# Patient Record
Sex: MALE | URBAN - METROPOLITAN AREA
[De-identification: names, ages, dates, MRNs, and addresses within clinical notes are randomized per-mention and may not be internally consistent; named-entity substitution may affect disease eponyms.]

---

## 2019-01-23 ENCOUNTER — APPOINTMENT (RX ONLY)
Dept: URBAN - METROPOLITAN AREA CLINIC 148 | Facility: CLINIC | Age: 79
Setting detail: DERMATOLOGY
End: 2019-01-23

## 2019-01-23 DIAGNOSIS — L57.0 ACTINIC KERATOSIS: ICD-10-CM

## 2019-01-23 DIAGNOSIS — D18.0 HEMANGIOMA: ICD-10-CM

## 2019-01-23 DIAGNOSIS — D22 MELANOCYTIC NEVI: ICD-10-CM

## 2019-01-23 DIAGNOSIS — L82.1 OTHER SEBORRHEIC KERATOSIS: ICD-10-CM

## 2019-01-23 DIAGNOSIS — L81.4 OTHER MELANIN HYPERPIGMENTATION: ICD-10-CM

## 2019-01-23 PROBLEM — F41.9 ANXIETY DISORDER, UNSPECIFIED: Status: ACTIVE | Noted: 2019-01-23

## 2019-01-23 PROBLEM — I10 ESSENTIAL (PRIMARY) HYPERTENSION: Status: ACTIVE | Noted: 2019-01-23

## 2019-01-23 PROBLEM — D22.5 MELANOCYTIC NEVI OF TRUNK: Status: ACTIVE | Noted: 2019-01-23

## 2019-01-23 PROBLEM — F32.9 MAJOR DEPRESSIVE DISORDER, SINGLE EPISODE, UNSPECIFIED: Status: ACTIVE | Noted: 2019-01-23

## 2019-01-23 PROBLEM — D48.5 NEOPLASM OF UNCERTAIN BEHAVIOR OF SKIN: Status: ACTIVE | Noted: 2019-01-23

## 2019-01-23 PROBLEM — D18.01 HEMANGIOMA OF SKIN AND SUBCUTANEOUS TISSUE: Status: ACTIVE | Noted: 2019-01-23

## 2019-01-23 PROBLEM — E13.9 OTHER SPECIFIED DIABETES MELLITUS WITHOUT COMPLICATIONS: Status: ACTIVE | Noted: 2019-01-23

## 2019-01-23 PROCEDURE — ? COUNSELING

## 2019-01-23 PROCEDURE — 11102 TANGNTL BX SKIN SINGLE LES: CPT | Mod: 59

## 2019-01-23 PROCEDURE — 11103 TANGNTL BX SKIN EA SEP/ADDL: CPT | Mod: 59

## 2019-01-23 PROCEDURE — 99203 OFFICE O/P NEW LOW 30 MIN: CPT | Mod: 25

## 2019-01-23 PROCEDURE — ? LIQUID NITROGEN

## 2019-01-23 PROCEDURE — 69100 BIOPSY OF EXTERNAL EAR: CPT

## 2019-01-23 PROCEDURE — 17004 DESTROY PREMAL LESIONS 15/>: CPT

## 2019-01-23 PROCEDURE — ? BIOPSY BY SHAVE METHOD

## 2019-01-23 ASSESSMENT — LOCATION DETAILED DESCRIPTION DERM
LOCATION DETAILED: LEFT ULNAR DORSAL HAND
LOCATION DETAILED: RIGHT ANTIHELIX
LOCATION DETAILED: LEFT SUPERIOR POSTERIOR NECK
LOCATION DETAILED: LEFT CENTRAL PARIETAL SCALP
LOCATION DETAILED: RIGHT ANTECUBITAL SKIN
LOCATION DETAILED: RIGHT ULNAR DORSAL HAND
LOCATION DETAILED: LEFT ELBOW
LOCATION DETAILED: LEFT SUPERIOR PREAURICULAR CHEEK
LOCATION DETAILED: LEFT SUPERIOR UPPER BACK
LOCATION DETAILED: LEFT INFERIOR MEDIAL UPPER BACK
LOCATION DETAILED: PERIUMBILICAL SKIN
LOCATION DETAILED: RIGHT DISTAL DORSAL FOREARM
LOCATION DETAILED: LEFT POSTERIOR SHOULDER
LOCATION DETAILED: LEFT MEDIAL TRAPEZIAL NECK
LOCATION DETAILED: LEFT RADIAL DORSAL HAND
LOCATION DETAILED: LEFT SUPERIOR LATERAL BUCCAL CHEEK
LOCATION DETAILED: LEFT LATERAL ELBOW
LOCATION DETAILED: LEFT MEDIAL UPPER BACK
LOCATION DETAILED: RIGHT INFERIOR CENTRAL MALAR CHEEK
LOCATION DETAILED: RIGHT MID-UPPER BACK
LOCATION DETAILED: RIGHT SUPERIOR UPPER BACK
LOCATION DETAILED: RIGHT RADIAL DORSAL HAND
LOCATION DETAILED: LEFT DISTAL POSTERIOR UPPER ARM
LOCATION DETAILED: RIGHT DISTAL RADIAL DORSAL FOREARM
LOCATION DETAILED: RIGHT PROXIMAL DORSAL FOREARM
LOCATION DETAILED: RIGHT POSTERIOR SHOULDER
LOCATION DETAILED: RIGHT SUPERIOR HELIX
LOCATION DETAILED: RIGHT ANTERIOR DISTAL UPPER ARM
LOCATION DETAILED: LEFT DISTAL DORSAL FOREARM
LOCATION DETAILED: RIGHT INFERIOR FOREHEAD
LOCATION DETAILED: LEFT CENTRAL MALAR CHEEK
LOCATION DETAILED: LEFT SUPERIOR LATERAL NECK
LOCATION DETAILED: LEFT PROXIMAL RADIAL DORSAL FOREARM
LOCATION DETAILED: RIGHT PROXIMAL RADIAL DORSAL FOREARM
LOCATION DETAILED: LEFT PROXIMAL DORSAL FOREARM

## 2019-01-23 ASSESSMENT — LOCATION SIMPLE DESCRIPTION DERM
LOCATION SIMPLE: NECK
LOCATION SIMPLE: RIGHT FOREARM
LOCATION SIMPLE: RIGHT EAR
LOCATION SIMPLE: LEFT SHOULDER
LOCATION SIMPLE: RIGHT SHOULDER
LOCATION SIMPLE: ABDOMEN
LOCATION SIMPLE: LEFT HAND
LOCATION SIMPLE: RIGHT FOREHEAD
LOCATION SIMPLE: SCALP
LOCATION SIMPLE: LEFT FOREARM
LOCATION SIMPLE: RIGHT CHEEK
LOCATION SIMPLE: LEFT UPPER BACK
LOCATION SIMPLE: LEFT UPPER ARM
LOCATION SIMPLE: POSTERIOR NECK
LOCATION SIMPLE: RIGHT ELBOW
LOCATION SIMPLE: LEFT CHEEK
LOCATION SIMPLE: RIGHT UPPER BACK
LOCATION SIMPLE: RIGHT HAND
LOCATION SIMPLE: LEFT ELBOW
LOCATION SIMPLE: RIGHT UPPER ARM

## 2019-01-23 ASSESSMENT — LOCATION ZONE DERM
LOCATION ZONE: HAND
LOCATION ZONE: EAR
LOCATION ZONE: ARM
LOCATION ZONE: TRUNK
LOCATION ZONE: FACE
LOCATION ZONE: SCALP
LOCATION ZONE: NECK

## 2019-01-23 NOTE — PROCEDURE: LIQUID NITROGEN
Detail Level: Zone
Duration Of Freeze Thaw-Cycle (Seconds): 3
Post-Care Instructions: I reviewed with the patient in detail post-care instructions. Patient is to wear sunprotection, and avoid picking at any of the treated lesions. Pt may apply Vaseline to crusted or scabbing areas.
Render Post-Care Instructions In Note?: no
Consent: The patient's consent was obtained including but not limited to risks of crusting, scabbing, blistering, scarring, darker or lighter pigmentary change, recurrence, incomplete removal and infection.

## 2019-02-13 ENCOUNTER — APPOINTMENT (RX ONLY)
Dept: URBAN - METROPOLITAN AREA CLINIC 148 | Facility: CLINIC | Age: 79
Setting detail: DERMATOLOGY
End: 2019-02-13

## 2019-02-13 PROBLEM — C44.229 SQUAMOUS CELL CARCINOMA OF SKIN OF LEFT EAR AND EXTERNAL AURICULAR CANAL: Status: ACTIVE | Noted: 2019-02-13

## 2019-02-13 PROCEDURE — 17311 MOHS 1 STAGE H/N/HF/G: CPT

## 2019-02-13 PROCEDURE — ? MOHS SURGERY

## 2019-02-13 PROCEDURE — 13152 CMPLX RPR E/N/E/L 2.6-7.5 CM: CPT

## 2019-02-13 NOTE — PROCEDURE: MOHS SURGERY
Body Location Override (Optional - Billing Will Still Be Based On Selected Body Map Location If Applicable): L posterior ear

## 2019-02-18 ENCOUNTER — APPOINTMENT (RX ONLY)
Dept: URBAN - METROPOLITAN AREA CLINIC 148 | Facility: CLINIC | Age: 79
Setting detail: DERMATOLOGY
End: 2019-02-18

## 2019-02-18 DIAGNOSIS — Z48.02 ENCOUNTER FOR REMOVAL OF SUTURES: ICD-10-CM

## 2019-02-18 PROBLEM — D04.62 CARCINOMA IN SITU OF SKIN OF LEFT UPPER LIMB, INCLUDING SHOULDER: Status: ACTIVE | Noted: 2019-02-18

## 2019-02-18 PROCEDURE — ? MOHS SURGERY

## 2019-02-18 PROCEDURE — 17313 MOHS 1 STAGE T/A/L: CPT | Mod: 79

## 2019-02-18 PROCEDURE — 99024 POSTOP FOLLOW-UP VISIT: CPT

## 2019-02-18 PROCEDURE — ? SUTURE REMOVAL (GLOBAL PERIOD)

## 2019-02-18 PROCEDURE — 13121 CMPLX RPR S/A/L 2.6-7.5 CM: CPT | Mod: 79

## 2019-02-18 ASSESSMENT — LOCATION SIMPLE DESCRIPTION DERM: LOCATION SIMPLE: LEFT EAR

## 2019-02-18 ASSESSMENT — LOCATION DETAILED DESCRIPTION DERM: LOCATION DETAILED: LEFT POSTERIOR EAR

## 2019-02-18 ASSESSMENT — LOCATION ZONE DERM: LOCATION ZONE: EAR

## 2019-02-18 NOTE — PROCEDURE: SUTURE REMOVAL (GLOBAL PERIOD)
Detail Level: Detailed
Add 00738 Cpt? (Important Note: In 2017 The Use Of 44767 Is Being Tracked By Cms To Determine Future Global Period Reimbursement For Global Periods): yes

## 2019-02-28 ENCOUNTER — APPOINTMENT (RX ONLY)
Dept: URBAN - METROPOLITAN AREA CLINIC 148 | Facility: CLINIC | Age: 79
Setting detail: DERMATOLOGY
End: 2019-02-28

## 2019-02-28 PROBLEM — C44.529 SQUAMOUS CELL CARCINOMA OF SKIN OF OTHER PART OF TRUNK: Status: ACTIVE | Noted: 2019-02-28

## 2019-02-28 PROCEDURE — ? EXCISION

## 2019-02-28 PROCEDURE — 13101 CMPLX RPR TRUNK 2.6-7.5 CM: CPT | Mod: 79

## 2019-02-28 PROCEDURE — 11602 EXC TR-EXT MAL+MARG 1.1-2 CM: CPT | Mod: 79

## 2019-02-28 PROCEDURE — ? PRESCRIPTION

## 2019-02-28 RX ORDER — MUPIROCIN 20 MG/G
OINTMENT TOPICAL
Qty: 1 | Refills: 0 | Status: ERX | COMMUNITY
Start: 2019-02-28

## 2019-02-28 RX ADMIN — MUPIROCIN: 20 OINTMENT TOPICAL at 19:51

## 2019-02-28 NOTE — PROCEDURE: EXCISION
Burow's Advancement Flap Text: The defect edges were debeveled with a #15 scalpel blade.  Given the location of the defect and the proximity to free margins a Burow's advancement flap was deemed most appropriate.  Using a sterile surgical marker, the appropriate advancement flap was drawn incorporating the defect and placing the expected incisions within the relaxed skin tension lines where possible.    The area thus outlined was incised deep to adipose tissue with a #15 scalpel blade.  The skin margins were undermined to an appropriate distance in all directions utilizing iris scissors. Viral syndrome

## 2019-03-01 ENCOUNTER — APPOINTMENT (RX ONLY)
Dept: URBAN - METROPOLITAN AREA CLINIC 148 | Facility: CLINIC | Age: 79
Setting detail: DERMATOLOGY
End: 2019-03-01

## 2019-03-01 DIAGNOSIS — Z48.817 ENCOUNTER FOR SURGICAL AFTERCARE FOLLOWING SURGERY ON THE SKIN AND SUBCUTANEOUS TISSUE: ICD-10-CM

## 2019-03-01 PROCEDURE — ? POST-OP WOUND CHECK

## 2019-03-01 ASSESSMENT — LOCATION DETAILED DESCRIPTION DERM: LOCATION DETAILED: LEFT POSTERIOR EAR

## 2019-03-01 ASSESSMENT — LOCATION SIMPLE DESCRIPTION DERM: LOCATION SIMPLE: LEFT EAR

## 2019-03-01 ASSESSMENT — LOCATION ZONE DERM: LOCATION ZONE: EAR

## 2019-03-01 NOTE — PROCEDURE: POST-OP WOUND CHECK
Body Location Override (Optional - Billing Will Still Be Based On Selected Body Map Location If Applicable): L posterior ear
Detail Level: Detailed
Add 02585 Cpt? (Important Note: In 2017 The Use Of 31147 Is Being Tracked By Cms To Determine Future Global Period Reimbursement For Global Periods): no
Wound Assessment Override (Optional): no signs of infection, good granulation, no exudate

## 2019-03-04 ENCOUNTER — APPOINTMENT (RX ONLY)
Dept: URBAN - METROPOLITAN AREA CLINIC 148 | Facility: CLINIC | Age: 79
Setting detail: DERMATOLOGY
End: 2019-03-04

## 2019-03-04 DIAGNOSIS — Z48.02 ENCOUNTER FOR REMOVAL OF SUTURES: ICD-10-CM

## 2019-03-04 DIAGNOSIS — L57.0 ACTINIC KERATOSIS: ICD-10-CM

## 2019-03-04 DIAGNOSIS — Z48.817 ENCOUNTER FOR SURGICAL AFTERCARE FOLLOWING SURGERY ON THE SKIN AND SUBCUTANEOUS TISSUE: ICD-10-CM

## 2019-03-04 DIAGNOSIS — L82.1 OTHER SEBORRHEIC KERATOSIS: ICD-10-CM

## 2019-03-04 PROCEDURE — ? ADDITIONAL NOTES

## 2019-03-04 PROCEDURE — 99213 OFFICE O/P EST LOW 20 MIN: CPT | Mod: 24,25

## 2019-03-04 PROCEDURE — ? LIQUID NITROGEN

## 2019-03-04 PROCEDURE — ? POST-OP WOUND CHECK

## 2019-03-04 PROCEDURE — ? SUTURE REMOVAL (GLOBAL PERIOD)

## 2019-03-04 PROCEDURE — 17004 DESTROY PREMAL LESIONS 15/>: CPT | Mod: 79

## 2019-03-04 PROCEDURE — ? COUNSELING

## 2019-03-04 PROCEDURE — ? PRESCRIPTION MEDICATION MANAGEMENT

## 2019-03-04 PROCEDURE — ? PRESCRIPTION

## 2019-03-04 PROCEDURE — 99024 POSTOP FOLLOW-UP VISIT: CPT

## 2019-03-04 RX ORDER — FLUOROURACIL 20 MG/ML
SOLUTION TOPICAL
Qty: 1 | Refills: 0 | Status: ERX | COMMUNITY
Start: 2019-03-04

## 2019-03-04 RX ORDER — TRIAMCINOLONE ACETONIDE 1 MG/G
CREAM TOPICAL
Qty: 1 | Refills: 0 | Status: ERX | COMMUNITY
Start: 2019-03-04

## 2019-03-04 RX ADMIN — FLUOROURACIL: 20 SOLUTION TOPICAL at 15:19

## 2019-03-04 RX ADMIN — TRIAMCINOLONE ACETONIDE: 1 CREAM TOPICAL at 15:20

## 2019-03-04 ASSESSMENT — LOCATION DETAILED DESCRIPTION DERM
LOCATION DETAILED: RIGHT PROXIMAL DORSAL FOREARM
LOCATION DETAILED: RIGHT RADIAL DORSAL HAND
LOCATION DETAILED: RIGHT PROXIMAL RADIAL DORSAL FOREARM
LOCATION DETAILED: RIGHT DISTAL RADIAL DORSAL FOREARM
LOCATION DETAILED: LEFT DISTAL RADIAL DORSAL FOREARM
LOCATION DETAILED: LEFT POSTERIOR EAR
LOCATION DETAILED: RIGHT ULNAR DORSAL HAND
LOCATION DETAILED: LEFT DISTAL POSTERIOR UPPER ARM
LOCATION DETAILED: LEFT CENTRAL MALAR CHEEK
LOCATION DETAILED: LEFT PROXIMAL LATERAL POSTERIOR UPPER ARM
LOCATION DETAILED: LEFT PROXIMAL DORSAL FOREARM
LOCATION DETAILED: LEFT PROXIMAL POSTERIOR UPPER ARM
LOCATION DETAILED: LEFT DISTAL DORSAL FOREARM
LOCATION DETAILED: RIGHT DISTAL DORSAL FOREARM

## 2019-03-04 ASSESSMENT — LOCATION SIMPLE DESCRIPTION DERM
LOCATION SIMPLE: LEFT EAR
LOCATION SIMPLE: LEFT FOREARM
LOCATION SIMPLE: LEFT CHEEK
LOCATION SIMPLE: RIGHT FOREARM
LOCATION SIMPLE: RIGHT HAND
LOCATION SIMPLE: LEFT UPPER ARM

## 2019-03-04 ASSESSMENT — LOCATION ZONE DERM
LOCATION ZONE: HAND
LOCATION ZONE: ARM
LOCATION ZONE: EAR
LOCATION ZONE: FACE

## 2019-03-04 NOTE — PROCEDURE: SUTURE REMOVAL (GLOBAL PERIOD)
Detail Level: Zone
Add 31234 Cpt? (Important Note: In 2017 The Use Of 36679 Is Being Tracked By Cms To Determine Future Global Period Reimbursement For Global Periods): yes

## 2019-03-04 NOTE — PROCEDURE: LIQUID NITROGEN
Duration Of Freeze Thaw-Cycle (Seconds): 3
Consent: The patient's consent was obtained including but not limited to risks of crusting, scabbing, blistering, scarring, darker or lighter pigmentary change, recurrence, incomplete removal and infection.
Detail Level: Zone
Post-Care Instructions: I reviewed with the patient in detail post-care instructions. Patient is to wear sunprotection, and avoid picking at any of the treated lesions. Pt may apply Vaseline to crusted or scabbing areas.
Render Post-Care Instructions In Note?: no

## 2019-03-04 NOTE — PROCEDURE: ADDITIONAL NOTES
Detail Level: Simple
Additional Notes: Discussed with patient to start with the right arm first. Let the wound healed completely on left wrist and then do the other arm. Pt understood and agreed with treatment plan

## 2019-03-04 NOTE — PROCEDURE: POST-OP WOUND CHECK
Add 44760 Cpt? (Important Note: In 2017 The Use Of 70274 Is Being Tracked By Cms To Determine Future Global Period Reimbursement For Global Periods): no
Detail Level: Detailed
Body Location Override (Optional - Billing Will Still Be Based On Selected Body Map Location If Applicable): L posterior ear
Wound Assessment Override (Optional): no signs of infection, good granulation

## 2019-03-04 NOTE — PROCEDURE: PRESCRIPTION MEDICATION MANAGEMENT
Render In Strict Bullet Format?: No
Continue Regimen: Mupirocin BID until healed as Cathy ALVARADO discussed with patient last time
Detail Level: Simple

## 2019-03-15 ENCOUNTER — APPOINTMENT (RX ONLY)
Dept: URBAN - METROPOLITAN AREA CLINIC 148 | Facility: CLINIC | Age: 79
Setting detail: DERMATOLOGY
End: 2019-03-15

## 2019-03-15 DIAGNOSIS — Z48.02 ENCOUNTER FOR REMOVAL OF SUTURES: ICD-10-CM

## 2019-03-15 PROCEDURE — ? SUTURE REMOVAL (GLOBAL PERIOD)

## 2019-03-15 PROCEDURE — 99024 POSTOP FOLLOW-UP VISIT: CPT

## 2019-03-15 ASSESSMENT — LOCATION SIMPLE DESCRIPTION DERM: LOCATION SIMPLE: RIGHT UPPER BACK

## 2019-03-15 ASSESSMENT — LOCATION ZONE DERM: LOCATION ZONE: TRUNK

## 2019-03-15 ASSESSMENT — LOCATION DETAILED DESCRIPTION DERM: LOCATION DETAILED: RIGHT SUPERIOR UPPER BACK

## 2019-03-15 NOTE — PROCEDURE: SUTURE REMOVAL (GLOBAL PERIOD)
Body Location Override (Optional - Billing Will Still Be Based On Selected Body Map Location If Applicable): right superior upper back
Detail Level: Detailed
Add 47065 Cpt? (Important Note: In 2017 The Use Of 28052 Is Being Tracked By Cms To Determine Future Global Period Reimbursement For Global Periods): yes

## 2019-04-01 ENCOUNTER — APPOINTMENT (RX ONLY)
Dept: URBAN - METROPOLITAN AREA CLINIC 148 | Facility: CLINIC | Age: 79
Setting detail: DERMATOLOGY
End: 2019-04-01

## 2019-04-01 DIAGNOSIS — L57.0 ACTINIC KERATOSIS: ICD-10-CM

## 2019-04-01 DIAGNOSIS — L81.4 OTHER MELANIN HYPERPIGMENTATION: ICD-10-CM

## 2019-04-01 PROBLEM — Z85.828 PERSONAL HISTORY OF OTHER MALIGNANT NEOPLASM OF SKIN: Status: ACTIVE | Noted: 2019-04-01

## 2019-04-01 PROCEDURE — ? LIQUID NITROGEN

## 2019-04-01 PROCEDURE — ? PRESCRIPTION

## 2019-04-01 PROCEDURE — 99213 OFFICE O/P EST LOW 20 MIN: CPT | Mod: 25

## 2019-04-01 PROCEDURE — ? COUNSELING

## 2019-04-01 PROCEDURE — 17004 DESTROY PREMAL LESIONS 15/>: CPT

## 2019-04-01 PROCEDURE — ? PRESCRIPTION MEDICATION MANAGEMENT

## 2019-04-01 RX ORDER — FLUOROURACIL 2 G/40G
CREAM TOPICAL
Qty: 1 | Refills: 0 | COMMUNITY
Start: 2019-04-01

## 2019-04-01 RX ADMIN — FLUOROURACIL: 2 CREAM TOPICAL at 13:24

## 2019-04-01 ASSESSMENT — LOCATION DETAILED DESCRIPTION DERM
LOCATION DETAILED: LEFT DISTAL LATERAL POSTERIOR UPPER ARM
LOCATION DETAILED: LEFT PROXIMAL ULNAR DORSAL FOREARM
LOCATION DETAILED: LEFT ANTERIOR DISTAL UPPER ARM
LOCATION DETAILED: LEFT PROXIMAL RADIAL DORSAL FOREARM
LOCATION DETAILED: LEFT PROXIMAL POSTERIOR UPPER ARM
LOCATION DETAILED: LEFT PROXIMAL ULNAR DORSAL RING FINGER
LOCATION DETAILED: RIGHT PROXIMAL DORSAL FOREARM
LOCATION DETAILED: LEFT INFERIOR CENTRAL MALAR CHEEK
LOCATION DETAILED: RIGHT ULNAR DORSAL HAND
LOCATION DETAILED: LEFT DISTAL POSTERIOR UPPER ARM
LOCATION DETAILED: LEFT MEDIAL SUPERIOR CHEST
LOCATION DETAILED: RIGHT DISTAL POSTERIOR UPPER ARM
LOCATION DETAILED: RIGHT PROXIMAL DORSAL MIDDLE FINGER
LOCATION DETAILED: RIGHT DISTAL RADIAL DORSAL FOREARM
LOCATION DETAILED: RIGHT PROXIMAL RADIAL DORSAL FOREARM
LOCATION DETAILED: RIGHT RADIAL DORSAL HAND
LOCATION DETAILED: SUPERIOR THORACIC SPINE
LOCATION DETAILED: LEFT PROXIMAL DORSAL FOREARM
LOCATION DETAILED: LEFT ULNAR DORSAL HAND
LOCATION DETAILED: RIGHT DISTAL DORSAL FOREARM
LOCATION DETAILED: LEFT MEDIAL DORSAL WRIST
LOCATION DETAILED: LEFT RADIAL DORSAL HAND
LOCATION DETAILED: LEFT DISTAL DORSAL FOREARM

## 2019-04-01 ASSESSMENT — LOCATION ZONE DERM
LOCATION ZONE: TRUNK
LOCATION ZONE: HAND
LOCATION ZONE: FACE
LOCATION ZONE: ARM
LOCATION ZONE: FINGER

## 2019-04-01 ASSESSMENT — LOCATION SIMPLE DESCRIPTION DERM
LOCATION SIMPLE: LEFT RING FINGER
LOCATION SIMPLE: LEFT CHEEK
LOCATION SIMPLE: UPPER BACK
LOCATION SIMPLE: LEFT WRIST
LOCATION SIMPLE: LEFT UPPER ARM
LOCATION SIMPLE: CHEST
LOCATION SIMPLE: RIGHT FOREARM
LOCATION SIMPLE: RIGHT MIDDLE FINGER
LOCATION SIMPLE: LEFT HAND
LOCATION SIMPLE: RIGHT HAND
LOCATION SIMPLE: RIGHT UPPER ARM
LOCATION SIMPLE: LEFT FOREARM

## 2019-04-01 NOTE — PROCEDURE: LIQUID NITROGEN
Post-Care Instructions: I reviewed with the patient in detail post-care instructions. Patient is to wear sunprotection, and avoid picking at any of the treated lesions. Pt may apply Vaseline to crusted or scabbing areas.
Duration Of Freeze Thaw-Cycle (Seconds): 3
Consent: The patient's consent was obtained including but not limited to risks of crusting, scabbing, blistering, scarring, darker or lighter pigmentary change, recurrence, incomplete removal and infection.
Render Post-Care Instructions In Note?: no
Detail Level: Zone

## 2019-04-01 NOTE — PROCEDURE: PRESCRIPTION MEDICATION MANAGEMENT
Discontinue Regimen: TAC 0.1%
Plan: Explained to patient to repeat treatment in 3 months again on arms. Apply efudex cream twice a day for two weeks, then use TAC cream BID for two weeks and stop. Moisturize daily. Patient was only spot treating the arms with minimal reaction.
Detail Level: Simple
Render In Strict Bullet Format?: No

## 2021-05-08 ENCOUNTER — INPATIENT (INPATIENT)
Facility: HOSPITAL | Age: 81
LOS: 5 days | Discharge: HOME | DRG: 260 | End: 2021-05-14
Attending: INTERNAL MEDICINE | Admitting: INTERNAL MEDICINE
Payer: MEDICARE

## 2021-05-08 VITALS
SYSTOLIC BLOOD PRESSURE: 184 MMHG | HEART RATE: 104 BPM | OXYGEN SATURATION: 95 % | WEIGHT: 240.08 LBS | TEMPERATURE: 98 F | DIASTOLIC BLOOD PRESSURE: 101 MMHG | RESPIRATION RATE: 18 BRPM | HEIGHT: 73 IN

## 2021-05-08 DIAGNOSIS — Z95.1 PRESENCE OF AORTOCORONARY BYPASS GRAFT: Chronic | ICD-10-CM

## 2021-05-08 LAB
ABO RH CONFIRMATION: SIGNIFICANT CHANGE UP
ALBUMIN SERPL ELPH-MCNC: 3.8 G/DL — SIGNIFICANT CHANGE UP (ref 3.3–5.2)
ALP SERPL-CCNC: 143 U/L — HIGH (ref 40–120)
ALT FLD-CCNC: 15 U/L — SIGNIFICANT CHANGE UP
ANION GAP SERPL CALC-SCNC: 14 MMOL/L — SIGNIFICANT CHANGE UP (ref 5–17)
ANISOCYTOSIS BLD QL: SLIGHT — SIGNIFICANT CHANGE UP
APPEARANCE UR: CLEAR — SIGNIFICANT CHANGE UP
APTT BLD: 36.5 SEC — HIGH (ref 27.5–35.5)
AST SERPL-CCNC: 22 U/L — SIGNIFICANT CHANGE UP
BACTERIA # UR AUTO: NEGATIVE — SIGNIFICANT CHANGE UP
BASOPHILS # BLD AUTO: 0.14 K/UL — SIGNIFICANT CHANGE UP (ref 0–0.2)
BASOPHILS NFR BLD AUTO: 1.7 % — SIGNIFICANT CHANGE UP (ref 0–2)
BILIRUB SERPL-MCNC: 0.6 MG/DL — SIGNIFICANT CHANGE UP (ref 0.4–2)
BILIRUB UR-MCNC: NEGATIVE — SIGNIFICANT CHANGE UP
BLD GP AB SCN SERPL QL: SIGNIFICANT CHANGE UP
BUN SERPL-MCNC: 46 MG/DL — HIGH (ref 8–20)
BURR CELLS BLD QL SMEAR: SLIGHT — SIGNIFICANT CHANGE UP
CALCIUM SERPL-MCNC: 8.6 MG/DL — SIGNIFICANT CHANGE UP (ref 8.6–10.2)
CHLORIDE SERPL-SCNC: 100 MMOL/L — SIGNIFICANT CHANGE UP (ref 98–107)
CK MB CFR SERPL CALC: 6.1 NG/ML — SIGNIFICANT CHANGE UP (ref 0–6.7)
CK SERPL-CCNC: 155 U/L — SIGNIFICANT CHANGE UP (ref 30–200)
CO2 SERPL-SCNC: 28 MMOL/L — SIGNIFICANT CHANGE UP (ref 22–29)
COLOR SPEC: YELLOW — SIGNIFICANT CHANGE UP
CREAT SERPL-MCNC: 2.12 MG/DL — HIGH (ref 0.5–1.3)
DACRYOCYTES BLD QL SMEAR: SLIGHT — SIGNIFICANT CHANGE UP
DIFF PNL FLD: ABNORMAL
ELLIPTOCYTES BLD QL SMEAR: SLIGHT — SIGNIFICANT CHANGE UP
EOSINOPHIL # BLD AUTO: 0.07 K/UL — SIGNIFICANT CHANGE UP (ref 0–0.5)
EOSINOPHIL NFR BLD AUTO: 0.9 % — SIGNIFICANT CHANGE UP (ref 0–6)
EPI CELLS # UR: NEGATIVE — SIGNIFICANT CHANGE UP
ETHANOL SERPL-MCNC: <10 MG/DL — SIGNIFICANT CHANGE UP (ref 0–9)
GLUCOSE BLDC GLUCOMTR-MCNC: 116 MG/DL — HIGH (ref 70–99)
GLUCOSE SERPL-MCNC: 77 MG/DL — SIGNIFICANT CHANGE UP (ref 70–99)
GLUCOSE UR QL: NEGATIVE MG/DL — SIGNIFICANT CHANGE UP
HCT VFR BLD CALC: 36.2 % — LOW (ref 39–50)
HGB BLD-MCNC: 11.1 G/DL — LOW (ref 13–17)
INR BLD: 2.03 RATIO — HIGH (ref 0.88–1.16)
KETONES UR-MCNC: ABNORMAL
LACTATE BLDV-MCNC: 1.3 MMOL/L — SIGNIFICANT CHANGE UP (ref 0.5–2)
LEUKOCYTE ESTERASE UR-ACNC: NEGATIVE — SIGNIFICANT CHANGE UP
LIDOCAIN IGE QN: 13 U/L — LOW (ref 22–51)
LYMPHOCYTES # BLD AUTO: 0.36 K/UL — LOW (ref 1–3.3)
LYMPHOCYTES # BLD AUTO: 4.3 % — LOW (ref 13–44)
MACROCYTES BLD QL: SLIGHT — SIGNIFICANT CHANGE UP
MANUAL SMEAR VERIFICATION: SIGNIFICANT CHANGE UP
MCHC RBC-ENTMCNC: 27.7 PG — SIGNIFICANT CHANGE UP (ref 27–34)
MCHC RBC-ENTMCNC: 30.7 GM/DL — LOW (ref 32–36)
MCV RBC AUTO: 90.3 FL — SIGNIFICANT CHANGE UP (ref 80–100)
MICROCYTES BLD QL: SLIGHT — SIGNIFICANT CHANGE UP
MONOCYTES # BLD AUTO: 0.29 K/UL — SIGNIFICANT CHANGE UP (ref 0–0.9)
MONOCYTES NFR BLD AUTO: 3.5 % — SIGNIFICANT CHANGE UP (ref 2–14)
NEUTROPHILS # BLD AUTO: 7.43 K/UL — HIGH (ref 1.8–7.4)
NEUTROPHILS NFR BLD AUTO: 89.6 % — HIGH (ref 43–77)
NITRITE UR-MCNC: NEGATIVE — SIGNIFICANT CHANGE UP
NT-PROBNP SERPL-SCNC: HIGH PG/ML (ref 0–300)
OVALOCYTES BLD QL SMEAR: SLIGHT — SIGNIFICANT CHANGE UP
PH UR: 6 — SIGNIFICANT CHANGE UP (ref 5–8)
PLAT MORPH BLD: NORMAL — SIGNIFICANT CHANGE UP
PLATELET # BLD AUTO: 180 K/UL — SIGNIFICANT CHANGE UP (ref 150–400)
PLATELET COUNT - ESTIMATE: NORMAL — SIGNIFICANT CHANGE UP
POIKILOCYTOSIS BLD QL AUTO: SLIGHT — SIGNIFICANT CHANGE UP
POLYCHROMASIA BLD QL SMEAR: SLIGHT — SIGNIFICANT CHANGE UP
POTASSIUM SERPL-MCNC: 3.3 MMOL/L — LOW (ref 3.5–5.3)
POTASSIUM SERPL-SCNC: 3.3 MMOL/L — LOW (ref 3.5–5.3)
PROT SERPL-MCNC: 6.9 G/DL — SIGNIFICANT CHANGE UP (ref 6.6–8.7)
PROT UR-MCNC: 100 MG/DL
PROTHROM AB SERPL-ACNC: 22.8 SEC — HIGH (ref 10.6–13.6)
RBC # BLD: 4.01 M/UL — LOW (ref 4.2–5.8)
RBC # FLD: 17.2 % — HIGH (ref 10.3–14.5)
RBC BLD AUTO: NORMAL — SIGNIFICANT CHANGE UP
RBC CASTS # UR COMP ASSIST: ABNORMAL /HPF (ref 0–4)
SARS-COV-2 RNA SPEC QL NAA+PROBE: SIGNIFICANT CHANGE UP
SODIUM SERPL-SCNC: 142 MMOL/L — SIGNIFICANT CHANGE UP (ref 135–145)
SP GR SPEC: 1.01 — SIGNIFICANT CHANGE UP (ref 1.01–1.02)
TROPONIN T SERPL-MCNC: 0.04 NG/ML — SIGNIFICANT CHANGE UP (ref 0–0.06)
TROPONIN T SERPL-MCNC: 0.04 NG/ML — SIGNIFICANT CHANGE UP (ref 0–0.06)
UROBILINOGEN FLD QL: NEGATIVE MG/DL — SIGNIFICANT CHANGE UP
WBC # BLD: 8.29 K/UL — SIGNIFICANT CHANGE UP (ref 3.8–10.5)
WBC # FLD AUTO: 8.29 K/UL — SIGNIFICANT CHANGE UP (ref 3.8–10.5)
WBC UR QL: SIGNIFICANT CHANGE UP

## 2021-05-08 PROCEDURE — 73060 X-RAY EXAM OF HUMERUS: CPT | Mod: 26,RT

## 2021-05-08 PROCEDURE — 93970 EXTREMITY STUDY: CPT | Mod: 26

## 2021-05-08 PROCEDURE — 72170 X-RAY EXAM OF PELVIS: CPT | Mod: 26

## 2021-05-08 PROCEDURE — 72125 CT NECK SPINE W/O DYE: CPT | Mod: 26,MA

## 2021-05-08 PROCEDURE — 70450 CT HEAD/BRAIN W/O DYE: CPT | Mod: 26,MA

## 2021-05-08 PROCEDURE — 71250 CT THORAX DX C-: CPT | Mod: 26,MA

## 2021-05-08 PROCEDURE — 70450 CT HEAD/BRAIN W/O DYE: CPT | Mod: 26,MA,77

## 2021-05-08 PROCEDURE — 74176 CT ABD & PELVIS W/O CONTRAST: CPT | Mod: 26,MA

## 2021-05-08 PROCEDURE — 71045 X-RAY EXAM CHEST 1 VIEW: CPT | Mod: 26

## 2021-05-08 RX ORDER — METOPROLOL TARTRATE 50 MG
5 TABLET ORAL ONCE
Refills: 0 | Status: COMPLETED | OUTPATIENT
Start: 2021-05-08 | End: 2021-05-08

## 2021-05-08 RX ORDER — LISINOPRIL 2.5 MG/1
10 TABLET ORAL DAILY
Refills: 0 | Status: DISCONTINUED | OUTPATIENT
Start: 2021-05-08 | End: 2021-05-08

## 2021-05-08 RX ORDER — INSULIN LISPRO 100/ML
VIAL (ML) SUBCUTANEOUS
Refills: 0 | Status: DISCONTINUED | OUTPATIENT
Start: 2021-05-08 | End: 2021-05-14

## 2021-05-08 RX ORDER — FUROSEMIDE 40 MG
60 TABLET ORAL ONCE
Refills: 0 | Status: DISCONTINUED | OUTPATIENT
Start: 2021-05-08 | End: 2021-05-08

## 2021-05-08 RX ORDER — DEXTROSE 50 % IN WATER 50 %
25 SYRINGE (ML) INTRAVENOUS ONCE
Refills: 0 | Status: DISCONTINUED | OUTPATIENT
Start: 2021-05-08 | End: 2021-05-14

## 2021-05-08 RX ORDER — AMLODIPINE BESYLATE 2.5 MG/1
10 TABLET ORAL DAILY
Refills: 0 | Status: DISCONTINUED | OUTPATIENT
Start: 2021-05-08 | End: 2021-05-11

## 2021-05-08 RX ORDER — POTASSIUM CHLORIDE 20 MEQ
40 PACKET (EA) ORAL ONCE
Refills: 0 | Status: COMPLETED | OUTPATIENT
Start: 2021-05-08 | End: 2021-05-08

## 2021-05-08 RX ORDER — ACETAMINOPHEN 500 MG
975 TABLET ORAL ONCE
Refills: 0 | Status: COMPLETED | OUTPATIENT
Start: 2021-05-08 | End: 2021-05-08

## 2021-05-08 RX ORDER — SODIUM CHLORIDE 9 MG/ML
1000 INJECTION, SOLUTION INTRAVENOUS
Refills: 0 | Status: DISCONTINUED | OUTPATIENT
Start: 2021-05-08 | End: 2021-05-14

## 2021-05-08 RX ORDER — DEXTROSE 50 % IN WATER 50 %
15 SYRINGE (ML) INTRAVENOUS ONCE
Refills: 0 | Status: DISCONTINUED | OUTPATIENT
Start: 2021-05-08 | End: 2021-05-14

## 2021-05-08 RX ORDER — FUROSEMIDE 40 MG
40 TABLET ORAL DAILY
Refills: 0 | Status: DISCONTINUED | OUTPATIENT
Start: 2021-05-08 | End: 2021-05-08

## 2021-05-08 RX ORDER — INSULIN GLARGINE 100 [IU]/ML
7 INJECTION, SOLUTION SUBCUTANEOUS
Refills: 0 | Status: DISCONTINUED | OUTPATIENT
Start: 2021-05-08 | End: 2021-05-14

## 2021-05-08 RX ORDER — SERTRALINE 25 MG/1
100 TABLET, FILM COATED ORAL DAILY
Refills: 0 | Status: DISCONTINUED | OUTPATIENT
Start: 2021-05-08 | End: 2021-05-14

## 2021-05-08 RX ORDER — CARVEDILOL PHOSPHATE 80 MG/1
6.25 CAPSULE, EXTENDED RELEASE ORAL EVERY 12 HOURS
Refills: 0 | Status: DISCONTINUED | OUTPATIENT
Start: 2021-05-08 | End: 2021-05-08

## 2021-05-08 RX ORDER — DILTIAZEM HCL 120 MG
10 CAPSULE, EXT RELEASE 24 HR ORAL ONCE
Refills: 0 | Status: COMPLETED | OUTPATIENT
Start: 2021-05-08 | End: 2021-05-08

## 2021-05-08 RX ORDER — MAGNESIUM OXIDE 400 MG ORAL TABLET 241.3 MG
400 TABLET ORAL DAILY
Refills: 0 | Status: DISCONTINUED | OUTPATIENT
Start: 2021-05-08 | End: 2021-05-10

## 2021-05-08 RX ORDER — METOPROLOL TARTRATE 50 MG
50 TABLET ORAL
Refills: 0 | Status: DISCONTINUED | OUTPATIENT
Start: 2021-05-08 | End: 2021-05-09

## 2021-05-08 RX ORDER — FUROSEMIDE 40 MG
40 TABLET ORAL DAILY
Refills: 0 | Status: DISCONTINUED | OUTPATIENT
Start: 2021-05-08 | End: 2021-05-10

## 2021-05-08 RX ORDER — DEXTROSE 50 % IN WATER 50 %
12.5 SYRINGE (ML) INTRAVENOUS ONCE
Refills: 0 | Status: DISCONTINUED | OUTPATIENT
Start: 2021-05-08 | End: 2021-05-14

## 2021-05-08 RX ORDER — APIXABAN 2.5 MG/1
2 TABLET, FILM COATED ORAL
Refills: 0 | Status: DISCONTINUED | OUTPATIENT
Start: 2021-05-08 | End: 2021-05-08

## 2021-05-08 RX ORDER — GLUCAGON INJECTION, SOLUTION 0.5 MG/.1ML
1 INJECTION, SOLUTION SUBCUTANEOUS ONCE
Refills: 0 | Status: DISCONTINUED | OUTPATIENT
Start: 2021-05-08 | End: 2021-05-14

## 2021-05-08 RX ORDER — TAMSULOSIN HYDROCHLORIDE 0.4 MG/1
0.4 CAPSULE ORAL AT BEDTIME
Refills: 0 | Status: DISCONTINUED | OUTPATIENT
Start: 2021-05-08 | End: 2021-05-14

## 2021-05-08 RX ORDER — PANTOPRAZOLE SODIUM 20 MG/1
40 TABLET, DELAYED RELEASE ORAL
Refills: 0 | Status: DISCONTINUED | OUTPATIENT
Start: 2021-05-08 | End: 2021-05-14

## 2021-05-08 RX ORDER — APIXABAN 2.5 MG/1
5 TABLET, FILM COATED ORAL ONCE
Refills: 0 | Status: COMPLETED | OUTPATIENT
Start: 2021-05-08 | End: 2021-05-08

## 2021-05-08 RX ADMIN — Medication 975 MILLIGRAM(S): at 09:01

## 2021-05-08 RX ADMIN — Medication 10 MILLIGRAM(S): at 17:23

## 2021-05-08 RX ADMIN — Medication 40 MILLIGRAM(S): at 12:43

## 2021-05-08 RX ADMIN — Medication 40 MILLIEQUIVALENT(S): at 12:42

## 2021-05-08 RX ADMIN — INSULIN GLARGINE 7 UNIT(S): 100 INJECTION, SOLUTION SUBCUTANEOUS at 21:12

## 2021-05-08 RX ADMIN — Medication 5 MILLIGRAM(S): at 18:31

## 2021-05-08 RX ADMIN — Medication 10 MILLIGRAM(S): at 17:10

## 2021-05-08 RX ADMIN — Medication 975 MILLIGRAM(S): at 12:22

## 2021-05-08 RX ADMIN — TAMSULOSIN HYDROCHLORIDE 0.4 MILLIGRAM(S): 0.4 CAPSULE ORAL at 20:07

## 2021-05-08 RX ADMIN — CARVEDILOL PHOSPHATE 6.25 MILLIGRAM(S): 80 CAPSULE, EXTENDED RELEASE ORAL at 16:19

## 2021-05-08 RX ADMIN — APIXABAN 5 MILLIGRAM(S): 2.5 TABLET, FILM COATED ORAL at 20:07

## 2021-05-08 NOTE — ED PROVIDER NOTE - PROGRESS NOTE DETAILS
Juan Jose: I discussed with Joselyn COATS who will evaluate the patient. I shared results with patient and wife. Patient has no contraindications to MRI. Juan Jose: I discussed repeat CT head findings with NSx PA who notes there is nothing more to do from NSx perspective, pending echo.

## 2021-05-08 NOTE — ED ADULT NURSE NOTE - NSIMPLEMENTINTERV_GEN_ALL_ED
Implemented All Fall with Harm Risk Interventions:  [place] to call system. Call bell, personal items and telephone within reach. Instruct patient to call for assistance. Room bathroom lighting operational. Non-slip footwear when patient is off stretcher. Physically safe environment: no spills, clutter or unnecessary equipment. Stretcher in lowest position, wheels locked, appropriate side rails in place. Provide visual cue, wrist band, yellow gown, etc. Monitor gait and stability. Monitor for mental status changes and reorient to person, place, and time. Review medications for side effects contributing to fall risk. Reinforce activity limits and safety measures with patient and family. Provide visual clues: red socks.

## 2021-05-08 NOTE — ED PROVIDER NOTE - CARE PLAN
Principal Discharge DX:	Intracranial hemorrhage  Secondary Diagnosis:	Acute on chronic congestive heart failure, unspecified heart failure type   Principal Discharge DX:	Atrial fibrillation, unspecified type  Secondary Diagnosis:	Acute on chronic congestive heart failure, unspecified heart failure type

## 2021-05-08 NOTE — ED PROVIDER NOTE - OBJECTIVE STATEMENT
79 y/o M with PMH A fib on Eliquis, CHF, CAD s/p CABG x 2 presents with wife for fall that occurred 4 days ago walking on his deck without using a walker or cane (patient has both, refuses to use either). He states he missed a step and fell, landing mostly on his right side, did not hit head or lose consciousness, was helped up by a neighbor. Patient states that his left leg "went out from under me," and that two weeks ago he fell again, also to the right side and suffered bruising to his abdomen. He has felt lightheaded since the fall, complaining of right upper arm pain, low back pain. He has been taking tramadol for pain since the fall. He has refused to come to the ED until today because the pain was not getting much better. His wife states that his legs have been increasingly swollen despite taking his diuretic. [facility] [place] [facility] - he has not been in [place] for the past 18 months due to the pandemic. He is fully vaccinated. He denies allergies to medications.    Wife told RN that yesterday his lips turned purple for a few minutes, which has never happened before. Patient has no complaints regarding SOB or cyanosis.

## 2021-05-08 NOTE — ED PROVIDER NOTE - PMH
Atrial fibrillation, unspecified type    Bell's palsy  Right side of face  Congestive heart failure, unspecified HF chronicity, unspecified heart failure type    Coronary artery disease involving native heart, angina presence unspecified, unspecified vessel or lesion type

## 2021-05-08 NOTE — ED ADULT TRIAGE NOTE - CHIEF COMPLAINT QUOTE
patient brought in by wife HX of DM and HTN, states that he missed a step fell denies hitting head and LOC on Eliquis complaining of right shoulder and arm pain and back pain feeling dizzy, states feeling weak on lower left side, patient states that he fell TUESDAY, states that n lately he has been off balance for the past week

## 2021-05-08 NOTE — ED CDU PROVIDER INITIAL DAY NOTE - MEDICAL DECISION MAKING DETAILS
Patient is a 80 year old male with pmhx of CAD, CABG, A-fib, HTN, DM2 who presents for fall, patient had trauma CT which are negative.  patient will be placed in obs for TTE and daily weights, cardiology will see again in AM.    will add on 2nd trop, and doppler

## 2021-05-08 NOTE — CONSULT NOTE ADULT - SUBJECTIVE AND OBJECTIVE BOX
Neurosurgery [facility]  Consult note     This is a 80 year old right hand dominant male with a past medical history significant for AFIB on Eliquis, CHF, CAD, CABG x 2, right Belly's Palsy,       PMHX: angina, Atrial fibrillation, CHF, right Bell's Palsy   PSHX: CABG (coronary artery bypass graft)  FAMILY HISTORY:    SOCIAL HISTORY:  Tobacco Use:  EtOH use:   Substance:    Allergies: No Known Allergies    REVIEW OF SYSTEMS  CONSTITUTIONAL: No fever, weight loss, or fatigue  EYES: No eye pain, visual disturbances, or discharge  ENMT:  No difficulty hearing, tinnitus, vertigo; No sinus or throat pain  NECK: No pain or stiffness  RESPIRATORY: No cough, wheezing, chills or hemoptysis; No shortness of breath  CARDIOVASCULAR: No chest pain, palpitations, dizziness, or leg swelling  GASTROINTESTINAL: No abdominal or epigastric pain. No nausea, vomiting, or hematemesis; No diarrhea or constipation. No melena or hematochezia.  GENITOURINARY: No dysuria, frequency, hematuria, or incontinence  NEUROLOGICAL: No headaches, memory loss, loss of strength, numbness, or tremors  SKIN: No itching, burning, rashes, or lesions   LYMPH NODES: No enlarged glands  ENDOCRINE: No heat or cold intolerance; No hair loss  MUSCULOSKELETAL: No joint pain or swelling; No muscle, back, or extremity pain  PSYCHIATRIC: No depression, anxiety, mood swings, or difficulty sleeping  HEME/LYMPH: No easy bruising, or bleeding gums  ALLERGY AND IMMUNOLOGIC: No hives or eczema    HOME MEDICATIONS:  Home Medications:    MEDICATIONS:  Antibiotics:    Neuro:    Anticoagulation:    OTHER:  furosemide   Injectable 60 milliGRAM(s) IV Push Once    IVF:  potassium chloride    Tablet ER 40 milliEquivalent(s) Oral once      Vital Signs Last 24 Hrs  T(C): 36.6 (08 May 2021 11:05), Max: 36.7 (08 May 2021 07:06)  T(F): 97.9 (08 May 2021 11:05), Max: 98 (08 May 2021 07:06)  HR: 81 (08 May 2021 11:05) (80 - 104)  BP: 161/92 (08 May 2021 11:05) (136/87 - 184/101)  RR: 20 (08 May 2021 11:05) (18 - 20)  SpO2: 95% (08 May 2021 11:05) (95% - 96%)      PHYSICAL EXAM:  GENERAL: NAD, well-developed  HEAD:  Atraumatic, normocephalic  WOUND: Dressing clean dry intact; well healed  EYES: Conjunctiva and sclera clear  ENMT: No tonsillar erythema, exudates, or enlargement; moist mucous membranes  NECK: Supple, no JVD  ALEXANDER COMA SCORE: E- V- M- =       E: 4= opens eyes spontaneously 3= to voice 2= to noxious 1= no opening       V: 5= oriented 4= confused 3= inappropriate words 2= incomprehensible sounds 1= nonverbal 1T= intubated       M: 6= follows commands 5= localizes 4= withdraws 3= flexor posturing 2= extensor posturing 1= no movement  MENTAL STATUS: A A O x3, Appropriately conversant, following simple commands   CRANIAL NERVES: PERRL. EOMI without nystagmus. Facial sensation intact V1-3 distribution b/l. Tongue midline. Hearing grossly intact. Speech clear. Head turning and shoulder shrug intact.   REFLEXES: No pronator drift   MOTOR: strength 5/5 b/l upper and lower extremities  Uppers     Delt     Bicep     Tricep     HG  R                5/5       5/5         5/5         5/5  L                5/5       5/5         5/5         5/5  Lowers      HF     KF      KE     PF     DF     EHL  R             5/5     5/5     5/5    5/5   5/5    5/5  L              5/5    5/5      5/5    5/5   5/5    5/5  SENSATION: grossly intact to light touch all extremities  COORDINATION: Gait intact  SENSATION: grossly intact to light touch all extremities  PLANTAR: downgoing (Babinski)  CHEST/LUNG: Clear to auscultation bilaterally  HEART: +S1/+S2  ABDOMEN: Soft, nontender  EXTREMITIES:  2+ peripheral pulses, no clubbing, cyanosis, or edema  LYMPH: No lymphadenopathy noted  SKIN: Warm, dry; no rashes or lesions    LABS:                        11.1   8.29  )-----------( 180      ( 08 May 2021 08:39 )             36.2         142  |  100  |  46.0<H>  ----------------------------<  77  3.3<L>   |  28.0  |  2.12<H>    Ca    8.6      08 May 2021 08:39    TPro  6.9  /  Alb  3.8  /  TBili  0.6  /  DBili  x   /  AST  22  /  ALT  15  /  AlkPhos  143<H>  05-08ra    PT/INR - ( 08 May 2021 08:39 )   PT: 22.8 sec;   INR: 2.03 ratio      PTT - ( 08 May 2021 08:39 )  PTT:36.5 sec  Urinalysis Basic - ( 08 May 2021 11:07 )    Color: Yellow / Appearance: Clear / S.015 / pH: x  Gluc: x / Ketone: Trace  / Bili: Negative / Urobili: Negative mg/dL   Blood: x / Protein: 100 mg/dL / Nitrite: Negative   Leuk Esterase: Negative / RBC: x / WBC x   Sq Epi: x / Non Sq Epi: x / Bacteria: x      RADIOLOGY & ADDITIONAL STUDIES:  EXAM:  CT CERVICAL SPINE                        EXAM:  CT BRAIN                        PROCEDURE DATE:  2021      IMPRESSION:    CT head: Questionable thin anterior falx subdural hemorrhage measuring 1 cm in thickness. Follow-up head CT or MRI is recommended for further evaluation.    CT C-spine: Multilevel spondylosis. No acute osseous abnormality. Consider MRI as clinically warranted.     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 Patient currently complains of right shoulder pain. Patient denies headache, neck pain, dizziness, visual deficits and mental status change.     PMHX: DM, angina, Atrial fibrillation, CHF, right Bell's Palsy   PSHX: CABG (coronary artery bypass graft)  FAMILY HISTORY: non-contributory      SOCIAL HISTORY: , accompanied by wife   Tobacco Use: denies   EtOH use:  denies   Substance: denies     Allergies: No Known Allergies    REVIEW OF SYSTEMS  CONSTITUTIONAL: No fever, weight loss, or fatigue  EYES: No eye pain, visual disturbances, or discharge  ENMT:  No difficulty hearing, tinnitus, vertigo; No sinus or throat pain  NECK: No pain or stiffness  RESPIRATORY: No cough, wheezing, chills or hemoptysis; No shortness of breath  CARDIOVASCULAR: No chest pain, palpitations, dizziness, or leg swelling  GASTROINTESTINAL: No abdominal or epigastric pain. No nausea, vomiting, or hematemesis; No diarrhea or constipation. No melena or hematochezia.  GENITOURINARY: No dysuria, frequency, hematuria, or incontinence  NEUROLOGICAL: No headaches, memory loss, loss of strength, numbness, or tremors  SKIN: No itching, burning, rashes, or lesions   LYMPH NODES: No enlarged glands  ENDOCRINE: No heat or cold intolerance; No hair loss  MUSCULOSKELETAL: No joint pain or swelling; No muscle, back, or extremity pain  PSYCHIATRIC: No depression, anxiety, mood swings, or difficulty sleeping  HEME/LYMPH: No easy bruising, or bleeding gums  ALLERGY AND IMMUNOLOGIC: No hives or eczema    HOME MEDICATIONS:  Home Medications:      OTHER:  furosemide   Injectable 60 milliGRAM(s) IV Push Once    IVF:  potassium chloride    Tablet ER 40 milliEquivalent(s) Oral once      Vital Signs Last 24 Hrs  T(C): 36.6 (08 May 2021 11:05), Max: 36.7 (08 May 2021 07:06)  T(F): 97.9 (08 May 2021 11:05), Max: 98 (08 May 2021 07:06)  HR: 81 (08 May 2021 11:05) (80 - 104)  BP: 161/92 (08 May 2021 11:05) (136/87 - 184/101)  RR: 20 (08 May 2021 11:05) (18 - 20)  SpO2: 95% (08 May 2021 11:05) (95% - 96%)      PHYSICAL EXAM:  GENERAL: NAD, well-developed  HEAD:  Atraumatic, normocephalic  WOUND: Dressing clean dry intact; well healed  EYES: Conjunctiva and sclera clear  ENMT: No tonsillar erythema, exudates, or enlargement; moist mucous membranes  NECK: Supple, no JVD  ALEXANDER COMA SCORE: E- V- M- =15  CRANIAL NERVES: PERRL. EOMI without nystagmus. Facial sensation intact V1-3 distribution b/l. Tongue midline. Hearing grossly intact. Speech clear. Head turning and shoulder shrug intact.   REFLEXES: No pronator drift   MOTOR: strength 5/5 b/l upper and lower extremities  SENSATION: grossly intact to light touch all extremities  CHEST/LUNG: Clear to auscultation bilaterally  HEART: +S1/+S2  ABDOMEN: Soft, nontender, obese  EXTREMITIES:  2+ peripheral pulses, no clubbing, cyanosis, or edema  LYMPH: No lymphadenopathy noted  SKIN: Warm, dry; no rashes or lesions    LABS:                        11.1   8.29  )-----------( 180      ( 08 May 2021 08:39 )             36.2         142  |  100  |  46.0<H>  ----------------------------<  79  3.3<L>   |  28.0  |  2.12<H>    Ca    8.6      08 May 2021 08:39    TPro  6.9  /  Alb  3.8  /  TBili  0.6  /  DBili  x   /  AST  22  /  ALT  15  /  AlkPhos  143<H>  05-08ra    PT/INR - ( 08 May 2021 08:39 )   PT: 22.8 sec;   INR: 2.03 ratio      PTT - ( 08 May 2021 08:39 )  PTT:36.5 sec  Urinalysis Basic - ( 08 May 2021 11:07 )    Color: Yellow / Appearance: Clear / S.015 / pH: x  Gluc: x / Ketone: Trace  / Bili: Negative / Urobili: Negative mg/dL   Blood: x / Protein: 100 mg/dL / Nitrite: Negative   Leuk Esterase: Negative / RBC: x / WBC x   Sq Epi: x / Non Sq Epi: x / Bacteria: x      RADIOLOGY & ADDITIONAL STUDIES:  EXAM:  CT CERVICAL SPINE                        EXAM:  CT BRAIN                        PROCEDURE DATE:  2021      IMPRESSION:    CT head: Questionable thin anterior falx subdural hemorrhage measuring 1 cm in thickness. Follow-up head CT or MRI is recommended for further evaluation.    CT C-spine: Multilevel spondylosis. No acute osseous abnormality. Consider MRI as clinically warranted.     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 Patient currently complains of right shoulder pain. Patient denies headache, neck pain, dizziness, visual deficits and mental status change.     PMHX: DM, angina, Atrial fibrillation, CHF, right Bell's Palsy   PSHX: CABG (coronary artery bypass graft)  FAMILY HISTORY: non-contributory      SOCIAL HISTORY: , accompanied by wife   Tobacco Use: denies   EtOH use:  denies   Substance: denies     Allergies: No Known Allergies    REVIEW OF SYSTEMS  CONSTITUTIONAL: No fever, weight loss, or fatigue  EYES: No eye pain, visual disturbances, or discharge  ENMT:  No difficulty hearing, tinnitus, vertigo; No sinus or throat pain  NECK: No pain or stiffness  RESPIRATORY: No cough, wheezing, chills or hemoptysis; No shortness of breath  CARDIOVASCULAR: No chest pain, palpitations, dizziness, or leg swelling  GASTROINTESTINAL: No abdominal or epigastric pain. No nausea, vomiting, or hematemesis; No diarrhea or constipation. No melena or hematochezia.  GENITOURINARY: No dysuria, frequency, hematuria, or incontinence  NEUROLOGICAL: No headaches, memory loss, loss of strength, numbness, or tremors  SKIN: No itching, burning, rashes, or lesions   LYMPH NODES: No enlarged glands  ENDOCRINE: No heat or cold intolerance; No hair loss  MUSCULOSKELETAL: No joint pain or swelling; No muscle, back, or extremity pain  PSYCHIATRIC: No depression, anxiety, mood swings, or difficulty sleeping  HEME/LYMPH: No easy bruising, or bleeding gums  ALLERGY AND IMMUNOLOGIC: No hives or eczema    HOME MEDICATIONS:  Home Medications:      OTHER:  furosemide   Injectable 60 milliGRAM(s) IV Push Once    IVF:  potassium chloride    Tablet ER 40 milliEquivalent(s) Oral once      Vital Signs Last 24 Hrs  T(C): 36.6 (08 May 2021 11:05), Max: 36.7 (08 May 2021 07:06)  T(F): 97.9 (08 May 2021 11:05), Max: 98 (08 May 2021 07:06)  HR: 81 (08 May 2021 11:05) (80 - 104)  BP: 161/92 (08 May 2021 11:05) (136/87 - 184/101)  RR: 20 (08 May 2021 11:05) (18 - 20)  SpO2: 95% (08 May 2021 11:05) (95% - 96%)      PHYSICAL EXAM:  GENERAL: NAD, well-developed  HEAD:  Atraumatic, normocephalic  WOUND: Dressing clean dry intact; well healed  EYES: Conjunctiva and sclera clear  ENMT: No tonsillar erythema, exudates, or enlargement; moist mucous membranes  NECK: Supple, no JVD  ALEXANDER COMA SCORE: E- V- M- =15  CRANIAL NERVES: PERRL. EOMI without nystagmus. Right facial droop, Tongue midline. Hearing grossly intact. Speech clear. Head turning and shoulder shrug intact.   REFLEXES: No pronator drift   MOTOR: strength 5/5 b/l upper and lower extremities  SENSATION: grossly intact to light touch all extremities  CHEST/LUNG: Clear to auscultation bilaterally  HEART: +S1/+S2  ABDOMEN: Soft, nontender, obese  EXTREMITIES:  2+ peripheral pulses, no clubbing, cyanosis, or edema  LYMPH: No lymphadenopathy noted  SKIN: Warm, dry; no rashes or lesions    LABS:                        11.1   8.29  )-----------( 180      ( 08 May 2021 08:39 )             36.2         142  |  100  |  46.0<H>  ----------------------------<  79  3.3<L>   |  28.0  |  2.12<H>    Ca    8.6      08 May 2021 08:39    TPro  6.9  /  Alb  3.8  /  TBili  0.6  /  DBili  x   /  AST  22  /  ALT  15  /  AlkPhos  143<H>  05-08ra    PT/INR - ( 08 May 2021 08:39 )   PT: 22.8 sec;   INR: 2.03 ratio      PTT - ( 08 May 2021 08:39 )  PTT:36.5 sec  Urinalysis Basic - ( 08 May 2021 11:07 )    Color: Yellow / Appearance: Clear / S.015 / pH: x  Gluc: x / Ketone: Trace  / Bili: Negative / Urobili: Negative mg/dL   Blood: x / Protein: 100 mg/dL / Nitrite: Negative   Leuk Esterase: Negative / RBC: x / WBC x   Sq Epi: x / Non Sq Epi: x / Bacteria: x      RADIOLOGY & ADDITIONAL STUDIES:  EXAM:  CT CERVICAL SPINE                        EXAM:  CT BRAIN                        PROCEDURE DATE:  2021      IMPRESSION:    CT head: Questionable thin anterior falx subdural hemorrhage measuring 1 cm in thickness. Follow-up head CT or MRI is recommended for further evaluation.    CT C-spine: Multilevel spondylosis. No acute osseous abnormality. Consider MRI as clinically warranted.

## 2021-05-08 NOTE — ED ADULT NURSE REASSESSMENT NOTE - NS ED NURSE REASSESS COMMENT FT1
Received report from HARMEET Higginbotham. WESLEY noted, respirations even and nonlabored. Pt without complaints at this time.

## 2021-05-08 NOTE — ED PROVIDER NOTE - PHYSICAL EXAMINATION
Const: Awake, alert and oriented. In no acute distress. Well appearing.  HEENT: NC/AT. Moist mucous membranes.  Eyes: No scleral icterus. EOMI.  Neck:. Soft and supple. Full ROM without pain.  Cardiac: Tachycardic rate and regular rhythm. +S1/S2. No murmurs. Peripheral pulses 2+ and symmetric. 2+b/l LE edema.  Resp: Speaking in full sentences. No evidence of respiratory distress. No wheezes, rales or rhonchi.  Abd: Soft, non-tender, non-distended. Normal bowel sounds in all 4 quadrants. No guarding or rebound.  Back: Spine midline and non-tender. No CVAT.  Skin: Ecchymosis to right upper arm, ecchymosis to right flank. No rashes, abrasions or lacerations.  MSK: Chest wall stable, pelvis stable. No deformity. Full ROM all extremities.  Neuro: Right facial droop (chronic per wife - Bell's palsy), numbness to right side of face (chronic per patient - Bell's palsy), PERRL, EOMI. Tongue midline. Normal finger to nose. Normal heel to shin. Normal rapid alternating movements. No pronator drift. Sensation symmetrically intact bilateral upper and lower extremities.

## 2021-05-08 NOTE — CONSULT NOTE ADULT - SUBJECTIVE AND OBJECTIVE BOX
[place] [facility]                                                               Office:  [address] [address]                                                              Telephone: [phone]. Fax:[phone]                                                                        CARDIOLOGY CONSULTATION NOTE                                                                                             Consult requested by:  Dr. Ingram   Reason for Consultation: CHF  History obtained by: Patient and medical record   obtained: No    Covid Status: Pending    Chief complaint:    Patient is a 80y old  Male who presents with a chief complaint of subdural hemorrhage (08 May 2021 11:22)      HPI: 79 y/o M with a PMHx of CAD s/p CABG c/b sepsis and sternal wound infection s/p sternum removal and pec flap and angioplasty, CHF (unknown baseline EF), Afib (on Eliquis), HTN, DMII, and Bell's Palsy who presented to the ER with complaints of back pain and dizziness after a fall 4 days ago. Patient states that he missed a step, fell backwards, and landed on his right side. Patient states that he has been having some dizziness, felt like he might pass out, as well as back pain since the fall. Patient's wife states he refused to come to the ER initially, but the symptoms continued to worsen. Patient just flew up from [place] as well, and does not have a Cardiologist following in [place]. Patient also noted for the last few months he has been having continued shortness of breath and leg swelling, and was recently started on lasix in addition to metolazone. Patient denies any fevers, chills, CP, abdominal pain, diarrhea, or vision changes.     REVIEW OF SYSTEMS:  CONSTITUTIONAL: No fever, weight loss, or fatigue  ENMT:  No difficulty hearing, tinnitus, vertigo; No sinus or throat pain  NECK: No pain or stiffness  CARDIOVASCULAR: AS PER HPI  RESPIRATORY: AS PER HPI  : No dysuria, no hematuria   GI: No dark color stool, no melena, no diarrhea, no constipation, no abdominal pain   NEURO: AS PER HPI  MUSCULOSKELETAL: No joint pain or swelling; No muscle, back, or extremity pain  PSYCH: No agitation, no anxiety.    ALL OTHER REVIEW OF SYSTEMS ARE NEGATIVE.      PREVIOUS DIAGNOSTIC TESTING  ECHO FINDINGS: Pending      ALLERGIES: Allergies    No Known Allergies    Intolerances      PAST MEDICAL HISTORY  Coronary artery disease involving native heart, angina presence unspecified, unspecified vessel or lesion type    Atrial fibrillation, unspecified type    Congestive heart failure, unspecified HF chronicity, unspecified heart failure type    Bell&#x27;s palsy        PAST SURGICAL HISTORY  S/P CABG (coronary artery bypass graft)        FAMILY HISTORY: Non-contributory.      SOCIAL HISTORY:  Denies smoking/alcohol/drugs. Splits time between [place] and [place]      CURRENT MEDICATIONS:  amLODIPine   Tablet 10 milliGRAM(s) Oral daily  furosemide   Injectable 40 milliGRAM(s) IV Push daily  lisinopril 10 milliGRAM(s) Oral daily  metolazone 5 milliGRAM(s) Oral daily  tamsulosin 0.4 milliGRAM(s) Oral at bedtime     sertraline  pantoprazole    Tablet  insulin glargine Injectable (LANTUS)  magnesium oxide  potassium chloride    Tablet ER      HOME MEDICATIONS:  Metolazone 5mg PO qday   Norvasc 10mg PO qday   Magnesium Oxid 400mg PO qday   Omeprazole 20mg PO qday   Eliquis 5mg PO BID  Metoprolol   Flomax 0.4mg PO qday  Lipitor 1/2? per day  Lexapro  Lantus 17 U 2x a day  Lasix 40mg PO qday  Lisinopril 10mg PO qday  Sertraline 100mg     Vital Signs Last 24 Hrs  T(C): 36.6 (08 May 2021 11:05), Max: 36.7 (08 May 2021 07:06)  T(F): 97.9 (08 May 2021 11:05), Max: 98 (08 May 2021 07:06)  HR: 81 (08 May 2021 11:05) (80 - 104)  BP: 161/92 (08 May 2021 11:05) (136/87 - 184/101)  RR: 20 (08 May 2021 11:05) (18 - 20)  SpO2: 95% (08 May 2021 11:05) (95% - 96%)      PHYSICAL EXAM:  Constitutional: Comfortable . No acute distress.   HEENT: Atraumatic and normocephalic , neck is supple . no JVD. No carotid bruit. PEERL   CNS: A&Ox3. No focal deficits. EOMI. Cranial nerves II-IX are intact.   Lymph Nodes: Cervical : Not palpable.  Respiratory: CTAB  Cardiovascular: Irregularly irregular. No rubs or gallop. II/VI systolic murmur lsb  Gastrointestinal: Soft non-tender and non distended . +Bowel sounds. negative Dang's sign.  Extremities: 2+ b/l LE edema.   Psychiatric: Calm . no agitation.  Skin: No skin rash/ulcers visualized to face, hands or feet.    Intake and output:     LABS:                        11.1   8.29  )-----------( 180      ( 08 May 2021 08:39 )             36.2         142  |  100  |  46.0<H>  ----------------------------<  77  3.3<L>   |  28.0  |  2.12<H>    Ca    8.6      08 May 2021 08:39    TPro  6.9  /  Alb  3.8  /  TBili  0.6  /  DBili  x   /  AST  22  /  ALT  15  /  AlkPhos  143<H>      CARDIAC MARKERS ( 08 May 2021 08:39 )  x     / 0.04 ng/mL / 155 U/L / x     / 6.1 ng/mL    ;p-BNP=Serum Pro-Brain Natriuretic Peptide: 27789 pg/mL ( @ 08:39)    PT/INR - ( 08 May 2021 08:39 )   PT: 22.8 sec;   INR: 2.03 ratio         PTT - ( 08 May 2021 08:39 )  PTT:36.5 sec  Urinalysis Basic - ( 08 May 2021 11:07 )    Color: Yellow / Appearance: Clear / S.015 / pH: x  Gluc: x / Ketone: Trace  / Bili: Negative / Urobili: Negative mg/dL   Blood: x / Protein: 100 mg/dL / Nitrite: Negative   Leuk Esterase: Negative / RBC: 11-25 /HPF / WBC 0-2   Sq Epi: x / Non Sq Epi: Negative / Bacteria: Negative        INTERPRETATION OF TELEMETRY: Reviewed by me. Afib, PVCs  ECG: Reviewed by me. Afib, Bifasicular block    RADIOLOGY & ADDITIONAL STUDIES:     < from: CT Chest No Cont (21 @ 10:16) >  FINDINGS:  CHEST:  LUNGS AND LARGE AIRWAYS: Patent central airways. No pulmonary nodules. Trace subpleural atelectasis throughout the lungs.  PLEURA: No pleural effusion. No pneumothorax or pneumomediastinum. No hemothorax.  VESSELS: Within normal limits.  HEART: Status post CABG. The heart is enlarged.. No pericardial effusion.  MEDIASTINUM AND ADAIR: No lymphadenopathy.  CHEST WALL AND LOWER NECK: Within normal limits.    ABDOMEN AND PELVIS:  LIVER: The liver is mildly prominent and mildly heterogeneous in attenuation, which may represent underlying hepatocellular disease.  BILE DUCTS: Normal caliber.  GALLBLADDER: Multiple small gallstones..  SPLEEN: Within normal limits.  PANCREAS: Within normal limits.  ADRENALS: Within normal limits.  KIDNEYS/URETERS: Within normal limits.    BLADDER:Mildly thickened and trabeculated with stone containing diverticula of the dome.  REPRODUCTIVE ORGANS: The prostate is enlarged.    BOWEL: Diverticulum of the second portion of the duodenum. Sigmoid diverticulosis.  PERITONEUM: There is trace free fluid in the pelvis and trace perihepatic fluid.  VESSELS: Within normal limits.  RETROPERITONEUM/LYMPH NODES: No lymphadenopathy.  ABDOMINAL WALL: Status post right inguinal hernia repair. Small fat-containing umbilical hernia.  BONES: Chronic mild compression deformity of the central superior endplate of L1.    IMPRESSION: No evidence of acute visceral organ or bony injury on this noncontrast CT. Mild cirrhotic morphology of the liver with trace intraperitoneal fluid. Thickened bladder with trabeculation and stone containing diverticula and enlarged prostate, compatible chronic bladder outlet obstruction.    < end of copied text >    < from: CT Head No Cont (21 @ 10:15) >    FINDINGS: Mild anterior dural thickening tiny calcification on image 46 of series 4. Questionable thin anterior falx subdural hemorrhage measuring 1 mm image 40 of series 4.  There is periventricular and subcortical white matter hypodensity without mass effect, nonspecific, likely representing mild chronic microvascular ischemic changes. There is no compelling evidence for an acute transcortical infarction. There is no other evidence of mass, mass effect, midline shift or extra-axial fluid collection. The lateral ventricles and cortical sulci are otherwise age-appropriate in size and configuration. Trace mucosal thickening bilateral maxillary sinuses The orbits, mastoid air cells and visualized paranasal sinuses are normal. The calvarium is intact. Consider follow-up CT or MRI as clinically warranted.    < end of copied text >

## 2021-05-08 NOTE — CONSULT NOTE ADULT - ATTENDING COMMENTS
The patient was seen and examined  Details per the resident's consult note  Fall several days prior to presentation  Now complains of arm pain--no fracture  The work-up included a CT scan of the brain--this shows a SDH  Neurologically okay  No other injury  CXR and PXR okay  Cleared from trauma

## 2021-05-08 NOTE — CONSULT NOTE ADULT - ASSESSMENT
This is a 80 year old right hand dominant male with a past medical history significant for AFIB on Eliquis, CHF, CAD, CABG x 2, right Belly's Palsy,     1. Repeat Ct scan non-contrast in 6 hours  2. Continue neuro checks   3. Maintain SBP less than 150  4.  This is a 80 year old right hand dominant male with a past medical history significant for DM, CAD, CHF, CVA, AFIB on Eliquis, CHF, CAD, CABG x 2, right Belly's Palsy, status post fall 4 days, ago with a questionable thin anterior falx subdural hemorrhage.     1. Repeat Ct scan non-contrast in 6 hours  2. Continue neuro checks   3. Maintain SBP less than 150  4. Tylenol for pain to avoid over sedation   5. Plan was discussed with Dr Mcclendon

## 2021-05-08 NOTE — CONSULT NOTE ADULT - ASSESSMENT
A/P:  81 y/o M with a PMHx of CAD s/p CABG c/b sepsis and sternal wound infection s/p sternum removal and pec flap and angioplasty, CHF (unknown baseline EF), Afib (on Eliquis), HTN, DMII, and Bell's Palsy who presented to the ER with complaints of back pain and dizziness after a fall 4 days ago. Patient states that he missed a step, fell backwards, and landed on his right side. Patient states that he has been having some dizziness, felt like he might pass out, as well as back pain since the fall. Patient's wife states he refused to come to the ER initially, but the symptoms continued to worsen. Patient just flew up from [place] as well, and does not have a Cardiologist following in [place]. Patient also noted for the last few months he has been having continued shortness of breath and leg swelling, and was recently started on lasix in addition to metolazone. Patient denies any fevers, chills, CP, abdominal pain, diarrhea, or vision changes.   Troponin negative x 1  BNP 78230    Acute Decompensated CHF  - Obtain TTE.   - LE swelling, but CT chest and lung exam not with significant fluid overload.   - Continue metolazone 5mg PO qday and Lasix 40mg IV daily.   - Further GDMT based on echo results.   - Monitor on telemetry.   - Strict i/o and daily weights.  -  Keep K > 4, Mg > 2.  -  Monitor renal function with ongoing diuresis.    Atrial Fibrillation  - Currently rate controlled.   - Continue home metoprolol dose.   - Hold Eliquis due to concern for SDH on CT head.   - Continue telemetry monitoring.     Subdural Hematoma  - Neurosurgery consulted.   - MRI pending.   - Hold AC at this time.     Assessment and recommendations are final when note is signed by the attending.

## 2021-05-08 NOTE — CONSULT NOTE ADULT - ATTENDING COMMENTS
80M history as listed follows with cardiologist at [facility] had recent mechanical fall found with traumatic SDH on Eliquis for Afib, reports recent dyspnea on exertion, significantly elevated pBNP although CT chest without much pulmonary edema, on Lasix 40mg and metolazone 5mg daily at home, wife reports baseline Cr less than 2.0, does not follow up with outside nephrologist  -change to IV Lasix 40mg + metolazone 5mg daily- consider Torsemide on discharge  -hold lisinopril for now and monitor Cr. trend with diuresis  -change metoprolol to carvedilol for BP control  -await TTE  -await repeat CT head and neurosurgery decision about when can resume anticoagulation for Afib          Gamal Duke DO, [facility]  Faculty Non-Invasive Cardiologist  [phone] 80M history as listed follows with cardiologist at [facility] [place] [facility] had recent mechanical fall found with traumatic SDH on Eliquis for Afib, reports recent dyspnea on exertion, significantly elevated pBNP although CT chest without much pulmonary edema, on Lasix 40mg and metolazone 5mg daily at home, wife reports baseline Cr less than 2.0, does not follow up with outside nephrologist  -change to IV Lasix 40mg + metolazone 5mg daily- consider Torsemide on discharge  -hold lisinopril for now and monitor Cr. trend with diuresis  -change metoprolol to carvedilol for BP control however per report wife recollect prior delayed drug rash to carvedilol in the past, already received 1 dose in the ER and discontinue, will increase metoprolol 50mg BID for now.   -await TTE  -await repeat CT head and neurosurgery decision about when can resume anticoagulation for Afib          Gamal Duke DO, [facility]  Faculty Non-Invasive Cardiologist  [phone]

## 2021-05-08 NOTE — CONSULT NOTE ADULT - ASSESSMENT
79yo M s/p fall on Eliquis. Questionable SDH on CT head. CT cspine and remainder of exam negative for any traumatic injury.     - appreciate neurosurgery recs  - final disposition pending results of repeat CT head to r/o acute intracranial bleed.

## 2021-05-08 NOTE — ED ADULT NURSE NOTE - OBJECTIVE STATEMENT
A&Ox3, resp wnl, c/o pain to buttocks, right shoulder " all over " after falling, has been falling lately, due to loss of balance

## 2021-05-08 NOTE — ED PROVIDER NOTE - NS ED ROS FT
Const: Denies fever, chills  HEENT: Denies blurry vision, sore throat  Neck: Denies neck pain/stiffness  Resp: Denies coughing, SOB  Cardiovascular: + LE edema. Denies CP, palpitations  GI: Denies nausea, vomiting, abdominal pain, diarrhea, constipation, blood in stool  : Denies urinary frequency/urgency/dysuria, hematuria  MSK: + back pain, + right arm pain.  Neuro: + lightheadedness.  Denies HA, numbness, weakness  Skin: Denies rashes.

## 2021-05-08 NOTE — ED CDU PROVIDER INITIAL DAY NOTE - ATTENDING CONTRIBUTION TO CARE
seen with acp  in obs for extensive cardiac hx and falling  eval sig acute on chronic chf  agree with care p howie

## 2021-05-08 NOTE — ED CDU PROVIDER INITIAL DAY NOTE - OBJECTIVE STATEMENT
Patient is a 80 year old male with pmhx of CAD, CABG, A-fib, HTN, DM2 who presented to ER c/o falls on Tuesday.  patient flew from [place] on Tuesday and fell upon arrival. patient came today due to pain.  patient with bruising on right arm and torso.  patient had negative xrays, CT of Head showed possible SDH however repeat was negative.  patient seen by Neurosurgery and Trauma.  patient also c/o chronic LE edema, worsened over past few days.  patient seen by cardiology who was obs overnight with TTE.  patient will be placed in obs.  patient admits to SOB and PINA. no chest pain

## 2021-05-08 NOTE — ED PROVIDER NOTE - CLINICAL SUMMARY MEDICAL DECISION MAKING FREE TEXT BOX
79 y/o M with extensive cardiac history presents for mechanical fall 4 days ago, no head trauma, but has been lightheaded ever since, b/l LE edema. Will evaluate for traumatic injuries as patient is on Eliquis, no head trauma or LOC (contrary to triage RN note), however will evaluate for intracranial pathology as cause of lightheadedness, cardiac work up and diurese as needed.

## 2021-05-08 NOTE — CONSULT NOTE ADULT - SUBJECTIVE AND OBJECTIVE BOX
Trauma and Acute Care Surgery Consult Note    HPI:   79 y/o M with PMH A fib on Eliquis, CHF, CAD s/p CABG x 2 presents with wife for fall that occurred 4 days ago walking on his deck without using a walker or cane (patient has both, refuses to use either). He states he missed a step and fell, landing mostly on his right side, did not hit head or lose consciousness, was helped up by a neighbor. Patient states that his left leg "went out from under me," and that two weeks ago he fell again, also to the right side. Complains of right arm pain. denies any headache. He has been taking tramadol for pain since the fall. He has refused to come to the ED until today because the pain was not getting much better.    PMH:  Coronary artery disease involving native heart, angina presence unspecified, unspecified vessel or lesion type  Atrial fibrillation, unspecified type  Congestive heart failure, unspecified HF chronicity, unspecified heart failure type  Bell&#x27;s palsy  Right side of face    PSH:  S/P CABG (coronary artery bypass graft)    Home Medications:  eliquis  amlodipine  hctz    ALL:  nkda    FMH:  Denies family history of gastrointestinal malignancy     SOC Hx:   Denies etoh, tobacco, or drug use     Physical Exam:   Gen: well appearing elderly male, NAD  Neuro: AOx3, EOMI, PERRLA, no gross deficit on exam. CTLS spines nontender to palpation without deformity.   HEENT: No oral/mucosal lesions, no neck masses or lymphadenopathy  RESP: CTABL, nonlabored breathing  CVS: RRR,   GI: abd soft, NT, ND, no rebound/guarding  Extremities: 2+ peripheral pulses. RUE w/ ecchymosis.

## 2021-05-08 NOTE — ED ADULT NURSE NOTE - PMH
Atrial fibrillation, unspecified type    Congestive heart failure, unspecified HF chronicity, unspecified heart failure type    Coronary artery disease involving native heart, angina presence unspecified, unspecified vessel or lesion type

## 2021-05-09 DIAGNOSIS — I50.9 HEART FAILURE, UNSPECIFIED: ICD-10-CM

## 2021-05-09 LAB
A1C WITH ESTIMATED AVERAGE GLUCOSE RESULT: 7.5 % — HIGH (ref 4–5.6)
ANION GAP SERPL CALC-SCNC: 13 MMOL/L — SIGNIFICANT CHANGE UP (ref 5–17)
BUN SERPL-MCNC: 43 MG/DL — HIGH (ref 8–20)
CALCIUM SERPL-MCNC: 9 MG/DL — SIGNIFICANT CHANGE UP (ref 8.6–10.2)
CHLORIDE SERPL-SCNC: 91 MMOL/L — LOW (ref 98–107)
CO2 SERPL-SCNC: 27 MMOL/L — SIGNIFICANT CHANGE UP (ref 22–29)
CREAT SERPL-MCNC: 1.93 MG/DL — HIGH (ref 0.5–1.3)
ESTIMATED AVERAGE GLUCOSE: 169 MG/DL — HIGH (ref 68–114)
GLUCOSE BLDC GLUCOMTR-MCNC: 100 MG/DL — HIGH (ref 70–99)
GLUCOSE BLDC GLUCOMTR-MCNC: 193 MG/DL — HIGH (ref 70–99)
GLUCOSE BLDC GLUCOMTR-MCNC: 236 MG/DL — HIGH (ref 70–99)
GLUCOSE SERPL-MCNC: 102 MG/DL — HIGH (ref 70–99)
MAGNESIUM SERPL-MCNC: 1.3 MG/DL — LOW (ref 1.6–2.6)
NT-PROBNP SERPL-SCNC: HIGH PG/ML (ref 0–300)
NT-PROBNP SERPL-SCNC: HIGH PG/ML (ref 0–300)
POTASSIUM SERPL-MCNC: 2.9 MMOL/L — CRITICAL LOW (ref 3.5–5.3)
POTASSIUM SERPL-SCNC: 2.9 MMOL/L — CRITICAL LOW (ref 3.5–5.3)
SODIUM SERPL-SCNC: 131 MMOL/L — LOW (ref 135–145)
TSH SERPL-MCNC: 0.91 UIU/ML — SIGNIFICANT CHANGE UP (ref 0.27–4.2)

## 2021-05-09 PROCEDURE — 93306 TTE W/DOPPLER COMPLETE: CPT | Mod: 26

## 2021-05-09 PROCEDURE — 99223 1ST HOSP IP/OBS HIGH 75: CPT

## 2021-05-09 PROCEDURE — 99233 SBSQ HOSP IP/OBS HIGH 50: CPT

## 2021-05-09 PROCEDURE — 99223 1ST HOSP IP/OBS HIGH 75: CPT | Mod: AI

## 2021-05-09 RX ORDER — POTASSIUM CHLORIDE 20 MEQ
20 PACKET (EA) ORAL
Refills: 0 | Status: COMPLETED | OUTPATIENT
Start: 2021-05-09 | End: 2021-05-09

## 2021-05-09 RX ORDER — MAGNESIUM SULFATE 500 MG/ML
2 VIAL (ML) INJECTION ONCE
Refills: 0 | Status: COMPLETED | OUTPATIENT
Start: 2021-05-09 | End: 2021-05-09

## 2021-05-09 RX ORDER — APIXABAN 2.5 MG/1
5 TABLET, FILM COATED ORAL
Refills: 0 | Status: DISCONTINUED | OUTPATIENT
Start: 2021-05-09 | End: 2021-05-12

## 2021-05-09 RX ORDER — METOPROLOL TARTRATE 50 MG
100 TABLET ORAL EVERY 12 HOURS
Refills: 0 | Status: DISCONTINUED | OUTPATIENT
Start: 2021-05-09 | End: 2021-05-11

## 2021-05-09 RX ORDER — ASPIRIN/CALCIUM CARB/MAGNESIUM 324 MG
81 TABLET ORAL DAILY
Refills: 0 | Status: DISCONTINUED | OUTPATIENT
Start: 2021-05-09 | End: 2021-05-13

## 2021-05-09 RX ORDER — SODIUM CHLORIDE 9 MG/ML
500 INJECTION INTRAMUSCULAR; INTRAVENOUS; SUBCUTANEOUS ONCE
Refills: 0 | Status: COMPLETED | OUTPATIENT
Start: 2021-05-09 | End: 2021-05-09

## 2021-05-09 RX ORDER — LANOLIN ALCOHOL/MO/W.PET/CERES
5 CREAM (GRAM) TOPICAL ONCE
Refills: 0 | Status: COMPLETED | OUTPATIENT
Start: 2021-05-09 | End: 2021-05-09

## 2021-05-09 RX ORDER — POTASSIUM CHLORIDE 20 MEQ
40 PACKET (EA) ORAL EVERY 4 HOURS
Refills: 0 | Status: COMPLETED | OUTPATIENT
Start: 2021-05-09 | End: 2021-05-09

## 2021-05-09 RX ADMIN — INSULIN GLARGINE 7 UNIT(S): 100 INJECTION, SOLUTION SUBCUTANEOUS at 21:37

## 2021-05-09 RX ADMIN — SODIUM CHLORIDE 500 MILLILITER(S): 9 INJECTION INTRAMUSCULAR; INTRAVENOUS; SUBCUTANEOUS at 10:00

## 2021-05-09 RX ADMIN — Medication 50 MILLIEQUIVALENT(S): at 14:35

## 2021-05-09 RX ADMIN — Medication 50 GRAM(S): at 21:36

## 2021-05-09 RX ADMIN — TAMSULOSIN HYDROCHLORIDE 0.4 MILLIGRAM(S): 0.4 CAPSULE ORAL at 21:37

## 2021-05-09 RX ADMIN — Medication 81 MILLIGRAM(S): at 11:39

## 2021-05-09 RX ADMIN — Medication 50 MILLIEQUIVALENT(S): at 18:24

## 2021-05-09 RX ADMIN — INSULIN GLARGINE 7 UNIT(S): 100 INJECTION, SOLUTION SUBCUTANEOUS at 08:06

## 2021-05-09 RX ADMIN — AMLODIPINE BESYLATE 10 MILLIGRAM(S): 2.5 TABLET ORAL at 05:38

## 2021-05-09 RX ADMIN — Medication 40 MILLIEQUIVALENT(S): at 14:35

## 2021-05-09 RX ADMIN — Medication 50 MILLIEQUIVALENT(S): at 11:36

## 2021-05-09 RX ADMIN — Medication 40 MILLIGRAM(S): at 06:10

## 2021-05-09 RX ADMIN — MAGNESIUM OXIDE 400 MG ORAL TABLET 400 MILLIGRAM(S): 241.3 TABLET ORAL at 11:36

## 2021-05-09 RX ADMIN — SERTRALINE 100 MILLIGRAM(S): 25 TABLET, FILM COATED ORAL at 11:36

## 2021-05-09 RX ADMIN — Medication 100 MILLIGRAM(S): at 18:25

## 2021-05-09 RX ADMIN — Medication 1: at 18:25

## 2021-05-09 RX ADMIN — PANTOPRAZOLE SODIUM 40 MILLIGRAM(S): 20 TABLET, DELAYED RELEASE ORAL at 08:05

## 2021-05-09 RX ADMIN — Medication 40 MILLIEQUIVALENT(S): at 10:44

## 2021-05-09 RX ADMIN — Medication 50 MILLIGRAM(S): at 05:38

## 2021-05-09 RX ADMIN — APIXABAN 5 MILLIGRAM(S): 2.5 TABLET, FILM COATED ORAL at 18:25

## 2021-05-09 RX ADMIN — SODIUM CHLORIDE 500 MILLILITER(S): 9 INJECTION INTRAMUSCULAR; INTRAVENOUS; SUBCUTANEOUS at 11:32

## 2021-05-09 RX ADMIN — Medication 5 MILLIGRAM(S): at 21:36

## 2021-05-09 NOTE — H&P ADULT - HISTORY OF PRESENT ILLNESS
The patient is a 80 year old male with a past medical history of CAD status post CABG, CHF unknown baseline EF, afib on eliquis, hypertension and diabetes mellitus type 2 who presented to the ER after a fall at home. The patient fell 4 days prior to admission, states he missed a step and fell backward with possible LOC. Post fall patient had progressively worsening back pain and presented to the ER for evaluation. In the ER, CT head showed possible anterior SDH. Neurosurgery was consulted, repeat CT head was negative.  Noted to have KEN [birth year] [place] [address]. REcently patient has had complaints of dyspnea. HIs cardiologists and physicians are in [place]. Was seen in the ER by cardiology, started on diuresis with IV lasix and metalozone for acute decompensated CHF. Troponin was negative x 2. Initially admitted to the CDU> Echocardiogram was done which showed EF of 35-40% with severe RV dysfunction. Admitted for afib with rvr and acute decompensated CHF>

## 2021-05-09 NOTE — H&P ADULT - ASSESSMENT
The patient is a 80 year old male with a past medical history of CAD status post CABG, CHF unknown baseline EF, afib on eliquis, hypertension and diabetes mellitus type 2 who presented to the ER after a fall at home. Admitted for afib with rvr and acute decompensated CHF.     Assessment/Plan:    1. Acute decompensated CHF: Echocardiogram reviewed with EF of 35-40%  NPO after midnight for NST In AM  TElemetry monitoring  IV lasix  Metalozone held     2. KEN with CKD: Baseline unknown  Continue to monitor BMP    3. Afib with RVR: Eliquis was resumed  Metoprolol increased    4. Hypokalemia: SUpplement KCL  Check Mg level STAT    5. Diabetes mellitus type 2     The patient is a 80 year old male with a past medical history of CAD status post CABG, CHF unknown baseline EF, afib on eliquis, hypertension and diabetes mellitus type 2 who presented to the ER after a fall at home. Admitted for afib with rvr and acute decompensated CHF.     Assessment/Plan:    1. Acute decompensated CHF: Echocardiogram reviewed with EF of 35-40%  NPO after midnight for NST In AM  TElemetry monitoring  IV lasix  Metalozone held     2. KEN with CKD: Baseline unknown  Continue to monitor BMP    3. Afib with RVR: Eliquis was resumed  Metoprolol increased    4. Hypokalemia: SUpplement KCL  Check Mg level STAT    5. Diabetes mellitus type 2: continue Lantus with HSS  Monitor BSL

## 2021-05-09 NOTE — ED CDU PROVIDER DISPOSITION NOTE - ATTENDING CONTRIBUTION TO CARE
I agree with the PA's note and was available for any issues/concerns. I was directly involved in patient care. My brief overall assessment is as follows:    cardiology assessment noted; will admit

## 2021-05-09 NOTE — ED CDU PROVIDER SUBSEQUENT DAY NOTE - MEDICAL DECISION MAKING DETAILS
Patient is a 80y male with pmhx of CAD, CABG, A-fib, HTN, DM2 who presents for fall, patient had trauma CTs were negative. Pt placed in obs for TTE and daily weights. Cardiology consult this am.

## 2021-05-09 NOTE — PROGRESS NOTE ADULT - SUBJECTIVE AND OBJECTIVE BOX
[place] [facility]                                                               Office: [address] [address]                                                              Telephone: [phone]. Fax:[phone]                                                                             PROGRESS NOTE  Reason for follow up: CHF  Update: Patient with rapid Afib overnight, and states that he has been urinating frequently. Repeat CT head was negative for bleeding, given dose of Eliquis. Patient states his breathing and leg swelling have improved. Echo shows LVEF 35-40% with severely dilated RV. No records available for comparison. BNP   Covid Status: Negative 05/08/21    Review of symptoms:   Cardiac:  No chest pain. +dyspnea. No palpitations.  Respiratory: No cough. +dyspnea  Gastrointestinal: No diarrhea. No abdominal pain. No bleeding.     Past medical history: No updates.   	  Vitals:  T(C): 36.8 (05-09-21 @ 07:49), Max: 36.8 (05-09-21 @ 07:49)  HR: 108 (05-09-21 @ 07:49) (77 - 121)  BP: 148/88 (05-09-21 @ 07:49) (118/72 - 192/110)  RR: 19 (05-09-21 @ 07:49) (18 - 28)  SpO2: 98% (05-09-21 @ 07:49) (94% - 98%)  Wt(kg): --  I&O's Summary    Weight (kg): 108.9 (05-08 @ 07:06)      PHYSICAL EXAM:  Appearance: Comfortable. No acute distress  HEENT:  Head and neck: Atraumatic. Normocephalic.  Normal oral mucosa, PERRL, Neck is supple. No JVD, No carotid bruit.   Neurologic: A&Ox 3, no focal deficits. EOMI, Cranial nerves are intact.  Lymphatic: No cervical lymphadenopathy  Cardiovascular: Normal S1 S2, No murmur, rubs/gallops. No JVD, No edema  Respiratory: Lungs clear to auscultation  Gastrointestinal:  Soft, Non-tender, + BS  Lower Extremities: No edema  Psychiatry: Patient is calm. No agitation. Mood & affect appropriate  Skin: No rashes/ecchymoses/cyanosis/ulcers visualized on the face, hands or feet.      CURRENT MEDICATIONS:  amLODIPine   Tablet 10 milliGRAM(s) Oral daily  furosemide   Injectable 40 milliGRAM(s) IV Push daily  metolazone 5 milliGRAM(s) Oral daily  metoprolol tartrate 100 milliGRAM(s) Oral every 12 hours  tamsulosin 0.4 milliGRAM(s) Oral at bedtime    sertraline  pantoprazole    Tablet  dextrose 40% Gel  dextrose 50% Injectable  dextrose 50% Injectable  dextrose 50% Injectable  glucagon  Injectable  insulin glargine Injectable (LANTUS)  insulin lispro (ADMELOG) corrective regimen sliding scale  apixaban  dextrose 5%.  dextrose 5%.  magnesium oxide  potassium chloride    Tablet ER  potassium chloride  20 mEq/100 mL IVPB  sodium chloride 0.9% Bolus      DIAGNOSTIC TESTING:  [ ] Echocardiogram:   [ ]  Catheterization:  [ ] Stress Test:    OTHER: 	      LABS:	 	  CARDIAC MARKERS ( 08 May 2021 18:28 )  x     / 0.04 ng/mL / x     / x     / x      p-BNP 08 May 2021 18:28: x    , CARDIAC MARKERS ( 08 May 2021 08:39 )  x     / 0.04 ng/mL / 155 U/L / x     / 6.1 ng/mL  p-BNP 08 May 2021 08:39: 09838 pg/mL                          11.1   8.29  )-----------( 180      ( 08 May 2021 08:39 )             36.2     05-09    131<L>  |  91<L>  |  43.0<H>  ----------------------------<  102<H>  2.9<LL>   |  27.0  |  1.93<H>    Ca    9.0      09 May 2021 06:06    TPro  6.9  /  Alb  3.8  /  TBili  0.6  /  DBili  x   /  AST  22  /  ALT  15  /  AlkPhos  143<H>  05-08    proBNP: Serum Pro-Brain Natriuretic Peptide: 43459 pg/mL (05-08 @ 08:39)    Lipid Profile:   HgA1c:   TSH: Thyroid Stimulating Hormone, Serum: 0.91 uIU/mL        TELEMETRY: Reviewed    ECG:  Reviewed by me. 	                                                                      [place] [facility]                                                               Office: [address] [address]                                                              Telephone: [phone]. Fax:[phone]                                                                             PROGRESS NOTE  Reason for follow up: CHF  Update: Patient with rapid Afib overnight, and states that he has been urinating frequently. Repeat CT head was negative for bleeding, given dose of Eliquis. Patient states his breathing and leg swelling have improved. Echo shows LVEF 35-40% with severely dilated RV. No records available for comparison. BNP   Covid Status: Negative 05/08/21    Review of symptoms:   Cardiac:  No chest pain. +dyspnea. No palpitations.  Respiratory: No cough. +dyspnea  Gastrointestinal: No diarrhea. No abdominal pain. No bleeding.     Past medical history: No updates.   	  Vitals:  T(C): 36.8 (05-09-21 @ 07:49), Max: 36.8 (05-09-21 @ 07:49)  HR: 108 (05-09-21 @ 07:49) (77 - 121)  BP: 148/88 (05-09-21 @ 07:49) (118/72 - 192/110)  RR: 19 (05-09-21 @ 07:49) (18 - 28)  SpO2: 98% (05-09-21 @ 07:49) (94% - 98%)  Wt(kg): --  I&O's Summary    Weight (kg): 108.9 (05-08 @ 07:06)      PHYSICAL EXAM:  Constitutional: Comfortable . No acute distress.   HEENT: Atraumatic and normocephalic , neck is supple . no JVD. No carotid bruit. PEERL   CNS: A&Ox3. No focal deficits. EOMI. Cranial nerves II-IX are intact.   Lymph Nodes: Cervical : Not palpable.  Respiratory: Decreased BS b/l  Cardiovascular: Irregularly irregular. No rubs or gallop. II/VI systolic murmur lsb  Gastrointestinal: Soft non-tender and non distended . +Bowel sounds. negative Dang's sign.  Extremities: 2+ b/l LE edema, improved  Psychiatric: Calm . no agitation.  Skin: No skin rash/ulcers visualized to face, hands or feet.      CURRENT MEDICATIONS:  amLODIPine   Tablet 10 milliGRAM(s) Oral daily  furosemide   Injectable 40 milliGRAM(s) IV Push daily  metolazone 5 milliGRAM(s) Oral daily  metoprolol tartrate 100 milliGRAM(s) Oral every 12 hours  tamsulosin 0.4 milliGRAM(s) Oral at bedtime    sertraline  pantoprazole    Tablet  dextrose 40% Gel  dextrose 50% Injectable  dextrose 50% Injectable  dextrose 50% Injectable  glucagon  Injectable  insulin glargine Injectable (LANTUS)  insulin lispro (ADMELOG) corrective regimen sliding scale  apixaban  dextrose 5%.  dextrose 5%.  magnesium oxide  potassium chloride    Tablet ER  potassium chloride  20 mEq/100 mL IVPB  sodium chloride 0.9% Bolus      DIAGNOSTIC TESTING:  [ ] Echocardiogram: Pending    OTHER: 	  < from: CT Head No Cont (05.08.21 @ 16:31) >   EXAM:  CT BRAIN                          PROCEDURE DATE:  05/08/2021          INTERPRETATION:  CLINICAL STATEMENT: Question subdural hematoma    TECHNIQUE: CT of the head was performed without IV contrast.  RAPID artificial intelligence was utilized for the preliminary evaluation of intracranial hemorrhage.    COMPARISON: CT head 5/8/2021 at 10:06 AM    FINDINGS:  There is mild diffuse parenchymal volume loss. There are areas of low attenuation in the periventricular white matter likely related to mild chronic microvascular ischemic changes.    Stable anterior dural calcifications    There is no acute intracranial hemorrhage, parenchymal mass, mass effect or midline shift. There is no acute territorial infarct. There is no hydrocephalus. Partial empty sella    The cranium is intact.    Mild mucosal thickening maxillary sinuses    IMPRESSION:  No acute intracranial hemorrhage or acute territorial infarct.  If symptoms persist, follow-up MRI exam recommended.    < end of copied text >    < from: US Duplex Venous Lower Ext Complete, Bilateral (05.08.21 @ 19:58) >     EXAM:  US DPLX LWR EXT VEINS COMPL BI                          PROCEDURE DATE:  05/08/2021          INTERPRETATION:  CLINICAL INFORMATION: Bilateral leg pain.    COMPARISON: None available.    TECHNIQUE: Duplex sonography of the BILATERAL LOWER extremity veins with color and spectral Doppler, with and without compression.    FINDINGS:    RIGHT:  Normal compressibility of the RIGHT common femoral, femoral and popliteal veins.  Doppler examination shows normal spontaneous and phasic flow.  No RIGHT calf vein thrombosis is detected.    LEFT:  Normal compressibility of the LEFT common femoral, femoral and popliteal veins.  Doppler examination shows normal spontaneous and phasic flow.  No LEFT calf vein thrombosis is detected.    IMPRESSION:  No evidence of deep venous thrombosis in either lower extremity.    < end of copied text >      LABS:	 	  CARDIAC MARKERS ( 08 May 2021 18:28 )  x     / 0.04 ng/mL / x     / x     / x      p-BNP 08 May 2021 18:28: x    , CARDIAC MARKERS ( 08 May 2021 08:39 )  x     / 0.04 ng/mL / 155 U/L / x     / 6.1 ng/mL  p-BNP 08 May 2021 08:39: 56098 pg/mL                          11.1   8.29  )-----------( 180      ( 08 May 2021 08:39 )             36.2     05-09    131<L>  |  91<L>  |  43.0<H>  ----------------------------<  102<H>  2.9<LL>   |  27.0  |  1.93<H>    Ca    9.0      09 May 2021 06:06    TPro  6.9  /  Alb  3.8  /  TBili  0.6  /  DBili  x   /  AST  22  /  ALT  15  /  AlkPhos  143<H>  05-08    proBNP: Serum Pro-Brain Natriuretic Peptide: 71160 pg/mL (05-08 @ 08:39)    Lipid Profile:   HgA1c:   TSH: Thyroid Stimulating Hormone, Serum: 0.91 uIU/mL        TELEMETRY: Reviewed  Afib with RVR                                                                        [place] [facility]                                                               Office: [address] [address]                                                              Telephone: [phone]. Fax:[phone]                                                                             PROGRESS NOTE  Reason for follow up: CHF  Update: Patient with rapid Afib overnight, and states that he has been urinating frequently. Repeat CT head was negative for bleeding, given dose of Eliquis. Patient states his breathing and leg swelling have improved. Prelim Echo shows LVEF 35-40% with severely dilated RV. No records available for comparison. BNP   Covid Status: Negative 05/08/21    Review of symptoms:   Cardiac:  No chest pain. +dyspnea. No palpitations.  Respiratory: No cough. +dyspnea  Gastrointestinal: No diarrhea. No abdominal pain. No bleeding.     Past medical history: No updates.   	  Vitals:  T(C): 36.8 (05-09-21 @ 07:49), Max: 36.8 (05-09-21 @ 07:49)  HR: 108 (05-09-21 @ 07:49) (77 - 121)  BP: 148/88 (05-09-21 @ 07:49) (118/72 - 192/110)  RR: 19 (05-09-21 @ 07:49) (18 - 28)  SpO2: 98% (05-09-21 @ 07:49) (94% - 98%)  Wt(kg): --  I&O's Summary    Weight (kg): 108.9 (05-08 @ 07:06)      PHYSICAL EXAM:  Constitutional: Comfortable . No acute distress.   HEENT: Atraumatic and normocephalic , neck is supple . no JVD. No carotid bruit. PEERL   CNS: A&Ox3. No focal deficits. EOMI. Cranial nerves II-IX are intact.   Lymph Nodes: Cervical : Not palpable.  Respiratory: Decreased BS b/l  Cardiovascular: Irregularly irregular. No rubs or gallop. II/VI systolic murmur lsb  Gastrointestinal: Soft non-tender and non distended . +Bowel sounds. negative Dang's sign.  Extremities: 2+ b/l LE edema, improved  Psychiatric: Calm . no agitation.  Skin: No skin rash/ulcers visualized to face, hands or feet.      CURRENT MEDICATIONS:  amLODIPine   Tablet 10 milliGRAM(s) Oral daily  furosemide   Injectable 40 milliGRAM(s) IV Push daily  metolazone 5 milliGRAM(s) Oral daily  metoprolol tartrate 100 milliGRAM(s) Oral every 12 hours  tamsulosin 0.4 milliGRAM(s) Oral at bedtime    sertraline  pantoprazole    Tablet  dextrose 40% Gel  dextrose 50% Injectable  dextrose 50% Injectable  dextrose 50% Injectable  glucagon  Injectable  insulin glargine Injectable (LANTUS)  insulin lispro (ADMELOG) corrective regimen sliding scale  apixaban  dextrose 5%.  dextrose 5%.  magnesium oxide  potassium chloride    Tablet ER  potassium chloride  20 mEq/100 mL IVPB  sodium chloride 0.9% Bolus      DIAGNOSTIC TESTING:  [ ] Echocardiogram: Pending    OTHER: 	  < from: CT Head No Cont (05.08.21 @ 16:31) >   EXAM:  CT BRAIN                          PROCEDURE DATE:  05/08/2021          INTERPRETATION:  CLINICAL STATEMENT: Question subdural hematoma    TECHNIQUE: CT of the head was performed without IV contrast.  RAPID artificial intelligence was utilized for the preliminary evaluation of intracranial hemorrhage.    COMPARISON: CT head 5/8/2021 at 10:06 AM    FINDINGS:  There is mild diffuse parenchymal volume loss. There are areas of low attenuation in the periventricular white matter likely related to mild chronic microvascular ischemic changes.    Stable anterior dural calcifications    There is no acute intracranial hemorrhage, parenchymal mass, mass effect or midline shift. There is no acute territorial infarct. There is no hydrocephalus. Partial empty sella    The cranium is intact.    Mild mucosal thickening maxillary sinuses    IMPRESSION:  No acute intracranial hemorrhage or acute territorial infarct.  If symptoms persist, follow-up MRI exam recommended.    < end of copied text >    < from: US Duplex Venous Lower Ext Complete, Bilateral (05.08.21 @ 19:58) >     EXAM:  US DPLX LWR EXT VEINS COMPL BI                          PROCEDURE DATE:  05/08/2021          INTERPRETATION:  CLINICAL INFORMATION: Bilateral leg pain.    COMPARISON: None available.    TECHNIQUE: Duplex sonography of the BILATERAL LOWER extremity veins with color and spectral Doppler, with and without compression.    FINDINGS:    RIGHT:  Normal compressibility of the RIGHT common femoral, femoral and popliteal veins.  Doppler examination shows normal spontaneous and phasic flow.  No RIGHT calf vein thrombosis is detected.    LEFT:  Normal compressibility of the LEFT common femoral, femoral and popliteal veins.  Doppler examination shows normal spontaneous and phasic flow.  No LEFT calf vein thrombosis is detected.    IMPRESSION:  No evidence of deep venous thrombosis in either lower extremity.    < end of copied text >      LABS:	 	  CARDIAC MARKERS ( 08 May 2021 18:28 )  x     / 0.04 ng/mL / x     / x     / x      p-BNP 08 May 2021 18:28: x    , CARDIAC MARKERS ( 08 May 2021 08:39 )  x     / 0.04 ng/mL / 155 U/L / x     / 6.1 ng/mL  p-BNP 08 May 2021 08:39: 70656 pg/mL                          11.1   8.29  )-----------( 180      ( 08 May 2021 08:39 )             36.2     05-09    131<L>  |  91<L>  |  43.0<H>  ----------------------------<  102<H>  2.9<LL>   |  27.0  |  1.93<H>    Ca    9.0      09 May 2021 06:06    TPro  6.9  /  Alb  3.8  /  TBili  0.6  /  DBili  x   /  AST  22  /  ALT  15  /  AlkPhos  143<H>  05-08    proBNP: Serum Pro-Brain Natriuretic Peptide: 22802 pg/mL (05-08 @ 08:39)    Lipid Profile:   HgA1c:   TSH: Thyroid Stimulating Hormone, Serum: 0.91 uIU/mL        TELEMETRY: Reviewed  Afib with RVR

## 2021-05-09 NOTE — H&P ADULT - NSICDXPASTMEDICALHX_GEN_ALL_CORE_FT
PAST MEDICAL HISTORY:  Atrial fibrillation, unspecified type     Bell's palsy Right side of face    Congestive heart failure, unspecified HF chronicity, unspecified heart failure type     Coronary artery disease involving native heart, angina presence unspecified, unspecified vessel or lesion type

## 2021-05-09 NOTE — ED CDU PROVIDER DISPOSITION NOTE - CLINICAL COURSE
Patient is a 80 year old male with pmhx of CAD, CABG, A-fib, HTN, DM2 who presented to ER c/o falls on Tuesday.  patient flew from [place] on Tuesday and fell upon arrival. patient came today due to pain.  patient with bruising on right arm and torso.  patient had negative xrays, CT of Head showed possible SDH however repeat was negative.  patient seen by Neurosurgery and Trauma.  patient also c/o chronic LE edema, worsened over past few days.  patient seen by cardiology who was obs overnight with TTE.  patient will be placed in obs.  patient admits to SOB and PINA. no chest pain.  While in ED patient had echo, abnormal, as per cardiology reccomends admission for NST and possible cardioversion.

## 2021-05-09 NOTE — ED CDU PROVIDER SUBSEQUENT DAY NOTE - ATTENDING CONTRIBUTION TO CARE
I agree with the PA's note and was available for any issues/concerns. I was directly involved in patient care. My brief overall assessment is as follows:    no chest pain overnight; labs and diagnostic imaging results reviewed; potassium supplement started; patient pending ECHO

## 2021-05-10 LAB
ANION GAP SERPL CALC-SCNC: 14 MMOL/L — SIGNIFICANT CHANGE UP (ref 5–17)
BUN SERPL-MCNC: 53 MG/DL — HIGH (ref 8–20)
CALCIUM SERPL-MCNC: 8.7 MG/DL — SIGNIFICANT CHANGE UP (ref 8.6–10.2)
CHLORIDE SERPL-SCNC: 100 MMOL/L — SIGNIFICANT CHANGE UP (ref 98–107)
CO2 SERPL-SCNC: 27 MMOL/L — SIGNIFICANT CHANGE UP (ref 22–29)
COVID-19 SPIKE DOMAIN AB INTERP: POSITIVE
COVID-19 SPIKE DOMAIN ANTIBODY RESULT: >250 U/ML — HIGH
CREAT SERPL-MCNC: 2.24 MG/DL — HIGH (ref 0.5–1.3)
GLUCOSE BLDC GLUCOMTR-MCNC: 119 MG/DL — HIGH (ref 70–99)
GLUCOSE BLDC GLUCOMTR-MCNC: 180 MG/DL — HIGH (ref 70–99)
GLUCOSE BLDC GLUCOMTR-MCNC: 189 MG/DL — HIGH (ref 70–99)
GLUCOSE BLDC GLUCOMTR-MCNC: 218 MG/DL — HIGH (ref 70–99)
GLUCOSE BLDC GLUCOMTR-MCNC: 284 MG/DL — HIGH (ref 70–99)
GLUCOSE SERPL-MCNC: 133 MG/DL — HIGH (ref 70–99)
HCT VFR BLD CALC: 38.1 % — LOW (ref 39–50)
HGB BLD-MCNC: 11.4 G/DL — LOW (ref 13–17)
MAGNESIUM SERPL-MCNC: 1.9 MG/DL — SIGNIFICANT CHANGE UP (ref 1.6–2.6)
MCHC RBC-ENTMCNC: 27.3 PG — SIGNIFICANT CHANGE UP (ref 27–34)
MCHC RBC-ENTMCNC: 29.9 GM/DL — LOW (ref 32–36)
MCV RBC AUTO: 91.1 FL — SIGNIFICANT CHANGE UP (ref 80–100)
PHOSPHATE SERPL-MCNC: 2.7 MG/DL — SIGNIFICANT CHANGE UP (ref 2.4–4.7)
PLATELET # BLD AUTO: 199 K/UL — SIGNIFICANT CHANGE UP (ref 150–400)
POTASSIUM SERPL-MCNC: 4.2 MMOL/L — SIGNIFICANT CHANGE UP (ref 3.5–5.3)
POTASSIUM SERPL-SCNC: 4.2 MMOL/L — SIGNIFICANT CHANGE UP (ref 3.5–5.3)
RBC # BLD: 4.18 M/UL — LOW (ref 4.2–5.8)
RBC # FLD: 17.4 % — HIGH (ref 10.3–14.5)
SARS-COV-2 IGG+IGM SERPL QL IA: >250 U/ML — HIGH
SARS-COV-2 IGG+IGM SERPL QL IA: POSITIVE
SODIUM SERPL-SCNC: 141 MMOL/L — SIGNIFICANT CHANGE UP (ref 135–145)
WBC # BLD: 6.83 K/UL — SIGNIFICANT CHANGE UP (ref 3.8–10.5)
WBC # FLD AUTO: 6.83 K/UL — SIGNIFICANT CHANGE UP (ref 3.8–10.5)

## 2021-05-10 PROCEDURE — 78452 HT MUSCLE IMAGE SPECT MULT: CPT | Mod: 26

## 2021-05-10 PROCEDURE — 93018 CV STRESS TEST I&R ONLY: CPT

## 2021-05-10 PROCEDURE — 99223 1ST HOSP IP/OBS HIGH 75: CPT

## 2021-05-10 PROCEDURE — 93016 CV STRESS TEST SUPVJ ONLY: CPT

## 2021-05-10 PROCEDURE — 99233 SBSQ HOSP IP/OBS HIGH 50: CPT

## 2021-05-10 PROCEDURE — 93010 ELECTROCARDIOGRAM REPORT: CPT

## 2021-05-10 RX ORDER — FUROSEMIDE 40 MG
40 TABLET ORAL DAILY
Refills: 0 | Status: DISCONTINUED | OUTPATIENT
Start: 2021-05-10 | End: 2021-05-11

## 2021-05-10 RX ORDER — BACITRACIN ZINC 500 UNIT/G
1 OINTMENT IN PACKET (EA) TOPICAL
Refills: 0 | Status: COMPLETED | OUTPATIENT
Start: 2021-05-10 | End: 2021-05-12

## 2021-05-10 RX ADMIN — APIXABAN 5 MILLIGRAM(S): 2.5 TABLET, FILM COATED ORAL at 05:51

## 2021-05-10 RX ADMIN — INSULIN GLARGINE 7 UNIT(S): 100 INJECTION, SOLUTION SUBCUTANEOUS at 21:40

## 2021-05-10 RX ADMIN — Medication 1 APPLICATION(S): at 21:40

## 2021-05-10 RX ADMIN — APIXABAN 5 MILLIGRAM(S): 2.5 TABLET, FILM COATED ORAL at 17:55

## 2021-05-10 RX ADMIN — PANTOPRAZOLE SODIUM 40 MILLIGRAM(S): 20 TABLET, DELAYED RELEASE ORAL at 06:01

## 2021-05-10 RX ADMIN — MAGNESIUM OXIDE 400 MG ORAL TABLET 400 MILLIGRAM(S): 241.3 TABLET ORAL at 12:06

## 2021-05-10 RX ADMIN — AMLODIPINE BESYLATE 10 MILLIGRAM(S): 2.5 TABLET ORAL at 05:51

## 2021-05-10 RX ADMIN — INSULIN GLARGINE 7 UNIT(S): 100 INJECTION, SOLUTION SUBCUTANEOUS at 11:01

## 2021-05-10 RX ADMIN — TAMSULOSIN HYDROCHLORIDE 0.4 MILLIGRAM(S): 0.4 CAPSULE ORAL at 21:36

## 2021-05-10 RX ADMIN — Medication 40 MILLIGRAM(S): at 05:50

## 2021-05-10 RX ADMIN — SERTRALINE 100 MILLIGRAM(S): 25 TABLET, FILM COATED ORAL at 12:06

## 2021-05-10 RX ADMIN — Medication 100 MILLIGRAM(S): at 17:13

## 2021-05-10 RX ADMIN — Medication 81 MILLIGRAM(S): at 12:06

## 2021-05-10 RX ADMIN — Medication 2: at 17:13

## 2021-05-10 RX ADMIN — Medication 1: at 11:01

## 2021-05-10 NOTE — PROGRESS NOTE ADULT - SUBJECTIVE AND OBJECTIVE BOX
[id number]            [facility]                          [address] [place] [address]                       Phone: [phone]. Fax:[phone]                      ________________________________________________    HPI:  The patient is a 80 year old male with a past medical history of CAD status post CABG, CHF unknown baseline EF, afib on eliquis, hypertension and diabetes mellitus type 2 who presented to the ER after a fall at home. The patient fell 4 days prior to admission, states he missed a step and fell backward with possible LOC. Post fall patient had progressively worsening back pain and presented to the ER for evaluation. In the ER, CT head showed possible anterior SDH. Neurosurgery was consulted, repeat CT head was negative.  Noted to have KEN [birth year] with a BNP of 344[address]. REcently patient has had complaints of dyspnea. HIs cardiologists and physicians are in [place]. Was seen in the ER by cardiology, started on diuresis with IV lasix and metalozone for acute decompensated CHF. Troponin was negative x 2. Initially admitted to the CDU> Echocardiogram was done which showed EF of 35-40% with severe RV dysfunction. Admitted for afib with rvr and acute decompensated CHF>      (09 May 2021 12:07)    HOME MEDICATIONS:  atorvastatin 40 mg oral tablet: 1 tab(s) orally once a day (10 May 2021 12:58)  Eliquis 5 mg oral tablet: 1 tab(s) orally 2 times a day (10 May 2021 12:58)  ferrous sulfate 300 mg (60 mg elemental iron) oral tablet: 1 tab(s) orally once a day (10 May 2021 12:58)  Flomax 0.4 mg oral capsule: 1 cap(s) orally once a day (10 May 2021 12:58)  Lasix 40 mg oral tablet: 1 tab(s) orally once a day (10 May 2021 12:58)  lisinopril 10 mg oral tablet: 1 tab(s) orally once a day (10 May 2021 12:58)  magnesium oxide 400 mg (241.3 mg elemental magnesium) oral tablet: 1 tab(s) orally once a day (10 May 2021 12:58)  metoprolol tartrate 50 mg oral tablet: 1 tab(s) orally 2 times a day (10 May 2021 12:58)  omeprazole 40 mg oral delayed release capsule: 1 cap(s) orally once a day (10 May 2021 12:58)  sertraline 100 mg oral tablet: 1 tab(s) orally once a day (10 May 2021 12:58)  Zaroxolyn 5 mg oral tablet: 1 tab(s) orally once a day (10 May 2021 12:58)      ROS: All review of systems negative unless indicated otherwise below.                         PHYSICAL EXAM:    GENERAL: NAD  NECK: Supple, No JVD  NERVOUS SYSTEM:  Alert & Oriented X3, non focal neuro exam.   CHEST/LUNG: clear lungs, No rales, rhonchi, wheezing, or rubs  HEART: Regular rate and rhythm; s1 and s2 auscultated, No murmurs, rubs, or gallops  ABDOMEN: Soft, Nontender, Nondistended; Bowel sounds present and normoactive.   EXTREMITIES:  2+ Peripheral Pulses, No clubbing, cyanosis, or edema       Vital Signs Last 24 Hrs  T(C): 36.6 (10 May 2021 05:47), Max: 36.7 (09 May 2021 15:36)  T(F): 97.8 (10 May 2021 05:47), Max: 98.1 (09 May 2021 15:36)  HR: 80 (10 May 2021 05:47) (80 - 106)  BP: 141/92 (10 May 2021 05:47) (123/79 - 141/92)  BP(mean): --  RR: 18 (10 May 2021 05:47) (18 - 18)  SpO2: 97% (10 May 2021 05:47) (95% - 98%)                                                   DAILY WEIGHTS - 48 HOUR TREND     Daily Weight in k (10 May 2021 05:47)                             INTAKE AND OUTPUT - 48 HOUR TREND     21 @ 07:01  -  05-10-21 @ 07:00  --------------------------------------------------------  IN:  Total IN: 0 mL    OUT:    Voided (mL): 300 mL  Total OUT: 300 mL    Total NET: -300 mL                                               LAB RESULTS                 COMPLETE BLOOD COUNT( 10 May 2021 06:40 )                            11.4 g/dL<L>  6.83 K/uL )---------------( 199 K/uL                        38.1 %<L>      Automated Differential     Auto Basophil # - X      Auto Basophil % - X      Auto Eosinophil # - X      Auto Eosinophil % - X      Auto Immature Granulocyte # - X      Auto Immature Granulocyte % - X      Auto Lymphocyte # - X      Auto Lymphocyte % - X      Auto Monocyte # - X      Auto Monocyte % - X      Auto Neutrophil # - X      Auto Neutrophil % - X                                      CHEMISTRY                 Basic Metabolic Panel (05-10-21 @ 06:40)    141  |  100  |  53.0<H>  ----------------------------<  133<H>  4.2   |  27.0  |  2.24<H>    Ca    8.7      10 May 2021 06:40  Phos  2.7     05-10  Mg     1.9     05-10                    Liver Functions (21 @ 08:39))  TPro  6.9  /  Alb  3.8  /  TBili  0.6  /  DBili  x   /  AST  22  /  ALT  15  /  AlkPhos  143     PT/INR/PTT ( 08 May 2021 08:39 )                        :                       :      22.8         :       36.5                  .        .                   .              .           .       2.03        .                                                                 Cardiac Enzymes   ( 09 May 2021 11:25 )  Troponin T  X    ,  CPK  X    , CKMB  X    , BNP 35819<H>    , ( 09 May 2021 06:06 )  Troponin T  X    ,  CPK  X    , CKMB  X    , BNP 48190<H>    , ( 08 May 2021 18:28 )  Troponin T  0.04 ,  CPK  X    , CKMB  X    , BNP X                                 Current Admission Active Medications    amLODIPine   Tablet 10 milliGRAM(s) Oral daily  apixaban 5 milliGRAM(s) Oral two times a day  aspirin enteric coated 81 milliGRAM(s) Oral daily  dextrose 40% Gel 15 Gram(s) Oral once  dextrose 5%. 1000 milliLiter(s) (50 mL/Hr) IV Continuous <Continuous>  dextrose 5%. 1000 milliLiter(s) (100 mL/Hr) IV Continuous <Continuous>  dextrose 50% Injectable 25 Gram(s) IV Push once  dextrose 50% Injectable 12.5 Gram(s) IV Push once  dextrose 50% Injectable 25 Gram(s) IV Push once  furosemide   Injectable 40 milliGRAM(s) IV Push daily  glucagon  Injectable 1 milliGRAM(s) IntraMuscular once  insulin glargine Injectable (LANTUS) 7 Unit(s) SubCutaneous two times a day  insulin lispro (ADMELOG) corrective regimen sliding scale   SubCutaneous three times a day before meals  magnesium oxide 400 milliGRAM(s) Oral daily  metoprolol tartrate 100 milliGRAM(s) Oral every 12 hours  pantoprazole    Tablet 40 milliGRAM(s) Oral before breakfast  sertraline 100 milliGRAM(s) Oral daily  tamsulosin 0.4 milliGRAM(s) Oral at bedtime                          RADIOLOGY RESULTS: Personally visualized   < from: Xray Chest 1 View AP/PA. (21 @ 08:48) >  Impression: No active pulmonary disease. Marked cardiomegaly.    < end of copied text >    < from: CT Head No Cont (21 @ 16:31) >  IMPRESSION:  No acute intracranial hemorrhage or acute territorial infarct.  If symptoms persist, follow-up MRI exam recommended.      < end of copied text >                          CARDIOLOGY RESULTS: Official Report/Preliminary Verbal Reports  < from: TTE Echo Complete w/ Contrast w/ Doppler (21 @ 08:59) >  Summary:   1. Technically difficult study.   2. Endocardial visualization was enhanced with intravenous echo contrast.   3. Left ventricularejection fraction, by visual estimation, is 35 to 40%.   4. Paradoxical septal motion with moderately decreased segmental left ventricular systolic function.   5. Severe hypokinesis of the basal to mid segements, the apex and distal segments are preserved.   6. The mitral in-flow pattern reveals no discernable A-wave, therefore no comment on diastolic function can be made.   7. Severely enlarged right ventricle with severely reduced systolic function, estimated PASP least 45 mmHg mild to moderately elevated.   8. Right atrial enlargement.   9. Moderately enlarged left atrium.  10. Mild mitral annular calcification.  11. Trace mitral valve regurgitation.  12. Thickening and calcification of the anterior and posterior mitral valve leaflets.  13. Mild-moderate tricuspid regurgitation.  14. Sclerotic aortic valve with decreased opening.  15. There is mild aortic root calcification.  16. There is no evidence of pericardial effusion.  17. No prior study available for comparison.    Gamal Mirza DO Electronically signed on 2021 at 11:21:05 AM    < end of copied text >

## 2021-05-10 NOTE — CONSULT NOTE ADULT - SUBJECTIVE AND OBJECTIVE BOX
Electrophysiology Attending Consult Note    HPI:  The patient is a 80 year old male with a past medical history of CAD status post CABG, CHF unknown baseline EF, afib on eliquis, hypertension and diabetes mellitus type 2 who presented to the ER after a fall at home. The patient fell 4 days prior to admission, states he missed a step and fell backward with possible LOC. Post fall patient had progressively worsening back pain and presented to the ER for evaluation. In the ER, CT head showed possible anterior SDH. Neurosurgery was consulted, repeat CT head was negative.  Noted to have KEN [birth year] with a BNP of 8[address]. REcently patient has had complaints of dyspnea. HIs cardiologists and physicians are in [place]. Was seen in the ER by cardiology, started on diuresis with IV lasix and metalozone for acute decompensated CHF. Troponin was negative x 2. Initially admitted to the CDU> Echocardiogram was done which showed EF of 35-40% with severe RV dysfunction. Admitted for afib with rvr and acute decompensated CHF>     Above appreciated  Patient has been having progressive PINA lately as well as leg edema. He has been compliant with all his medications. He declines any chest pain, palpitation, or syncope. In regards to the fall, he rememebrs feels a bit weak or maybe dizzy but denies shiva syncope prior to the fall. NO other syncope. Has rare falls.   He stated that he was told he has AF for more than oe year and he doesn't feel any palpitation.   He had BRBPR in the recent past which determined to be due to hemorrhoid and stopped.   He has no family histroy of premature CAD or SCD.  He was in sinus with bifascicular block on presentation and went into AFIB ?sometime yesterday with rates in the low 100. Tele showed no pauses or bradycardia   During this admission his EF noted to be low, and stress test was negative for ischemia so cath was deferred given the CKD.   Initial CT suggested SDH which was not seen on recurrent CT and so his AC was resumed. No follow up from NSx initial consult       PAST MEDICAL & SURGICAL HISTORY:  Coronary artery disease, s/p CABG  Atrial fibrillation, CHADS-VASc = 6 (HF, HTN, DM, CAD and age) on eliquis  Congestive heart failure, unspecified HF chronicity, unspecified heart failure type  DM  HTN  Bell&#x27;s palsy  Right side of face        REVIEW OF SYSTEMS:    CONSTITUTIONAL: No fever, weight loss, or fatigue  EYES: No eye pain, visual disturbances, or discharge  ENMT:  No difficulty hearing, tinnitus, vertigo; No sinus or throat pain  RESPIRATORY: No cough, wheezing, chills or hemoptysis;   CARDIOVASCULAR: see HPI  GASTROINTESTINAL: No abdominal or epigastric pain. No nausea, vomiting, or hematemesis; No diarrhea or constipation.  GENITOURINARY: No dysuria, frequency, hematuria,   NEUROLOGICAL: No headaches, memory loss, loss of strength, numbness, or tremors  SKIN: No itching, burning, rashes, or lesions   LYMPH NODES: No enlarged glands  ENDOCRINE: No heat or cold intolerance; No hair loss  MUSCULOSKELETAL: No joint pain or swelling; No muscle, back, or extremity pain  HEME/LYMPH: No easy bruising, or bleeding gums  ALLERY AND IMMUNOLOGIC: No hives or eczema      MEDICATIONS  (STANDING):  amLODIPine   Tablet 10 milliGRAM(s) Oral daily  apixaban 5 milliGRAM(s) Oral two times a day  aspirin enteric coated 81 milliGRAM(s) Oral daily  dextrose 40% Gel 15 Gram(s) Oral once  dextrose 5%. 1000 milliLiter(s) (50 mL/Hr) IV Continuous <Continuous>  dextrose 5%. 1000 milliLiter(s) (100 mL/Hr) IV Continuous <Continuous>  dextrose 50% Injectable 25 Gram(s) IV Push once  dextrose 50% Injectable 12.5 Gram(s) IV Push once  dextrose 50% Injectable 25 Gram(s) IV Push once  furosemide    Tablet 40 milliGRAM(s) Oral daily  glucagon  Injectable 1 milliGRAM(s) IntraMuscular once  insulin glargine Injectable (LANTUS) 7 Unit(s) SubCutaneous two times a day  insulin lispro (ADMELOG) corrective regimen sliding scale   SubCutaneous three times a day before meals  magnesium oxide 400 milliGRAM(s) Oral daily  metoprolol tartrate 100 milliGRAM(s) Oral every 12 hours  pantoprazole    Tablet 40 milliGRAM(s) Oral before breakfast  sertraline 100 milliGRAM(s) Oral daily  tamsulosin 0.4 milliGRAM(s) Oral at bedtime      Allergies  - No Known Allergies      SOCIAL HISTORY:  Negative for smoking  Occasional alcohol    FAMILY HISTORY:  No pertinent family history in first degree relatives      Vital Signs Last 24 Hrs  T(C): 36.7 (10 May 2021 16:12), Max: 36.7 (10 May 2021 16:12)  T(F): 98.1 (10 May 2021 16:12), Max: 98.1 (10 May 2021 16:12)  HR: 104 (10 May 2021 16:12) (80 - 104)  BP: 125/75 (10 May 2021 16:12) (123/79 - 141/92)  BP(mean): --  RR: 18 (10 May 2021 16:12) (18 - 18)  SpO2: 96% (10 May 2021 16:12) (95% - 97%)    Physical Exam:  Constitutional: AAOx3, NAD  Neck: supple, No JVD  Cardiovascular: +S1S2, irregular pulse, no murmurs, rubs, gallops   Pulmonary: CTA b/l, unlabored, no wheezes, rales. 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 Bifascicular block  EKG 5/8 SR with Bifascicular block    < from: TTE Echo Complete w/ Contrast w/ Doppler (05.09.21 @ 08:59) >  Summary:   1. Technically difficult study.   2. Endocardial visualization was enhanced with intravenous echo contrast.   3. Left ventricularejection fraction, by visual estimation, is 35 to 40%.   4. Paradoxical septal motion with moderately decreased segmental left ventricular systolic function.   5. Severe hypokinesis of the basal to mid segements, the apex and distal segments are preserved.   6. The mitral in-flow pattern reveals no discernable A-wave, therefore no comment on diastolic function can be made.   7. Severely enlarged right ventricle with severely reduced systolic function, estimated PASP least 45 mmHg mild to moderately elevated.   8. Right atrial enlargement.   9. Moderately enlarged left atrium.  10. Mild mitral annular calcification.  11. Trace mitral valve regurgitation.  12. Thickening and calcification of the anterior and posterior mitral valve leaflets.  13. Mild-moderate tricuspid regurgitation.  14. Sclerotic aortic valve with decreased opening.  15. There is mild aortic root calcification.  16. There is no evidence of pericardial effusion.  17. No prior study available for comparison.    < end of copied text >      < from: CT Head No Cont (05.08.21 @ 10:15) >  FINDINGS: Mild anterior dural thickening tiny calcification on image 46 of series 4. Questionable thin anterior falx subdural hemorrhage measuring 1 mm image 40 of series 4.  There is periventricular and subcortical white matter hypodensity without mass effect, nonspecific, likely representing mild chronic microvascular ischemic changes. There is no compelling evidence for an acute transcortical infarction. There is no other evidence of mass, mass effect, midline shift or extra-axial fluid collection. The lateral ventricles and cortical sulci are otherwise age-appropriate in size and configuration. Trace mucosal thickening bilateral maxillary sinuses The orbits, mastoid air cells and visualized paranasal sinuses are normal. The calvarium is intact. Consider follow-up CT or MRI as clinically warranted.    < end of copied text >      < from: CT Chest No Cont (05.08.21 @ 10:16) >  IMPRESSION: No evidence of acute visceral organ or bony injury on this noncontrast CT. Mild cirrhotic morphology of the liver with trace intraperitoneal fluid. Thickened bladder with trabeculation and stone containing diverticula and enlarged prostate, compatible chronic bladder outlet obstruction.    < end of copied text >      < from: CT Abdomen and Pelvis No Cont (05.08.21 @ 10:16) >  IMPRESSION: No evidence of acute visceral organ or bony injury on this noncontrast CT. Mild cirrhotic morphology of the liver with trace intraperitoneal fluid. Thickened bladder with trabeculation and stone containing diverticula and enlarged prostate, compatible chronic bladder outlet obstruction.    < end of copied text >      < from: CT Head No Cont (05.08.21 @ 16:31) >  IMPRESSION:  No acute intracranial hemorrhage or acute territorial infarct.  If symptoms persist, follow-up MRI exam recommended.    < end of copied text >  < from: US Duplex Venous Lower Ext Complete, Bilateral (05.08.21 @ 19:58) >  IMPRESSION:  No evidence of deep venous thrombosis in either lower extremity.    < end of copied text >     Electrophysiology Attending Consult Note    HPI:  The patient is a 80 year old male with a past medical history of CAD status post CABG, CHF unknown baseline EF, afib on eliquis, hypertension and diabetes mellitus type 2 who presented to the ER after a fall at home. The patient fell 4 days prior to admission, states he missed a step and fell backward with possible LOC. Post fall patient had progressively worsening back pain and presented to the ER for evaluation. In the ER, CT head showed possible anterior SDH. Neurosurgery was consulted, repeat CT head was negative.  Noted to have KEN 2[birth year] with a BNP of [address]. REcently patient has had complaints of dyspnea. HIs cardiologists and physicians are in [place]. Was seen in the ER by cardiology, started on diuresis with IV lasix and metalozone for acute decompensated CHF. Troponin was negative x 2. Initially admitted to the CDU> Echocardiogram was done which showed EF of 35-40% with severe RV dysfunction. Admitted for afib with rvr and acute decompensated CHF>     Above appreciated  Patient has been having progressive PINA lately as well as leg edema. He has been compliant with all his medications. He declines any chest pain, palpitation, or syncope. In regards to the fall, he rememebrs feels a bit weak or maybe dizzy but denies shiva syncope prior to the fall. NO other syncope. Has rare falls.   He stated that he was told he has AF for more than one year and he doesn't feel any palpitation.   He had BRBPR in the recent past which determined to be due to hemorrhoid and stopped.   He has no family histroy of premature CAD or SCD.  He was in sinus vs AFL with bifascicular block on presentation and went into AFIB ?sometime yesterday with rates in the low 100. Tele showed no pauses or bradycardia   During this admission his EF noted to be low, and stress test was negative for ischemia so cath was deferred given the CKD.   Initial CT suggested SDH which was not seen on recurrent CT and so his AC was resumed. No follow up from NSx initial consult       PAST MEDICAL & SURGICAL HISTORY:  Coronary artery disease, s/p CABG  Atrial fibrillation, CHADS-VASc = 6 (HF, HTN, DM, CAD and age) on eliquis  Congestive heart failure, unspecified HF chronicity, unspecified heart failure type  DM  HTN  Bell&#x27;s palsy  Right side of face        REVIEW OF SYSTEMS:    CONSTITUTIONAL: No fever, weight loss, or fatigue  EYES: No eye pain, visual disturbances, or discharge  ENMT:  No difficulty hearing, tinnitus, vertigo; No sinus or throat pain  RESPIRATORY: No cough, wheezing, chills or hemoptysis;   CARDIOVASCULAR: see HPI  GASTROINTESTINAL: No abdominal or epigastric pain. No nausea, vomiting, or hematemesis; No diarrhea or constipation.  GENITOURINARY: No dysuria, frequency, hematuria,   NEUROLOGICAL: No headaches, memory loss, loss of strength, numbness, or tremors  SKIN: No itching, burning, rashes, or lesions   LYMPH NODES: No enlarged glands  ENDOCRINE: No heat or cold intolerance; No hair loss  MUSCULOSKELETAL: No joint pain or swelling; No muscle, back, or extremity pain  HEME/LYMPH: No easy bruising, or bleeding gums  ALLERY AND IMMUNOLOGIC: No hives or eczema      MEDICATIONS  (STANDING):  amLODIPine   Tablet 10 milliGRAM(s) Oral daily  apixaban 5 milliGRAM(s) Oral two times a day  aspirin enteric coated 81 milliGRAM(s) Oral daily  dextrose 40% Gel 15 Gram(s) Oral once  dextrose 5%. 1000 milliLiter(s) (50 mL/Hr) IV Continuous <Continuous>  dextrose 5%. 1000 milliLiter(s) (100 mL/Hr) IV Continuous <Continuous>  dextrose 50% Injectable 25 Gram(s) IV Push once  dextrose 50% Injectable 12.5 Gram(s) IV Push once  dextrose 50% Injectable 25 Gram(s) IV Push once  furosemide    Tablet 40 milliGRAM(s) Oral daily  glucagon  Injectable 1 milliGRAM(s) IntraMuscular once  insulin glargine Injectable (LANTUS) 7 Unit(s) SubCutaneous two times a day  insulin lispro (ADMELOG) corrective regimen sliding scale   SubCutaneous three times a day before meals  magnesium oxide 400 milliGRAM(s) Oral daily  metoprolol tartrate 100 milliGRAM(s) Oral every 12 hours  pantoprazole    Tablet 40 milliGRAM(s) Oral before breakfast  sertraline 100 milliGRAM(s) Oral daily  tamsulosin 0.4 milliGRAM(s) Oral at bedtime      Allergies  - No Known Allergies      SOCIAL HISTORY:  Negative for smoking  Occasional alcohol    FAMILY HISTORY:  No pertinent family history in first degree relatives      Vital Signs Last 24 Hrs  T(C): 36.7 (10 May 2021 16:12), Max: 36.7 (10 May 2021 16:12)  T(F): 98.1 (10 May 2021 16:12), Max: 98.1 (10 May 2021 16:12)  HR: 104 (10 May 2021 16:12) (80 - 104)  BP: 125/75 (10 May 2021 16:12) (123/79 - 141/92)  BP(mean): --  RR: 18 (10 May 2021 16:12) (18 - 18)  SpO2: 96% (10 May 2021 16:12) (95% - 97%)    Physical Exam:  Constitutional: AAOx3, NAD  Neck: supple, No JVD  Cardiovascular: +S1S2, irregular pulse, no murmurs, rubs, gallops   Pulmonary: CTA b/l, unlabored, no wheezes, rales. 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 Bifascicular block  EKG 5/8 AFL with 3:1 AVB and  with Bifascicular block    < from: TTE Echo Complete w/ Contrast w/ Doppler (05.09.21 @ 08:59) >  Summary:   1. Technically difficult study.   2. Endocardial visualization was enhanced with intravenous echo contrast.   3. Left ventricularejection fraction, by visual estimation, is 35 to 40%.   4. Paradoxical septal motion with moderately decreased segmental left ventricular systolic function.   5. Severe hypokinesis of the basal to mid segements, the apex and distal segments are preserved.   6. The mitral in-flow pattern reveals no discernable A-wave, therefore no comment on diastolic function can be made.   7. Severely enlarged right ventricle with severely reduced systolic function, estimated PASP least 45 mmHg mild to moderately elevated.   8. Right atrial enlargement.   9. Moderately enlarged left atrium.  10. Mild mitral annular calcification.  11. Trace mitral valve regurgitation.  12. Thickening and calcification of the anterior and posterior mitral valve leaflets.  13. Mild-moderate tricuspid regurgitation.  14. Sclerotic aortic valve with decreased opening.  15. There is mild aortic root calcification.  16. There is no evidence of pericardial effusion.  17. No prior study available for comparison.    < end of copied text >      < from: CT Head No Cont (05.08.21 @ 10:15) >  FINDINGS: Mild anterior dural thickening tiny calcification on image 46 of series 4. Questionable thin anterior falx subdural hemorrhage measuring 1 mm image 40 of series 4.  There is periventricular and subcortical white matter hypodensity without mass effect, nonspecific, likely representing mild chronic microvascular ischemic changes. There is no compelling evidence for an acute transcortical infarction. There is no other evidence of mass, mass effect, midline shift or extra-axial fluid collection. The lateral ventricles and cortical sulci are otherwise age-appropriate in size and configuration. Trace mucosal thickening bilateral maxillary sinuses The orbits, mastoid air cells and visualized paranasal sinuses are normal. The calvarium is intact. Consider follow-up CT or MRI as clinically warranted.    < end of copied text >      < from: CT Chest No Cont (05.08.21 @ 10:16) >  IMPRESSION: No evidence of acute visceral organ or bony injury on this noncontrast CT. Mild cirrhotic morphology of the liver with trace intraperitoneal fluid. Thickened bladder with trabeculation and stone containing diverticula and enlarged prostate, compatible chronic bladder outlet obstruction.    < end of copied text >      < from: CT Abdomen and Pelvis No Cont (05.08.21 @ 10:16) >  IMPRESSION: No evidence of acute visceral organ or bony injury on this noncontrast CT. Mild cirrhotic morphology of the liver with trace intraperitoneal fluid. Thickened bladder with trabeculation and stone containing diverticula and enlarged prostate, compatible chronic bladder outlet obstruction.    < end of copied text >      < from: CT Head No Cont (05.08.21 @ 16:31) >  IMPRESSION:  No acute intracranial hemorrhage or acute territorial infarct.  If symptoms persist, follow-up MRI exam recommended.    < end of copied text >  < from: US Duplex Venous Lower Ext Complete, Bilateral (05.08.21 @ 19:58) >  IMPRESSION:  No evidence of deep venous thrombosis in either lower extremity.    < end of copied text >

## 2021-05-10 NOTE — CHART NOTE - NSCHARTNOTEFT_GEN_A_CORE
PA note    Called by RN to see patient who cut his left arm on a pipe sticking out of the wall in his bathroom. He states his last tetanus shot was less then 5years ago.   Left arm: 1"by 1" skin tear noted above elbow, active bleeding.  Cleaned area with saline, antibiotic cream ordered, wrap with gauze.  Con't to monitor

## 2021-05-10 NOTE — PROGRESS NOTE ADULT - SUBJECTIVE AND OBJECTIVE BOX
CC: Follow up    INTERVAL HPI/OVERNIGHT EVENTS: Patient seen and examined, denies chest pain sob, muniz or palpitations. Status post NSt this morning       Vital Signs Last 24 Hrs  T(C): 36.6 (10 May 2021 05:47), Max: 36.7 (09 May 2021 15:36)  T(F): 97.8 (10 May 2021 05:47), Max: 98.1 (09 May 2021 15:36)  HR: 80 (10 May 2021 05:47) (80 - 106)  BP: 141/92 (10 May 2021 05:47) (123/79 - 141/92)  BP(mean): --  RR: 18 (10 May 2021 05:47) (18 - 18)  SpO2: 97% (10 May 2021 05:47) (95% - 98%)    PHYSICAL EXAM:    GENERAL: NAD, AOX3  HEAD:  Atraumatic, Normocephalic  EYES: conjunctiva and sclera clear  ENMT: Moist mucous membranes  NECK: Supple, No JVD  CHEST/LUNG: Clear to auscultation bilaterally; No rales, rhonchi, wheezing, or rubs  HEART: IRR; No murmurs, rubs, or gallops  ABDOMEN: Soft, Nontender, Nondistended; Bowel sounds present  EXTREMITIES:  2+ Peripheral Pulses, No clubbing, cyanosis, or edema        MEDICATIONS  (STANDING):  amLODIPine   Tablet 10 milliGRAM(s) Oral daily  apixaban 5 milliGRAM(s) Oral two times a day  aspirin enteric coated 81 milliGRAM(s) Oral daily  dextrose 40% Gel 15 Gram(s) Oral once  dextrose 5%. 1000 milliLiter(s) (50 mL/Hr) IV Continuous <Continuous>  dextrose 5%. 1000 milliLiter(s) (100 mL/Hr) IV Continuous <Continuous>  dextrose 50% Injectable 25 Gram(s) IV Push once  dextrose 50% Injectable 12.5 Gram(s) IV Push once  dextrose 50% Injectable 25 Gram(s) IV Push once  furosemide   Injectable 40 milliGRAM(s) IV Push daily  glucagon  Injectable 1 milliGRAM(s) IntraMuscular once  insulin glargine Injectable (LANTUS) 7 Unit(s) SubCutaneous two times a day  insulin lispro (ADMELOG) corrective regimen sliding scale   SubCutaneous three times a day before meals  magnesium oxide 400 milliGRAM(s) Oral daily  metoprolol tartrate 100 milliGRAM(s) Oral every 12 hours  pantoprazole    Tablet 40 milliGRAM(s) Oral before breakfast  sertraline 100 milliGRAM(s) Oral daily  tamsulosin 0.4 milliGRAM(s) Oral at bedtime    MEDICATIONS  (PRN):      Allergies    No Known Allergies    Intolerances          LABS:                          11.4   6.83  )-----------( 199      ( 10 May 2021 06:40 )             38.1     05-10    141  |  100  |  53.0<H>  ----------------------------<  133<H>  4.2   |  27.0  |  2.24<H>    Ca    8.7      10 May 2021 06:40  Phos  2.7     05-10  Mg     1.9     05-10            RADIOLOGY & ADDITIONAL TESTS:

## 2021-05-10 NOTE — CONSULT NOTE ADULT - ASSESSMENT
The patient is a 80 year old male with a past medical history of CAD status post CABG, CHF unknown baseline EF, afib on eliquis, hypertension and diabetes mellitus type 2 who presented to the ER after a fall at home. The patient fell 4 days prior to admission, states he missed a step and fell backward with possible LOC. Post fall patient had progressively worsening back pain and presented to the ER for evaluation. In the ER, CT head showed possible anterior SDH. Neurosurgery was consulted, repeat CT head was negative.  Noted to have KEN 2.14/[birth year] with a BNP of 77364. REcently patient has had complaints of dyspnea. HIs cardiologists and physicians are in [place]. Was seen in the ER by cardiology, started on diuresis with IV lasix and metalozone for acute decompensated CHF. Troponin was negative x 2. Initially admitted to the CDU> Echocardiogram was done which showed EF of 35-40% with severe RV dysfunction. Rythm changed into AFIB ? yesterday    stress test negative for ischemia so no cath given high creatinine  EPS consulted for AFIB, HF, dizziness and bifascicular block    1. AFIB, CHADS-VASc = 6 (HTN, DM, HF, CAD, and age) on eliquis. 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 Rate and rhythm control were discussed.  - NPO for MARLO/DCCV tomorrow  - ILR to determine AF burden then will consider further options as an outpatient  - CAn consider Watchman if recurrent falls or had recurrent GI bleeding    2. Dizziness, without shiva syncope but also bifascicular block  - ILR for monitoring    3. HF, continue with GDMT. Will r/a need for device therapy as an outpatient May benefit from rhythm control

## 2021-05-10 NOTE — PROGRESS NOTE ADULT - ASSESSMENT
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 Patient states that he missed a step, fell backwards, and landed on his right side. Patient states that he has been having some dizziness, felt like he might pass out, as well as back pain since the fall. Patient's wife states he refused to come to the ER initially, but the symptoms continued to worsen. Patient just flew up from [place] as well, and does not have a Cardiologist following in [place]. Patient also noted for the last few months he has been having continued shortness of breath and leg swelling, and was recently started on lasix in addition to metolazone. Patient denies any fevers, chills, CP, abdominal pain, diarrhea, or vision changes.   Troponin negative x 1  BNP [id number]    Acute Decompensated HFrEF with Severely Reduced RV function  -  EF 35-40% with severely reduced RV function.  - Continue IV lasix 40mg daily.   - consider Entresto if Cr. improves and would benefit from dapagliflozin as well for HF/DM  - old TTE with moderately reduced LV EF (patient reports told by cardiologist in [place] with LV EF 40-45%? in the past, had CABG 3 years ago then required PCI 1 year after without stent deployment, last nuclear stress test >2 years ago), also with severe RV dysfunction, would warrant for repeat ischemic workup with pharm nuclear stress test given advanced CKD to avoid contrast for now  - stress complete today pending results  - plan of care to be determined based upon stress results.   -Will increase metoprolol, and transition to toprol XL upon D/C.   - Monitor on telemetry.   - Strict i/o and daily weights.  -  Keep K > 4, Mg > 2.  -  Monitor renal function with ongoing diuresis.  - Will obtain outpatient records.   - NPO after midnight.   - Plan for NST tomorrow.     Atrial Fibrillation  - given Afib -130s with cardiomyopathy would benefit from rhythm control, consider MARLO/CV prior to discharge, EPS consult after stress test   - possible MARLO/CV for Afib Tuesday   - Elevated rates overnight.   - continue metoprolol   - Repeat CT head with no SDH.   - Restart Eliquis 5mg PO BID.   - Continue telemetry monitoring.     Subdural Hematoma  - Repeat CT negative.     Dispo   - ep consult for possible MARLO/DCCV, keep NPO @ [facility]   - depending upon stress results will plan for L/RHC   - avoid nephrotoxins   81 y/o M with a PMHx of CAD s/p CABG c/b sepsis and sternal wound infection s/p sternum removal and pec flap and angioplasty, CHF (unknown baseline EF), Afib (on Eliquis), HTN, DMII, and Bell's Palsy who presented to the ER with complaints of back pain and dizziness after a fall 4 days ago. Patient states that he missed a step, fell backwards, and landed on his right side. Patient states that he has been having some dizziness, felt like he might pass out, as well as back pain since the fall. Patient's wife states he refused to come to the ER initially, but the symptoms continued to worsen. Patient just flew up from [place] as well, and does not have a Cardiologist following in [place]. Patient also noted for the last few months he has been having continued shortness of breath and leg swelling, and was recently started on lasix in addition to metolazone. Patient denies any fevers, chills, CP, abdominal pain, diarrhea, or vision changes.   Troponin negative x 1  BNP [id number]    Acute Decompensated HFrEF with Severely Reduced RV function  -  EF 35-40% with severely reduced RV function.  - Continue IV lasix 40mg daily.   - consider Entresto if Cr. improves and would benefit from dapagliflozin as well for HF/DM  - old TTE with moderately reduced LV EF (patient reports told by cardiologist in [place] with LV EF 40-45%? in the past, had CABG 3 years ago then required PCI 1 year after without stent deployment, last nuclear stress test >2 years ago), also with severe RV dysfunction, would warrant for repeat ischemic workup with pharm nuclear stress test given advanced CKD to avoid contrast for now  - stress complete today pending results  - plan of care to be determined based upon stress results.   -Will increase metoprolol, and transition to toprol XL upon D/C.   - Monitor on telemetry.   - Strict i/o and daily weights.  -  Keep K > 4, Mg > 2.  -  Monitor renal function with ongoing diuresis.  - Will obtain outpatient records.   - NPO after midnight.   - Plan for NST tomorrow.     Atrial Fibrillation  - given Afib -130s with cardiomyopathy would benefit from rhythm control, consider MARLO/CV prior to discharge, EPS consult after stress test   - possible MARLO/CV for Afib Tuesday   - Elevated rates overnight.   - continue metoprolol   - Repeat CT head with no SDH.   - Restart Eliquis 5mg PO BID.   - Continue telemetry monitoring.     Subdural Hematoma  - Repeat CT negative.     Dispo   - ep consult for possible MARLO/DCCV, keep NPO @ [facility]   - depending upon stress results will plan for L/RHC   - avoid nephrotoxins     5/10/21 17:25 Addendum   - Stress test is largely unchanged, no ischemia   - will not pursue R/LHC this admission 2/2 to worsening creatinine, unchanged stress, lack of trops, and lack of angina/anginal equivalents  - will be worked up as outpatient as necessary   - Plan for MARLO/DCCV in AM, NPO @ [facility]   - continue eliquis

## 2021-05-10 NOTE — PROGRESS NOTE ADULT - ASSESSMENT
The patient is a 80 year old male with a past medical history of CAD status post CABG, CHF unknown baseline EF, afib on eliquis, hypertension and diabetes mellitus type 2 who presented to the ER after a fall at home. Admitted for afib with rvr and acute decompensated CHF.     Assessment/Plan:    1. Acute decompensated CHF: Echocardiogram reviewed with EF of 35-40%  Status post NST, results pending   TElemetry monitoring  IV lasix  Metalozone held     2. KEN with CKD: Baseline unknown  Improving  Continue to monitor BMP    3. Afib with RVR: Eliquis   Metoprolol increased  HR improved    4. Hypokalemia and hypomagnesemia supplemented    5. Diabetes mellitus type 2: continue Lantus with HSS  Monitor BSL

## 2021-05-11 ENCOUNTER — TRANSCRIPTION ENCOUNTER (OUTPATIENT)
Age: 81
End: 2021-05-11

## 2021-05-11 LAB
ANION GAP SERPL CALC-SCNC: 14 MMOL/L — SIGNIFICANT CHANGE UP (ref 5–17)
APTT BLD: 35.1 SEC — SIGNIFICANT CHANGE UP (ref 27.5–35.5)
BUN SERPL-MCNC: 55 MG/DL — HIGH (ref 8–20)
CALCIUM SERPL-MCNC: 9 MG/DL — SIGNIFICANT CHANGE UP (ref 8.6–10.2)
CHLORIDE SERPL-SCNC: 100 MMOL/L — SIGNIFICANT CHANGE UP (ref 98–107)
CO2 SERPL-SCNC: 27 MMOL/L — SIGNIFICANT CHANGE UP (ref 22–29)
CREAT SERPL-MCNC: 2.17 MG/DL — HIGH (ref 0.5–1.3)
GLUCOSE BLDC GLUCOMTR-MCNC: 183 MG/DL — HIGH (ref 70–99)
GLUCOSE BLDC GLUCOMTR-MCNC: 192 MG/DL — HIGH (ref 70–99)
GLUCOSE BLDC GLUCOMTR-MCNC: 212 MG/DL — HIGH (ref 70–99)
GLUCOSE SERPL-MCNC: 177 MG/DL — HIGH (ref 70–99)
HCT VFR BLD CALC: 34.5 % — LOW (ref 39–50)
HGB BLD-MCNC: 10.4 G/DL — LOW (ref 13–17)
INR BLD: 1.54 RATIO — HIGH (ref 0.88–1.16)
MAGNESIUM SERPL-MCNC: 2 MG/DL — SIGNIFICANT CHANGE UP (ref 1.6–2.6)
MCHC RBC-ENTMCNC: 27.2 PG — SIGNIFICANT CHANGE UP (ref 27–34)
MCHC RBC-ENTMCNC: 30.1 GM/DL — LOW (ref 32–36)
MCV RBC AUTO: 90.3 FL — SIGNIFICANT CHANGE UP (ref 80–100)
NT-PROBNP SERPL-SCNC: HIGH PG/ML (ref 0–300)
PLATELET # BLD AUTO: 194 K/UL — SIGNIFICANT CHANGE UP (ref 150–400)
POTASSIUM SERPL-MCNC: 3.8 MMOL/L — SIGNIFICANT CHANGE UP (ref 3.5–5.3)
POTASSIUM SERPL-SCNC: 3.8 MMOL/L — SIGNIFICANT CHANGE UP (ref 3.5–5.3)
PROTHROM AB SERPL-ACNC: 17.5 SEC — HIGH (ref 10.6–13.6)
RBC # BLD: 3.82 M/UL — LOW (ref 4.2–5.8)
RBC # FLD: 17.5 % — HIGH (ref 10.3–14.5)
SARS-COV-2 RNA SPEC QL NAA+PROBE: SIGNIFICANT CHANGE UP
SODIUM SERPL-SCNC: 141 MMOL/L — SIGNIFICANT CHANGE UP (ref 135–145)
WBC # BLD: 7.16 K/UL — SIGNIFICANT CHANGE UP (ref 3.8–10.5)
WBC # FLD AUTO: 7.16 K/UL — SIGNIFICANT CHANGE UP (ref 3.8–10.5)

## 2021-05-11 PROCEDURE — 33285 INSJ SUBQ CAR RHYTHM MNTR: CPT

## 2021-05-11 PROCEDURE — 93325 DOPPLER ECHO COLOR FLOW MAPG: CPT | Mod: 26

## 2021-05-11 PROCEDURE — 93451 RIGHT HEART CATH: CPT | Mod: 26

## 2021-05-11 PROCEDURE — 93010 ELECTROCARDIOGRAM REPORT: CPT

## 2021-05-11 PROCEDURE — 93320 DOPPLER ECHO COMPLETE: CPT | Mod: 26

## 2021-05-11 PROCEDURE — 99233 SBSQ HOSP IP/OBS HIGH 50: CPT

## 2021-05-11 PROCEDURE — 93312 ECHO TRANSESOPHAGEAL: CPT | Mod: 26

## 2021-05-11 PROCEDURE — 92960 CARDIOVERSION ELECTRIC EXT: CPT

## 2021-05-11 PROCEDURE — 99233 SBSQ HOSP IP/OBS HIGH 50: CPT | Mod: 25

## 2021-05-11 RX ORDER — METOPROLOL TARTRATE 50 MG
50 TABLET ORAL
Refills: 0 | Status: DISCONTINUED | OUTPATIENT
Start: 2021-05-11 | End: 2021-05-14

## 2021-05-11 RX ORDER — ASPIRIN/CALCIUM CARB/MAGNESIUM 324 MG
1 TABLET ORAL
Qty: 0 | Refills: 0 | DISCHARGE
Start: 2021-05-11

## 2021-05-11 RX ORDER — ISOSORBIDE MONONITRATE 60 MG/1
60 TABLET, EXTENDED RELEASE ORAL DAILY
Refills: 0 | Status: DISCONTINUED | OUTPATIENT
Start: 2021-05-11 | End: 2021-05-14

## 2021-05-11 RX ORDER — CEFAZOLIN SODIUM 1 G
2000 VIAL (EA) INJECTION ONCE
Refills: 0 | Status: COMPLETED | OUTPATIENT
Start: 2021-05-11 | End: 2021-05-11

## 2021-05-11 RX ORDER — HYDRALAZINE HCL 50 MG
50 TABLET ORAL
Refills: 0 | Status: DISCONTINUED | OUTPATIENT
Start: 2021-05-11 | End: 2021-05-14

## 2021-05-11 RX ADMIN — ISOSORBIDE MONONITRATE 60 MILLIGRAM(S): 60 TABLET, EXTENDED RELEASE ORAL at 21:22

## 2021-05-11 RX ADMIN — AMLODIPINE BESYLATE 10 MILLIGRAM(S): 2.5 TABLET ORAL at 05:25

## 2021-05-11 RX ADMIN — Medication 40 MILLIGRAM(S): at 05:28

## 2021-05-11 RX ADMIN — Medication 1 APPLICATION(S): at 05:28

## 2021-05-11 RX ADMIN — Medication 50 MILLIGRAM(S): at 18:51

## 2021-05-11 RX ADMIN — APIXABAN 5 MILLIGRAM(S): 2.5 TABLET, FILM COATED ORAL at 20:44

## 2021-05-11 RX ADMIN — Medication 100 MILLIGRAM(S): at 17:35

## 2021-05-11 RX ADMIN — APIXABAN 5 MILLIGRAM(S): 2.5 TABLET, FILM COATED ORAL at 05:28

## 2021-05-11 RX ADMIN — Medication 1 APPLICATION(S): at 18:48

## 2021-05-11 RX ADMIN — TAMSULOSIN HYDROCHLORIDE 0.4 MILLIGRAM(S): 0.4 CAPSULE ORAL at 20:44

## 2021-05-11 RX ADMIN — Medication 100 MILLIGRAM(S): at 05:25

## 2021-05-11 RX ADMIN — Medication 81 MILLIGRAM(S): at 11:38

## 2021-05-11 RX ADMIN — INSULIN GLARGINE 7 UNIT(S): 100 INJECTION, SOLUTION SUBCUTANEOUS at 20:44

## 2021-05-11 RX ADMIN — PANTOPRAZOLE SODIUM 40 MILLIGRAM(S): 20 TABLET, DELAYED RELEASE ORAL at 05:29

## 2021-05-11 RX ADMIN — SERTRALINE 100 MILLIGRAM(S): 25 TABLET, FILM COATED ORAL at 11:38

## 2021-05-11 NOTE — DISCHARGE NOTE PROVIDER - NSDCCPCAREPLAN_GEN_ALL_CORE_FT
PRINCIPAL DISCHARGE DIAGNOSIS  Diagnosis: Acute on chronic congestive heart failure, unspecified heart failure type  Assessment and Plan of Treatment: Clinically improved  Continue Po lasix  Follow up with cardiology Dr Duke in 1-2 weeks      SECONDARY DISCHARGE DIAGNOSES  Diagnosis: Atrial fibrillation, unspecified type  Assessment and Plan of Treatment: Metoprolol dose was increased  Continue ELiquis  Follow up with Dr Gamez in 1-2 weeks    Diagnosis: Chronic renal insufficiency  Assessment and Plan of Treatment: Follow up with your primary care physicican in 1 week for follow up labs     PRINCIPAL DISCHARGE DIAGNOSIS  Diagnosis: Acute on chronic congestive heart failure, unspecified heart failure type  Assessment and Plan of Treatment: Clinically improved  Continue Po lasix  Follow up with cardiology Dr Duke in 1-2 weeks      SECONDARY DISCHARGE DIAGNOSES  Diagnosis: Atrial fibrillation, unspecified type  Assessment and Plan of Treatment: Metoprolol dose was increased  Restart eliquis on 5/15/2021  Follow up with Dr Gamez in 1-2 weeks    Diagnosis: Chronic renal insufficiency  Assessment and Plan of Treatment: Follow up with your primary care physicican in 1 week for follow up labs     PRINCIPAL DISCHARGE DIAGNOSIS  Diagnosis: Acute on chronic congestive heart failure, unspecified heart failure type  Assessment and Plan of Treatment: Clinically improved  Lasix and metalozone changed to PO Torsemide   Follow up with cardiology Dr Duke in 1-2 weeks      SECONDARY DISCHARGE DIAGNOSES  Diagnosis: Atrial fibrillation, unspecified type  Assessment and Plan of Treatment: Continue metoprolol 50mg 1 tab twice a day   Restart eliquis on 5/15/2021  Follow up with Dr Gamez in 1-2 weeks    Diagnosis: Chronic renal insufficiency  Assessment and Plan of Treatment: Follow up with your primary care physicican in 1 week for follow up labs

## 2021-05-11 NOTE — PROGRESS NOTE ADULT - SUBJECTIVE AND OBJECTIVE BOX
Electrophysiology Attending Consult Note    HPI:  The patient is a 80 year old male with a past medical history of CAD status post CABG, CHF unknown baseline EF, afib on eliquis, hypertension and diabetes mellitus type 2 who presented to the ER after a fall at home. The patient fell 4 days prior to admission, states he missed a step and fell backward with possible LOC. Post fall patient had progressively worsening back pain and presented to the ER for evaluation. In the ER, CT head showed possible anterior SDH. Neurosurgery was consulted, repeat CT head was negative.  Noted to have KEN [birth year] with a BNP of [id number]. REcently patient has had complaints of dyspnea. HIs cardiologists and physicians are in [place]. Was seen in the ER by cardiology, started on diuresis with IV lasix and metalozone for acute decompensated CHF. Troponin was negative x 2. Initially admitted to the [facility]> Echocardiogram was done which showed EF of 35-40% with severe RV dysfunction. Admitted for afib with rvr and acute decompensated CHF>     Above appreciated  Patient has been having progressive PINA lately as well as leg edema. He has been compliant with all his medications. He declines any chest pain, palpitation, or syncope. In regards to the fall, he rememebrs feels a bit weak or maybe dizzy but denies shiva syncope prior to the fall. NO other syncope. Has rare falls.   He stated that he was told he has AF for more than one year and he doesn't feel any palpitation.   He had BRBPR in the recent past which determined to be due to hemorrhoid and stopped.   He has no family histroy of premature CAD or SCD.  He was in AFL with bifascicular block on presentation and went into AFIB ?sometime yesterday with rates in the low 100 and up to 130 bpm. Tele showed no pauses or bradycardia   During this admission his EF noted to be low, and stress test was negative for ischemia so cath was deferred given the CKD.   Initial CT suggested SDH which was not seen on recurrent CT and so his AC was resumed. No follow up from NSx initial consult     Interval Hx 5/10-11:  Doing well, denies any CP, SOB, dizziness or palpitation  Tele showed alternation between AFIB and AFL with mostly controlled rates, no pauses or bradycardia      PAST MEDICAL & SURGICAL HISTORY:  Coronary artery disease, s/p CABG  AFL/Atrial fibrillation, CHADS-VASc = 6 (HF, HTN, DM, CAD and age) on eliquis  Congestive heart failure, unspecified HF chronicity, unspecified heart failure type  DM  HTN  Bell&#x27;s palsy  Right side of face        REVIEW OF SYSTEMS:    CONSTITUTIONAL: No fever, weight loss, or fatigue  EYES: No eye pain, visual disturbances, or discharge  ENMT:  No difficulty hearing, tinnitus, vertigo; No sinus or throat pain  RESPIRATORY: No cough, wheezing, chills or hemoptysis;   CARDIOVASCULAR: see HPI  GASTROINTESTINAL: No abdominal or epigastric pain. No nausea, vomiting, or hematemesis; No diarrhea or constipation.  GENITOURINARY: No dysuria, frequency, hematuria,   NEUROLOGICAL: No headaches, memory loss, loss of strength, numbness, or tremors  SKIN: No itching, burning, rashes, or lesions   LYMPH NODES: No enlarged glands  ENDOCRINE: No heat or cold intolerance; No hair loss  MUSCULOSKELETAL: No joint pain or swelling; No muscle, back, or extremity pain  HEME/LYMPH: No easy bruising, or bleeding gums  ALLERY AND IMMUNOLOGIC: No hives or eczema      MEDICATIONS  (STANDING):  amLODIPine   Tablet 10 milliGRAM(s) Oral daily  apixaban 5 milliGRAM(s) Oral two times a day  aspirin enteric coated 81 milliGRAM(s) Oral daily  BACItracin   Ointment 1 Application(s) Topical two times a day  dextrose 40% Gel 15 Gram(s) Oral once  dextrose 5%. 1000 milliLiter(s) (50 mL/Hr) IV Continuous <Continuous>  dextrose 5%. 1000 milliLiter(s) (100 mL/Hr) IV Continuous <Continuous>  dextrose 50% Injectable 25 Gram(s) IV Push once  dextrose 50% Injectable 12.5 Gram(s) IV Push once  dextrose 50% Injectable 25 Gram(s) IV Push once  furosemide    Tablet 40 milliGRAM(s) Oral daily  glucagon  Injectable 1 milliGRAM(s) IntraMuscular once  insulin glargine Injectable (LANTUS) 7 Unit(s) SubCutaneous two times a day  insulin lispro (ADMELOG) corrective regimen sliding scale   SubCutaneous three times a day before meals  metoprolol tartrate 100 milliGRAM(s) Oral every 12 hours  pantoprazole    Tablet 40 milliGRAM(s) Oral before breakfast  sertraline 100 milliGRAM(s) Oral daily  tamsulosin 0.4 milliGRAM(s) Oral at bedtime    MEDICATIONS  (PRN):        Allergies  - No Known Allergies      SOCIAL HISTORY:  Negative for smoking  Occasional alcohol    FAMILY HISTORY:  No pertinent family history in first degree relatives      Vital Signs Last 24 Hrs  T(C): 36.7 (11 May 2021 08:46), Max: 36.7 (10 May 2021 16:12)  T(F): 98 (11 May 2021 08:46), Max: 98.1 (10 May 2021 16:12)  HR: 80 (11 May 2021 08:46) (80 - 104)  BP: 157/88 (11 May 2021 08:46) (125/75 - 157/88)  RR: 17 (11 May 2021 08:46) (17 - 18)  SpO2: 97% (11 May 2021 08:46) (94% - 97%)      Physical Exam:  Constitutional: AAOx3, NAD  Neck: supple, No JVD  Cardiovascular: +S1S2,RRR, no murmurs, rubs, gallops   Pulmonary: CTA b/l, unlabored, no wheezes, rales. rhonci  Abdomen: +BS, soft NTND  Extremities: trace edema b/l,  Neuro: non focal, speech clear, HONG x 4    LABS:                                   10.4   7.16  )-----------( 194      ( 11 May 2021 06:12 )             34.5   05-11    141  |  100  |  55.0<H>  ----------------------------<  177<H>  3.8   |  27.0  |  2.17<H>    Ca    9.0      11 May 2021 06:10  Phos  2.7     05-10  Mg     2.0     05-11          RADIOLOGY & ADDITIONAL STUDIES:  EKG 5/11 AFL with 3:1 AVB and  with Bifascicular block  EKG 5/10: AFL with 2:1 AVB. Bifascicular block  EKG 5/8 AFL with 3:1 AVB and  with Bifascicular block    < from: TTE Echo Complete w/ Contrast w/ Doppler (05.09.21 @ 08:59) >  Summary:   1. Technically difficult study.   2. Endocardial visualization was enhanced with intravenous echo contrast.   3. Left ventricularejection fraction, by visual estimation, is 35 to 40%.   4. Paradoxical septal motion with moderately decreased segmental left ventricular systolic function.   5. Severe hypokinesis of the basal to mid segements, the apex and distal segments are preserved.   6. The mitral in-flow pattern reveals no discernable A-wave, therefore no comment on diastolic function can be made.   7. Severely enlarged right ventricle with severely reduced systolic function, estimated PASP least 45 mmHg mild to moderately elevated.   8. Right atrial enlargement.   9. Moderately enlarged left atrium.  10. Mild mitral annular calcification.  11. Trace mitral valve regurgitation.  12. Thickening and calcification of the anterior and posterior mitral valve leaflets.  13. Mild-moderate tricuspid regurgitation.  14. Sclerotic aortic valve with decreased opening.  15. There is mild aortic root calcification.  16. There is no evidence of pericardial effusion.  17. No prior study available for comparison.    < end of copied text >      < from: CT Head No Cont (05.08.21 @ 10:15) >  FINDINGS: Mild anterior dural thickening tiny calcification on image 46 of series 4. Questionable thin anterior falx subdural hemorrhage measuring 1 mm image 40 of series 4.  There is periventricular and subcortical white matter hypodensity without mass effect, nonspecific, likely representing mild chronic microvascular ischemic changes. There is no compelling evidence for an acute transcortical infarction. There is no other evidence of mass, mass effect, midline shift or extra-axial fluid collection. The lateral ventricles and cortical sulci are otherwise age-appropriate in size and configuration. Trace mucosal thickening bilateral maxillary sinuses The orbits, mastoid air cells and visualized paranasal sinuses are normal. The calvarium is intact. Consider follow-up CT or MRI as clinically warranted.    < end of copied text >      < from: CT Chest No Cont (05.08.21 @ 10:16) >  IMPRESSION: No evidence of acute visceral organ or bony injury on this noncontrast CT. Mild cirrhotic morphology of the liver with trace intraperitoneal fluid. Thickened bladder with trabeculation and stone containing diverticula and enlarged prostate, compatible chronic bladder outlet obstruction.    < end of copied text >      < from: CT Abdomen and Pelvis No Cont (05.08.21 @ 10:16) >  IMPRESSION: No evidence of acute visceral organ or bony injury on this noncontrast CT. Mild cirrhotic morphology of the liver with trace intraperitoneal fluid. Thickened bladder with trabeculation and stone containing diverticula and enlarged prostate, compatible chronic bladder outlet obstruction.    < end of copied text >      < from: CT Head No Cont (05.08.21 @ 16:31) >  IMPRESSION:  No acute intracranial hemorrhage or acute territorial infarct.  If symptoms persist, follow-up MRI exam recommended.    < end of copied text >  < from: US Duplex Venous Lower Ext Complete, Bilateral (05.08.21 @ 19:58) >  IMPRESSION:  No evidence of deep venous thrombosis in either lower extremity.    < end of copied text >

## 2021-05-11 NOTE — PROGRESS NOTE ADULT - ASSESSMENT
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 Patient states that he missed a step, fell backwards, and landed on his right side. Patient states that he has been having some dizziness, felt like he might pass out, as well as back pain since the fall. Patient's wife states he refused to come to the ER initially, but the symptoms continued to worsen. Patient just flew up from [place] as well, and does not have a Cardiologist following in [place]. Patient also noted for the last few months he has been having continued shortness of breath and leg swelling, and was recently started on lasix in addition to metolazone. Patient denies any fevers, chills, CP, abdominal pain, diarrhea, or vision changes.   Troponin negative x 1  BNP 02713, I and O's not accurate, appears euvolemic      Acute Decompensated HFrEF with Severely Reduced RV function  -  EF 35-40% with severely reduced RV function.  - switch to PO Lasix    - consider Entresto if Cr. improves and would benefit from dapagliflozin as well for HF/DM - bun/creatinie remain elevated today.   - stress test:    IMPRESSIONS:  * Myocardial Perfusion SPECT results are abnormal.  * There is a medium sized, moderate defect in basal to mid  inferior wall that is fixed, suggestive of infarct. No  evidence of ischemia.  * Post-stress resting myocardial perfusion gated SPECT  imaging was performed (LVEF = 41 %;LVEDV = 141 ml.)  * Dilated right ventricle noted.  Stress test is largely unchanged, no ischemia   - will not pursue R/LHC this admission 2/2 to worsening creatinine, unchanged stress, lack of trops, and lack of angina/anginal equivalents  - will be worked up as outpatient as necessary   -Will increase metoprolol, and transition to toprol XL upon D/C.   - Monitor on telemetry.   - Strict i/o and daily weights.  -  Keep K > 4, Mg > 2.  -  Monitor renal function with ongoing diuresis.  -EP considering outpatient work up for watchman.      Atrial Fibrillation - rate improvement noted 88, afib.    - given Afib -130s with cardiomyopathy would benefit from rhythm control, consider MARLO/CV prior to today EPS following  - plan for CV  - Elevated rates overnight.   - continue metoprolol   - Repeat CT head with no SDH.   - Eliquis 5mg PO BID.   - Continue telemetry monitoring.     Subdural Hematoma  - Repeat CT negative.     Dispo   1.  - seen by EP and recommending    - MARLO/DCCV today  - ILR today to determine AF burden then will consider further options as an outpatient  - Will consider AFL and AFIB ablation as an outpatient  - Can consider Watchman if recurrent falls or had recurrent GI bleeding    2. Dizziness, without shiva syncope but also bifascicular block  - ILR for monitoring    3. HF, continue with GDMT. Will r/a need for device therapy as an outpatient May benefit from rhythm control. Patient says that his EF has always been 35-40%, need to get documents   79 y/o M with a PMHx of CAD s/p CABG c/b sepsis and sternal wound infection s/p sternum removal and pec flap and angioplasty, CHF (unknown baseline EF), Afib (on Eliquis), HTN, DMII, and Bell's Palsy who presented to the ER with complaints of back pain and dizziness after a fall 4 days ago. Patient states that he missed a step, fell backwards, and landed on his right side. Patient states that he has been having some dizziness, felt like he might pass out, as well as back pain since the fall. Patient's wife states he refused to come to the ER initially, but the symptoms continued to worsen. Patient just flew up from [place] as well, and does not have a Cardiologist following in [place]. Patient also noted for the last few months he has been having continued shortness of breath and leg swelling, and was recently started on lasix in addition to metolazone. Patient denies any fevers, chills, CP, abdominal pain, diarrhea, or vision changes.   Troponin negative x 1  BNP 07740, I and O's not accurate, appears euvolemic      Acute Decompensated HFrEF with Severely Reduced RV function  -  EF 35-40% with severely reduced RV function.  - switch to PO Lasix    - consider Entresto if Cr. improves and would benefit from dapagliflozin as well for HF/DM - bun/creatinie remain elevated today.   - stress test:    IMPRESSIONS:  * Myocardial Perfusion SPECT results are abnormal.  * There is a medium sized, moderate defect in basal to mid  inferior wall that is fixed, suggestive of infarct. No  evidence of ischemia.  * Post-stress resting myocardial perfusion gated SPECT  imaging was performed (LVEF = 41 %;LVEDV = 141 ml.)  * Dilated right ventricle noted.  Stress test is largely unchanged, no ischemia   - will be worked up as outpatient as necessary   -Will increase metoprolol, and transition to toprol XL upon D/C.   - Monitor on telemetry.   - Strict i/o and daily weights.  -  Keep K > 4, Mg > 2.  -  Monitor renal function with ongoing diuresis.  -EP considering outpatient work up for watchman.      Atrial Fibrillation - rate improvement noted 88, afib.    - given Afib -130s with cardiomyopathy would benefit from rhythm control, consider MARLO/CV prior to today EPS following  - plan for CV  - Elevated rates overnight.   - continue metoprolol   - Repeat CT head with no SDH.   - Eliquis 5mg PO BID.   - Continue telemetry monitoring.     Subdural Hematoma  - Repeat CT negative.     Dispo   1.  - seen by EP and recommending    - MARLO/DCCV today  - ILR today to determine AF burden then will consider further options as an outpatient  - Will consider AFL and AFIB ablation as an outpatient  - Can consider Watchman if recurrent falls or had recurrent GI bleeding    2. Dizziness, without shiva syncope but also bifascicular block  - ILR for monitoring    3. HF, continue with GDMT. Will r/a need for device therapy as an outpatient May benefit from rhythm control. Patient says that his EF has always been 35-40%, need to get documents   81 y/o M with a PMHx of CAD s/p CABG c/b sepsis and sternal wound infection s/p sternum removal and pec flap and angioplasty, CHF (unknown baseline EF), Afib (on Eliquis), HTN, DMII, and Bell's Palsy who presented to the ER with complaints of back pain and dizziness after a fall 4 days ago. Patient states that he missed a step, fell backwards, and landed on his right side. Patient states that he has been having some dizziness, felt like he might pass out, as well as back pain since the fall. Patient's wife states he refused to come to the ER initially, but the symptoms continued to worsen. Patient just flew up from [place] as well, and does not have a Cardiologist following in [place]. Patient also noted for the last few months he has been having continued shortness of breath and leg swelling, and was recently started on lasix in addition to metolazone. Patient denies any fevers, chills, CP, abdominal pain, diarrhea, or vision changes.   Troponin negative x 1  BNP 47854, I and O's not accurate, appears euvolemic      Acute Decompensated HFrEF with Severely Reduced RV function  -  EF 35-40% with severely reduced RV function.  - switch to PO Lasix    - consider Entresto if Cr. improves and would benefit from dapagliflozin as well for HF/DM - bun/creatinie remain elevated today.   - stress test:    IMPRESSIONS:  * Myocardial Perfusion SPECT results are abnormal.  * There is a medium sized, moderate defect in basal to mid  inferior wall that is fixed, suggestive of infarct. No  evidence of ischemia.  * Post-stress resting myocardial perfusion gated SPECT  imaging was performed (LVEF = 41 %;LVEDV = 141 ml.)  * Dilated right ventricle noted.  Stress test is largely unchanged, no ischemia   - will be worked up as outpatient as necessary   -Will increase metoprolol, and transition to toprol XL upon D/C.   - Monitor on telemetry.   - Strict i/o and daily weights.  -  Keep K > 4, Mg > 2.  -  Monitor renal function with ongoing diuresis.  -EP considering outpatient work up for watchman.      Atrial Fibrillation - rate improvement noted 88, afib.    - given Afib -130s with cardiomyopathy would benefit from rhythm control, consider MARLO/CV prior to today EPS following  - plan for CV  - Elevated rates overnight.   - continue metoprolol   - Repeat CT head with no SDH.   - Eliquis 5mg PO BID.   - Continue telemetry monitoring.     Subdural Hematoma  - Repeat CT negative.

## 2021-05-11 NOTE — PROGRESS NOTE ADULT - ASSESSMENT
The patient is a 80 year old male with a past medical history of CAD status post CABG, CHF unknown baseline EF, afib on eliquis, hypertension and diabetes mellitus type 2 who presented to the ER after a fall at home. The patient fell 4 days prior to admission, states he missed a step and fell backward with possible LOC. Post fall patient had progressively worsening back pain and presented to the ER for evaluation. In the ER, CT head showed possible anterior SDH. Neurosurgery was consulted, repeat CT head was negative.  Noted to have KEN [birth year] with a BNP of 70[address]. REcently patient has had complaints of dyspnea. HIs cardiologists and physicians are in [place]. Was seen in the ER by cardiology, started on diuresis with IV lasix and metalozone for acute decompensated CHF. Troponin was negative x 2. Initially admitted to the [facility]> Echocardiogram was done which showed EF of 35-40% with severe RV dysfunction. Rythm changed into AFIB ? yesterday    stress test negative for ischemia so no cath given high creatinine  EPS consulted for AFIB, HF, dizziness and bifascicular block    Interval Hx 5/10-11:  Doing well, denies any CP, SOB, dizziness or palpitation  Tele showed alternation between AFIB and AFL with mostly controlled rates, no pauses or bradycardia    1. AFL/AFIB, CHADS-VASc = 6 (HTN, DM, HF, CAD, and age) on eliquis. 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 Rate and rhythm control were discussed.  - MARLO/DCCV today  - ILR today to determine AF burden then will consider further options as an outpatient  - Will consider AFL and AFIB ablation as an outpatient  - CAn consider Watchman if recurrent falls or had recurrent GI bleeding    2. Dizziness, without shiva syncope but also bifascicular block  - ILR for monitoring    3. HF, continue with GDMT. Will r/a need for device therapy as an outpatient May benefit from rhythm control. Patient says that his EF has always been 35-40%, need to get documents     will follow up

## 2021-05-11 NOTE — PROGRESS NOTE ADULT - SUBJECTIVE AND OBJECTIVE BOX
[facility]                                                       [facility]                                                        Office: [address] [address]                                                       Telephone: [phone]. Fax:[phone]                                                                             PROGRESS NOTE   Reason for follow up:   rapid afib/flutter                            Overnight: No new events.   Update:   Reviewed as per MD Jaimes and plan is for TTE/CV, ILR, and outpatient work up for possible watchman.   Subjective: "I was getting ready to go home"   Complains of:  Nothng  Review of symptoms: Cardiac:  No chest pain. No dyspnea. No palpitations.  Respiratory: no cough. No dyspnea  Gastrointestinal: No diarrhea. No abdominal pain. No bleeding.   Past medical history: No updates.   Chronic conditions:  PAST MEDICAL & SURGICAL HISTORY:  Coronary artery disease involving native heart, angina presence unspecified, unspecified vessel or lesion type  Atrial fibrillation, unspecified type  Congestive heart failure, unspecified HF chronicity, unspecified heart failure type  Bell&#x27;s palsy  Right side of face  S/P CABG (coronary artery bypass graft)  	  Vitals:  T(C): 36.7 (05-11-21 @ 08:46), Max: 36.7 (05-10-21 @ 16:12)  HR: 80 (05-11-21 @ 08:46) (80 - 104)  BP: 157/88 (05-11-21 @ 08:46) (125/75 - 157/88)  RR: 17 (05-11-21 @ 08:46) (17 - 18)  SpO2: 97% (05-11-21 @ 08:46) (94% - 97%)  I&O's Summary  Weight (kg): 108.9 (05-08 @ 07:06)  PHYSICAL EXAM:  Appearance: Comfortable. No acute distress  HEENT:  Head and neck: Atraumatic. Normocephalic.  Normal oral mucosa, PERRL, Neck is supple. No JVD, No carotid bruit.   Neurologic: A & O x 3, no focal deficits. EOMI , Cranial nerves are intact.  Lymphatic: No cervical lymphadenopathy  Cardiovascular: Normal S1 S2, No murmur, rubs/gallops. No JVD, No edema  Respiratory: Lungs clear to auscultation  Gastrointestinal:  Soft, Non-tender, + BS  Lower Extremities: No edema, skin dry and scaly, hemosiderin staining noted.    Psychiatry: Patient is calm. No agitation. Mood & affect appropriate  Skin: No rash, warm and dry.    CURRENT MEDICATIONS:  amLODIPine   Tablet 10 milliGRAM(s) Oral daily  furosemide    Tablet 40 milliGRAM(s) Oral daily  metoprolol tartrate 100 milliGRAM(s) Oral every 12 hours  tamsulosin 0.4 milliGRAM(s) Oral at bedtime  sertraline  pantoprazole    Tablet  glucagon  Injectable  insulin glargine Injectable (LANTUS)  insulin lispro (ADMELOG) corrective regimen sliding scale  apixaban  aspirin enteric coated  BACItracin   Ointment    LABS:	 	  CARDIAC MARKERS ( 11 May 2021 06:10 )  x     / x     / x     / x     / x      p-BNP 11 May 2021 06:10: 56071 pg/mL, CARDIAC MARKERS ( 09 May 2021 11:25 )  x     / x     / x     / x     / x      p-BNP 09 May 2021 11:25: 42396 pg/mL, CARDIAC MARKERS ( 09 May 2021 06:06 )  x     / x     / x     / x     / x      p-BNP 09 May 2021 06:06: 67358 pg/mL, CARDIAC MARKERS ( 08 May 2021 18:28 )  x     / 0.04 ng/mL / x     / x     / x      p-BNP 08 May 2021 18:28: x                           10.4   7.16  )-----------( 194      ( 11 May 2021 06:12 )             34.5     05-11    141  |  100  |  55.0<H>  ----------------------------<  177<H>  3.8   |  27.0  |  2.17<H>    Ca    9.0      11 May 2021 06:10  Phos  2.7     05-10  Mg     2.0     05-11  proBNP: Serum Pro-Brain Natriuretic Peptide: 32868 pg/mL (05-11 @ 06:10)  Serum Pro-Brain Natriuretic Peptide: 33349 pg/mL (05-09 @ 11:25)  Serum Pro-Brain Natriuretic Peptide: 77392 pg/mL (05-09 @ 06:06)  Serum Pro-Brain Natriuretic Peptide: 82095 pg/mL (05-08 @ 08:39)  HgA1c: TSH: Thyroid Stimulating Hormone, Serum: 0.91 uIU/mL    TELEMETRY: Reviewed    ECG:  Reviewed by me.

## 2021-05-11 NOTE — PROGRESS NOTE ADULT - SUBJECTIVE AND OBJECTIVE BOX
CC: Follow up    INTERVAL HPI/OVERNIGHT EVENTS: Patient seen and examined, last night as patient was getting up from the toilet in the bathroom he hit the back of his left arm on a pipe. Was wrapped for excessive bleeding.       Vital Signs Last 24 Hrs  T(C): 36.7 (11 May 2021 08:46), Max: 36.7 (10 May 2021 16:12)  T(F): 98 (11 May 2021 08:46), Max: 98.1 (10 May 2021 16:12)  HR: 80 (11 May 2021 08:46) (80 - 104)  BP: 157/88 (11 May 2021 08:46) (125/75 - 157/88)  BP(mean): --  RR: 17 (11 May 2021 08:46) (17 - 18)  SpO2: 97% (11 May 2021 08:46) (94% - 97%)    PHYSICAL EXAM:    GENERAL: NAD, AOX3  HEAD:  Atraumatic, Normocephalic  EYES: conjunctiva and sclera clear  ENMT: Moist mucous membranes  NECK: Supple, No JVD  CHEST/LUNG: Clear to auscultation bilaterally; No rales, rhonchi, wheezing, or rubs  HEART: Regular rate and rhythm; No murmurs, rubs, or gallops  ABDOMEN: Soft, Nontender, Nondistended; Bowel sounds present  EXTREMITIES:  2+ Peripheral Pulses, No clubbing, cyanosis, or edema  LUE: Elbow dressing removed, small scab on the left elbow with superficial oozing         MEDICATIONS  (STANDING):  amLODIPine   Tablet 10 milliGRAM(s) Oral daily  apixaban 5 milliGRAM(s) Oral two times a day  aspirin enteric coated 81 milliGRAM(s) Oral daily  BACItracin   Ointment 1 Application(s) Topical two times a day  dextrose 40% Gel 15 Gram(s) Oral once  dextrose 5%. 1000 milliLiter(s) (50 mL/Hr) IV Continuous <Continuous>  dextrose 5%. 1000 milliLiter(s) (100 mL/Hr) IV Continuous <Continuous>  dextrose 50% Injectable 25 Gram(s) IV Push once  dextrose 50% Injectable 12.5 Gram(s) IV Push once  dextrose 50% Injectable 25 Gram(s) IV Push once  furosemide    Tablet 40 milliGRAM(s) Oral daily  glucagon  Injectable 1 milliGRAM(s) IntraMuscular once  insulin glargine Injectable (LANTUS) 7 Unit(s) SubCutaneous two times a day  insulin lispro (ADMELOG) corrective regimen sliding scale   SubCutaneous three times a day before meals  metoprolol tartrate 100 milliGRAM(s) Oral every 12 hours  pantoprazole    Tablet 40 milliGRAM(s) Oral before breakfast  sertraline 100 milliGRAM(s) Oral daily  tamsulosin 0.4 milliGRAM(s) Oral at bedtime    MEDICATIONS  (PRN):      Allergies    No Known Allergies    Intolerances          LABS:                          10.4   7.16  )-----------( 194      ( 11 May 2021 06:12 )             34.5     05-11    141  |  100  |  55.0<H>  ----------------------------<  177<H>  3.8   |  27.0  |  2.17<H>    Ca    9.0      11 May 2021 06:10  Phos  2.7     05-10  Mg     2.0     05-11      PT/INR - ( 11 May 2021 06:09 )   PT: 17.5 sec;   INR: 1.54 ratio         PTT - ( 11 May 2021 06:09 )  PTT:35.1 sec      RADIOLOGY & ADDITIONAL TESTS:

## 2021-05-11 NOTE — DISCHARGE NOTE PROVIDER - CARE PROVIDERS DIRECT ADDRESSES
,DirectAddress_Unknown,DirectAddress_Unknown ,DirectAddress_Unknown,DirectAddress_Unknown,[email or web address]

## 2021-05-11 NOTE — PROGRESS NOTE ADULT - SUBJECTIVE AND OBJECTIVE BOX
Pt doing well s/p uncomplicated MARLO, successful DCCV 300J x 1, RHC and ILR implant.   MARLO 5/11/21: EF 30-35%, severely enlarged RA/RV, severely reduced RV systolic, mild-mod enlarged LA, mod-severe TR, negative for intracardiac thrombus  RHC report pending     Exam:   VSS, NAD, A&O x 3  Skin: no erythema/edema/blistering at defib pad sites.   Left parasternal loop site: +op site and steri strips in place, no bleeding, swelling or hematoma.   Card: S1/S2, RRR, no m/g/r  Resp: lungs CTA b/l      Assessment: 80 year old male with a past medical history of CAD status post CABG, CHF unknown baseline EF, pAF on eliquis, hypertension and diabetes mellitus who was admitted 5/9/21 s/p mechanical fall (4 days prior to admission-he states he missed a step and fell backward with possible LOC). Post fall patient had progressively worsening back pain and presented to the ER for evaluation. In the ER, CT head showed possible anterior SDH. Neurosurgery was consulted, repeat CT head was negative, pt noted to have KEN 2.[birth year] with a BNP of 7[address]. Trop neg x 2. TTE revealed EF 35-40% with severe RV dysfunction. Pt converted to AF 5/10/21. EP consult requested for AF management, conduction disease and HFrEF (unknown duration-primary cardiologist in [place]).    Pt now s/p MARLO, successful DCCV 300J x 1, RHC and ILR implant.  MARLO 5/11/21: EF 30-35%, severely enlarged RA/RV, severely reduced RV systolic, mild-mod enlarged LA, mod-severe TR, negative for intracardiac thrombus  RHC report pending. LHC deferred as stress test was negative for ischemia and creatinine       Plan:   1. AFL/AFIB, CHADS-VASc = 6 (HTN, DM, HF, CAD, and age) on eliquis. 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  - s/p MARLO, DCCV and ILR implant   - will lower beta blocker from lopressor 100mg bid to 50mg po bid to prevent bradycardia  - Pt taken from MARLO room directly to Cath lab, ACP evaluation not performed in between.   - Observation on telemetry per post op protocol.    - Resume PO intake.   - Continue uninterrupted Eliquis 5mg po BID, pt now s/p DCCV.  Importance of strict compliance with anticoagulation regimen reinforced with pt.   - Continued plan per primary Cardiology and Medical teams including GDMT for HFrEF; lasix & lopressor. No ACEI/ARB secondary to elevated creat 2.17 today  - Will consider AFL and AFIB ablation as an outpatient  - Can consider Watchman if recurrent falls or had recurrent GI bleeding    Loop Recorder Incision Care:     - Do not touch the incision until it is completely healed.   - Remove op site tomorrow 5/12/21  - There is Dermabond (skin glue) & steri strips on your incision, they will start to flake off on their own over the next 2-3 weeks. Do not pick at or peel off the Dermabond or steri strips.  - Do not apply soaps, creams, lotions, ointments or powders to the incision until it is completely healed.  - You should call the doctor if you notice redness, drainage, swelling, increased tenderness, hot sensation around the incision, bleeding or incision edges pulling apart.    2. Dizziness, without shiva syncope, bifascicular block  - ILR for monitoring    3. HFrEF   - continue with GDMT as BP and creatinine will allow   - Will r/a need for device therapy as an outpatient.  - May benefit from rhythm control, will consider outpt ablation.   - Patient states that his EF has always been 35-40%, will attempt to get documents    DW Dr Mantilla Pt doing well s/p uncomplicated MARLO, successful DCCV 300J x 1, RHC and ILR implant.   MARLO 5/11/21: EF 30-35%, severely enlarged RA/RV, severely reduced RV systolic, mild-mod enlarged LA, mod-severe TR, negative for intracardiac thrombus  RHC report pending     EKG 5/11/21 18:21: sinus rhythm 66bpm, APCs, PVC, LPFB, RBBB    Exam:   VSS, NAD, A&O x 3  Skin: no erythema/edema/blistering at defib pad sites.   Left parasternal loop site: +op site and steri strips in place, no bleeding, swelling or hematoma.   Card: S1/S2, RRR, no m/g/r  Resp: lungs CTA b/l      Assessment: 80 year old male with a past medical history of CAD status post CABG, CHF unknown baseline EF, pAF on eliquis, hypertension and diabetes mellitus who was admitted 5/9/21 s/p mechanical fall (4 days prior to admission-he states he missed a step and fell backward with possible LOC). Post fall patient had progressively worsening back pain and presented to the ER for evaluation. In the ER, CT head showed possible anterior SDH. Neurosurgery was consulted, repeat CT head was negative, pt noted to have KEN 2.14/43 with a BNP of 9[address]. Trop neg x 2. TTE revealed EF 35-40% with severe RV dysfunction. Pt converted to AF 5/10/21. EP consult requested for AF management, conduction disease and HFrEF (unknown duration-primary cardiologist in [place]).    Pt now s/p MARLO, successful DCCV 300J x 1, RHC and ILR implant.  MARLO 5/11/21: EF 30-35%, severely enlarged RA/RV, severely reduced RV systolic, mild-mod enlarged LA, mod-severe TR, negative for intracardiac thrombus  RHC report pending. LHC deferred as stress test was negative for ischemia and creatinine       Plan:   1. AFL/AFIB, CHADS-VASc = 6 (HTN, DM, HF, CAD, and age) on eliquis. 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  - s/p MARLO, DCCV and ILR implant   - will lower beta blocker from lopressor 100mg bid to 50mg po bid to prevent bradycardia  - Pt taken from MARLO room directly to Cath lab, ACP evaluation not performed in between.   - Observation on telemetry per post op protocol.    - Resume PO intake.   - Continue uninterrupted Eliquis 5mg po BID, pt now s/p DCCV.  Importance of strict compliance with anticoagulation regimen reinforced with pt.   - Continued plan per primary Cardiology and Medical teams including GDMT for HFrEF; lasix & lopressor. No ACEI/ARB secondary to elevated creat 2.17 today  - Will consider AFL and AFIB ablation as an outpatient  - Can consider Watchman if recurrent falls or had recurrent GI bleeding    Loop Recorder Incision Care:     - Do not touch the incision until it is completely healed.   - Remove op site tomorrow 5/12/21  - There is Dermabond (skin glue) & steri strips on your incision, they will start to flake off on their own over the next 2-3 weeks. Do not pick at or peel off the Dermabond or steri strips.  - Do not apply soaps, creams, lotions, ointments or powders to the incision until it is completely healed.  - You should call the doctor if you notice redness, drainage, swelling, increased tenderness, hot sensation around the incision, bleeding or incision edges pulling apart.    2. Dizziness, without shiva syncope, bifascicular block  - ILR for monitoring    3. HFrEF   - continue with GDMT as BP and creatinine will allow   - Will r/a need for device therapy as an outpatient.  - May benefit from rhythm control, will consider outpt ablation.   - Patient states that his EF has always been 35-40%, will attempt to get documents    DW Dr Mantilla

## 2021-05-11 NOTE — DISCHARGE NOTE PROVIDER - HOSPITAL COURSE
The patient is a 80 year old male with a past medical history of CAD status post CABG, CHF unknown baseline EF, afib on eliquis, hypertension and diabetes mellitus type 2 who presented to the ER after a fall at home. Admitted for afib with rvr and acute decompensated CHF. Echocardiogram showed EF of 35-40% with severe RV dysfunction. Was admitted from CDU to medicine. Started on IV diuresis with clinical improvement. Noted to have renal insufficiency with unknown baseline. Status post nSt which showed no acute ischemia. Dyspnea was suspected secondary to Afib. EP consulted for MARLO/CV and ILR placement.    The patient is a 80 year old male with a past medical history of CAD status post CABG, CHF unknown baseline EF, afib on eliquis, hypertension and diabetes mellitus type 2 who presented to the ER after a fall at home. Admitted for afib with rvr and acute decompensated CHF. Echocardiogram showed EF of 35-40% with severe RV dysfunction. Was admitted from CDU to medicine. Started on IV diuresis with clinical improvement. Noted to have renal insufficiency with unknown baseline. Status post nSt which showed no acute ischemia. Dyspnea was suspected secondary to Afib. PT was admitted and EP consulted for MARLO/CV and ILR placement.  Pt underwent procedure on 5/11/21 and tolerated well.  Hospital course complicated by  Left elbow laceration that occurred  at home, which continued to to bleed, like due to pt on AC.  Surgery consulted and applied pressure dressing.  On day of discharge pt's laceration developed scarred and bleeding has stopped. All electrolyte abnormalities were monitored carefully and repleted as necessary during this hospitalization. At the time of discharge patient was hemodynamically stable and amenable to all terms of discharge. The patient has received verbal instructions from myself regarding discharge plans.     Length of Discharge: 45MIN      Vital Signs Last 24 Hrs  T(C): 37.1 (14 May 2021 10:57), Max: 37.1 (14 May 2021 10:57)  T(F): 98.7 (14 May 2021 10:57), Max: 98.7 (14 May 2021 10:57)  HR: 93 (14 May 2021 10:57) (63 - 93)  BP: 124/68 (14 May 2021 10:57) (116/66 - 128/72)  BP(mean): --  RR: 18 (14 May 2021 10:57) (16 - 18)  SpO2: 93% (14 May 2021 10:57) (91% - 96%)    PHYSICAL EXAM:  GENERAL: NAD, sitting in bed comfortably  HEAD:  Atraumatic, Normocephalic  EYES: EOMI, PERRL, conjunctiva and sclera clear  ENT: Moist mucous membranes  NECK: Supple, No JVD  CHEST/LUNG: Clear to auscultation bilaterally; No rales, rhonchi, wheezing, or rubs. Unlabored respirations  HEART: Regular rate and rhythm; No murmurs, rubs, or gallops  ABDOMEN: Bowel sounds present; Soft, Nontender, Nondistended   EXTREMITIES:  2+ Peripheral Pulses, brisk capillary refill. No clubbing, cyanosis, or edema  NERVOUS SYSTEM:  Alert & Oriented X3, speech clear. No facial droop, tongue protrusion midline. Answers questions appropriately. Full and equal 5/5 strength B/L upper and lower extremities. +reflexes B/L LE. Sensation intact. No deficits   MSK: FROM x 4 extremities   SKIN: Left elbow laceration noted. healed scarred.  Dressing reapplied with no issues.     The patient is a 80 year old male with a past medical history of CAD status post CABG, CHF unknown baseline EF, afib on eliquis, hypertension and diabetes mellitus type 2 who presented to the ER after a fall at home. Admitted for afib with rvr and acute decompensated CHF. Echocardiogram showed EF of 35-40% with severe RV dysfunction. Was admitted from CDU to medicine. Started on IV diuresis with clinical improvement. Noted to have renal insufficiency with unknown baseline. Status post nSt which showed no acute ischemia. Dyspnea was suspected secondary to Afib. PT was admitted and EP consulted for MARLO/CV and ILR placement.  Pt underwent procedure on 5/11/21 and tolerated well.  Hospital course complicated by  Left elbow laceration that occurred  at home, which continued to to bleed, like due to pt on AC.  Surgery consulted and applied pressure dressing.  On day of discharge pt's laceration developed scarred and bleeding has stopped. All electrolyte abnormalities were monitored carefully and repleted as necessary during this hospitalization. At the time of discharge patient was hemodynamically stable and amenable to all terms of discharge. The patient has received verbal instructions from myself regarding discharge plans.     Length of Discharge: 45MIN

## 2021-05-11 NOTE — DISCHARGE NOTE PROVIDER - NSDCMRMEDTOKEN_GEN_ALL_CORE_FT
aspirin 81 mg oral delayed release tablet: 1 tab(s) orally once a day  atorvastatin 40 mg oral tablet: 1 tab(s) orally once a day  Eliquis 5 mg oral tablet: 1 tab(s) orally 2 times a day  ferrous sulfate 300 mg (60 mg elemental iron) oral tablet: 1 tab(s) orally once a day  Flomax 0.4 mg oral capsule: 1 cap(s) orally once a day  Lasix 40 mg oral tablet: 1 tab(s) orally once a day  magnesium oxide 400 mg (241.3 mg elemental magnesium) oral tablet: 1 tab(s) orally once a day  omeprazole 40 mg oral delayed release capsule: 1 cap(s) orally once a day  sertraline 100 mg oral tablet: 1 tab(s) orally once a day   aspirin 81 mg oral delayed release tablet: 1 tab(s) orally once a day  atorvastatin 40 mg oral tablet: 1 tab(s) orally once a day  Eliquis 5 mg oral tablet: 1 tab(s) orally 2 times a day  ferrous sulfate 300 mg (60 mg elemental iron) oral tablet: 1 tab(s) orally once a day  Flomax 0.4 mg oral capsule: 1 cap(s) orally once a day  hydrALAZINE 50 mg oral tablet: 1 tab(s) orally 2 times a day  hydrALAZINE 50 mg oral tablet: 1 tab(s) orally 2 times a day  isosorbide mononitrate 60 mg oral tablet, extended release: 1 tab(s) orally once a day  Lasix 40 mg oral tablet: 1 tab(s) orally once a day  magnesium oxide 400 mg (241.3 mg elemental magnesium) oral tablet: 1 tab(s) orally once a day  omeprazole 40 mg oral delayed release capsule: 1 cap(s) orally once a day  potassium chloride 20 mEq oral tablet, extended release: 2 tab(s) orally once  sertraline 100 mg oral tablet: 1 tab(s) orally once a day   aspirin 81 mg oral delayed release tablet: 1 tab(s) orally once a day  atorvastatin 40 mg oral tablet: 1 tab(s) orally once a day  Eliquis 5 mg oral tablet: 1 tab(s) orally 2 times a day  ferrous sulfate 300 mg (60 mg elemental iron) oral tablet: 1 tab(s) orally once a day  Flomax 0.4 mg oral capsule: 1 cap(s) orally once a day  hydrALAZINE 50 mg oral tablet: 1 tab(s) orally 2 times a day  hydrALAZINE 50 mg oral tablet: 1 tab(s) orally 2 times a day  isosorbide mononitrate 60 mg oral tablet, extended release: 1 tab(s) orally once a day  magnesium oxide 400 mg (241.3 mg elemental magnesium) oral tablet: 1 tab(s) orally once a day  metoprolol tartrate 50 mg oral tablet: 1 tab(s) orally 2 times a day  omeprazole 40 mg oral delayed release capsule: 1 cap(s) orally once a day  potassium chloride 20 mEq oral tablet, extended release: 2 tab(s) orally once  sertraline 100 mg oral tablet: 1 tab(s) orally once a day  torsemide 20 mg oral tablet: 2 tab(s) orally once a day

## 2021-05-11 NOTE — DISCHARGE NOTE PROVIDER - PROVIDER TOKENS
PROVIDER:[TOKEN:[[id number]]],PROVIDER:[TOKEN:[[id number]]] PROVIDER:[TOKEN:[[id number]]],PROVIDER:[TOKEN:[[id number]]],PROVIDER:[TOKEN:[[id number]],FOLLOWUP:[2 weeks]]

## 2021-05-11 NOTE — DISCHARGE NOTE PROVIDER - CARE PROVIDER_API CALL
Jacqueline Mantilla (MD)  Cardiac Electrophysiology; Cardiovascular Disease; Internal Medicine  [address] [place] [address]  Phone: [phone]  Fax: [phone]  Follow Up Time:     Gamal Duke (DO)  Cardiology; Internal Medicine  [address] [facility], [address]  [place] [address]  Phone: [phone]  Fax: [phone]  Follow Up Time:    Jacqueline Mantilla)  Cardiac Electrophysiology; Cardiovascular Disease; Internal Medicine  [address] [place] [address]  Phone: [phone]  Fax: [phone]  Follow Up Time:     Gamal Duke (DO)  Cardiology; Internal Medicine  [address] [facility], [address]  [place] [address]  Phone: [phone]  Fax: [phone]  Follow Up Time:     Demetra Mims)  Internal Medicine; Nephrology  [address] [place] [address]  Phone: [phone]  Fax: [phone]  Follow Up Time: 2 weeks

## 2021-05-11 NOTE — CHART NOTE - NSCHARTNOTEFT_GEN_A_CORE
Pt is now s/p RHC via right brachial vein approach with Dr. Pressley    Right brachial vein precautions  Cardiac output 4.78  Cardiac index 2.08  PA 62/22/35  PCW   SAT AO 94%  SAT PA 48.4%  RV 61/8/18  SAT RV 50.6%  SAT RA 49.5%  RA 15/17/15    Give eliquis at 8pm     OFFICIAL REPORT BELOW:    < from: Cardiac Cath Lab - Adult (21 @ 16:42) >    INDICATIONS: Acute on chronic systolic congestive heart failure.  HISTORY: 81 yo man with known CAD, underwent CABG at [facility] in  the past. Admitted now with acute decompensated heart failure. Echo  revealed reduced LV function. The patient has a history of coronary artery  disease. The patient has hypertension, oral hypoglycemic-treated diabetes,  renal failure (not requiring dialysis), and morbid obesity.  PROCEDURE:  --  Right heart catheterization.  TECHNIQUE: The risks and alternatives of the procedures and conscious  sedation were explained to the patient and informed consent was obtained.  Cardiac catheterization performed electively.  Local anesthetic given. Right brachial vein access. Right heart  catheterization. The procedure was performed utilizing a catheter.  RADIATION EXPOSURE: 1.1 min.  MEDICATIONS GIVEN: 1% Lidocaine, 5 ml, subcutaneously.  COMPLICATIONS: No complications occurred during the cath lab visit.  DIAGNOSTIC IMPRESSIONS: Moderate Elevation of Right Heart Pressures:  RA=15 mmHg; RV=61/18 mmHg; PCWP=23 mmHg  Moderate Pulmonary Hypertension=62/22 mmHg  Reduced CO=4.8 L/min and CI=2.06 L/min/m2  DIAGNOSTIC RECOMMENDATIONS: The patient should continue with the present  medications. Patient management should include aggressive medical therapy,  close monitoring of BUN and creatinine, and resumption of all previous  activities in 2 days. Medical management is recommended.  INTERVENTIONAL IMPRESSIONS: Moderate Elevation of Right Heart Pressures:  RA=15 mmHg; RV=61/18 mmHg; PCWP=23 mmHg  Moderate Pulmonary Hypertension=62/22 mmHg  Reduced CO=4.8 L/min and CI=2.06 L/min/m2  Prepared and signed by  Mickey Pressley MD  Signed 2021 18:02:41  HEMODYNAMIC TABLES  Pressures:  Baseline  Pressures:  - HR: 57  Pressures:  - Rhythm:  Pressures:  -- Pulmonary Artery (S/D/M): 62/22/35  Pressures:  -- Pulmonary Capillary Wedge:   Pressures:  -- Right Atrium (a/v/M): 15/17/15  Pressures:  -- Right Ventricle (s/edp): 61/18/--  O2 Sats:  Baseline  O2 Sats:  - HR: 57  O2 Sats:  - Rhythm:  O2 Sats:  -- AO: 9.8/94/12.53  O2 Sats:  -- PA: 9.8/48.4/6.45  O2 Sats:  -- RA: 9.8/49.5/6.6  O2 Sats:  -- RV: 9.8/50.6/6.74  Outputs:  Baseline  Outputs:  -- CALCULATIONS[age]  Outputs:  -- CALCULATIONS: Body Surface Area: 2.32  Outputs:  -- CALCULATIONS: Height in cm: 185.00  Outputs:  -- CALCULATIONS: Sex: Male  Outputs:  -- CALCULATIONS: Weight in k.90  Outputs:  -- OUTPUTS: Blood Oxygen Difference: 6.08  Outputs:  -- OUTPUTS: CO by Timothy: 4.78  Outputs:  -- OUTPUTS: Timothy cardiac index: 2.06  Outputs:  -- OUTPUTS: O2 consumption: 290.21  Outputs:  -- OUTPUTS:Vo2 Indexed: 125.00  Outputs:  -- RESISTANCES: Pulmonary vascular index (dsc): 466.64  Outputs:  -- RESISTANCES: Pulmonary vascular index (Wood Units): 5.83  Outputs:  -- RESISTANCES: Pulmonary vascular resistance (dsc): 200.99  Outputs:  -- RESISTANCES: Pulmonary vascular resistance (Wood Units): 2.51  Outputs:  -- RESISTANCES: Right ventricular stroke work: 21.65  Outputs:  -- RESISTANCES: Right ventricular stroke work index: 9.32  Outputs:  -- RESISTANCES: Total pulmonary index (dsc): 1361.03  Outputs:  -- RESISTANCES: Total pulmonary index (Wood Units): 17.02  Outputs:  -- RESISTANCES: Total pulmonary resistance (dsc): 586.23  Outputs:  -- RESISTANCES: Total pulmonary resistance (Wood Units): 7.33  Outputs:  -- SHUNTS: Qs Indexed: 2.06  Outputs:  -- SHUNTS: Systemic flow: 4.78    < end of copied text >

## 2021-05-11 NOTE — PROGRESS NOTE ADULT - ASSESSMENT
The patient is a 80 year old male with a past medical history of CAD status post CABG, CHF unknown baseline EF, afib on eliquis, hypertension and diabetes mellitus type 2 who presented to the ER after a fall at home. Admitted for afib with rvr and acute decompensated CHF.     Assessment/Plan:    1. Acute decompensated CHF: Echocardiogram reviewed with EF of 35-40%  Suspected to be secondary to AFIb  Clinically improved  Lasix changed to PO     2. KEN with CKD: Baseline unknown  Improving  Continue to monitor BMP    3. Afib with RVR: NPO For MARLO/CV today  Plan for ILR placement   Metoprolol increased to 100mg Q12 hours     4. Hypokalemia and hypomagnesemia supplemented    5. Diabetes mellitus type 2: continue Lantus with HSS  Monitor BSL     Discharge home in 24-48 hours pending cardiac clearance    The patient is a 80 year old male with a past medical history of CAD status post CABG, CHF unknown baseline EF, afib on eliquis, hypertension and diabetes mellitus type 2 who presented to the ER after a fall at home. Admitted for afib with rvr and acute decompensated CHF. Echocardiogram showed EF of 35-40% with severe RV dysfunction. Was admitted from CDU to medicine. Started on IV diuresis with clinical improvement. Noted to have renal insufficiency with unknown baseline. Status post nSt which showed no acute ischemia. Dyspnea was suspected secondary to Afib. EP consulted for MARLO/CV and ILR placement.     Assessment/Plan:    1. Acute decompensated CHF: Echocardiogram reviewed with EF of 35-40%  Suspected to be secondary to AFIb  Clinically improved  Lasix changed to PO   Status post NST with no acute ischemia; no plans for cardiac cath given renal insuffiencey  Dyspnea suspected secondary to Afib with rvr       2. KEN with CKD: Baseline unknown  Improving  Continue to monitor BMP    3. Afib with RVR: NPO For MARLO/CV today  Plan for ILR placement   Metoprolol increased to 100mg Q12 hours     4. Hypokalemia and hypomagnesemia supplemented    5. Diabetes mellitus type 2: continue Lantus with HSS  Monitor BSL     Discharge home in 24-48 hours pending cardiac clearance

## 2021-05-11 NOTE — DISCHARGE NOTE PROVIDER - NSDCCPTREATMENT_GEN_ALL_CORE_FT
PRINCIPAL PROCEDURE  Procedure: Echocardiography, transesophageal, with cardioversion  Findings and Treatment: [facility] will call you to schedule a 2 week post op follow up, please call [phone] if you dont hear from them or need to be seen sooner.  -Take each dose of your anticoagulation medication (blood thinner) exactly as prescribed.   -Resume your diet with soft foods first.  - Do not drive, operate heavy machinery or make important decisions for 24 hours following the procedure.  - You may resume all other activities the day after the procedure.  Call your doctor if:   -you develop a fever, cough up blood, have any chest pain, palpitations or difficulty breathing. You may take a throat lozenge if you develop a sore throat or try warm salt water gargle.   - you have any questions or concerns regarding the procedure.  If you experience increased difficulty breathing or chest pain, or if you faint, have dizzy spells, or for any other distressing symptom, please seek immediate medical attention.        SECONDARY PROCEDURE  Procedure: Insertion, loop recorder  Findings and Treatment: Loop Recorder Incision Care:     - Do not touch the incision until it is completely healed.   - There is Dermabond (skin glue) on your incision, which will start to flake off on its own over the next 2-3 weeks. Do not pick at or peel off the Dermabond.   - Do not apply soaps, creams, lotions, ointments or powders to the incision until it is completely healed.  - You should call the doctor if you notice redness, drainage, swelling, increased tenderness, hot sensation around the  incision, bleeding or incision edges pulling apart.       PRINCIPAL PROCEDURE  Procedure: Echocardiography, transesophageal, with cardioversion  Findings and Treatment: [facility] will call you to schedule a 2 week post op follow up, please call [phone] if you dont hear from them or need to be seen sooner.  -Take each dose of your anticoagulation medication (blood thinner) exactly as prescribed.   -Resume your diet with soft foods first.  - Do not drive, operate heavy machinery or make important decisions for 24 hours following the procedure.  - You may resume all other activities the day after the procedure.  Call your doctor if:   -you develop a fever, cough up blood, have any chest pain, palpitations or difficulty breathing. You may take a throat lozenge if you develop a sore throat or try warm salt water gargle.   - you have any questions or concerns regarding the procedure.  If you experience increased difficulty breathing or chest pain, or if you faint, have dizzy spells, or for any other distressing symptom, please seek immediate medical attention.        SECONDARY PROCEDURE  Procedure: Insertion, loop recorder  Findings and Treatment: Loop Recorder Incision Care:     - Do not touch the incision until it is completely healed.   - There is Dermabond (skin glue) & steri strips on your incision, which will start to flake off on their own over the next 2-3 weeks. Do not pick at or peel off the Dermabond.   - Do not apply soaps, creams, lotions, ointments or powders to the incision until it is completely healed.  - You should call the doctor if you notice redness, drainage, swelling, increased tenderness, hot sensation around the  incision, bleeding or incision edges pulling apart.

## 2021-05-12 LAB
ANION GAP SERPL CALC-SCNC: 16 MMOL/L — SIGNIFICANT CHANGE UP (ref 5–17)
BUN SERPL-MCNC: 63 MG/DL — HIGH (ref 8–20)
CALCIUM SERPL-MCNC: 8.7 MG/DL — SIGNIFICANT CHANGE UP (ref 8.6–10.2)
CHLORIDE SERPL-SCNC: 98 MMOL/L — SIGNIFICANT CHANGE UP (ref 98–107)
CO2 SERPL-SCNC: 27 MMOL/L — SIGNIFICANT CHANGE UP (ref 22–29)
CREAT SERPL-MCNC: 2.66 MG/DL — HIGH (ref 0.5–1.3)
GLUCOSE BLDC GLUCOMTR-MCNC: 175 MG/DL — HIGH (ref 70–99)
GLUCOSE BLDC GLUCOMTR-MCNC: 210 MG/DL — HIGH (ref 70–99)
GLUCOSE BLDC GLUCOMTR-MCNC: 217 MG/DL — HIGH (ref 70–99)
GLUCOSE BLDC GLUCOMTR-MCNC: 251 MG/DL — HIGH (ref 70–99)
GLUCOSE SERPL-MCNC: 204 MG/DL — HIGH (ref 70–99)
HCT VFR BLD CALC: 31.6 % — LOW (ref 39–50)
HGB BLD-MCNC: 9.8 G/DL — LOW (ref 13–17)
MAGNESIUM SERPL-MCNC: 1.6 MG/DL — SIGNIFICANT CHANGE UP (ref 1.6–2.6)
MCHC RBC-ENTMCNC: 27.6 PG — SIGNIFICANT CHANGE UP (ref 27–34)
MCHC RBC-ENTMCNC: 31 GM/DL — LOW (ref 32–36)
MCV RBC AUTO: 89 FL — SIGNIFICANT CHANGE UP (ref 80–100)
PLATELET # BLD AUTO: 208 K/UL — SIGNIFICANT CHANGE UP (ref 150–400)
POTASSIUM SERPL-MCNC: 3.8 MMOL/L — SIGNIFICANT CHANGE UP (ref 3.5–5.3)
POTASSIUM SERPL-SCNC: 3.8 MMOL/L — SIGNIFICANT CHANGE UP (ref 3.5–5.3)
RBC # BLD: 3.55 M/UL — LOW (ref 4.2–5.8)
RBC # FLD: 17.2 % — HIGH (ref 10.3–14.5)
SODIUM SERPL-SCNC: 141 MMOL/L — SIGNIFICANT CHANGE UP (ref 135–145)
WBC # BLD: 6.8 K/UL — SIGNIFICANT CHANGE UP (ref 3.8–10.5)
WBC # FLD AUTO: 6.8 K/UL — SIGNIFICANT CHANGE UP (ref 3.8–10.5)

## 2021-05-12 PROCEDURE — 99232 SBSQ HOSP IP/OBS MODERATE 35: CPT | Mod: 24

## 2021-05-12 PROCEDURE — 99233 SBSQ HOSP IP/OBS HIGH 50: CPT

## 2021-05-12 RX ORDER — MAGNESIUM SULFATE 500 MG/ML
2 VIAL (ML) INJECTION ONCE
Refills: 0 | Status: COMPLETED | OUTPATIENT
Start: 2021-05-12 | End: 2021-05-12

## 2021-05-12 RX ORDER — SODIUM CHLORIDE 9 MG/ML
1000 INJECTION INTRAMUSCULAR; INTRAVENOUS; SUBCUTANEOUS
Refills: 0 | Status: DISCONTINUED | OUTPATIENT
Start: 2021-05-12 | End: 2021-05-12

## 2021-05-12 RX ADMIN — Medication 1 APPLICATION(S): at 05:14

## 2021-05-12 RX ADMIN — Medication 81 MILLIGRAM(S): at 08:21

## 2021-05-12 RX ADMIN — Medication 1: at 12:24

## 2021-05-12 RX ADMIN — Medication 50 MILLIGRAM(S): at 05:20

## 2021-05-12 RX ADMIN — SODIUM CHLORIDE 100 MILLILITER(S): 9 INJECTION INTRAMUSCULAR; INTRAVENOUS; SUBCUTANEOUS at 12:24

## 2021-05-12 RX ADMIN — INSULIN GLARGINE 7 UNIT(S): 100 INJECTION, SOLUTION SUBCUTANEOUS at 08:21

## 2021-05-12 RX ADMIN — Medication 1 APPLICATION(S): at 14:41

## 2021-05-12 RX ADMIN — Medication 50 GRAM(S): at 08:21

## 2021-05-12 RX ADMIN — TAMSULOSIN HYDROCHLORIDE 0.4 MILLIGRAM(S): 0.4 CAPSULE ORAL at 21:22

## 2021-05-12 RX ADMIN — Medication 50 MILLIGRAM(S): at 05:21

## 2021-05-12 RX ADMIN — Medication 40 MILLIGRAM(S): at 06:22

## 2021-05-12 RX ADMIN — Medication 50 MILLIGRAM(S): at 17:19

## 2021-05-12 RX ADMIN — PANTOPRAZOLE SODIUM 40 MILLIGRAM(S): 20 TABLET, DELAYED RELEASE ORAL at 05:20

## 2021-05-12 RX ADMIN — SERTRALINE 100 MILLIGRAM(S): 25 TABLET, FILM COATED ORAL at 08:20

## 2021-05-12 RX ADMIN — ISOSORBIDE MONONITRATE 60 MILLIGRAM(S): 60 TABLET, EXTENDED RELEASE ORAL at 08:20

## 2021-05-12 RX ADMIN — Medication 2: at 08:21

## 2021-05-12 RX ADMIN — INSULIN GLARGINE 7 UNIT(S): 100 INJECTION, SOLUTION SUBCUTANEOUS at 21:22

## 2021-05-12 RX ADMIN — Medication 3: at 17:18

## 2021-05-12 NOTE — PROGRESS NOTE ADULT - ASSESSMENT
The patient is a 80 year old male with a past medical history of CAD status post CABG, CHF unknown baseline EF, afib on eliquis, hypertension and diabetes mellitus type 2 who presented to the ER after a fall at home. Admitted for afib with rvr and acute decompensated CHF. Echocardiogram showed EF of 35-40% with severe RV dysfunction. Was admitted from CDU to medicine. Started on IV diuresis with clinical improvement. Noted to have renal insufficiency with unknown baseline. Status post nSt which showed no acute ischemia. Dyspnea was suspected secondary to Afib. EP consulted for MARLO/CV and ILR placement.     Assessment/Plan:    1. Acute decompensated CHF: Echocardiogram reviewed with EF of 35-40%  Suspected to be secondary to AFIb  Clinically improved  Lasix changed to PO torsemide  Status post NST with no acute ischemia; no plans for cardiac cath given renal insuffiencey  Dyspnea multifactorial, suspected secondary to Afib with rvr, pHTN, HF exac      2. KEN with CKD: Baseline unknown, worsening  suspect stage 3  Continue to monitor BMP, cr now up to 2.6  not seen by any nephro outpatient  poss related to diuretic - discussed w/ cardio, will cont current dose at their request but consider lowering if progressively worsening  nephro eval in am    3. Afib with RVR: sp MARLO/CV 5/11 - well tolerated  sp ILR placement 5/11   Metoprolol increased to 100mg Q12 hours, dec back to 50mg due to bradycardia     4. Hypokalemia and hypomagnesemia resolved    5. Diabetes mellitus type 2: continue Lantus with HSS  Monitor BSL     6. LT elbow laceration, sec to fall, stable  excess bleeding sec to AC use - hold eliquis until 5/14 w/ persmission given from cardio    7. Anemia, unknown if chronic  monitor for progressive worsening due to bleeding lac  transfuse hgb < 7 or symptom onset  outpt w/u    8. Mod Pulmonary Hypertenstion, new dx  confirmed on RHC  likely type 2 sec to cardiac etiology  management as above for HF    Discharge home in 24-48 hours pending improvement in renal status

## 2021-05-12 NOTE — PROGRESS NOTE ADULT - SUBJECTIVE AND OBJECTIVE BOX
CC: tr    Patient seen and examined at the bedside. No acute overnight events. RHC yesterday. Complaining of nothing today. Denies fever/chills, headache, lightheadedness, dizziness, chest pain, palpitations, shortness of breath, cough, abd pain, nausea/vomiting/diarrhea, muscle pain.      =========================================================================================  T(C): 36.8 (21 @ 15:32), Max: 36.8 (21 @ 05:17)  HR: 65 (21 @ 15:32) (59 - 83)  BP: 104/58 (21 @ 15:32) (104/58 - 166/79)  RR: 16 (21 @ 15:32) (16 - 24)  SpO2: 91% (21 @ 15:32) (91% - 95%)    PHYSICAL EXAM.    GEN - appears age appropriate. well nourished. pleasant. no distress.   HEENT - NCAT, EOMI, MEGAN  RESP - CTA BL, no wheeze/stridor/rhonchi/crackles. not on supplemental O2. able to speak in full sentences without distress. no accessory muscle use.  CARDIO - NS1S2, RRR.   ABD - Soft/Non tender/Non distended. Normal BS x4 quadrants. no guarding/rebound tenderness.  Ext - No LAST.  MSK - BL 5/5 strength on upper and lower extremities.   Neuro -  no gross neuro deficits noted. no focal sensory abnormalities. no tremor/seizure  Psych - normal affect. AAOx3.  Skin - hyperpigmented hypertrophic skin on BL upper ext. LT elbow bandaged w/ overlying ace wrap, noted blood on wrap      I&O's Summary    11 May 2021 07:01  -  12 May 2021 07:00  --------------------------------------------------------  IN: 0 mL / OUT: 700 mL / NET: -700 mL    12 May 2021 07:01  -  12 May 2021 16:51  --------------------------------------------------------  IN: 240 mL / OUT: 0 mL / NET: 240 mL      Daily     Daily Weight in k.4 (12 May 2021 05:20)    =========================================================================================  LABS.        141  |  98  |  63.0<H>  ----------------------------<  204<H>  3.8   |  27.0  |  2.66<H>    Ca    8.7      12 May 2021 07:19  Mg     1.6     12                            9.8    6.80  )-----------( 208      ( 12 May 2021 07:19 )             31.6       PT/INR - ( 11 May 2021 06:09 )   PT: 17.5 sec;   INR: 1.54 ratio         PTT - ( 11 May 2021 06:09 )  PTT:35.1 sec          COVID-19 PCR: NotDetec (11 May 2021 09:17)  COVID-19 PCR: NotDetec (08 May 2021 08:40)    =========================================================================================  IMAGING.     =========================================================================================    DIET.    Diet, Regular:   Consistent Carbohydrate Evening Snack (CSTCHOSN)  DASH/TLC Sodium & Cholesterol Restricted (DASH)  Low Sodium (21 @ 18:11)      =========================================================================================    HOME MEDS.    Home Medications:  aspirin 81 mg oral delayed release tablet: 1 tab(s) orally once a day (11 May 2021 11:51)  atorvastatin 40 mg oral tablet: 1 tab(s) orally once a day (10 May 2021 12:58)  Eliquis 5 mg oral tablet: 1 tab(s) orally 2 times a day (10 May 2021 12:58)  ferrous sulfate 300 mg (60 mg elemental iron) oral tablet: 1 tab(s) orally once a day (10 May 2021 12:58)  Flomax 0.4 mg oral capsule: 1 cap(s) orally once a day (10 May 2021 12:58)  Lasix 40 mg oral tablet: 1 tab(s) orally once a day (10 May 2021 12:58)  magnesium oxide 400 mg (241.3 mg elemental magnesium) oral tablet: 1 tab(s) orally once a day (10 May 2021 12:58)  omeprazole 40 mg oral delayed release capsule: 1 cap(s) orally once a day (10 May 2021 12:58)  sertraline 100 mg oral tablet: 1 tab(s) orally once a day (10 May 2021 12:58)      =========================================================================================    HOSPITAL MEDS.    MEDICATIONS  (STANDING):  aspirin enteric coated 81 milliGRAM(s) Oral daily  dextrose 40% Gel 15 Gram(s) Oral once  dextrose 5%. 1000 milliLiter(s) (50 mL/Hr) IV Continuous <Continuous>  dextrose 5%. 1000 milliLiter(s) (100 mL/Hr) IV Continuous <Continuous>  dextrose 50% Injectable 25 Gram(s) IV Push once  dextrose 50% Injectable 12.5 Gram(s) IV Push once  dextrose 50% Injectable 25 Gram(s) IV Push once  glucagon  Injectable 1 milliGRAM(s) IntraMuscular once  hydrALAZINE 50 milliGRAM(s) Oral two times a day  insulin glargine Injectable (LANTUS) 7 Unit(s) SubCutaneous two times a day  insulin lispro (ADMELOG) corrective regimen sliding scale   SubCutaneous three times a day before meals  isosorbide   mononitrate ER Tablet (IMDUR) 60 milliGRAM(s) Oral daily  metoprolol tartrate 50 milliGRAM(s) Oral two times a day  pantoprazole    Tablet 40 milliGRAM(s) Oral before breakfast  sertraline 100 milliGRAM(s) Oral daily  tamsulosin 0.4 milliGRAM(s) Oral at bedtime    MEDICATIONS  (PRN):

## 2021-05-12 NOTE — PROGRESS NOTE ADULT - ATTENDING COMMENTS
agree  ILR in place  SB post CV, agree with lowering metoprolol for now, can change to long acting formula prior to DC. ILR will guide further need for escalation of medication or ablation +/- device therapy
-reports his breathing is better today and Cr. slight downtrend on IV Lasix, but severe hypokalemia getting K repletion, will hold metolazone for now and continue IV Lasix 40mg once daily. Lisinopril on hold for now, consider Entresto if Cr. improves and would benefit from dapagliflozin as well for HF/DM  -TTE with moderately reduced LV EF (patient reports told by cardiologist in [place] with LV EF 40-45%? in the past, had CABG 3 years ago then required PCI 1 year after without stent deployment, last nuclear stress test >2 years ago), also with severe RV dysfunction, would warrant for repeat ischemic workup with pharm nuclear stress test given advanced CKD to avoid contrast for now, if significant ischemic burden may consider LHC, would benefit from RHC to assess cause of RV failure.   -patient with chronic Afib >3 years since CABG, never had rhythm control (CV or ablation) in [place], given Afib -130s with cardiomyopathy would benefit from rhythm control, consider MARLO/CV prior to discharge, EPS consult after stress test; increase metoprolol 100mg BID, continue Eliquis.   -NPO for pharm nuclear stress test Monday  -possible MARLO/CV for Afib Tuesday with RHC +/- LHC pending Cr. trend and stress test result.           Gamal Duke DO, [facility]  Faculty Non-Invasive Cardiologist  [phone]
-pharm nuclear stress test with fixed inferior infarct without ischemia, given underlying advanced CKD would not pursue left heart cath, continue medical management.   -euvolemic, switch to PO Lasix, chronic exertional dyspnea, suspect elevated pBNP related to marked RV dilation, obtain RHC as well to check pulmonary pressure and filling pressure.   -recurrent pAfib RVR despite increase metoprolol dose and suspect exertional dyspnea could be related to uncontrolled Afib, offered trial of MARLO/CV and consider future ablation if symptomatic improvement, EPS consulted      Gamal Duke DO, [facility]  Faculty Non-Invasive Cardiologist  [phone]
-active oozing of blood from L-elbow abrasion wound sustained 2 days ago fell in the bathroom, pressure dressing applied, need wound care eval.   -mild uptrend in Cr., did not receive any iodine contrast as only RHC done yesterday still with elevated L-sided filling pressure (PCWP 23) and moderate pHTN (PASP 60s with PVR 5)- need to continue diuresis, changed PO Lasix + metolazone to Torsemide 40mg once daily  -lisinopril been on hold since admission, off amlodipine as well, continue hydralazine/Imdur for HFrEF, outpatient addition of SGLT-2 inhibitor if eGFR >30  -stopped Mg supplement for frequent loose bowel movement.   -nephrology eval. for advanced CKD  -continue metoprolol at lower dose given sinus bradycardia, resume Eliquis if L-elbow bleeding resolve; outpatient follow up with EPS for consideration of elective Afib ablation.         Gamal Duke DO, [facility]  Faculty Non-Invasive Cardiologist  [phone]
-patient needs RHC to assess for etiology of RV dysfunction and left-sided filling pressures, pBNP downtrended but still elevated-continue PO Lasix 40mg daily, may need restart metolazone but likely not need daily dosing  -in Afib/flutter, await MARLO/CV if improvement in symptoms for eventual Afib ablation, plan for ILR implant as well today with EPS/Dr. Mantilla, continue Eliquis and metoprolol.   -continue medical management for known ischemic cardiomyopathy, will add hydralazine/Imdur for HFrEF/HTN as Cr. not able to tolerate ACE/ARB/ARNi use, need outpatient nephrology eval.  -would benefit from SGLT-2 inhibitor if eGFR >30   -possible discharge planning home tomorrow        Gamal Duke DO, [facility]  Faculty Non-Invasive Cardiologist  [phone]

## 2021-05-12 NOTE — PROGRESS NOTE ADULT - ASSESSMENT
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 Patient states that he missed a step, fell backwards, and landed on his right side. Patient states that he has been having some dizziness, felt like he might pass out, as well as back pain since the fall. Patient's wife states he refused to come to the ER initially, but the symptoms continued to worsen. Patient just flew up from [place] as well, and does not have a Cardiologist following in [place]. Patient also noted for the last few months he has been having continued shortness of breath and leg swelling, and was recently started on lasix in addition to metolazone. Patient denies any fevers, chills, CP, abdominal pain, diarrhea, or vision changes.   Troponin negative x 1  BNP 88118    Acute Decompensated HFrEF with Severely Reduced RV function  -  EF 35-40% with severely reduced RV function.  - s/p RHC - RA 15, RV 61/18, PCWP 23, CO 4.,8, CI 2.06  - consider Entresto if Cr. improves and would benefit from dapagliflozin as well for HF/DM  - stress test results are largely unchanged and there is no ischemia   -Will continue metoprolol, and transition to toprol XL upon D/C.   - off IV diuresis, was on PO   - creatinine with significant increase post cath 2.66 from 1.93 on admission  - euvolemic on exam, if creatinine improves tomorrow resume lasix 40mg daily as maintenance dose.   - cont GDMT: metoprolol, hydralazine, imdur,   - Monitor on telemetry.   - Strict i/o and daily weights.  -  Keep K > 4, Mg > 2.  -  Monitor renal function    Atrial Fibrillation  - currently SB 50s s/p MARLO/DCCV  and ILR placement   - lopressor was lowered by EP   - Repeat CT head with no SDH.   - Continue telemetry monitoring.   - hold eliquis x 48 hours     Subdural Hematoma  - Repeat CT negative.     Left Elbow Laceration  - left elbow laceration s/p mechanical fall  - skin tear with 3 spots with consistent bleeding  - wound care consult   - dressing applies with adaptiq, gauze, kerlix, ace wrap.   - if bleeding does not stop will need cauterization  - hold eliquis x 2 days

## 2021-05-12 NOTE — PROGRESS NOTE ADULT - ASSESSMENT
The patient is a 80 year old male with a past medical history of CAD status post CABG, CHF unknown baseline EF, afib on eliquis, hypertension and diabetes mellitus type 2 who presented to the ER after a fall at home. The patient fell 4 days prior to admission, states he missed a step and fell backward with possible LOC. Post fall patient had progressively worsening back pain and presented to the ER for evaluation. In the ER, CT head showed possible anterior SDH. Neurosurgery was consulted, repeat CT head was negative.  Noted to have KEN [birth year] [place] a [place] of [address]. REcently patient has had complaints of dyspnea. HIs cardiologists and physicians are in [place]. Was seen in the ER by cardiology, started on diuresis with IV lasix and metalozone for acute decompensated CHF. Troponin was negative x 2. Initially admitted to the CDU> Echocardiogram was done which showed EF of 35-40% with severe RV dysfunction. Rythm changed into AFIB ? yesterday    stress test negative for ischemia so no cath given high creatinine  EPS consulted for AFIB, HF, dizziness and bifascicular block    Interval Hx 5/10-11:  Doing well, denies any CP, SOB, dizziness or palpitation  Tele showed alternation between AFIB and AFL with mostly controlled rates, no pauses or bradycardia    Interval Hx 5/11-12:  - s/p MARLO/DCCV, and ILR as well as RHC.   - Feels well  - Tele shows sinus rhythm/sinus bradycardia with , 30 seconds of AF overnight  - lowered metoprolol from 100 bid to 50 bid    1. AFL/AFIB, CHADS-VASc = 6 (HTN, DM, HF, CAD, and age) on eliquis. 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 Rate and rhythm control were discussed.  - continue with eliquis  - keep metoprolol at 50 mg bid or change it to 100 mg Toprol  - ILR in place  - Will consider AFL and AFIB ablation as an outpatient  - Can consider Watchman if recurrent falls or had recurrent GI bleeding    2. Dizziness, without shiva syncope but also bifascicular block  - ILR in place for monitoring    3. HF, continue with GDMT. Will r/a need for device therapy as an outpatient May benefit from rhythm control. Patient says that his EF has always been 35-40%, need to get documents     EPS sign off, will follow up as an outpatient.         The patient is a 80 year old male with a past medical history of CAD status post CABG, CHF unknown baseline EF, afib on eliquis, hypertension and diabetes mellitus type 2 who presented to the ER after a fall at home. The patient fell 4 days prior to admission, states he missed a step and fell backward with possible LOC. Post fall patient had progressively worsening back pain and presented to the ER for evaluation. In the ER, CT head showed possible anterior SDH. Neurosurgery was consulted, repeat CT head was negative.  Noted to have KEN [birth year] with a BNP of 7[address]. REcently patient has had complaints of dyspnea. HIs cardiologists and physicians are in [place]. Was seen in the ER by cardiology, started on diuresis with IV lasix and metalozone for acute decompensated CHF. Troponin was negative x 2. Initially admitted to the CDU> Echocardiogram was done which showed EF of 35-40% with severe RV dysfunction. Rythm changed into AFIB ? yesterday    stress test negative for ischemia so no cath given high creatinine  EPS consulted for AFIB, HF, dizziness and bifascicular block    Interval Hx 5/10-11:  Doing well, denies any CP, SOB, dizziness or palpitation  Tele showed alternation between AFIB and AFL with mostly controlled rates, no pauses or bradycardia    Interval Hx 5/11-12:  - s/p MARLO/DCCV, and ILR as well as RHC.   - Feels well  - Tele shows sinus rhythm/sinus bradycardia with , 30 seconds of AF overnight  - lowered metoprolol from 100 bid to 50 bid    1. AFL/AFIB, CHADS-VASc = 6 (HTN, DM, HF, CAD, and age) on eliquis. 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 Rate and rhythm control were discussed.  - continue with eliquis  - keep metoprolol at 50 mg bid or change it to 100 mg Toprol  - ILR in place  - Will consider AFL and AFIB ablation as an outpatient  - Can consider Watchman if recurrent falls or had recurrent GI bleeding    2. Dizziness, without shiva syncope but also bifascicular block  - ILR in place for monitoring    3. HF, continue with GDMT. Will r/a need for device therapy as an outpatient May benefit from rhythm control. Patient says that his EF has always been 35-40%, need to get documents     EPS sign off, will follow up as an outpatient.

## 2021-05-12 NOTE — PROGRESS NOTE ADULT - SUBJECTIVE AND OBJECTIVE BOX
Electrophysiology Attending Consult Note    HPI:  The patient is a 80 year old male with a past medical history of CAD status post CABG, CHF unknown baseline EF, afib on eliquis, hypertension and diabetes mellitus type 2 who presented to the ER after a fall at home. The patient fell 4 days prior to admission, states he missed a step and fell backward with possible LOC. Post fall patient had progressively worsening back pain and presented to the ER for evaluation. In the ER, CT head showed possible anterior SDH. Neurosurgery was consulted, repeat CT head was negative.  Noted to have KEN [birth year] with a BNP of 689[address]. REcently patient has had complaints of dyspnea. HIs cardiologists and physicians are in [place]. Was seen in the ER by cardiology, started on diuresis with IV lasix and metalozone for acute decompensated CHF. Troponin was negative x 2. Initially admitted to the CDU> Echocardiogram was done which showed EF of 35-40% with severe RV dysfunction. Admitted for afib with rvr and acute decompensated CHF>     Above appreciated  Patient has been having progressive PINA lately as well as leg edema. He has been compliant with all his medications. He declines any chest pain, palpitation, or syncope. In regards to the fall, he rememebrs feels a bit weak or maybe dizzy but denies shiva syncope prior to the fall. NO other syncope. Has rare falls.   He stated that he was told he has AF for more than one year and he doesn't feel any palpitation.   He had BRBPR in the recent past which determined to be due to hemorrhoid and stopped.   He has no family histroy of premature CAD or SCD.  He was in AFL with bifascicular block on presentation and went into AFIB ?sometime yesterday with rates in the low 100 and up to 130 bpm. Tele showed no pauses or bradycardia   During this admission his EF noted to be low, and stress test was negative for ischemia so cath was deferred given the CKD.   Initial CT suggested SDH which was not seen on recurrent CT and so his AC was resumed. No follow up from NSx initial consult     Interval Hx 5/10-11:  Doing well, denies any CP, SOB, dizziness or palpitation  Tele showed alternation between AFIB and AFL with mostly controlled rates, no pauses or bradycardia    Interval Hx 5/11-12:  - s/p MARLO/DCCV, and ILR as well as RHC.   - Feels well  - Tele shows sinus rhythm/sinus bradycardia with , 30 seconds of AF overnight  - lowered metoprolol from 100 bid to 50 bid    PAST MEDICAL & SURGICAL HISTORY:  Coronary artery disease, s/p CABG  AFL/Atrial fibrillation, CHADS-VASc = 6 (HF, HTN, DM, CAD and age) on eliquis  Congestive heart failure, unspecified HF chronicity, unspecified heart failure type  DM  HTN  Bell&#x27;s palsy  Right side of face        REVIEW OF SYSTEMS:    CONSTITUTIONAL: No fever, weight loss, or fatigue  EYES: No eye pain, visual disturbances, or discharge  ENMT:  No difficulty hearing, tinnitus, vertigo; No sinus or throat pain  RESPIRATORY: No cough, wheezing, chills or hemoptysis;   CARDIOVASCULAR: see HPI  GASTROINTESTINAL: No abdominal or epigastric pain. No nausea, vomiting, or hematemesis; No diarrhea or constipation.  GENITOURINARY: No dysuria, frequency, hematuria,   NEUROLOGICAL: No headaches, memory loss, loss of strength, numbness, or tremors  SKIN: No itching, burning, rashes, or lesions   LYMPH NODES: No enlarged glands  ENDOCRINE: No heat or cold intolerance; No hair loss  MUSCULOSKELETAL: No joint pain or swelling; No muscle, back, or extremity pain  HEME/LYMPH: No easy bruising, or bleeding gums  ALLERY AND IMMUNOLOGIC: No hives or eczema      MEDICATIONS  (STANDING):  apixaban 5 milliGRAM(s) Oral two times a day  aspirin enteric coated 81 milliGRAM(s) Oral daily  BACItracin   Ointment 1 Application(s) Topical two times a day  dextrose 40% Gel 15 Gram(s) Oral once  dextrose 5%. 1000 milliLiter(s) (50 mL/Hr) IV Continuous <Continuous>  dextrose 5%. 1000 milliLiter(s) (100 mL/Hr) IV Continuous <Continuous>  dextrose 50% Injectable 25 Gram(s) IV Push once  dextrose 50% Injectable 12.5 Gram(s) IV Push once  dextrose 50% Injectable 25 Gram(s) IV Push once  glucagon  Injectable 1 milliGRAM(s) IntraMuscular once  hydrALAZINE 50 milliGRAM(s) Oral two times a day  insulin glargine Injectable (LANTUS) 7 Unit(s) SubCutaneous two times a day  insulin lispro (ADMELOG) corrective regimen sliding scale   SubCutaneous three times a day before meals  isosorbide   mononitrate ER Tablet (IMDUR) 60 milliGRAM(s) Oral daily  metoprolol tartrate 50 milliGRAM(s) Oral two times a day  pantoprazole    Tablet 40 milliGRAM(s) Oral before breakfast  sertraline 100 milliGRAM(s) Oral daily  tamsulosin 0.4 milliGRAM(s) Oral at bedtime  torsemide 40 milliGRAM(s) Oral daily        Allergies  - No Known Allergies      SOCIAL HISTORY:  Negative for smoking  Occasional alcohol    FAMILY HISTORY:  No pertinent family history in first degree relatives      Vital Signs Last 24 Hrs  T(C): 36.8 (12 May 2021 05:17), Max: 36.8 (12 May 2021 05:17)  T(F): 98.3 (12 May 2021 05:17), Max: 98.3 (12 May 2021 05:17)  HR: 60 (12 May 2021 05:17) (60 - 91)  BP: 120/70 (12 May 2021 05:17) (114/81 - 166/79)  RR: 16 (12 May 2021 05:17) (16 - 24)  SpO2: 94% (12 May 2021 05:17) (92% - 95%)    Physical Exam:  Constitutional: AAOx3, NAD  Neck: supple, No JVD  Cardiovascular: +S1S2,RRR, no murmurs, rubs, gallops   Pulmonary: CTA b/l, unlabored, no wheezes, rales. rhonci  Abdomen: +BS, soft NTND  Extremities: trace edema b/l,  Neuro: non focal, speech clear, HONG x 4    ILR site clean and dry. no swelling    LABS:                                   9.8    6.80  )-----------( 208      ( 12 May 2021 07:19 )             31.6   05-12    141  |  98  |  63.0<H>  ----------------------------<  204<H>  3.8   |  27.0  |  2.66<H>    Ca    8.7      12 May 2021 07:19  Mg     1.6     05-12              RADIOLOGY & ADDITIONAL STUDIES:  EKG 5/11 post DCCV, Sinus with PACs   EKG 5/11 AFL with 3:1 AVB and  with Bifascicular block  EKG 5/10: AFL with 2:1 AVB. Bifascicular block  EKG 5/8 AFL with 3:1 AVB and  with Bifascicular block    < from: TTE Echo Complete w/ Contrast w/ Doppler (05.09.21 @ 08:59) >  Summary:   1. Technically difficult study.   2. Endocardial visualization was enhanced with intravenous echo contrast.   3. Left ventricularejection fraction, by visual estimation, is 35 to 40%.   4. Paradoxical septal motion with moderately decreased segmental left ventricular systolic function.   5. Severe hypokinesis of the basal to mid segements, the apex and distal segments are preserved.   6. The mitral in-flow pattern reveals no discernable A-wave, therefore no comment on diastolic function can be made.   7. Severely enlarged right ventricle with severely reduced systolic function, estimated PASP least 45 mmHg mild to moderately elevated.   8. Right atrial enlargement.   9. Moderately enlarged left atrium.  10. Mild mitral annular calcification.  11. Trace mitral valve regurgitation.  12. Thickening and calcification of the anterior and posterior mitral valve leaflets.  13. Mild-moderate tricuspid regurgitation.  14. Sclerotic aortic valve with decreased opening.  15. There is mild aortic root calcification.  16. There is no evidence of pericardial effusion.  17. No prior study available for comparison.    < end of copied text >    < from: Nuclear Stress Test-Pharmacologic (05.10.21 @ 07:59) >  IMPRESSIONS:  * Myocardial Perfusion SPECT results are abnormal.  * There is a medium sized, moderate defect in basal to mid inferior wall that is fixed, suggestive of infarct. No evidence of ischemia.  * Post-stress resting myocardial perfusion gated SPECT imaging was performed (LVEF = 41 %;LVEDV = 141 ml.)  * Dilated right ventricle noted.    No prior study is available for comparison.    < end of copied text >      < from: Cardiac Cath Lab - Adult (05.11.21 @ 16:42) >  Pressures:  - Rhythm:  Pressures:  -- Pulmonary Artery (S/D/M): 62/22/35  Pressures:  -- Pulmonary Capillary Wedge: 25/33/23  Pressures:  -- Right Atrium (a/v/M): 15/17/15  Pressures:  -- Right Ventricle (s/edp): 61/18/--    < end of copied text >      < from: CT Head No Cont (05.08.21 @ 10:15) >  FINDINGS: Mild anterior dural thickening tiny calcification on image 46 of series 4. Questionable thin anterior falx subdural hemorrhage measuring 1 mm image 40 of series 4.  There is periventricular and subcortical white matter hypodensity without mass effect, nonspecific, likely representing mild chronic microvascular ischemic changes. There is no compelling evidence for an acute transcortical infarction. There is no other evidence of mass, mass effect, midline shift or extra-axial fluid collection. The lateral ventricles and cortical sulci are otherwise age-appropriate in size and configuration. Trace mucosal thickening bilateral maxillary sinuses The orbits, mastoid air cells and visualized paranasal sinuses are normal. The calvarium is intact. Consider follow-up CT or MRI as clinically warranted.    < end of copied text >      < from: CT Chest No Cont (05.08.21 @ 10:16) >  IMPRESSION: No evidence of acute visceral organ or bony injury on this noncontrast CT. Mild cirrhotic morphology of the liver with trace intraperitoneal fluid. Thickened bladder with trabeculation and stone containing diverticula and enlarged prostate, compatible chronic bladder outlet obstruction.    < end of copied text >      < from: CT Abdomen and Pelvis No Cont (05.08.21 @ 10:16) >  IMPRESSION: No evidence of acute visceral organ or bony injury on this noncontrast CT. Mild cirrhotic morphology of the liver with trace intraperitoneal fluid. Thickened bladder with trabeculation and stone containing diverticula and enlarged prostate, compatible chronic bladder outlet obstruction.    < end of copied text >      < from: CT Head No Cont (05.08.21 @ 16:31) >  IMPRESSION:  No acute intracranial hemorrhage or acute territorial infarct.  If symptoms persist, follow-up MRI exam recommended.    < end of copied text >  < from: US Duplex Venous Lower Ext Complete, Bilateral (05.08.21 @ 19:58) >  IMPRESSION:  No evidence of deep venous thrombosis in either lower extremity.    < end of copied text >

## 2021-05-12 NOTE — PROGRESS NOTE ADULT - TIME BILLING
HFrEF, Afib, ICM
ICM, HFrEF, RV dysfunction, Afib/flutter
HFrEF, ICM, RV failure, Afib
ICM/HFrEF, RV dysfunction, pAfib RVR

## 2021-05-12 NOTE — PROGRESS NOTE ADULT - SUBJECTIVE AND OBJECTIVE BOX
[facility] [id number]            [facility]                          [address] [place] [address]                       Phone: [phone]. Fax:[phone]                      ________________________________________________    HPI:  The patient is a 80 year old male with a past medical history of CAD status post CABG, CHF unknown baseline EF, afib on eliquis, hypertension and diabetes mellitus type 2 who presented to the ER after a fall at home. The patient fell 4 days prior to admission, states he missed a step and fell backward with possible LOC. Post fall patient had progressively worsening back pain and presented to the ER for evaluation. In the ER, CT head showed possible anterior SDH. Neurosurgery was consulted, repeat CT head was negative.  Noted to have KEN [birth year] with a BNP of 76414. REcently patient has had complaints of dyspnea. HIs cardiologists and physicians are in [place]. Was seen in the ER by cardiology, started on diuresis with IV lasix and metalozone for acute decompensated CHF. Troponin was negative x 2. Initially admitted to the CDU> Echocardiogram was done which showed EF of 35-40% with severe RV dysfunction. Admitted for afib with rvr and acute decompensated CHF>      (09 May 2021 12:07)    HOME MEDICATIONS:  aspirin 81 mg oral delayed release tablet: 1 tab(s) orally once a day (11 May 2021 11:51)  atorvastatin 40 mg oral tablet: 1 tab(s) orally once a day (10 May 2021 12:58)  Eliquis 5 mg oral tablet: 1 tab(s) orally 2 times a day (10 May 2021 12:58)  ferrous sulfate 300 mg (60 mg elemental iron) oral tablet: 1 tab(s) orally once a day (10 May 2021 12:58)  Flomax 0.4 mg oral capsule: 1 cap(s) orally once a day (10 May 2021 12:58)  Lasix 40 mg oral tablet: 1 tab(s) orally once a day (10 May 2021 12:58)  magnesium oxide 400 mg (241.3 mg elemental magnesium) oral tablet: 1 tab(s) orally once a day (10 May 2021 12:58)  omeprazole 40 mg oral delayed release capsule: 1 cap(s) orally once a day (10 May 2021 12:58)  sertraline 100 mg oral tablet: 1 tab(s) orally once a day (10 May 2021 12:58)      ROS: All review of systems negative unless indicated otherwise below.                         PHYSICAL EXAM:    GENERAL: NAD  NECK: Supple, No JVD  NERVOUS SYSTEM:  Alert & Oriented X3, non focal neuro exam.   CHEST/LUNG: clear lungs, No rales, rhonchi, wheezing, or rubs  HEART: Regular rate and rhythm; s1 and s2 auscultated, No murmurs, rubs, or gallops  ABDOMEN: Soft, Nontender, Nondistended; Bowel sounds present and normoactive.   EXTREMITIES:  2+ Peripheral Pulses, No clubbing, cyanosis, or edema  SKIN: Left elbow with large laceration which is with profuse bleeding, laceration is superficial. pressure pressing is intact.   CATH SITE: Right brachial site benign s/p cath.  No bleeding, no ecchymosis, no hematoma. Extremity Warm to touch, with palpable distal pulses, and brisk capillary refill.      Vital Signs Last 24 Hrs  T(C): 36.7 (12 May 2021 10:06), Max: 36.8 (12 May 2021 05:17)  T(F): 98.1 (12 May 2021 10:06), Max: 98.3 (12 May 2021 05:17)  HR: 59 (12 May 2021 10:06) (59 - 91)  BP: 116/67 (12 May 2021 10:06) (114/81 - 166/79)  BP(mean): --  RR: 17 (12 May 2021 10:06) (16 - 24)  SpO2: 95% (12 May 2021 10:06) (92% - 95%)                                                   DAILY WEIGHTS - 48 HOUR TREND     Daily Weight in k.4 (12 May 2021 05:20), Weight in k (11 May 2021 04:45)                             INTAKE AND OUTPUT - 48 HOUR TREND     21 @ 07:01  -  21 @ 07:00  --------------------------------------------------------  IN:  Total IN: 0 mL    OUT:    Voided (mL): 700 mL  Total OUT: 700 mL    Total NET: -700 mL                                                           LAB RESULTS                 COMPLETE BLOOD COUNT( 12 May 2021 07:19 )                            9.8 g/dL<L>  6.80 K/uL )---------------( 208 K/uL                        31.6 %<L>      Automated Differential     Auto Basophil # - X      Auto Basophil % - X      Auto Eosinophil # - X      Auto Eosinophil % - X      Auto Immature Granulocyte # - X      Auto Immature Granulocyte % - X      Auto Lymphocyte # - X      Auto Lymphocyte % - X      Auto Monocyte # - X      Auto Monocyte % - X      Auto Neutrophil # - X      Auto Neutrophil % - X                                      CHEMISTRY                 Basic Metabolic Panel (21 @ 07:19)    141  |  98  |  63.0<H>  ----------------------------<  204<H>  3.8   |  27.0  |  2.66<H>    Ca    8.7      12 May 2021 07:19  Phos  2.7     05-10  Mg     1.6     05-12                    Liver Functions (21 @ 08:39))  TPro  6.9  /  Alb  3.8  /  TBili  0.6  /  DBili  x   /  AST  22  /  ALT  15  /  AlkPhos  143     PT/INR/PTT ( 11 May 2021 06:09 )                        :                       :      17.5         :       35.1                  .        .                   .              .           .       1.54        .                                                                 Cardiac Enzymes   ( 11 May 2021 06:10 )  Troponin T  X    ,  CPK  X    , CKMB  X    , BNP 56029<H>    , ( 09 May 2021 11:25 )  Troponin T  X    ,  CPK  X    , CKMB  X    , BNP 58418<H>    , ( 09 May 2021 06:06 )  Troponin T  X    ,  CPK  X    , CKMB  X    , BNP 67687<H>                                       Current Admission Active Medications    aspirin enteric coated 81 milliGRAM(s) Oral daily  BACItracin   Ointment 1 Application(s) Topical two times a day  dextrose 40% Gel 15 Gram(s) Oral once  dextrose 5%. 1000 milliLiter(s) (50 mL/Hr) IV Continuous <Continuous>  dextrose 5%. 1000 milliLiter(s) (100 mL/Hr) IV Continuous <Continuous>  dextrose 50% Injectable 25 Gram(s) IV Push once  dextrose 50% Injectable 12.5 Gram(s) IV Push once  dextrose 50% Injectable 25 Gram(s) IV Push once  glucagon  Injectable 1 milliGRAM(s) IntraMuscular once  hydrALAZINE 50 milliGRAM(s) Oral two times a day  insulin glargine Injectable (LANTUS) 7 Unit(s) SubCutaneous two times a day  insulin lispro (ADMELOG) corrective regimen sliding scale   SubCutaneous three times a day before meals  isosorbide   mononitrate ER Tablet (IMDUR) 60 milliGRAM(s) Oral daily  metoprolol tartrate 50 milliGRAM(s) Oral two times a day  pantoprazole    Tablet 40 milliGRAM(s) Oral before breakfast  sertraline 100 milliGRAM(s) Oral daily  sodium chloride 0.9%. 1000 milliLiter(s) (100 mL/Hr) IV Continuous <Continuous>  tamsulosin 0.4 milliGRAM(s) Oral at bedtime       CARDIOLOGY RESULTS: Official Report/Preliminary Verbal Reports  < from: TTE Echo Complete w/ Contrast w/ Doppler (21 @ 08:59) >  Summary:   1. Technically difficult study.   2. Endocardial visualization was enhanced with intravenous echo contrast.   3. Left ventricularejection fraction, by visual estimation, is 35 to 40%.   4. Paradoxical septal motion with moderately decreased segmental left ventricular systolic function.   5. Severe hypokinesis of the basal to mid segements, the apex and distal segments are preserved.   6. The mitral in-flow pattern reveals no discernable A-wave, therefore no comment on diastolic function can be made.   7. Severely enlarged right ventricle with severely reduced systolic function, estimated PASP least 45 mmHg mild to moderately elevated.   8. Right atrial enlargement.   9. Moderately enlarged left atrium.  10. Mild mitral annular calcification.  11. Trace mitral valve regurgitation.  12. Thickening and calcification of the anterior and posterior mitral valve leaflets.  13. Mild-moderate tricuspid regurgitation.  14. Sclerotic aortic valve with decreased opening.  15. There is mild aortic root calcification.  16. There is no evidence of pericardial effusion.  17. No prior study available for comparison.    Gamal Mirza DO Electronically signed on 2021 at 11:21:05 AM    < end of copied text >    < from: Cardiac Cath Lab - Adult (21 @ 16:42) >  INTERVENTIONAL IMPRESSIONS: Moderate Elevation of Right Heart Pressures:  RA=15 mmHg; RV=61/18 mmHg; PCWP=23 mmHg  Moderate Pulmonary Hypertension=62/22 mmHg  Reduced CO=4.8 L/min and CI=2.06 L/min/m2  Prepared and signed by  Mickey Pressley MD    < end of copied text >                            CARDIOLOGY REVIEW: Personally visualized and reviewed  Telemetry:  SB 50s

## 2021-05-13 LAB
ANION GAP SERPL CALC-SCNC: 17 MMOL/L — SIGNIFICANT CHANGE UP (ref 5–17)
APPEARANCE UR: CLEAR — SIGNIFICANT CHANGE UP
BACTERIA # UR AUTO: ABNORMAL
BILIRUB UR-MCNC: NEGATIVE — SIGNIFICANT CHANGE UP
BLD GP AB SCN SERPL QL: SIGNIFICANT CHANGE UP
BUN SERPL-MCNC: 75 MG/DL — HIGH (ref 8–20)
CALCIUM SERPL-MCNC: 8.8 MG/DL — SIGNIFICANT CHANGE UP (ref 8.4–10.5)
CALCIUM SERPL-MCNC: 8.9 MG/DL — SIGNIFICANT CHANGE UP (ref 8.6–10.2)
CHLORIDE SERPL-SCNC: 98 MMOL/L — SIGNIFICANT CHANGE UP (ref 98–107)
CO2 SERPL-SCNC: 25 MMOL/L — SIGNIFICANT CHANGE UP (ref 22–29)
COLOR SPEC: YELLOW — SIGNIFICANT CHANGE UP
CREAT ?TM UR-MCNC: 50 MG/DL — SIGNIFICANT CHANGE UP
CREAT SERPL-MCNC: 2.66 MG/DL — HIGH (ref 0.5–1.3)
DIFF PNL FLD: NEGATIVE — SIGNIFICANT CHANGE UP
EPI CELLS # UR: SIGNIFICANT CHANGE UP
FERRITIN SERPL-MCNC: 280 NG/ML — SIGNIFICANT CHANGE UP (ref 30–400)
GLUCOSE BLDC GLUCOMTR-MCNC: 149 MG/DL — HIGH (ref 70–99)
GLUCOSE BLDC GLUCOMTR-MCNC: 182 MG/DL — HIGH (ref 70–99)
GLUCOSE BLDC GLUCOMTR-MCNC: 217 MG/DL — HIGH (ref 70–99)
GLUCOSE BLDC GLUCOMTR-MCNC: 284 MG/DL — HIGH (ref 70–99)
GLUCOSE SERPL-MCNC: 171 MG/DL — HIGH (ref 70–99)
GLUCOSE UR QL: NEGATIVE MG/DL — SIGNIFICANT CHANGE UP
HCT VFR BLD CALC: 30 % — LOW (ref 39–50)
HGB BLD-MCNC: 9.4 G/DL — LOW (ref 13–17)
IRON SATN MFR SERPL: 10 % — LOW (ref 16–55)
IRON SATN MFR SERPL: 29 UG/DL — LOW (ref 59–158)
KETONES UR-MCNC: NEGATIVE — SIGNIFICANT CHANGE UP
LEUKOCYTE ESTERASE UR-ACNC: NEGATIVE — SIGNIFICANT CHANGE UP
MAGNESIUM SERPL-MCNC: 1.6 MG/DL — SIGNIFICANT CHANGE UP (ref 1.6–2.6)
NITRITE UR-MCNC: NEGATIVE — SIGNIFICANT CHANGE UP
PH UR: 6 — SIGNIFICANT CHANGE UP (ref 5–8)
PHOSPHATE SERPL-MCNC: 4.5 MG/DL — SIGNIFICANT CHANGE UP (ref 2.4–4.7)
POTASSIUM SERPL-MCNC: 4 MMOL/L — SIGNIFICANT CHANGE UP (ref 3.5–5.3)
POTASSIUM SERPL-SCNC: 4 MMOL/L — SIGNIFICANT CHANGE UP (ref 3.5–5.3)
PROT ?TM UR-MCNC: 18 MG/DL — HIGH (ref 0–12)
PROT UR-MCNC: 15 MG/DL
PROT/CREAT UR-RTO: 0.4 RATIO — HIGH
RBC CASTS # UR COMP ASSIST: SIGNIFICANT CHANGE UP /HPF (ref 0–4)
SODIUM SERPL-SCNC: 140 MMOL/L — SIGNIFICANT CHANGE UP (ref 135–145)
SP GR SPEC: 1.01 — SIGNIFICANT CHANGE UP (ref 1.01–1.02)
TIBC SERPL-MCNC: 287 UG/DL — SIGNIFICANT CHANGE UP (ref 220–430)
TRANSFERRIN SERPL-MCNC: 201 MG/DL — SIGNIFICANT CHANGE UP (ref 180–329)
UROBILINOGEN FLD QL: NEGATIVE MG/DL — SIGNIFICANT CHANGE UP
WBC UR QL: SIGNIFICANT CHANGE UP

## 2021-05-13 PROCEDURE — 99221 1ST HOSP IP/OBS SF/LOW 40: CPT

## 2021-05-13 PROCEDURE — 99223 1ST HOSP IP/OBS HIGH 75: CPT

## 2021-05-13 PROCEDURE — 99233 SBSQ HOSP IP/OBS HIGH 50: CPT

## 2021-05-13 PROCEDURE — 76770 US EXAM ABDO BACK WALL COMP: CPT | Mod: 26

## 2021-05-13 RX ORDER — ACETAMINOPHEN 500 MG
650 TABLET ORAL ONCE
Refills: 0 | Status: COMPLETED | OUTPATIENT
Start: 2021-05-13 | End: 2021-05-13

## 2021-05-13 RX ORDER — INSULIN LISPRO 100/ML
3 VIAL (ML) SUBCUTANEOUS ONCE
Refills: 0 | Status: COMPLETED | OUTPATIENT
Start: 2021-05-13 | End: 2021-05-13

## 2021-05-13 RX ORDER — MAGNESIUM OXIDE 400 MG ORAL TABLET 241.3 MG
400 TABLET ORAL
Refills: 0 | Status: COMPLETED | OUTPATIENT
Start: 2021-05-13 | End: 2021-05-14

## 2021-05-13 RX ORDER — ERYTHROPOIETIN 10000 [IU]/ML
10000 INJECTION, SOLUTION INTRAVENOUS; SUBCUTANEOUS
Refills: 0 | Status: DISCONTINUED | OUTPATIENT
Start: 2021-05-13 | End: 2021-05-14

## 2021-05-13 RX ADMIN — Medication 1: at 08:29

## 2021-05-13 RX ADMIN — MAGNESIUM OXIDE 400 MG ORAL TABLET 400 MILLIGRAM(S): 241.3 TABLET ORAL at 17:10

## 2021-05-13 RX ADMIN — ISOSORBIDE MONONITRATE 60 MILLIGRAM(S): 60 TABLET, EXTENDED RELEASE ORAL at 13:26

## 2021-05-13 RX ADMIN — MAGNESIUM OXIDE 400 MG ORAL TABLET 400 MILLIGRAM(S): 241.3 TABLET ORAL at 13:26

## 2021-05-13 RX ADMIN — PANTOPRAZOLE SODIUM 40 MILLIGRAM(S): 20 TABLET, DELAYED RELEASE ORAL at 05:45

## 2021-05-13 RX ADMIN — Medication 50 MILLIGRAM(S): at 17:10

## 2021-05-13 RX ADMIN — Medication 2: at 17:10

## 2021-05-13 RX ADMIN — Medication 50 MILLIGRAM(S): at 05:45

## 2021-05-13 RX ADMIN — INSULIN GLARGINE 7 UNIT(S): 100 INJECTION, SOLUTION SUBCUTANEOUS at 08:29

## 2021-05-13 RX ADMIN — TAMSULOSIN HYDROCHLORIDE 0.4 MILLIGRAM(S): 0.4 CAPSULE ORAL at 21:44

## 2021-05-13 RX ADMIN — Medication 3 UNIT(S): at 21:44

## 2021-05-13 RX ADMIN — Medication 650 MILLIGRAM(S): at 20:17

## 2021-05-13 RX ADMIN — INSULIN GLARGINE 7 UNIT(S): 100 INJECTION, SOLUTION SUBCUTANEOUS at 21:44

## 2021-05-13 RX ADMIN — Medication 40 MILLIGRAM(S): at 05:45

## 2021-05-13 RX ADMIN — SERTRALINE 100 MILLIGRAM(S): 25 TABLET, FILM COATED ORAL at 13:26

## 2021-05-13 RX ADMIN — ERYTHROPOIETIN 10000 UNIT(S): 10000 INJECTION, SOLUTION INTRAVENOUS; SUBCUTANEOUS at 17:10

## 2021-05-13 RX ADMIN — Medication 650 MILLIGRAM(S): at 20:45

## 2021-05-13 NOTE — CONSULT NOTE ADULT - ATTENDING COMMENTS
The patient was seen and examined  Details per the resident's H&P  Direct pressure for bleeding  No reason to hold AC  Re-consult as needed

## 2021-05-13 NOTE — PROGRESS NOTE ADULT - ASSESSMENT
DELAYING PROGRESSION OF CKD    The following section describes cox recommendations and  guidance for people with CKD with respect to delaying  progression of CKD. General lifestyle recommendations are  provided as well as caveats given for those with diabetes.  Cardiovascular risk reduction including management of  hypertension, dyslipidemia, and hyperuricemia is further  addressed. Unless otherwise stated, the guidance is intended  to apply to adults with CKD.  For the practicing clinician, ideally working with a team of  health-care professionals, it is important to institute general  lifestyle modification practices in people with CKD so that  they may gain the benefit of these in addition to more  kidney-specific strategies. Often these general measures are  overlooked or disregarded in people with CKD, thus their  utility is underscored here.  3.1 PREVENTION OF CKD PROGRESSION  The management of progression of CKD is aimed at  addressing a multiplicity of factors known to be associated  with progression. There are general measures which have  been shown to address cardiovascular health and CKD  together, or each separately. Addressing CVD risk factors may  indirectly and directly impact CKD progression. Strategies  include general lifestyle measures which improve cardiovascular  health, BP control, and interruption of the RAAS.  In addition, control of other metabolic parameters such as  blood sugar, uric acid, acidosis, and dyslipidemia may also be  important. This section deals with management of BP, RAAS  interruption, glycemic control and dietary/lifestyle manipulations  which have been examined in the context of  delaying progression of CKD.  BP and RAAS interruption  The following statements are excerpted and where  necessary, condensed, from the KDIGO Clinical Practice  Guideline for the Management of Blood Pressure in CKD.  10  3.1.1: Individualize BP targets and agents according to age,  coexistent cardiovascular disease and other comorbidities,  risk of progression of CKD, presence or absence  of retinopathy (in CKD patients with diabetes), and  tolerance of treatment as described in the KDIGO  2012 Blood Pressure Guideline. (Not Graded)  3.1.2: Inquire about postural dizziness and check for  postural hypotension regularly when treating CKD  patients with BP-lowering drugs. (Not Graded)  3.1.3: Tailor BP treatment regimens in elderly patients with  CKD by carefully considering age, comorbidities and  other therapies, with gradual escalation of treatment  and close attention to adverse events related to BP  treatment, including electrolyte disorders, acute  deterioration in kidney function, orthostatic hypotension  and drug side effects. (Not Graded)  3.1.4: We recommend that in both diabetic and nondiabetic  adults with CKD and urine albumin  excretion o30 mg/24 hours (or equivalent*) whose  office BP is consistently 4140 mm Hg systolic or  490 mm Hg diastolic be treated with BP-lowering  drugs to maintain a BP that is consistently r140 mm  Hg systolic and r90 mm Hg diastolic. (1B)  3.1.5: We suggest that in both diabetic and non-diabetic  adults with CKD and with urine albumin excretion  of Z30 mg/24 hours (or equivalent*) whose  office BP is consistently 4130 mm Hg systolic or  480 mm Hg diastolic be treated with BP-lowering  drugs to maintain a BP that is consistently r130 mm  Hg systolic and r80 mm Hg diastolic. (2D)  3.1.6: We suggest that an ARB or ACE-I be used in diabetic  adults with CKD and urine albumin excretion   mg/24 hours (or equivalent*). (2D)  3.1.7: We recommend that an ARB or ACE-I be used in  both diabetic and non-diabetic adults with CKD  and urine albumin excretion 4300 mg/24 hours  (or equivalent*). (1B)  3.1.8: There is insufficient evidence to recommend combining  an ACE-I with ARBs to prevent progression  of CKD. (Not Graded)  3.1.9: We recommend that in children with CKD, BPlowering  treatment is started when BP is consistently  above the 90th percentile for age, sex, and height. (1C)  3.1.10: We suggest that in children with CKD (particularly  those with proteinuria), BP is lowered to consistently  achieve systolic and diastolic readings less than or  equal to the 50th percentile for age, sex, and height,  unless achieving these targets is limited by signs or  symptoms of hypotension. (2D)  http://www.kidney-international.org chapter 3  & 2013 KDIGO  *Approximate equivalents for albumin excretion rate per 24 hours—  expressed as protein excretion rate per 24 hours, albumin-to-creatinine  ratio, protein-to-creatinine ratio, and protein reagent strip results— are given  in Table 7, Chapter 1  Kidney International Supplements (2013) 3, 73–90 73  3.1.11: We suggest that an ARB or ACE-I be used in children  with CKD in whom treatment with BP-lowering  drugs is indicated, irrespective of the level of proteinuria.  (2D): DELAYING PROGRESSION OF CKD    The following section describes cox recommendations and  guidance for people with CKD with respect to delaying  progression of CKD. General lifestyle recommendations are  provided as well as caveats given for those with diabetes.  Cardiovascular risk reduction including management of  hypertension, dyslipidemia, and hyperuricemia is further  addressed. Unless otherwise stated, the guidance is intended  to apply to adults with CKD.  For the practicing clinician, ideally working with a team of  health-care professionals, it is important to institute general  lifestyle modification practices in people with CKD so that  they may gain the benefit of these in addition to more  kidney-specific strategies. Often these general measures are  overlooked or disregarded in people with CKD, thus their  utility is underscored here.  3.1 PREVENTION OF CKD PROGRESSION  The management of progression of CKD is aimed at  addressing a multiplicity of factors known to be associated  with progression. There are general measures which have  been shown to address cardiovascular health and CKD  together, or each separately. Addressing CVD risk factors may  indirectly and directly impact CKD progression. Strategies  include general lifestyle measures which improve cardiovascular  health, BP control, and interruption of the RAAS.  In addition, control of other metabolic parameters such as  blood sugar, uric acid, acidosis, and dyslipidemia may also be  important. This section deals with management of BP, RAAS  interruption, glycemic control and dietary/lifestyle manipulations  which have been examined in the context of  delaying progression of CKD.  BP and RAAS interruption  The following statements are excerpted and where  necessary, condensed, from the KDIGO Clinical Practice  Guideline for the Management of Blood Pressure in CKD.  10  3.1.1: Individualize BP targets and agents according to age,  coexistent cardiovascular disease and other comorbidities,  risk of progression of CKD, presence or absence  of retinopathy (in CKD patients with diabetes), and  tolerance of treatment as described in the KDIGO  2012 Blood Pressure Guideline. (Not Graded)  3.1.2: Inquire about postural dizziness and check for  postural hypotension regularly when treating CKD  patients with BP-lowering drugs. (Not Graded)  3.1.3: Tailor BP treatment regimens in elderly patients with  CKD by carefully considering age, comorbidities and  other therapies, with gradual escalation of treatment  and close attention to adverse events related to BP  treatment, including electrolyte disorders, acute  deterioration in kidney function, orthostatic hypotension  and drug side effects. (Not Graded)  3.1.4: We recommend that in both diabetic and nondiabetic  adults with CKD and urine albumin  excretion o30 mg/24 hours (or equivalent*) whose  office BP is consistently 4140 mm Hg systolic or  490 mm Hg diastolic be treated with BP-lowering  drugs to maintain a BP that is consistently r140 mm  Hg systolic and r90 mm Hg diastolic. (1B)  3.1.5: We suggest that in both diabetic and non-diabetic  adults with CKD and with urine albumin excretion  of Z30 mg/24 hours (or equivalent*) whose  office BP is consistently 4130 mm Hg systolic or  480 mm Hg diastolic be treated with BP-lowering  drugs to maintain a BP that is consistently r130 mm  Hg systolic and r80 mm Hg diastolic. (2D)  3.1.6: We suggest that an ARB or ACE-I be used in diabetic  adults with CKD and urine albumin excretion   mg/24 hours (or equivalent*). (2D)  3.1.7: We recommend that an ARB or ACE-I be used in  both diabetic and non-diabetic adults with CKD  and urine albumin excretion 4300 mg/24 hours  (or equivalent*). (1B)  3.1.8: There is insufficient evidence to recommend combining  an ACE-I with ARBs to prevent progression  of CKD. (Not Graded)  3.1.9: We recommend that in children with CKD, BPlowering  treatment is started when BP is consistently  above the 90th percentile for age, sex, and height. (1C)  3.1.10: We suggest that in children with CKD (particularly  those with proteinuria), BP is lowered to consistently  achieve systolic and diastolic readings less than or  equal to the 50th percentile for age, sex, and height,  unless achieving these targets is limited by signs or  symptoms of hypotension. (2D)  http://www.kidney-international.org chapter 3  & 2013 KDIGO  *Approximate equivalents for albumin excretion rate per 24 hours—  expressed as protein excretion rate per 24 hours, albumin-to-creatinine  ratio, protein-to-creatinine ratio, and protein reagent strip results— are given  in Table 7, Chapter 1  Kidney International Supplements (2013) 3, 73–90 73  3.1.11: We suggest that an ARB or ACE-I be used in children  with CKD in whom treatment with BP-lowering  drugs is indicated, irrespective of the level of proteinuria.  (2D):      Being Diuresed, D/W Dr. Kinney,    Full consult to Follow, after seen,

## 2021-05-13 NOTE — ADVANCED PRACTICE NURSE CONSULT - REASON FOR CONSULT
**Late entry** WORN consulted for skin tear to left elbow on 5/12/21 with excessive bleeding.  Patient noted to be on anticoagulant medications.      Former smoker

## 2021-05-13 NOTE — PROGRESS NOTE ADULT - ASSESSMENT
The patient is a 80 year old male with a past medical history of CAD status post CABG, CHF unknown baseline EF, afib on eliquis, hypertension and diabetes mellitus type 2 who presented to the ER after a fall at home. Admitted for afib with rvr and acute decompensated CHF. Echocardiogram showed EF of 35-40% with severe RV dysfunction. Was admitted from CDU to medicine. Started on IV diuresis with clinical improvement. Noted to have renal insufficiency with unknown baseline. Status post nSt which showed no acute ischemia. Dyspnea was suspected secondary to Afib. EP consulted for MARLO/CV and ILR placement.     Assessment/Plan:    1. Acute decompensated CHF: Echocardiogram reviewed with EF of 35-40%  Suspected to be secondary to AFIb  Clinically improved  Lasix changed to PO torsemide  Status post NST with no acute ischemia; no plans for cardiac cath given renal insuffiencey  Dyspnea multifactorial, suspected secondary to Afib with rvr, pHTN, HF exac      2. KEN with CKD: Baseline unknown, worsening  suspect stage 3  Continue to monitor BMP, cr now up to 2.6  not seen by any nephro outpatient  poss related to diuretic - discussed w/ cardio, will cont current dose at their request but consider lowering if progressively worsening  nephro eval in am    3. Afib with RVR: sp MARLO/CV 5/11 - well tolerated  sp ILR placement 5/11   Metoprolol increased to 100mg Q12 hours, dec back to 50mg due to bradycardia     4. Hypokalemia and hypomagnesemia resolved    5. Diabetes mellitus type 2: continue Lantus with HSS  Monitor BSL     6. LT elbow laceration, sec to fall, stable  excess bleeding sec to AC use - hold eliquis until 5/14 w/ persmission given from cardio    7. Anemia, unknown if chronic  monitor for progressive worsening due to bleeding lac  transfuse hgb < 7 or symptom onset  outpt w/u    8. Mod Pulmonary Hypertenstion, new dx  confirmed on RHC  likely type 2 sec to cardiac etiology  management as above for HF    Discharge home in 24-48 hours pending improvement in renal status   The patient is a 80 year old male with a past medical history of CAD status post CABG, CHF unknown baseline EF, afib on eliquis, hypertension and diabetes mellitus type 2 who presented to the ER after a fall at home. Admitted for afib with rvr and acute decompensated CHF. Echocardiogram showed EF of 35-40% with severe RV dysfunction. Was admitted from CDU to medicine. Started on IV diuresis with clinical improvement. Noted to have renal insufficiency with unknown baseline. Status post nSt which showed no acute ischemia. Dyspnea was suspected secondary to Afib. EP consulted for MARLO/CV and ILR placement.     Assessment/Plan:    1. Acute decompensated CHF: Echocardiogram reviewed with EF of 35-40%, resolved  Suspected to be secondary to AFIb  Clinically improved  Lasix changed to PO torsemide  Status post NST with no acute ischemia; no plans for cardiac cath given renal insuffiencey  Dyspnea multifactorial, suspected secondary to Afib with rvr, pHTN, HF exac      2. KEN with CKD: Baseline unknown, stable  suspect stage 3  Continue to monitor BMP, cr now up to 2.6  not seen by any nephro outpatient  discussed w/ cardio, will cont current dose at their request but consider lowering if progressively worsening  nephro eval w/ ss nephro appreciated  renal us 5/13 w/poss chronic bladder obstruction but pt w/o complaints of urinary retention, otherwise renal parenchyma wnl  lab ckd w/u per nephro - need close outpt fu    3. Afib with RVR: sp MARLO/CV 5/11 - well tolerated  sp ILR placement 5/11   Metoprolol increased to 100mg Q12 hours, dec back to 50mg due to bradycardia     4. Hypokalemia and hypomagnesemia resolved, hypomag recurrently low normal  mg supplemented 5/13    5. Diabetes mellitus type 2: continue Lantus with HSS  Monitor BSL     6. LT elbow laceration, sec to fall, complicated by persistent bleeding  excess bleeding sec to AC use - hold eliquis , cardio on board, last dose 5/11 in PM  asa held 5/13 - last dose 5/13 in am  wound care + sx assessments appreciated - no indication for cautery/stiching, rec pressure dressings be applied PRN  surgicell obtained from OR - will dress personally    7. Anemia, unknown if chronic, stable  monitor for progressive worsening due to bleeding lac  transfuse hgb < 7 or symptom onset - type + screen up to date as of 5/13  epo per nephro  outpt w/u    8. Mod Pulmonary Hypertenstion, new dx  confirmed on RHC  likely type 2 sec to cardiac etiology  management as above for HF    Discharge pending improvement in blood loss from elbow lac

## 2021-05-13 NOTE — CONSULT NOTE ADULT - ASSESSMENT
80 M with afib anticoagulated on Eliquis s/p fall admitted to Medicine for syncopal workup and questionable SDH on CT head. CT cspine and remainder of exam negative for any traumatic injury. Trauma Surgery saw him on admission and cleared him from the surgical standpoint.   Surgery was reconsulted today by Cardiology for continuous bleeding of the LUE elbow abrasion.     Recommendations:     An abrasion of the skin can't cause arterial bleeding. Hence, being a venous bleeding, pressure applied during a prolonged time with enough constant pressure should suffice to stop the oozing from the hypodermis.   We recommend against lifting the dressing every few hours, otherwise it will happened as stated: the bleeding will start again due to removal of the fibril layer created to coagulate the venous bleeding.   The dressing was already placed by Hospitalist with Surgicel, non adherent dressing and gauze on top on an ace wrap. We agree with this dressing.     Venous bleeding from a skin abrasion does not overcome the risk of not restarting AC on a patient with A fib. Although, this decision belong to the primary team and to the cardiology team.     Discussed with Dr. Hogue whom agrees.     Thank you for the opportunity of taking care of this consult. Any questions please call.

## 2021-05-13 NOTE — CHART NOTE - NSCHARTNOTEFT_GEN_A_CORE
Called by RN after pt requesting meds for generalized pain.  Ordered Tylenol 650mg pox1.  Monitor and reassess prn. Called by RN after pt requesting meds for generalized pain.  Ordered Tylenol 650mg pox1.  Monitor and reassess prn.    22:00  Called by RN with pt's FS of 284; Received 7U of Lantus.  Ordered Admelog 3U SQ x1 now.  Monitor and escalate prn.

## 2021-05-13 NOTE — ADVANCED PRACTICE NURSE CONSULT - ASSESSMENT
Patient with skin tear with loss of epidermal flap.  Adaptic, gauze, abd pad, kerlix and ace wrap applied. Dressing change completed with RN.  Patient educated to elevate arm and apply pressure.  Discussion with RN on anticoagulant medication and continued bleeding.  RN to discuss medication with prescribing provider.

## 2021-05-13 NOTE — PROGRESS NOTE ADULT - ASSESSMENT
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 Patient states that he missed a step, fell backwards, and landed on his right side. Patient states that he has been having some dizziness, felt like he might pass out, as well as back pain since the fall. Patient's wife states he refused to come to the ER initially, but the symptoms continued to worsen. Patient just flew up from [place] as well, and does not have a Cardiologist following in [place]. Patient also noted for the last few months he has been having continued shortness of breath and leg swelling, and was recently started on lasix in addition to metolazone. Patient denies any fevers, chills, CP, abdominal pain, diarrhea, or vision changes.   Troponin negative x 1  BNP 77201 ---> [id number]    Acute Decompensated HFrEF with Severely Reduced RV function, CAD/CABG  -  EF 35-40% with severely reduced RV function, no ischemia on stress test and advanced CKD not candidate for LHC  - s/p RHC - RA 15, RV 61/18, PCWP 23, CO 4.,8, CI 2.06  - home meds of Lasix 40mg + metolazone daily switched to Torsemide 40mg given still with elevated PCWP 23 and G. 2 pHTN on RHC  - off lisinopril as not candidate for ACE/ARB/ARNI  - continue hydralazine 50mg BID and Imdur 60mg, change to Toprol 100mg  - continue ASA 81mg and atorvastatin 40mg  - no need Mg supplement at home as frequent loose stool     pAtrial Fibrillation, flutter  - s/p MARLO/CV but revereted back to pAfib  - s/p ILR, outpatient EPS follow up for elective Afib ablation  - continue lower metoprolol dose as episodes of sinus didi  - resume Eliquis when L-elbow bleeding is controlled, future Watchman eval given recurrent falls    HTN  -off lisionopril and amlodipine  -monitor on hydralazine, Imdur and metoprolol, BP goal <130/80 can further uptitrate hydralazine dose    KEN on CKD  -unknown baseline Cr. but admission fo 2.1, would need to tolerate some degree of azotemia given with biventricular HF and pHTN  -continue Torsemide 40mg dialy  -nephrology eval pending    DM2  -consider addition of SGLT-2 inhibitor if eEFR >30     Left Elbow Laceration due to mechanical fall  - skin tear with subQ exposure and 3 spots with active blood oozing despite pressure dressing x48hrs and Eliquis remains on hold  - need surgery eval. 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 Patient states that he missed a step, fell backwards, and landed on his right side. Patient states that he has been having some dizziness, felt like he might pass out, as well as back pain since the fall. Patient's wife states he refused to come to the ER initially, but the symptoms continued to worsen. Patient just flew up from [place] as well, and does not have a Cardiologist following in [place]. Patient also noted for the last few months he has been having continued shortness of breath and leg swelling, and was recently started on lasix in addition to metolazone. Patient denies any fevers, chills, CP, abdominal pain, diarrhea, or vision changes.   Troponin negative x 1  BNP [id number] ---> [id number]    Acute Decompensated HFrEF with Severely Reduced RV function, CAD/CABG  -  EF 35-40% with severely reduced RV function, no ischemia on stress test and advanced CKD not candidate for LHC  - s/p RHC - RA 15, RV 61/18, PCWP 23, CO 4.,8, CI 2.06  - home meds of Lasix 40mg + metolazone daily switched to Torsemide 40mg given still with elevated PCWP 23 and G. 2 pHTN on RHC, consider elective CardioMems as outpatient  - off lisinopril as not candidate for ACE/ARB/ARNI  - continue hydralazine 50mg BID and Imdur 60mg, change to Toprol 100mg  - continue ASA 81mg and atorvastatin 40mg  - no need Mg supplement at home as frequent loose stool     pAtrial Fibrillation, flutter  - s/p MARLO/CV but revereted back to pAfib  - s/p ILR, outpatient EPS follow up for elective Afib ablation  - continue lower metoprolol dose as episodes of sinus didi  - resume Eliquis when L-elbow bleeding is controlled, future Watchman eval given recurrent falls    HTN  -off lisionopril and amlodipine  -monitor on hydralazine, Imdur and metoprolol, BP goal <130/80 can further uptitrate hydralazine dose    KEN on CKD  -unknown baseline Cr. but admission fo 2.1, would need to tolerate some degree of azotemia given with biventricular HF and pHTN  -continue Torsemide 40mg dialy  -nephrology eval pending    DM2  -consider addition of SGLT-2 inhibitor if eEFR >30     Left Elbow Laceration due to mechanical fall  - skin tear with subQ exposure and 3 spots with active blood oozing despite pressure dressing x48hrs and Eliquis remains on hold  - need surgery eval. and wound care      Gamal Duke DO, [facility]  Faculty Non-Invasive Cardiologist  [phone]

## 2021-05-13 NOTE — PROGRESS NOTE ADULT - SUBJECTIVE AND OBJECTIVE BOX
[place] [facility]                                                               Office: [address] [address]                                                              Telephone: [phone]. Fax:[phone]                                                                             PROGRESS NOTE  Reason for follow up: HFrEF, pAfib/flutter  Overnight: No new events.   Update: L-elbow wound remains unresolved despite been off Eliquis >24hrs, pressure dressing soaked through from yesterday and still with active oozing of bright red blood, Hb slight downtrend as well. Cr. stable from yesterday, denies shortness of breath.        Review of symptoms:   Cardiac:  No chest pain. No dyspnea. No palpitations.  Respiratory: No cough. No dyspnea  Gastrointestinal: No diarrhea. No abdominal pain. No bleeding.     Past medical history: No updates.   	  Vital Signs Last 24 Hrs  T(C): 37.2 (05-13-21 @ 05:43), Max: 37.2 (05-13-21 @ 05:43)  T(F): 99 (05-13-21 @ 05:43), Max: 99 (05-13-21 @ 05:43)  HR: 68 (05-13-21 @ 05:43) (61 - 68)  BP: 142/77 (05-13-21 @ 05:43) (104/58 - 142/77)  BP(mean): --  RR: 16 (05-13-21 @ 05:43) (16 - 16)  SpO2: 94% (05-13-21 @ 05:43) (91% - 94%)  I&O's Summary    12 May 2021 07:01  -  13 May 2021 07:00  --------------------------------------------------------  IN: 1240 mL / OUT: 1400 mL / NET: -160 mL          PHYSICAL EXAM:  Appearance: Comfortable. No acute distress  HEENT:  Head and neck: Atraumatic. Normocephalic.  Normal oral mucosa, PERRL, Neck is supple. No JVD, No carotid bruit.   Neurologic: A&Ox 3, no focal deficits. EOMI, Cranial nerves are intact.  Lymphatic: No cervical lymphadenopathy  Cardiovascular: Normal S1 S2, No murmur, rubs/gallops. No JVD, No edema  Respiratory: Lungs clear to auscultation  Gastrointestinal:  Soft, Non-tender, + BS  Lower Extremities: No edema  Psychiatry: Patient is calm. No agitation. Mood & affect appropriate  Skin: L-elbow abrasion wound with active oozing of bright red blood      CURRENT MEDICATIONS:  MEDICATIONS  (STANDING):  dextrose 40% Gel 15 Gram(s) Oral once  dextrose 5%. 1000 milliLiter(s) (50 mL/Hr) IV Continuous <Continuous>  dextrose 5%. 1000 milliLiter(s) (100 mL/Hr) IV Continuous <Continuous>  dextrose 50% Injectable 25 Gram(s) IV Push once  dextrose 50% Injectable 12.5 Gram(s) IV Push once  dextrose 50% Injectable 25 Gram(s) IV Push once  epoetin nohemi-epbx (RETACRIT) Injectable 57493 Unit(s) SubCutaneous <User Schedule>  glucagon  Injectable 1 milliGRAM(s) IntraMuscular once  hydrALAZINE 50 milliGRAM(s) Oral two times a day  insulin glargine Injectable (LANTUS) 7 Unit(s) SubCutaneous two times a day  insulin lispro (ADMELOG) corrective regimen sliding scale   SubCutaneous three times a day before meals  isosorbide   mononitrate ER Tablet (IMDUR) 60 milliGRAM(s) Oral daily  magnesium oxide 400 milliGRAM(s) Oral three times a day with meals  metoprolol tartrate 50 milliGRAM(s) Oral two times a day  pantoprazole    Tablet 40 milliGRAM(s) Oral before breakfast  sertraline 100 milliGRAM(s) Oral daily  tamsulosin 0.4 milliGRAM(s) Oral at bedtime  torsemide 40 milliGRAM(s) Oral daily    MEDICATIONS  (PRN):      DIAGNOSTIC TESTING:  [ ] Echocardiogram:   < from: TTE Echo Complete w/ Contrast w/ Doppler (05.09.21 @ 08:59) >  Summary:   1. Technically difficult study.   2. Endocardial visualization was enhanced with intravenous echo contrast.   3. Left ventricularejection fraction, by visual estimation, is 35 to 40%.   4. Paradoxical septal motion with moderately decreased segmental left ventricular systolic function.   5. Severe hypokinesis of the basal to mid segements, the apex and distal segments are preserved.   6. The mitral in-flow pattern reveals no discernable A-wave, therefore no comment on diastolic function can be made.   7. Severely enlarged right ventricle with severely reduced systolic function, estimated PASP least 45 mmHg mild to moderately elevated.   8. Right atrial enlargement.   9. Moderately enlarged left atrium.  10. Mild mitral annular calcification.  11. Trace mitral valve regurgitation.  12. Thickening and calcification of the anterior and posterior mitral valve leaflets.  13. Mild-moderate tricuspid regurgitation.  14. Sclerotic aortic valve with decreased opening.  15. There is mild aortic root calcification.  16. There is no evidence of pericardial effusion.  17. No prior study available for comparison.    < end of copied text >    [ ]  Catheterization:  < from: Cardiac Cath Lab - Adult (05.11.21 @ 16:42) >  INTERVENTIONAL IMPRESSIONS: Moderate Elevation of Right Heart Pressures:  RA=15 mmHg; RV=61/18 mmHg; PCWP=23 mmHg  Moderate Pulmonary Hypertension=62/22 mmHg  Reduced CO=4.8 L/min and CI=2.06 L/min/m2    < end of copied text >    [ ] Stress Test:    < from: Nuclear Stress Test-Pharmacologic (05.10.21 @ 07:59) >  IMPRESSIONS:  * Myocardial Perfusion SPECT results are abnormal.  * There is a medium sized, moderate defect in basal to mid  inferior wall that is fixed, suggestive of infarct. No  evidence of ischemia.  * Post-stress resting myocardial perfusion gated SPECT  imaging was performed (LVEF = 41 %;LVEDV = 141 ml.)  * Dilated right ventricle noted.    < end of copied text >      Labs:                        9.4    x     )-----------( x        ( 13 May 2021 11:38 )             30.0     05-13    140  |  98  |  75.0<H>  ----------------------------<  171<H>  4.0   |  25.0  |  2.66<H>    Ca    8.9      13 May 2021 06:45  Phos  4.5     05-13  Mg     1.6     05-13       08 May 2021 18:28 Troponin 0.04 ng/mL / Creatine Kinase x     /  CKMB x     / CPK Mass Assay % x       08 May 2021 08:39 Troponin 0.04 ng/mL / Creatine Kinase 155 U/L /  CKMB 6.1 ng/mL / CPK Mass Assay % x          Serum Pro-Brain Natriuretic Peptide: 45551 pg/mL (05-11-21 @ 06:10)  Serum Pro-Brain Natriuretic Peptide: 96704 pg/mL (05-09-21 @ 11:25)  Serum Pro-Brain Natriuretic Peptide: 81950 pg/mL (05-09-21 @ 06:06)  Serum Pro-Brain Natriuretic Peptide: 43623 pg/mL (05-08-21 @ 08:39)      A1C with Estimated Average Glucose Result: 7.5 % (05-09-21 @ 06:06)      TELEMETRY: on on monitor                                                                     [place] [facility]                                                               Office: [address] [address]                                                              Telephone: [phone]. Fax:[phone]                                                                             PROGRESS NOTE  Reason for follow up: HFrEF, pAfib/flutter  Overnight: No new events.   Update: L-elbow bleeding wound remains unresolved despite been off Eliquis >24hrs, pressure dressing soaked through from yesterday and still with active oozing of bright red blood, Hb slight downtrend as well. Cr. stable from yesterday, denies shortness of breath.        Review of symptoms:   Cardiac:  No chest pain. No dyspnea. No palpitations.  Respiratory: No cough. No dyspnea  Gastrointestinal: No diarrhea. No abdominal pain. No bleeding.     Past medical history: No updates.   	  Vital Signs Last 24 Hrs  T(C): 37.2 (05-13-21 @ 05:43), Max: 37.2 (05-13-21 @ 05:43)  T(F): 99 (05-13-21 @ 05:43), Max: 99 (05-13-21 @ 05:43)  HR: 68 (05-13-21 @ 05:43) (61 - 68)  BP: 142/77 (05-13-21 @ 05:43) (104/58 - 142/77)  BP(mean): --  RR: 16 (05-13-21 @ 05:43) (16 - 16)  SpO2: 94% (05-13-21 @ 05:43) (91% - 94%)  I&O's Summary    12 May 2021 07:01  -  13 May 2021 07:00  --------------------------------------------------------  IN: 1240 mL / OUT: 1400 mL / NET: -160 mL          PHYSICAL EXAM:  Appearance: Comfortable. No acute distress  HEENT:  Head and neck: Atraumatic. Normocephalic.  Normal oral mucosa, PERRL, Neck is supple. No JVD, No carotid bruit.   Neurologic: A&Ox 3, no focal deficits. EOMI, Cranial nerves are intact.  Lymphatic: No cervical lymphadenopathy  Cardiovascular: Normal S1 S2, No murmur, rubs/gallops. No JVD, No edema  Respiratory: Lungs clear to auscultation  Gastrointestinal:  Soft, Non-tender, + BS  Lower Extremities: No edema  Psychiatry: Patient is calm. No agitation. Mood & affect appropriate  Skin: L-elbow abrasion wound with active oozing of bright red blood      CURRENT MEDICATIONS:  MEDICATIONS  (STANDING):  dextrose 40% Gel 15 Gram(s) Oral once  dextrose 5%. 1000 milliLiter(s) (50 mL/Hr) IV Continuous <Continuous>  dextrose 5%. 1000 milliLiter(s) (100 mL/Hr) IV Continuous <Continuous>  dextrose 50% Injectable 25 Gram(s) IV Push once  dextrose 50% Injectable 12.5 Gram(s) IV Push once  dextrose 50% Injectable 25 Gram(s) IV Push once  epoetin nohemi-epbx (RETACRIT) Injectable 64132 Unit(s) SubCutaneous <User Schedule>  glucagon  Injectable 1 milliGRAM(s) IntraMuscular once  hydrALAZINE 50 milliGRAM(s) Oral two times a day  insulin glargine Injectable (LANTUS) 7 Unit(s) SubCutaneous two times a day  insulin lispro (ADMELOG) corrective regimen sliding scale   SubCutaneous three times a day before meals  isosorbide   mononitrate ER Tablet (IMDUR) 60 milliGRAM(s) Oral daily  magnesium oxide 400 milliGRAM(s) Oral three times a day with meals  metoprolol tartrate 50 milliGRAM(s) Oral two times a day  pantoprazole    Tablet 40 milliGRAM(s) Oral before breakfast  sertraline 100 milliGRAM(s) Oral daily  tamsulosin 0.4 milliGRAM(s) Oral at bedtime  torsemide 40 milliGRAM(s) Oral daily    MEDICATIONS  (PRN):      DIAGNOSTIC TESTING:  [ ] Echocardiogram:   < from: TTE Echo Complete w/ Contrast w/ Doppler (05.09.21 @ 08:59) >  Summary:   1. Technically difficult study.   2. Endocardial visualization was enhanced with intravenous echo contrast.   3. Left ventricularejection fraction, by visual estimation, is 35 to 40%.   4. Paradoxical septal motion with moderately decreased segmental left ventricular systolic function.   5. Severe hypokinesis of the basal to mid segements, the apex and distal segments are preserved.   6. The mitral in-flow pattern reveals no discernable A-wave, therefore no comment on diastolic function can be made.   7. Severely enlarged right ventricle with severely reduced systolic function, estimated PASP least 45 mmHg mild to moderately elevated.   8. Right atrial enlargement.   9. Moderately enlarged left atrium.  10. Mild mitral annular calcification.  11. Trace mitral valve regurgitation.  12. Thickening and calcification of the anterior and posterior mitral valve leaflets.  13. Mild-moderate tricuspid regurgitation.  14. Sclerotic aortic valve with decreased opening.  15. There is mild aortic root calcification.  16. There is no evidence of pericardial effusion.  17. No prior study available for comparison.    < end of copied text >    [ ]  Catheterization:  < from: Cardiac Cath Lab - Adult (05.11.21 @ 16:42) >  INTERVENTIONAL IMPRESSIONS: Moderate Elevation of Right Heart Pressures:  RA=15 mmHg; RV=61/18 mmHg; PCWP=23 mmHg  Moderate Pulmonary Hypertension=62/22 mmHg  Reduced CO=4.8 L/min and CI=2.06 L/min/m2    < end of copied text >    [ ] Stress Test:    < from: Nuclear Stress Test-Pharmacologic (05.10.21 @ 07:59) >  IMPRESSIONS:  * Myocardial Perfusion SPECT results are abnormal.  * There is a medium sized, moderate defect in basal to mid  inferior wall that is fixed, suggestive of infarct. No  evidence of ischemia.  * Post-stress resting myocardial perfusion gated SPECT  imaging was performed (LVEF = 41 %;LVEDV = 141 ml.)  * Dilated right ventricle noted.    < end of copied text >      Labs:                        9.4    x     )-----------( x        ( 13 May 2021 11:38 )             30.0     05-13    140  |  98  |  75.0<H>  ----------------------------<  171<H>  4.0   |  25.0  |  2.66<H>    Ca    8.9      13 May 2021 06:45  Phos  4.5     05-13  Mg     1.6     05-13       08 May 2021 18:28 Troponin 0.04 ng/mL / Creatine Kinase x     /  CKMB x     / CPK Mass Assay % x       08 May 2021 08:39 Troponin 0.04 ng/mL / Creatine Kinase 155 U/L /  CKMB 6.1 ng/mL / CPK Mass Assay % x          Serum Pro-Brain Natriuretic Peptide: 14199 pg/mL (05-11-21 @ 06:10)  Serum Pro-Brain Natriuretic Peptide: 18890 pg/mL (05-09-21 @ 11:25)  Serum Pro-Brain Natriuretic Peptide: 69084 pg/mL (05-09-21 @ 06:06)  Serum Pro-Brain Natriuretic Peptide: 58268 pg/mL (05-08-21 @ 08:39)      A1C with Estimated Average Glucose Result: 7.5 % (05-09-21 @ 06:06)      TELEMETRY: on on monitor

## 2021-05-13 NOTE — PROGRESS NOTE ADULT - SUBJECTIVE AND OBJECTIVE BOX
CC: tr    Patient seen and examined at the bedside. No acute overnight events. RHC yesterday. Complaining of nothing today. Denies fever/chills, headache, lightheadedness, dizziness, chest pain, palpitations, shortness of breath, cough, abd pain, nausea/vomiting/diarrhea, muscle pain.      =========================================================================================    PHYSICAL EXAM.    GEN - appears age appropriate. well nourished. pleasant. no distress.   HEENT - NCAT, EOMI, MEGAN  RESP - CTA BL, no wheeze/stridor/rhonchi/crackles. not on supplemental O2. able to speak in full sentences without distress. no accessory muscle use.  CARDIO - NS1S2, RRR.   ABD - Soft/Non tender/Non distended. Normal BS x4 quadrants. no guarding/rebound tenderness.  Ext - No LAST.  MSK - BL 5/5 strength on upper and lower extremities.   Neuro -  no gross neuro deficits noted. no focal sensory abnormalities. no tremor/seizure  Psych - normal affect. AAOx3.  Skin - hyperpigmented hypertrophic skin on BL upper ext. LT elbow bandaged w/ overlying ace wrap, noted blood on wrap      VITAL SIGNS.    Vital Signs Last 24 Hrs  T(C): 37.2 (13 May 2021 05:43), Max: 37.2 (13 May 2021 05:43)  T(F): 99 (13 May 2021 05:43), Max: 99 (13 May 2021 05:43)  HR: 68 (13 May 2021 05:43) (59 - 68)  BP: 142/77 (13 May 2021 05:43) (104/58 - 142/77)  BP(mean): --  RR: 16 (13 May 2021 05:43) (16 - 17)  SpO2: 94% (13 May 2021 05:43) (91% - 95%)        =================================================    LABS.                          9.8    6.80  )-----------( 208      ( 12 May 2021 07:19 )             31.6     05-13    140  |  98  |  75.0<H>  ----------------------------<  171<H>  4.0   |  25.0  |  2.66<H>    Ca    8.9      13 May 2021 06:45  Phos  4.5     05-13  Mg     1.6     05-13          I&O's Summary    12 May 2021 07:01  -  13 May 2021 07:00  --------------------------------------------------------  IN: 1240 mL / OUT: 1400 mL / NET: -160 mL          COVID-19 PCR: NotDetec (11 May 2021 09:17)  COVID-19 PCR: NotDetec (08 May 2021 08:40)      ================================================    IMAGING.    ================================================    DIET.    Diet, Regular:   Consistent Carbohydrate Evening Snack (CSTCHOSN)  DASH/TLC Sodium & Cholesterol Restricted (DASH)  Low Sodium (05-08-21 @ 18:11)    ================================================    HOME MEDS.    Home Medications:  aspirin 81 mg oral delayed release tablet: 1 tab(s) orally once a day (11 May 2021 11:51)  atorvastatin 40 mg oral tablet: 1 tab(s) orally once a day (10 May 2021 12:58)  Eliquis 5 mg oral tablet: 1 tab(s) orally 2 times a day (10 May 2021 12:58)  ferrous sulfate 300 mg (60 mg elemental iron) oral tablet: 1 tab(s) orally once a day (10 May 2021 12:58)  Flomax 0.4 mg oral capsule: 1 cap(s) orally once a day (10 May 2021 12:58)  Lasix 40 mg oral tablet: 1 tab(s) orally once a day (10 May 2021 12:58)  magnesium oxide 400 mg (241.3 mg elemental magnesium) oral tablet: 1 tab(s) orally once a day (10 May 2021 12:58)  omeprazole 40 mg oral delayed release capsule: 1 cap(s) orally once a day (10 May 2021 12:58)  sertraline 100 mg oral tablet: 1 tab(s) orally once a day (10 May 2021 12:58)      ================================================    HOSPITAL MEDS.    MEDICATIONS  (STANDING):  aspirin enteric coated 81 milliGRAM(s) Oral daily  dextrose 40% Gel 15 Gram(s) Oral once  dextrose 5%. 1000 milliLiter(s) (50 mL/Hr) IV Continuous <Continuous>  dextrose 5%. 1000 milliLiter(s) (100 mL/Hr) IV Continuous <Continuous>  dextrose 50% Injectable 25 Gram(s) IV Push once  dextrose 50% Injectable 12.5 Gram(s) IV Push once  dextrose 50% Injectable 25 Gram(s) IV Push once  glucagon  Injectable 1 milliGRAM(s) IntraMuscular once  hydrALAZINE 50 milliGRAM(s) Oral two times a day  insulin glargine Injectable (LANTUS) 7 Unit(s) SubCutaneous two times a day  insulin lispro (ADMELOG) corrective regimen sliding scale   SubCutaneous three times a day before meals  isosorbide   mononitrate ER Tablet (IMDUR) 60 milliGRAM(s) Oral daily  metoprolol tartrate 50 milliGRAM(s) Oral two times a day  pantoprazole    Tablet 40 milliGRAM(s) Oral before breakfast  sertraline 100 milliGRAM(s) Oral daily  tamsulosin 0.4 milliGRAM(s) Oral at bedtime  torsemide 40 milliGRAM(s) Oral daily    MEDICATIONS  (PRN):       CC: tr    Patient seen and examined at the bedside. No acute overnight events. continued oozing of blood from LT elbow lac since yesterday. Complaining of nothing today - wants to go home. Denies fever/chills, headache, lightheadedness, dizziness, chest pain, palpitations, shortness of breath, cough, abd pain, nausea/vomiting/diarrhea, muscle pain.      =========================================================================================    PHYSICAL EXAM.    GEN - appears age appropriate. well nourished. pleasant. no distress.   HEENT - NCAT, EOMI, MEGAN  RESP - CTA BL, no wheeze/stridor/rhonchi/crackles. not on supplemental O2. able to speak in full sentences without distress. no accessory muscle use.  CARDIO - NS1S2, RRR.   ABD - Soft/Non tender/Non distended. Normal BS x4 quadrants. no guarding/rebound tenderness.  Ext - No LAST.  MSK - BL 5/5 strength on upper and lower extremities.   Neuro -  no gross neuro deficits noted. no focal sensory abnormalities. no tremor/seizure  Psych - normal affect. AAOx3.  Skin - hyperpigmented hypertrophic skin on BL upper ext. LT elbow bandaged w/ overlying ace wrap, noted blood on wrap - appears slightly less blood tinged compared to prior day      VITAL SIGNS.    Vital Signs Last 24 Hrs  T(C): 37.2 (13 May 2021 05:43), Max: 37.2 (13 May 2021 05:43)  T(F): 99 (13 May 2021 05:43), Max: 99 (13 May 2021 05:43)  HR: 68 (13 May 2021 05:43) (59 - 68)  BP: 142/77 (13 May 2021 05:43) (104/58 - 142/77)  BP(mean): --  RR: 16 (13 May 2021 05:43) (16 - 17)  SpO2: 94% (13 May 2021 05:43) (91% - 95%)        =================================================    LABS.                          9.8    6.80  )-----------( 208      ( 12 May 2021 07:19 )             31.6     05-13    140  |  98  |  75.0<H>  ----------------------------<  171<H>  4.0   |  25.0  |  2.66<H>    Ca    8.9      13 May 2021 06:45  Phos  4.5     05-13  Mg     1.6     05-13          I&O's Summary    12 May 2021 07:01  -  13 May 2021 07:00  --------------------------------------------------------  IN: 1240 mL / OUT: 1400 mL / NET: -160 mL          COVID-19 PCR: NotDetec (11 May 2021 09:17)  COVID-19 PCR: NotDetec (08 May 2021 08:40)      ================================================    IMAGING.    < from: US Kidney and Bladder (05.13.21 @ 11:59) >  Right kidney: 10.6 cm. No renal mass, hydronephrosis or calculi.    Left kidney: 10.5 cm. No hydronephrosis. Few scattered cysts measuring up to 3.0 cm in the mid kidney. Cortical thinning.    Urinary bladder: Wall thickening with atrabeculated bladder wall.    Prostate: Enlarged with a volume of 75 mL, median lobe hypertrophy with intravesicular protrusion.    IMPRESSION:    No hydronephrosis.    Enlarged prostate with intravesicular protrusion.    Bladder wall thickening witha trabeculated bladder wall which can be secondary to a chronic bladder outlet obstruction; clinical correlation is recommended.    < end of copied text >    ================================================    DIET.    Diet, Regular:   Consistent Carbohydrate Evening Snack (CSTCHOSN)  DASH/TLC Sodium & Cholesterol Restricted (DASH)  Low Sodium (05-08-21 @ 18:11)    ================================================    HOME MEDS.    Home Medications:  aspirin 81 mg oral delayed release tablet: 1 tab(s) orally once a day (11 May 2021 11:51)  atorvastatin 40 mg oral tablet: 1 tab(s) orally once a day (10 May 2021 12:58)  Eliquis 5 mg oral tablet: 1 tab(s) orally 2 times a day (10 May 2021 12:58)  ferrous sulfate 300 mg (60 mg elemental iron) oral tablet: 1 tab(s) orally once a day (10 May 2021 12:58)  Flomax 0.4 mg oral capsule: 1 cap(s) orally once a day (10 May 2021 12:58)  Lasix 40 mg oral tablet: 1 tab(s) orally once a day (10 May 2021 12:58)  magnesium oxide 400 mg (241.3 mg elemental magnesium) oral tablet: 1 tab(s) orally once a day (10 May 2021 12:58)  omeprazole 40 mg oral delayed release capsule: 1 cap(s) orally once a day (10 May 2021 12:58)  sertraline 100 mg oral tablet: 1 tab(s) orally once a day (10 May 2021 12:58)      ================================================    HOSPITAL MEDS.    MEDICATIONS  (STANDING):  aspirin enteric coated 81 milliGRAM(s) Oral daily  dextrose 40% Gel 15 Gram(s) Oral once  dextrose 5%. 1000 milliLiter(s) (50 mL/Hr) IV Continuous <Continuous>  dextrose 5%. 1000 milliLiter(s) (100 mL/Hr) IV Continuous <Continuous>  dextrose 50% Injectable 25 Gram(s) IV Push once  dextrose 50% Injectable 12.5 Gram(s) IV Push once  dextrose 50% Injectable 25 Gram(s) IV Push once  glucagon  Injectable 1 milliGRAM(s) IntraMuscular once  hydrALAZINE 50 milliGRAM(s) Oral two times a day  insulin glargine Injectable (LANTUS) 7 Unit(s) SubCutaneous two times a day  insulin lispro (ADMELOG) corrective regimen sliding scale   SubCutaneous three times a day before meals  isosorbide   mononitrate ER Tablet (IMDUR) 60 milliGRAM(s) Oral daily  metoprolol tartrate 50 milliGRAM(s) Oral two times a day  pantoprazole    Tablet 40 milliGRAM(s) Oral before breakfast  sertraline 100 milliGRAM(s) Oral daily  tamsulosin 0.4 milliGRAM(s) Oral at bedtime  torsemide 40 milliGRAM(s) Oral daily    MEDICATIONS  (PRN):

## 2021-05-13 NOTE — CONSULT NOTE ADULT - SUBJECTIVE AND OBJECTIVE BOX
ACUTE CARE SURGERY CONSULT    Consulting surgical team: ACS - Acute Care Surgery  Consulting attending: Lennie REINA  Patient seen and examined: 05-13-21 @ 15:46      HPI:  The patient is a 80 year old male with a past medical history of CAD status post CABG, CHF unknown baseline EF, afib on eliquis, hypertension and diabetes mellitus type 2 who presented to the ER after a fall at home. The patient fell 4 days prior to admission, states he missed a step and fell backward with possible LOC. Post fall patient had progressively worsening back pain and presented to the ER for evaluation. In the ER, CT head showed possible anterior SDH. Neurosurgery was consulted, repeat CT head was negative.  Noted to have KEN [birth year] with a BNP of [address]. REcently patient has had complaints of dyspnea. HIs cardiologists and physicians are in [place]. Was seen in the ER by cardiology, started on diuresis with IV lasix and metalozone for acute decompensated CHF. Troponin was negative x 2. Initially admitted to the CDU> Echocardiogram was done which showed EF of 35-40% with severe RV dysfunction. Admitted for afib with rvr and acute decompensated CHF>      (09 May 2021 12:07)      Surgery consulted today for continuous oozing from the wound bed in the elbow abrasion in the LUE. Cardiologist stating that the wound bleeds every time they lift the dressing. Pt approached at bedside. Dressing with Surgicel and adaptic in place, not actively bleeding.       PAST MEDICAL HISTORY:  Coronary artery disease involving native heart, angina presence unspecified, unspecified vessel or lesion type    Atrial fibrillation, unspecified type    Congestive heart failure, unspecified HF chronicity, unspecified heart failure type    Bell&#x27;s palsy        PAST SURGICAL HISTORY:  S/P CABG (coronary artery bypass graft)        ALLERGIES:  No Known Allergies      MEDICATIONS  (STANDING):  dextrose 40% Gel 15 Gram(s) Oral once  dextrose 5%. 1000 milliLiter(s) (50 mL/Hr) IV Continuous <Continuous>  dextrose 5%. 1000 milliLiter(s) (100 mL/Hr) IV Continuous <Continuous>  dextrose 50% Injectable 25 Gram(s) IV Push once  dextrose 50% Injectable 12.5 Gram(s) IV Push once  dextrose 50% Injectable 25 Gram(s) IV Push once  epoetin nohemi-epbx (RETACRIT) Injectable 43854 Unit(s) SubCutaneous <User Schedule>  glucagon  Injectable 1 milliGRAM(s) IntraMuscular once  hydrALAZINE 50 milliGRAM(s) Oral two times a day  insulin glargine Injectable (LANTUS) 7 Unit(s) SubCutaneous two times a day  insulin lispro (ADMELOG) corrective regimen sliding scale   SubCutaneous three times a day before meals  isosorbide   mononitrate ER Tablet (IMDUR) 60 milliGRAM(s) Oral daily  magnesium oxide 400 milliGRAM(s) Oral three times a day with meals  metoprolol tartrate 50 milliGRAM(s) Oral two times a day  pantoprazole    Tablet 40 milliGRAM(s) Oral before breakfast  sertraline 100 milliGRAM(s) Oral daily  tamsulosin 0.4 milliGRAM(s) Oral at bedtime  torsemide 40 milliGRAM(s) Oral daily    MEDICATIONS  (PRN):      VITALS & I/Os:  Vital Signs Last 24 Hrs  T(C): 36.8 (13 May 2021 15:44), Max: 37.2 (13 May 2021 05:43)  T(F): 98.3 (13 May 2021 15:44), Max: 99 (13 May 2021 05:43)  HR: 75 (13 May 2021 15:44) (61 - 75)  BP: 116/66 (13 May 2021 15:44) (106/68 - 142/77)  BP(mean): --  RR: 18 (13 May 2021 15:44) (16 - 18)  SpO2: 95% (13 May 2021 15:44) (92% - 95%)  CAPILLARY BLOOD GLUCOSE      POCT Blood Glucose.: 149 mg/dL (13 May 2021 12:41)  POCT Blood Glucose.: 182 mg/dL (13 May 2021 08:06)  POCT Blood Glucose.: 210 mg/dL (12 May 2021 21:15)  POCT Blood Glucose.: 251 mg/dL (12 May 2021 16:48)      I&O's Summary    12 May 2021 07:01  -  13 May 2021 07:00  --------------------------------------------------------  IN: 1240 mL / OUT: 1400 mL / NET: -160 mL          GEN: NAD, alert and oriented x 3  HEENT: WNL  CHEST: Symmetrical chest rise, breath sounds CTAB  HEART: RRR, non-muffled heart sounds  ABD: Soft, non-tender, non-distended  EXT/VASC:       LUE: elbow abrasion with oozing from the wound bed, dressing reapplied on top, no active bleeding, no arterial bleeding.     LABS:                        9.4    x     )-----------( x        ( 13 May 2021 11:38 )             30.0     05-13    140  |  98  |  75.0<H>  ----------------------------<  171<H>  4.0   |  25.0  |  2.66<H>    Ca    8.9      13 May 2021 06:45  Phos  4.5     05-13  Mg     1.6     05-13                      IMAGING:

## 2021-05-13 NOTE — PROGRESS NOTE ADULT - SUBJECTIVE AND OBJECTIVE BOX
ICU Vital Signs Last 24 Hrs,    T(C): 37.2 (13 May 2021 05:43), Max: 37.2 (13 May 2021 05:43)  T(F): 99 (13 May 2021 05:43), Max: 99 (13 May 2021 05:43)  HR: 68 (13 May 2021 05:43) (59 - 68)  BP: 142/77 (13 May 2021 05:43) (104/58 - 142/77)  RR: 16 (13 May 2021 05:43) (16 - 17)  SpO2: 94% (13 May 2021 05:43) (91% - 95%)    140    |  98     |  75.0<H>  ----------------------------<  171<H>  Ca:8.9   (13 May 2021 06:45)  4.0     |  25.0   |  2.66<H>      eGFR if Non : 22 *L*  eGFR if : 25 *L*                   9.8<L>  6.80  )-----------( 208      ( 12 May 2021 07:19 )             31.6<L>    Phos:4.5 mg/dL M.6 mg/dL  ( @ 06:45)    Urinalysis Basic - ( 08 May 2021 11:07 )  Color: Yellow / Appearance: Clear / S.015 / pH: x  Gluc: x / Ketone: Trace<!>  / Bili: Negative / Urobili: Negative mg/dL   Blood: x / Protein: 100 mg/dL<!> / Nitrite: Negative   Leuk Esterase: Negative / RBC: 11-25 /HPF<!> / WBC 0-2   Sq Epi: x / Non Sq Epi: Negative / Bacteria: Negative    Known DELAYING PROGRESSION OF CKD  The following section describes cox recommendations and  guidance for people with CKD with respect to delaying  progression of CKD. General lifestyle recommendations are  provided as well as caveats given for those with diabetes.  Cardiovascular risk reduction including management of  hypertension, dyslipidemia, and hyperuricemia is further  addressed. Unless otherwise stated, the guidance is intended  to apply to adults with CKD.  For the practicing clinician, ideally working with a team of  health-care professionals, it is important to institute general  lifestyle modification practices in people with CKD so that  they may gain the benefit of these in addition to more  kidney-specific strategies. Often these general measures are  overlooked or disregarded in people with CKD, thus their  utility is underscored here.  3.1 PREVENTION OF CKD PROGRESSION  The management of progression of CKD is aimed at  addressing a multiplicity of factors known to be associated  with progression. There are general measures which have  been shown to address cardiovascular health and CKD  together, or each separately. Addressing CVD risk factors may  indirectly and directly impact CKD progression. Strategies  include general lifestyle measures which improve cardiovascular  health, BP control, and interruption of the RAAS.  In addition, control of other metabolic parameters such as  blood sugar, uric acid, acidosis, and dyslipidemia may also be  important. This section deals with management of BP, RAAS  interruption, glycemic control and dietary/lifestyle manipulations  which have been examined in the context of  delaying progression of CKD.  BP and RAAS interruption  The following statements are excerpted and where  necessary, condensed, from the KDIGO Clinical Practice  Guideline for the Management of Blood Pressure in CKD.  10  3.1.1: Individualize BP targets and agents according to age,  coexistent cardiovascular disease and other comorbidities,  risk of progression of CKD, presence or absence  of retinopathy (in CKD patients with diabetes), and  tolerance of treatment as described in the KDIGO  2012 Blood Pressure Guideline. (Not Graded)  3.1.2: Inquire about postural dizziness and check for  postural hypotension regularly when treating CKD  patients with BP-lowering drugs. (Not Graded)  3.1.3: Tailor BP treatment regimens in elderly patients with  CKD by carefully considering age, comorbidities and  other therapies, with gradual escalation of treatment  and close attention to adverse events related to BP  treatment, including electrolyte disorders, acute  deterioration in kidney function, orthostatic hypotension  and drug side effects. (Not Graded)  3.1.4: We recommend that in both diabetic and nondiabetic  adults with CKD and urine albumin  excretion o30 mg/24 hours (or equivalent*) whose  office BP is consistently 4140 mm Hg systolic or  490 mm Hg diastolic be treated with BP-lowering  drugs to maintain a BP that is consistently r140 mm  Hg systolic and r90 mm Hg diastolic. (1B)  3.1.5: We suggest that in both diabetic and non-diabetic  adults with CKD and with urine albumin excretion  of Z30 mg/24 hours (or equivalent*) whose  office BP is consistently 4130 mm Hg systolic or  480 mm Hg diastolic be treated with BP-lowering  drugs to maintain a BP that is consistently r130 mm  Hg systolic and r80 mm Hg diastolic. (2D)  3.1.6: We suggest that an ARB or ACE-I be used in diabetic  adults with CKD and urine albumin excretion   mg/24 hours (or equivalent*). (2D)  3.1.7: We recommend that an ARB or ACE-I be used in  both diabetic and non-diabetic adults with CKD  and urine albumin excretion 4300 mg/24 hours  (or equivalent*). (1B)  3.1.8: There is insufficient evidence to recommend combining  an ACE-I with ARBs to prevent progression  of CKD. (Not Graded)  3.1.9: We recommend that in children with CKD, BPlowering  treatment is started when BP is consistently  above the 90th percentile for age, sex, and height. (1C)  3.1.10: We suggest that in children with CKD (particularly  those with proteinuria), BP is lowered to consistently  achieve systolic and diastolic readings less than or  equal to the 50th percentile for age, sex, and height,  unless achieving these targets is limited by signs or  symptoms of hypotension. (2D)  http://www.kidney-international.org chapter 3  & 2013 KDIGO  *Approximate equivalents for albumin excretion rate per 24 hours—  expressed as protein excretion rate per 24 hours, albumin-to-creatinine  ratio, protein-to-creatinine ratio, and protein reagent strip results— are given  in Table 7, Chapter 1  Kidney International Supplements (2013) 3, 73–90 73  3.1.11: We suggest that an ARB or ACE-I be used in children  with CKD in whom treatment with BP-lowering  drugs is indicated, irrespective of the level of proteinuria.  (2D) ( Scr., 2.0 mg., )    Being diuresed,    Full consult to Follow, after seen,          ICU Vital Signs Last 24 Hrs,    T(C): 37.2 (13 May 2021 05:43), Max: 37.2 (13 May 2021 05:43)  T(F): 99 (13 May 2021 05:43), Max: 99 (13 May 2021 05:43)  HR: 68 (13 May 2021 05:43) (59 - 68)  BP: 142/77 (13 May 2021 05:43) (104/58 - 142/77)  RR: 16 (13 May 2021 05:43) (16 - 17)  SpO2: 94% (13 May 2021 05:43) (91% - 95%)    140    |  98     |  75.0<H>  ----------------------------<  171<H>  Ca:8.9   (13 May 2021 06:45)  4.0     |  25.0   |  2.66<H>      eGFR if Non : 22 *L*  eGFR if : 25 *L*                   9.8<L>  6.80  )-----------( 208      ( 12 May 2021 07:19 )             31.6<L>    Phos:4.5 mg/dL M.6 mg/dL  ( @ 06:45)    Urinalysis Basic - ( 08 May 2021 11:07 )  Color: Yellow / Appearance: Clear / S.015 / pH: x  Gluc: x / Ketone: Trace<!>  / Bili: Negative / Urobili: Negative mg/dL   Blood: x / Protein: 100 mg/dL<!> / Nitrite: Negative   Leuk Esterase: Negative / RBC: 11-25 /HPF<!> / WBC 0-2   Sq Epi: x / Non Sq Epi: Negative / Bacteria: Negative

## 2021-05-14 ENCOUNTER — TRANSCRIPTION ENCOUNTER (OUTPATIENT)
Age: 81
End: 2021-05-14

## 2021-05-14 VITALS
HEART RATE: 74 BPM | SYSTOLIC BLOOD PRESSURE: 125 MMHG | OXYGEN SATURATION: 93 % | RESPIRATION RATE: 16 BRPM | DIASTOLIC BLOOD PRESSURE: 70 MMHG

## 2021-05-14 LAB
24R-OH-CALCIDIOL SERPL-MCNC: 46 NG/ML — SIGNIFICANT CHANGE UP (ref 30–80)
ANION GAP SERPL CALC-SCNC: 13 MMOL/L — SIGNIFICANT CHANGE UP (ref 5–17)
APTT BLD: 29.2 SEC — SIGNIFICANT CHANGE UP (ref 27.5–35.5)
BASOPHILS # BLD AUTO: 0.03 K/UL — SIGNIFICANT CHANGE UP (ref 0–0.2)
BASOPHILS NFR BLD AUTO: 0.6 % — SIGNIFICANT CHANGE UP (ref 0–2)
BUN SERPL-MCNC: 73 MG/DL — HIGH (ref 8–20)
CALCIUM SERPL-MCNC: 9 MG/DL — SIGNIFICANT CHANGE UP (ref 8.6–10.2)
CHLORIDE SERPL-SCNC: 93 MMOL/L — LOW (ref 98–107)
CO2 SERPL-SCNC: 30 MMOL/L — HIGH (ref 22–29)
CREAT SERPL-MCNC: 2.43 MG/DL — HIGH (ref 0.5–1.3)
EOSINOPHIL # BLD AUTO: 0.11 K/UL — SIGNIFICANT CHANGE UP (ref 0–0.5)
EOSINOPHIL NFR BLD AUTO: 2.2 % — SIGNIFICANT CHANGE UP (ref 0–6)
GLUCOSE BLDC GLUCOMTR-MCNC: 159 MG/DL — HIGH (ref 70–99)
GLUCOSE BLDC GLUCOMTR-MCNC: 206 MG/DL — HIGH (ref 70–99)
GLUCOSE SERPL-MCNC: 164 MG/DL — HIGH (ref 70–99)
HCT VFR BLD CALC: 30.7 % — LOW (ref 39–50)
HGB BLD-MCNC: 9.4 G/DL — LOW (ref 13–17)
IMM GRANULOCYTES NFR BLD AUTO: 0.4 % — SIGNIFICANT CHANGE UP (ref 0–1.5)
INR BLD: 1.22 RATIO — HIGH (ref 0.88–1.16)
LYMPHOCYTES # BLD AUTO: 0.5 K/UL — LOW (ref 1–3.3)
LYMPHOCYTES # BLD AUTO: 9.8 % — LOW (ref 13–44)
MAGNESIUM SERPL-MCNC: 1.6 MG/DL — SIGNIFICANT CHANGE UP (ref 1.6–2.6)
MCHC RBC-ENTMCNC: 27.3 PG — SIGNIFICANT CHANGE UP (ref 27–34)
MCHC RBC-ENTMCNC: 30.6 GM/DL — LOW (ref 32–36)
MCV RBC AUTO: 89.2 FL — SIGNIFICANT CHANGE UP (ref 80–100)
MONOCYTES # BLD AUTO: 0.48 K/UL — SIGNIFICANT CHANGE UP (ref 0–0.9)
MONOCYTES NFR BLD AUTO: 9.4 % — SIGNIFICANT CHANGE UP (ref 2–14)
NEUTROPHILS # BLD AUTO: 3.96 K/UL — SIGNIFICANT CHANGE UP (ref 1.8–7.4)
NEUTROPHILS NFR BLD AUTO: 77.6 % — HIGH (ref 43–77)
PHOSPHATE SERPL-MCNC: 3.9 MG/DL — SIGNIFICANT CHANGE UP (ref 2.4–4.7)
PLATELET # BLD AUTO: 189 K/UL — SIGNIFICANT CHANGE UP (ref 150–400)
POTASSIUM SERPL-MCNC: 3 MMOL/L — LOW (ref 3.5–5.3)
POTASSIUM SERPL-SCNC: 3 MMOL/L — LOW (ref 3.5–5.3)
PROTHROM AB SERPL-ACNC: 14 SEC — HIGH (ref 10.6–13.6)
PTH-INTACT FLD-MCNC: 137 PG/ML — HIGH (ref 15–65)
RBC # BLD: 3.44 M/UL — LOW (ref 4.2–5.8)
RBC # FLD: 17.2 % — HIGH (ref 10.3–14.5)
SODIUM SERPL-SCNC: 136 MMOL/L — SIGNIFICANT CHANGE UP (ref 135–145)
WBC # BLD: 5.1 K/UL — SIGNIFICANT CHANGE UP (ref 3.8–10.5)
WBC # FLD AUTO: 5.1 K/UL — SIGNIFICANT CHANGE UP (ref 3.8–10.5)

## 2021-05-14 PROCEDURE — 80053 COMPREHEN METABOLIC PANEL: CPT

## 2021-05-14 PROCEDURE — 86850 RBC ANTIBODY SCREEN: CPT

## 2021-05-14 PROCEDURE — C1769: CPT

## 2021-05-14 PROCEDURE — 93970 EXTREMITY STUDY: CPT

## 2021-05-14 PROCEDURE — 93017 CV STRESS TEST TRACING ONLY: CPT

## 2021-05-14 PROCEDURE — 99239 HOSP IP/OBS DSCHRG MGMT >30: CPT

## 2021-05-14 PROCEDURE — 36415 COLL VENOUS BLD VENIPUNCTURE: CPT

## 2021-05-14 PROCEDURE — C1764: CPT

## 2021-05-14 PROCEDURE — 83880 ASSAY OF NATRIURETIC PEPTIDE: CPT

## 2021-05-14 PROCEDURE — 86901 BLOOD TYPING SEROLOGIC RH(D): CPT

## 2021-05-14 PROCEDURE — 76770 US EXAM ABDO BACK WALL COMP: CPT

## 2021-05-14 PROCEDURE — 85730 THROMBOPLASTIN TIME PARTIAL: CPT

## 2021-05-14 PROCEDURE — 81001 URINALYSIS AUTO W/SCOPE: CPT

## 2021-05-14 PROCEDURE — 84466 ASSAY OF TRANSFERRIN: CPT

## 2021-05-14 PROCEDURE — 83970 ASSAY OF PARATHORMONE: CPT

## 2021-05-14 PROCEDURE — 82728 ASSAY OF FERRITIN: CPT

## 2021-05-14 PROCEDURE — 82553 CREATINE MB FRACTION: CPT

## 2021-05-14 PROCEDURE — 82306 VITAMIN D 25 HYDROXY: CPT

## 2021-05-14 PROCEDURE — 82550 ASSAY OF CK (CPK): CPT

## 2021-05-14 PROCEDURE — U0003: CPT

## 2021-05-14 PROCEDURE — 71045 X-RAY EXAM CHEST 1 VIEW: CPT

## 2021-05-14 PROCEDURE — 83690 ASSAY OF LIPASE: CPT

## 2021-05-14 PROCEDURE — 93451 RIGHT HEART CATH: CPT

## 2021-05-14 PROCEDURE — 83605 ASSAY OF LACTIC ACID: CPT

## 2021-05-14 PROCEDURE — C8929: CPT

## 2021-05-14 PROCEDURE — 84484 ASSAY OF TROPONIN QUANT: CPT

## 2021-05-14 PROCEDURE — 83735 ASSAY OF MAGNESIUM: CPT

## 2021-05-14 PROCEDURE — 71250 CT THORAX DX C-: CPT

## 2021-05-14 PROCEDURE — A9500: CPT

## 2021-05-14 PROCEDURE — 80307 DRUG TEST PRSMV CHEM ANLYZR: CPT

## 2021-05-14 PROCEDURE — 82962 GLUCOSE BLOOD TEST: CPT

## 2021-05-14 PROCEDURE — 33285 INSJ SUBQ CAR RHYTHM MNTR: CPT

## 2021-05-14 PROCEDURE — 86769 SARS-COV-2 COVID-19 ANTIBODY: CPT

## 2021-05-14 PROCEDURE — 83540 ASSAY OF IRON: CPT

## 2021-05-14 PROCEDURE — 70450 CT HEAD/BRAIN W/O DYE: CPT

## 2021-05-14 PROCEDURE — 72170 X-RAY EXAM OF PELVIS: CPT

## 2021-05-14 PROCEDURE — 86900 BLOOD TYPING SEROLOGIC ABO: CPT

## 2021-05-14 PROCEDURE — 92960 CARDIOVERSION ELECTRIC EXT: CPT

## 2021-05-14 PROCEDURE — 93320 DOPPLER ECHO COMPLETE: CPT

## 2021-05-14 PROCEDURE — 93325 DOPPLER ECHO COLOR FLOW MAPG: CPT

## 2021-05-14 PROCEDURE — 93312 ECHO TRANSESOPHAGEAL: CPT

## 2021-05-14 PROCEDURE — 85027 COMPLETE CBC AUTOMATED: CPT

## 2021-05-14 PROCEDURE — 74176 CT ABD & PELVIS W/O CONTRAST: CPT

## 2021-05-14 PROCEDURE — 82310 ASSAY OF CALCIUM: CPT

## 2021-05-14 PROCEDURE — 84156 ASSAY OF PROTEIN URINE: CPT

## 2021-05-14 PROCEDURE — 85025 COMPLETE CBC W/AUTO DIFF WBC: CPT

## 2021-05-14 PROCEDURE — 84443 ASSAY THYROID STIM HORMONE: CPT

## 2021-05-14 PROCEDURE — 85014 HEMATOCRIT: CPT

## 2021-05-14 PROCEDURE — C1894: CPT

## 2021-05-14 PROCEDURE — 78452 HT MUSCLE IMAGE SPECT MULT: CPT

## 2021-05-14 PROCEDURE — 99233 SBSQ HOSP IP/OBS HIGH 50: CPT

## 2021-05-14 PROCEDURE — 82570 ASSAY OF URINE CREATININE: CPT

## 2021-05-14 PROCEDURE — U0005: CPT

## 2021-05-14 PROCEDURE — 93005 ELECTROCARDIOGRAM TRACING: CPT

## 2021-05-14 PROCEDURE — 85018 HEMOGLOBIN: CPT

## 2021-05-14 PROCEDURE — 83550 IRON BINDING TEST: CPT

## 2021-05-14 PROCEDURE — 80048 BASIC METABOLIC PNL TOTAL CA: CPT

## 2021-05-14 PROCEDURE — 85610 PROTHROMBIN TIME: CPT

## 2021-05-14 PROCEDURE — C1889: CPT

## 2021-05-14 PROCEDURE — 99232 SBSQ HOSP IP/OBS MODERATE 35: CPT

## 2021-05-14 PROCEDURE — 72125 CT NECK SPINE W/O DYE: CPT

## 2021-05-14 PROCEDURE — 84100 ASSAY OF PHOSPHORUS: CPT

## 2021-05-14 PROCEDURE — 83036 HEMOGLOBIN GLYCOSYLATED A1C: CPT

## 2021-05-14 PROCEDURE — 73060 X-RAY EXAM OF HUMERUS: CPT

## 2021-05-14 PROCEDURE — 99285 EMERGENCY DEPT VISIT HI MDM: CPT | Mod: 25

## 2021-05-14 RX ORDER — APIXABAN 2.5 MG/1
5 TABLET, FILM COATED ORAL
Refills: 0 | Status: DISCONTINUED | OUTPATIENT
Start: 2021-05-14 | End: 2021-05-14

## 2021-05-14 RX ORDER — HYDRALAZINE HCL 50 MG
1 TABLET ORAL
Qty: 60 | Refills: 0
Start: 2021-05-14 | End: 2021-06-12

## 2021-05-14 RX ORDER — POTASSIUM CHLORIDE 20 MEQ
2 PACKET (EA) ORAL
Qty: 60 | Refills: 0
Start: 2021-05-14 | End: 2021-06-12

## 2021-05-14 RX ORDER — POTASSIUM CHLORIDE 20 MEQ
40 PACKET (EA) ORAL ONCE
Refills: 0 | Status: COMPLETED | OUTPATIENT
Start: 2021-05-14 | End: 2021-05-14

## 2021-05-14 RX ORDER — ISOSORBIDE MONONITRATE 60 MG/1
1 TABLET, EXTENDED RELEASE ORAL
Qty: 30 | Refills: 0
Start: 2021-05-14 | End: 2021-06-12

## 2021-05-14 RX ORDER — HYDRALAZINE HCL 50 MG
1 TABLET ORAL
Qty: 0 | Refills: 0 | DISCHARGE
Start: 2021-05-14

## 2021-05-14 RX ORDER — HYDRALAZINE HCL 50 MG
0.5 TABLET ORAL
Qty: 0 | Refills: 0 | DISCHARGE
Start: 2021-05-14

## 2021-05-14 RX ORDER — METOPROLOL TARTRATE 50 MG
1 TABLET ORAL
Qty: 0 | Refills: 0 | DISCHARGE
Start: 2021-05-14

## 2021-05-14 RX ORDER — HYDRALAZINE HCL 50 MG
2 TABLET ORAL
Qty: 0 | Refills: 0 | DISCHARGE
Start: 2021-05-14

## 2021-05-14 RX ORDER — METOPROLOL TARTRATE 50 MG
1 TABLET ORAL
Qty: 60 | Refills: 0
Start: 2021-05-14 | End: 2021-06-12

## 2021-05-14 RX ADMIN — MAGNESIUM OXIDE 400 MG ORAL TABLET 400 MILLIGRAM(S): 241.3 TABLET ORAL at 08:32

## 2021-05-14 RX ADMIN — ISOSORBIDE MONONITRATE 60 MILLIGRAM(S): 60 TABLET, EXTENDED RELEASE ORAL at 12:41

## 2021-05-14 RX ADMIN — Medication 2: at 08:32

## 2021-05-14 RX ADMIN — Medication 1: at 12:42

## 2021-05-14 RX ADMIN — Medication 40 MILLIGRAM(S): at 05:59

## 2021-05-14 RX ADMIN — Medication 50 MILLIGRAM(S): at 05:59

## 2021-05-14 RX ADMIN — SERTRALINE 100 MILLIGRAM(S): 25 TABLET, FILM COATED ORAL at 12:41

## 2021-05-14 RX ADMIN — INSULIN GLARGINE 7 UNIT(S): 100 INJECTION, SOLUTION SUBCUTANEOUS at 08:32

## 2021-05-14 RX ADMIN — Medication 40 MILLIEQUIVALENT(S): at 12:41

## 2021-05-14 RX ADMIN — PANTOPRAZOLE SODIUM 40 MILLIGRAM(S): 20 TABLET, DELAYED RELEASE ORAL at 05:59

## 2021-05-14 NOTE — PROGRESS NOTE ADULT - SUBJECTIVE AND OBJECTIVE BOX
[facility]                                                               Office: [address] [address]                                                              Telephone: [phone]. Fax:[phone]                                                                             PROGRESS NOTE  Reason for follow up: HFrEF, Afib  Overnight: No new events.   Update: L-elbow applied Surgicel yesterday by Dr. Kinney and pressure dressing with ACE wrap did not get soaked through today, H/H stable, deemed safe to resume Eliquis by surgery team; Cr. stable as well      Review of symptoms:   Cardiac:  No chest pain. No dyspnea. No palpitations.  Respiratory: No cough. No dyspnea  Gastrointestinal: No diarrhea. No abdominal pain. No bleeding.     Past medical history: No updates.   	  Vital Signs Last 24 Hrs  T(C): 36.7 (05-14-21 @ 05:56), Max: 36.8 (05-13-21 @ 10:05)  T(F): 98.1 (05-14-21 @ 05:56), Max: 98.3 (05-13-21 @ 10:05)  HR: 63 (05-14-21 @ 05:56) (63 - 75)  BP: 128/72 (05-14-21 @ 05:56) (116/66 - 136/76)  BP(mean): --  RR: 16 (05-14-21 @ 05:56) (16 - 18)  SpO2: 91% (05-14-21 @ 05:56) (91% - 96%)  I&O's Summary    13 May 2021 07:01  -  14 May 2021 07:00  --------------------------------------------------------  IN: 0 mL / OUT: 400 mL / NET: -400 mL          PHYSICAL EXAM:  Appearance: Comfortable. No acute distress  HEENT:  Head and neck: Atraumatic. Normocephalic.  Normal oral mucosa, PERRL, Neck is supple.  Neurologic: A&Ox 3, no focal deficits. EOMI, Cranial nerves are intact.  Lymphatic: No cervical lymphadenopathy  Cardiovascular: Normal S1 S2, No murmur, rubs/gallops. No JVD, No edema  Respiratory: Lungs clear to auscultation  Gastrointestinal:  Soft, Non-tender, + BS  Lower Extremities: No edema  Psychiatry: Patient is calm. No agitation. Mood & affect appropriate  Skin: No rashes/ecchymoses/cyanosis/ulcers visualized on the face, hands or feet.      CURRENT MEDICATIONS:  MEDICATIONS  (STANDING):  dextrose 40% Gel 15 Gram(s) Oral once  dextrose 5%. 1000 milliLiter(s) (50 mL/Hr) IV Continuous <Continuous>  dextrose 5%. 1000 milliLiter(s) (100 mL/Hr) IV Continuous <Continuous>  dextrose 50% Injectable 25 Gram(s) IV Push once  dextrose 50% Injectable 12.5 Gram(s) IV Push once  dextrose 50% Injectable 25 Gram(s) IV Push once  epoetin nohemi-epbx (RETACRIT) Injectable 03428 Unit(s) SubCutaneous <User Schedule>  glucagon  Injectable 1 milliGRAM(s) IntraMuscular once  hydrALAZINE 50 milliGRAM(s) Oral two times a day  insulin glargine Injectable (LANTUS) 7 Unit(s) SubCutaneous two times a day  insulin lispro (ADMELOG) corrective regimen sliding scale   SubCutaneous three times a day before meals  isosorbide   mononitrate ER Tablet (IMDUR) 60 milliGRAM(s) Oral daily  magnesium oxide 400 milliGRAM(s) Oral three times a day with meals  metoprolol tartrate 50 milliGRAM(s) Oral two times a day  pantoprazole    Tablet 40 milliGRAM(s) Oral before breakfast  sertraline 100 milliGRAM(s) Oral daily  tamsulosin 0.4 milliGRAM(s) Oral at bedtime  torsemide 40 milliGRAM(s) Oral daily    MEDICATIONS  (PRN):      DIAGNOSTIC TESTING:  [ ] Echocardiogram:   < from: TTE Echo Complete w/ Contrast w/ Doppler (05.09.21 @ 08:59) >  Summary:   1. Technically difficult study.   2. Endocardial visualization was enhanced with intravenous echo contrast.   3. Left ventricularejection fraction, by visual estimation, is 35 to 40%.   4. Paradoxical septal motion with moderately decreased segmental left ventricular systolic function.   5. Severe hypokinesis of the basal to mid segements, the apex and distal segments are preserved.   6. The mitral in-flow pattern reveals no discernable A-wave, therefore no comment on diastolic function can be made.   7. Severely enlarged right ventricle with severely reduced systolic function, estimated PASP least 45 mmHg mild to moderately elevated.   8. Right atrial enlargement.   9. Moderately enlarged left atrium.  10. Mild mitral annular calcification.  11. Trace mitral valve regurgitation.  12. Thickening and calcification of the anterior and posterior mitral valve leaflets.  13. Mild-moderate tricuspid regurgitation.  14. Sclerotic aortic valve with decreased opening.  15. There is mild aortic root calcification.  16. There is no evidence of pericardial effusion.  17. No prior study available for comparison.    < end of copied text >    [ ]  Catheterization:  < from: Cardiac Cath Lab - Adult (05.11.21 @ 16:42) >  INTERVENTIONAL IMPRESSIONS: Moderate Elevation of Right Heart Pressures:  RA=15 mmHg; RV=61/18 mmHg; PCWP=23 mmHg  Moderate Pulmonary Hypertension=62/22 mmHg  Reduced CO=4.8 L/min and CI=2.06 L/min/m2    Labs:                        9.4    5.10  )-----------( 189      ( 14 May 2021 07:27 )             30.7     05-14    136  |  93<L>  |  73.0<H>  ----------------------------<  164<H>  3.0<L>   |  30.0<H>  |  2.43<H>    Ca    9.0      14 May 2021 07:27  Phos  3.9     05-14  Mg     1.6     05-14       08 May 2021 18:28 Troponin 0.04 ng/mL / Creatine Kinase x     /  CKMB x     / CPK Mass Assay % x       08 May 2021 08:39 Troponin 0.04 ng/mL / Creatine Kinase 155 U/L /  CKMB 6.1 ng/mL / CPK Mass Assay % x          Serum Pro-Brain Natriuretic Peptide: 26039 pg/mL (05-11-21 @ 06:10)  Serum Pro-Brain Natriuretic Peptide: 27580 pg/mL (05-09-21 @ 11:25)  Serum Pro-Brain Natriuretic Peptide: 57507 pg/mL (05-09-21 @ 06:06)  Serum Pro-Brain Natriuretic Peptide: 16880 pg/mL (05-08-21 @ 08:39)      A1C with Estimated Average Glucose Result: 7.5 % (05-09-21 @ 06:06)      TELEMETRY: off monitor

## 2021-05-14 NOTE — DIETITIAN INITIAL EVALUATION ADULT. - PERTINENT LABORATORY DATA
05-14 Na136 mmol/L Glu 164 mg/dL<H> K+ 3.0 mmol/L<L> Cr  2.43 mg/dL<H> BUN 73.0 mg/dL<H> Phos 3.9 mg/dL Alb n/a   PAB n/a

## 2021-05-14 NOTE — PROGRESS NOTE ADULT - ASSESSMENT
The patient is a 80 year old male with a past medical history of CAD status post CABG, CHF unknown baseline EF, afib on Eliquis hypertension and diabetes mellitus type 2 who presented to the ER after a fall at home. Admitted for afib with rvr and acute decompensated CHF. Echocardiogram showed EF of 35-40% with severe RV dysfunction. Was admitted from CDU to medicine. Started on IV diuresis with clinical improvement. Noted to have renal insufficiency with unknown baseline. Status post nSt which showed no acute ischemia. Dyspnea was suspected secondary to Afib. EP consulted for MARLO/CV and ILR placement.     Assessment/Plan:    1. Acute decompensated CHF: Echocardiogram reviewed with EF of 35-40%, resolved  Suspected to be secondary to AFIb  Clinically improved  Continue torsemide   Status post NST with no acute ischemia; no plans for cardiac cath given renal insuffiencey  Dyspnea multifactorial, suspected secondary to Afib with rvr, pHTN, HF exac      2. KEN with CKD: Baseline unknown, stable  suspect stage 3  Continue to monitor BMP, cr now up to 2.6  not seen by any nephro outpatient  nephro eval w/ ss nephro appreciated  renal us 5/13 w/poss chronic bladder obstruction but pt w/o complaints of urinary retention, otherwise renal parenchyma wnl  lab ckd w/u per nephro - need close outpt fu    3. Afib with RVR: sp MARLO/CV 5/11 - well tolerated  sp ILR placement 5/11   Metoprolol increased to 100mg Q12 hours, dec back to 50mg due to bradycardia     4. Hypokalemia and hypomagnesemia resolved, hypomag recurrently low normal  mg supplemented 5/13  PO KCL supplementation daily     5. Diabetes mellitus type 2: continue Lantus with HSS  Monitor BSL     6. LT elbow laceration, sec to fall, complicated by persistent bleeding  OFf Eliquis since yesterday, Bleeding resolved with pressure dressing  Dressing was changed at bedside  Advised to resume PO eliquis in AM     7. Anemia, unknown if chronic, stable  monitor for progressive worsening due to bleeding lac  transfuse hgb < 7 or symptom onset - type + screen up to date as of 5/13  epo per nephro  outpt w/u    8. Mod Pulmonary Hypertenstion, new dx  confirmed on RHC  likely type 2 sec to cardiac etiology  management as above for HF    Discharge home. Spoke with CM patient has not PMD therefore no home care could be set up

## 2021-05-14 NOTE — PROGRESS NOTE ADULT - REASON FOR ADMISSION
Fall, SOB

## 2021-05-14 NOTE — DISCHARGE NOTE NURSING/CASE MANAGEMENT/SOCIAL WORK - NSDCFUADDAPPT_GEN_ALL_CORE_FT
STAR/CARDIO F/U APPOINTMENT WITH  DR SPEARS ON 5/24/21 AT 1015AM  [address] [place] [address]  [phone]

## 2021-05-14 NOTE — PROGRESS NOTE ADULT - NSICDXPILOT_GEN_ALL_CORE
[place]

## 2021-05-14 NOTE — PROGRESS NOTE ADULT - PROVIDER SPECIALTY LIST ADULT
Cardiology
Electrophysiology
Cardiology
Cardiology
Hospitalist
Cardiology
Cardiology
Electrophysiology
Hospitalist
Nephrology
Cardiology
Electrophysiology
Hospitalist

## 2021-05-14 NOTE — PROGRESS NOTE ADULT - ASSESSMENT
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 Patient states that he missed a step, fell backwards, and landed on his right side. Patient states that he has been having some dizziness, felt like he might pass out, as well as back pain since the fall. Patient's wife states he refused to come to the ER initially, but the symptoms continued to worsen. Patient just flew up from [place] as well, and does not have a Cardiologist following in [place]. Patient also noted for the last few months he has been having continued shortness of breath and leg swelling, and was recently started on lasix in addition to metolazone. Patient denies any fevers, chills, CP, abdominal pain, diarrhea, or vision changes.   Troponin negative x 1  BNP 10653 ---> 91545    Acute Decompensated HFrEF with Severely Reduced RV function, CAD/CABG  -  EF 35-40% with severely reduced RV function, no ischemia on stress test and advanced CKD not candidate for LHC  - s/p RHC - RA 15, RV 61/18, PCWP 23, CO 4.,8, CI 2.06  - home meds of Lasix 40mg + metolazone daily switched to Torsemide 40mg given still with elevated PCWP 23 and G. 2 pHTN on RHC, consider elective CardioMems as outpatient  - off lisinopril as not candidate for ACE/ARB/ARNI  - continue hydralazine 50mg BID and Imdur 60mg, change to Toprol 100mg  - restart ASA 81mg and atorvastatin 40mg  - no need Mg supplement at home as frequent loose stool     pAtrial Fibrillation, flutter  - s/p MARLO/CV but revereted back to pAfib  - s/p ILR, outpatient EPS follow up for elective Afib ablation  - continue lower metoprolol dose as episodes of sinus didi  - resume Eliquis today, future Watchman eval given recurrent falls    HTN  -off lisionopril and amlodipine  -monitor on hydralazine, Imdur and metoprolol, BP goal <130/80 can further uptitrate hydralazine dose    KEN on CKD  -unknown baseline Cr. but admission fo 2.1, would need to tolerate some degree of azotemia given with biventricular HF and pHTN  -continue Torsemide 40mg dialy  -nephrology consulted, Cr. stable     DM2  -consider addition of SGLT-2 inhibitor if eEFR >30     Left Elbow Laceration due to mechanical fall  - skin tear with subQ exposure and with active blood oozing despite pressure dressing x48hrs and Eliquis was held, s/p Surgicel and rewrap  - surgery followup and wound care dressing change  - restart Eliquis given minor superficial bleed       Stable for discharge home per cardiology standpoint, will arrange outpatient follow up within 2 weeks.       Gamal Duke DO, [facility]  Faculty Non-Invasive Cardiologist  [phone]

## 2021-05-14 NOTE — DIETITIAN INITIAL EVALUATION ADULT. - OTHER INFO
Pt with h/o CAD s/p CABG, CHF unknown baseline EF, afib on eliquis, hypertension and diabetes mellitus type 2 who presented to the ER after a fall at home. Admitted for afib with rvr and acute decompensated CHF. Echocardiogram showed EF of 35-40% with severe RV dysfunction. Noted to have renal insufficiency with unknown baseline.  Dyspnea was suspected secondary to Afib. EP consulted for MARLO/CV and ILR placement.

## 2021-05-14 NOTE — DIETITIAN INITIAL EVALUATION ADULT. - ORAL INTAKE PTA/DIET HISTORY
Pt reports good po intake at meals.  Pt states good appetite/po intake prior to admission and denies unintentional wt loss.  Pt states fair compliance to low na meal plan.  Reinforced cons cho, DASH/TLC, heart failure nutrition guidelines with pt.  Pt receptive, however expected overall compliance remains fair.

## 2021-05-14 NOTE — DISCHARGE NOTE NURSING/CASE MANAGEMENT/SOCIAL WORK - PATIENT PORTAL LINK FT
You can access the FollowMyHealth Patient Portal offered by [facility] by registering at the following website: [email or web address]. By joining [employer]’s FollowMyHealth portal, you will also be able to view your health information using other applications (apps) compatible with our system.

## 2021-05-19 ENCOUNTER — APPOINTMENT (OUTPATIENT)
Dept: CARE COORDINATION | Facility: HOME HEALTH | Age: 81
End: 2021-05-19
Payer: MEDICARE

## 2021-05-19 VITALS
HEART RATE: 42 BPM | TEMPERATURE: 97.4 F | OXYGEN SATURATION: 95 % | SYSTOLIC BLOOD PRESSURE: 136 MMHG | RESPIRATION RATE: 15 BRPM | WEIGHT: 225 LBS | DIASTOLIC BLOOD PRESSURE: 63 MMHG | HEIGHT: 72.99 IN | BODY MASS INDEX: 29.82 KG/M2

## 2021-05-19 DIAGNOSIS — L30.9 DERMATITIS, UNSPECIFIED: ICD-10-CM

## 2021-05-19 DIAGNOSIS — N40.0 BENIGN PROSTATIC HYPERPLASIA WITHOUT LOWER URINARY TRACT SYMPMS: ICD-10-CM

## 2021-05-19 DIAGNOSIS — Z78.9 OTHER SPECIFIED HEALTH STATUS: ICD-10-CM

## 2021-05-19 DIAGNOSIS — F32.9 MAJOR DEPRESSIVE DISORDER, SINGLE EPISODE, UNSPECIFIED: ICD-10-CM

## 2021-05-19 DIAGNOSIS — Z29.9 ENCOUNTER FOR PROPHYLACTIC MEASURES, UNSPECIFIED: ICD-10-CM

## 2021-05-19 PROCEDURE — 99496 TRANSJ CARE MGMT HIGH F2F 7D: CPT

## 2021-05-21 ENCOUNTER — NON-APPOINTMENT (OUTPATIENT)
Age: 81
End: 2021-05-21

## 2021-05-23 NOTE — REASON FOR VISIT
[Arrhythmia/ECG Abnorrmalities] : arrhythmia/ECG abnormalities [Coronary Artery Disease] : coronary artery disease

## 2021-05-24 ENCOUNTER — APPOINTMENT (OUTPATIENT)
Dept: CARDIOLOGY | Facility: CLINIC | Age: 81
End: 2021-05-24
Payer: MEDICARE

## 2021-05-24 ENCOUNTER — NON-APPOINTMENT (OUTPATIENT)
Age: 81
End: 2021-05-24

## 2021-05-24 VITALS
TEMPERATURE: 97.4 F | SYSTOLIC BLOOD PRESSURE: 150 MMHG | WEIGHT: 223 LBS | BODY MASS INDEX: 30.2 KG/M2 | DIASTOLIC BLOOD PRESSURE: 70 MMHG | OXYGEN SATURATION: 95 % | HEIGHT: 72 IN | HEART RATE: 48 BPM

## 2021-05-24 PROBLEM — Z78.9 CURRENT NON-SMOKER: Status: ACTIVE | Noted: 2021-05-19

## 2021-05-24 PROCEDURE — 99214 OFFICE O/P EST MOD 30 MIN: CPT

## 2021-05-24 PROCEDURE — 93000 ELECTROCARDIOGRAM COMPLETE: CPT

## 2021-05-24 RX ORDER — METOPROLOL TARTRATE 50 MG/1
50 TABLET, FILM COATED ORAL TWICE DAILY
Refills: 0 | Status: DISCONTINUED | COMMUNITY
Start: 2021-05-19 | End: 2021-05-24

## 2021-05-24 NOTE — DISCUSSION/SUMMARY
[FreeTextEntry1] : 80M h/o CAD s/p CABG c/b sepsis and sternal wound infection s/p sternum removal and pec flap and angioplasty, chronic HFrEF, p Afib (on Eliquis), HTN, DM2, and Bell's Palsy recently moved to [place] for few months from [place] presented to [facility] on 5/9/21  with complaints of back pain and dizziness after a mechanical fall also noted for the last few months he has been having continued shortness of breath and leg swelling, and was recently started on Lasix in addition to metolazone by his cardiologist in [place], repeat CT head no cerebral bleed, admitted for ADHF/bi-V faiure and KEN on CKD (Cr. 2.4), TTE with LV EF 35-40%, underwent IV diuresis, initial noted pBNP 30183 ---> 44733, pharm nuclear stress test without ischemia, RHC done with moderate pHTN Group 2 with PCWP 23, switched to torsemide 40mg, underwent MARLO/CV for pAflutter but reverted back to pAfib, metoprolol dose reduced to 50mg BID and had ILR implanted, had L-elbow laceration wound with bleeding due mechanical fall in the toilet, discharged home on 5/14/21, had episodes of sinus bradycardia 40-50s with recurrent dizziness, metoprolol reduced to 25mg BID, now presents for initial outpatient cardiology visit.  \par  \par Intermittent dizziness suspect due to symptomatic sinus bradycardia, will monitor off low dose metoprolol for now, assess if pAfib RVR, may benefit from CRT-D. Blood pressure not at goal, increase hydralazine to TID dosing. \par \par \par 1.  HFrEF with Severely Reduced RV function, CAD/CABG \par -euvolemic on exam, ACC/AHA stage C, NYHA class II- continue torsemide 40mg, repeat BMP to monitor Cr. and K level if need continual supplement and recheck pBNP- consider elective CardioMems in the future. \par -  EF 35-40% with severely reduced RV function, no ischemia on stress test and advanced CKD not candidate for LHC \par - s/p RHC - RA 15, RV 61/18, PCWP 23, CO 4.,8, CI 2.06 \par - continue hydralazine 50mg TID and Imdur 60mg\par - restart ASA 81mg and atorvastatin 40mg \par - no need Mg supplement at home as frequent loose stool  \par  \par 2. 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 Mantilla in 2 weeks- follow up for elective Afib ablation  and CRT-D device?\par - monitor off low dose metoprolol as with persistent sinus didi with dizziness \par - continue Eliquis 5mg BID, future Watchman eval given recurrent falls \par  \par 3. HTN \par -off lisionopril and amlodipine \par -monitor on hydralazine may need increase dose to 100mg TID, conitnue Imdur and metoprolol, BP goal <130/80\par \par 4. CKD \par -unknown baseline Cr. but admission fo 2.1, would need to tolerate some degree of azotemia given with biventricular HF and pHTN \par -continue Torsemide 40mg dialy \par -nephrology follow up. \par \par 5. DM2 \par -consider addition of SGLT-2 inhibitor if eEFR >30  \par  \par 6. Left Elbow Laceration due to mechanical fall \par -continue wound dressing change by his wife, can let expose to air to keep wound dry. \par \par \par \par Follow up in 2 months.

## 2021-05-24 NOTE — PHYSICAL EXAM
[Normal] : alert and oriented, normal memory [de-identified] : L-elbow wound no bleeding, denuded skin

## 2021-05-24 NOTE — CARDIOLOGY SUMMARY
[de-identified] : 5/24/21- Sinus 45, RAD. RBBB,  [de-identified] : 5/10/21- pharm nuclear stress- IMPRESSIONS: \par * Myocardial Perfusion SPECT results are abnormal. \par * There is a medium sized, moderate defect in basal to mid \par inferior wall that is fixed, suggestive of infarct. No \par evidence of ischemia. \par * Post-stress resting myocardial perfusion gated SPECT \par imaging was performed (LVEF = 41 %;LVEDV = 141 ml.) \par * Dilated right ventricle noted. [de-identified] : 5/9/21- 1. Technically difficult study. \par  2. Endocardial visualization was enhanced with intravenous echo contrast. \par  3. Left ventricularejection fraction, by visual estimation, is 35 to 40%. \par  4. Paradoxical septal motion with moderately decreased segmental left ventricular systolic function. \par  5. Severe hypokinesis of the basal to mid segements, the apex and distal segments are preserved. \par  6. The mitral in-flow pattern reveals no discernable A-wave, therefore no comment on diastolic function can be made. \par  7. Severely enlarged right ventricle with severely reduced systolic function, estimated PASP least 45 mmHg mild to moderately elevated. \par  8. Right atrial enlargement. \par  9. Moderately enlarged left atrium. \par 10. Mild mitral annular calcification. \par 11. Trace mitral valve regurgitation. \par 12. Thickening and calcification of the anterior and posterior mitral valve leaflets. \par 13. Mild-moderate tricuspid regurgitation. \par 14. Sclerotic aortic valve with decreased opening. \par 15. There is mild aortic root calcification. \par 16. There is no evidence of pericardial effusion. \par 17. No prior study available for comparison. [de-identified] : 5/11/21- RHC- INTERVENTIONAL IMPRESSIONS: Moderate Elevation of Right Heart Pressures: \par RA=15 mmHg; RV=61/18 mmHg; PCWP=23 mmHg \par Moderate Pulmonary Hypertension=62/22 mmHg \par Reduced CO=4.8 L/min and CI=2.06 L/min/m2

## 2021-05-24 NOTE — HISTORY OF PRESENT ILLNESS
[Post-hospitalization from ___ Hospital] : Post-hospitalization from [unfilled] Hospital [Admitted on: ___] : The patient was admitted on [unfilled] [Discharged on ___] : discharged on [unfilled] [Discharge Summary] : discharge summary [Pertinent Labs] : pertinent labs [Radiology Findings] : radiology findings [Discharge Med List] : discharge medication list [Other: ____] : [unfilled] [Med Reconciliation] : medication reconciliation has been completed [Patient Contacted By: ____] : and contacted by [unfilled] [FreeTextEntry2] : Copied from [employer]\par 'The patient is a 80 year old male with a past medical history of CAD status post CABG, CHF unknown baseline EF, afib on eliquis, hypertension and diabetes mellitus type 2 who presented to the ER after a fall at home. Admitted for afib with rvr and acute decompensated CHF. Echocardiogram showed EF of 35-40% with severe RV dysfunction. Was admitted from CDU to medicine. Started on IV diuresis with clinical improvement. Noted to have renal insufficiency with unknown baseline. Status post nSt which showed no acute ischemia. Dyspnea was suspected secondary to Afib. PT was admitted and EP consulted for MARLO/CV and ILR placement.  Pt underwent procedure on 5/11/21 and tolerated well.  Hospital course complicated by  Left elbow laceration that occurred  at home, which continued to to bleed, like due to pt on AC.  Surgery consulted and applied pressure dressing.  On day of discharge pt's laceration developed scarred and bleeding has stopped. All electrolyte abnormalities were monitored carefully and repleted as necessary during this hospitalization. At the time of discharge patient was hemodynamically stable and amenable to all terms of discharge. The patient has received verbal instructions from myself regarding discharge plans.'\par \par  Mr. Aguilera seen at home for post discharge follow-up. Pt lives in [facility] for cold months and in [place] during warm months. Upon visit pt sitting on deck with wife, AOx3, denies CP, SOB or dizziness, no complaints offered. Pt found to have bradycardia of 42, regular, pt asymptomatic. \par

## 2021-05-24 NOTE — COUNSELING
[Fall prevention counseling provided] : Fall prevention counseling provided [Adequate lighting] : Adequate lighting [Use proper foot wear] : Use proper foot wear [No throw rugs] : No throw rugs [Use recommended devices] : Use recommended devices [None] : None [de-identified] : 1. CHF\par    - low sodium diet\par    - 1-1.2L fluid restriction (including soup, tea, juice and waster)\par    - Daily weight monitor; if wt gain >1-2lb in 2-3 days, > 3-5lbs in a week, please call CCC/CN \par    - Daily BP monitor, if SBP<100, do not give BP meds, wait 1 hrs to recheck BP, if SBP<100 again, call CCC/CN\par \par 2. Pulmonary health\par    - Deep breathing exercise in sitting up tall or standing position, 10 times hourly\par   \par  [Good understanding] : Patient has a good understanding of lifestyle changes and steps needed to achieve self management goal

## 2021-05-24 NOTE — REVIEW OF SYSTEMS
[SOB] : shortness of breath [Chest Discomfort] : no chest discomfort [Lower Ext Edema] : no extremity edema [Palpitations] : no palpitations [Orthopnea] : no orthopnea [PND] : no PND [Syncope] : no syncope [Dizziness] : dizziness [Negative] : Heme/Lymph

## 2021-05-24 NOTE — HISTORY OF PRESENT ILLNESS
[FreeTextEntry1] : 80M h/o CAD s/p CABG c/b sepsis and sternal wound infection s/p sternum removal and pec flap and angioplasty, chronic HFrEF, p Afib (on Eliquis), HTN, DM2, and Bell's Palsy recently moved to [place] for few months from [place] presented to [facility] on 5/9/21  with complaints of back pain and dizziness after a mechanical fall also noted for the last few months he has been having continued shortness of breath and leg swelling, and was recently started on Lasix in addition to metolazone by his cardiologist in [place], repeat CT head no cerebral bleed, admitted for ADHF/bi-V faiure and KEN on CKD (Cr. 2.4), TTE with LV EF 35-40%, underwent IV diuresis, initial noted pBNP 22367 ---> 23236, pharm nuclear stress test without ischemia, RHC done with moderate pHTN Group 2 with PCWP 23, switched to torsemide 40mg, underwent MARLO/CV for pAflutter but reverted back to pAfib, metoprolol dose reduced to 50mg BID and had ILR implanted, had L-elbow laceration wound with bleeding due mechanical fall in the toilet, discharged home on 5/14/21, had episodes of sinus bradycardia 40-50s with recurrent dizziness, metoprolol reduced to 25mg BID, now presents for initial outpatient cardiology visit.  \par  \pantera Reports having intermittent shortness of breath and also continues to have dizziness at times, been on reduced dose of metoprolol 25mg BID since last week, denies syncope, no chest pain. He reports is able to ambulate better with less exertional dyspnea compared to prior to recent hospitalization, no edema since discharge. Has not yet make appointment with nephrology for follow up. L-elbow wound has been healing, no recurrent bleeding back on ASA/Eliquis. \par \par Him and his wife and planning to return to [place] in October for 6 months.

## 2021-05-24 NOTE — HEALTH RISK ASSESSMENT
[] : No [Yes] : Yes [Two or more falls in past year] : Patient reported two or more falls in the past year [0] : 2) Feeling down, depressed, or hopeless: Not at all (0) [FreeTextEntry1] : P [[id number]] : 0 [Low Fat Diet] : low fat [Low Salt Diet] : low salt [Strict Adherence] : strict adherence [Fully functional (bathing, dressing, toileting, transferring, walking, feeding)] : Fully functional (bathing, dressing, toileting, transferring, walking, feeding) [Fully functional (using the telephone, shopping, preparing meals, housekeeping, doing laundry, using] : Fully functional and needs no help or supervision to perform IADLs (using the telephone, shopping, preparing meals, housekeeping, doing laundry, using transportation, managing medications and managing finances)

## 2021-05-24 NOTE — ASSESSMENT
[FreeTextEntry1] : [] HFrEF \par - Euvolemic on exam, EF 35-40%  with severe RV dysfunction\par - Continue Tersemide ,20mg 2 tabs QD with KCl 40mEq\par - Low sodium diet (1500mg /day), 32-40oz fluid restriction,  Daily BP and weight monitor\par \par \par [] bradycardia\par - on exam 42bpm, asymptomatic\par - Metoprolol tartrate lowered to 25mg BID\par - Dr. Duke and Dr. Mantilla made aware\par \par \par [] afib s/p ILR placement\par - regular bradycardia on exam\par - continue Eliquid 5mg BID, ASA 81mg, Lopressor 25mg BID\par - continue ILR transmission daily.\par \par \par [] Left elbow laceration\par - wound healing\par - dressing changed during the visit\par - daily wound care with NaCl cleansing, continue applying bacitracin and telfa, cover with Kerlix, \par \par \par [] HTN\par - slightly elevated SBP in 130s\par - continue hydralazine 50mg BID, Imdur 60mg QD \par \par \par \par [] depression\par - Pt reporting 'well-controlled' but spouse reporting ' pt talks to  family members that he will be up there soon' \par - Continue Sertraline 40mg QD\par \par \par [] BPH\par - stable, no issues with voiding\par - Continue Flomax 0.4mg\par \par [] Care Coordination\par - Follow up with Dr. Duke  \par - Follow up with Dr. Mantilla \par - Follow up with Felicita \par \par Reviewed all medications at length with patient, All questions addressed. Worsening symptoms discussed with pt understanding. No issues or concerns at this time. Pt encouraged to call CCC/LAURA at [phone] w/any questions, concerns or issues. Will continue to follow up with patient status\par \par \par \par \par

## 2021-05-24 NOTE — REVIEW OF SYSTEMS
[Fatigue] : no fatigue [Chest Pain] : no chest pain [Palpitations] : no palpitations [Lower Ext Edema] : no lower extremity edema [Orthopena] : no orthopnea [Shortness Of Breath] : no shortness of breath [Wheezing] : no wheezing [Cough] : no cough [Dyspnea on Exertion] : dyspnea on exertion [Abdominal Pain] : no abdominal pain [Nausea] : no nausea [Constipation] : no constipation [Diarrhea] : no diarrhea [Vomiting] : no vomiting [Heartburn] : no heartburn [Melena] : no melena [Dysuria] : no dysuria [Incontinence] : no incontinence [Hesitancy] : no hesitancy [Hematuria] : no hematuria [Frequency] : no frequency [Joint Pain] : joint pain [Joint Stiffness] : joint stiffness [Itching] : no itching [Headache] : no headache [Dizziness] : no dizziness [Fainting] : no fainting [Confusion] : no confusion [Unsteady Walk] : no ataxia [Memory Loss] : no memory loss [Anxiety] : no anxiety [Depression] : no depression [Negative] : Heme/Lymph [FreeTextEntry9] : Left shoulder pain 7/10 [de-identified] : Left elbow with kerlix dressing

## 2021-05-24 NOTE — PHYSICAL EXAM
[No Acute Distress] : no acute distress [Well Nourished] : well nourished [Well Developed] : well developed [Well-Appearing] : well-appearing [Normal Sclera/Conjunctiva] : normal sclera/conjunctiva [PERRL] : pupils equal round and reactive to light [EOMI] : extraocular movements intact [Normal Outer Ear/Nose] : the outer ears and nose were normal in appearance [Normal Oropharynx] : the oropharynx was normal [No JVD] : no jugular venous distention [Supple] : supple [Thyroid Normal, No Nodules] : the thyroid was normal and there were no nodules present [No Respiratory Distress] : no respiratory distress  [Clear to Auscultation] : lungs were clear to auscultation bilaterally [No Accessory Muscle Use] : no accessory muscle use [Regular Rhythm] : with a regular rhythm [Normal S1, S2] : normal S1 and S2 [No Murmur] : no murmur heard [No Varicosities] : no varicosities [Pedal Pulses Present] : the pedal pulses are present [No Edema] : there was no peripheral edema [No Extremity Clubbing/Cyanosis] : no extremity clubbing/cyanosis [Soft] : abdomen soft [Non Tender] : non-tender [Non-distended] : non-distended [No Masses] : no abdominal mass palpated [No HSM] : no HSM [Normal Bowel Sounds] : normal bowel sounds [Normal Posterior Cervical Nodes] : no posterior cervical lymphadenopathy [Normal Anterior Cervical Nodes] : no anterior cervical lymphadenopathy [No CVA Tenderness] : no CVA  tenderness [No Spinal Tenderness] : no spinal tenderness [No Joint Swelling] : no joint swelling [Grossly Normal Strength/Tone] : grossly normal strength/tone [Normal Gait] : normal gait [Coordination Grossly Intact] : coordination grossly intact [No Focal Deficits] : no focal deficits [Speech Grossly Normal] : speech grossly normal [Memory Grossly Normal] : memory grossly normal [Normal Affect] : the affect was normal [Alert and Oriented x3] : oriented to person, place, and time [Normal Mood] : the mood was normal [Normal Insight/Judgement] : insight and judgment were intact [de-identified] : bradycardia in 40s [de-identified] : no joint swelling on Left shoulder,  [de-identified] : eczema like skin lesions grossly located on face, all extremities, Left elbow with 2cmx 4cm wound with no skin flap.  wound bed healthy wound margin well approximated.

## 2021-05-28 ENCOUNTER — NON-APPOINTMENT (OUTPATIENT)
Age: 81
End: 2021-05-28

## 2021-05-28 ENCOUNTER — EMERGENCY (EMERGENCY)
Facility: HOSPITAL | Age: 81
LOS: 1 days | Discharge: HOME | End: 2021-05-28
Attending: EMERGENCY MEDICINE
Payer: MEDICARE

## 2021-05-28 VITALS
HEART RATE: 75 BPM | RESPIRATION RATE: 16 BRPM | OXYGEN SATURATION: 99 % | SYSTOLIC BLOOD PRESSURE: 135 MMHG | DIASTOLIC BLOOD PRESSURE: 76 MMHG | TEMPERATURE: 98 F

## 2021-05-28 VITALS
HEIGHT: 73 IN | RESPIRATION RATE: 18 BRPM | WEIGHT: 220.9 LBS | TEMPERATURE: 98 F | DIASTOLIC BLOOD PRESSURE: 78 MMHG | OXYGEN SATURATION: 98 % | SYSTOLIC BLOOD PRESSURE: 144 MMHG | HEART RATE: 84 BPM

## 2021-05-28 DIAGNOSIS — Z95.1 PRESENCE OF AORTOCORONARY BYPASS GRAFT: Chronic | ICD-10-CM

## 2021-05-28 LAB
ALBUMIN SERPL ELPH-MCNC: 4.1 G/DL — SIGNIFICANT CHANGE UP (ref 3.3–5.2)
ALP SERPL-CCNC: 149 U/L — HIGH (ref 40–120)
ALT FLD-CCNC: 13 U/L — SIGNIFICANT CHANGE UP
ANION GAP SERPL CALC-SCNC: 13 MMOL/L — SIGNIFICANT CHANGE UP (ref 5–17)
AST SERPL-CCNC: 21 U/L — SIGNIFICANT CHANGE UP
BASOPHILS # BLD AUTO: 0.04 K/UL — SIGNIFICANT CHANGE UP (ref 0–0.2)
BASOPHILS NFR BLD AUTO: 0.5 % — SIGNIFICANT CHANGE UP (ref 0–2)
BILIRUB SERPL-MCNC: 0.3 MG/DL — LOW (ref 0.4–2)
BUN SERPL-MCNC: 85.8 MG/DL — HIGH (ref 8–20)
CALCIUM SERPL-MCNC: 9.9 MG/DL — SIGNIFICANT CHANGE UP (ref 8.6–10.2)
CHLORIDE SERPL-SCNC: 99 MMOL/L — SIGNIFICANT CHANGE UP (ref 98–107)
CO2 SERPL-SCNC: 24 MMOL/L — SIGNIFICANT CHANGE UP (ref 22–29)
CREAT SERPL-MCNC: 2.49 MG/DL — HIGH (ref 0.5–1.3)
EOSINOPHIL # BLD AUTO: 0.03 K/UL — SIGNIFICANT CHANGE UP (ref 0–0.5)
EOSINOPHIL NFR BLD AUTO: 0.4 % — SIGNIFICANT CHANGE UP (ref 0–6)
GLUCOSE SERPL-MCNC: 218 MG/DL — HIGH (ref 70–99)
HCT VFR BLD CALC: 28.8 % — LOW (ref 39–50)
HGB BLD-MCNC: 9 G/DL — LOW (ref 13–17)
IMM GRANULOCYTES NFR BLD AUTO: 0.5 % — SIGNIFICANT CHANGE UP (ref 0–1.5)
LYMPHOCYTES # BLD AUTO: 0.51 K/UL — LOW (ref 1–3.3)
LYMPHOCYTES # BLD AUTO: 6 % — LOW (ref 13–44)
MAGNESIUM SERPL-MCNC: 1.9 MG/DL — SIGNIFICANT CHANGE UP (ref 1.6–2.6)
MCHC RBC-ENTMCNC: 27.9 PG — SIGNIFICANT CHANGE UP (ref 27–34)
MCHC RBC-ENTMCNC: 31.3 GM/DL — LOW (ref 32–36)
MCV RBC AUTO: 89.2 FL — SIGNIFICANT CHANGE UP (ref 80–100)
MONOCYTES # BLD AUTO: 0.46 K/UL — SIGNIFICANT CHANGE UP (ref 0–0.9)
MONOCYTES NFR BLD AUTO: 5.5 % — SIGNIFICANT CHANGE UP (ref 2–14)
NEUTROPHILS # BLD AUTO: 7.36 K/UL — SIGNIFICANT CHANGE UP (ref 1.8–7.4)
NEUTROPHILS NFR BLD AUTO: 87.1 % — HIGH (ref 43–77)
NT-PROBNP SERPL-SCNC: HIGH PG/ML (ref 0–300)
PLATELET # BLD AUTO: 182 K/UL — SIGNIFICANT CHANGE UP (ref 150–400)
POTASSIUM SERPL-MCNC: 5.1 MMOL/L — SIGNIFICANT CHANGE UP (ref 3.5–5.3)
POTASSIUM SERPL-MCNC: 5.4 MMOL/L — HIGH (ref 3.5–5.3)
POTASSIUM SERPL-SCNC: 5.1 MMOL/L — SIGNIFICANT CHANGE UP (ref 3.5–5.3)
POTASSIUM SERPL-SCNC: 5.4 MMOL/L — HIGH (ref 3.5–5.3)
PROT SERPL-MCNC: 7.3 G/DL — SIGNIFICANT CHANGE UP (ref 6.6–8.7)
RBC # BLD: 3.23 M/UL — LOW (ref 4.2–5.8)
RBC # FLD: 16.4 % — HIGH (ref 10.3–14.5)
SODIUM SERPL-SCNC: 136 MMOL/L — SIGNIFICANT CHANGE UP (ref 135–145)
TROPONIN T SERPL-MCNC: 0.05 NG/ML — SIGNIFICANT CHANGE UP (ref 0–0.06)
TROPONIN T SERPL-MCNC: 0.07 NG/ML — HIGH (ref 0–0.06)
WBC # BLD: 8.44 K/UL — SIGNIFICANT CHANGE UP (ref 3.8–10.5)
WBC # FLD AUTO: 8.44 K/UL — SIGNIFICANT CHANGE UP (ref 3.8–10.5)

## 2021-05-28 PROCEDURE — 99222 1ST HOSP IP/OBS MODERATE 55: CPT

## 2021-05-28 PROCEDURE — 99284 EMERGENCY DEPT VISIT MOD MDM: CPT

## 2021-05-28 PROCEDURE — 93010 ELECTROCARDIOGRAM REPORT: CPT

## 2021-05-28 PROCEDURE — 84132 ASSAY OF SERUM POTASSIUM: CPT

## 2021-05-28 PROCEDURE — 71045 X-RAY EXAM CHEST 1 VIEW: CPT | Mod: 26

## 2021-05-28 PROCEDURE — 99284 EMERGENCY DEPT VISIT MOD MDM: CPT | Mod: 25

## 2021-05-28 PROCEDURE — 85025 COMPLETE CBC W/AUTO DIFF WBC: CPT

## 2021-05-28 PROCEDURE — 83880 ASSAY OF NATRIURETIC PEPTIDE: CPT

## 2021-05-28 PROCEDURE — 99283 EMERGENCY DEPT VISIT LOW MDM: CPT

## 2021-05-28 PROCEDURE — 84484 ASSAY OF TROPONIN QUANT: CPT

## 2021-05-28 PROCEDURE — 71045 X-RAY EXAM CHEST 1 VIEW: CPT

## 2021-05-28 PROCEDURE — 83735 ASSAY OF MAGNESIUM: CPT

## 2021-05-28 PROCEDURE — 93880 EXTRACRANIAL BILAT STUDY: CPT

## 2021-05-28 PROCEDURE — 93880 EXTRACRANIAL BILAT STUDY: CPT | Mod: 26

## 2021-05-28 PROCEDURE — 80053 COMPREHEN METABOLIC PANEL: CPT

## 2021-05-28 PROCEDURE — 93005 ELECTROCARDIOGRAM TRACING: CPT

## 2021-05-28 PROCEDURE — 36415 COLL VENOUS BLD VENIPUNCTURE: CPT

## 2021-05-28 RX ORDER — METOPROLOL TARTRATE 50 MG
1 TABLET ORAL
Qty: 10 | Refills: 0
Start: 2021-05-28 | End: 2021-06-06

## 2021-05-28 RX ORDER — MAGNESIUM OXIDE 241.3 MG/1000MG
400 TABLET ORAL DAILY
Refills: 0 | Status: DISCONTINUED | COMMUNITY
Start: 2021-05-19 | End: 2021-05-28

## 2021-05-28 NOTE — ED PROVIDER NOTE - NSFOLLOWUPINSTRUCTIONS_ED_ALL_ED_FT
-PLEASE START YOUR METOPROLOL 25MG DAILY.  -PLEASE FOLLOW UP WITH YOUR CARDIOLOGIST AND THE ELECTROPHYSIOLOGIST.       Chest Pain    Chest pain can be caused by many different conditions which may or may not be dangerous. Causes include heartburn, lung infections, heart attack, blood clot in lungs, skin infections, strain or damage to muscle, cartilage, or bones, etc. In addition to a history and physical examination, an electrocardiogram (ECG) or other lab tests may have been performed to determine the cause of your chest pain. Follow up with your primary care provider or with a cardiologist as instructed.     SEEK IMMEDIATE MEDICAL CARE IF YOU HAVE ANY OF THE FOLLOWING SYMPTOMS: worsening chest pain, coughing up blood, unexplained back/neck/jaw pain, severe abdominal pain, dizziness or lightheadedness, fainting, shortness of breath, sweaty or clammy skin, vomiting, or racing heart beat. These symptoms may represent a serious problem that is an emergency. Do not wait to see if the symptoms will go away. Get medical help right away. Call 911 and do not drive yourself to the hospital.

## 2021-05-28 NOTE — ED PROVIDER NOTE - PMH
Atrial fibrillation, unspecified type    Bell's palsy  Right side of face  Congestive heart failure, unspecified HF chronicity, unspecified heart failure type    Coronary artery disease involving native heart, angina presence unspecified, unspecified vessel or lesion type    Essential hypertension    Osteomyelitis of sternum    Type 2 diabetes mellitus without complication, unspecified whether long term insulin use     Atrial fibrillation, unspecified type    Bell's palsy  Right side of face  Chronic kidney disease, unspecified CKD stage    Congestive heart failure, unspecified HF chronicity, unspecified heart failure type    Coronary artery disease involving native heart, angina presence unspecified, unspecified vessel or lesion type    Essential hypertension    Osteomyelitis of sternum    Type 2 diabetes mellitus without complication, unspecified whether long term insulin use

## 2021-05-28 NOTE — ED ADULT NURSE NOTE - PSH
S/P CABG (coronary artery bypass graft)  complicated by osteomyleitis of the sternum and sepsis; sternum removed

## 2021-05-28 NOTE — ED PROVIDER NOTE - CARE PROVIDER_API CALL
Gamal Duke (DO)  Cardiology; Internal Medicine  [address] [place] [address], [place] [address]  Phone: [phone]  Fax: [phone]  Follow Up Time: 1-3 Days    Hunter Suggs)  Cardiology; Internal Medicine  [address] [place] [address], [place] [address]  Phone: [phone]  Fax: [phone]  Follow Up Time: 1-3 Days

## 2021-05-28 NOTE — CONSULT NOTE ADULT - SUBJECTIVE AND OBJECTIVE BOX
[place] [facility] Faculty Practice                                                               Office: [address] [address]                                                              Telephone: [phone]. Fax:[phone]                                                                        CARDIOLOGY CONSULTATION NOTE                                                                                             Chief complaint:  Dizziness      HPI:  80M h/o CAD s/p CABG c/b sepsis and sternal wound infection s/p sternum removal and pec flap and angioplasty, chronic HFrEF, p Afib (on Eliquis), HTN, DM2, and Bell's Palsy recently moved to [place] for few months from [place] presented to [facility] on 5/9/21 with complaints of back pain and dizziness after a mechanical fall also noted for the last few months he has been having continued shortness of breath and leg swelling, and was recently started on Lasix in addition to metolazone by his cardiologist in [place], repeat CT head no cerebral bleed, admitted for ADHF/bi-V faiure and KEN on CKD (Cr. 2.4), TTE with LV EF 35-40%, underwent IV diuresis, initial noted pBNP 76309 ---> 92299, pharm nuclear stress test without ischemia, RHC done with moderate pHTN Group 2 with PCWP 23, switched to torsemide 40mg, underwent MARLO/CV for pAflutter but reverted back to pAfib, metoprolol dose reduced to 50mg BID and had ILR implanted, had L-elbow laceration wound with bleeding due mechanical fall in the toilet, discharged home on 5/14/21, had episodes of sinus bradycardia 40-50s with recurrent dizziness, metoprolol reduced to 25mg BID, seen on 5/24/21 at  outpatient cardiology visit had metoprolol discontinued for sinus bradycardia, uptitrated hydralazine dose to 50mg TID for uncontrolled HTN/HFrEF, presents today with episodes of recurring dizziness, nausea and vomited x1, reports also L-sided chest pressure for few minutes, vitals with /70s in the ER.      Patient reports still with recurring dizziness despite off metoprolol, BP has not been low and actually elevated SBP 180s in the AM, reports an episode of L-sided chest pressure at rest this morning, denies exertional discomfort, [place] and felt dizzy with nausea and vomited x1 with some yellow fluid, denies abdominal discomfort and currently he's feeling better.       REVIEW OF SYMPTOMS:     CONSTITUTIONAL: No fever, weight loss, or fatigue  ENMT:  No difficulty hearing, tinnitus, vertigo; No sinus or throat pain  NECK: No pain or stiffness  CARDIOVASCULAR: as per HPI  RESPIRATORY: No Dyspnea on exertion, Shortness of breath, cough, wheezing  : No dysuria, no hematuria   GI: No dark color stool, no melena, no diarrhea, no constipation, no abdominal pain   NEURO: No headache, no dizziness, no slurred speech   MUSCULOSKELETAL: No joint pain or swelling; No muscle, back, or extremity pain  PSYCH: No agitation, no anxiety.    ALL OTHER REVIEW OF SYSTEMS ARE NEGATIVE.      PREVIOUS DIAGNOSTIC TESTING      DIAGNOSTIC TESTING:  [ ] Echocardiogram:   < from: TTE Echo Complete w/ Contrast w/ Doppler (05.09.21 @ 08:59) >  Summary:   1. Technically difficult study.   2. Endocardial visualization was enhanced with intravenous echo contrast.   3. Left ventricularejection fraction, by visual estimation, is 35 to 40%.   4. Paradoxical septal motion with moderately decreased segmental left ventricular systolic function.   5. Severe hypokinesis of the basal to mid segements, the apex and distal segments are preserved.   6. The mitral in-flow pattern reveals no discernable A-wave, therefore no comment on diastolic function can be made.   7. Severely enlarged right ventricle with severely reduced systolic function, estimated PASP least 45 mmHg mild to moderately elevated.   8. Right atrial enlargement.   9. Moderately enlarged left atrium.  10. Mild mitral annular calcification.  11. Trace mitral valve regurgitation.  12. Thickening and calcification of the anterior and posterior mitral valve leaflets.  13. Mild-moderate tricuspid regurgitation.  14. Sclerotic aortic valve with decreased opening.  15. There is mild aortic root calcification.  16. There is no evidence of pericardial effusion.  17. No prior study available for comparison.    < end of copied text >    [ ]  Catheterization:  < from: Cardiac Cath Lab - Adult (05.11.21 @ 16:42) >  INTERVENTIONAL IMPRESSIONS: Moderate Elevation of Right Heart Pressures:  RA=15 mmHg; RV=61/18 mmHg; PCWP=23 mmHg  Moderate Pulmonary Hypertension=62/22 mmHg  Reduced CO=4.8 L/min and CI=2.06 L/min/m2      ALLERGIES: Allergies    No Known Allergies        PAST MEDICAL HISTORY  see HPI      PAST SURGICAL HISTORY        FAMILY HISTORY:        SOCIAL HISTORY:    CIGARETTES:   former  ALCOHOL: denies  DRUGS: denies      CURRENT MEDICATIONS:  MEDICATIONS  (STANDING):    MEDICATIONS  (PRN):         HOME MEDICATIONS:  reviewed      Vital Signs Last 24 Hrs  T(C): 36.8 (05-28-21 @ 13:53), Max: 36.8 (05-28-21 @ 13:53)  T(F): 98.2 (05-28-21 @ 13:53), Max: 98.2 (05-28-21 @ 13:53)  HR: 84 (05-28-21 @ 13:53) (84 - 84)  BP: 144/78 (05-28-21 @ 13:53) (144/78 - 144/78)  BP(mean): --  RR: 18 (05-28-21 @ 13:53) (18 - 18)  SpO2: 98% (05-28-21 @ 13:53) (98% - 98%)  I&O's Summary      Appearance: Comfortable. No acute distress  HEENT:  Head and neck: Atraumatic. Normocephalic.  Normal oral mucosa, PERRL, Neck is supple. No JVD, No carotid bruit.   Neurologic: A&Ox 3, no focal deficits. EOMI, Cranial nerves are intact.  Lymphatic: No cervical lymphadenopathy  Cardiovascular: Normal S1 S2, RRR, No murmur, rubs/gallops. No JVD, No edema  Respiratory: Lungs clear to auscultation  Gastrointestinal:  Soft, Non-tender, + BS  Lower Extremities: No edema  Psychiatry: Patient is calm. No agitation. Mood & affect appropriate  Skin: No rashes/ecchymoses/cyanosis/ulcers visualized on the face, hands or feet. L-elbow wound granulation tissue      Labs:                         9.0    8.44  )-----------( 182      ( 28 May 2021 15:45 )             28.8     05-28    136  |  99  |  85.8<H>  ----------------------------<  218<H>  5.4<H>   |  24.0  |  2.49<H>    Ca    9.9      28 May 2021 15:45  Mg     1.9     05-28    TPro  7.3  /  Alb  4.1  /  TBili  0.3<L>  /  DBili  x   /  AST  21  /  ALT  13  /  AlkPhos  149<H>  05-28     28 May 2021 15:45 Troponin 0.07 ng/mL / Creatine Kinase x     /  CKMB x     / CPK Mass Assay % x       08 May 2021 18:28 Troponin 0.04 ng/mL / Creatine Kinase x     /  CKMB x     / CPK Mass Assay % x       08 May 2021 08:39 Troponin 0.04 ng/mL / Creatine Kinase 155 U/L /  CKMB 6.1 ng/mL / CPK Mass Assay % x          Serum Pro-Brain Natriuretic Peptide: 21265 pg/mL (05-28-21 @ 15:45)  Serum Pro-Brain Natriuretic Peptide: 33662 pg/mL (05-11-21 @ 06:10)  Serum Pro-Brain Natriuretic Peptide: 29398 pg/mL (05-09-21 @ 11:25)  Serum Pro-Brain Natriuretic Peptide: 36144 pg/mL (05-09-21 @ 06:06)  Serum Pro-Brain Natriuretic Peptide: 77321 pg/mL (05-08-21 @ 08:39)      A1C with Estimated Average Glucose Result: 7.5 % (05-09-21 @ 06:06)        ECG:  Sinus with RBBB, LAFB

## 2021-05-28 NOTE — ED PROVIDER NOTE - CLINICAL SUMMARY MEDICAL DECISION MAKING FREE TEXT BOX
patient with chest pain and diaphoresis, found to have episodes of aflutter during these episodes. seen by cardiology and EP who recommends restarting Metoprolol at 25mg daily.

## 2021-05-28 NOTE — CONSULT NOTE ADULT - ASSESSMENT
80M h/o CAD s/p CABG c/b sepsis and sternal wound infection s/p sternum removal and pec flap and angioplasty, chronic HFrEF, p Afib (on Eliquis), HTN, DM2, and Bell's Palsy recently moved to [place] for few months from [place] presented to [facility] on 5/9/21 with complaints of back pain and dizziness after a mechanical fall also noted for the last few months he has been having continued shortness of breath and leg swelling, and was recently started on Lasix in addition to metolazone by his cardiologist in [place], repeat CT head no cerebral bleed, admitted for ADHF/bi-V faiure and KEN on CKD (Cr. 2.4), TTE with LV EF 35-40%, underwent IV diuresis, initial noted pBNP 01484 ---> 34371, pharm nuclear stress test without ischemia, RHC done with moderate pHTN Group 2 with PCWP 23, switched to torsemide 40mg, underwent MARLO/CV for pAflutter but reverted back to pAfib, metoprolol dose reduced to 50mg BID and had ILR implanted, had L-elbow laceration wound with bleeding due mechanical fall in the toilet, discharged home on 5/14/21, had episodes of sinus bradycardia 40-50s with recurrent dizziness, metoprolol reduced to 25mg BID, seen on 5/24/21 at  outpatient cardiology visit had metoprolol discontinued for sinus bradycardia, uptitrated hydralazine dose to 50mg TID for uncontrolled HTN/HFrEF, presents today with episodes of recurring dizziness, nausea and vomited x1, reports also L-sided chest pressure for few minutes, vitals with /70s in the ER.        Recurring dizziness despite off metoprolol and normotensive, lab work overall similar to prior baseline except for mildly elevated Trop, pBNP near baseline and appear euvolemic, will check ILR for event if correlating to recent symptoms 80M h/o CAD s/p CABG c/b sepsis and sternal wound infection s/p sternum removal and pec flap and angioplasty, chronic HFrEF, p Afib (on Eliquis), HTN, DM2, and Bell's Palsy recently moved to [place] for few months from [place] presented to [facility] on 5/9/21 with complaints of back pain and dizziness after a mechanical fall also noted for the last few months he has been having continued shortness of breath and leg swelling, and was recently started on Lasix in addition to metolazone by his cardiologist in [place], repeat CT head no cerebral bleed, admitted for ADHF/bi-V faiure and KEN on CKD (Cr. 2.4), TTE with LV EF 35-40%, underwent IV diuresis, initial noted pBNP 34994 ---> 64987, pharm nuclear stress test without ischemia, RHC done with moderate pHTN Group 2 with PCWP 23, switched to torsemide 40mg, underwent MARLO/CV for pAflutter but reverted back to pAfib, metoprolol dose reduced to 50mg BID and had ILR implanted, had L-elbow laceration wound with bleeding due mechanical fall in the toilet, discharged home on 5/14/21, had episodes of sinus bradycardia 40-50s with recurrent dizziness, metoprolol reduced to 25mg BID, seen on 5/24/21 at  outpatient cardiology visit had metoprolol discontinued for sinus bradycardia, uptitrated hydralazine dose to 50mg TID for uncontrolled HTN/HFrEF, presents today with episodes of recurring dizziness, nausea and vomited x1, reports also L-sided chest pressure for few minutes, vitals with /70s in the ER.        Recurring dizziness despite off metoprolol and normotensive, lab work overall similar to prior baseline except for mildly elevated Trop, pBNP near baseline and appear euvolemic, ILR checked showed episodes of narrow complex tachycardia 100-110s around 12pm today and yesterday correlated to the time of dizziness event, suspect sinus tach vs. AT per EPS    1. Dizziness  -suspect symptomatic from vagal vs. AT- trial to restart low dose metoprolol succinate 25mg daily  -continue home BP monitoring  -consider MRI head if recurring dizziness     2. Mildly elevated Trop  -can get 2nd set in 4hrs if no further elevation then stable for discharge home  -continue medical management for known CAD, advanced renal insuff. not candidate for invasive workup/intervention for now.     3. HFrEF, CAD/ischemic cardiomyopathy  -euvolemic, continue Torsemide 40mg + reduce KCl to 20 mEq  -continue ASA/statin    4. HTN  -continue hydralazine 50mg TID and Imdur, would tolerate higher BP goals for now given recurrent dizziness complains    5. pAfib/flutter, sinus bradycardia  -resume low dose metoprolol succinate 25mg- monitor with ILR for symptomatic activation  -continue Eliquis 5mg BID  -plan for elective Afib/flutter ablation as outpatient with Dr. Annalee Hammer for discharge home tonight if 2nd Trop without further elevation.   Will arrange earlier followup  to see me in the office within 2 weeks. Follow up with EPS in the office as well.         Gamal Duke DO, [facility]  Faculty Non-Invasive Cardiologist  [phone] 80M h/o CAD s/p CABG c/b sepsis and sternal wound infection s/p sternum removal and pec flap and angioplasty, chronic HFrEF, p Afib (on Eliquis), HTN, DM2, and Bell's Palsy recently moved to [place] for few months from [place] presented to [facility] on 5/9/21 with complaints of back pain and dizziness after a mechanical fall also noted for the last few months he has been having continued shortness of breath and leg swelling, and was recently started on Lasix in addition to metolazone by his cardiologist in [place], repeat CT head no cerebral bleed, admitted for ADHF/bi-V faiure and KEN on CKD (Cr. 2.4), TTE with LV EF 35-40%, underwent IV diuresis, initial noted pBNP 72085 ---> 78974, pharm nuclear stress test without ischemia, RHC done with moderate pHTN Group 2 with PCWP 23, switched to torsemide 40mg, underwent MARLO/CV for pAflutter but reverted back to pAfib, metoprolol dose reduced to 50mg BID and had ILR implanted, had L-elbow laceration wound with bleeding due mechanical fall in the toilet, discharged home on 5/14/21, had episodes of sinus bradycardia 40-50s with recurrent dizziness, metoprolol reduced to 25mg BID, seen on 5/24/21 at  outpatient cardiology visit had metoprolol discontinued for sinus bradycardia, uptitrated hydralazine dose to 50mg TID for uncontrolled HTN/HFrEF, presents today with episodes of recurring dizziness, nausea and vomited x1, reports also L-sided chest pressure for few minutes, vitals with /70s in the ER.        Recurring dizziness despite off metoprolol and normotensive, lab work overall similar to prior baseline except for mildly elevated Trop, pBNP near baseline and appear euvolemic, ILR checked showed episodes of narrow complex tachycardia 100-110s around 12pm today and yesterday correlated to the time of dizziness event, suspect sinus tach vs. AT per EPS    1. Dizziness  -suspect symptomatic from vagal vs. AT- trial to restart low dose metoprolol succinate 25mg daily  -continue home BP monitoring  -carotid duplex to rule out ROSA  -consider MRI head if recurring dizziness     2. Mildly elevated Trop  -can get 2nd set in 4hrs if no further elevation then stable for discharge home  -continue medical management for known CAD, advanced renal insuff. not candidate for invasive workup/intervention for now.     3. HFrEF, CAD/ischemic cardiomyopathy  -euvolemic, continue Torsemide 40mg + reduce KCl to 20 mEq  -continue ASA/statin    4. HTN  -continue hydralazine 50mg TID and Imdur, would tolerate higher BP goals for now given recurrent dizziness complains    5. pAfib/flutter, sinus bradycardia  -resume low dose metoprolol succinate 25mg- monitor with ILR for symptomatic activation  -continue Eliquis 5mg BID  -plan for elective Afib/flutter ablation as outpatient with Dr. Annalee Hammer for discharge home tonight if 2nd Trop without further elevation.   Will arrange earlier followup  to see me in the office within 2 weeks. Follow up with EPS in the office as well.         Gamal Duke DO, [facility]  Faculty Non-Invasive Cardiologist  [phone]

## 2021-05-28 NOTE — ED PROVIDER NOTE - OBJECTIVE STATEMENT
80 y.o M with PMH of CAD s/p CABG 3 years ago, Afib, HTN, DM, HLD, CVA presenting to the ED with chest pain. Patient [place] post [place] when he had an abrupt onset of chest pain, leg weakness, lightheadedness and nausea. He states he was able to ambulate back to his car 80 y.o M with PMH of CAD s/p CABG 3 years ago complicated by osteomyelitis of the sternum, Afib, HTN, DM, HLD, CVA presenting to the ED with chest pain. Patient states [place] post [place] when he had an abrupt onset of chest pain, leg weakness, lightheadedness and nausea. He describes the chest pain as pressure, and rates it a 4/10 in severity. He states he was able to ambulate back to his car and then went home. At home while ambulating into the house, his symptoms started again. He states when he got into the house he started dry heaving, and had 4-5 episodes of clear emesis. He also had a bowel movement at that time, looser stool but not diarrhea. He reports a similar episode 2 days ago, but that today's was much worse. He called his cardiologist's office, Dr. Duke, and was advised to come to the ED. Patient endorses a 19lb weight loss since last hospitalization at [facility] about 2 weeks ago. He denies fever, SOB, syncope, confusion, jaw pain, arm pain, numbness, paresthesias. Patient states he was taken off metoprolol 1 week ago due to bradycardia and started on new medications, that he can not recall at this time. 80 y.o M with PMH of CAD s/p CABG 3 years ago c/b osteomyelitis of sternum and sternum removal, Afib, CHF, CKD, HTN, DM, CVA, HLD, Bell's Palsy presenting to the ED with chest pain. Patient states [place] when he had an abrupt onset of chest pain, leg weakness, lightheadedness and nausea. He describes the chest pain as pressure, and rates it a 4/10 in severity. He states he was able to ambulate back to his car and then went home. At home while ambulating into the house, his symptoms started again. He states when he got into the house he started dry heaving, and had 4-5 episodes of clear emesis. He also had a bowel movement at that time, looser stool but not diarrhea. He reports a similar episode 2 days ago, but that today's was much worse. He called his cardiologist's office, Dr. Duke, and was advised to come to the ED. Patient endorses a 19lb weight loss since last hospitalization at [facility] about 2 weeks ago. He denies fever, SOB, syncope, confusion, jaw pain, arm pain, numbness, paresthesias. Patient states he was taken off metoprolol 1 week ago due to bradycardia and started on new medications, that he can not recall at this time.

## 2021-05-28 NOTE — ED PROVIDER NOTE - PATIENT PORTAL LINK FT
You can access the FollowMyHealth Patient Portal offered by [facility] by registering at the following website: [email or web address]. By joining [employer]’s FollowMyHealth portal, you will also be able to view your health information using other applications (apps) compatible with our system.

## 2021-05-28 NOTE — CONSULT NOTE ADULT - SUBJECTIVE AND OBJECTIVE BOX
80 year old male patient with a known history of CAD s/p CABG c/b sepsis and sternal wound infection s/p sternum removal and pec flap and angioplasty, chronic HFrEF, EF 30-35%, RBBB and LPHB conduction, paroxysmal Afib (on Eliquis), HTN, DM2, and Bell's Palsy with recent hospitalization earlier in the month for mechanical fall, CHF exacerbation and underwent an ILR implantation. The patient presents to the ER once again with episodes of recurring dizziness, nausea, and vomiting x 1. Recently was found to be bradycardic on his ILR and beta blockers held. Patient reported some left sided chest pressure at rest this AM and exertional dyspnea [place] [employer]. He pressed his ILR symptom activator and arrived in the ER.     PAST MEDICAL & SURGICAL HISTORY:  Coronary artery disease involving native heart, angina presence unspecified, unspecified vessel or lesion type  Atrial fibrillation, unspecified type  Congestive heart failure, unspecified HF chronicity, unspecified heart failure type  Bell&#x27;s palsy  Right side of face  Essential hypertension  Type 2 diabetes mellitus without complication, unspecified whether long term insulin use  Osteomyelitis of sternum  S/P CABG (coronary artery bypass graft)  complicated by osteomyleitis of the sternum and sepsis; sternum removed    REVIEW OF SYSTEMS  General:	  Skin/Breast:  Ophthalmologic:	  ENMT:	  Respiratory and Thorax:  Cardiovascular:	  Gastrointestinal:	  Genitourinary:	  Musculoskeletal:	  Neurological:	  Psychiatric:	    MEDICATIONS  (STANDING):    Allergies  No Known Allergies    SOCIAL HISTORY:    FAMILY HISTORY:  No pertinent family history in first degree relatives    Vital Signs Last 24 Hrs  T(C): 36.8 (28 May 2021 13:53), Max: 36.8 (28 May 2021 13:53)  T(F): 98.2 (28 May 2021 13:53), Max: 98.2 (28 May 2021 13:53)  HR: 84 (28 May 2021 13:53) (84 - 84)  BP: 144/78 (28 May 2021 13:53) (144/78 - 144/78)  RR: 18 (28 May 2021 13:53) (18 - 18)  SpO2: 98% (28 May 2021 13:53) (98% - 98%)    Physical Exam:  Constitutional: AAOx3, NAD  Neck: supple, No JVD  Cardiovascular: +S1S2 RRR, no murmurs, rubs, gallops   Pulmonary: CTA b/l, unlabored, no wheezes, rales. rhonci  Abdomen: +BS, soft NTND  Extremities: no edema b/l, +distal pulses b/l  Neuro: non focal, speech clear, HONG x 4    LABS:                        9.0    8.44  )-----------( 182      ( 28 May 2021 15:45 )             28.8     136  |  99  |  85.8<H>  ----------------------------<  218<H>  5.4<H>   |  24.0  |  2.49<H>  Ca    9.9      28 May 2021 15:45  Mg     1.9     05-28  TPro  7.3  /  Alb  4.1  /  TBili  0.3<L>  /  DBili  x   /  AST  21  /  ALT  13  /  AlkPhos  149<H>  05-28  LIVER FUNCTIONS - ( 28 May 2021 15:45 )  Alb: 4.1 g/dL / Pro: 7.3 g/dL / ALK PHOS: 149 U/L / ALT: 13 U/L / AST: 21 U/L / GGT: x         CARDIAC MARKERS ( 28 May 2021 15:45 )  x     / 0.07 ng/mL / x     / x     / x        RADIOLOGY & ADDITIONAL STUDIES:  MARLO 5/11/21  Summary:   1. Left ventricular ejection fraction, by visual estimation, is 30 to 35%.   2. Moderately decreased global left ventricular systolic function.   3. Severely enlarged right atrium.   4. Severely enlarged right ventricle.   5. Severely reduced RV systolic function.   6. Mild to moderately enlarged left atrium.   7. There is no evidence of pericardial effusion.   8. Mild mitral annular calcification.   9. Mild mitral valve regurgitation.  10. Moderate-severe tricuspid regurgitation.  11. Color flow doppler and intravenous injection of agitated saline demonstrates the presence of an intact intra atrial septum.  12. No left atrial appendage thrombus and decreased left atrial appendage velocities.    TTE 5/9/21  Summary:   1. Technically difficult study.   2. Endocardial visualization was enhanced with intravenous echo contrast.   3. Left ventricular ejection fraction, by visual estimation, is 35 to 40%.   4. Paradoxical septal motion with moderately decreased segmental left ventricular systolic function.   5. Severe hypokinesis of the basal to mid segements, the apex and distal segments are preserved.   6. The mitral in-flow pattern reveals no discernable A-wave, therefore no comment on diastolic function can be made.   7. Severely enlarged right ventricle with severely reduced systolic function, estimated PASP least 45 mmHg mild to moderately elevated.   8. Right atrial enlargement.   9. Moderately enlarged left atrium.  10. Mild mitral annular calcification.  11. Trace mitral valve regurgitation.  12. Thickening and calcification of the anterior and posterior mitral valve leaflets.  13. Mild-moderate tricuspid regurgitation.  14. Sclerotic aortic valve with decreased opening.  15. There is mild aortic root calcification.  16. There is no evidence of pericardial effusion.  17. No prior study available for comparison.    Cardiac Cath 5/11/21  COMPLICATIONS: No complications occurred during the cath lab visit.  DIAGNOSTIC IMPRESSIONS: Moderate Elevation of Right Heart Pressures:  RA=15 mmHg; RV=61/18 mmHg; PCWP=23 mmHg  Moderate Pulmonary Hypertension=62/22 mmHg  Reduced CO=4.8 L/min and CI=2.06 L/min/m2  DIAGNOSTIC RECOMMENDATIONS: The patient should continue with the present  medications. Patient management should include aggressive medical therapy,  close monitoring of BUN and creatinine, and resumption of all previous  activities in 2 days. Medical management is recommended.  INTERVENTIONAL IMPRESSIONS: Moderate Elevation of Right Heart Pressures:  RA=15 mmHg; RV=61/18 mmHg; PCWP=23 mmHg  Moderate Pulmonary Hypertension=62/22 mmHg  Reduced CO=4.8 L/min and CI=2.06 L/min/m2  Prepared and signed by  Mickey Pressley MD    CXR 5/28/21  Heart is enlarged. Left loop recorder noted. Loop recorder may be new since May 8.  Lungs are clear.  Chest is similar to prior.  IMPRESSION: Loop recorder presently seen possibly new. Heart enlargement and sternotomy again seen.    A/P       80 year old male patient with a known history of CAD s/p CABG c/b sepsis and sternal wound infection s/p sternum removal and pec flap and angioplasty, chronic HFrEF, EF 30-35%, RBBB and LPHB conduction, paroxysmal Afib (on Eliquis), HTN, DM2, and Bell's Palsy with recent hospitalization earlier in the month for mechanical fall, CHF exacerbation and underwent an ILR implantation. The patient presents to the ER once again with episodes of recurring dizziness, nausea, and vomiting x 1. Recently was found to be bradycardic on his ILR and beta blockers held. Patient reported some left sided chest pressure at rest this AM and exertional dyspnea [place] [employer]. He pressed his ILR symptom activator and arrived in the ER.     Patient reports that he has felt weak and tired for the past day or so. Today he went to the post office and as he was standing at the counter became weak, dizzy and very nauseous. He sat down and after some time felt better. He stood up and felt fine and drove home. Once at home he felt again dizzy, nauseous and vomited once. He denies any palpitations or sweating. No chest pain. He was recetly off beta blockers due to recordings of sinus bradycardia. ILR interrogation reveals sinus tachycardia vs slow ATach with rates in the 100s.     PAST MEDICAL & SURGICAL HISTORY:  Coronary artery disease involving native heart, angina presence unspecified, unspecified vessel or lesion type  Atrial fibrillation, unspecified type  Congestive heart failure, unspecified HF chronicity, unspecified heart failure type  Bell&#x27;s palsy  Right side of face  Essential hypertension  Type 2 diabetes mellitus without complication, unspecified whether long term insulin use  Osteomyelitis of sternum  S/P CABG (coronary artery bypass graft)  complicated by osteomyleitis of the sternum and sepsis; sternum removed    REVIEW OF SYSTEMS  General:- fever or chills, +fatigue	  Skin/Breast: - rashes  Ophthalmologic: - blurred vision	  ENMT: - sore throat  Respiratory and Thorax: +PINA, - chest pain  Cardiovascular:	- palpitations,   Gastrointestinal:	+N, +V  Genitourinary: - dysuria  Musculoskeletal:	 - arthritis  Neurological: - weaknesses  Psychiatric: - anxiety    MEDICATIONS  (STANDING):    Allergies  No Known Allergies    SOCIAL HISTORY: non smoker, non drinker    FAMILY HISTORY:  No pertinent family history in first degree relatives    Vital Signs Last 24 Hrs  T(C): 36.8 (28 May 2021 13:53), Max: 36.8 (28 May 2021 13:53)  T(F): 98.2 (28 May 2021 13:53), Max: 98.2 (28 May 2021 13:53)  HR: 84 (28 May 2021 13:53) (84 - 84)  BP: 144/78 (28 May 2021 13:53) (144/78 - 144/78)  RR: 18 (28 May 2021 13:53) (18 - 18)  SpO2: 98% (28 May 2021 13:53) (98% - 98%)    Physical Exam:  Constitutional: AAOx3, NAD  Neck: supple, No JVD  Cardiovascular: +S1S2 RRR, 2/6 MARGOT  Pulmonary: CTA b/l, unlabored, no wheezes, rales. No rhonchi  Abdomen: +BS, soft NTND  Extremities: no edema b/l,   Neuro: non focal, speech clear, HONG x 4    LABS:                        9.0    8.44  )-----------( 182      ( 28 May 2021 15:45 )             28.8     136  |  99  |  85.8<H>  ----------------------------<  218<H>  5.4<H>   |  24.0  |  2.49<H>  Ca    9.9      28 May 2021 15:45  Mg     1.9     05-28  TPro  7.3  /  Alb  4.1  /  TBili  0.3<L>  /  DBili  x   /  AST  21  /  ALT  13  /  AlkPhos  149<H>  05-28  LIVER FUNCTIONS - ( 28 May 2021 15:45 )  Alb: 4.1 g/dL / Pro: 7.3 g/dL / ALK PHOS: 149 U/L / ALT: 13 U/L / AST: 21 U/L / GGT: x         CARDIAC MARKERS ( 28 May 2021 15:45 )  x     / 0.07 ng/mL / x     / x     / x        RADIOLOGY & ADDITIONAL STUDIES:  MARLO 5/11/21  Summary:   1. Left ventricular ejection fraction, by visual estimation, is 30 to 35%.   2. Moderately decreased global left ventricular systolic function.   3. Severely enlarged right atrium.   4. Severely enlarged right ventricle.   5. Severely reduced RV systolic function.   6. Mild to moderately enlarged left atrium.   7. There is no evidence of pericardial effusion.   8. Mild mitral annular calcification.   9. Mild mitral valve regurgitation.  10. Moderate-severe tricuspid regurgitation.  11. Color flow doppler and intravenous injection of agitated saline demonstrates the presence of an intact intra atrial septum.  12. No left atrial appendage thrombus and decreased left atrial appendage velocities.    TTE 5/9/21  Summary:   1. Technically difficult study.   2. Endocardial visualization was enhanced with intravenous echo contrast.   3. Left ventricular ejection fraction, by visual estimation, is 35 to 40%.   4. Paradoxical septal motion with moderately decreased segmental left ventricular systolic function.   5. Severe hypokinesis of the basal to mid segements, the apex and distal segments are preserved.   6. The mitral in-flow pattern reveals no discernable A-wave, therefore no comment on diastolic function can be made.   7. Severely enlarged right ventricle with severely reduced systolic function, estimated PASP least 45 mmHg mild to moderately elevated.   8. Right atrial enlargement.   9. Moderately enlarged left atrium.  10. Mild mitral annular calcification.  11. Trace mitral valve regurgitation.  12. Thickening and calcification of the anterior and posterior mitral valve leaflets.  13. Mild-moderate tricuspid regurgitation.  14. Sclerotic aortic valve with decreased opening.  15. There is mild aortic root calcification.  16. There is no evidence of pericardial effusion.  17. No prior study available for comparison.    Cardiac Cath 5/11/21  COMPLICATIONS: No complications occurred during the cath lab visit.  DIAGNOSTIC IMPRESSIONS: Moderate Elevation of Right Heart Pressures:  RA=15 mmHg; RV=61/18 mmHg; PCWP=23 mmHg  Moderate Pulmonary Hypertension=62/22 mmHg  Reduced CO=4.8 L/min and CI=2.06 L/min/m2  DIAGNOSTIC RECOMMENDATIONS: The patient should continue with the present  medications. Patient management should include aggressive medical therapy,  close monitoring of BUN and creatinine, and resumption of all previous  activities in 2 days. Medical management is recommended.  INTERVENTIONAL IMPRESSIONS: Moderate Elevation of Right Heart Pressures:  RA=15 mmHg; RV=61/18 mmHg; PCWP=23 mmHg  Moderate Pulmonary Hypertension=62/22 mmHg  Reduced CO=4.8 L/min and CI=2.06 L/min/m2  Prepared and signed by  Mickey Pressley MD    CXR 5/28/21  Heart is enlarged. Left loop recorder noted. Loop recorder may be new since May 8.  Lungs are clear.  Chest is similar to prior.  IMPRESSION: Loop recorder presently seen possibly new. Heart enlargement and sternotomy again seen.    A/P  80 year old male patient with a known history of CAD s/p CABG c/b sepsis and sternal wound infection s/p sternum removal and pec flap and angioplasty, chronic HFrEF, EF 30-35%, RBBB and LPHB conduction, paroxysmal Afib (on Eliquis), HTN, DM2, and Bell's Palsy who presents with dizziness/near syncope and ILR recordings of sinus tachycardia vs atrial tachycardia    Recently stopped low dose beta blockers    - Reasonable to resume half dose beta blockers at this time  - Outpatient EP follow up with Dr. Mantilla on 6/7/21  - No obstruction to discharge from EP perspective.

## 2021-05-28 NOTE — ED ADULT TRIAGE NOTE - CHIEF COMPLAINT QUOTE
c/o chest pain and feeling light-headed and dizzy x 2 hours c/o feeling nauseous and vomited bile a few times

## 2021-05-28 NOTE — ED PROVIDER NOTE - ATTENDING CONTRIBUTION TO CARE
80yoM; with a past medical history of CAD s/p CABG complicated by sternal osteomyelitis, CHF, afib on eliquis, hypertension and diabetes mellitus type 2 who presented to the ER now p/w chest pain. patient reports while walking to post office, patient felt lightheadedness, diaphoretic, and nausea. when he got home, patient reports having chest pain--left sided, pressure, non-radiating, associated with palpitations, lightheadedness, diaphoresis, nausea, vomiting x1--nbnb.  and 1 episode of diarrhea. denies f/c/s. states he was here 2 weeks ago and had change in meds with increased diuresis and now 19lb weight loss.  denies abd pain. denies sick contacts. denies cough.  General:     NAD, well-nourished, well-appearing  Head:     NC/AT, EOMI, oral mucosa moist  Neck:     trachea midline  Lungs:     CTA b/l, no w/r/r  CVS:     S1S2, RRR, no m/g/r  Abd:     +BS, s/nt/nd, no organomegaly  Ext:    2+ radial and pedal pulses, no c/c/e  Neuro: AAOx3, no sensory/motor deficits  A/P:  80yoM p/w chest pain  -labs, ekg, cardiac monitor, pulse ox

## 2021-05-28 NOTE — ED ADULT NURSE NOTE - NSFALLRSKASSESSTYPE_ED_ALL_ED
Principal Discharge DX:	 (normal spontaneous vaginal delivery)  Goal:	postpartum recovery  Assessment and plan of treatment:	regular diet  instructions given  pp check 4-6 weeks Initial (On Arrival)

## 2021-05-28 NOTE — ED PROVIDER NOTE - PROVIDER TOKENS
PROVIDER:[TOKEN:[[id number]],FOLLOWUP:[1-3 Days]],PROVIDER:[TOKEN:[[id number]],FOLLOWUP:[1-3 Days]]

## 2021-05-28 NOTE — CONSULT NOTE ADULT - ATTENDING COMMENTS
80 year old male known to EP service (followed by Dr. Mantilla) with CAD s/p CABG, chronic HFrEF (LVEF: 30-35%), RBBB + LPFB, paroxysmal AF/AFL (on Apixaban), HTN, DM2 and Bell's Palsy who presented with episodes of dizziness/lightheadedness. Today he also had an episode of nausea and vomiting after his episode. He recently was on beta blockers but they were discontinued as he had dizziness/lightheadedness with bradycardia. He pushed the symptom activator but a symptom was not recorded on the ILR. He had a less severe episode yesterday after going for a walk. During both of these times his HR was noted to be ~100 bpm. It is unclear if he had AT but review of strips suggests sinus tachycardia (there are PVCs at times where P wave morphology is similar to sinus rate). I suspect that his tachycardia is likely sinus and may be secondary to other underlying stressors. His ECG today shows sinus rhythm at a rate of 80 bpm, so I feel it is entirely possible for him to augment a HR of ~100 bpm under stress. It is possible that he could have an AT that is slower but his prior arrhythmia rates were ~120 bpm or greater. I feel we should try starting a lower dose of Metoprolol (XL 25mg daily) to see if it helps his system. He was reeducated on proper use of the symptom activator. If he cannot tolerate BB due to bradycardia, then he would benefit with CRT-D implantation (given low EF, wide bifascicular block and at least NYHA II symptoms). Additionally, catheter ablation of AF/AFL would be beneficial if he has more AF/AFL episodes on ILR.    Recommendations:  - Start Metoprolol Succinate 25mg daily  - Continue ILR monitoring with symptom activator  - Consider outpatient CRT-D +/- AF/AFL ablation    OK for DC from EP standpoint. Outpatient follow up with Dr. Mantilla.    Thank you for this consult.    Hunter Suggs MD  Clinical Cardiac Electrophysiology

## 2021-05-28 NOTE — ED CLERICAL - NS ED CLERK NOTE PRE-ARRIVAL INFORMATION; ADDITIONAL PRE-ARRIVAL INFORMATION
This patient is enrolled in a readmission reduction program and has active care navigation. This patient can be followed up by the care navigation team within 24 hours. To arrange close follow-up or to obtain additional clinical information about this patient, please call the contact number above. Please speak with the [facility] ED Case Manager for assistance with discharge planning

## 2021-05-28 NOTE — ED ADULT NURSE NOTE - PMH
Atrial fibrillation, unspecified type    Bell's palsy  Right side of face  Chronic kidney disease, unspecified CKD stage    Congestive heart failure, unspecified HF chronicity, unspecified heart failure type    Coronary artery disease involving native heart, angina presence unspecified, unspecified vessel or lesion type    Essential hypertension    Osteomyelitis of sternum    Type 2 diabetes mellitus without complication, unspecified whether long term insulin use

## 2021-05-28 NOTE — ED PROVIDER NOTE - PHYSICAL EXAMINATION
General: Well appearing male, no acute distress, alert & orientedx3  HEENT: EOMI, R lower lid sag, R facial drooping (baseline x1 year due to Bell's palsy)  Cardiovascular: Normal S1/S2, RRR, no murmurs/rales/rhonchi  Respiratory: Clear to auscultation bilaterally, No wheezing/rales/rhonchi  Gastrointestinal: Soft, non-tender, non-distended, normoactive bowel sounds, no TTP, no rebound or guarding  Neuro: Sensation intact B/L UE and LE, Motor strength intact B/L UE and LE, Cerebellar movements intact, Normal gait,  Extremities: No edema  Skin: Warm, intact, no rashes

## 2021-05-28 NOTE — ED ADULT NURSE NOTE - OBJECTIVE STATEMENT
Pt. presents alert and oriented x 4 to ED complaining of episode of chest pain with associated lightheadedness and Pt. presents alert and oriented x 4 to ED complaining of episode of chest pain with associated lightheadedness and nausea and vomiting x 1. Pt. reports the chest pain was a 7/10 and L. sided, denies pain at this time. Pt. denies dyspnea on exertion, leg swelling, SOB. Pt. is smiling, calm, and cooperative at this time, HONG, ambulatory, VSS.

## 2021-05-30 ENCOUNTER — EMERGENCY (EMERGENCY)
Facility: HOSPITAL | Age: 81
LOS: 1 days | Discharge: HOME | End: 2021-05-30
Attending: EMERGENCY MEDICINE
Payer: MEDICARE

## 2021-05-30 VITALS
OXYGEN SATURATION: 99 % | TEMPERATURE: 98 F | HEART RATE: 66 BPM | SYSTOLIC BLOOD PRESSURE: 164 MMHG | DIASTOLIC BLOOD PRESSURE: 82 MMHG | RESPIRATION RATE: 17 BRPM | HEIGHT: 73 IN

## 2021-05-30 DIAGNOSIS — Z95.1 PRESENCE OF AORTOCORONARY BYPASS GRAFT: Chronic | ICD-10-CM

## 2021-05-30 PROCEDURE — 70450 CT HEAD/BRAIN W/O DYE: CPT | Mod: 26,MH

## 2021-05-30 PROCEDURE — 99284 EMERGENCY DEPT VISIT MOD MDM: CPT

## 2021-05-30 PROCEDURE — 99284 EMERGENCY DEPT VISIT MOD MDM: CPT | Mod: 25

## 2021-05-30 PROCEDURE — 70450 CT HEAD/BRAIN W/O DYE: CPT

## 2021-05-30 PROCEDURE — 72125 CT NECK SPINE W/O DYE: CPT | Mod: 26,MH

## 2021-05-30 PROCEDURE — 72125 CT NECK SPINE W/O DYE: CPT

## 2021-05-30 NOTE — ED PROVIDER NOTE - PATIENT PORTAL LINK FT
You can access the FollowMyHealth Patient Portal offered by [facility] by registering at the following website: [email or web address]. By joining [facility]’s FollowMyHealth portal, you will also be able to view your health information using other applications (apps) compatible with our system.

## 2021-05-30 NOTE — ED PROVIDER NOTE - PROGRESS NOTE DETAILS
Thierry Martínez, Resident: Pt felt well, no complaints. Advised to follow up with PMD and verbalized return precautions.

## 2021-05-30 NOTE — ED PROVIDER NOTE - CLINICAL SUMMARY MEDICAL DECISION MAKING FREE TEXT BOX
Pt is a 80 y.o. M hx Afib on eliquis, CKD, T2D, HTN, CHF, CAD s/p CABG, presenting with fall. Priority imaging and reevaluation.

## 2021-05-30 NOTE — ED ADULT TRIAGE NOTE - CHIEF COMPLAINT QUOTE
pt BIBA s/p fall. states he was going to use the toilet and missed and fell to the ground. hit head. denies LOC. on Eliquis. pt aao x4. moves all extremities with equal stength. MD at bedside.

## 2021-05-30 NOTE — ED PROVIDER NOTE - ATTENDING CONTRIBUTION TO CARE
I personally saw the patient with the resident, and completed the key components of the history and physical exam. I then discussed the management plan with the resident.   gen gcs 15 resp cler cardiac no murmur abd soft neuro intact   agree with resident plan of care

## 2021-05-30 NOTE — ED PROVIDER NOTE - PHYSICAL EXAMINATION
A: airway intact, speaking full sentences  B: bilateral breath sounds, not in respiratory distress  C: central and distal pulses  D: GCS 15, no confusion, 3 mm, pupils round and reactive  E: pt exposed for eval of injuries    General: well appearing, NAD  Head:  NC, AT  Eyes: EOMI, PERRLA, no scleral icterus  Ears: no erythema/drainage  Nose: midline, no bleeding/drainage  Throat: MMM  Cardiac: RRR, no m/r/g, no lower extremity edema  Respiratory: CTABL, no wheezes/rales/rhonchi, equal chest wall expansions  Abdomen: soft, ND, NT, no rebound tenderness, no guarding, nonperitonitic  MSK/Vascular: full ROM, distal pulses intact, soft compartments, warm extremities  Neuro: AAOx3, negative pronator drift, strength 5/5, sensation to light touch intact, finger to nose coordination intact, cranial nerves 2-12 intact  Psych: calm, cooperative, normal affect

## 2021-05-30 NOTE — ED PROVIDER NOTE - OBJECTIVE STATEMENT
Pt is a 80 y.o. M hx Afib on eliquis, CKD, T2D, HTN, CHF, CAD s/p CABG, presenting with fall. Pt states he was Pt is a 80 y.o. M hx Afib on eliquis, CKD, T2D, HTN, CHF, CAD s/p CABG, presenting with fall. Pt states he was trying to sit on his toilet and "missed", falling to his side and hitting his head. Denies LOC, HA. No other complaints.

## 2021-05-30 NOTE — ED PROVIDER NOTE - NSFOLLOWUPINSTRUCTIONS_ED_ALL_ED_FT
Follow up with your primary medical doctor within one week.  Seek medical attention if you are having new or worsening symptoms.

## 2021-05-30 NOTE — ED CLERICAL - NS ED CLERK NOTE PRE-ARRIVAL INFORMATION; ADDITIONAL PRE-ARRIVAL INFORMATION
This patient is enrolled in the [facility] readmission reduction program and has active care navigation. This patient can be followed up by the care navigation team within 24 hours. To arrange close follow-up or to obtain additional clinical information about this patient, please call the contact number above. Please speak with the [facility] ED Case Manager for assistance with discharge planning CHF

## 2021-06-01 ENCOUNTER — NON-APPOINTMENT (OUTPATIENT)
Age: 81
End: 2021-06-01

## 2021-06-01 ENCOUNTER — APPOINTMENT (OUTPATIENT)
Dept: CARDIOLOGY | Facility: CLINIC | Age: 81
End: 2021-06-01
Payer: MEDICARE

## 2021-06-01 ENCOUNTER — RX RENEWAL (OUTPATIENT)
Age: 81
End: 2021-06-01

## 2021-06-01 VITALS
RESPIRATION RATE: 18 BRPM | HEART RATE: 54 BPM | WEIGHT: 219 LBS | SYSTOLIC BLOOD PRESSURE: 118 MMHG | DIASTOLIC BLOOD PRESSURE: 48 MMHG | HEIGHT: 74 IN | OXYGEN SATURATION: 95 % | BODY MASS INDEX: 28.11 KG/M2

## 2021-06-01 PROCEDURE — 99214 OFFICE O/P EST MOD 30 MIN: CPT

## 2021-06-01 RX ORDER — TRAMADOL HYDROCHLORIDE 50 MG/1
50 TABLET, COATED ORAL TWICE DAILY
Qty: 14 | Refills: 0 | Status: DISCONTINUED | COMMUNITY
Start: 2021-05-19 | End: 2021-06-01

## 2021-06-01 NOTE — HISTORY OF PRESENT ILLNESS
[FreeTextEntry1] : 80M h/o CAD s/p CABG c/b sepsis and sternal wound infection s/p sternum removal and pec flap and angioplasty, chronic HFrEF, p Afib (on Eliquis), HTN, DM2, and Bell's Palsy recently moved to [place] for few months from [place] presented to [facility] on 5/9/21  with complaints of back pain and dizziness after a mechanical fall also noted for the last few months he has been having continued shortness of breath and leg swelling, and was recently started on Lasix in addition to metolazone by his cardiologist in [place], repeat CT head no cerebral bleed, admitted for ADHF/bi-V faiure and KEN on CKD (Cr. 2.4), TTE with LV EF 35-40%, underwent IV diuresis, initial noted pBNP 13223 ---> 84419, pharm nuclear stress test without ischemia, RHC done with moderate pHTN Group 2 with PCWP 23, switched to torsemide 40mg, underwent MARLO/CV for pAflutter but reverted back to pAfib, metoprolol dose reduced to 50mg BID and had ILR implanted, had L-elbow laceration wound with bleeding due mechanical fall in the toilet, discharged home on 5/14/21, had episodes of sinus bradycardia 40-50s with recurrent dizziness, metoprolol reduced to 25mg BID, seen on 5/24/21 for initial outpatient cardiology visit, discontinued metoprolol, then still with recurrent dizziness/near syncope, presented to the ER x2 (on 5/28 and 5/30 with fall), ILR checked without didi/tachyarrhythmia, restarted low dose metoprolol, carotid duplex and CT head negative, presents for acute cardiology visit. \par \par He reports was very dizzy while walking into the bathroom on Sunday and felt room spinning and fell in the toilet, initial SBP 80s checked by his wife, then when EMS arrive SBP 110s. Still dizzy and worst  even with standing and walking. Continues to have chronic diarrhea 4x daily, did not stop the magnesium supplement. \par \par \par Prior visit 5/24/21: \par Reports having intermittent shortness of breath and also continues to have dizziness at times, been on reduced dose of metoprolol 25mg BID since last week, denies syncope, no chest pain. He reports is able to ambulate better with less exertional dyspnea compared to prior to recent hospitalization, no edema since discharge. Has not yet make appointment with nephrology for follow up. L-elbow wound has been healing, no recurrent bleeding back on ASA/Eliquis. \par \par Him and his wife and planning to return to [place] in October for 6 months.

## 2021-06-01 NOTE — DISCUSSION/SUMMARY
[FreeTextEntry1] : 80M h/o CAD s/p CABG c/b sepsis and sternal wound infection s/p sternum removal and pec flap and angioplasty, chronic HFrEF, p Afib (on Eliquis), HTN, DM2, and Bell's Palsy recently moved to [place] for few months from [place] presented to [facility] on 5/9/21  with complaints of back pain and dizziness after a mechanical fall also noted for the last few months he has been having continued shortness of breath and leg swelling, and was recently started on Lasix in addition to metolazone by his cardiologist in [place], repeat CT head no cerebral bleed, admitted for ADHF/bi-V faiure and KEN on CKD (Cr. 2.4), TTE with LV EF 35-40%, underwent IV diuresis, initial noted pBNP 79459 ---> 45023, pharm nuclear stress test without ischemia, RHC done with moderate pHTN Group 2 with PCWP 23, switched to torsemide 40mg, underwent MARLO/CV for pAflutter but reverted back to pAfib, metoprolol dose reduced to 50mg BID and had ILR implanted, had L-elbow laceration wound with bleeding due mechanical fall in the toilet, discharged home on 5/14/21, had episodes of sinus bradycardia 40-50s with recurrent dizziness, metoprolol reduced to 25mg BID, seen on 5/24/21 for initial outpatient cardiology visit, discontinued metoprolol, then still with recurrent dizziness/near syncope, presented to the ER x2 (on 5/28 and 5/30 with fall), ILR checked without didi/tachyarrhythmia, restarted low dose metoprolol, carotid duplex and CT head negative, presents for acute cardiology visit. \par Ongoing dizziness despite no correlation to ILR event, he is very symptomatically dizzy with just standing, suspect due to vertigo vs. orthostasis, unable to check orthostatic BP as he is very dizzy with standing for just few seconds. Will reduce cardiac meds to halve dosing and tolerate higher BP threshold for now, trial of empiric meclizine PRN and referral to neurology. \par \par \par 1. Dizziness\par -trial of meclizine and compression stockings\par -reduce diuretic/GDMT to halve dosing, acceptable for SBP up to 140s for  now. \par -neurology eval.   \par \par \par 2. HFrEF with Severely Reduced RV function, CAD/CABG \par -euvolemic on exam, ACC/AHA stage C, NYHA class II-  reduce torsemide from 40mg to 20mg, consider elective CardioMems in the future. \par -  EF 35-40% with severely reduced RV function, no ischemia on stress test and advanced CKD not candidate for LHC \par - s/p RHC - RA 15, RV 61/18, PCWP 23, CO 4.,8, CI 2.06 \par - reduce hydralazine from 50mg to 25mg TID and Imdur 60mg to 30mg\par - continue ASA 81mg and atorvastatin 40mg \par - stop Mg supplement as frequent loose stool  \par  \par 3. pAtrial Fibrillation, flutter \par - s/p MARLO/CV but reverted back to pAfib , continue ILR monitoring if tachy-didi syndrome to require PPM\par - outpatient EPS/Dr. Mantilla in 2 weeks- follow up for elective Afib ablation  and CRT-D device?\par - monitor on low dose metoprolol succinate 25mg as prior sinus didi \par - continue Eliquis 5mg BID, future Watchman eval given recurrent falls \par  \par 4. HTN \par -off lisionopril and amlodipine \par -monitor on hydralazine, Imdur and metoprolol, permissive higher BP target for  now until dizziness resolve. \par \par 5. CKD \par -unknown baseline Cr. but admission fo 2.1, would need to tolerate some degree of azotemia given with biventricular HF and pHTN \par -continue Torsemide 20mg dialy \par -nephrology follow up. \par \par 6. DM2 \par -consider addition of SGLT-2 inhibitor if eEFR >30  \par  \par 7. Left Elbow Laceration due to mechanical fall \par -continue wound dressing change by his wife, can let expose to air to keep wound dry. \par \par \par \par Follow up in 2 weeks.

## 2021-06-01 NOTE — CARDIOLOGY SUMMARY
[de-identified] : 5/24/21- Sinus 45, RAD. RBBB  [de-identified] : 5/10/21- pharm nuclear stress- IMPRESSIONS: \par * Myocardial Perfusion SPECT results are abnormal. \par * There is a medium sized, moderate defect in basal to mid \par inferior wall that is fixed, suggestive of infarct. No \par evidence of ischemia. \par * Post-stress resting myocardial perfusion gated SPECT \par imaging was performed (LVEF = 41 %;LVEDV = 141 ml.) \par * Dilated right ventricle noted. [de-identified] : 5/9/21- 1. Technically difficult study. \par  2. Endocardial visualization was enhanced with intravenous echo contrast. \par  3. Left ventricularejection fraction, by visual estimation, is 35 to 40%. \par  4. Paradoxical septal motion with moderately decreased segmental left ventricular systolic function. \par  5. Severe hypokinesis of the basal to mid segements, the apex and distal segments are preserved. \par  6. The mitral in-flow pattern reveals no discernable A-wave, therefore no comment on diastolic function can be made. \par  7. Severely enlarged right ventricle with severely reduced systolic function, estimated PASP least 45 mmHg mild to moderately elevated. \par  8. Right atrial enlargement. \par  9. Moderately enlarged left atrium. \par 10. Mild mitral annular calcification. \par 11. Trace mitral valve regurgitation. \par 12. Thickening and calcification of the anterior and posterior mitral valve leaflets. \par 13. Mild-moderate tricuspid regurgitation. \par 14. Sclerotic aortic valve with decreased opening. \par 15. There is mild aortic root calcification. \par 16. There is no evidence of pericardial effusion. \par 17. No prior study available for comparison. [de-identified] : 5/11/21- RHC- INTERVENTIONAL IMPRESSIONS: Moderate Elevation of Right Heart Pressures: \par RA=15 mmHg; RV=61/18 mmHg; PCWP=23 mmHg \par Moderate Pulmonary Hypertension=62/22 mmHg \par Reduced CO=4.8 L/min and CI=2.06 L/min/m2

## 2021-06-02 ENCOUNTER — APPOINTMENT (OUTPATIENT)
Dept: CARE COORDINATION | Facility: HOME HEALTH | Age: 81
End: 2021-06-02
Payer: MEDICARE

## 2021-06-02 PROCEDURE — 99349 HOME/RES VST EST MOD MDM 40: CPT | Mod: 95

## 2021-06-03 ENCOUNTER — APPOINTMENT (OUTPATIENT)
Dept: NEUROLOGY | Facility: CLINIC | Age: 81
End: 2021-06-03
Payer: MEDICARE

## 2021-06-03 ENCOUNTER — NON-APPOINTMENT (OUTPATIENT)
Age: 81
End: 2021-06-03

## 2021-06-03 VITALS
WEIGHT: 219 LBS | TEMPERATURE: 97.8 F | BODY MASS INDEX: 28.11 KG/M2 | SYSTOLIC BLOOD PRESSURE: 128 MMHG | DIASTOLIC BLOOD PRESSURE: 60 MMHG | HEIGHT: 74 IN

## 2021-06-03 DIAGNOSIS — R42 DIZZINESS AND GIDDINESS: ICD-10-CM

## 2021-06-03 DIAGNOSIS — R55 SYNCOPE AND COLLAPSE: ICD-10-CM

## 2021-06-03 DIAGNOSIS — Z86.59 PERSONAL HISTORY OF OTHER MENTAL AND BEHAVIORAL DISORDERS: ICD-10-CM

## 2021-06-03 DIAGNOSIS — Z86.39 PERSONAL HISTORY OF OTHER ENDOCRINE, NUTRITIONAL AND METABOLIC DISEASE: ICD-10-CM

## 2021-06-03 DIAGNOSIS — I48.0 PAROXYSMAL ATRIAL FIBRILLATION: ICD-10-CM

## 2021-06-03 DIAGNOSIS — Z86.79 PERSONAL HISTORY OF OTHER DISEASES OF THE CIRCULATORY SYSTEM: ICD-10-CM

## 2021-06-03 PROCEDURE — 99204 OFFICE O/P NEW MOD 45 MIN: CPT

## 2021-06-03 RX ORDER — HYDRALAZINE HYDROCHLORIDE 25 MG/1
25 TABLET ORAL 3 TIMES DAILY
Qty: 270 | Refills: 0 | Status: COMPLETED | COMMUNITY
Start: 2021-05-19 | End: 2021-06-03

## 2021-06-03 RX ORDER — ISOSORBIDE MONONITRATE 30 MG/1
30 TABLET, EXTENDED RELEASE ORAL DAILY
Qty: 90 | Refills: 0 | Status: COMPLETED | COMMUNITY
Start: 2021-05-19 | End: 2021-06-03

## 2021-06-03 NOTE — HISTORY OF PRESENT ILLNESS
[FreeTextEntry1] : I saw this patient in the office today.\par He presents with his wife.\par \par He has been having episodes of dizziness and falling.\par This began in mid April of 2021.\par He describes feeling off balance are constant basis.\par On several occasions he has fallen.\par He is not clear if he loses consciousness but does not believe so.\par The most recent episode was on 5/30/21.\par He was taken to the emergency room where CT scan of that head and cervical spine was performed.\par \par He is a prior history of right-sided Bell's palsy in April of 2020.\par He had left-sided Bell's palsy many years ago for

## 2021-06-03 NOTE — PHYSICAL EXAM
[General Appearance - Alert] : alert [General Appearance - In No Acute Distress] : in no acute distress [Oriented To Time, Place, And Person] : oriented to person, place, and time [Affect] : the affect was normal [Memory Recent] : recent memory was not impaired [Memory Remote] : remote memory was not impaired [Cranial Nerves Optic (II)] : visual acuity intact bilaterally,  visual fields full to confrontation, pupils equal round and reactive to light [Cranial Nerves Oculomotor (III)] : extraocular motion intact [Cranial Nerves Trigeminal (V)] : facial sensation intact symmetrically [Cranial Nerves Vestibulocochlear (VIII)] : hearing was intact bilaterally [Cranial Nerves Glossopharyngeal (IX)] : tongue and palate midline [Cranial Nerves Accessory (XI - Cranial And Spinal)] : head turning and shoulder shrug symmetric [Cranial Nerves Hypoglossal (XII)] : there was no tongue deviation with protrusion [Motor Tone] : muscle tone was normal in all four extremities [Motor Strength] : muscle strength was normal in all four extremities [Sensation Tactile Decrease] : light touch was intact [Sensation Pain / Temperature Decrease] : pain and temperature was intact [Sensation Vibration Decrease] : vibration was intact [Limited Balance] : the patient's balance was impaired [2+] : Patella left 2+ [1+] : Ankle jerk left 1+ [Optic Disc Abnormality] : the optic disc were normal in size and color [Dysarthria] : no dysarthria [Aphasia] : no dysphasia/aphasia [Coordination - Dysmetria Impaired Finger-to-Nose Bilateral] : not present [Plantar Reflex Right Only] : normal on the right [Plantar Reflex Left Only] : normal on the left [FreeTextEntry8] : Gait is unsteady. He presented in a wheelchair today.

## 2021-06-03 NOTE — CONSULT LETTER
[Dear  ___] : Dear  [unfilled], [Courtesy Letter:] : I had the pleasure of seeing your patient, [unfilled], in my office today. [Please see my note below.] : Please see my note below. [Consult Closing:] : Thank you very much for allowing me to participate in the care of this patient.  If you have any questions, please do not hesitate to contact me. [Sincerely,] : Sincerely, [FreeTextEntry3] : Jose Owens MD.

## 2021-06-03 NOTE — REASON FOR VISIT
[Consultation] : a consultation visit [FreeTextEntry1] : CC: dizziness and balance difficulty and near syncope

## 2021-06-03 NOTE — ASSESSMENT
[FreeTextEntry1] : This is a 80 year-old man with an extensive cardiac history who has now been having episodes of dizziness and falling. It is not clear if he passes out.\par \par I would like to obtain an MRI of the brain.\par His loop recorder in place.\par I have explained that he must speak to his cardiologist prior to this study so that they can interrogate loop recorder before the MRI.\par It will then have to be resected afterwards.\par \par I will also obtain an ambulatory EEG to assess for any epileptiform activity.\par \par I have explained that he should use the meclizine as needed for severe dizziness.\par \par I will see him back afterward.

## 2021-06-03 NOTE — DATA REVIEWED
[de-identified] : CT scan of the head was performed on 5/30/21.\par I reviewed the images.\par There is mild atrophy. There is some chronic ischemic change as well as scattered punctate calcifications which are described as unchanged from prior study.

## 2021-06-04 ENCOUNTER — INPATIENT (INPATIENT)
Facility: HOSPITAL | Age: 81
LOS: 10 days | Discharge: HOME | DRG: 357 | End: 2021-06-15
Attending: HOSPITALIST | Admitting: EMERGENCY MEDICINE
Payer: MEDICARE

## 2021-06-04 VITALS
SYSTOLIC BLOOD PRESSURE: 126 MMHG | HEART RATE: 62 BPM | RESPIRATION RATE: 20 BRPM | TEMPERATURE: 98 F | DIASTOLIC BLOOD PRESSURE: 58 MMHG | OXYGEN SATURATION: 100 %

## 2021-06-04 DIAGNOSIS — Z95.1 PRESENCE OF AORTOCORONARY BYPASS GRAFT: Chronic | ICD-10-CM

## 2021-06-04 DIAGNOSIS — K92.2 GASTROINTESTINAL HEMORRHAGE, UNSPECIFIED: ICD-10-CM

## 2021-06-04 LAB
ALBUMIN SERPL ELPH-MCNC: 4.1 G/DL — SIGNIFICANT CHANGE UP (ref 3.3–5.2)
ALP SERPL-CCNC: 150 U/L — HIGH (ref 40–120)
ALT FLD-CCNC: 24 U/L — SIGNIFICANT CHANGE UP
ANION GAP SERPL CALC-SCNC: 14 MMOL/L — SIGNIFICANT CHANGE UP (ref 5–17)
ANISOCYTOSIS BLD QL: SLIGHT — SIGNIFICANT CHANGE UP
APTT BLD: 31.7 SEC — SIGNIFICANT CHANGE UP (ref 27.5–35.5)
AST SERPL-CCNC: 26 U/L — SIGNIFICANT CHANGE UP
BASOPHILS # BLD AUTO: 0.09 K/UL — SIGNIFICANT CHANGE UP (ref 0–0.2)
BASOPHILS NFR BLD AUTO: 0.9 % — SIGNIFICANT CHANGE UP (ref 0–2)
BILIRUB SERPL-MCNC: 0.4 MG/DL — SIGNIFICANT CHANGE UP (ref 0.4–2)
BUN SERPL-MCNC: 79.2 MG/DL — HIGH (ref 8–20)
CALCIUM SERPL-MCNC: 9.3 MG/DL — SIGNIFICANT CHANGE UP (ref 8.6–10.2)
CHLORIDE SERPL-SCNC: 101 MMOL/L — SIGNIFICANT CHANGE UP (ref 98–107)
CO2 SERPL-SCNC: 22 MMOL/L — SIGNIFICANT CHANGE UP (ref 22–29)
CREAT SERPL-MCNC: 2.27 MG/DL — HIGH (ref 0.5–1.3)
DACRYOCYTES BLD QL SMEAR: SLIGHT — SIGNIFICANT CHANGE UP
EOSINOPHIL # BLD AUTO: 0.09 K/UL — SIGNIFICANT CHANGE UP (ref 0–0.5)
EOSINOPHIL NFR BLD AUTO: 0.9 % — SIGNIFICANT CHANGE UP (ref 0–6)
FERRITIN SERPL-MCNC: 162 NG/ML — SIGNIFICANT CHANGE UP (ref 30–400)
FOLATE SERPL-MCNC: 12.6 NG/ML — SIGNIFICANT CHANGE UP
GIANT PLATELETS BLD QL SMEAR: PRESENT — SIGNIFICANT CHANGE UP
GLUCOSE BLDC GLUCOMTR-MCNC: 190 MG/DL — HIGH (ref 70–99)
GLUCOSE BLDC GLUCOMTR-MCNC: 274 MG/DL — HIGH (ref 70–99)
GLUCOSE SERPL-MCNC: 233 MG/DL — HIGH (ref 70–99)
HCT VFR BLD CALC: 22.1 % — LOW (ref 39–50)
HGB BLD-MCNC: 6.7 G/DL — CRITICAL LOW (ref 13–17)
HYPOCHROMIA BLD QL: SLIGHT — SIGNIFICANT CHANGE UP
INR BLD: 1.71 RATIO — HIGH (ref 0.88–1.16)
IRON SATN MFR SERPL: 17 % — SIGNIFICANT CHANGE UP (ref 16–55)
IRON SATN MFR SERPL: 62 UG/DL — SIGNIFICANT CHANGE UP (ref 59–158)
LYMPHOCYTES # BLD AUTO: 0.76 K/UL — LOW (ref 1–3.3)
LYMPHOCYTES # BLD AUTO: 7.9 % — LOW (ref 13–44)
MAGNESIUM SERPL-MCNC: 2 MG/DL — SIGNIFICANT CHANGE UP (ref 1.6–2.6)
MANUAL SMEAR VERIFICATION: SIGNIFICANT CHANGE UP
MCHC RBC-ENTMCNC: 28.4 PG — SIGNIFICANT CHANGE UP (ref 27–34)
MCHC RBC-ENTMCNC: 30.3 GM/DL — LOW (ref 32–36)
MCV RBC AUTO: 93.6 FL — SIGNIFICANT CHANGE UP (ref 80–100)
MONOCYTES # BLD AUTO: 0.33 K/UL — SIGNIFICANT CHANGE UP (ref 0–0.9)
MONOCYTES NFR BLD AUTO: 3.5 % — SIGNIFICANT CHANGE UP (ref 2–14)
NEUTROPHILS # BLD AUTO: 8.25 K/UL — HIGH (ref 1.8–7.4)
NEUTROPHILS NFR BLD AUTO: 86.8 % — HIGH (ref 43–77)
OB PNL STL: POSITIVE
OVALOCYTES BLD QL SMEAR: SLIGHT — SIGNIFICANT CHANGE UP
PLAT MORPH BLD: NORMAL — SIGNIFICANT CHANGE UP
PLATELET # BLD AUTO: 234 K/UL — SIGNIFICANT CHANGE UP (ref 150–400)
POIKILOCYTOSIS BLD QL AUTO: SLIGHT — SIGNIFICANT CHANGE UP
POLYCHROMASIA BLD QL SMEAR: SLIGHT — SIGNIFICANT CHANGE UP
POTASSIUM SERPL-MCNC: 4.7 MMOL/L — SIGNIFICANT CHANGE UP (ref 3.5–5.3)
POTASSIUM SERPL-SCNC: 4.7 MMOL/L — SIGNIFICANT CHANGE UP (ref 3.5–5.3)
PROT SERPL-MCNC: 6.8 G/DL — SIGNIFICANT CHANGE UP (ref 6.6–8.7)
PROTHROM AB SERPL-ACNC: 19.3 SEC — HIGH (ref 10.6–13.6)
RBC # BLD: 2.36 M/UL — LOW (ref 4.2–5.8)
RBC # FLD: 18.1 % — HIGH (ref 10.3–14.5)
RBC BLD AUTO: ABNORMAL
SARS-COV-2 RNA SPEC QL NAA+PROBE: SIGNIFICANT CHANGE UP
SODIUM SERPL-SCNC: 137 MMOL/L — SIGNIFICANT CHANGE UP (ref 135–145)
TIBC SERPL-MCNC: 362 UG/DL — SIGNIFICANT CHANGE UP (ref 220–430)
TRANSFERRIN SERPL-MCNC: 253 MG/DL — SIGNIFICANT CHANGE UP (ref 180–329)
TROPONIN T SERPL-MCNC: 0.06 NG/ML — SIGNIFICANT CHANGE UP (ref 0–0.06)
VIT B12 SERPL-MCNC: 578 PG/ML — SIGNIFICANT CHANGE UP (ref 232–1245)
WBC # BLD: 9.57 K/UL — SIGNIFICANT CHANGE UP (ref 3.8–10.5)
WBC # FLD AUTO: 9.57 K/UL — SIGNIFICANT CHANGE UP (ref 3.8–10.5)

## 2021-06-04 PROCEDURE — 99223 1ST HOSP IP/OBS HIGH 75: CPT

## 2021-06-04 PROCEDURE — 71045 X-RAY EXAM CHEST 1 VIEW: CPT | Mod: 26

## 2021-06-04 PROCEDURE — 93010 ELECTROCARDIOGRAM REPORT: CPT

## 2021-06-04 PROCEDURE — 99285 EMERGENCY DEPT VISIT HI MDM: CPT

## 2021-06-04 RX ORDER — SOD SULF/SODIUM/NAHCO3/KCL/PEG
1000 SOLUTION, RECONSTITUTED, ORAL ORAL EVERY 4 HOURS
Refills: 0 | Status: COMPLETED | OUTPATIENT
Start: 2021-06-04 | End: 2021-06-04

## 2021-06-04 RX ORDER — PANTOPRAZOLE SODIUM 20 MG/1
40 TABLET, DELAYED RELEASE ORAL EVERY 12 HOURS
Refills: 0 | Status: DISCONTINUED | OUTPATIENT
Start: 2021-06-04 | End: 2021-06-05

## 2021-06-04 RX ORDER — ACETAMINOPHEN 500 MG
650 TABLET ORAL EVERY 4 HOURS
Refills: 0 | Status: DISCONTINUED | OUTPATIENT
Start: 2021-06-04 | End: 2021-06-05

## 2021-06-04 RX ORDER — GLUCAGON INJECTION, SOLUTION 0.5 MG/.1ML
1 INJECTION, SOLUTION SUBCUTANEOUS ONCE
Refills: 0 | Status: DISCONTINUED | OUTPATIENT
Start: 2021-06-04 | End: 2021-06-05

## 2021-06-04 RX ORDER — DEXTROSE 50 % IN WATER 50 %
25 SYRINGE (ML) INTRAVENOUS ONCE
Refills: 0 | Status: DISCONTINUED | OUTPATIENT
Start: 2021-06-04 | End: 2021-06-05

## 2021-06-04 RX ORDER — INSULIN LISPRO 100/ML
VIAL (ML) SUBCUTANEOUS
Refills: 0 | Status: DISCONTINUED | OUTPATIENT
Start: 2021-06-04 | End: 2021-06-05

## 2021-06-04 RX ORDER — DEXTROSE 50 % IN WATER 50 %
12.5 SYRINGE (ML) INTRAVENOUS ONCE
Refills: 0 | Status: DISCONTINUED | OUTPATIENT
Start: 2021-06-04 | End: 2021-06-05

## 2021-06-04 RX ORDER — ATORVASTATIN CALCIUM 80 MG/1
40 TABLET, FILM COATED ORAL AT BEDTIME
Refills: 0 | Status: DISCONTINUED | OUTPATIENT
Start: 2021-06-04 | End: 2021-06-05

## 2021-06-04 RX ORDER — TAMSULOSIN HYDROCHLORIDE 0.4 MG/1
0.4 CAPSULE ORAL AT BEDTIME
Refills: 0 | Status: DISCONTINUED | OUTPATIENT
Start: 2021-06-04 | End: 2021-06-05

## 2021-06-04 RX ORDER — SODIUM CHLORIDE 9 MG/ML
1000 INJECTION, SOLUTION INTRAVENOUS
Refills: 0 | Status: DISCONTINUED | OUTPATIENT
Start: 2021-06-04 | End: 2021-06-05

## 2021-06-04 RX ORDER — INSULIN GLARGINE 100 [IU]/ML
10 INJECTION, SOLUTION SUBCUTANEOUS AT BEDTIME
Refills: 0 | Status: DISCONTINUED | OUTPATIENT
Start: 2021-06-04 | End: 2021-06-05

## 2021-06-04 RX ORDER — DEXTROSE 50 % IN WATER 50 %
15 SYRINGE (ML) INTRAVENOUS ONCE
Refills: 0 | Status: DISCONTINUED | OUTPATIENT
Start: 2021-06-04 | End: 2021-06-05

## 2021-06-04 RX ORDER — ONDANSETRON 8 MG/1
4 TABLET, FILM COATED ORAL EVERY 6 HOURS
Refills: 0 | Status: DISCONTINUED | OUTPATIENT
Start: 2021-06-04 | End: 2021-06-05

## 2021-06-04 RX ORDER — HYDRALAZINE HCL 50 MG
10 TABLET ORAL EVERY 8 HOURS
Refills: 0 | Status: DISCONTINUED | OUTPATIENT
Start: 2021-06-04 | End: 2021-06-05

## 2021-06-04 RX ADMIN — ONDANSETRON 4 MILLIGRAM(S): 8 TABLET, FILM COATED ORAL at 22:38

## 2021-06-04 RX ADMIN — PANTOPRAZOLE SODIUM 40 MILLIGRAM(S): 20 TABLET, DELAYED RELEASE ORAL at 22:24

## 2021-06-04 RX ADMIN — Medication 10 MILLIGRAM(S): at 22:25

## 2021-06-04 RX ADMIN — ATORVASTATIN CALCIUM 40 MILLIGRAM(S): 80 TABLET, FILM COATED ORAL at 22:25

## 2021-06-04 RX ADMIN — INSULIN GLARGINE 10 UNIT(S): 100 INJECTION, SOLUTION SUBCUTANEOUS at 22:26

## 2021-06-04 RX ADMIN — Medication 1000 MILLILITER(S): at 21:31

## 2021-06-04 RX ADMIN — Medication 1: at 18:01

## 2021-06-04 RX ADMIN — TAMSULOSIN HYDROCHLORIDE 0.4 MILLIGRAM(S): 0.4 CAPSULE ORAL at 22:25

## 2021-06-04 NOTE — H&P ADULT - NSHPREVIEWOFSYSTEMS_GEN_ALL_CORE
Gen: malaise. no recent weight loss/gain.    HEENT: no vision changes.  Hearing intact. No loss of taste/smell.  No neck stiffness  CV: no chest pain, palpitations.  intermittent PINA- no change.  no sob at rest. No increase in LE edema.  LUNG: no cough, wheeze  ABD: as above. no distension.  no constipation.  no n/v.   no abdominal pain  SKIN:  no rash/breakdown  NEURO:  no confusion.  no headache.  no focal weakness or sensory changes  PSYCH:    no agitation  ENDO:  sugars recently controlled  MSK:  no aches, arthritis

## 2021-06-04 NOTE — H&P ADULT - ASSESSMENT
Symptomatic acute anemia/ r/o ABLA with FOBT +/ Presyncope/ h/o previous GIB 2 yrs ago  - Positive orthostatics in setting of severe anemia.   -likely UGIB with melena  -PPI  - IV fluids.   - Hold home torsemide and Eliquis.  - GI consult pending  - Telemetry monitoring.   -transfuse to hgb>8    Atrial Fibrillation  -holding eliquis  -ILR placement may 2021: ILR interrogation  -EP will be consulted for Watchman evaluation  -cardiology following  -metoprolol recently d/c'd 2/2 didi      Biventricular CHF: Echocardiogram reviewed with EF of 35-40%, severe RV dysfunction/5/11 RHC mod pHTN  -Suspected to be secondary to AFIb  -recent Nuc stress: no ischemia.  no current plans for cath 2/2 CKD  -holding torsemide/imdur  -consider restart torsemide sunday per cards  -hydralazine reduced    CKD: Baseline unknown  -suspect stage 3  -recent BL 2-2.5  -currently stable  -renal us 5/13 w/poss chronic bladder obstruction   -has outpt f/u with dr. Mims    Diabetes mellitus type 2  -no outpt medications  -outpt f/u    Chronic anemia  -unknown baseline/etiology: iron def, renal dz  -on iron replacement  -was on epo previously per renal  -will need continued outpt f/u    HLD  -c/w lipitor    BPH  -c/w flomax     80M h/o CAD s/p CABG c/b sepsis and sternal wound infection s/p sternum removal and pec flap and angioplasty, chronic HFrEF- recent EF 35-40%, p Afib (on Eliquis), HTN, DM2, and Bell's Palsy recently moved to [place] for few months from [place] presented to ED with LH/presyncope and fatigue.  Hgb 6.7--> reduced from 9.0 on 5/28 with melanic stool and FOBT +. Cardiology and GI have been consulted.  PRBC ordered. Orthostatics positive.  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  Trop .06 however is chronically elevated.      Symptomatic acute anemia/ r/o ABLA with FOBT +/ Presyncope/ h/o previous GIB 2 yrs ago  - Positive orthostatics in setting of severe anemia.   -source unclear  -PPI IV BID  - Hold home torsemide and Eliquis.  -holding ASA- hope to restart post EGD/colo  - GI consulted:  planning colo/EGD tomorrow  - Telemetry monitoring.   -transfuse to hgb>8: one unit transfusing now  - f/u hgb at [facility] and again in am    Atrial Fibrillation  -holding eliquis  -ILR placement may 2021  -EP consulted for Watchman evaluation  -cardiology following  -metoprolol recently d/c'd 2/2 didi    Biventricular CHF: Echocardiogram  with EF of 35-40%, severe RV dysfunction/5/11 RHC mod pHTN  -Suspected to be secondary to AFIb  -recent Nuc stress: no ischemia.  no current plans for cath 2/2 CKD  -holding torsemide/imdur  -consider restart torsemide sunday per cards  -hydralazine reduced from 50 TID to 10 TID    CKD:   -suspect stage 3  -recent BL 2-2.5  -currently stable  -renal us 5/13 w/poss chronic bladder obstruction.  Denies urinary retention  -has outpt f/u with dr. Mims    Diabetes mellitus type 2  -on lantus 17 units BID and unknown orals as outpt  -start lantus 10 HS and correction with meals    Chronic anemia  -unknown baseline/etiology: iron def, renal dz  -on iron replacement  -was on epo previously per renal  -will need continued outpt f/u    HLD  -c/w lipitor    BPH  -c/w flomax    full code  scd for dvt prophy  dispo: lives with his wife.  anticipate will return home at discharge    COLO/EGD PLANNED FOR TOMORROW. D/W DR. WOODALL AND AGREE THAT GIVEN PT'S HIGH STROKE RISK (AFIB, RECENT MARLO/CV/ILR PLACEMENT) ENDOSCOPY SHOULD NOT WAIT UNTIL MONDAY.  HEPARIN BRIDGING IS NOT A VIABLE OPTION GIVEN ACUTE NATURE OF THE BLEED (2.5 POINT DROP IN 7 DAYS) WITHOUT INVESTIGATION.     80M h/o CAD s/p CABG c/b sepsis and sternal wound infection s/p sternum removal and pec flap and angioplasty, chronic HFrEF- recent EF 35-40%, p Afib (on Eliquis), HTN, DM2, and Bell's Palsy recently moved to [place] for few months from [place] presented to ED with LH/presyncope and fatigue.  Hgb 6.7--> reduced from 9.0 on 5/28 with melanic stool and FOBT +. Cardiology and GI have been consulted.  PRBC ordered. Orthostatics positive.  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  Trop .06 however is chronically elevated.      Symptomatic acute anemia/ r/o ABLA with FOBT +/ Presyncope/ h/o previous GIB 2 yrs ago  - Positive orthostatics in setting of severe anemia.   -source unclear  -PPI IV BID  - Hold home torsemide and Eliquis.  -holding ASA- hope to restart post EGD/colo  - GI consulted:  planning colo/EGD tomorrow  - Telemetry monitoring.   -transfuse to hgb>8: one unit transfusing now  - f/u hgb at [facility] and again in am    Atrial Fibrillation  -holding eliquis  -ILR placement may 2021  -EP consulted for Watchman evaluation  -cardiology following  -metoprolol recently d/c'd 2/2 didi    Biventricular CHF: Echocardiogram  with EF of 35-40%, severe RV dysfunction/5/11 RHC mod pHTN  -Suspected to be secondary to AFIb  -recent Nuc stress: no ischemia.  no current plans for cath 2/2 CKD  -holding torsemide/imdur  -consider restart torsemide sunday per cards  -hydralazine reduced from 50 TID to 10 TID    CKD:   -suspect stage 3  -recent BL 2-2.5  -currently stable  -renal us 5/13 w/poss chronic bladder obstruction.  Denies urinary retention  -has outpt f/u with dr. Mims    Diabetes mellitus type 2  -on lantus 17 units BID and unknown orals as outpt  -start lantus 10 HS and correction with meals  -needs verification of home oral meds    Chronic anemia  -unknown baseline/etiology: iron def, renal dz  -on iron replacement  -was on epo previously per renal  -will need continued outpt f/u    HLD  -c/w lipitor    BPH  -c/w flomax    full code  scd for dvt prophy  dispo: lives with his wife.  anticipate will return home at discharge    COLO/EGD PLANNED FOR TOMORROW. D/W DR. WOODALL AND AGREE THAT GIVEN PT'S HIGH STROKE RISK (AFIB, RECENT MARLO/CV/ILR PLACEMENT) ENDOSCOPY SHOULD NOT WAIT UNTIL MONDAY.  HEPARIN BRIDGING IS NOT A VIABLE OPTION GIVEN ACUTE NATURE OF THE BLEED (2.5 POINT DROP IN 7 DAYS) WITHOUT INVESTIGATION.

## 2021-06-04 NOTE — H&P ADULT - HISTORY OF PRESENT ILLNESS
80M h/o CAD s/p CABG c/b sepsis and sternal wound infection s/p sternum removal and pec flap and angioplasty, chronic HFrEF- recent EF 30-35%, p Afib (on Eliquis), HTN, DM2, and Bell's Palsy recently moved to [place] for few months from [place] presented to ED with LH/presyncope and fatigue.  Hgb 6.7--> reduced from 9.0 on 5/28 with melanic stool and FOBT +. Cardiology and GI have been consulted.  PRBC ordered. Orthostatics positive.  Patient denies fevers, chills, CP, SOB, abdominal pain, N/V/D, headache, or vision changes.   Extensive notable history: Pt admitted 5/8-5/14 with complaints of back pain and dizziness after a mechanical fall also noted for the last few months he has been having continued shortness of breath and leg swelling, and was recently started on Lasix in addition to metolazone by his cardiologist in [place], repeat CT head no cerebral bleed, admitted for ADHF/bi-V faiure and KEN on CKD (Cr. 2.4), TTE with LV EF 35-40%, underwent IV diuresis, initial noted pBNP 34556 ---> [id number], pharm nuclear stress test without ischemia, RHC done with moderate pHTN Group 2 with PCWP 23, switched to torsemide 40mg, underwent MARLO/CV for pAflutter but reverted back to pAfib, metoprolol dose reduced to 50mg BID and had ILR implanted, had L-elbow laceration wound with bleeding due mechanical fall in the toilet, discharged home on 5/14/21, had episodes of sinus bradycardia 40-50s with recurrent dizziness, metoprolol reduced to 25mg BID, seen on 5/24/21 at  outpatient cardiology visit had metoprolol discontinued for sinus bradycardia, uptitrated hydralazine dose to 50mg TID for uncontrolled HTN/HFrEF  He was seen again in ED 5/28  with episodes of recurring dizziness, nausea and vomited x1, reports also L-sided chest pressure for few minutes, vitals with /70s in the ER.  Trop's negative and pt discharged after a stay for observation.         80M h/o CAD s/p CABG c/b sepsis and sternal wound infection s/p sternum removal and pec flap and angioplasty, chronic HFrEF- recent EF 30-35%, p Afib (on Eliquis), HTN, DM2, and Bell's Palsy recently moved to [place] for few months from [place] presented to ED with LH/presyncope and fatigue.  Hgb 6.7--> reduced from 9.0 on 5/28 with melanic stool and FOBT +. Cardiology and GI have been consulted.  PRBC ordered. Orthostatics positive.  Patient denies fevers, chills, CP, SOB, abdominal pain, N/V/D, headache, or vision changes.     Extensive notable history: Pt admitted 5/8-5/14 with complaints of back pain and dizziness after a mechanical fall also noted for the last few months he has been having continued shortness of breath and leg swelling, and was recently started on Lasix in addition to metolazone by his cardiologist in [place], repeat CT head no cerebral bleed, admitted for ADHF/bi-V faiure and KEN on CKD (Cr. 2.4), TTE with LV EF 35-40%, underwent IV diuresis, initial noted pBNP 89142 ---> 23204, pharm nuclear stress test without ischemia, RHC done with moderate pHTN Group 2 with PCWP 23, switched to torsemide 40mg, underwent MARLO/CV for pAflutter but reverted back to pAfib, metoprolol dose reduced to 50mg BID and had ILR implanted, had L-elbow laceration wound with bleeding due mechanical fall in the toilet, discharged home on 5/14/21, had episodes of sinus bradycardia 40-50s with recurrent dizziness, metoprolol reduced to 25mg BID, seen on 5/24/21 at  outpatient cardiology visit had metoprolol discontinued for sinus bradycardia, uptitrated hydralazine dose to 50mg TID for uncontrolled HTN/HFrEF  He was seen again in ED 5/28  with episodes of recurring dizziness, nausea and vomited x1, reports also L-sided chest pressure for few minutes, vitals with /70s in the ER.  Trop's negative and pt discharged after a stay for observation.         80M h/o CAD s/p CABG c/b sepsis and sternal wound infection s/p sternum removal and pec flap and angioplasty, chronic HFrEF- recent EF 35-40%, p Afib (on Eliquis), HTN, DM2, and Bell's Palsy recently moved to [place] for few months from [place] presented to ED with LH/presyncope and fatigue.  Hgb 6.7--> reduced from 9.0 on 5/28 with melanic stool and FOBT +. Cardiology and GI have been consulted.  PRBC ordered. Orthostatics positive.  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  Trop .06 however is chronically elevated. Patient denies fevers, CP, SOB, abdominal pain, N/V/D, headache, or vision changes. He did not notice any black stools, but states they are always dark as he takes iron.  He has chronic unchanged intermittent diarrhea.  He did feel shaky today and had some chills.  His previous LE edema resolved with his last admission and has remained stable.    Extensive notable history: Pt admitted 5/8-5/14 with complaints of back pain and dizziness after a mechanical fall also noted for the last few months he has been having continued shortness of breath and leg swelling, and was recently started on Lasix in addition to metolazone by his cardiologist in [place], repeat CT head no cerebral bleed, admitted for ADHF/bi-V faiure and KEN on CKD (Cr. 2.4), TTE with LV EF 35-40%, underwent IV diuresis, initial noted pBNP 30698 ---> 72923, pharm nuclear stress test without ischemia, RHC done with moderate pHTN Group 2 with PCWP 23, switched to torsemide 40mg, underwent MARLO/CV for pAflutter but reverted back to pAfib, metoprolol dose reduced to 50mg BID and had ILR implanted, had L-elbow laceration wound with bleeding due mechanical fall in the toilet, discharged home on 5/14/21, had episodes of sinus bradycardia 40-50s with recurrent dizziness, metoprolol reduced to 25mg BID, seen on 5/24/21 at  outpatient cardiology visit had metoprolol discontinued for sinus bradycardia, uptitrated hydralazine dose to 50mg TID for uncontrolled HTN/HFrEF  He was seen again in ED 5/28  with episodes of recurring dizziness, nausea and vomited x1, reports also L-sided chest pressure for few minutes, vitals with /70s in the ER.  Trop's negative and pt discharged after a stay for observation.

## 2021-06-04 NOTE — CONSULT NOTE ADULT - ASSESSMENT
80M h/o CAD s/p CABG c/b sepsis and sternal wound infection s/p sternum removal and pec flap and angioplasty, chronic HFrEF- (EF 35-40% on TTE and 41% on nuclear stress test, and 30-35% on MARLO), AFL/AFIB, s/p MARLO-DCCV on 5/11, CHADS-VASc = 6 on elqiuis, HTN, DM2, CKD, and Bell's Palsy  He was seen by myself for AFib, AFL, dizziness, bifascicular block and cardiomyopathy back in 5/2021. He underwent a MARLO-DCCV and ILR on 5/11/2021. Since then he had several episodes of dizziness (he denied shiva syncope) and most if not all these episodes were postural. He had some sinus bradycardia and so his metoprolol was reduced and at times stopped. His symptoms persisted and all c/w normal sinus rhythm on ILR ruling out bradyarrhythmia as a cause for his symptoms. Now he is back with postural presyncope and new anemia likely acute and probably from GI source.     1. Dizziness, likely postural ? autonomic. ILR confirmed normal sinus rhythm during all of these episodes ruling out rhythm related causes for his symptoms.  - General cardiology is managing this. Suggest neurology evaluation as well    2. AFL/AFIB, s/p MARLO-DCCV on 5/11, CHADS-VASc = 6 on elqiuis (now on hold given acute drop in H/H). No prior ablation. MARLO from 5/11 showed KAROLINA is suitable for Watchman. Now in sinus  - d/w patient, cardiology and Dr. Payne from GI. Patient is at very high risk of stroke with interruption of AC. Strongly advised for an urgent ASAP endoscopic evaluation to allow for better assessment and management and hopefully resumption of AC, ? start with heparin challenge. Dr. Payne will arrange for endoscopy tomorrow  - Given falls, and high bleeding as well as stroke risk, pt may be a good candidate for Watchman, will defer timing and final decision till we have results of endoscopy to see if he can be cleared for 6 weeks AC   - Please start heparin ASAP, once cleared by GI  - Can continue to use metoprolol if BP is stable, can start with toprol 25 mg once a day, need this for AF and HFrEF    3. Chronic congestive heart failure, ? ischemic,   EF 35-40%, no current indication for device therapy, will continue to monitor his ILR    4. Bifascicular bloc, no bradyarrhythmia to indicate need for pacing support.     above d/w pt, Srikanth Aly, Elmer, and Latrell  will follow up

## 2021-06-04 NOTE — ED PROVIDER NOTE - ATTENDING CONTRIBUTION TO CARE
I, Jared [place], personally saw the patient with the resident, and completed the key components of the history and physical exam. I then discussed the management plan with the resident.    79 yo m hx of cabg, afib on eliquist, CKD, DM, HTN,  p/w lightheadedness and syncope. EMS found him hypotensive initally, now improved. Patient found to have Hb 6.7, occult feces positive, black stool. trop 0.06. 1 U PRBC ordered. GI consult. admit for GI bleed.

## 2021-06-04 NOTE — ED PROVIDER NOTE - CLINICAL SUMMARY MEDICAL DECISION MAKING FREE TEXT BOX
Pt is a 80 y.o. M hx of afib on eliquis,  CKD, T2D, HTN, CHF, CAD s/p CABG presenting with a presyncopal event. Labs, meds, imaging.

## 2021-06-04 NOTE — ED ADULT TRIAGE NOTE - CHIEF COMPLAINT QUOTE
pt comes to ed from home for near syncope. patient reports he felt like he would pass out. did not fall or hit head. patient on eliquis hx of cabg in past.

## 2021-06-04 NOTE — H&P ADULT - NSICDXPASTMEDICALHX_GEN_ALL_CORE_FT
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 Recent BL cr 2.0-2.5    Congestive heart failure, unspecified HF chronicity, unspecified heart failure type EF 35-40%    Coronary artery disease involving native heart, angina presence unspecified, unspecified vessel or lesion type nuc stress 5/2021 negative    Essential hypertension     GIB (gastrointestinal bleeding) h/o GIB 2019 in [place].  told hemorrhoidal    Osteomyelitis of sternum     Type 2 diabetes mellitus without complication, unspecified whether long term insulin use insulin plus orals

## 2021-06-04 NOTE — CONSULT NOTE ADULT - SUBJECTIVE AND OBJECTIVE BOX
[place] [facility] Faculty Practice                                                               Office:  [address] [address]                                                              Telephone: [phone]. Fax:[phone]                                                                        CARDIOLOGY CONSULTATION NOTE                                                                                             Consult requested by:  Dr. Martínez  Reason for Consultation: Near Syncope  Primary Cardiologist: Dr. Duke  History obtained by: Patient and medical record   obtained: No    Covid Status: Pending    Chief complaint:    Patient is a 80y old  Male who presents with a chief complaint of near syncope.       HPI: 80M h/o CAD s/p CABG c/b sepsis and sternal wound infection s/p sternum removal and pec flap and angioplasty, chronic HFrEF, p Afib (on Eliquis), HTN, DM2, and Bell's Palsy recently moved to [place] for few months from [place] presented to [facility] on 5/9/21 with complaints of back pain and dizziness after a mechanical fall also noted for the last few months he has been having continued shortness of breath and leg swelling, and was recently started on Lasix in addition to metolazone by his cardiologist in [place], repeat CT head no cerebral bleed, admitted for ADHF/bi-V faiure and KEN on CKD (Cr. 2.4), TTE with LV EF 35-40%, underwent IV diuresis, initial noted pBNP 61243 ---> 71795, pharm nuclear stress test without ischemia, RHC done with moderate pHTN Group 2 with PCWP 23, switched to torsemide 40mg, underwent MARLO/CV for pAflutter but reverted back to pAfib, metoprolol dose reduced to 50mg BID and had ILR implanted, had L-elbow laceration wound with bleeding due mechanical fall in the toilet, discharged home on 5/14/21, had episodes of sinus bradycardia 40-50s with recurrent dizziness, metoprolol reduced to 25mg BID, seen on 5/24/21 at  outpatient cardiology visit had metoprolol discontinued for sinus bradycardia, uptitrated hydralazine dose to 50mg TID for uncontrolled HTN/HFrEF, presents today with episodes of recurring dizziness, nausea and vomited x1, reports also L-sided chest pressure for few minutes, vitals with /70s in the ER.      Patient reports still with recurring dizziness despite off metoprolol, BP has not been low and actually elevated SBP 180s in the AM, reports an episode of L-sided chest pressure at rest this morning, denies exertional discomfort, [place] [employer] [place] and felt dizzy with nausea and vomited x1 with some yellow fluid, denies abdominal discomfort and currently he's feeling better.       REVIEW OF SYMPTOMS:     CONSTITUTIONAL: No fever, weight loss, or fatigue  ENMT:  No difficulty hearing, tinnitus, vertigo; No sinus or throat pain  NECK: No pain or stiffness  CARDIOVASCULAR: chest pain, dyspnea, syncope, palpitations, dizziness, Orthopnea, Paroxsymal nocturnal dyspnea  RESPIRATORY: Dyspnea on exertion, Shortness of breath, cough, wheezing  : No dysuria, no hematuria   GI: No dark color stool, no melena, no diarrhea, no constipation, no abdominal pain   NEURO: No headache, no dizziness, no slurred speech   MUSCULOSKELETAL: No joint pain or swelling; No muscle, back, or extremity pain  PSYCH: No agitation, no anxiety.    ALL OTHER REVIEW OF SYSTEMS ARE NEGATIVE.      PREVIOUS DIAGNOSTIC TESTING  ECHO FINDINGS:      STRESS FINDINGS:      CATHETERIZATION FINDINGS:         ALLERGIES: Allergies    No Known Allergies    Intolerances          PAST MEDICAL HISTORY  Coronary artery disease involving native heart, angina presence unspecified, unspecified vessel or lesion type    Atrial fibrillation, unspecified type    Congestive heart failure, unspecified HF chronicity, unspecified heart failure type    Bell&#x27;s palsy    Essential hypertension    Type 2 diabetes mellitus without complication, unspecified whether long term insulin use    Osteomyelitis of sternum    Chronic kidney disease, unspecified CKD stage        PAST SURGICAL HISTORY  S/P CABG (coronary artery bypass graft)        FAMILY HISTORY:  No pertinent family history in first degree relatives        SOCIAL HISTORY:  Denies smoking/alcohol/drugs  CIGARETTES:     ALCOHOL:  DRUGS:      CURRENT MEDICATIONS:           HOME MEDICATIONS:      Vital Signs Last 24 Hrs  T(C): 36.9 (04 Jun 2021 11:45), Max: 36.9 (04 Jun 2021 11:45)  T(F): 98.5 (04 Jun 2021 11:45), Max: 98.5 (04 Jun 2021 11:45)  HR: 68 (04 Jun 2021 11:45) (62 - 68)  BP: 147/65 (04 Jun 2021 11:45) (126/58 - 147/65)  BP(mean): --  RR: 16 (04 Jun 2021 11:45) (16 - 20)  SpO2: 98% (04 Jun 2021 11:45) (98% - 100%)      PHYSICAL EXAM:  Constitutional: Comfortable . No acute distress.   HEENT: Atraumatic and normocephalic , neck is supple . no JVD. No carotid bruit. PEERL   CNS: A&Ox3. No focal deficits. EOMI. Cranial nerves II-IX are intact.   Lymph Nodes: Cervical : Not palpable.  Respiratory: CTAB  Cardiovascular: S1S2 RRR. No murmur/rubs or gallop.  Gastrointestinal: Soft non-tender and non distended . +Bowel sounds. negative Dang's sign.  Extremities: No edema.   Psychiatric: Calm . no agitation.  Skin: No skin rash/ulcers visualized to face, hands or feet.    Intake and output:     LABS:                        6.7    9.57  )-----------( 234      ( 04 Jun 2021 12:00 )             22.1     06-04    137  |  101  |  79.2<H>  ----------------------------<  233<H>  4.7   |  22.0  |  2.27<H>    Ca    9.3      04 Jun 2021 12:00  Mg     2.0     06-04    TPro  6.8  /  Alb  4.1  /  TBili  0.4  /  DBili  x   /  AST  26  /  ALT  24  /  AlkPhos  150<H>  06-04    CARDIAC MARKERS ( 04 Jun 2021 12:00 )  x     / 0.06 ng/mL / x     / x     / x        ;p-BNP=        INTERPRETATION OF TELEMETRY: Reviewed by me.   ECG: Reviewed by me.     RADIOLOGY & ADDITIONAL STUDIES:    X-ray:  reviewed by me.   CT scan:   MRI:                                                                          [place] [facility] Faculty Practice                                                               Office:  [address] [address]                                                              Telephone: [phone]. Fax:[phone]                                                                        CARDIOLOGY CONSULTATION NOTE                                                                                             Consult requested by:  Dr. Martínez  Reason for Consultation: Near Syncope  Primary Cardiologist: Dr. Duke  History obtained by: Patient and medical record   obtained: No    Covid Status: Pending    Chief complaint:    Patient is a 80y old  Male who presents with a chief complaint of near syncope.       HPI: 80M h/o CAD s/p CABG c/b sepsis and sternal wound infection s/p sternum removal and pec flap and angioplasty, chronic HFrEF, p Afib (on Eliquis), HTN, DM2, and Bell's Palsy recently moved to [place] for few months from [place] presented to [facility] on 5/9/21 with complaints of back pain and dizziness after a mechanical fall also noted for the last few months he has been having continued shortness of breath and leg swelling, and was recently started on Lasix in addition to metolazone by his cardiologist in [place], repeat CT head no cerebral bleed, admitted for ADHF/bi-V faiure and KEN on CKD (Cr. 2.4), TTE with LV EF 35-40%, underwent IV diuresis, initial noted pBNP 61614 ---> 13737, pharm nuclear stress test without ischemia, RHC done with moderate pHTN Group 2 with PCWP 23, switched to torsemide 40mg, underwent MARLO/CV for pAflutter but reverted back to pAfib, metoprolol dose reduced to 50mg BID and had ILR implanted, had L-elbow laceration wound with bleeding due mechanical fall in the toilet, discharged home on 5/14/21, patient has had recurrent episodes of dizziness and had his medications further adjusted., had episodes of sinus bradycardia 40-50s with recurrent dizziness, metoprolol reduced to 25mg BID, seen on 5/24/21 at  outpatient cardiology visit had metoprolol discontinued for sinus bradycardia, uptitrated hydralazine dose to 50mg TID for uncontrolled HTN/HFrEF, presents today with episodes of recurring dizziness, nausea and vomited x1, reports also L-sided chest pressure for few minutes, vitals with /70s in the ER.      Patient reports still with recurring dizziness despite off metoprolol, BP has not been low and actually elevated SBP 180s in the AM, reports an episode of L-sided chest pressure at rest this morning, denies exertional discomfort, [place] [employer] [place] and felt dizzy with nausea and vomited x1 with some yellow fluid, denies abdominal discomfort and currently he's feeling better.       REVIEW OF SYMPTOMS:     CONSTITUTIONAL: No fever, weight loss, or fatigue  ENMT:  No difficulty hearing, tinnitus, vertigo; No sinus or throat pain  NECK: No pain or stiffness  CARDIOVASCULAR: chest pain, dyspnea, syncope, palpitations, dizziness, Orthopnea, Paroxsymal nocturnal dyspnea  RESPIRATORY: Dyspnea on exertion, Shortness of breath, cough, wheezing  : No dysuria, no hematuria   GI: No dark color stool, no melena, no diarrhea, no constipation, no abdominal pain   NEURO: No headache, no dizziness, no slurred speech   MUSCULOSKELETAL: No joint pain or swelling; No muscle, back, or extremity pain  PSYCH: No agitation, no anxiety.    ALL OTHER REVIEW OF SYSTEMS ARE NEGATIVE.      PREVIOUS DIAGNOSTIC TESTING  ECHO FINDINGS:      STRESS FINDINGS:      CATHETERIZATION FINDINGS:         ALLERGIES: Allergies    No Known Allergies    Intolerances          PAST MEDICAL HISTORY  Coronary artery disease involving native heart, angina presence unspecified, unspecified vessel or lesion type    Atrial fibrillation, unspecified type    Congestive heart failure, unspecified HF chronicity, unspecified heart failure type    Bell&#x27;s palsy    Essential hypertension    Type 2 diabetes mellitus without complication, unspecified whether long term insulin use    Osteomyelitis of sternum    Chronic kidney disease, unspecified CKD stage        PAST SURGICAL HISTORY  S/P CABG (coronary artery bypass graft)        FAMILY HISTORY:  No pertinent family history in first degree relatives        SOCIAL HISTORY:  Denies smoking/alcohol/drugs  CIGARETTES:     ALCOHOL:  DRUGS:      CURRENT MEDICATIONS:           HOME MEDICATIONS:      Vital Signs Last 24 Hrs  T(C): 36.9 (04 Jun 2021 11:45), Max: 36.9 (04 Jun 2021 11:45)  T(F): 98.5 (04 Jun 2021 11:45), Max: 98.5 (04 Jun 2021 11:45)  HR: 68 (04 Jun 2021 11:45) (62 - 68)  BP: 147/65 (04 Jun 2021 11:45) (126/58 - 147/65)  BP(mean): --  RR: 16 (04 Jun 2021 11:45) (16 - 20)  SpO2: 98% (04 Jun 2021 11:45) (98% - 100%)      PHYSICAL EXAM:  Constitutional: Comfortable . No acute distress.   HEENT: Atraumatic and normocephalic , neck is supple . no JVD. No carotid bruit. PEERL   CNS: A&Ox3. No focal deficits. EOMI. Cranial nerves II-IX are intact.   Lymph Nodes: Cervical : Not palpable.  Respiratory: CTAB  Cardiovascular: S1S2 RRR. No murmur/rubs or gallop.  Gastrointestinal: Soft non-tender and non distended . +Bowel sounds. negative Dang's sign.  Extremities: No edema.   Psychiatric: Calm . no agitation.  Skin: No skin rash/ulcers visualized to face, hands or feet.    Intake and output:     LABS:                        6.7    9.57  )-----------( 234      ( 04 Jun 2021 12:00 )             22.1     06-04    137  |  101  |  79.2<H>  ----------------------------<  233<H>  4.7   |  22.0  |  2.27<H>    Ca    9.3      04 Jun 2021 12:00  Mg     2.0     06-04    TPro  6.8  /  Alb  4.1  /  TBili  0.4  /  DBili  x   /  AST  26  /  ALT  24  /  AlkPhos  150<H>  06-04    CARDIAC MARKERS ( 04 Jun 2021 12:00 )  x     / 0.06 ng/mL / x     / x     / x        ;p-BNP=        INTERPRETATION OF TELEMETRY: Reviewed by me.   ECG: Reviewed by me.     RADIOLOGY & ADDITIONAL STUDIES:    X-ray:  reviewed by me.   CT scan:   MRI:                                                                          [place] [facility] Faculty Practice                                                               Office:  [address] [address]                                                              Telephone: [phone]. Fax:[phone]                                                                        CARDIOLOGY CONSULTATION NOTE                                                                                             Consult requested by:  Dr. Martínez  Reason for Consultation: Near Syncope  Primary Cardiologist: Dr. Duke  History obtained by: Patient and medical record   obtained: No    Covid Status: Pending    Chief complaint:    Patient is a 80y old  Male who presents with a chief complaint of near syncope.       HPI: 80M h/o CAD s/p CABG c/b sepsis and sternal wound infection s/p sternum removal and pec flap and angioplasty, chronic HFrEF, p Afib (on Eliquis), HTN, DM2, and Bell's Palsy recently moved to [place] for few months from [place] presented to [facility] on 5/9/21 with complaints of back pain and dizziness after a mechanical fall also noted for the last few months he has been having continued shortness of breath and leg swelling, and was recently started on Lasix in addition to metolazone by his cardiologist in [place], repeat CT head no cerebral bleed, admitted for ADHF/bi-V faiure and KEN on CKD (Cr. 2.4), TTE with LV EF 35-40%, underwent IV diuresis, initial noted pBNP 03857 ---> 30970, pharm nuclear stress test without ischemia, RHC done with moderate pHTN Group 2 with PCWP 23, switched to torsemide 40mg, underwent MARLO/CV for pAflutter but reverted back to pAfib, metoprolol dose reduced to 50mg BID and had ILR implanted, had L-elbow laceration wound with bleeding due mechanical fall in the toilet, discharged home on 5/14/21, patient has had recurrent episodes of dizziness and had his medications further adjusted. Patient states that this morning after he went to the bathroom he was feeling very dizzy, hot, and with leg tingling like he might pass out. Patient states that he has never felt like he was fully going to pass out until today, and he has been feeling very dizzy every time he stands. Patient also notes that he has been having some dark stools, but states that he thought it was from his iron. Patient was noted to be orthostatic upon arrival to the ER with a hemoglobin of 6.7. Patient states he feels comfortable at rest at this time, but feels worse upon standing. Patient denies fevers, chills, CP, SOB, abdominal pain, N/V/D, headache, or vision changes.     REVIEW OF SYMPTOMS:     CONSTITUTIONAL: No fever, weight loss, or fatigue  ENMT:  No difficulty hearing, tinnitus, vertigo; No sinus or throat pain  NECK: No pain or stiffness  CARDIOVASCULAR: AS PER HPI  RESPIRATORY: Dyspnea on exertion, Shortness of breath, cough, wheezing  : No dysuria, no hematuria   GI: No dark color stool, no melena, no diarrhea, no constipation, no abdominal pain   NEURO: AS PER HPI  MUSCULOSKELETAL: No joint pain or swelling; No muscle, back, or extremity pain  PSYCH: No agitation, no anxiety.    ALL OTHER REVIEW OF SYSTEMS ARE NEGATIVE.      PREVIOUS DIAGNOSTIC TESTING  ECHO FINDINGS:  < from: MARLO Echo Doppler (05.11.21 @ 16:06) >    PHYSICIAN INTERPRETATION:  Left Ventricle: The left ventricular internal cavity size is normal.  Global LV systolic function was moderately decreased. Left ventricular ejection fraction, by visual estimation, is 30 to 35%.  Right Ventricle: The right ventricular size is severelyenlarged. RV systolic function is severely reduced.  Left Atrium: Mild to moderately enlarged left atrium. No left atrial appendage thrombus is seen and decreased left atrial appendage velocities. Color flow doppler and intravenous injection of agitated saline demonstrates the presence of an intact intra atrial septum. Spontaneous echo contrast noted in [place] and [place].  Right Atrium: Severely enlarged right atrium.  Pericardium: There is no evidence of pericardial effusion.  Mitral Valve: The mitral valve is normal in structure. There is mild mitral annular calcification. Mild mitral valve regurgitation is seen.  Tricuspid Valve: The tricuspid valve is normal in structure. Moderate-severe tricuspid regurgitation is visualized.  Aortic Valve: The aortic valve is trileaflet. No evidence of aortic stenosis.  Aorta: The aortic root, ascending aorta, aortic arch and descending aorta are all structurally normal, with no evidence of dilitation or obstruction. The aortic arch was not well visualized.Diffuse atherosclerotic plaque noted in descending aorta.  Shunts: Agitated saline contrast was given intravenously to evaluate for intracardiac shunting. There is no evidence of a patent foramen ovale.      Summary:   1. Left ventricular ejection fraction, by visual estimation, is 30 to 35%.   2. Moderately decreased global left ventricular systolic function.   3. Severely enlarged right atrium.   4. Severely enlarged right ventricle.   5. Severely reduced RV systolic function.   6. Mild to moderately enlarged left atrium.   7. There is no evidence of pericardial effusion.   8. Mild mitral annular calcification.   9. Mild mitral valve regurgitation.  10. Moderate-severe tricuspid regurgitation.  11. Color flow doppler and intravenous injection of agitated saline demonstrates the presence of an intact intra atrial septum.  12. No left atrial appendage thrombus and decreased left atrial appendage velocities.    MD Rayo Electronically signed on 5/11/2021 at 4:52:38 PM    < end of copied text >      STRESS FINDINGS:    < from: Nuclear Stress Test-Pharmacologic (05.10.21 @ 07:59) >  NUCLEAR FINDINGS:  Review of raw data shows: The study is of good technical  quality.  There is a medium sized, moderate defect in basal to mid  inferior wall that is fixed, suggestive of infarct. No  evidence of ischemia.  Dilated right ventricular noted.  ------------------------------------------------------------------------      GATED ANALYSIS:  Post-stress resting myocardial perfusion gated SPECT  imaging was performed (LVEF = 41 %;LVEDV = 141 ml.)  ------------------------------------------------------------------------    IMPRESSIONS:  * Myocardial Perfusion SPECT results are abnormal.  * There is a medium sized, moderate defect in basal to mid  inferior wall that is fixed, suggestive of infarct. No  evidence of ischemia.  * Post-stress resting myocardial perfusion gated SPECT  imaging was performed (LVEF = 41 %;LVEDV = 141 ml.)  * Dilated right ventricle noted.    No prior study is available for comparison.  ------------------------------------------------------------------------      ------------------------------------------------------------------------    Confirmed on  5/10/2021 - 14:21:33 by Gamal Duke MD  Cardiology Fellow: Elmer Grant FNP-C  ------------------------------------------------------------------------    < end of copied text >    CATHETERIZATION FINDINGS:   < from: Cardiac Cath Lab - Adult (05.11.21 @ 16:42) >  DIAGNOSTIC IMPRESSIONS: Moderate Elevation of Right Heart Pressures:  RA=15 mmHg; RV=61/18 mmHg; PCWP=23 mmHg  Moderate Pulmonary Hypertension=62/22 mmHg  Reduced CO=4.8 L/min and CI=2.06 L/min/m2  DIAGNOSTIC RECOMMENDATIONS: The patient should continue with the present  medications. Patient management should include aggressive medical therapy,  close monitoring of BUN and creatinine, and resumption of all previous  activities in 2 days. Medical management is recommended.  INTERVENTIONAL IMPRESSIONS: Moderate Elevation of Right Heart Pressures:  RA=15 mmHg; RV=61/18 mmHg; PCWP=23 mmHg  Moderate Pulmonary Hypertension=62/22 mmHg  Reduced CO=4.8 L/min and CI=2.06 L/min/m2  Prepared and signed by  Mickey Pressley MD  Signed 05/11/2021 18:02:41    < end of copied text >        ALLERGIES: Allergies    No Known Allergies    Intolerances          PAST MEDICAL HISTORY  Coronary artery disease involving native heart, angina presence unspecified, unspecified vessel or lesion type    Atrial fibrillation, unspecified type    Congestive heart failure, unspecified HF chronicity, unspecified heart failure type    Bell&#x27;s palsy    Essential hypertension    Type 2 diabetes mellitus without complication, unspecified whether long term insulin use    Osteomyelitis of sternum    Chronic kidney disease, unspecified CKD stage        PAST SURGICAL HISTORY  S/P CABG (coronary artery bypass graft)        FAMILY HISTORY:  No pertinent family history in first degree relatives        SOCIAL HISTORY:    CIGARETTES:   Former smoker  ALCOHOL: Denies  DRUGS: Denies      CURRENT MEDICATIONS:           HOME MEDICATIONS:  aspirin 81 mg oral delayed release tablet: 1 tab(s) orally once a day  atorvastatin 40 mg oral tablet: 1 tab(s) orally once a day  Eliquis 5 mg oral tablet: 1 tab(s) orally 2 times a day  ferrous sulfate 300 mg (60 mg elemental iron) oral tablet: 1 tab(s) orally once a day  Flomax 0.4 mg oral capsule: 1 cap(s) orally once a day  hydrALAZINE 25 mg oral tablet: 1 tab(s) orally 3 times a day  isosorbide mononitrate 30 mg oral tablet, extended release: 1 tab(s) orally once a day  magnesium oxide 400 mg (241.3 mg elemental magnesium) oral tablet: 1 tab(s) orally once a day  Metoprolol Succinate ER 25 mg oral tablet, extended release: 1 tab(s) orally once a day  omeprazole 40 mg oral delayed release capsule: 1 cap(s) orally once a day  potassium chloride 20 mEq oral tablet, extended release: 2 tab(s) orally once a day  torsemide 20 mg oral tablet: 2 tab(s) orally once a day    Vital Signs Last 24 Hrs  T(C): 36.9 (04 Jun 2021 11:45), Max: 36.9 (04 Jun 2021 11:45)  T(F): 98.5 (04 Jun 2021 11:45), Max: 98.5 (04 Jun 2021 11:45)  HR: 68 (04 Jun 2021 11:45) (62 - 68)  BP: 147/65 (04 Jun 2021 11:45) (126/58 - 147/65)  BP(mean): --  RR: 16 (04 Jun 2021 11:45) (16 - 20)  SpO2: 98% (04 Jun 2021 11:45) (98% - 100%)      PHYSICAL EXAM:  Constitutional: Comfortable . No acute distress.   HEENT: Atraumatic and normocephalic , neck is supple . no JVD. No carotid bruit. PEERL   CNS: A&Ox3. No focal deficits. EOMI. Cranial nerves II-IX are intact.   Lymph Nodes: Cervical : Not palpable.  Respiratory: CTAB  Cardiovascular: S1S2 RRR. No rubs or gallop. III/VI systolic murmur lsb  Gastrointestinal: Soft non-tender and non distended . +Bowel sounds. negative Dang's sign.  Extremities: No edema.   Psychiatric: Calm . no agitation.  Skin: No skin rash/ulcers visualized to face, hands or feet.    Intake and output:     LABS:                        6.7    9.57  )-----------( 234      ( 04 Jun 2021 12:00 )             22.1     06-04    137  |  101  |  79.2<H>  ----------------------------<  233<H>  4.7   |  22.0  |  2.27<H>    Ca    9.3      04 Jun 2021 12:00  Mg     2.0     06-04    TPro  6.8  /  Alb  4.1  /  TBili  0.4  /  DBili  x   /  AST  26  /  ALT  24  /  AlkPhos  150<H>  06-04    CARDIAC MARKERS ( 04 Jun 2021 12:00 )  x     / 0.06 ng/mL / x     / x     / x        ;p-BNP=        INTERPRETATION OF TELEMETRY: Reviewed by me. SR  ECG: Reviewed by me. SR, PACs, bifasicular block,     RADIOLOGY & ADDITIONAL STUDIES:    X-ray:  reviewed by me.   < from: Xray Chest 1 View- PORTABLE-Urgent (Xray Chest 1 View- PORTABLE-Urgent .) (06.04.21 @ 12:55) >  EXAM:  XR CHEST PORTABLE URGENT 1V                          PROCEDURE DATE:  06/04/2021          INTERPRETATION:  Clinical history: 80-year-old male, syncope.    Two expiratory/kyphotic views are compared to 5/28/2021 and demonstrate a normal cardiac silhouette and normal pulmonary vasculature with no consolidation, effusion, gross adenopathy, pneumothorax or acute osseous finding.    Patient is post CABG. A cardiac loop recorder is again evident.    IMPRESSION:  No acute radiographic findingsand no change    < end of copied text >    CT scan:     MRI:

## 2021-06-04 NOTE — H&P ADULT - NSHPLABSRESULTS_GEN_ALL_CORE
6.7    9.57  )-----------( 234      ( 04 Jun 2021 12:00 )             22.1   06-04    137  |  101  |  79.2<H>  ----------------------------<  233<H>  4.7   |  22.0  |  2.27<H>    Ca    9.3      04 Jun 2021 12:00  Mg     2.0     06-04    TPro  6.8  /  Alb  4.1  /  TBili  0.4  /  DBili  x   /  AST  26  /  ALT  24  /  AlkPhos  150<H>  06-04          RADIOLOGY & ADDITIONAL STUDIES:  MARLO 5/11/21  Summary:   1. Left ventricular ejection fraction, by visual estimation, is 30 to 35%.   2. Moderately decreased global left ventricular systolic function.   3. Severely enlarged right atrium.   4. Severely enlarged right ventricle.   5. Severely reduced RV systolic function.   6. Mild to moderately enlarged left atrium.   7. There is no evidence of pericardial effusion.   8. Mild mitral annular calcification.   9. Mild mitral valve regurgitation.  10. Moderate-severe tricuspid regurgitation.  11. Color flow doppler and intravenous injection of agitated saline demonstrates the presence of an intact intra atrial septum.  12. No left atrial appendage thrombus and decreased left atrial appendage velocities.    TTE 5/9/21  Summary:   1. Technically difficult study.   2. Endocardial visualization was enhanced with intravenous echo contrast.   3. Left ventricular ejection fraction, by visual estimation, is 35 to 40%.   4. Paradoxical septal motion with moderately decreased segmental left ventricular systolic function.   5. Severe hypokinesis of the basal to mid segements, the apex and distal segments are preserved.   6. The mitral in-flow pattern reveals no discernable A-wave, therefore no comment on diastolic function can be made.   7. Severely enlarged right ventricle with severely reduced systolic function, estimated PASP least 45 mmHg mild to moderately elevated.   8. Right atrial enlargement.   9. Moderately enlarged left atrium.  10. Mild mitral annular calcification.  11. Trace mitral valve regurgitation.  12. Thickening and calcification of the anterior and posterior mitral valve leaflets.  13. Mild-moderate tricuspid regurgitation.  14. Sclerotic aortic valve with decreased opening.  15. There is mild aortic root calcification.  16. There is no evidence of pericardial effusion.  17. No prior study available for comparison.    Cardiac Cath 5/11/21  COMPLICATIONS: No complications occurred during the cath lab visit.  DIAGNOSTIC IMPRESSIONS: Moderate Elevation of Right Heart Pressures:  RA=15 mmHg; RV=61/18 mmHg; PCWP=23 mmHg  Moderate Pulmonary Hypertension=62/22 mmHg  Reduced CO=4.8 L/min and CI=2.06 L/min/m2  DIAGNOSTIC RECOMMENDATIONS: The patient should continue with the present  medications. Patient management should include aggressive medical therapy,  close monitoring of BUN and creatinine, and resumption of all previous  activities in 2 days. Medical management is recommended.  INTERVENTIONAL IMPRESSIONS: Moderate Elevation of Right Heart Pressures:  RA=15 mmHg; RV=61/18 mmHg; PCWP=23 mmHg  Moderate Pulmonary Hypertension=62/22 mmHg  Reduced CO=4.8 L/min and CI=2.06 L/min/m2  Prepared and signed by  Mickey Pressley MD 6.7    9.57  )-----------( 234      ( 04 Jun 2021 12:00 )             22.1   06-04    137  |  101  |  79.2<H>  ----------------------------<  233<H>  4.7   |  22.0  |  2.27<H>    Ca    9.3      04 Jun 2021 12:00  Mg     2.0     06-04    TPro  6.8  /  Alb  4.1  /  TBili  0.4  /  DBili  x   /  AST  26  /  ALT  24  /  AlkPhos  150<H>  06-04    PT/INR - ( 04 Jun 2021 15:31 )   PT: 19.3 sec;   INR: 1.71 ratio         PTT - ( 04 Jun 2021 15:31 )  PTT:31.7 sec    6/4 CXR  reviewed.  IMPRESSION:  No acute radiographic findings and no change    EKG reviewed  NSR 68.  RBBB            RADIOLOGY & ADDITIONAL STUDIES:  MARLO 5/11/21  Summary:   1. Left ventricular ejection fraction, by visual estimation, is 30 to 35%.   2. Moderately decreased global left ventricular systolic function.   3. Severely enlarged right atrium.   4. Severely enlarged right ventricle.   5. Severely reduced RV systolic function.   6. Mild to moderately enlarged left atrium.   7. There is no evidence of pericardial effusion.   8. Mild mitral annular calcification.   9. Mild mitral valve regurgitation.  10. Moderate-severe tricuspid regurgitation.  11. Color flow doppler and intravenous injection of agitated saline demonstrates the presence of an intact intra atrial septum.  12. No left atrial appendage thrombus and decreased left atrial appendage velocities.    TTE 5/9/21  Summary:   1. Technically difficult study.   2. Endocardial visualization was enhanced with intravenous echo contrast.   3. Left ventricular ejection fraction, by visual estimation, is 35 to 40%.   4. Paradoxical septal motion with moderately decreased segmental left ventricular systolic function.   5. Severe hypokinesis of the basal to mid segements, the apex and distal segments are preserved.   6. The mitral in-flow pattern reveals no discernable A-wave, therefore no comment on diastolic function can be made.   7. Severely enlarged right ventricle with severely reduced systolic function, estimated PASP least 45 mmHg mild to moderately elevated.   8. Right atrial enlargement.   9. Moderately enlarged left atrium.  10. Mild mitral annular calcification.  11. Trace mitral valve regurgitation.  12. Thickening and calcification of the anterior and posterior mitral valve leaflets.  13. Mild-moderate tricuspid regurgitation.  14. Sclerotic aortic valve with decreased opening.  15. There is mild aortic root calcification.  16. There is no evidence of pericardial effusion.  17. No prior study available for comparison.    Cardiac Cath 5/11/21  COMPLICATIONS: No complications occurred during the cath lab visit.  DIAGNOSTIC IMPRESSIONS: Moderate Elevation of Right Heart Pressures:  RA=15 mmHg; RV=61/18 mmHg; PCWP=23 mmHg  Moderate Pulmonary Hypertension=62/22 mmHg  Reduced CO=4.8 L/min and CI=2.06 L/min/m2  DIAGNOSTIC RECOMMENDATIONS: The patient should continue with the present  medications. Patient management should include aggressive medical therapy,  close monitoring of BUN and creatinine, and resumption of all previous  activities in 2 days. Medical management is recommended.  INTERVENTIONAL IMPRESSIONS: Moderate Elevation of Right Heart Pressures:  RA=15 mmHg; RV=61/18 mmHg; PCWP=23 mmHg  Moderate Pulmonary Hypertension=62/22 mmHg  Reduced CO=4.8 L/min and CI=2.06 L/min/m2  Prepared and signed by  Mickey Pressley MD

## 2021-06-04 NOTE — ED PROVIDER NOTE - OBJECTIVE STATEMENT
Pt is a 80 y.o. M hx of afib on eliquis,  CKD, T2D, HTN, CHF, CAD s/p CABG, presenting with lightheadedness. The pt states he was walking into his bathroom when he felt weak and lightheaded, felt as if the room began to spin, prompting him to sit down and call EMS. As per EMS report, initial SBP in 80's and on a later recheck 120's. Pt denying syncope, chest pain, fever, sweats, chills. Endorses mild shortness of breath.

## 2021-06-04 NOTE — CONSULT NOTE ADULT - TIME BILLING
Patient was seen and examined at bedside and noted to be orthostatic positive with symptoms   Patient appears euvolemic at the bedside and he notes after taking his medications in the morning when he checks his blood pressure SBP in the 80's  Telemetry reviewed and noted to be in sinus rhythm     Labs: Hgb 6.7  Cr: 2.27   Troponin: 0.06    Dx: Orthostatic hypotension with Symptomatic anemia, pAfib/Aflutter on Eliquis s/p ILR placement, HFrEF/ CAD ischemic cardiomyopathy,  HTN   - patient notes that he has been dizzy for the oast couple of weeks after taking his medications, currently orthostatic in the ER with anemia   - recommend GI consult to evaluate for EGD/ Colonoscopy in light of patient on Eliquis and afib, patient had prior history fo GI bleed in [place] 2 yrs ago   - recommend to obtain TTE to assess LV function and valvulopathy   - HOLD Eliquis at this time until GI eval and would need recommendations if and when can be restarted  - will invite EP for initial eval for Watchman, but patient will need to be on AC post 45 days of Watchman placement and would thus meed input from GI   - transfuse pRBCs to Hgb > 8 repeat CBC  - can HOLD Torsemide today and Saturday and Torsemide dose @ 20mg on Sunday   - will Hold Imdur as well and decrease dose of Hydralazine to 10mg po q 8hrs and reassess BP and increase if SBP >140  - check ILR     Preoperative Cardiovascular assessment prior to EGD / Colonoscopy   - patient is chest pain free with no EKG changes and /or troponin elevation   - EKG reviewed NSR @ 68 bpm with RBBB with LPFB   - patient has RCRI of 3 points Class IV Risk w/ 15.0 % 30-day risk of death, MI, or cardiac arrest, patient is considered elevated risk for a low to intermediate risk procedure and no need for further cardiovascular intervention and cardiovascularly optimized and may proceed   -  close intraprocedural monitoring   - based on results of EGD/Colonoscopy will follow up recs from GI standpoint regarding AC     Nunu Gonzalez D.O. [facility]  Cardiology/Vascular Cardiology -[facility]   Telephone # [phone] Patient was seen and examined at bedside and noted to be orthostatic positive with symptoms   Patient appears euvolemic at the bedside and he notes after taking his medications in the morning when he checks his blood pressure SBP in the 80's  Telemetry reviewed and noted to be in sinus rhythm     Labs: Hgb 6.7  Cr: 2.27   Troponin: 0.06    Dx: Orthostatic hypotension with Symptomatic anemia, pAfib/Aflutter on Eliquis s/p ILR placement, HFrEF/ CAD ischemic cardiomyopathy,  HTN   - patient notes that he has been dizzy for the oast couple of weeks after taking his medications, currently orthostatic in the ER with anemia   - recommend GI consult to evaluate for EGD/ Colonoscopy in light of patient on Eliquis and afib, patient had prior history fo GI bleed in [place] 2 yrs ago   - recommend to obtain TTE to assess LV function and valvulopathy   - HOLD Eliquis at this time until GI eval and would need recommendations if and when can be restarted  - patient was recently DCCV in May and will need to be on AC   - will invite EP for initial eval for Watchman, but patient will need to be on AC post 45 days of Watchman placement and would thus meed input from GI   - transfuse pRBCs to Hgb > 8 repeat CBC  - can HOLD Torsemide today and Saturday and Torsemide dose @ 20mg on Sunday   - will Hold Imdur as well and decrease dose of Hydralazine to 10mg po q 8hrs and reassess BP and increase if SBP >140  - check ILR     Preoperative Cardiovascular assessment prior to EGD / Colonoscopy   - patient is chest pain free with no EKG changes and /or troponin elevation   - EKG reviewed NSR @ 68 bpm with RBBB with LPFB   - patient has RCRI of 3 points Class IV Risk w/ 15.0 % 30-day risk of death, MI, or cardiac arrest, patient is considered elevated risk for a low to intermediate risk procedure and no need for further cardiovascular intervention and cardiovascularly optimized and may proceed   -  close intraprocedural monitoring   - based on results of EGD/Colonoscopy will follow up recs from GI standpoint regarding AC     Nunu Gonzalez D.O. [facility]  Cardiology/Vascular Cardiology -[facility]   Telephone # [phone] Patient was seen and examined at bedside and noted to be orthostatic positive with symptoms   Patient appears euvolemic at the bedside and he notes after taking his medications in the morning when he checks his blood pressure SBP in the 80's  Telemetry reviewed and noted to be in sinus rhythm     Labs: Hgb 6.7  Cr: 2.27   Troponin: 0.06    Dx: Orthostatic hypotension with Symptomatic anemia, pAfib/Aflutter on Eliquis s/p ILR placement, HFrEF/ CAD ischemic cardiomyopathy,  HTN   - patient notes that he has been dizzy for the oast couple of weeks after taking his medications, currently orthostatic in the ER with anemia   - recommend GI consult to evaluate for EGD/ Colonoscopy in light of patient on Eliquis and afib, patient had prior history fo GI bleed in [place] 2 yrs ago   - recommend to obtain TTE to assess LV function and valvulopathy   - HOLD Eliquis at this time until GI eval and would need recommendations if and when can be restarted (Hep GGT)  - patient was recently DCCV in May and will need to be on AC   - will invite EP for initial eval for Watchman, but patient will need to be on AC post 45 days of Watchman placement and would thus will need input from GI   - transfuse pRBCs to Hgb > 8 repeat CBC  - can HOLD Torsemide today and Saturday and resume Torsemide dose @ 20mg on Sunday   - will Hold Imdur as well and decrease dose of Hydralazine to 10mg po q 8hrs and restart Toprol XL 25mg po q daily and reassess BP and increase if SBP >140  - interrogate ILR   - may consider Neurology there is ? autonomic dysfunction but with severe symptomatic anemia and + orthostasis would reassess dizziness from a Neurologic standpoint once underlying reasons as mentioned above have been rectified     Preoperative Cardiovascular assessment prior to EGD / Colonoscopy   - patient is chest pain free with no EKG changes and /or troponin elevation   - EKG reviewed NSR @ 68 bpm with RBBB with LPFB   - patient has RCRI of 3 points Class IV Risk w/ 15.0 % 30-day risk of death, MI, or cardiac arrest, patient is considered elevated risk for a low to intermediate risk procedure and no need for further cardiovascular intervention and cardiovascularly optimized and may proceed   -  close intraprocedural monitoring   - based on results of EGD/Colonoscopy will follow up recs from GI standpoint regarding AC     Nunu Dussie D.O. [facility]  Cardiology/Vascular Cardiology -[facility]   Telephone # [phone]

## 2021-06-04 NOTE — CONSULT NOTE ADULT - ASSESSMENT
A/P: 80M h/o CAD s/p CABG c/b sepsis and sternal wound infection s/p sternum removal and pec flap and angioplasty, chronic HFrEF, p Afib (on Eliquis), HTN, DM2, and Bell's Palsy recently moved to [place] for few months from [place] presented to [facility] on 5/9/21 with complaints of back pain and dizziness after a mechanical fall also noted for the last few months he has been having continued shortness of breath and leg swelling, and was recently started on Lasix in addition to metolazone by his cardiologist in [place], repeat CT head no cerebral bleed, admitted for ADHF/bi-V faiure and KEN on CKD (Cr. 2.4), TTE with LV EF 35-40%, underwent IV diuresis, initial noted pBNP 08084 ---> 23533, pharm nuclear stress test without ischemia, RHC done with moderate pHTN Group 2 with PCWP 23, switched to torsemide 40mg, underwent MARLO/CV for pAflutter but reverted back to pAfib, metoprolol dose reduced to 50mg BID and had ILR implanted, had L-elbow laceration wound with bleeding due mechanical fall in the toilet, discharged home on 5/14/21, patient has had recurrent episodes of dizziness and had his medications further adjusted. Patient states that this morning after he went to the bathroom he was feeling very dizzy, hot, and with leg tingling like he might pass out. Patient states that he has never felt like he was fully going to pass out until today, and he has been feeling very dizzy every time he stands. Patient also notes that he has been having some dark stools, but states that he thought it was from his iron. Patient was noted to be orthostatic upon arrival to the ER with a hemoglobin of 6.7. Patient states he feels comfortable at rest at this time, but feels worse upon standing. Patient denies fevers, chills, CP, SOB, abdominal pain, N/V/D, headache, or vision changes.   Troponin negative x 1    Near Syncope   - Positive orthostatics in setting of severe anemia.   - IV fluids.   - Hold home torsemide and Eliquis.  - GI consult for anemia and +FOBT.   - Telemetry monitoring.     Anemia  - Likely UGIB with +FOBT.   - Continue PPI.   - GI evaluation.   - Eliquis on hold.     Atrial Fibrillation   - Currently in SR.   - Eliquis on hold for likely GI bleeding.   - Continue home Toprol XL.   - Continue telemetry monitoring.   - EP evaluation for eventual Watchman device.     Assessment and recommendations are final when note is signed by the attending.

## 2021-06-04 NOTE — CONSULT NOTE ADULT - ASSESSMENT
Symptomatic anemia with positive stool hemoccult r/o colorectal neoplasm +/or UGI pathology. Cardiac clearance will be needed for eventual GI w/u probably Monday. Transfuse to Hb of 8 grams or higher. OK to feed pt as he is not actively bleeding. IV Pantoprazole for GI mucosal cytoprotection. Repeat labs ordered for the AM.

## 2021-06-04 NOTE — ED PROVIDER NOTE - PROGRESS NOTE DETAILS
Thierry Martínez, Resident: Pt with symptomatic anemia, pt understands and agrees to transfusion; occult fecal guaiac sent. Cardiology recommends Thierry Martínez, Resident: Pt with symptomatic anemia, pt understands and agrees to transfusion; occult fecal guaiac sent. Cardiology recommends holding torsemide. GI consulted.

## 2021-06-04 NOTE — H&P ADULT - NSICDXPASTSURGICALHX_GEN_ALL_CORE_FT
PAST SURGICAL HISTORY:  S/P CABG (coronary artery bypass graft) complicated by osteomyleitis of the sternum and sepsis; sternum removed

## 2021-06-04 NOTE — CONSULT NOTE ADULT - SUBJECTIVE AND OBJECTIVE BOX
Patient is a 80y old  Male who presents with a chief complaint of GIB/anemia (04 Jun 2021 14:28)      HPI:  80M h/o CAD s/p CABG c/b sepsis and sternal wound infection s/p sternum removal and pec flap and angioplasty, chronic HFrEF- recent EF 30-35%, p Afib (on Eliquis), HTN, DM2, and Bell's Palsy recently moved to [place] for few months from [place] presented to ED with LH/presyncope and fatigue.  Hgb 6.7--> reduced from 9.0 on 5/28 with melanic stool and FOBT +. Cardiology and GI have been consulted.  PRBC ordered. Orthostatics positive.  Patient denies fevers, chills, CP, SOB, abdominal pain, N/V/D, headache, or vision changes.   Extensive notable history: Pt admitted 5/8-5/14 with complaints of back pain and dizziness after a mechanical fall also noted for the last few months he has been having continued shortness of breath and leg swelling, and was recently started on Lasix in addition to metolazone by his cardiologist in [place], repeat CT head no cerebral bleed, admitted for ADHF/bi-V faiure and KEN on CKD (Cr. 2.4), TTE with LV EF 35-40%, underwent IV diuresis, initial noted pBNP 16386 ---> 84266, pharm nuclear stress test without ischemia, RHC done with moderate pHTN Group 2 with PCWP 23, switched to torsemide 40mg, underwent MARLO/CV for pAflutter but reverted back to pAfib, metoprolol dose reduced to 50mg BID and had ILR implanted, had L-elbow laceration wound with bleeding due mechanical fall in the toilet, discharged home on 5/14/21, had episodes of sinus bradycardia 40-50s with recurrent dizziness, metoprolol reduced to 25mg BID, seen on 5/24/21 at  outpatient cardiology visit had metoprolol discontinued for sinus bradycardia, uptitrated hydralazine dose to 50mg TID for uncontrolled HTN/HFrEF  He was seen again in ED 5/28  with episodes of recurring dizziness, nausea and vomited x1, reports also L-sided chest pressure for few minutes, vitals with /70s in the ER.  Trop's negative and pt discharged after a stay for observation. He states that roughly two years ago in [place] he had active rectal bleeding and had an EGD and colonoscopy there and was told that he had a bleeding ulcer. He presently denies gross hematochezia or melena or abdominal pain or unexplained weight loss or anorexia. He takes Eliquis for chronic atrial fibrillation and is being evaluated for a possible Watchman procedure. His last Eliquis dose was yesterday and states that in [place] his Hb went down to as low as 7 grams. He is presently also on two oral iron tablets daily.        REVIEW OF SYSTEMS:  Constitutional: As noted above in HPI.  ENMT:  No difficulty hearing, tinnitus, vertigo; No sinus or throat pain  Respiratory: As noted in HPI.  Cardiovascular: As per HPI.  Gastrointestinal: As noted in HPI. No abdominal or epigastric pain. No nausea, vomiting or hematemesis; No diarrhea or constipation. No melena or hematochezia.  Skin: No itching, burning, rashes or lesions   Musculoskeletal: No joint pain or swelling; No muscle, back or extremity pain  Patient has no cardiopulmonary, peripheral vascular, musculoskeletal, dermatological, neurological,  or psychological symptoms or complaints at this time.    PAST MEDICAL & SURGICAL HISTORY:  Coronary artery disease involving native heart, angina presence unspecified, unspecified vessel or lesion type  nuc stress 5/2021 negative    Atrial fibrillation, unspecified type    Congestive heart failure, unspecified HF chronicity, unspecified heart failure type  EF 35-40%    Bell&#x27;s palsy  Right side of face    Essential hypertension    Type 2 diabetes mellitus without complication, unspecified whether long term insulin use    Osteomyelitis of sternum    Chronic kidney disease, unspecified CKD stage  stage III. Recent BL cr 2.0-2.5    Anemia secondary to renal failure  together with iron deficiency    GIB (gastrointestinal bleeding)  h/o GIB 2019 in [place]    S/P CABG (coronary artery bypass graft)  complicated by osteomyleitis of the sternum and sepsis; sternum removed        FAMILY HISTORY:  No pertinent family history in first degree relatives.        SOCIAL HISTORY:  Smoking Status: [ ] Current, [ ] Former, [x ] Never  Pack Years: N/A. No ETOH or drug abuse history    MEDICATIONS:  MEDICATIONS  (STANDING):  atorvastatin 40 milliGRAM(s) Oral at bedtime  hydrALAZINE 10 milliGRAM(s) Oral every 8 hours    MEDICATIONS  (PRN):      Allergies    No Known Allergies    Intolerances        Vital Signs Last 24 Hrs  T(C): 36.8 (04 Jun 2021 15:11), Max: 36.9 (04 Jun 2021 11:45)  T(F): 98.3 (04 Jun 2021 15:11), Max: 98.5 (04 Jun 2021 11:45)  HR: 64 (04 Jun 2021 15:11) (62 - 68)  BP: 150/74 (04 Jun 2021 15:11) (126/58 - 150/74)  BP(mean): --  RR: 18 (04 Jun 2021 15:11) (16 - 20)  SpO2: 99% (04 Jun 2021 15:11) (97% - 100%)        PHYSICAL EXAM:    General: Well developed; overweight; in no acute distress  HEENT: MMM, conjunctiva pink and sclera anicteric.  Lungs: Clear bilaterally.  Cor: RRR S1, S2 only. Positive sternotomy scar  Gastrointestinal: Abdomen: Soft, non-tender non-distended; Normal bowel sounds; No rebound or guarding or HSM.  LILIANA; Dark brown stool. No melena or hematochezia.  Extremities: Normal range of motion, No clubbing, cyanosis or edema  Neurological: Alert and oriented x3  Skin: Warm and dry. No obvious rash      LABS:                        6.7    9.57  )-----------( 234      ( 04 Jun 2021 12:00 )             22.1     06-04    137  |  101  |  79.2<H>  ----------------------------<  233<H>  4.7   |  22.0  |  2.27<H>    Ca    9.3      04 Jun 2021 12:00  Mg     2.0     06-04    TPro  6.8  /  Alb  4.1  /  TBili  0.4  /  DBili  x   /  AST  26  /  ALT  24  /  AlkPhos  150<H>  06-04          RADIOLOGY & ADDITIONAL STUDIES:

## 2021-06-04 NOTE — ED ADULT NURSE NOTE - NSIMPLEMENTINTERV_GEN_ALL_ED
Implemented All Fall Risk Interventions:  [place] to call system. Call bell, personal items and telephone within reach. Instruct patient to call for assistance. Room bathroom lighting operational. Non-slip footwear when patient is off stretcher. Physically safe environment: no spills, clutter or unnecessary equipment. Stretcher in lowest position, wheels locked, appropriate side rails in place. Provide visual cue, wrist band, yellow gown, etc. Monitor gait and stability. Monitor for mental status changes and reorient to person, place, and time. Review medications for side effects contributing to fall risk. Reinforce activity limits and safety measures with patient and family.

## 2021-06-04 NOTE — ED ADULT NURSE REASSESSMENT NOTE - NS ED NURSE REASSESS COMMENT FT1
Pt status unchanged, refer to flowsheet and chart, pt safety maintained, pt hemodynamically stable. Pt resting comfortably. Pt POC explained and verbalized understanding. Pt awaiting admit bed. Report given to HARMEET Cordero.

## 2021-06-04 NOTE — ED ADULT NURSE NOTE - OBJECTIVE STATEMENT
Pt with PMHx of CABG, CHF, DM, osteomyelitis, CKD, CAD, afib on eliquis presents with near syncope. Pt states he got up to use bathroom today and he became very weak, dizzy and nauseous. Pt states he put lid down on toilet and sat down. Pt states he had associated numbness to his lower extremities and "felt cold". Pt did state he had an episode of syncope and loss control of bladder/bowel during episode on Sunday and bought to [facility]. Pt sttaes Pt denies LOC, head trauma, vomiting/diarrhea, CP, abd , back pain. Cardiologist is Dr. Duke who he last saw on Tuesday. Pt states he had ECHO/stress test about 10 days ago with unremarkable results.

## 2021-06-04 NOTE — ED CLERICAL - NS ED CLERK NOTE PRE-ARRIVAL INFORMATION; ADDITIONAL PRE-ARRIVAL INFORMATION
This patient is enrolled in a readmission reduction program and has active care navigation. This patient can be followed up by the care navigation team within 24 hours. To arrange close follow-up or to obtain additional clinical information about this patient, please call the contact number above. Please speak with the [facility] ED Case Manager for assistance with discharge planning

## 2021-06-04 NOTE — PHARMACOTHERAPY INTERVENTION NOTE - COMMENTS
Spoke to patient at bedside to obtain medication list and conferred with pharmacy and medical record

## 2021-06-04 NOTE — H&P ADULT - NSHPPHYSICALEXAM_GEN_ALL_CORE
Vital Signs Last 24 Hrs  T(C): 36.7 (04 Jun 2021 14:45), Max: 36.9 (04 Jun 2021 11:45)  T(F): 98 (04 Jun 2021 14:45), Max: 98.5 (04 Jun 2021 11:45)  HR: 63 (04 Jun 2021 14:45) (62 - 68)  BP: 146/69 (04 Jun 2021 14:45) (126/58 - 147/65)  BP(mean): --  RR: 18 (04 Jun 2021 14:45) (16 - 20)  SpO2: 97% (04 Jun 2021 14:45) (97% - 100%)    Orthostatic VS    06-04-21 @ 11:44  Lying BP: 131/60 HR: 61   Sitting BP: 126/53 HR: 63  Standing BP: 89/54 HR: 71  Site: --   Mode: -- Vital Signs Last 24 Hrs  T(C): 36.7 (04 Jun 2021 14:45), Max: 36.9 (04 Jun 2021 11:45)  T(F): 98 (04 Jun 2021 14:45), Max: 98.5 (04 Jun 2021 11:45)  HR: 63 (04 Jun 2021 14:45) (62 - 68)  BP: 146/69 (04 Jun 2021 14:45) (126/58 - 147/65)  BP(mean): --  RR: 18 (04 Jun 2021 14:45) (16 - 20)  SpO2: 97% (04 Jun 2021 14:45) (97% - 100%)    Orthostatic VS    06-04-21 @ 11:44  Lying BP: 131/60 HR: 61   Sitting BP: 126/53 HR: 63  Standing BP: 89/54 HR: 71  Site: --   Mode: --    GEN:  A and O x4, well nourished, well developed. No apparent distress.  HEENT: PERRL, EOMI, neck supple, no cervical/SC LAD.  OP moist, no erythema, no exudate  CV: RRR without m/g/r.  No S3/S4.  No carotid bruit.  No JVD.  2+ rad/DP pulses.  extremities warm and well perfused  LUNG: CTA B without wheezes, crackles.  Equal air movement bilaterally.  no stridor  ABD:  soft, non-distended.  nl bowel sounds.  No tenderness to palpation, no guarding/rebound  EXT: no clubbing, cyanosis, edema  SKIN:  no rash.  Warm and pink.  bilateral toe tips with callous with minimal breatkdown  NEURO:  CN 2-12 grossly intact.  Motor 5/5 B U/LE.  DTR's 2+ and equal bilaterally.  No gross sensory changes.  No gross visual changes.  MSK: Moves all 4 equally, no gross abnl

## 2021-06-04 NOTE — ED PROVIDER NOTE - PHYSICAL EXAMINATION
General: NAD, elderly, pallor  Head:  NC, AT  Eyes: EOMI, PERRLA, no scleral icterus  Ears: no erythema/drainage  Nose: midline, no bleeding/drainage  Throat: MMM  Cardiac: RRR, no m/r/g, no lower extremity edema  Respiratory: CTABL, no wheezes/rales/rhonchi, equal chest wall expansions, no use of accessory muscles, no retractions  Abdomen: soft, nondistended, nontender, no rebound tenderness, no guarding, nonperitonitic  MSK/Vascular: full ROM, soft compartments, warm extremities  Neuro: Alert and oriented, motor/sensory intact  Psych: calm, cooperative, normal affect

## 2021-06-04 NOTE — CONSULT NOTE ADULT - SUBJECTIVE AND OBJECTIVE BOX
Electrophysiology Attending Consult Note    HPI:  80M h/o CAD s/p CABG c/b sepsis and sternal wound infection s/p sternum removal and pec flap and angioplasty, chronic HFrEF- recent EF 30-35%, p Afib (on Eliquis), HTN, DM2, CKD, and Bell's Palsy recently moved to [place] for few months from [place] presented to ED with LH/presyncope and fatigue.  Hgb 6.7--> reduced from 9.0 on 5/28 with melanic stool and FOBT +. Cardiology and GI have been consulted.  PRBC ordered. Orthostatics positive.  Patient denies fevers, chills, CP, SOB, abdominal pain, N/V/D, headache, or vision changes.   Extensive notable history: Pt admitted 5/8-5/14 with complaints of back pain and dizziness after a mechanical fall also noted for the last few months he has been having continued shortness of breath and leg swelling, and was recently started on Lasix in addition to metolazone by his cardiologist in [place], repeat CT head no cerebral bleed, admitted for ADHF/bi-V faiure and KEN on CKD (Cr. 2.4), TTE with LV EF 35-40%, underwent IV diuresis, initial noted pBNP 82289 ---> 82033, pharm nuclear stress test without ischemia, RHC done with moderate pHTN Group 2 with PCWP 23, switched to torsemide 40mg, underwent MARLO/CV for pAflutter but reverted back to pAfib, metoprolol dose reduced to 50mg BID and had ILR implanted, had L-elbow laceration wound with bleeding due mechanical fall in the toilet, discharged home on 5/14/21, had episodes of sinus bradycardia 40-50s with recurrent dizziness, metoprolol reduced to 25mg BID, seen on 5/24/21 at  outpatient cardiology visit had metoprolol discontinued for sinus bradycardia, uptitrated hydralazine dose to 50mg TID for uncontrolled HTN/HFrEF  He was seen again in ED 5/28  with episodes of recurring dizziness, nausea and vomited x1, reports also L-sided chest pressure for few minutes, vitals with /70s in the ER.  Trop's negative and pt discharged after a stay for observation.   (04 Jun 2021 14:28)      Above appreciated  Patient was seen by myself last month for AFib, AFL, Bifascicular block, ? presyncope and cardiomyopathy (EF 35-40% on TTE and 41% on nuclear stress test, and 30-35% on MARLO). At that time, he underwent a MARLO-DCCV and an ILR on 5/11. He was maintained on eliquis since then. He had several episodes of severe dizziness, mostly postural and he triggered ILR recorded normal sinus rhythm during all those episodes including the two from earlier today (4 am and around 9 am).   Patient denies any palpitation, CP, or limiting SOB.  He has been compliant with eliquis and did not notice any overt bleeding. He has black stool and is also on iron pills.   He was scheduled to see me in this office this coming Monday 6/7 but got admitted now and I was asked now to r/a him for   I reviewed his MARLO from 5/11 and his KAROLINA is suitable for [facility]         PAST MEDICAL & SURGICAL HISTORY:  Coronary artery disease, sp CABG and PCI. Nuc stress 5/2021 negative  AFL/AFIB, s/p MARLO-DCCV on 5/11, CHADS-VASc = 6 on elqiuis (now on hold given acute drop in H/H). No prior ablation. MARLO from 5/11 showed KAROLINA is suitable for [facility]  Chronic congestive heart failure, ? ischemic,   EF 35-40%  Bifascicular block   Bell&#x27;s palsy Right side of face  Essential hypertension  Type 2 diabetes mellitus without complication, unspecified whether long term insulin use  Osteomyelitis of sternum  Chronic kidney disease, unspecified CKD stage  stage III. Recent BL cr 2.0-2.5  Anemia   GIB (gastrointestinal bleeding)   h/o GIB 2019 in [place]        REVIEW OF SYSTEMS:  CONSTITUTIONAL: No fever, weight loss, or fatigue  EYES: No eye pain, visual disturbances, or discharge  NECK: No pain or stiffness  RESPIRATORY: No cough, wheezing, chills or hemoptysis; Stable PINA  CARDIOVASCULAR: No chest pain, palpitations, or leg swelling  GASTROINTESTINAL: No abdominal or epigastric pain. No nausea, vomiting, or hematemesis; No diarrhea or constipation. No melena or hematochezia.  GENITOURINARY: No dysuria, frequency, hematuria, or incontinence  NEUROLOGICAL: No headaches, memory loss, loss of strength, numbness, or tremors        MEDICATIONS  (STANDING):  atorvastatin 40 milliGRAM(s) Oral at bedtime  hydrALAZINE 10 milliGRAM(s) Oral every 8 hours    MEDICATIONS  (PRN):      Allergies  - No Known Allergies    Intolerances      SOCIAL HISTORY:  Negative for smoking  Occasional alcohol    FAMILY HISTORY:  No pertinent family history in first degree relatives        Vital Signs Last 24 Hrs  T(C): 36.8 (04 Jun 2021 15:11), Max: 36.9 (04 Jun 2021 11:45)  T(F): 98.3 (04 Jun 2021 15:11), Max: 98.5 (04 Jun 2021 11:45)  HR: 64 (04 Jun 2021 15:11) (62 - 68)  BP: 150/74 (04 Jun 2021 15:11) (126/58 - 150/74)  BP(mean): --  RR: 18 (04 Jun 2021 15:11) (16 - 20)  SpO2: 99% (04 Jun 2021 15:11) (97% - 100%)    Physical Exam:  Constitutional: AAOx3, NAD  Neck: supple, No JVD  Cardiovascular: +S1S2 RRR, no murmurs, rubs, gallops   Pulmonary: CTA b/l, unlabored, no wheezes, rales. rhonci  Abdomen: +BS, soft NTND  Extremities: no edema b/l, +distal pulses b/l  Neuro: non focal, speech clear, HONG x 4    LABS:                        6.7    9.57  )-----------( 234      ( 04 Jun 2021 12:00 )             22.1   06-04    137  |  101  |  79.2<H>  ----------------------------<  233<H>  4.7   |  22.0  |  2.27<H>    Ca    9.3      04 Jun 2021 12:00  Mg     2.0     06-04    TPro  6.8  /  Alb  4.1  /  TBili  0.4  /  DBili  x   /  AST  26  /  ALT  24  /  AlkPhos  150<H>  06-04  LIVER FUNCTIONS - ( 04 Jun 2021 12:00 )  Alb: 4.1 g/dL / Pro: 6.8 g/dL / ALK PHOS: 150 U/L / ALT: 24 U/L / AST: 26 U/L / GGT: x           PT/INR - ( 04 Jun 2021 15:31 )   PT: 19.3 sec;   INR: 1.71 ratio         PTT - ( 04 Jun 2021 15:31 )  PTT:31.7 secCARDIAC MARKERS ( 04 Jun 2021 12:00 )  x     / 0.06 ng/mL / x     / x     / x              RADIOLOGY & ADDITIONAL STUDIES:  EKG today      EKG 5/11 AFL with 3:1 AVB and  with Bifascicular block  EKG 5/10: AFL with 2:1 AVB. Bifascicular block  EKG 5/8 AFL with 3:1 AVB and  with Bifascicular block    < from: TTE Echo Complete w/ Contrast w/ Doppler (05.09.21 @ 08:59) >  Summary:   1. Technically difficult study.   2. Endocardial visualization was enhanced with intravenous echo contrast.   3. Left ventricularejection fraction, by visual estimation, is 35 to 40%.   4. Paradoxical septal motion with moderately decreased segmental left ventricular systolic function.   5. Severe hypokinesis of the basal to mid segements, the apex and distal segments are preserved.   6. The mitral in-flow pattern reveals no discernable A-wave, therefore no comment on diastolic function can be made.   7. Severely enlarged right ventricle with severely reduced systolic function, estimated PASP least 45 mmHg mild to moderately elevated.   8. Right atrial enlargement.   9. Moderately enlarged left atrium.  10. Mild mitral annular calcification.  11. Trace mitral valve regurgitation.  12. Thickening and calcification of the anterior and posterior mitral valve leaflets.  13. Mild-moderate tricuspid regurgitation.  14. Sclerotic aortic valve with decreased opening.  15. There is mild aortic root calcification.  16. There is no evidence of pericardial effusion.  17. No prior study available for comparison.    < end of copied text >      < from: CT Head No Cont (05.08.21 @ 10:15) >  FINDINGS: Mild anterior dural thickening tiny calcification on image 46 of series 4. Questionable thin anterior falx subdural hemorrhage measuring 1 mm image 40 of series 4.  There is periventricular and subcortical white matter hypodensity without mass effect, nonspecific, likely representing mild chronic microvascular ischemic changes. There is no compelling evidence for an acute transcortical infarction. There is no other evidence of mass, mass effect, midline shift or extra-axial fluid collection. The lateral ventricles and cortical sulci are otherwise age-appropriate in size and configuration. Trace mucosal thickening bilateral maxillary sinuses The orbits, mastoid air cells and visualized paranasal sinuses are normal. The calvarium is intact. Consider follow-up CT or MRI as clinically warranted.    < end of copied text >      < from: CT Chest No Cont (05.08.21 @ 10:16) >  IMPRESSION: No evidence of acute visceral organ or bony injury on this noncontrast CT. Mild cirrhotic morphology of the liver with trace intraperitoneal fluid. Thickened bladder with trabeculation and stone containing diverticula and enlarged prostate, compatible chronic bladder outlet obstruction.    < end of copied text >      < from: CT Abdomen and Pelvis No Cont (05.08.21 @ 10:16) >  IMPRESSION: No evidence of acute visceral organ or bony injury on this noncontrast CT. Mild cirrhotic morphology of the liver with trace intraperitoneal fluid. Thickened bladder with trabeculation and stone containing diverticula and enlarged prostate, compatible chronic bladder outlet obstruction.    < end of copied text >      < from: CT Head No Cont (05.08.21 @ 16:31) >  IMPRESSION:  No acute intracranial hemorrhage or acute territorial infarct.  If symptoms persist, follow-up MRI exam recommended.    < end of copied text >  < from: US Duplex Venous Lower Ext Complete, Bilateral (05.08.21 @ 19:58) >  IMPRESSION:  No evidence of deep venous thrombosis in either lower extremity.    < end of copied text >     Electrophysiology Attending Consult Note    HPI:  80M h/o CAD s/p CABG c/b sepsis and sternal wound infection s/p sternum removal and pec flap and angioplasty, chronic HFrEF- recent EF 30-35%, p Afib (on Eliquis), HTN, DM2, CKD, and Bell's Palsy recently moved to [place] for few months from [place] presented to ED with LH/presyncope and fatigue.  Hgb 6.7--> reduced from 9.0 on 5/28 with melanic stool and FOBT +. Cardiology and GI have been consulted.  PRBC ordered. Orthostatics positive.  Patient denies fevers, chills, CP, SOB, abdominal pain, N/V/D, headache, or vision changes.   Extensive notable history: Pt admitted 5/8-5/14 with complaints of back pain and dizziness after a mechanical fall also noted for the last few months he has been having continued shortness of breath and leg swelling, and was recently started on Lasix in addition to metolazone by his cardiologist in [place], repeat CT head no cerebral bleed, admitted for ADHF/bi-V faiure and KEN on CKD (Cr. 2.4), TTE with LV EF 35-40%, underwent IV diuresis, initial noted pBNP [id number] ---> 96313, pharm nuclear stress test without ischemia, RHC done with moderate pHTN Group 2 with PCWP 23, switched to torsemide 40mg, underwent MARLO/CV for pAflutter but reverted back to pAfib, metoprolol dose reduced to 50mg BID and had ILR implanted, had L-elbow laceration wound with bleeding due mechanical fall in the toilet, discharged home on 5/14/21, had episodes of sinus bradycardia 40-50s with recurrent dizziness, metoprolol reduced to 25mg BID, seen on 5/24/21 at  outpatient cardiology visit had metoprolol discontinued for sinus bradycardia, uptitrated hydralazine dose to 50mg TID for uncontrolled HTN/HFrEF  He was seen again in ED 5/28  with episodes of recurring dizziness, nausea and vomited x1, reports also L-sided chest pressure for few minutes, vitals with /70s in the ER.  Trop's negative and pt discharged after a stay for observation.   (04 Jun 2021 14:28)      Above appreciated  Patient was seen by myself last month for AFib, AFL, Bifascicular block, ? presyncope and cardiomyopathy (EF 35-40% on TTE and 41% on nuclear stress test, and 30-35% on MARLO). At that time, he underwent a MARLO-DCCV and an ILR on 5/11. He was maintained on eliquis since then. He had several episodes of severe dizziness, mostly postural and he triggered ILR recorded normal sinus rhythm during all those episodes including the two from earlier today (4 am and around 9 am).   Patient denies any palpitation, CP, or limiting SOB.  He has been compliant with eliquis and did not notice any overt bleeding. He has black stool and is also on iron pills.   He was scheduled to see me in this office this coming Monday 6/7 but got admitted now and I was asked now to r/a him for   I reviewed his MARLO from 5/11 and his KAROLINA is suitable for [facility]         PAST MEDICAL & SURGICAL HISTORY:  Coronary artery disease, sp CABG and PCI. Nuc stress 5/2021 negative  AFL/AFIB, s/p MARLO-DCCV on 5/11, CHADS-VASc = 6 on elqiuis (now on hold given acute drop in H/H). No prior ablation. MARLO from 5/11 showed KAROLINA is suitable for [facility]  Chronic congestive heart failure, ? ischemic,   EF 35-40%  Bifascicular block   Bell&#x27;s palsy Right side of face  Essential hypertension  Type 2 diabetes mellitus without complication, unspecified whether long term insulin use  Osteomyelitis of sternum  Chronic kidney disease, unspecified CKD stage  stage III. Recent BL cr 2.0-2.5  Anemia   GIB (gastrointestinal bleeding)   h/o GIB 2019 in [place]        REVIEW OF SYSTEMS:  CONSTITUTIONAL: No fever, weight loss, or fatigue  EYES: No eye pain, visual disturbances, or discharge  NECK: No pain or stiffness  RESPIRATORY: No cough, wheezing, chills or hemoptysis; Stable PINA  CARDIOVASCULAR: No chest pain, palpitations, or leg swelling  GASTROINTESTINAL: No abdominal or epigastric pain. No nausea, vomiting, or hematemesis; No diarrhea or constipation. No melena or hematochezia.  GENITOURINARY: No dysuria, frequency, hematuria, or incontinence  NEUROLOGICAL: No headaches, memory loss, loss of strength, numbness, or tremors        MEDICATIONS  (STANDING):  atorvastatin 40 milliGRAM(s) Oral at bedtime  hydrALAZINE 10 milliGRAM(s) Oral every 8 hours    MEDICATIONS  (PRN):      Allergies  - No Known Allergies    Intolerances      SOCIAL HISTORY:  Negative for smoking  Occasional alcohol    FAMILY HISTORY:  No pertinent family history in first degree relatives        Vital Signs Last 24 Hrs  T(C): 36.8 (04 Jun 2021 15:11), Max: 36.9 (04 Jun 2021 11:45)  T(F): 98.3 (04 Jun 2021 15:11), Max: 98.5 (04 Jun 2021 11:45)  HR: 64 (04 Jun 2021 15:11) (62 - 68)  BP: 150/74 (04 Jun 2021 15:11) (126/58 - 150/74)  BP(mean): --  RR: 18 (04 Jun 2021 15:11) (16 - 20)  SpO2: 99% (04 Jun 2021 15:11) (97% - 100%)    Physical Exam:  Constitutional: AAOx3, NAD  Neck: supple, No JVD  Cardiovascular: +S1S2 RRR, no murmurs, rubs, gallops   Pulmonary: CTA b/l, unlabored, no wheezes, rales. rhonci  Abdomen: +BS, soft NTND  Extremities: no edema b/l, +distal pulses b/l  Neuro: non focal, speech clear, HONG x 4    LABS:                        6.7    9.57  )-----------( 234      ( 04 Jun 2021 12:00 )             22.1   06-04    137  |  101  |  79.2<H>  ----------------------------<  233<H>  4.7   |  22.0  |  2.27<H>    Ca    9.3      04 Jun 2021 12:00  Mg     2.0     06-04    TPro  6.8  /  Alb  4.1  /  TBili  0.4  /  DBili  x   /  AST  26  /  ALT  24  /  AlkPhos  150<H>  06-04  LIVER FUNCTIONS - ( 04 Jun 2021 12:00 )  Alb: 4.1 g/dL / Pro: 6.8 g/dL / ALK PHOS: 150 U/L / ALT: 24 U/L / AST: 26 U/L / GGT: x           PT/INR - ( 04 Jun 2021 15:31 )   PT: 19.3 sec;   INR: 1.71 ratio         PTT - ( 04 Jun 2021 15:31 )  PTT:31.7 secCARDIAC MARKERS ( 04 Jun 2021 12:00 )  x     / 0.06 ng/mL / x     / x     / x              RADIOLOGY & ADDITIONAL STUDIES:  EKG today: SR with bifascicular block   Tele sinus with no events  ILR check now: Only symptoms events and all c/w normal sinus rhythm including 2 from today (one around 4 am and second from around 9 am)      EKG 5/11 AFL with 3:1 AVB and  with Bifascicular block  EKG 5/10: AFL with 2:1 AVB. Bifascicular block  EKG 5/8 AFL with 3:1 AVB and  with Bifascicular block    < from: TTE Echo Complete w/ Contrast w/ Doppler (05.09.21 @ 08:59) >  Summary:   1. Technically difficult study.   2. Endocardial visualization was enhanced with intravenous echo contrast.   3. Left ventricularejection fraction, by visual estimation, is 35 to 40%.   4. Paradoxical septal motion with moderately decreased segmental left ventricular systolic function.   5. Severe hypokinesis of the basal to mid segements, the apex and distal segments are preserved.   6. The mitral in-flow pattern reveals no discernable A-wave, therefore no comment on diastolic function can be made.   7. Severely enlarged right ventricle with severely reduced systolic function, estimated PASP least 45 mmHg mild to moderately elevated.   8. Right atrial enlargement.   9. Moderately enlarged left atrium.  10. Mild mitral annular calcification.  11. Trace mitral valve regurgitation.  12. Thickening and calcification of the anterior and posterior mitral valve leaflets.  13. Mild-moderate tricuspid regurgitation.  14. Sclerotic aortic valve with decreased opening.  15. There is mild aortic root calcification.  16. There is no evidence of pericardial effusion.  17. No prior study available for comparison.    < end of copied text >      < from: MARLO Echo Doppler (05.11.21 @ 16:06) >  Summary:   1. Left ventricular ejection fraction, by visual estimation, is 30 to 35%.   2. Moderately decreased global left ventricular systolic function.   3. Severely enlarged right atrium.   4. Severely enlarged right ventricle.   5. Severely reduced RV systolic function.   6. Mild to moderately enlarged left atrium.   7. There is no evidence of pericardial effusion.   8. Mild mitral annular calcification.   9. Mild mitral valve regurgitation.  10. Moderate-severe tricuspid regurgitation.  11. Color flow doppler and intravenous injection of agitated saline demonstrates the presence of an intact intra atrial septum.  12. No left atrial appendage thrombus and decreased left atrial appendage velocities.    < end of copied text >  < from: Nuclear Stress Test-Pharmacologic (05.10.21 @ 07:59) >  IMPRESSIONS:  * Myocardial Perfusion SPECT results are abnormal.  * There is a medium sized, moderate defect in basal to mid  inferior wall that is fixed, suggestive of infarct. No  evidence of ischemia.  * Post-stress resting myocardial perfusion gated SPECT  imaging was performed (LVEF = 41 %;LVEDV = 141 ml.)  * Dilated right ventricle noted.    No prior study is available for comparison.    < end of copied text >      < end of copied text >  < from: US Duplex Venous Lower Ext Complete, Bilateral (05.08.21 @ 19:58) >  IMPRESSION:  No evidence of deep venous thrombosis in either lower extremity.    < end of copied text >

## 2021-06-05 DIAGNOSIS — K92.2 GASTROINTESTINAL HEMORRHAGE, UNSPECIFIED: ICD-10-CM

## 2021-06-05 DIAGNOSIS — N18.9 CHRONIC KIDNEY DISEASE, UNSPECIFIED: ICD-10-CM

## 2021-06-05 DIAGNOSIS — I50.9 HEART FAILURE, UNSPECIFIED: ICD-10-CM

## 2021-06-05 DIAGNOSIS — I48.91 UNSPECIFIED ATRIAL FIBRILLATION: ICD-10-CM

## 2021-06-05 LAB
ALBUMIN SERPL ELPH-MCNC: 3.6 G/DL — SIGNIFICANT CHANGE UP (ref 3.3–5.2)
ALP SERPL-CCNC: 120 U/L — SIGNIFICANT CHANGE UP (ref 40–120)
ALT FLD-CCNC: 19 U/L — SIGNIFICANT CHANGE UP
ANION GAP SERPL CALC-SCNC: 13 MMOL/L — SIGNIFICANT CHANGE UP (ref 5–17)
APTT BLD: 28.9 SEC — SIGNIFICANT CHANGE UP (ref 27.5–35.5)
AST SERPL-CCNC: 17 U/L — SIGNIFICANT CHANGE UP
BILIRUB SERPL-MCNC: 0.3 MG/DL — LOW (ref 0.4–2)
BUN SERPL-MCNC: 92.7 MG/DL — HIGH (ref 8–20)
CALCIUM SERPL-MCNC: 9.1 MG/DL — SIGNIFICANT CHANGE UP (ref 8.6–10.2)
CHLORIDE SERPL-SCNC: 108 MMOL/L — HIGH (ref 98–107)
CO2 SERPL-SCNC: 22 MMOL/L — SIGNIFICANT CHANGE UP (ref 22–29)
COVID-19 SPIKE DOMAIN AB INTERP: POSITIVE
COVID-19 SPIKE DOMAIN ANTIBODY RESULT: 191 U/ML — HIGH
CREAT SERPL-MCNC: 2.42 MG/DL — HIGH (ref 0.5–1.3)
GAS PNL BLDA: SIGNIFICANT CHANGE UP
GLUCOSE BLDC GLUCOMTR-MCNC: 188 MG/DL — HIGH (ref 70–99)
GLUCOSE BLDC GLUCOMTR-MCNC: 237 MG/DL — HIGH (ref 70–99)
GLUCOSE BLDC GLUCOMTR-MCNC: 240 MG/DL — HIGH (ref 70–99)
GLUCOSE SERPL-MCNC: 225 MG/DL — HIGH (ref 70–99)
HCT VFR BLD CALC: 21 % — CRITICAL LOW (ref 39–50)
HCT VFR BLD CALC: 25.1 % — LOW (ref 39–50)
HGB BLD-MCNC: 6.3 G/DL — CRITICAL LOW (ref 13–17)
HGB BLD-MCNC: 8.1 G/DL — LOW (ref 13–17)
INR BLD: 1.26 RATIO — HIGH (ref 0.88–1.16)
MCHC RBC-ENTMCNC: 29.2 PG — SIGNIFICANT CHANGE UP (ref 27–34)
MCHC RBC-ENTMCNC: 29.7 PG — SIGNIFICANT CHANGE UP (ref 27–34)
MCHC RBC-ENTMCNC: 30 GM/DL — LOW (ref 32–36)
MCHC RBC-ENTMCNC: 32.3 GM/DL — SIGNIFICANT CHANGE UP (ref 32–36)
MCV RBC AUTO: 91.9 FL — SIGNIFICANT CHANGE UP (ref 80–100)
MCV RBC AUTO: 97.2 FL — SIGNIFICANT CHANGE UP (ref 80–100)
PLATELET # BLD AUTO: 182 K/UL — SIGNIFICANT CHANGE UP (ref 150–400)
PLATELET # BLD AUTO: 197 K/UL — SIGNIFICANT CHANGE UP (ref 150–400)
POTASSIUM SERPL-MCNC: 5 MMOL/L — SIGNIFICANT CHANGE UP (ref 3.5–5.3)
POTASSIUM SERPL-SCNC: 5 MMOL/L — SIGNIFICANT CHANGE UP (ref 3.5–5.3)
PROT SERPL-MCNC: 6.3 G/DL — LOW (ref 6.6–8.7)
PROTHROM AB SERPL-ACNC: 14.5 SEC — HIGH (ref 10.6–13.6)
RBC # BLD: 2.16 M/UL — LOW (ref 4.2–5.8)
RBC # BLD: 2.73 M/UL — LOW (ref 4.2–5.8)
RBC # FLD: 16.4 % — HIGH (ref 10.3–14.5)
RBC # FLD: 17.7 % — HIGH (ref 10.3–14.5)
SARS-COV-2 IGG+IGM SERPL QL IA: 191 U/ML — HIGH
SARS-COV-2 IGG+IGM SERPL QL IA: POSITIVE
SODIUM SERPL-SCNC: 143 MMOL/L — SIGNIFICANT CHANGE UP (ref 135–145)
WBC # BLD: 10.19 K/UL — SIGNIFICANT CHANGE UP (ref 3.8–10.5)
WBC # BLD: 7.12 K/UL — SIGNIFICANT CHANGE UP (ref 3.8–10.5)
WBC # FLD AUTO: 10.19 K/UL — SIGNIFICANT CHANGE UP (ref 3.8–10.5)
WBC # FLD AUTO: 7.12 K/UL — SIGNIFICANT CHANGE UP (ref 3.8–10.5)

## 2021-06-05 PROCEDURE — 99223 1ST HOSP IP/OBS HIGH 75: CPT

## 2021-06-05 PROCEDURE — 99232 SBSQ HOSP IP/OBS MODERATE 35: CPT

## 2021-06-05 PROCEDURE — 36245 INS CATH ABD/L-EXT ART 1ST: CPT | Mod: XS

## 2021-06-05 PROCEDURE — 36247 INS CATH ABD/L-EXT ART 3RD: CPT

## 2021-06-05 PROCEDURE — 76937 US GUIDE VASCULAR ACCESS: CPT | Mod: 26

## 2021-06-05 PROCEDURE — 99222 1ST HOSP IP/OBS MODERATE 55: CPT

## 2021-06-05 PROCEDURE — 45378 DIAGNOSTIC COLONOSCOPY: CPT

## 2021-06-05 PROCEDURE — 99233 SBSQ HOSP IP/OBS HIGH 50: CPT

## 2021-06-05 PROCEDURE — 43235 EGD DIAGNOSTIC BRUSH WASH: CPT | Mod: 59

## 2021-06-05 PROCEDURE — 37244 VASC EMBOLIZE/OCCLUDE BLEED: CPT

## 2021-06-05 RX ORDER — PANTOPRAZOLE SODIUM 20 MG/1
8 TABLET, DELAYED RELEASE ORAL
Qty: 80 | Refills: 0 | Status: DISCONTINUED | OUTPATIENT
Start: 2021-06-05 | End: 2021-06-06

## 2021-06-05 RX ORDER — HYDRALAZINE HCL 50 MG
10 TABLET ORAL EVERY 4 HOURS
Refills: 0 | Status: DISCONTINUED | OUTPATIENT
Start: 2021-06-05 | End: 2021-06-06

## 2021-06-05 RX ORDER — FENTANYL CITRATE 50 UG/ML
50 INJECTION INTRAVENOUS
Refills: 0 | Status: DISCONTINUED | OUTPATIENT
Start: 2021-06-05 | End: 2021-06-05

## 2021-06-05 RX ORDER — ISOSORBIDE MONONITRATE 60 MG/1
30 TABLET, EXTENDED RELEASE ORAL DAILY
Refills: 0 | Status: DISCONTINUED | OUTPATIENT
Start: 2021-06-05 | End: 2021-06-05

## 2021-06-05 RX ORDER — LABETALOL HCL 100 MG
10 TABLET ORAL
Refills: 0 | Status: DISCONTINUED | OUTPATIENT
Start: 2021-06-05 | End: 2021-06-05

## 2021-06-05 RX ORDER — CHLORHEXIDINE GLUCONATE 213 G/1000ML
1 SOLUTION TOPICAL
Refills: 0 | Status: DISCONTINUED | OUTPATIENT
Start: 2021-06-05 | End: 2021-06-14

## 2021-06-05 RX ORDER — SODIUM CHLORIDE 9 MG/ML
1000 INJECTION, SOLUTION INTRAVENOUS
Refills: 0 | Status: DISCONTINUED | OUTPATIENT
Start: 2021-06-05 | End: 2021-06-05

## 2021-06-05 RX ADMIN — PANTOPRAZOLE SODIUM 40 MILLIGRAM(S): 20 TABLET, DELAYED RELEASE ORAL at 06:43

## 2021-06-05 RX ADMIN — Medication 10 MILLIGRAM(S): at 06:43

## 2021-06-05 RX ADMIN — PANTOPRAZOLE SODIUM 10 MG/HR: 20 TABLET, DELAYED RELEASE ORAL at 20:10

## 2021-06-05 RX ADMIN — Medication 2: at 08:34

## 2021-06-05 RX ADMIN — Medication 1000 MILLILITER(S): at 00:55

## 2021-06-05 NOTE — CONSULT NOTE ADULT - ATTENDING COMMENTS
I saw him as an intra-op consult @ 1630 during EGD for UGI bleed.  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  He remained hemodynamically stable without evidence of exsanguinating hemorrhage and subsequently underwent empiric coil embolization of the right gastroepiploic, SPDA, and GDA based on the presumed location of the bleed seen endoscopically.    soft / NT / ND  no surgical scars on abdomen  midline sternotomy scar    hgb - 6.7 (6/4) -> 6.3 (6/5) g/dL  Cr - 2.4 (stage 4 CKD)  lactate - 0.4 (6/5 @ 1704)    UGI bleed  -No evidence of significant ongoing bleeding, so he will not benefit from SICU admission.  -Given extensive cardiac comorbidities and the concern that the duodenal diverticulum could be the source of UGI bleed, the risk of surgery is likely prohibitive in this patient. 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 I saw him as an intra-op consult @ 1630 during EGD for UGI bleed.  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  He remained hemodynamically stable without evidence of exsanguinating hemorrhage and subsequently underwent empiric coil embolization of the right gastroepiploic, SPDA, and GDA based on the presumed location of the bleed seen endoscopically.    soft / NT / ND  no surgical scars on abdomen  midline sternotomy scar    hgb - 6.7 (6/4) -> 6.3 (6/5) g/dL  Cr - 2.4 (stage 4 CKD)  lactate - 0.4 (6/5 @ 1704)    UGI bleed  acute blood loss anemia  -No evidence of significant ongoing bleeding, so he will not benefit from SICU admission.  -Given extensive cardiac comorbidities and the concern that the duodenal diverticulum could be the source of UGI bleed, the risk of surgery is likely prohibitive in this patient. 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

## 2021-06-05 NOTE — CONSULT NOTE ADULT - ATTENDING COMMENTS
I agree the above evaluation, assessment, and plan    - in short, Kraig has an UGIB, that is unable to be reached currently via EGD. pending IR evaluation.  - In d/w surgery, if Kraig were to have another episode of bleeding the procedure would is high risk with a high risk patient (medically speaking). In this case a surgical intervention would not be warranted and medical management (transfusions, MTP maybe, 2ndary GI eval vs repeat IR attempts) would be the next best step(s).   - upon my evaluation, Tyrell had an SBP >190 (robert) >180 (cuff) with HR ~80 therefore a shock index of <0.5. he received furosemide intra-procedure, he was laying flat and on 2-3L NC with SpO2 >90%.   - Understanding his previous medical history and concerns for embolic events (as per cardiology note) the need for anticoagulation is well understood (high CHADSVASC2 score) though he's also has a current GI bleed and per my d/w with GI the restarting of anticoagulation should not be done now.   - apixaban was being used, it has been held > 2 days which is enough time for it's anticoagulant effects to wear off.   - Kraig, should have telemetry monitoring for tachycardia, likely needs repeat H&H in time, likely every 6 hours x 3 draw- would avoid too many evaluations as this can lead to anemia- furthermore trending H&H would not diagnose an acute repeat bleed as this would be clinical with melena and/or changes in hemodynamics (shock index)  - If there were to be pulmonary compromise (presume.d to be edema from increase oncotic load) then NIPPV +/- furosemide would be my suggestion  - If he were to rebleed, then as per the surgical discussion (noted above) I would transfuse and repeat EGD v IR procedure (both teams are aware)  - At this time, there is no acute need critical care interventions, treatments, or therapies. This can change and he should be monitored closely (i.e. Step-down, advanced telemetry floor, etc.). I agree the above evaluation, assessment, and plan    - in short, Kraig has an UGIB, that is unable to be reached currently via EGD. pending IR evaluation.  - In d/w surgery, if Kraig were to have another episode of bleeding the procedure would be high risk with a high risk patient (medically speaking). In this case a surgical intervention would not be warranted and medical management (transfusions, MTP maybe, 2ndary GI eval vs repeat IR attempts) would be the next best step(s).   - upon my evaluation, Tyrell had an SBP >190 (robert) >180 (cuff) with HR ~80 therefore a shock index of <0.5. he received furosemide intra-procedure, he was laying flat and on 2-3L NC with SpO2 >90%.   - Understanding his previous medical history and concerns for embolic events (as per cardiology note) the need for anticoagulation is well understood (high CHADSVASC2 score) though he's also has a current GI bleed and per my d/w with GI the restarting of anticoagulation should not be done now.   - apixaban was being used, it has been held > 2 days which is enough time for it's anticoagulant effects to wear off.   - Kraig, should have telemetry monitoring for tachycardia, likely needs repeat H&H in time, likely every 6 hours x 3 draw- would avoid too many evaluations as this can lead to anemia- furthermore trending H&H would not diagnose an acute repeat bleed as this would be clinical with melena and/or changes in hemodynamics (shock index)  - If there were to be pulmonary compromise (presume.d to be edema from increase oncotic load) then NIPPV +/- furosemide would be my suggestion  - If he were to rebleed, then as per the surgical discussion (noted above) I would transfuse and repeat EGD v IR procedure (both teams are aware)  - At this time, there is no acute need critical care interventions, treatments, or therapies. This can change and he should be monitored closely (i.e. Step-down, advanced telemetry floor, etc.).

## 2021-06-05 NOTE — PRE PROCEDURE NOTE - PRE PROCEDURE EVALUATION
IR PRE-PROCEDURE NOTE  DIAGNOSIS: duodenal bleed  PROCEDURE: mesenteric angio, possible embo  RECENT CHANGES: None  PERTINENT LABS: Within acceptable limits.  IMAGING: Reviewed  CONSENT: On chart

## 2021-06-05 NOTE — PROCEDURE NOTE - PLAN
right groin checks  follow CBC  may need to be re-scoped or surgery if rebleeds  may resume anticoagulation per primary team (do not bolus heparin)

## 2021-06-05 NOTE — BRIEF OPERATIVE NOTE - COMMENTS
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 Unable to perform intervention   I called IR  Dr Herron and Surgery . Dr Kasper called into OR. I discussed plan. Pt to go to IR then Possibly SICU ( SICU called )  - will give additional unit PRBC,   - protnix drip IV  - ABG hemoglobin 7.6. 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 Unable to perform intervention   I called IR  Dr Herron and Surgery . Dr Kasper called into OR. I discussed plan. Pt to go to IR then Possibly SICU ( SICU called )  - will give additional unit PRBC,   - protnix drip IV  - discussed plan with wife gilbert   - ABG hemoglobin 7.6. Recheck hemoglobin  after additional  transfusion complete   left message for wife

## 2021-06-05 NOTE — BRIEF OPERATIVE NOTE - NSICDXBRIEFPOSTOP_GEN_ALL_CORE_FT
POST-OP DIAGNOSIS:  Upper GI bleeding 05-Jun-2021 17:21:55  Alexa Payne  Colon, diverticulosis 05-Jun-2021 17:24:44  Alexa Payne

## 2021-06-05 NOTE — CONSULT NOTE ADULT - SUBJECTIVE AND OBJECTIVE BOX
REASON FOR CONSULT: UGIB    CONSULT REQUESTED BY: MD Elmer    Patient is a 80y old  Male who presents with a chief complaint of GIB/anemia (05 Jun 2021 14:25).  Patient with acute blood loss anemia, presyncope, and cardiac history including cabg complicated by swi and pec muscle flap, afib on eliquis, and chronic systolic heart failure with EF 35%.        BRIEF HOSPITAL COURSE:  Transfused 2prbc for hg 6 and brought to OR for egd and colonscopy.  Discussed results with Dr. Payne found with bleeding in duodenum, general surgery consulted in OR and plans made for patient to go to IR for embolization.  Patient hypertensive sbp 212 in PACU laying flat in no distress.  Lasix 40 IVP given by anesthesia and ABG in OR showed h/h 7/25 no lactemia.  Patient urgently transferred to IR during assessment.    PAST MEDICAL & SURGICAL HISTORY:  Coronary artery disease involving native heart, angina presence unspecified, unspecified vessel or lesion type  nuc stress 5/2021 negative    Atrial fibrillation, unspecified type    Congestive heart failure, unspecified HF chronicity, unspecified heart failure type  EF 35-40%    Bell&#x27;s palsy  Right side of face    Essential hypertension    Type 2 diabetes mellitus without complication, unspecified whether long term insulin use  insulin plus orals    Osteomyelitis of sternum    Chronic kidney disease, unspecified CKD stage  stage III. Recent BL cr 2.0-2.5    Anemia secondary to renal failure  together with iron deficiency    GIB (gastrointestinal bleeding)  h/o GIB 2019 in [place].  told hemorrhoidal    S/P CABG (coronary artery bypass graft)  complicated by osteomyleitis of the sternum and sepsis; sternum removed      Allergies    No Known Allergies    Intolerances      FAMILY HISTORY:  No pertinent family history in first degree relatives  cad        Review of Systems:  CONSTITUTIONAL: No fever, chills, or fatigue  EYES: No eye pain, visual disturbances, or discharge  ENMT:  No difficulty hearing, tinnitus, vertigo; No sinus or throat pain  NECK: No pain or stiffness  RESPIRATORY: No cough, wheezing, chills or hemoptysis; No shortness of breath  CARDIOVASCULAR: No chest pain, palpitations, dizziness, or leg swelling  GASTROINTESTINAL: No abdominal or epigastric pain. No nausea, vomiting, or hematemesis; No diarrhea or constipation. No melena or hematochezia.  GENITOURINARY: No dysuria, frequency, hematuria, or incontinence  NEUROLOGICAL: No headaches, memory loss, loss of strength, numbness, or tremors  SKIN: No itching, burning, rashes, or lesions   MUSCULOSKELETAL: No joint pain or swelling; No muscle, back, or extremity pain  PSYCHIATRIC: No depression, anxiety, mood swings, or difficulty sleeping      Medications:                                  ICU Vital Signs Last 24 Hrs  T(C): 36.9 (05 Jun 2021 14:25), Max: 36.9 (05 Jun 2021 09:19)  T(F): 98.5 (05 Jun 2021 14:25), Max: 98.5 (05 Jun 2021 09:19)  HR: 83 (05 Jun 2021 14:25) (57 - 83)  BP: 169/80 (05 Jun 2021 14:25) (114/58 - 179/53)  BP(mean): --  ABP: --  ABP(mean): --  RR: 18 (05 Jun 2021 14:25) (16 - 18)  SpO2: 95% (05 Jun 2021 14:25) (94% - 97%)    Vital Signs Last 24 Hrs  T(C): 36.9 (05 Jun 2021 14:25), Max: 36.9 (05 Jun 2021 09:19)  T(F): 98.5 (05 Jun 2021 14:25), Max: 98.5 (05 Jun 2021 09:19)  HR: 83 (05 Jun 2021 14:25) (57 - 83)  BP: 169/80 (05 Jun 2021 14:25) (114/58 - 179/53)  BP(mean): --  RR: 18 (05 Jun 2021 14:25) (16 - 18)  SpO2: 95% (05 Jun 2021 14:25) (94% - 97%)    ABG - ( 05 Jun 2021 17:04 )  pH, Arterial: 7.34  pH, Blood: x     /  pCO2: 40    /  pO2: 370   / HCO3: 21    / Base Excess: -4.1  /  SaO2: 100                 I&O's Detail    05 Jun 2021 07:01  -  05 Jun 2021 17:28  --------------------------------------------------------  IN:  Total IN: 0 mL    OUT:    Voided (mL): 500 mL  Total OUT: 500 mL    Total NET: -500 mL            LABS:                        6.3    7.12  )-----------( 197      ( 05 Jun 2021 08:03 )             21.0     06-05    143  |  108<H>  |  92.7<H>  ----------------------------<  225<H>  5.0   |  22.0  |  2.42<H>    Ca    9.1      05 Jun 2021 08:03  Mg     2.0     06-04    TPro  6.3<L>  /  Alb  3.6  /  TBili  0.3<L>  /  DBili  x   /  AST  17  /  ALT  19  /  AlkPhos  120  06-05      CARDIAC MARKERS ( 04 Jun 2021 12:00 )  x     / 0.06 ng/mL / x     / x     / x          CAPILLARY BLOOD GLUCOSE      POCT Blood Glucose.: 188 mg/dL (05 Jun 2021 12:20)    PT/INR - ( 05 Jun 2021 08:03 )   PT: 14.5 sec;   INR: 1.26 ratio         PTT - ( 05 Jun 2021 08:03 )  PTT:28.9 sec    CULTURES:      Physical Examination:    General: No acute distress.  Alert, oriented, interactive, nonfocal    CVS: Regular rate and rhythm, no murmurs, rubs, or gallops    SKIN: Warm and well perfused, no rashes noted.

## 2021-06-05 NOTE — PROGRESS NOTE ADULT - SUBJECTIVE AND OBJECTIVE BOX
Patient seen and examined today in ER A 17 receiving a unit of blood. Patient reports he is awaiting his colonoscopy and endoscopy. No acute complaints. Denies any palpitations.     TELE: Atrial fibrillation with rates in the 80-100s currently.     MEDICATIONS  (STANDING):  atorvastatin 40 milliGRAM(s) Oral at bedtime  dextrose 40% Gel 15 Gram(s) Oral once  dextrose 5%. 1000 milliLiter(s) (50 mL/Hr) IV Continuous <Continuous>  dextrose 5%. 1000 milliLiter(s) (100 mL/Hr) IV Continuous <Continuous>  dextrose 50% Injectable 25 Gram(s) IV Push once  dextrose 50% Injectable 12.5 Gram(s) IV Push once  dextrose 50% Injectable 25 Gram(s) IV Push once  glucagon  Injectable 1 milliGRAM(s) IntraMuscular once  hydrALAZINE 10 milliGRAM(s) Oral every 8 hours  insulin glargine Injectable (LANTUS) 10 Unit(s) SubCutaneous at bedtime  insulin lispro (ADMELOG) corrective regimen sliding scale   SubCutaneous three times a day before meals  pantoprazole  Injectable 40 milliGRAM(s) IV Push every 12 hours  tamsulosin 0.4 milliGRAM(s) Oral at bedtime    MEDICATIONS  (PRN):  acetaminophen   Tablet .. 650 milliGRAM(s) Oral every 4 hours PRN Mild Pain (1 - 3)  ondansetron Injectable 4 milliGRAM(s) IV Push every 6 hours PRN Nausea    Allergies  No Known Allergies    PAST MEDICAL & SURGICAL HISTORY:  Coronary artery disease involving native heart, angina presence unspecified, unspecified vessel or lesion type  nuc stress 5/2021 negative  Atrial fibrillation, unspecified type  Congestive heart failure, unspecified HF chronicity, unspecified heart failure type  EF 35-40%  Bell&#x27;s palsy  Right side of face  Essential hypertension  Type 2 diabetes mellitus without complication, unspecified whether long term insulin use  insulin plus orals  Osteomyelitis of sternum  Chronic kidney disease, unspecified CKD stage  stage III. Recent BL cr 2.0-2.5  Anemia secondary to renal failure  together with iron deficiency  GIB (gastrointestinal bleeding)  h/o GIB 2019 in [place].  told hemorrhoidal  S/P CABG (coronary artery bypass graft)  complicated by osteomyleitis of the sternum and sepsis; sternum removed    Vital Signs Last 24 Hrs  T(C): 36.7 (05 Jun 2021 11:30), Max: 36.9 (05 Jun 2021 09:19)  T(F): 98.1 (05 Jun 2021 11:30), Max: 98.5 (05 Jun 2021 09:19)  HR: 77 (05 Jun 2021 11:30) (57 - 80)  BP: 157/74 (05 Jun 2021 11:30) (114/58 - 179/53)  RR: 16 (05 Jun 2021 11:30) (16 - 18)  SpO2: 94% (05 Jun 2021 11:30) (94% - 99%)    Physical Exam:  Constitutional: NAD, AAOx3  Cardiovascular: +S1S2 RRR  Pulmonary: CTA b/l, unlabored  GI: soft NTND +BS  Extremities: no pedal edema, +distal pulses b/l  Neuro: non focal, HONG x4    LABS:                        6.3    7.12  )-----------( 197      ( 05 Jun 2021 08:03 )             21.0     143  |  108<H>  |  92.7<H>  ----------------------------<  225<H>  5.0   |  22.0  |  2.42<H>  Ca    9.1      05 Jun 2021 08:03  Mg     2.0     06-04  TPro  6.3<L>  /  Alb  3.6  /  TBili  0.3<L>  /  DBili  x   /  AST  17  /  ALT  19  /  AlkPhos  120  06-05  PT/INR - ( 05 Jun 2021 08:03 )   PT: 14.5 sec;   INR: 1.26 ratio    PTT - ( 05 Jun 2021 08:03 )  PTT:28.9 sec    A/P  80 year old male patient with CAD s/p CABG c/b sepsis and sternal wound infection s/p sternum removal and pec flap and angioplasty, chronic HFrEF- (EF 35-40% on TTE and 41% on nuclear stress test, and 30-35% on MARLO), AFL/AFIB, s/p MARLO-DCCV on 5/11, CHADS-VASc = 6 on Eliquis, HTN, DM2, CKD, and Bell's Palsy, AFib, AFL, dizziness, bifascicular block and cardiomyopathy back in 5/2021. He underwent a MARLO-DCCV and ILR on 5/11/2021 admitted with an acute GIB.     No ILR evidence of bradyarrytmias. Still awaiting GI work up.     - Transfuse per GI and Medicine  - Start Toprol XL 25mg when clinically stable  - Start anticoagulation (Heparin) once cleared by GI following EGD/Colonsocopy today.        Patient seen and examined today in ER A 17 receiving a unit of blood. Patient reports he is awaiting his colonoscopy and endoscopy. No acute complaints. Denies any palpitations.     TELE: SR with rates in the 80-100s currently.     MEDICATIONS  (STANDING):  atorvastatin 40 milliGRAM(s) Oral at bedtime  dextrose 40% Gel 15 Gram(s) Oral once  dextrose 5%. 1000 milliLiter(s) (50 mL/Hr) IV Continuous <Continuous>  dextrose 5%. 1000 milliLiter(s) (100 mL/Hr) IV Continuous <Continuous>  dextrose 50% Injectable 25 Gram(s) IV Push once  dextrose 50% Injectable 12.5 Gram(s) IV Push once  dextrose 50% Injectable 25 Gram(s) IV Push once  glucagon  Injectable 1 milliGRAM(s) IntraMuscular once  hydrALAZINE 10 milliGRAM(s) Oral every 8 hours  insulin glargine Injectable (LANTUS) 10 Unit(s) SubCutaneous at bedtime  insulin lispro (ADMELOG) corrective regimen sliding scale   SubCutaneous three times a day before meals  pantoprazole  Injectable 40 milliGRAM(s) IV Push every 12 hours  tamsulosin 0.4 milliGRAM(s) Oral at bedtime    MEDICATIONS  (PRN):  acetaminophen   Tablet .. 650 milliGRAM(s) Oral every 4 hours PRN Mild Pain (1 - 3)  ondansetron Injectable 4 milliGRAM(s) IV Push every 6 hours PRN Nausea    Allergies  No Known Allergies    PAST MEDICAL & SURGICAL HISTORY:  Coronary artery disease involving native heart, angina presence unspecified, unspecified vessel or lesion type  nuc stress 5/2021 negative  Atrial fibrillation, unspecified type  Congestive heart failure, unspecified HF chronicity, unspecified heart failure type  EF 35-40%  Bell&#x27;s palsy  Right side of face  Essential hypertension  Type 2 diabetes mellitus without complication, unspecified whether long term insulin use  insulin plus orals  Osteomyelitis of sternum  Chronic kidney disease, unspecified CKD stage  stage III. Recent BL cr 2.0-2.5  Anemia secondary to renal failure  together with iron deficiency  GIB (gastrointestinal bleeding)  h/o GIB 2019 in [place].  told hemorrhoidal  S/P CABG (coronary artery bypass graft)  complicated by osteomyleitis of the sternum and sepsis; sternum removed    Vital Signs Last 24 Hrs  T(C): 36.7 (05 Jun 2021 11:30), Max: 36.9 (05 Jun 2021 09:19)  T(F): 98.1 (05 Jun 2021 11:30), Max: 98.5 (05 Jun 2021 09:19)  HR: 77 (05 Jun 2021 11:30) (57 - 80)  BP: 157/74 (05 Jun 2021 11:30) (114/58 - 179/53)  RR: 16 (05 Jun 2021 11:30) (16 - 18)  SpO2: 94% (05 Jun 2021 11:30) (94% - 99%)    Physical Exam:  Constitutional: NAD, AAOx3  Cardiovascular: +S1S2 RRR  Pulmonary: CTA b/l, unlabored  GI: soft NTND +BS  Extremities: no pedal edema,   Neuro: non focal, HONG x4    LABS:                        6.3    7.12  )-----------( 197      ( 05 Jun 2021 08:03 )             21.0     143  |  108<H>  |  92.7<H>  ----------------------------<  225<H>  5.0   |  22.0  |  2.42<H>  Ca    9.1      05 Jun 2021 08:03  Mg     2.0     06-04  TPro  6.3<L>  /  Alb  3.6  /  TBili  0.3<L>  /  DBili  x   /  AST  17  /  ALT  19  /  AlkPhos  120  06-05  PT/INR - ( 05 Jun 2021 08:03 )   PT: 14.5 sec;   INR: 1.26 ratio    PTT - ( 05 Jun 2021 08:03 )  PTT:28.9 sec    A/P  80 year old male patient with CAD s/p CABG c/b sepsis and sternal wound infection s/p sternum removal and pec flap and angioplasty, chronic HFrEF- (EF 35-40% on TTE and 41% on nuclear stress test, and 30-35% on MARLO), AFL/AFIB, s/p MARLO-DCCV on 5/11, CHADS-VASc = 6 on Eliquis, HTN, DM2, CKD, and Bell's Palsy, AFib, AFL, dizziness, bifascicular block and cardiomyopathy back in 5/2021. He underwent a MARLO-DCCV and ILR on 5/11/2021 admitted with an acute anemia secondary to GIB.     No ILR evidence of bradyarrhythmias. Still awaiting GI work up.     - Transfuse per GI and Medicine  - Start Toprol XL 25mg when clinically stable  - Start anticoagulation (Heparin) once cleared by GI following EGD/Colonsocopy today.   - Cardilogy follow up appreciated.

## 2021-06-05 NOTE — PROGRESS NOTE ADULT - SUBJECTIVE AND OBJECTIVE BOX
[place] [facility]                                                               Office: [address] [address]                                                              Telephone: [phone]. Fax:[phone]                                                                             PROGRESS NOTE  Reason for follow up: GIB/anemia  Overnight: No new events.   Update: 6/5: Pt denies chest pain, palpitations, SOB. Tele- NSR HR @ 60s. Cont. Hydralazine, Start Imdur today. Will further optimize as BP tolerates. Cont. to hold AC.    Subjective: "feel tired"    	  Vitals:  T(C): 36.7 (06-05-21 @ 11:30), Max: 36.9 (06-05-21 @ 09:19)  HR: 77 (06-05-21 @ 11:30) (57 - 80)  BP: 157/74 (06-05-21 @ 11:30) (114/58 - 179/53)  RR: 16 (06-05-21 @ 11:30) (16 - 18)  SpO2: 94% (06-05-21 @ 11:30) (94% - 99%)    I&O's Summary    05 Jun 2021 07:01  -  05 Jun 2021 14:25  --------------------------------------------------------  IN: 0 mL / OUT: 500 mL / NET: -500 mL      Weight (kg): 98 (06-04 @ 12:54)      PHYSICAL EXAM:  Appearance: Comfortable. No acute distress  HEENT:  Head and neck: Atraumatic. Normocephalic.  Normal oral mucosa, PERRL, Neck is supple. No JVD, No carotid bruit.   Neurologic: A & O x 3, no focal deficits. EOMI.  Lymphatic: No cervical lymphadenopathy  Cardiovascular: Normal S1 S2, No murmur, rubs/gallops. No JVD, No edema  Respiratory: Lungs clear to auscultation  Gastrointestinal:  Soft, Non-tender, + BS  Lower Extremities: No edema  Psychiatry: Patient is calm. No agitation. Mood & affect appropriate  Skin: No rashes/ ecchymoses/cyanosis/ulcers visualized on the face, hands or feet       CURRENT MEDICATIONS:  hydrALAZINE 10 milliGRAM(s) Oral every 8 hours  tamsulosin 0.4 milliGRAM(s) Oral at bedtime  pantoprazole  Injectable  atorvastatin  dextrose 40% Gel  dextrose 50% Injectable  dextrose 50% Injectable  dextrose 50% Injectable  glucagon  Injectable  insulin glargine Injectable (LANTUS)  insulin lispro (ADMELOG) corrective regimen sliding scale  dextrose 5%.  dextrose 5%.      DIAGNOSTIC TESTING:  [ ] Echocardiogram: < from: TTE Echo Complete w/ Contrast w/ Doppler (05.09.21 @ 08:59) >  Summary:   1. Technically difficult study.   2. Endocardial visualization was enhanced with intravenous echo contrast.   3. Left ventricularejection fraction, by visual estimation, is 35 to 40%.   4. Paradoxical septal motion with moderately decreased segmental left ventricular systolic function.   5. Severe hypokinesis of the basal to mid segements, the apex and distal segments are preserved.   6. The mitral in-flow pattern reveals no discernable A-wave, therefore no comment on diastolic function can be made.   7. Severely enlarged right ventricle with severely reduced systolic function, estimated PASP least 45 mmHg mild to moderately elevated.   8. Right atrial enlargement.   9. Moderately enlarged left atrium.  10. Mild mitral annular calcification.  11. Trace mitral valve regurgitation.  12. Thickening and calcification of the anterior and posterior mitral valve leaflets.  13. Mild-moderate tricuspid regurgitation.  14. Sclerotic aortic valve with decreased opening.  15. There is mild aortic root calcification.  16. There is no evidence of pericardial effusion.  17. No prior study available for comparison.        [ ] Stress Test:  < from: Nuclear Stress Test-Pharmacologic (05.10.21 @ 07:59) >  IMPRESSIONS:  * Myocardial Perfusion SPECT results are abnormal.  * There is a medium sized, moderate defect in basal to mid  inferior wall that is fixed, suggestive of infarct. No  evidence of ischemia.  * Post-stress resting myocardial perfusion gated SPECT  imaging was performed (LVEF = 41 %;LVEDV = 141 ml.)  * Dilated right ventricle noted.    No prior study is available for comparison.      OTHER: 	    Xray:< from: Xray Chest 1 View- PORTABLE-Urgent (Xray Chest 1 View- PORTABLE-Urgent .) (06.04.21 @ 12:55) >    IMPRESSION:  No acute radiographic findingsand no change      LABS:	 	  CARDIAC MARKERS ( 04 Jun 2021 12:00 )  x     / 0.06 ng/mL / x     / x     / x      p-BNP 04 Jun 2021 12:00: x                              6.3    7.12  )-----------( 197      ( 05 Jun 2021 08:03 )             21.0     06-05    143  |  108<H>  |  92.7<H>  ----------------------------<  225<H>  5.0   |  22.0  |  2.42<H>    Ca    9.1      05 Jun 2021 08:03  Mg     2.0     06-04    TPro  6.3<L>  /  Alb  3.6  /  TBili  0.3<L>  /  DBili  x   /  AST  17  /  ALT  19  /  AlkPhos  120  06-05      TELEMETRY: NSR HR @ 60s

## 2021-06-05 NOTE — BRIEF OPERATIVE NOTE - OPERATION/FINDINGS
Patient had colonoscopy-  DIverticulosis in descending and sigmoid colon, internal hemorrhoids .  Prep was fair. Black stool throughout the colon . Ti showed melena.  Cecal withdrawal time was 16 minutes.   Colon was suctioned and irrigated- good visualization.      egd - 2 cm hiatal hernia .   Stomach otherwise normal.  D1 normal .D2 , just after sweep  in the appeared to be a diverticulum with active bleeding and clot.  Brisk bleeding. Unable to see under the clot .  No intervention.  There was a difficult angle and could not get good visualization and stabilization of the scope. Unable to do any intervention.

## 2021-06-05 NOTE — PROGRESS NOTE ADULT - ATTENDING COMMENTS
80M with symptomatic anemia    - s/p endoscopy today, source of bleed not able to be intervened upon endoscopically  - now planned for IR intervention with SICU post-operative follow up  - maintain Hb >8  - continue hold diuretics  - reintroduce HF medications as BP allows   - restart systemic AC with heparin drip as soon as safe from GI bleeding standpoint

## 2021-06-05 NOTE — PROCEDURE NOTE - PROCEDURE FINDINGS AND DETAILS
right cfa cath 5f  no extravasation seen, proph embo performed based on EGD findings  embo of gastroepiploic, SPDA, and GDA  no retrograde flow to from SMA    minx right groin.  No hematoma. 2+ pulses

## 2021-06-05 NOTE — CONSULT NOTE ADULT - PROBLEM SELECTOR RECOMMENDATION 9
patient for embolization at this time  trend H/H  D/w MICU intensivist Dr. Summers, requests evaluation for SICU.

## 2021-06-05 NOTE — PATIENT PROFILE ADULT - FALL HARM RISK
Next appt 9/20/21  Last appt 3/18/21    Refill request for  Disp Refills Start End     HYDROcodone-acetaminophen (NORCO) 5-325 MG per tablet 90 tablet 0 2/23/2021     Sig: TAKE ONE TABLET BY MOUTH EVERY 6 HOURS AS NEEDED FOR PAIN          Refill unable to be completed per standing protocol due to; controlled substance  Orders pended, and routed to provider for approval.  Please route any notes back to your nursing pool via patient call, NOT Rx Auth.    Thank you, Refill Center Staff     coagulation(Bleeding disorder R/T clinical cond/anti-coags)

## 2021-06-05 NOTE — CONSULT NOTE ADULT - ASSESSMENT
80M presents with anemia and UGIB, received 2 prbc pre-egd for hgb 6, hbg now 7 and patient hypertensive, being brought to IR for embolization.  NAD.  Case d/w Dr Payne at bedside with Dr. Summers.

## 2021-06-05 NOTE — CONSULT NOTE ADULT - SUBJECTIVE AND OBJECTIVE BOX
HPI:  80M h/o CAD s/p CABG c/b sepsis and sternal wound infection s/p sternum removal and pec flap and angioplasty, chronic HFrEF- recent EF 35-40%, p Afib (on Eliquis), HTN, DM2, and Bell's Palsy presyncope and fatigue.  Hgb 6.7 from 9.0 on 5/28, + melanic stool FOBT +. Pt underwent colonoscopy and EGD with GI. Colonoscopy notable for diverticulosis in the descending and sigmoid colon and internal hemorrhoids. Black stool noted throughout the colon. EGD notable for diverticulum with active bleeding and clot. Bleeding reported as "brisk" on brief op note. No intervention preformed due to poor visualization and inability to stabilize the scope. Pt was then taken to Ir for intervention. Since admission yesterday he has received 4 u PRBC. SICU consulted for acute GI bleed.     (04 Jun 2021 14:28)    PAST MEDICAL & SURGICAL HISTORY:  Coronary artery disease involving native heart, angina presence unspecified, unspecified vessel or lesion type  nuc stress 5/2021 negative    Atrial fibrillation, unspecified type    Congestive heart failure, unspecified HF chronicity, unspecified heart failure type  EF 35-40%    Bell&#x27;s palsy  Right side of face    Essential hypertension    Type 2 diabetes mellitus without complication, unspecified whether long term insulin use  insulin plus orals    Osteomyelitis of sternum    Chronic kidney disease, unspecified CKD stage  stage III. Recent BL cr 2.0-2.5    Anemia secondary to renal failure  together with iron deficiency    GIB (gastrointestinal bleeding)  h/o GIB 2019 in [place].  told hemorrhoidal    S/P CABG (coronary artery bypass graft)  complicated by osteomyleitis of the sternum and sepsis; sternum removed    Home Medications:  aspirin 81 mg oral delayed release tablet: 1 tab(s) orally once a day (04 Jun 2021 17:37)  atorvastatin 40 mg oral tablet: 1 tab(s) orally once a day (04 Jun 2021 17:37)  Eliquis 5 mg oral tablet: 1 tab(s) orally 2 times a day (04 Jun 2021 17:37)  ferrous sulfate 300 mg (60 mg elemental iron) oral tablet: 1 tab(s) orally once a day (04 Jun 2021 17:37)  Flomax 0.4 mg oral capsule: 1 cap(s) orally once a day (04 Jun 2021 17:37)  hydrALAZINE 25 mg oral tablet: 2 tab(s) orally 3 times a day (04 Jun 2021 17:37)  isosorbide mononitrate 30 mg oral tablet, extended release: 1 tab(s) orally once a day (04 Jun 2021 17:37)  Lantus 100 units/mL subcutaneous solution: 17 unit(s) subcutaneous 2 times a day (04 Jun 2021 17:37)  magnesium oxide 400 mg (241.3 mg elemental magnesium) oral tablet: 1 tab(s) orally once a day (04 Jun 2021 17:37)  omeprazole 40 mg oral delayed release capsule: 1 cap(s) orally once a day (04 Jun 2021 17:37)  potassium chloride 20 mEq oral tablet, extended release: 2 tab(s) orally once a day (04 Jun 2021 17:37)  torsemide 20 mg oral tablet: 2 tab(s) orally once a day (04 Jun 2021 17:37)    Review of Systems: ALEXANDRO as patient on IR table at the time of consult    MEDICATIONS  (STANDING):  pantoprazole Infusion 8 mG/Hr (10 mL/Hr) IV Continuous <Continuous>    MEDICATIONS  (PRN):    SOCIAL HISTORY:    Vital Signs Last 24 Hrs  T(C): 36.9 (05 Jun 2021 14:25), Max: 36.9 (05 Jun 2021 09:19)  T(F): 98.5 (05 Jun 2021 14:25), Max: 98.5 (05 Jun 2021 09:19)  HR: 83 (05 Jun 2021 14:25) (57 - 83)  BP: 169/80 (05 Jun 2021 14:25) (114/58 - 169/80)  BP(mean): --  RR: 18 (05 Jun 2021 14:25) (16 - 18)  SpO2: 95% (05 Jun 2021 14:25) (94% - 97%)    Physical Exam:  deferred as patient on IR table at the time of consult    LABS:                        6.3    7.12  )-----------( 197      ( 05 Jun 2021 08:03 )             21.0     06-05    143  |  108<H>  |  92.7<H>  ----------------------------<  225<H>  5.0   |  22.0  |  2.42<H>    Ca    9.1      05 Jun 2021 08:03  Mg     2.0     06-04    TPro  6.3<L>  /  Alb  3.6  /  TBili  0.3<L>  /  DBili  x   /  AST  17  /  ALT  19  /  AlkPhos  120  06-05    PT/INR - ( 05 Jun 2021 08:03 )   PT: 14.5 sec;   INR: 1.26 ratio         PTT - ( 05 Jun 2021 08:03 )  PTT:28.9 sec    RADIOLOGY & ADDITIONAL STUDIES    Impression and Plan:   80M h/o CAD s/p CABG c/b sepsis and sternal wound infection s/p sternum removal and pec flap and angioplasty, chronic HFrEF- recent EF 35-40%, p Afib (on Eliquis), HTN, DM2, and Bell's Palsy presyncope and fatigue.  Hgb 6.7 from 9.0 on 5/28, + melanic stool FOBT +. Pt underwent colonoscopy and EGD with GI. EGD w/ bleeding diverticulum no intervention preformed. Pt was then taken to Ir for intervention. Since admission yesterday he has received 4 u PRBC. SICU consulted for acute GI bleed.     - pt does not have a surgical problem at this point in time and therefore does not meet criteria for admission to SICU.  - recommend serial H/H and resuscitation with MTP/PRBC as indicated for active bleeding  - recommend continuous telemetry monitoring given patient's multiple co-morbidities  - please re-consult as necessary.    Plan to be discussed with Dr. Kasper

## 2021-06-05 NOTE — BRIEF OPERATIVE NOTE - NSICDXBRIEFPREOP_GEN_ALL_CORE_FT
PRE-OP DIAGNOSIS:  Melena 05-Jun-2021 17:14:47  Alexa Payne  Anemia due to acute blood loss 05-Jun-2021 17:19:57  Alexa Payne

## 2021-06-05 NOTE — PROGRESS NOTE ADULT - ASSESSMENT
A/P: 80M h/o CAD s/p CABG c/b sepsis and sternal wound infection s/p sternum removal and pec flap and angioplasty, chronic HFrEF, p Afib (on Eliquis), HTN, DM2, and Bell's Palsy recently moved to [place] for few months from [place] presented to [facility] on 5/9/21 with complaints of back pain and dizziness after a mechanical fall also noted for the last few months he has been having continued shortness of breath and leg swelling, and was recently started on Lasix in addition to metolazone by his cardiologist in [place], repeat CT head no cerebral bleed, admitted for ADHF/bi-V faiure and KEN on CKD (Cr. 2.4), TTE with LV EF 35-40%, underwent IV diuresis, initial noted pBNP 52215 ---> 21756, pharm nuclear stress test without ischemia, RHC done with moderate pHTN Group 2 with PCWP 23, switched to torsemide 40mg, underwent MARLO/CV for pAflutter but reverted back to pAfib, metoprolol dose reduced to 50mg BID and had ILR implanted, had L-elbow laceration wound with bleeding due mechanical fall in the toilet, discharged home on 5/14/21, patient has had recurrent episodes of dizziness and had his medications further adjusted. Patient states that this morning after he went to the bathroom he was feeling very dizzy, hot, and with leg tingling like he might pass out. Patient states that he has never felt like he was fully going to pass out until today, and he has been feeling very dizzy every time he stands. Patient also notes that he has been having some dark stools, but states that he thought it was from his iron. Patient was noted to be orthostatic upon arrival to the ER with a hemoglobin of 6.7. Patient states he feels comfortable at rest at this time, but feels worse upon standing. Patient denies fevers, chills, CP, SOB, abdominal pain, N/V/D, headache, or vision changes.   Troponin negative x 1    6/5: Pt denies chest pain, palpitations, SOB. Tele- NSR HR @ 60s. Cont. Hydralazine, Start Imdur today. Will further optimize as BP tolerates. Cont. to hold AC.    Near Syncope   - Positive orthostatics in setting of severe anemia   - IV fluids   -Cont transfusion as needed  - Hold home torsemide and Eliquis  - GI consult appreciated-plan for eventual GI w/u Monday 6/7  - Cont. Telemetry monitoring     Anemia  - Likely UGIB with +FOBT  - Continue PPI  - GI evaluation appreciated-plan for eventual GI w/u Monday 6/7  - Eliquis on hold     Preoperative Cardiovascular assessment prior to EGD / Colonoscopy   - patient is chest pain free with no EKG changes and /or troponin elevation   - EKG reviewed NSR @ 68 bpm with RBBB with LPFB   - patient has RCRI of 3 points Class IV Risk w/ 15.0 % 30-day risk of death, MI, or cardiac arrest, patient is considered elevated risk for a low to intermediate risk procedure and no need for further cardiovascular intervention and cardiovascularly optimized and may proceed   -  close intraprocedural monitoring   - based on results of EGD/Colonoscopy will follow up recs from GI standpoint regarding AC    Atrial Fibrillation   - Currently in SR   - Eliquis on hold for likely GI bleeding  - Continue home Toprol XL  - Continue telemetry monitoring    - EP evaluation for eventual Watchman device.     Assessment and recommendations are final when note is signed by the attending.  A/P: 80M h/o CAD s/p CABG c/b sepsis and sternal wound infection s/p sternum removal and pec flap and angioplasty, chronic HFrEF, p Afib (on Eliquis), HTN, DM2, and Bell's Palsy recently moved to [place] for few months from [place] presented to [facility] on 5/9/21 with complaints of back pain and dizziness after a mechanical fall also noted for the last few months he has been having continued shortness of breath and leg swelling, and was recently started on Lasix in addition to metolazone by his cardiologist in [place], repeat CT head no cerebral bleed, admitted for ADHF/bi-V faiure and KEN on CKD (Cr. 2.4), TTE with LV EF 35-40%, underwent IV diuresis, initial noted pBNP 62883 ---> 22570, pharm nuclear stress test without ischemia, RHC done with moderate pHTN Group 2 with PCWP 23, switched to torsemide 40mg, underwent MARLO/CV for pAflutter but reverted back to pAfib, metoprolol dose reduced to 50mg BID and had ILR implanted, had L-elbow laceration wound with bleeding due mechanical fall in the toilet, discharged home on 5/14/21, patient has had recurrent episodes of dizziness and had his medications further adjusted. Patient states that this morning after he went to the bathroom he was feeling very dizzy, hot, and with leg tingling like he might pass out. Patient states that he has never felt like he was fully going to pass out until today, and he has been feeling very dizzy every time he stands. Patient also notes that he has been having some dark stools, but states that he thought it was from his iron. Patient was noted to be orthostatic upon arrival to the ER with a hemoglobin of 6.7. Patient states he feels comfortable at rest at this time, but feels worse upon standing. Patient denies fevers, chills, CP, SOB, abdominal pain, N/V/D, headache, or vision changes.   Troponin negative x 1    6/5: Pt denies chest pain, palpitations, SOB. Tele- NSR HR @ 60s. Cont. Hydralazine, Start Imdur today. Will further optimize as BP tolerates. Cont. to hold AC.    Near Syncope   - Positive orthostatics in setting of severe anemia   - IV fluids   -Cont transfusion as needed  - Hold home torsemide and Eliquis  - GI consult appreciated-plan for eventual GI w/u Monday 6/7  - Cont. Telemetry monitoring     Anemia  - Likely UGIB with +FOBT  - Continue PPI  - GI evaluation appreciated-plan for eventual GI w/u Monday 6/7  - Eliquis on hold     Preoperative Cardiovascular assessment prior to EGD / Colonoscopy   - patient is chest pain free with no EKG changes and /or troponin elevation   - EKG reviewed NSR @ 68 bpm with RBBB with LPFB   - patient has RCRI of 3 points Class IV Risk w/ 15.0 % 30-day risk of death, MI, or cardiac arrest, patient is considered elevated risk for a low to intermediate risk procedure and no need for further cardiovascular intervention and cardiovascularly optimized and may proceed   -  close intraprocedural monitoring   - based on results of EGD/Colonoscopy will follow up recs from GI standpoint regarding AC    Atrial Fibrillation   - Currently in SR   - Eliquis on hold for likely GI bleeding  - Continue home Toprol XL  - Continue telemetry monitoring    - EP evaluation for eventual Watchman device.

## 2021-06-06 LAB
ALBUMIN SERPL ELPH-MCNC: 3.5 G/DL — SIGNIFICANT CHANGE UP (ref 3.3–5.2)
ALP SERPL-CCNC: 106 U/L — SIGNIFICANT CHANGE UP (ref 40–120)
ALT FLD-CCNC: 16 U/L — SIGNIFICANT CHANGE UP
ANION GAP SERPL CALC-SCNC: 14 MMOL/L — SIGNIFICANT CHANGE UP (ref 5–17)
APTT BLD: 61.9 SEC — HIGH (ref 27.5–35.5)
AST SERPL-CCNC: 17 U/L — SIGNIFICANT CHANGE UP
BASOPHILS # BLD AUTO: 0.03 K/UL — SIGNIFICANT CHANGE UP (ref 0–0.2)
BASOPHILS NFR BLD AUTO: 0.3 % — SIGNIFICANT CHANGE UP (ref 0–2)
BILIRUB SERPL-MCNC: 0.4 MG/DL — SIGNIFICANT CHANGE UP (ref 0.4–2)
BUN SERPL-MCNC: 81 MG/DL — HIGH (ref 8–20)
CALCIUM SERPL-MCNC: 8.8 MG/DL — SIGNIFICANT CHANGE UP (ref 8.6–10.2)
CHLORIDE SERPL-SCNC: 110 MMOL/L — HIGH (ref 98–107)
CO2 SERPL-SCNC: 21 MMOL/L — LOW (ref 22–29)
CREAT SERPL-MCNC: 1.92 MG/DL — HIGH (ref 0.5–1.3)
EOSINOPHIL # BLD AUTO: 0.11 K/UL — SIGNIFICANT CHANGE UP (ref 0–0.5)
EOSINOPHIL NFR BLD AUTO: 1.3 % — SIGNIFICANT CHANGE UP (ref 0–6)
GLUCOSE BLDC GLUCOMTR-MCNC: 182 MG/DL — HIGH (ref 70–99)
GLUCOSE BLDC GLUCOMTR-MCNC: 226 MG/DL — HIGH (ref 70–99)
GLUCOSE SERPL-MCNC: 208 MG/DL — HIGH (ref 70–99)
HCT VFR BLD CALC: 24.5 % — LOW (ref 39–50)
HCT VFR BLD CALC: 25.3 % — LOW (ref 39–50)
HCT VFR BLD CALC: 26 % — LOW (ref 39–50)
HGB BLD-MCNC: 8 G/DL — LOW (ref 13–17)
HGB BLD-MCNC: 8.3 G/DL — LOW (ref 13–17)
HGB BLD-MCNC: 8.4 G/DL — LOW (ref 13–17)
IMM GRANULOCYTES NFR BLD AUTO: 0.8 % — SIGNIFICANT CHANGE UP (ref 0–1.5)
LYMPHOCYTES # BLD AUTO: 0.56 K/UL — LOW (ref 1–3.3)
LYMPHOCYTES # BLD AUTO: 6.4 % — LOW (ref 13–44)
MAGNESIUM SERPL-MCNC: 2 MG/DL — SIGNIFICANT CHANGE UP (ref 1.6–2.6)
MCHC RBC-ENTMCNC: 29.6 PG — SIGNIFICANT CHANGE UP (ref 27–34)
MCHC RBC-ENTMCNC: 29.9 PG — SIGNIFICANT CHANGE UP (ref 27–34)
MCHC RBC-ENTMCNC: 29.9 PG — SIGNIFICANT CHANGE UP (ref 27–34)
MCHC RBC-ENTMCNC: 32.3 GM/DL — SIGNIFICANT CHANGE UP (ref 32–36)
MCHC RBC-ENTMCNC: 32.7 GM/DL — SIGNIFICANT CHANGE UP (ref 32–36)
MCHC RBC-ENTMCNC: 32.8 GM/DL — SIGNIFICANT CHANGE UP (ref 32–36)
MCV RBC AUTO: 90.4 FL — SIGNIFICANT CHANGE UP (ref 80–100)
MCV RBC AUTO: 91.4 FL — SIGNIFICANT CHANGE UP (ref 80–100)
MCV RBC AUTO: 92.5 FL — SIGNIFICANT CHANGE UP (ref 80–100)
MONOCYTES # BLD AUTO: 0.5 K/UL — SIGNIFICANT CHANGE UP (ref 0–0.9)
MONOCYTES NFR BLD AUTO: 5.7 % — SIGNIFICANT CHANGE UP (ref 2–14)
NEUTROPHILS # BLD AUTO: 7.43 K/UL — HIGH (ref 1.8–7.4)
NEUTROPHILS NFR BLD AUTO: 85.5 % — HIGH (ref 43–77)
PHOSPHATE SERPL-MCNC: 5 MG/DL — HIGH (ref 2.4–4.7)
PLATELET # BLD AUTO: 197 K/UL — SIGNIFICANT CHANGE UP (ref 150–400)
PLATELET # BLD AUTO: 198 K/UL — SIGNIFICANT CHANGE UP (ref 150–400)
PLATELET # BLD AUTO: 201 K/UL — SIGNIFICANT CHANGE UP (ref 150–400)
POTASSIUM SERPL-MCNC: 3.7 MMOL/L — SIGNIFICANT CHANGE UP (ref 3.5–5.3)
POTASSIUM SERPL-SCNC: 3.7 MMOL/L — SIGNIFICANT CHANGE UP (ref 3.5–5.3)
PROT SERPL-MCNC: 5.8 G/DL — LOW (ref 6.6–8.7)
RBC # BLD: 2.68 M/UL — LOW (ref 4.2–5.8)
RBC # BLD: 2.8 M/UL — LOW (ref 4.2–5.8)
RBC # BLD: 2.81 M/UL — LOW (ref 4.2–5.8)
RBC # FLD: 16.9 % — HIGH (ref 10.3–14.5)
RBC # FLD: 17.2 % — HIGH (ref 10.3–14.5)
RBC # FLD: 17.2 % — HIGH (ref 10.3–14.5)
SODIUM SERPL-SCNC: 145 MMOL/L — SIGNIFICANT CHANGE UP (ref 135–145)
WBC # BLD: 10.64 K/UL — HIGH (ref 3.8–10.5)
WBC # BLD: 8.7 K/UL — SIGNIFICANT CHANGE UP (ref 3.8–10.5)
WBC # BLD: 9.74 K/UL — SIGNIFICANT CHANGE UP (ref 3.8–10.5)
WBC # FLD AUTO: 10.64 K/UL — HIGH (ref 3.8–10.5)
WBC # FLD AUTO: 8.7 K/UL — SIGNIFICANT CHANGE UP (ref 3.8–10.5)
WBC # FLD AUTO: 9.74 K/UL — SIGNIFICANT CHANGE UP (ref 3.8–10.5)

## 2021-06-06 PROCEDURE — 99223 1ST HOSP IP/OBS HIGH 75: CPT

## 2021-06-06 PROCEDURE — 99233 SBSQ HOSP IP/OBS HIGH 50: CPT

## 2021-06-06 PROCEDURE — 99497 ADVNCD CARE PLAN 30 MIN: CPT | Mod: 25

## 2021-06-06 PROCEDURE — 99232 SBSQ HOSP IP/OBS MODERATE 35: CPT

## 2021-06-06 PROCEDURE — 99231 SBSQ HOSP IP/OBS SF/LOW 25: CPT

## 2021-06-06 RX ORDER — SODIUM CHLORIDE 9 MG/ML
1000 INJECTION, SOLUTION INTRAVENOUS
Refills: 0 | Status: DISCONTINUED | OUTPATIENT
Start: 2021-06-06 | End: 2021-06-15

## 2021-06-06 RX ORDER — TAMSULOSIN HYDROCHLORIDE 0.4 MG/1
0.4 CAPSULE ORAL AT BEDTIME
Refills: 0 | Status: DISCONTINUED | OUTPATIENT
Start: 2021-06-06 | End: 2021-06-09

## 2021-06-06 RX ORDER — POTASSIUM CHLORIDE 20 MEQ
10 PACKET (EA) ORAL
Refills: 0 | Status: COMPLETED | OUTPATIENT
Start: 2021-06-06 | End: 2021-06-06

## 2021-06-06 RX ORDER — DEXTROSE 50 % IN WATER 50 %
25 SYRINGE (ML) INTRAVENOUS ONCE
Refills: 0 | Status: DISCONTINUED | OUTPATIENT
Start: 2021-06-06 | End: 2021-06-15

## 2021-06-06 RX ORDER — INSULIN LISPRO 100/ML
VIAL (ML) SUBCUTANEOUS
Refills: 0 | Status: DISCONTINUED | OUTPATIENT
Start: 2021-06-06 | End: 2021-06-15

## 2021-06-06 RX ORDER — ATORVASTATIN CALCIUM 80 MG/1
40 TABLET, FILM COATED ORAL AT BEDTIME
Refills: 0 | Status: DISCONTINUED | OUTPATIENT
Start: 2021-06-06 | End: 2021-06-15

## 2021-06-06 RX ORDER — HEPARIN SODIUM 5000 [USP'U]/ML
8000 INJECTION INTRAVENOUS; SUBCUTANEOUS EVERY 6 HOURS
Refills: 0 | Status: DISCONTINUED | OUTPATIENT
Start: 2021-06-06 | End: 2021-06-07

## 2021-06-06 RX ORDER — HEPARIN SODIUM 5000 [USP'U]/ML
4000 INJECTION INTRAVENOUS; SUBCUTANEOUS EVERY 6 HOURS
Refills: 0 | Status: DISCONTINUED | OUTPATIENT
Start: 2021-06-06 | End: 2021-06-07

## 2021-06-06 RX ORDER — DEXTROSE 50 % IN WATER 50 %
12.5 SYRINGE (ML) INTRAVENOUS ONCE
Refills: 0 | Status: DISCONTINUED | OUTPATIENT
Start: 2021-06-06 | End: 2021-06-15

## 2021-06-06 RX ORDER — HYDRALAZINE HCL 50 MG
10 TABLET ORAL EVERY 4 HOURS
Refills: 0 | Status: DISCONTINUED | OUTPATIENT
Start: 2021-06-06 | End: 2021-06-06

## 2021-06-06 RX ORDER — HYDRALAZINE HCL 50 MG
50 TABLET ORAL THREE TIMES A DAY
Refills: 0 | Status: DISCONTINUED | OUTPATIENT
Start: 2021-06-06 | End: 2021-06-07

## 2021-06-06 RX ORDER — GLUCAGON INJECTION, SOLUTION 0.5 MG/.1ML
1 INJECTION, SOLUTION SUBCUTANEOUS ONCE
Refills: 0 | Status: DISCONTINUED | OUTPATIENT
Start: 2021-06-06 | End: 2021-06-15

## 2021-06-06 RX ORDER — LABETALOL HCL 100 MG
10 TABLET ORAL ONCE
Refills: 0 | Status: COMPLETED | OUTPATIENT
Start: 2021-06-06 | End: 2021-06-06

## 2021-06-06 RX ORDER — METOPROLOL TARTRATE 50 MG
5 TABLET ORAL ONCE
Refills: 0 | Status: COMPLETED | OUTPATIENT
Start: 2021-06-06 | End: 2021-06-06

## 2021-06-06 RX ORDER — HYDRALAZINE HCL 50 MG
10 TABLET ORAL ONCE
Refills: 0 | Status: COMPLETED | OUTPATIENT
Start: 2021-06-06 | End: 2021-06-06

## 2021-06-06 RX ORDER — DEXTROSE 50 % IN WATER 50 %
15 SYRINGE (ML) INTRAVENOUS ONCE
Refills: 0 | Status: DISCONTINUED | OUTPATIENT
Start: 2021-06-06 | End: 2021-06-15

## 2021-06-06 RX ORDER — PANTOPRAZOLE SODIUM 20 MG/1
40 TABLET, DELAYED RELEASE ORAL
Refills: 0 | Status: DISCONTINUED | OUTPATIENT
Start: 2021-06-06 | End: 2021-06-07

## 2021-06-06 RX ORDER — ISOSORBIDE MONONITRATE 60 MG/1
30 TABLET, EXTENDED RELEASE ORAL DAILY
Refills: 0 | Status: DISCONTINUED | OUTPATIENT
Start: 2021-06-06 | End: 2021-06-07

## 2021-06-06 RX ORDER — HYDRALAZINE HCL 50 MG
25 TABLET ORAL THREE TIMES A DAY
Refills: 0 | Status: DISCONTINUED | OUTPATIENT
Start: 2021-06-06 | End: 2021-06-06

## 2021-06-06 RX ORDER — HEPARIN SODIUM 5000 [USP'U]/ML
INJECTION INTRAVENOUS; SUBCUTANEOUS
Qty: 25000 | Refills: 0 | Status: DISCONTINUED | OUTPATIENT
Start: 2021-06-06 | End: 2021-06-08

## 2021-06-06 RX ADMIN — ATORVASTATIN CALCIUM 40 MILLIGRAM(S): 80 TABLET, FILM COATED ORAL at 21:23

## 2021-06-06 RX ADMIN — HEPARIN SODIUM 1800 UNIT(S)/HR: 5000 INJECTION INTRAVENOUS; SUBCUTANEOUS at 16:37

## 2021-06-06 RX ADMIN — PANTOPRAZOLE SODIUM 10 MG/HR: 20 TABLET, DELAYED RELEASE ORAL at 05:08

## 2021-06-06 RX ADMIN — Medication 100 MILLIEQUIVALENT(S): at 13:12

## 2021-06-06 RX ADMIN — CHLORHEXIDINE GLUCONATE 1 APPLICATION(S): 213 SOLUTION TOPICAL at 05:07

## 2021-06-06 RX ADMIN — Medication 100 MILLIEQUIVALENT(S): at 10:30

## 2021-06-06 RX ADMIN — Medication 10 MILLIGRAM(S): at 09:33

## 2021-06-06 RX ADMIN — Medication 100 MILLIEQUIVALENT(S): at 11:30

## 2021-06-06 RX ADMIN — Medication 10 MILLIGRAM(S): at 09:36

## 2021-06-06 RX ADMIN — Medication 10 MILLIGRAM(S): at 10:26

## 2021-06-06 RX ADMIN — Medication 10 MILLIGRAM(S): at 00:07

## 2021-06-06 RX ADMIN — Medication 10 MILLIGRAM(S): at 09:59

## 2021-06-06 RX ADMIN — Medication 4: at 17:05

## 2021-06-06 RX ADMIN — TAMSULOSIN HYDROCHLORIDE 0.4 MILLIGRAM(S): 0.4 CAPSULE ORAL at 21:23

## 2021-06-06 RX ADMIN — Medication 25 MILLIGRAM(S): at 13:12

## 2021-06-06 RX ADMIN — PANTOPRAZOLE SODIUM 40 MILLIGRAM(S): 20 TABLET, DELAYED RELEASE ORAL at 17:05

## 2021-06-06 RX ADMIN — Medication 5 MILLIGRAM(S): at 08:48

## 2021-06-06 RX ADMIN — Medication 50 MILLIGRAM(S): at 21:30

## 2021-06-06 RX ADMIN — Medication 10 MILLIGRAM(S): at 05:08

## 2021-06-06 NOTE — CONSULT NOTE ADULT - CONSULT REQUESTED DATE/TIME
04-Jun-2021 13:13
04-Jun-2021 16:29
05-Jun-2021 18:31
04-Jun-2021 15:00
05-Jun-2021
06-Jun-2021 12:15

## 2021-06-06 NOTE — PROGRESS NOTE ADULT - ASSESSMENT
A/P: 80M h/o CAD s/p CABG c/b sepsis and sternal wound infection s/p sternum removal and pec flap and angioplasty, chronic HFrEF, p Afib (on Eliquis), HTN, DM2, and Bell's Palsy recently moved to [place] for few months from [place] presented to [facility] on 5/9/21 with complaints of back pain and dizziness after a mechanical fall also noted for the last few months he has been having continued shortness of breath and leg swelling, and was recently started on Lasix in addition to metolazone by his cardiologist in [place], repeat CT head no cerebral bleed, admitted for ADHF/bi-V faiure and KEN on CKD (Cr. 2.4), TTE with LV EF 35-40%, underwent IV diuresis, initial noted pBNP 55153 ---> 63793, pharm nuclear stress test without ischemia, RHC done with moderate pHTN Group 2 with PCWP 23, switched to torsemide 40mg, underwent MARLO/CV for pAflutter but reverted back to pAfib, metoprolol dose reduced to 50mg BID and had ILR implanted, had L-elbow laceration wound with bleeding due mechanical fall in the toilet, discharged home on 5/14/21, patient has had recurrent episodes of dizziness and had his medications further adjusted. Patient states that this morning after he went to the bathroom he was feeling very dizzy, hot, and with leg tingling like he might pass out. Patient states that he has never felt like he was fully going to pass out until today, and he has been feeling very dizzy every time he stands. Patient also notes that he has been having some dark stools, but states that he thought it was from his iron. Patient was noted to be orthostatic upon arrival to the ER with a hemoglobin of 6.7. Patient states he feels comfortable at rest at this time, but feels worse upon standing. Patient denies fevers, chills, CP, SOB, abdominal pain, N/V/D, headache, or vision changes.   Troponin negative x 1    6/6: Pt denies chest pain, palpitations, SOB. Tele- NSR HR @ 70s, couplets, frequent PVCs, brief episodes of atrial tachycardia. EGD results as per GI notes-unable to intervene endoscopically ( could not get optimum position to get the  bleeding site ). Unclear if ulcer or deilofoy- just blood and clot seen. GI clears patient to restart AC. Start Heparin gtt. Cont. to monitor CBC. EP evaluation for eventual Watchman device    Near Syncope   -Positive orthostatics in setting of severe anemia   -H+H 8.4./26   - EGD results as per GI notes-unable to intervene endoscopically ( could not get optimum position to get the  bleeding site ). Unclear if ulcer or deilofoy- just blood and clot seen  -Cont. Telemetry monitoring     Anemia  - Likely UGIB with +FOBT  - Continue PPI  - EGD results as per GI notes-unable to intervene endoscopically ( could not get optimum position to get the  bleeding site ). Unclear if ulcer or deilofoy- just blood and clot seen  -GI clears patient to restart AC  -Start Heparin gtt    Atrial Fibrillation   - NSR HR @ 70s, couplets, frequent PVCs, brief episodes of atrial tachycardia  - Continue telemetry monitoring    - EP evaluation for eventual Watchman device     HTN  -Recent BP-210/59  -Pt given Lopressor 5mg IVP  -Start Labetalol 10mg and give another 10mg IVP if BP remains elevated  -Start pt on BP meds when patient restarted on PO

## 2021-06-06 NOTE — CONSULT NOTE ADULT - SUBJECTIVE AND OBJECTIVE BOX
HPI:  80M h/o CAD s/p CABG c/b sepsis and sternal wound infection s/p sternum removal and pec flap and angioplasty, chronic HFrEF- recent EF 35-40%, p Afib (on Eliquis), HTN, DM2, and Bell's Palsy recently moved to [place] for few months from [place] presented to ED with LH/presyncope and fatigue.  Hgb 6.7--> reduced from 9.0 on 5/28 with melanic stool and FOBT +. Cardiology and GI have been consulted.  PRBC ordered. Orthostatics positive.  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  Trop .06 however is chronically elevated. Patient denies fevers, CP, SOB, abdominal pain, N/V/D, headache, or vision changes. He did not notice any black stools, but states they are always dark as he takes iron.  He has chronic unchanged intermittent diarrhea.  He did feel shaky today and had some chills.  His previous LE edema resolved with his last admission and has remained stable.    Extensive notable history: Pt admitted 5/8-5/14 with complaints of back pain and dizziness after a mechanical fall also noted for the last few months he has been having continued shortness of breath and leg swelling, and was recently started on Lasix in addition to metolazone by his cardiologist in [place], repeat CT head no cerebral bleed, admitted for ADHF/bi-V faiure and KEN on CKD (Cr. 2.4), TTE with LV EF 35-40%, underwent IV diuresis, initial noted pBNP 55316 ---> 92637, pharm nuclear stress test without ischemia, RHC done with moderate pHTN Group 2 with PCWP 23, switched to torsemide 40mg, underwent MARLO/CV for pAflutter but reverted back to pAfib, metoprolol dose reduced to 50mg BID and had ILR implanted, had L-elbow laceration wound with bleeding due mechanical fall in the toilet, discharged home on 5/14/21, had episodes of sinus bradycardia 40-50s with recurrent dizziness, metoprolol reduced to 25mg BID, seen on 5/24/21 at  outpatient cardiology visit had metoprolol discontinued for sinus bradycardia, uptitrated hydralazine dose to 50mg TID for uncontrolled HTN/HFrEF  He was seen again in ED 5/28  with episodes of recurring dizziness, nausea and vomited x1, reports also L-sided chest pressure for few minutes, vitals with /70s in the ER.  Trop's negative and pt discharged after a stay for observation.         (04 Jun 2021 14:28)    PERTINENT PM/SXH:   Coronary artery disease involving native heart, angina presence unspecified, unspecified vessel or lesion type    Atrial fibrillation, unspecified type    Congestive heart failure, unspecified HF chronicity, unspecified heart failure type    Bell&#x27;s palsy    Essential hypertension    Type 2 diabetes mellitus without complication, unspecified whether long term insulin use    Osteomyelitis of sternum    Chronic kidney disease, unspecified CKD stage    History of subdural hematoma    Anemia secondary to renal failure    GIB (gastrointestinal bleeding)      S/P CABG (coronary artery bypass graft)      FAMILY HISTORY:  No pertinent family history in first degree relatives  cad      ITEMS NOT CHECKED ARE NOT PRESENT    SOCIAL HISTORY:   Significant other/partner[ ]  Children[ ]  Catholic/Spirituality:  Substance hx:  [ ]   Tobacco hx:  [ ]   Alcohol hx: [ ]   Home Opioid hx:  [ ] I-Stop Reference No:  Living Situation: [ ]Home  [ ]Long term care  [ ]Rehab [ ]Other    ADVANCE DIRECTIVES:    DNR  Yes    MOLST  [ ]  Living Will  [ ]   DECISION MAKER(s):  [ ] Health Care Proxy(s)  [ ] Surrogate(s)  [ ] Guardian           Name(s): Phone Number(s):    BASELINE (I)ADL(s) (prior to admission):  [place]: [ ]Total  [ ] Moderate [ ]Dependent    Allergies    No Known Allergies    Intolerances    MEDICATIONS  (STANDING):  atorvastatin 40 milliGRAM(s) Oral at bedtime  chlorhexidine 2% Cloths 1 Application(s) Topical <User Schedule>  hydrALAZINE 25 milliGRAM(s) Oral three times a day  pantoprazole   Suspension 40 milliGRAM(s) Oral two times a day    MEDICATIONS  (PRN):    PRESENT SYMPTOMS: [ ]Unable to obtain due to poor mentation   Source if other than patient:  [ ]Family   [ ]Team     Pain: [ ]yes [ ]no  QOL impact -   Location -                    Aggravating factors -  Quality -  Radiation -  Timing-  Severity (0-10 scale):  Minimal acceptable level (0-10 scale):     CPOT:    https://www.sccm.org/getattachment/keb19g42-0x7r-6l5l-8e1d-6379r6536g3f/Critical-Care-Pain-Observation-Tool-(CPOT)      PAIN AD Score:     http://[email or web address]/painad.[email or web address] (press ctrl +  left click to view)    Dyspnea:                           [ ]Mild [ ]Moderate [ ]Severe  Anxiety:                             [ ]Mild [ ]Moderate [ ]Severe  Fatigue:                             [ ]Mild [ ]Moderate [ ]Severe  Nausea:                             [ ]Mild [ ]Moderate [ ]Severe  Loss of appetite:              [ ]Mild [ ]Moderate [ ]Severe  Constipation:                    [ ]Mild [ ]Moderate [ ]Severe    Other Symptoms:  [ ]All other review of systems negative     Palliative Performance Status Version 2:         %    http://npcrc.org/files/news/palliative_performance_scale_ppsv2.pdf  PHYSICAL EXAM:  Vital Signs Last 24 Hrs  T(C): 36.9 (06 Jun 2021 12:00), Max: 37.1 (06 Jun 2021 08:00)  T(F): 98.5 (06 Jun 2021 12:00), Max: 98.7 (06 Jun 2021 08:00)  HR: 71 (06 Jun 2021 13:00) (67 - 91)  BP: 156/71 (06 Jun 2021 13:00) (120/60 - 195/86)  BP(mean): 102 (06 Jun 2021 13:00) (71 - 124)  RR: 18 (06 Jun 2021 13:00) (16 - 31)  SpO2: 98% (06 Jun 2021 13:00) (95% - 100%) I&O's Summary    05 Jun 2021 07:01  -  06 Jun 2021 07:00  --------------------------------------------------------  IN: 110 mL / OUT: 530 mL / NET: -420 mL    06 Jun 2021 07:01  -  06 Jun 2021 13:55  --------------------------------------------------------  IN: 570 mL / OUT: 350 mL / NET: 220 mL      GENERAL:  [ ]Alert  [ ]Oriented x   [ ]Lethargic  [ ]Cachexia  [ ]Unarousable  [ ]Verbal  [ ]Non-Verbal  Behavioral:   [ ] Anxiety  [ ] Delirium [ ] Agitation [ ] Other  HEENT:  [ ]Normal   [ ]Dry mouth   [ ]ET Tube/Trach  [ ]Oral lesions  PULMONARY:   [ ]Clear [ ]Tachypnea  [ ]Audible excessive secretions   [ ]Rhonchi        [ ]Right [ ]Left [ ]Bilateral  [ ]Crackles        [ ]Right [ ]Left [ ]Bilateral  [ ]Wheezing     [ ]Right [ ]Left [ ]Bilateral  [ ]Diminished breath sounds [ ]right [ ]left [ ]bilateral  CARDIOVASCULAR:    [ ]Regular [ ]Irregular [ ]Tachy  [ ]Doug [ ]Murmur [ ]Other  GASTROINTESTINAL:  [ ]Soft  [ ]Distended   [ ]+BS  [ ]Non tender [ ]Tender  [ ]PEG [ ]OGT/ NGT  Last BM:     GENITOURINARY:  [ ]Normal [ ] Incontinent   [ ]Oliguria/Anuria   [ ][place]  MUSCULOSKELETAL:   [ ]Normal   [ ]Weakness  [ ]Bed/Wheelchair bound [ ]Edema  NEUROLOGIC:   [ ]No focal deficits  [ ]Cognitive impairment  [ ]Dysphagia [ ]Dysarthria [ ]Paresis [ ]Other   SKIN:   [ ]Normal    [ ]Rash  [ ]Pressure ulcer(s)       Present on admission [ ]y [ ]n    CRITICAL CARE:  [ ] Shock Present  [ ]Septic [ ]Cardiogenic [ ]Neurologic [ ]Hypovolemic  [ ]  Vasopressors [ ]  Inotropes   [ ]Respiratory failure present [ ]Mechanical ventilation [ ]Non-invasive ventilatory support [ ]High flow    [ ]Acute  [ ]Chronic [ ]Hypoxic  [ ]Hypercarbic [ ]Other  [ ]Other organ failure     LABS:                        8.4    10.64 )-----------( 197      ( 06 Jun 2021 13:11 )             26.0   06-06    145  |  110<H>  |  81.0<H>  ----------------------------<  208<H>  3.7   |  21.0<L>  |  1.92<H>    Ca    8.8      06 Jun 2021 03:12  Phos  5.0     06-06  Mg     2.0     06-06    TPro  5.8<L>  /  Alb  3.5  /  TBili  0.4  /  DBili  x   /  AST  17  /  ALT  16  /  AlkPhos  106  06-06  PT/INR - ( 05 Jun 2021 08:03 )   PT: 14.5 sec;   INR: 1.26 ratio         PTT - ( 05 Jun 2021 08:03 )  PTT:28.9 sec      RADIOLOGY & ADDITIONAL STUDIES:    PROTEIN CALORIE MALNUTRITION PRESENT: [ ]mild [ ]moderate [ ]severe [ ]underweight [ ]morbid obesity  https://www.andeal.org/vault/2440/web/files/ONC/Table_Clinical%20Characteristics%20to%20Document%20Malnutrition-White%20JV%20et%20al%202012.pdf    Height (cm): 185.4 (06-04-21 @ 11:45), 185.4 (05-30-21 @ 03:17), 185.4 (05-28-21 @ 13:53)  Weight (kg): 98 (06-04-21 @ 12:54), 100.2 (05-28-21 @ 13:53), 108.9 (05-08-21 @ 07:06)  BMI (kg/m2): 28.5 (06-04-21 @ 12:54), 29.2 (06-04-21 @ 11:45), 29.2 (05-30-21 @ 03:17)    [ ]PPSV2 < or = to 30% [ ]significant weight loss  [ ]poor nutritional intake  [ ]anasarca     Albumin, Serum: 3.5 g/dL (06-06-21 @ 03:12)   [ ]Artificial Nutrition      REFERRALS:   [ ]Chaplaincy  [ ]Hospice  [ ]Child Life  [ ]Social Work  [ ]Case management [ ]Holistic Therapy     Goals of Care Document:  HPI:  80M h/o CAD s/p CABG c/b sepsis and sternal wound infection s/p sternum removal and pec flap and angioplasty, chronic HFrEF- recent EF 35-40%, p Afib (on Eliquis), HTN, DM2, and Bell's Palsy recently moved to [place] for few months from [place] presented to ED with LH/presyncope and fatigue.  Hgb 6.7--> reduced from 9.0 on 5/28 with melanic stool and FOBT +. Cardiology and GI have been consulted.  PRBC ordered. Orthostatics positive.  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  Trop .06 however is chronically elevated. Patient denies fevers, CP, SOB, abdominal pain, N/V/D, headache, or vision changes. He did not notice any black stools, but states they are always dark as he takes iron.  He has chronic unchanged intermittent diarrhea.  He did feel shaky today and had some chills.  His previous LE edema resolved with his last admission and has remained stable.    Extensive notable history: Pt admitted 5/8-5/14 with complaints of back pain and dizziness after a mechanical fall also noted for the last few months he has been having continued shortness of breath and leg swelling, and was recently started on Lasix in addition to metolazone by his cardiologist in [place], repeat CT head no cerebral bleed, admitted for ADHF/bi-V faiure and KEN on CKD (Cr. 2.4), TTE with LV EF 35-40%, underwent IV diuresis, initial noted pBNP 00115 ---> 55444, pharm nuclear stress test without ischemia, RHC done with moderate pHTN Group 2 with PCWP 23, switched to torsemide 40mg, underwent MARLO/CV for pAflutter but reverted back to pAfib, metoprolol dose reduced to 50mg BID and had ILR implanted, had L-elbow laceration wound with bleeding due mechanical fall in the toilet, discharged home on 5/14/21, had episodes of sinus bradycardia 40-50s with recurrent dizziness, metoprolol reduced to 25mg BID, seen on 5/24/21 at  outpatient cardiology visit had metoprolol discontinued for sinus bradycardia, uptitrated hydralazine dose to 50mg TID for uncontrolled HTN/HFrEF  He was seen again in ED 5/28  with episodes of recurring dizziness, nausea and vomited x1, reports also L-sided chest pressure for few minutes, vitals with /70s in the ER.  Trop's negative and pt discharged after a stay for observation.         (04 Jun 2021 14:28)    PERTINENT PM/SXH:   Coronary artery disease involving native heart, angina presence unspecified, unspecified vessel or lesion type    Atrial fibrillation, unspecified type    Congestive heart failure, unspecified HF chronicity, unspecified heart failure type    Bell&#x27;s palsy    Essential hypertension    Type 2 diabetes mellitus without complication, unspecified whether long term insulin use    Osteomyelitis of sternum    Chronic kidney disease, unspecified CKD stage    History of subdural hematoma    Anemia secondary to renal failure    GIB (gastrointestinal bleeding)      S/P CABG (coronary artery bypass graft)      FAMILY HISTORY:  No pertinent family history in first degree relatives  cad      ITEMS NOT CHECKED ARE NOT PRESENT    SOCIAL HISTORY:   Significant other/partner[x ]  Children[ x]  Buddhism/Spirituality: Restoration  Substance hx:  [ ]   Tobacco hx:  [ ]   Alcohol hx: [ ]   Home Opioid hx:  [ ] I-Stop Reference No:  Living Situation: [x ]Home  [ ]Long term care  [ ]Rehab [ ]Other    ADVANCE DIRECTIVES:    DNR  Yes    MOLST  [ ]  Living Will  [ ]   DECISION MAKER(s):  [ ] Health Care Proxy(s)  [ ] Surrogate(s)  [ ] Guardian           Name(s): Phone Number(s):    BASELINE (I)ADL(s) (prior to admission):  [place]: [x ]Total  [ ] Moderate [ ]Dependent    Allergies    No Known Allergies    Intolerances    MEDICATIONS  (STANDING):  atorvastatin 40 milliGRAM(s) Oral at bedtime  chlorhexidine 2% Cloths 1 Application(s) Topical <User Schedule>  hydrALAZINE 25 milliGRAM(s) Oral three times a day  pantoprazole   Suspension 40 milliGRAM(s) Oral two times a day    MEDICATIONS  (PRN):    PRESENT SYMPTOMS: [ ]Unable to obtain due to poor mentation   Source if other than patient:  [ ]Family   [ ]Team     Pain: [ ]yes [ x]no  QOL impact -   Location -                    Aggravating factors -  Quality -  Radiation -  Timing-  Severity (0-10 scale):  Minimal acceptable level (0-10 scale):     CPOT:    https://www.sccm.org/getattachment/cyh42w40-3w9p-0z0i-9f3z-5258q5613u2y/Critical-Care-Pain-Observation-Tool-(CPOT)      PAIN AD Score:     http://[email or web address]/[email or web address].pdf (press ctrl +  left click to view)    Dyspnea:                           [ ]Mild [ ]Moderate [ ]Severe  Anxiety:                             [ ]Mild [ ]Moderate [ ]Severe  Fatigue:                             [x ]Mild [ ]Moderate [ ]Severe  Nausea:                             [ ]Mild [ ]Moderate [ ]Severe  Loss of appetite:              [ x]Mild [ ]Moderate [ ]Severe  Constipation:                    [ ]Mild [ ]Moderate [ ]Severe    Other Symptoms:  [x ]All other review of systems negative     Palliative Performance Status Version 2:      80   %    http://[email or web address].org/files/news/palliative_performance_scale_[email or web address].pdf  PHYSICAL EXAM:  Vital Signs Last 24 Hrs  T(C): 36.9 (06 Jun 2021 12:00), Max: 37.1 (06 Jun 2021 08:00)  T(F): 98.5 (06 Jun 2021 12:00), Max: 98.7 (06 Jun 2021 08:00)  HR: 71 (06 Jun 2021 13:00) (67 - 91)  BP: 156/71 (06 Jun 2021 13:00) (120/60 - 195/86)  BP(mean): 102 (06 Jun 2021 13:00) (71 - 124)  RR: 18 (06 Jun 2021 13:00) (16 - 31)  SpO2: 98% (06 Jun 2021 13:00) (95% - 100%) I&O's Summary    05 Jun 2021 07:01  -  06 Jun 2021 07:00  --------------------------------------------------------  IN: 110 mL / OUT: 530 mL / NET: -420 mL    06 Jun 2021 07:01  -  06 Jun 2021 13:55  --------------------------------------------------------  IN: 570 mL / OUT: 350 mL / NET: 220 mL    General appearance: No acute distress, Awake, Alert  HEENT: Normocephalic, Atraumatic, Conjunctiva clear, EOMI  Neck: Supple, No JVD, No tenderness  Lungs: Breath sound equal bilaterally, No wheezes, No rales  Cardiovascular: S1S2, Irregular rhythm  Abdomen: Soft, Nontender, Nondistended, No guarding/rebound, Positive bowel sounds  Extremities: No clubbing, No cyanosis, No edema, No calf tenderness, Right groin dressing in place without ecchymosis  Neuro: Strength equal bilaterally, No tremors  Psychiatric: Appropriate mood, Normal affect    LABS:                        8.4    10.64 )-----------( 197      ( 06 Jun 2021 13:11 )             26.0   06-06    145  |  110<H>  |  81.0<H>  ----------------------------<  208<H>  3.7   |  21.0<L>  |  1.92<H>    Ca    8.8      06 Jun 2021 03:12  Phos  5.0     06-06  Mg     2.0     06-06    TPro  5.8<L>  /  Alb  3.5  /  TBili  0.4  /  DBili  x   /  AST  17  /  ALT  16  /  AlkPhos  106  06-06  PT/INR - ( 05 Jun 2021 08:03 )   PT: 14.5 sec;   INR: 1.26 ratio         PTT - ( 05 Jun 2021 08:03 )  PTT:28.9 sec      RADIOLOGY & ADDITIONAL STUDIES:    PROTEIN CALORIE MALNUTRITION PRESENT: [ ]mild [ ]moderate [ ]severe [ ]underweight [ ]morbid obesity  https://www.andeal.org/vault/2440/web/files/ONC/Table_Clinical%20Characteristics%20to%20Document%20Malnutrition-White%20JV%20et%20al%202012.pdf    Height (cm): 185.4 (06-04-21 @ 11:45), 185.4 (05-30-21 @ 03:17), 185.4 (05-28-21 @ 13:53)  Weight (kg): 98 (06-04-21 @ 12:54), 100.2 (05-28-21 @ 13:53), 108.9 (05-08-21 @ 07:06)  BMI (kg/m2): 28.5 (06-04-21 @ 12:54), 29.2 (06-04-21 @ 11:45), 29.2 (05-30-21 @ 03:17)    [ ]PPSV2 < or = to 30% [ ]significant weight loss  [ ]poor nutritional intake  [ ]anasarca     Albumin, Serum: 3.5 g/dL (06-06-21 @ 03:12)   [ ]Artificial Nutrition      REFERRALS:   [ ]Chaplaincy  [ ]Hospice  [ ]Child Life  [ ]Social Work  [ ]Case management [ ]Holistic Therapy     Goals of Care Document:

## 2021-06-06 NOTE — PROGRESS NOTE ADULT - ASSESSMENT
s/p GIB.   AF - rates better  per GI can resume AC  May need repeat CV when stable and would consider AAD short term to maintain SR - amiodarone.   May need KAROLINA closure especially if re bleeding ( Watch[place])

## 2021-06-06 NOTE — CONSULT NOTE ADULT - ASSESSMENT
Patient requested DNR/DNI status. MOLST form filled out and placed in chart. In addition, Pt appointed his wife and son as HCPs. 81 yo M with a history of coronary artery disease, CABG, atrial fibrillation, diabetes, hypertension, chronic kidney disease, and BPH who presented with lightheadedness and near syncope found to be orthostatic with anemia. The patient was noted to have melena and packed red blood cells were transfused for acute blood loss anemia. The patient underwent colonoscopy with diverticulosis and internal hemorrhoids. 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 Intervention was not attempted due to the position of the diverticulum and the patient underwent embolization by Interventional Radiology. The patient was monitored in the intensive care unit.    1) Acute blood loss anemia / Gastrointestinal hemorrhage   - s/p prbc transfusion   - Embolization completed by IR   - on PPI   - Clear liquid diet initiated with advancement as tolerated    2) Atrial fibrillation & CAD  - On heparin infusion for anticoagulation  - Cardiology recs noted     3) Chronic kidney disease  - Renal function improved     4) Goals of care/advance care planning  - Palliative performance scale score: 80%   - Prognosis: guarded   - Code status: DNR/DNI (MOLST form filled out and placed in the chart)  - GOC: continue current medical management    - HCP: 1ry wife Alma; 2ry son Kraig  - Psychosocial support provided    Appreciate consult. Discussed with the primary team.    Viraj Alvarez MD      Patient requested DNR/DNI status. MOLST form filled out and placed in chart. In addition, Pt appointed his wife and son as HCPs. 79 yo M with a history of coronary artery disease, CABG, CHF (EF 35%), atrial fibrillation, diabetes, hypertension, chronic kidney disease, and BPH who presented with lightheadedness and near syncope found to be orthostatic with anemia. The patient was noted to have melena and packed red blood cells were transfused for acute blood loss anemia. The patient underwent colonoscopy with diverticulosis and internal hemorrhoids. 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 Intervention was not attempted due to the position of the diverticulum and the patient underwent embolization by Interventional Radiology. The patient was monitored in the intensive care unit.    1) Acute blood loss anemia / Gastrointestinal hemorrhage   - s/p prbc transfusion   - Embolization completed by IR   - on PPI   - Clear liquid diet initiated with advancement as tolerated    2) Atrial fibrillation; h/o CHF (EF 35%) & CAD  - On heparin infusion for anticoagulation  - Cardiology recs noted     3) Chronic kidney disease  - Renal function improved     4) Goals of care/advance care planning  - Palliative performance scale score: 80%   - Prognosis: guarded   - Code status: DNR/DNI (MOLST form filled out and placed in the chart)  - GOC: continue current medical management    - HCP: 1ry wife Alma; 2ry son Kraig  - Psychosocial support provided    Appreciate consult. Discussed with the primary team.    Viraj Alvarez MD      Patient requested DNR/DNI status. MOLST form filled out and placed in chart. In addition, Pt appointed his wife and son as HCPs.

## 2021-06-06 NOTE — PROGRESS NOTE ADULT - ATTENDING COMMENTS
80M with symptomatic anemia    - s/p IR coil embolization, hemodynamically stable afterwards  - maintain Hb >8  - continue hold diuretics  - reintroduce HF and BP medications  - systemic AC restarted with heparin drip; will need to monitor closely for evidence of rebleed  - amiodarone if recurrent AF w/ RVR  - EP follow up for timing of LAAO device

## 2021-06-06 NOTE — CONSULT NOTE ADULT - CONVERSATION DETAILS
Reviewed patient's medical and social history as well as events leading to patient's hospitalization. Writer discussed patient's current diagnosis (.....), medical condition and management,  prognosis, and life expectancy. Inquired about patient's wishes regarding extent of medical care to be provided including escalation of medical care into the ICU and use of vasopressor support. In addition, the writer inquired about thoughts regarding cardiopulmonary resuscitation, artificial nutrition and hydration including use of feeding tubes and IVF, antibiotics, and further investigative studies such as blood draws and radiology. Pt showed insight into medical condition. All questions answered. Patient requested DNR/DNI status. MOLST form filled out and placed in chart. In addition, Pt appointed his wife and son as HCPs. Psychosocial support provided. Reviewed patient's medical and social history as well as events leading to patient's hospitalization. Writer discussed patient's current diagnosis (acute blood loss anemia, GIB s/p embolization, Afib, CAD), medical condition and management,  prognosis, and life expectancy. Inquired about patient's wishes regarding extent of medical care to be provided including escalation of medical care into the ICU and use of vasopressor support. In addition, the writer inquired about thoughts regarding cardiopulmonary resuscitation, artificial nutrition and hydration including use of feeding tubes and IVF, antibiotics, and further investigative studies such as blood draws and radiology. Pt showed insight into medical condition. All questions answered. Patient requested DNR/DNI status. MOLST form filled out and placed in chart. In addition, Pt appointed his wife and son as HCPs. Psychosocial support provided.

## 2021-06-06 NOTE — PROGRESS NOTE ADULT - SUBJECTIVE AND OBJECTIVE BOX
CHU LOGAN  ----------------------------------------  The patient was seen at bedside. Patient with gastrointestinal hemorrhage and history of atrial fibrillation. Denied chest pain or palpitations and noted some mild right groin discomfort.    Vital Signs Last 24 Hrs  T(C): 36.9 (06 Jun 2021 12:00), Max: 37.1 (06 Jun 2021 08:00)  T(F): 98.5 (06 Jun 2021 12:00), Max: 98.7 (06 Jun 2021 08:00)  HR: 71 (06 Jun 2021 14:00) (67 - 91)  BP: 173/74 (06 Jun 2021 14:00) (120/60 - 195/86)  BP(mean): 106 (06 Jun 2021 14:00) (71 - 124)  RR: 20 (06 Jun 2021 14:00) (16 - 31)  SpO2: 100% (06 Jun 2021 14:00) (95% - 100%)    CAPILLARY BLOOD GLUCOSE  POCT Blood Glucose.: 240 mg/dL (05 Jun 2021 19:26)    PHYSICAL EXAMINATION:  ----------------------------------------  General appearance: No acute distress, Awake, Alert  HEENT: Normocephalic, Atraumatic, Conjunctiva clear, EOMI  Neck: Supple, No JVD, No tenderness  Lungs: Breath sound equal bilaterally, No wheezes, No rales  Cardiovascular: S1S2, Irregular rhythm  Abdomen: Soft, Nontender, Nondistended, No guarding/rebound, Positive bowel sounds  Extremities: No clubbing, No cyanosis, No edema, No calf tenderness, Right groin dressing in place without ecchymosis  Neuro: Strength equal bilaterally, No tremors  Psychiatric: Appropriate mood, Normal affect    LABORATORY STUDIES:  ----------------------------------------             8.4    10.64 )-----------( 197      ( 06 Jun 2021 13:11 )             26.0     06-06    145  |  110<H>  |  81.0<H>  ----------------------------<  208<H>  3.7   |  21.0<L>  |  1.92<H>    Ca    8.8      06 Jun 2021 03:12  Phos  5.0     06-06  Mg     2.0     06-06    TPro  5.8<L>  /  Alb  3.5  /  TBili  0.4  /  DBili  x   /  AST  17  /  ALT  16  /  AlkPhos  106  06-06    LIVER FUNCTIONS - ( 06 Jun 2021 03:12 )  Alb: 3.5 g/dL / Pro: 5.8 g/dL / ALK PHOS: 106 U/L / ALT: 16 U/L / AST: 17 U/L / GGT: x           PT/INR - ( 05 Jun 2021 08:03 )   PT: 14.5 sec;   INR: 1.26 ratio    PTT - ( 05 Jun 2021 08:03 )  PTT:28.9 sec    MEDICATIONS  (STANDING):  atorvastatin 40 milliGRAM(s) Oral at bedtime  chlorhexidine 2% Cloths 1 Application(s) Topical <User Schedule>  hydrALAZINE 25 milliGRAM(s) Oral three times a day  pantoprazole   Suspension 40 milliGRAM(s) Oral two times a day      ASSESSMENT / PLAN:  ----------------------------------------  80M with a history of coronary artery disease, CABG, atrial fibrillation, diabetes, hypertension, chronic kidney disease, and BPH who presented with lightheadedness and near syncope found to be orthostatic with anemia. The patient was noted to have melena and packed red blood cells were transfused for acute blood loss anemia. The patient underwent colonoscopy with diverticulosis and internal hemorrhoids. 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 Intervention was not attempted due to the position of the diverticulum and the patient underwent embolization by Interventional Radiology. The patient was monitored in the intensive care unit.    Acute blood loss anemia / Gastrointestinal hemorrhage - Status post transfusion of packed red blood cells. Embolization completed by Interventional Radiology. On pantoprazole. Clear liquid diet initiated with advancement as tolerated. Monitoring CBC.    Atrial fibrillation - On heparin infusion for anticoagulation. Cardiology follow up noted with consideration of further treatment with repeat cardioversion with antiarrhythmic agent or atrial appendage closure.    Coronary artery disease - On atorvastatin.    Chronic kidney disease - Renal function improved on repeat studies.    Hypertension - Close blood pressure monitoring. On hydralazine.    Diabetes - Insulin coverage, close monitoring of blood glucose levels.    BPH - On tamsulosin.    Discussed with the patient and his wife at the bedside.

## 2021-06-06 NOTE — PROGRESS NOTE ADULT - SUBJECTIVE AND OBJECTIVE BOX
Procedure note:  Right upper arm prepped with chorohexidane and draped. 1 % lidocaine injected at site superficially.  Using US RUE basilic vein accessed via direct visualization and a midline catheter placed without difficulty vis seldinger technique.  Guidewire removed, venous blood aspirated and flush with NS.  Pt remains HD stable.  Transparent dressing applied.  Patient tolerated procedure well.

## 2021-06-06 NOTE — PROGRESS NOTE ADULT - SUBJECTIVE AND OBJECTIVE BOX
[facility]                                                               Office: [address] [address]                                                              Telephone: [phone]. Fax:[phone]                                                                             PROGRESS NOTE  Reason for follow up: GIB/anemia  Overnight: No new events.   Update: 6/6: Pt denies chest pain, palpitations, SOB. Tele- NSR HR @ 70s, couplets, frequent PVCs, brief episodes of atrial tachycardia. EGD results as per GI notes-unable to intervene endoscopically ( could not get optimum position to get the  bleeding site ). Unclear if ulcer or deilofoy- just blood and clot seen. GI clears patient to restart AC. Start Heparin gtt. Cont. to monitor CBC. EP evaluation for eventual Watchman device    Subjective: No new events    	  Vitals:  T(C): 37 (06-06-21 @ 16:00), Max: 37.1 (06-06-21 @ 08:00)  HR: 72 (06-06-21 @ 17:27) (67 - 91)  BP: 178/74 (06-06-21 @ 17:27) (120/60 - 195/86)  RR: 21 (06-06-21 @ 17:27) (16 - 31)  SpO2: 97% (06-06-21 @ 17:27) (95% - 100%)    I&O's Summary    05 Jun 2021 07:01  -  06 Jun 2021 07:00  --------------------------------------------------------  IN: 110 mL / OUT: 530 mL / NET: -420 mL    06 Jun 2021 07:01  -  06 Jun 2021 18:40  --------------------------------------------------------  IN: 846 mL / OUT: 1030 mL / NET: -184 mL      Weight (kg): 98 (06-04 @ 12:54)      PHYSICAL EXAM:  Appearance: Comfortable. No acute distress  HEENT:  Head and neck: Atraumatic. Normocephalic.  Normal oral mucosa, PERRL, Neck is supple. No JVD, No carotid bruit.   Neurologic: A & O x 3, no focal deficits. EOMI.  Lymphatic: No cervical lymphadenopathy  Cardiovascular: Normal S1 S2, No murmur, rubs/gallops. No JVD, No edema  Respiratory: Lungs clear to auscultation  Gastrointestinal:  Soft, Non-tender, + BS  Lower Extremities: No edema  Psychiatry: Patient is calm. No agitation. Mood & affect appropriate  Skin: No rashes/ ecchymoses/cyanosis/ulcers visualized on the face, hands or feet       CURRENT MEDICATIONS:  hydrALAZINE 25 milliGRAM(s) Oral three times a day  tamsulosin 0.4 milliGRAM(s) Oral at bedtime  pantoprazole   Suspension  atorvastatin  dextrose 40% Gel  dextrose 50% Injectable  dextrose 50% Injectable  dextrose 50% Injectable  glucagon  Injectable  insulin lispro (ADMELOG) corrective regimen sliding scale  chlorhexidine 2% Cloths  dextrose 5%.  dextrose 5%.  heparin  Infusion.      DIAGNOSTIC TESTING:    [ ] Echocardiogram: < from: [facility] Echo Doppler (05.11.21 @ 16:06) >  Summary:   1. Left ventricular ejection fraction, by visual estimation, is 30 to 35%.   2. Moderately decreased global left ventricular systolic function.   3. Severely enlarged right atrium.   4. Severely enlarged right ventricle.   5. Severely reduced RV systolic function.   6. Mild to moderately enlarged left atrium.   7. There is no evidence of pericardial effusion.   8. Mild mitral annular calcification.   9. Mild mitral valve regurgitation.  10. Moderate-severe tricuspid regurgitation.  11. Color flow doppler and intravenous injection of agitated saline demonstrates the presence of an intact intra atrial septum.  12. No left atrial appendage thrombus and decreased left atrial appendage velocities.    [ ]  Catheterization: < from: Cardiac Cath Lab - Adult (05.11.21 @ 16:42) >  DIAGNOSTIC IMPRESSIONS: Moderate Elevation of Right Heart Pressures:  RA=15 mmHg; RV=61/18 mmHg; PCWP=23 mmHg  Moderate Pulmonary Hypertension=62/22 mmHg  Reduced CO=4.8 L/min and CI=2.06 L/min/m2  DIAGNOSTIC RECOMMENDATIONS: The patient should continue with the present  medications. Patient management should include aggressive medical therapy,  close monitoring of BUN and creatinine, and resumption of all previous  activities in 2 days. Medical management is recommended.  INTERVENTIONAL IMPRESSIONS: Moderate Elevation of Right Heart Pressures:  RA=15 mmHg; RV=61/18 mmHg; PCWP=23 mmHg  Moderate Pulmonary Hypertension=62/22 mmHg  Reduced CO=4.8 L/min and CI=2.06 L/min/m2        [ ] Stress Test:  < from: Nuclear Stress Test-Pharmacologic (05.10.21 @ 07:59) >  IMPRESSIONS:  * Myocardial Perfusion SPECT results are abnormal.  * There is a medium sized, moderate defect in basal to mid  inferior wall that is fixed, suggestive of infarct. No  evidence of ischemia.  * Post-stress resting myocardial perfusion gated SPECT  imaging was performed (LVEF = 41 %;LVEDV = 141 ml.)  * Dilated right ventricle noted.    No prior study is available for comparison.        OTHER: 	    Xray:< from: Xray Chest 1 View- PORTABLE-Urgent (Xray Chest 1 View- PORTABLE-Urgent .) (06.04.21 @ 12:55) >  IMPRESSION:  No acute radiographic findingsand no change      LABS:	 	  CARDIAC MARKERS ( 04 Jun 2021 12:00 )  x     / 0.06 ng/mL / x     / x     / x      p-BNP 04 Jun 2021 12:00: x                              8.4    10.64 )-----------( 197      ( 06 Jun 2021 13:11 )             26.0     06-06    145  |  110<H>  |  81.0<H>  ----------------------------<  208<H>  3.7   |  21.0<L>  |  1.92<H>    Ca    8.8      06 Jun 2021 03:12  Phos  5.0     06-06  Mg     2.0     06-06    TPro  5.8<L>  /  Alb  3.5  /  TBili  0.4  /  DBili  x   /  AST  17  /  ALT  16  /  AlkPhos  106  06-06        TELEMETRY:  NSR HR @ 70s, couplets, frequent PVCs, brief episodes of atrial tachycardia

## 2021-06-06 NOTE — PROGRESS NOTE ADULT - PROBLEM SELECTOR PLAN 1
bleeding from duodenal diverticulum  unable to intervene endoscopically ( could not get optimum position to get the  bleeding site ). Unclear if ulcer or deilofoy- just blood and clot seen. Pt had embolization by IR  - If anticoagulation needs to be given, may start today. Please start IV heparin without bolus dose.   - start clears  plan Discussed with Dr Summers

## 2021-06-06 NOTE — PROGRESS NOTE ADULT - SUBJECTIVE AND OBJECTIVE BOX
No Known Allergies                                                             Code Status: Pt noted he'd prefer to be "put in a vault" if that [cardiac arrest] happened    CHU LOGAN Patient is a 80y old  Male who presents with a chief complaint of GIB/anemia (05 Jun 2021 18:31)      BRIEF HOSPITAL COURSE: Admitted with concerns for UGIB, found to have clotted diverticular ulcer, s/p IR embolization (see note for further details of findings). Extensive cardiac disease with hypertension. Transfused 5 units PRBCs (per chart since 6/4), with intermittent need for antihypertensives. Shock Index remains < 0.5. Surgery, cardiology, and GI following; admitted to MICU for 'monitoring'    Events last 24 hours: bed rest since OR EGD, hypertensive with SBPs >180s (IV push medications given).     Review of systems:   no concerns this morning, asked that I call his wife, and asking for something to wet his mouth.      PAST MEDICAL & SURGICAL HISTORY:  Coronary artery disease involving native heart, angina presence unspecified, unspecified vessel or lesion type  nuc stress 5/2021 negative    Atrial fibrillation, unspecified type    Congestive heart failure, unspecified HF chronicity, unspecified heart failure type  EF 35-40%    Bell&#x27;s palsy  Right side of face    Essential hypertension    Type 2 diabetes mellitus without complication, unspecified whether long term insulin use  insulin plus orals    Osteomyelitis of sternum    Chronic kidney disease, unspecified CKD stage  stage III. Recent BL cr 2.0-2.5    Anemia secondary to renal failure  together with iron deficiency    GIB (gastrointestinal bleeding)  h/o GIB 2019 in [place].  told hemorrhoidal    S/P CABG (coronary artery bypass graft)  complicated by osteomyleitis of the sternum and sepsis; sternum removed          Medications:    hydrALAZINE 25 milliGRAM(s) Oral three times a day  labetalol Injectable 10 milliGRAM(s) IV Push once            pantoprazole   Suspension 40 milliGRAM(s) Oral two times a day      atorvastatin 40 milliGRAM(s) Oral at bedtime    potassium chloride  10 mEq/100 mL IVPB 10 milliEquivalent(s) IV Intermittent every 1 hour      chlorhexidine 2% Cloths 1 Application(s) Topical <User Schedule>            Adult Advanced Hemodynamics Last 24 Hrs  CVP(mm Hg): --  CVP(cm H2O): --  CO: --  CI: --  PA: --  PA(mean): --  PCWP: --  SVR: --  SVRI: --  PVR: --  PVRI: --      ICU Vital Signs Last 24 Hrs  T(C): 37.1 (06 Jun 2021 08:00), Max: 37.1 (06 Jun 2021 08:00)  T(F): 98.7 (06 Jun 2021 08:00), Max: 98.7 (06 Jun 2021 08:00)  HR: 76 (06 Jun 2021 10:00) (68 - 91)  BP: 177/81 (06 Jun 2021 09:00) (120/60 - 195/86)  BP(mean): 117 (06 Jun 2021 09:00) (71 - 124)  ABP: 210/62 (06 Jun 2021 10:00) (155/56 - 210/62)  ABP(mean): 105 (06 Jun 2021 10:00) (75 - 111)  RR: 20 (06 Jun 2021 10:00) (16 - 31)  SpO2: 99% (06 Jun 2021 10:00) (94% - 100%)    CAPILLARY BLOOD GLUCOSE      POCT Blood Glucose.: 240 mg/dL (05 Jun 2021 19:26)  POCT Blood Glucose.: 188 mg/dL (05 Jun 2021 12:20)            LABS:  CBC Full  -  ( 06 Jun 2021 03:12 )  WBC Count : 8.70 K/uL  RBC Count : 2.80 M/uL  Hemoglobin : 8.3 g/dL  Hematocrit : 25.3 %  Platelet Count - Automated : 201 K/uL  Mean Cell Volume : 90.4 fl  Mean Cell Hemoglobin : 29.6 pg  Mean Cell Hemoglobin Concentration : 32.8 gm/dL  Auto Neutrophil # : 7.43 K/uL  Auto Lymphocyte # : 0.56 K/uL  Auto Monocyte # : 0.50 K/uL  Auto Eosinophil # : 0.11 K/uL  Auto Basophil # : 0.03 K/uL  Auto Neutrophil % : 85.5 %  Auto Lymphocyte % : 6.4 %  Auto Monocyte % : 5.7 %  Auto Eosinophil % : 1.3 %  Auto Basophil % : 0.3 %    06-06    145  |  110<H>  |  81.0<H>  ----------------------------<  208<H>  3.7   |  21.0<L>  |  1.92<H>    Ca    8.8      06 Jun 2021 03:12  Phos  5.0     06-06  Mg     2.0     06-06    TPro  5.8<L>  /  Alb  3.5  /  TBili  0.4  /  DBili  x   /  AST  17  /  ALT  16  /  AlkPhos  106  06-06      CARDIAC MARKERS ( 04 Jun 2021 12:00 )  x     / 0.06 ng/mL / x     / x     / x           PT/INR - ( 05 Jun 2021 08:03 )   PT: 14.5 sec;   INR: 1.26 ratio         PTT - ( 05 Jun 2021 08:03 )  PTT:28.9 sec        CULTURES:      Physical Examination:    General: No acute distress.  Alert, oriented, interactive. Follows simple instructions; laying flat    HEENT: Atraumatic, dry membranes Pupils equal, reactive to light.  Symmetric.     PULM: Clear to auscultation bilaterally, no significant sputum production    CVS: Regular rate and rhythm, No JVD    ABD: Soft, nondistended, nontender, normoactive bowel sounds, no masses    EXT: No edema, nontender. Distal pulses 2+ equal bilaterally    SKIN: Warm and well perfused, ; right groin c/d/i with no ecchymosis     Neuro: nonfocal, moves all extremities.

## 2021-06-06 NOTE — PROGRESS NOTE ADULT - PROBLEM SELECTOR PLAN 1
- s/p 5 Units PRBCs since admission  - no further signs of bleeding   - shock index < 0.5  - was given IV beta blockers by cardiology today; while this could dampen any tachycardic response (for new bleeding), though my clinical concern based on the short mechanism of action is low.   - starting clears as per verbal discussion with Dr. Payne (GI); also changed to some PO home medications and changed pantoprazole to solution PO BID  - Removing arterial line today  - out of bed as tolerated    - regarding restarting of anticoagulation, this should be a discussion between GI and Cardiology, in partnership with the patient- understanding there are limited options in the sense of rebleeding. In my opinion, the precautionary embolization of arterial flow to the area ( highly likely the underlying source of bleeding), in so ways further reduces the risk of bleeding therefore restarting of the DOAC (with lower risk of bleeding than UFH, warfarin, etc) is quite possible; that said the current feeling is to restart UFH when both GI, Cardiology, and the patient agree to this plan of treatment.    - I have placed a palliative consultation for recurrent hospitalizations and what Kraig expressed to me this morning. He is a     - Kraig did lose his 2nd IV access this morning therefore we are trying to establish 2 stable IVs prior to transfer out of the ICU

## 2021-06-06 NOTE — PROGRESS NOTE ADULT - SUBJECTIVE AND OBJECTIVE BOX
Patient is a 80y old  Male who presents with a chief complaint of GIB/anemia (06 Jun 2021 10:24)      HPI:  80M h/o CAD s/p CABG c/b sepsis and sternal wound infection s/p sternum removal and pec flap and angioplasty, chronic HFrEF- recent EF 35-40%, p Afib (on Eliquis), HTN, DM2,    Patient seen in MICU. No BM since last night. Vitals remain stable . Hemoglobin stable. No abdominal pain.               REVIEW OF SYSTEMS:  Constitutional: No fever, weight loss or fatigue  ENMT:  No difficulty hearing, tinnitus, vertigo; No sinus or throat pain  Respiratory: No cough, wheezing, chills or hemoptysis  Cardiovascular: No chest pain, palpitations, dizziness or leg swelling  Gastrointestinal: No abdominal or epigastric pain. No nausea, vomiting or hematemesis; No diarrhea or constipation. No melena or hematochezia.  Skin: No itching, burning, rashes or lesions   Musculoskeletal: No joint pain or swelling; No muscle, back or extremity pain    PAST MEDICAL & SURGICAL HISTORY:  Coronary artery disease involving native heart, angina presence unspecified, unspecified vessel or lesion type  nuc stress 5/2021 negative    Atrial fibrillation, unspecified type    Congestive heart failure, unspecified HF chronicity, unspecified heart failure type  EF 35-40%    Bell&#x27;s palsy  Right side of face    Essential hypertension    Type 2 diabetes mellitus without complication, unspecified whether long term insulin use  insulin plus orals    Osteomyelitis of sternum    Chronic kidney disease, unspecified CKD stage  stage III. Recent BL cr 2.0-2.5    Anemia secondary to renal failure  together with iron deficiency    GIB (gastrointestinal bleeding)  h/o GIB 2019 in [place].  told hemorrhoidal    S/P CABG (coronary artery bypass graft)  complicated by osteomyleitis of the sternum and sepsis; sternum removed        FAMILY HISTORY:  No pertinent family history in first degree relatives  cad        SOCIAL HISTORY:  Smoking Status: [ ] Current, [ ] Former, [ ] Never  Pack Years:  [  ] EtOH-no  [  ] IVDA    MEDICATIONS:  MEDICATIONS  (STANDING):  atorvastatin 40 milliGRAM(s) Oral at bedtime  chlorhexidine 2% Cloths 1 Application(s) Topical <User Schedule>  hydrALAZINE 25 milliGRAM(s) Oral three times a day  pantoprazole   Suspension 40 milliGRAM(s) Oral two times a day  potassium chloride  10 mEq/100 mL IVPB 10 milliEquivalent(s) IV Intermittent every 1 hour    MEDICATIONS  (PRN):      Allergies    No Known Allergies    Intolerances        Vital Signs Last 24 Hrs  T(C): 37.1 (06 Jun 2021 08:00), Max: 37.1 (06 Jun 2021 08:00)  T(F): 98.7 (06 Jun 2021 08:00), Max: 98.7 (06 Jun 2021 08:00)  HR: 69 (06 Jun 2021 12:00) (67 - 91)  BP: 171/77 (06 Jun 2021 12:00) (120/60 - 195/86)  BP(mean): 111 (06 Jun 2021 12:00) (71 - 124)  RR: 19 (06 Jun 2021 12:00) (16 - 31)  SpO2: 98% (06 Jun 2021 12:00) (95% - 100%)    06-05 @ 07:01  -  06-06 @ 07:00  --------------------------------------------------------  IN: 110 mL / OUT: 530 mL / NET: -420 mL    06-06 @ 07:01  -  06-06 @ 12:06  --------------------------------------------------------  IN: 570 mL / OUT: 0 mL / NET: 570 mL          PHYSICAL EXAM:    General: Well developed; well nourished; in no acute distress  HEENT: MMM, conjunctiva and sclera clear  H- RRR  L- CTA   Gastrointestinal: Soft, non-tender non-distended; Normal bowel sounds; No rebound or guarding  Extremities: Normal range of motion, No clubbing, cyanosis or edema  Neurological: Alert and oriented x3  Skin: Warm and dry. No obvious rash      LABS:                        8.3    8.70  )-----------( 201      ( 06 Jun 2021 03:12 )             25.3     06 Jun 2021 03:12    145    |  110    |  81.0   ----------------------------<  208    3.7     |  21.0   |  1.92     Ca    8.8        06 Jun 2021 03:12  Phos  5.0       06 Jun 2021 03:12  Mg     2.0       06 Jun 2021 03:12    TPro  5.8    /  Alb  3.5    /  TBili  0.4    /  DBili  x      /  AST  17     /  ALT  16     /  AlkPhos  106    / Amylase x      /Lipase x      06 Jun 2021 03:12              RADIOLOGY & ADDITIONAL STUDIES:

## 2021-06-06 NOTE — PROGRESS NOTE ADULT - SUBJECTIVE AND OBJECTIVE BOX
No specific complaint.   s/p endoscopy - duodenal bleed  Telemetry - AF with VR         MEDICATIONS  (STANDING):  atorvastatin 40 milliGRAM(s) Oral at bedtime  chlorhexidine 2% Cloths 1 Application(s) Topical <User Schedule>  hydrALAZINE 25 milliGRAM(s) Oral three times a day  pantoprazole   Suspension 40 milliGRAM(s) Oral two times a day  potassium chloride  10 mEq/100 mL IVPB 10 milliEquivalent(s) IV Intermittent every 1 hour    MEDICATIONS  (PRN):      Allergies    No Known Allergies    Intolerances      PAST MEDICAL & SURGICAL HISTORY:  Coronary artery disease involving native heart, angina presence unspecified, unspecified vessel or lesion type  nuc stress 5/2021 negative    Atrial fibrillation, unspecified type    Congestive heart failure, unspecified HF chronicity, unspecified heart failure type  EF 35-40%    Bell&#x27;s palsy  Right side of face    Essential hypertension    Type 2 diabetes mellitus without complication, unspecified whether long term insulin use  insulin plus orals    Osteomyelitis of sternum    Chronic kidney disease, unspecified CKD stage  stage III. Recent BL cr 2.0-2.5    Anemia secondary to renal failure  together with iron deficiency    GIB (gastrointestinal bleeding)  h/o GIB 2019 in [place].  told hemorrhoidal    S/P CABG (coronary artery bypass graft)  complicated by osteomyleitis of the sternum and sepsis; sternum removed        Vital Signs Last 24 Hrs  T(C): 37.1 (06 Jun 2021 08:00), Max: 37.1 (06 Jun 2021 08:00)  T(F): 98.7 (06 Jun 2021 08:00), Max: 98.7 (06 Jun 2021 08:00)  HR: 69 (06 Jun 2021 12:00) (67 - 91)  BP: 171/77 (06 Jun 2021 12:00) (120/60 - 195/86)  BP(mean): 111 (06 Jun 2021 12:00) (71 - 124)  RR: 19 (06 Jun 2021 12:00) (16 - 31)  SpO2: 98% (06 Jun 2021 12:00) (95% - 100%)    Physical Exam:  Constitutional: NAD, AAOx3  Cardiovascular: irreg irreg  Pulmonary: CTA b/l, unlabored  Abd: soft NTND +BS  Extremities: no pedal edema, +distal pulses b/l    LABS:                        8.3    8.70  )-----------( 201      ( 06 Jun 2021 03:12 )             25.3     06-06    145  |  110<H>  |  81.0<H>  ----------------------------<  208<H>  3.7   |  21.0<L>  |  1.92<H>    Ca    8.8      06 Jun 2021 03:12  Phos  5.0     06-06  Mg     2.0     06-06    TPro  5.8<L>  /  Alb  3.5  /  TBili  0.4  /  DBili  x   /  AST  17  /  ALT  16  /  AlkPhos  106  06-06    PT/INR - ( 05 Jun 2021 08:03 )   PT: 14.5 sec;   INR: 1.26 ratio         PTT - ( 05 Jun 2021 08:03 )  PTT:28.9 sec

## 2021-06-07 ENCOUNTER — APPOINTMENT (OUTPATIENT)
Dept: ELECTROPHYSIOLOGY | Facility: CLINIC | Age: 81
End: 2021-06-07

## 2021-06-07 DIAGNOSIS — Z51.5 ENCOUNTER FOR PALLIATIVE CARE: ICD-10-CM

## 2021-06-07 DIAGNOSIS — N18.9 CHRONIC KIDNEY DISEASE, UNSPECIFIED: ICD-10-CM

## 2021-06-07 LAB
ALBUMIN SERPL ELPH-MCNC: 3.4 G/DL — SIGNIFICANT CHANGE UP (ref 3.3–5.2)
ALP SERPL-CCNC: 103 U/L — SIGNIFICANT CHANGE UP (ref 40–120)
ALT FLD-CCNC: 12 U/L — SIGNIFICANT CHANGE UP
ANION GAP SERPL CALC-SCNC: 12 MMOL/L — SIGNIFICANT CHANGE UP (ref 5–17)
APTT BLD: 67.7 SEC — HIGH (ref 27.5–35.5)
AST SERPL-CCNC: 16 U/L — SIGNIFICANT CHANGE UP
BASOPHILS # BLD AUTO: 0.03 K/UL — SIGNIFICANT CHANGE UP (ref 0–0.2)
BASOPHILS NFR BLD AUTO: 0.3 % — SIGNIFICANT CHANGE UP (ref 0–2)
BILIRUB SERPL-MCNC: 0.7 MG/DL — SIGNIFICANT CHANGE UP (ref 0.4–2)
BUN SERPL-MCNC: 53.6 MG/DL — HIGH (ref 8–20)
CALCIUM SERPL-MCNC: 8.7 MG/DL — SIGNIFICANT CHANGE UP (ref 8.6–10.2)
CHLORIDE SERPL-SCNC: 108 MMOL/L — HIGH (ref 98–107)
CO2 SERPL-SCNC: 22 MMOL/L — SIGNIFICANT CHANGE UP (ref 22–29)
CREAT SERPL-MCNC: 1.89 MG/DL — HIGH (ref 0.5–1.3)
EOSINOPHIL # BLD AUTO: 0.14 K/UL — SIGNIFICANT CHANGE UP (ref 0–0.5)
EOSINOPHIL NFR BLD AUTO: 1.5 % — SIGNIFICANT CHANGE UP (ref 0–6)
GLUCOSE BLDC GLUCOMTR-MCNC: 187 MG/DL — HIGH (ref 70–99)
GLUCOSE BLDC GLUCOMTR-MCNC: 210 MG/DL — HIGH (ref 70–99)
GLUCOSE BLDC GLUCOMTR-MCNC: 228 MG/DL — HIGH (ref 70–99)
GLUCOSE BLDC GLUCOMTR-MCNC: 248 MG/DL — HIGH (ref 70–99)
GLUCOSE SERPL-MCNC: 187 MG/DL — HIGH (ref 70–99)
HCT VFR BLD CALC: 23.5 % — LOW (ref 39–50)
HCT VFR BLD CALC: 24 % — LOW (ref 39–50)
HGB BLD-MCNC: 7.6 G/DL — LOW (ref 13–17)
HGB BLD-MCNC: 7.6 G/DL — LOW (ref 13–17)
IMM GRANULOCYTES NFR BLD AUTO: 0.6 % — SIGNIFICANT CHANGE UP (ref 0–1.5)
LYMPHOCYTES # BLD AUTO: 0.56 K/UL — LOW (ref 1–3.3)
LYMPHOCYTES # BLD AUTO: 6.2 % — LOW (ref 13–44)
MAGNESIUM SERPL-MCNC: 1.9 MG/DL — SIGNIFICANT CHANGE UP (ref 1.8–2.6)
MCHC RBC-ENTMCNC: 29.6 PG — SIGNIFICANT CHANGE UP (ref 27–34)
MCHC RBC-ENTMCNC: 29.8 PG — SIGNIFICANT CHANGE UP (ref 27–34)
MCHC RBC-ENTMCNC: 31.7 GM/DL — LOW (ref 32–36)
MCHC RBC-ENTMCNC: 32.3 GM/DL — SIGNIFICANT CHANGE UP (ref 32–36)
MCV RBC AUTO: 92.2 FL — SIGNIFICANT CHANGE UP (ref 80–100)
MCV RBC AUTO: 93.4 FL — SIGNIFICANT CHANGE UP (ref 80–100)
MONOCYTES # BLD AUTO: 0.59 K/UL — SIGNIFICANT CHANGE UP (ref 0–0.9)
MONOCYTES NFR BLD AUTO: 6.5 % — SIGNIFICANT CHANGE UP (ref 2–14)
NEUTROPHILS # BLD AUTO: 7.72 K/UL — HIGH (ref 1.8–7.4)
NEUTROPHILS NFR BLD AUTO: 84.9 % — HIGH (ref 43–77)
PHOSPHATE SERPL-MCNC: 3.4 MG/DL — SIGNIFICANT CHANGE UP (ref 2.4–4.7)
PLATELET # BLD AUTO: 176 K/UL — SIGNIFICANT CHANGE UP (ref 150–400)
PLATELET # BLD AUTO: SIGNIFICANT CHANGE UP K/UL (ref 150–400)
POTASSIUM SERPL-MCNC: 3.8 MMOL/L — SIGNIFICANT CHANGE UP (ref 3.5–5.3)
POTASSIUM SERPL-SCNC: 3.8 MMOL/L — SIGNIFICANT CHANGE UP (ref 3.5–5.3)
PROT SERPL-MCNC: 6.1 G/DL — LOW (ref 6.6–8.7)
RBC # BLD: 2.55 M/UL — LOW (ref 4.2–5.8)
RBC # BLD: 2.57 M/UL — LOW (ref 4.2–5.8)
RBC # FLD: 17.3 % — HIGH (ref 10.3–14.5)
RBC # FLD: 17.5 % — HIGH (ref 10.3–14.5)
SODIUM SERPL-SCNC: 142 MMOL/L — SIGNIFICANT CHANGE UP (ref 135–145)
WBC # BLD: 11.01 K/UL — HIGH (ref 3.8–10.5)
WBC # BLD: 9.09 K/UL — SIGNIFICANT CHANGE UP (ref 3.8–10.5)
WBC # FLD AUTO: 11.01 K/UL — HIGH (ref 3.8–10.5)
WBC # FLD AUTO: 9.09 K/UL — SIGNIFICANT CHANGE UP (ref 3.8–10.5)

## 2021-06-07 PROCEDURE — 99232 SBSQ HOSP IP/OBS MODERATE 35: CPT

## 2021-06-07 PROCEDURE — 99233 SBSQ HOSP IP/OBS HIGH 50: CPT

## 2021-06-07 RX ORDER — HYDRALAZINE HCL 50 MG
50 TABLET ORAL THREE TIMES A DAY
Refills: 0 | Status: DISCONTINUED | OUTPATIENT
Start: 2021-06-07 | End: 2021-06-15

## 2021-06-07 RX ORDER — PANTOPRAZOLE SODIUM 20 MG/1
40 TABLET, DELAYED RELEASE ORAL
Refills: 0 | Status: DISCONTINUED | OUTPATIENT
Start: 2021-06-07 | End: 2021-06-15

## 2021-06-07 RX ORDER — HYDRALAZINE HCL 50 MG
75 TABLET ORAL THREE TIMES A DAY
Refills: 0 | Status: DISCONTINUED | OUTPATIENT
Start: 2021-06-07 | End: 2021-06-07

## 2021-06-07 RX ADMIN — PANTOPRAZOLE SODIUM 40 MILLIGRAM(S): 20 TABLET, DELAYED RELEASE ORAL at 05:29

## 2021-06-07 RX ADMIN — Medication 75 MILLIGRAM(S): at 16:03

## 2021-06-07 RX ADMIN — Medication 4: at 08:18

## 2021-06-07 RX ADMIN — Medication 20 MILLIGRAM(S): at 12:03

## 2021-06-07 RX ADMIN — Medication 2: at 12:03

## 2021-06-07 RX ADMIN — PANTOPRAZOLE SODIUM 40 MILLIGRAM(S): 20 TABLET, DELAYED RELEASE ORAL at 17:11

## 2021-06-07 RX ADMIN — HEPARIN SODIUM 1800 UNIT(S)/HR: 5000 INJECTION INTRAVENOUS; SUBCUTANEOUS at 00:09

## 2021-06-07 RX ADMIN — ATORVASTATIN CALCIUM 40 MILLIGRAM(S): 80 TABLET, FILM COATED ORAL at 20:54

## 2021-06-07 RX ADMIN — Medication 50 MILLIGRAM(S): at 20:56

## 2021-06-07 RX ADMIN — ISOSORBIDE MONONITRATE 30 MILLIGRAM(S): 60 TABLET, EXTENDED RELEASE ORAL at 12:03

## 2021-06-07 RX ADMIN — Medication 4: at 17:11

## 2021-06-07 RX ADMIN — HEPARIN SODIUM 1800 UNIT(S)/HR: 5000 INJECTION INTRAVENOUS; SUBCUTANEOUS at 07:09

## 2021-06-07 RX ADMIN — Medication 50 MILLIGRAM(S): at 05:29

## 2021-06-07 RX ADMIN — TAMSULOSIN HYDROCHLORIDE 0.4 MILLIGRAM(S): 0.4 CAPSULE ORAL at 20:55

## 2021-06-07 NOTE — PROGRESS NOTE ADULT - SUBJECTIVE AND OBJECTIVE BOX
Patient is a 80y old  Male who presents with a chief complaint of GIB/anemia (06 Jun 2021 17:15)      HPI:  80M h/o CAD s/p CABG c/b sepsis and sternal wound infection s/p sternum removal and pec flap and angioplasty, chronic HFrEF- recent EF 35-40%, p Afib (on Eliquis), HTN, DM2, and Bell's Palsy . Patient has not had melena or hematachezia. No abdominal pain. No fevers. Hgb dropped slightly  from 8 to 7.6 . No signs of bleeding. On solid diet     REVIEW OF SYSTEMS:  Constitutional: No fever, weight loss or fatigue  ENMT:  No difficulty hearing, tinnitus, vertigo; No sinus or throat pain  Respiratory: No cough, wheezing, chills or hemoptysis  Cardiovascular: No chest pain, palpitations, dizziness or leg swelling  Gastrointestinal: No abdominal or epigastric pain. No nausea, vomiting or hematemesis; No diarrhea or constipation. No melena or hematochezia.  Skin: No itching, burning, rashes or lesions   Musculoskeletal: No joint pain or swelling; No muscle, back or extremity pain    PAST MEDICAL & SURGICAL HISTORY:  Coronary artery disease involving native heart, angina presence unspecified, unspecified vessel or lesion type  nuc stress 5/2021 negative    Atrial fibrillation, unspecified type    Congestive heart failure, unspecified HF chronicity, unspecified heart failure type  EF 35-40%    Bell&#x27;s palsy  Right side of face    Essential hypertension    Type 2 diabetes mellitus without complication, unspecified whether long term insulin use  insulin plus orals    Osteomyelitis of sternum    Chronic kidney disease, unspecified CKD stage  stage III. Recent BL cr 2.0-2.5    Anemia secondary to renal failure  together with iron deficiency    GIB (gastrointestinal bleeding)  h/o GIB 2019 in [place].  told hemorrhoidal    S/P CABG (coronary artery bypass graft)  complicated by osteomyleitis of the sternum and sepsis; sternum removed        FAMILY HISTORY:  No pertinent family history in first degree relatives  cad        SOCIAL HISTORY:  Smoking Status: [ ] Current, [ ] Former, [ ] Never  Pack Years:  [  ] EtOH-no  [  ] IVDA    MEDICATIONS:  MEDICATIONS  (STANDING):  atorvastatin 40 milliGRAM(s) Oral at bedtime  chlorhexidine 2% Cloths 1 Application(s) Topical <User Schedule>  dextrose 40% Gel 15 Gram(s) Oral once  dextrose 5%. 1000 milliLiter(s) (50 mL/Hr) IV Continuous <Continuous>  dextrose 5%. 1000 milliLiter(s) (100 mL/Hr) IV Continuous <Continuous>  dextrose 50% Injectable 25 Gram(s) IV Push once  dextrose 50% Injectable 12.5 Gram(s) IV Push once  dextrose 50% Injectable 25 Gram(s) IV Push once  glucagon  Injectable 1 milliGRAM(s) IntraMuscular once  heparin  Infusion.  Unit(s)/Hr (18 mL/Hr) IV Continuous <Continuous>  hydrALAZINE 50 milliGRAM(s) Oral three times a day  insulin lispro (ADMELOG) corrective regimen sliding scale   SubCutaneous three times a day before meals  isosorbide   mononitrate ER Tablet (IMDUR) 30 milliGRAM(s) Oral daily  pantoprazole   Suspension 40 milliGRAM(s) Oral two times a day  tamsulosin 0.4 milliGRAM(s) Oral at bedtime  torsemide 20 milliGRAM(s) Oral daily    MEDICATIONS  (PRN):  heparin   Injectable 8000 Unit(s) IV Push every 6 hours PRN For aPTT less than 40  heparin   Injectable 4000 Unit(s) IV Push every 6 hours PRN For aPTT between 40 - 57      Allergies    No Known Allergies    Intolerances        Vital Signs Last 24 Hrs  T(C): 37.1 (07 Jun 2021 04:00), Max: 37.2 (06 Jun 2021 18:42)  T(F): 98.8 (07 Jun 2021 04:00), Max: 98.9 (06 Jun 2021 18:42)  HR: 84 (07 Jun 2021 04:00) (67 - 84)  BP: 145/63 (07 Jun 2021 04:00) (145/63 - 187/82)  BP(mean): 83 (07 Jun 2021 04:00) (83 - 118)  RR: 18 (07 Jun 2021 04:00) (18 - 29)  SpO2: 98% (07 Jun 2021 04:00) (97% - 100%)    06-06 @ 07:01  -  06-07 @ 07:00  --------------------------------------------------------  IN: 846 mL / OUT: 1330 mL / NET: -484 mL          PHYSICAL EXAM:    General: Well developed; well nourished; in no acute distress  HEENT: MMM, conjunctiva and sclera clear  H- RRR  L- CTA   Gastrointestinal: Soft, non-tender non-distended; Normal bowel sounds; No rebound or guarding  Extremities: Normal range of motion, No clubbing, cyanosis or edema  Neurological: Alert and oriented x3  Skin: Warm and dry. No obvious rash      LABS:                        7.6    9.09  )-----------( 176      ( 07 Jun 2021 05:43 )             24.0     07 Jun 2021 05:43    142    |  108    |  53.6   ----------------------------<  187    3.8     |  22.0   |  1.89     Ca    8.7        07 Jun 2021 05:43  Phos  3.4       07 Jun 2021 05:43  Mg     1.9       07 Jun 2021 05:43    TPro  6.1    /  Alb  3.4    /  TBili  0.7    /  DBili  x      /  AST  16     /  ALT  12     /  AlkPhos  103    / Amylase x      /Lipase x      07 Jun 2021 05:43              RADIOLOGY & ADDITIONAL STUDIES:     < from: Xray Chest 1 View- PORTABLE-Urgent (Xray Chest 1 View- PORTABLE-Urgent .) (06.04.21 @ 12:55) >  IMPRESSION:  No acute radiographic findingsand no change      < end of copied text >

## 2021-06-07 NOTE — PROGRESS NOTE ADULT - SUBJECTIVE AND OBJECTIVE BOX
CC:  follow up GOC  INTERVAL HPI/OVERNIGHT EVENTS:    PRESENT SYMPTOMS: SOURCE:  Patient/Family/Team    PAIN SCALE:  0 = none  1 = mild   2 = moderate  3 = severe    Pain: denies    Dyspnea:  [ ] YES [ x] NO  Anxiety:  [ ] YES [ x] NO  Fatigue: [ x] YES [ ] NO  Nausea: [ ] YES [ x] NO  Loss of Appetite: [ ] YES [x ] NO  Other symptoms: __________    MEDICATIONS  (STANDING):  atorvastatin 40 milliGRAM(s) Oral at bedtime  chlorhexidine 2% Cloths 1 Application(s) Topical <User Schedule>  dextrose 40% Gel 15 Gram(s) Oral once  dextrose 5%. 1000 milliLiter(s) (50 mL/Hr) IV Continuous <Continuous>  dextrose 5%. 1000 milliLiter(s) (100 mL/Hr) IV Continuous <Continuous>  dextrose 50% Injectable 25 Gram(s) IV Push once  dextrose 50% Injectable 12.5 Gram(s) IV Push once  dextrose 50% Injectable 25 Gram(s) IV Push once  glucagon  Injectable 1 milliGRAM(s) IntraMuscular once  heparin  Infusion.  Unit(s)/Hr (18 mL/Hr) IV Continuous <Continuous>  hydrALAZINE 75 milliGRAM(s) Oral three times a day  insulin lispro (ADMELOG) corrective regimen sliding scale   SubCutaneous three times a day before meals  isosorbide   mononitrate ER Tablet (IMDUR) 30 milliGRAM(s) Oral daily  pantoprazole  Injectable 40 milliGRAM(s) IV Push two times a day  tamsulosin 0.4 milliGRAM(s) Oral at bedtime  torsemide 20 milliGRAM(s) Oral daily    MEDICATIONS  (PRN):      Allergies    No Known Allergies    Intolerances    Karnofsky Performance Score/Palliative Performance Status Version 2:         %    Vital Signs Last 24 Hrs  T(C): 37.2 (07 Jun 2021 08:00), Max: 37.2 (06 Jun 2021 18:42)  T(F): 99 (07 Jun 2021 08:00), Max: 99 (07 Jun 2021 08:00)  HR: 94 (07 Jun 2021 12:00) (67 - 94)  BP: 113/62 (07 Jun 2021 12:00) (113/62 - 187/82)  BP(mean): 83 (07 Jun 2021 04:00) (83 - 118)  RR: 18 (07 Jun 2021 12:00) (18 - 23)  SpO2: 100% (07 Jun 2021 12:00) (97% - 100%)    PHYSICAL EXAM:    General: awake alert NAD    HEENT: [x ] normal  [ ] dry mouth  [ ] ET tube/trach    Lungs: [x ] comfortable [ ] tachypnea/labored breathing  [ ] excessive secretions    CV: [ x] normal  [ ] tachycardia    GI: [x ] normal  [ ] distended  [ ] tender  [ ] no BS               [ ] PEG/NG/OG tube    : [x ] normal  [ ] incontinent  [ ] oliguria/anuria  [ ] dyer    MSK: [x ] normal  [ ] weakness  [ ] edema             [ ] ambulatory  [ ] bedbound/wheelchair bound    Skin: [ x] normal  [ ] pressure ulcers- Stage_____  [ ] no rash    LABS:                        7.6    9.09  )-----------( 176      ( 07 Jun 2021 05:43 )             24.0     06-07    142  |  108<H>  |  53.6<H>  ----------------------------<  187<H>  3.8   |  22.0  |  1.89<H>    Ca    8.7      07 Jun 2021 05:43  Phos  3.4     06-07  Mg     1.9     06-07    TPro  6.1<L>  /  Alb  3.4  /  TBili  0.7  /  DBili  x   /  AST  16  /  ALT  12  /  AlkPhos  103  06-07    PTT - ( 07 Jun 2021 05:43 )  PTT:67.7 sec    I&O's Summary    06 Jun 2021 07:01  -  07 Jun 2021 07:00  --------------------------------------------------------  IN: 846 mL / OUT: 1330 mL / NET: -484 mL    07 Jun 2021 07:01  -  07 Jun 2021 14:07  --------------------------------------------------------  IN: 350 mL / OUT: 752 mL / NET: -402 mL        RADIOLOGY & ADDITIONAL STUDIES:

## 2021-06-07 NOTE — PROGRESS NOTE ADULT - SUBJECTIVE AND OBJECTIVE BOX
[id number]            [facility]                          [address] [address]                       Phone: [phone]. Fax:[phone]                      ________________________________________________    HPI:  80M h/o CAD s/p CABG c/b sepsis and sternal wound infection s/p sternum removal and pec flap and angioplasty, chronic HFrEF- recent EF 35-40%, p Afib (on Eliquis), HTN, DM2, and Bell's Palsy recently moved to [place] for few months from [place] presented to ED with LH/presyncope and fatigue.  Hgb 6.7--> reduced from 9.0 on  with melanic stool and FOBT +. Cardiology and GI have been consulted.  PRBC ordered. Orthostatics positive.  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  Trop .06 however is chronically elevated. Patient denies fevers, CP, SOB, abdominal pain, N/V/D, headache, or vision changes. He did not notice any black stools, but states they are always dark as he takes iron.  He has chronic unchanged intermittent diarrhea.  He did feel shaky today and had some chills.  His previous LE edema resolved with his last admission and has remained stable.    Extensive notable history: Pt admitted - with complaints of back pain and dizziness after a mechanical fall also noted for the last few months he has been having continued shortness of breath and leg swelling, and was recently started on Lasix in addition to metolazone by his cardiologist in [place], repeat CT head no cerebral bleed, admitted for ADHF/bi-V faiure and KEN on CKD (Cr. 2.4), TTE with LV EF 35-40%, underwent IV diuresis, initial noted pBNP 63401 ---> 78089, pharm nuclear stress test without ischemia, RHC done with moderate pHTN Group 2 with PCWP 23, switched to torsemide 40mg, underwent MARLO/CV for pAflutter but reverted back to pAfib, metoprolol dose reduced to 50mg BID and had ILR implanted, had L-elbow laceration wound with bleeding due mechanical fall in the toilet, discharged home on 21, had episodes of sinus bradycardia 40-50s with recurrent dizziness, metoprolol reduced to 25mg BID, seen on 21 at  outpatient cardiology visit had metoprolol discontinued for sinus bradycardia, uptitrated hydralazine dose to 50mg TID for uncontrolled HTN/HFrEF  He was seen again in ED   with episodes of recurring dizziness, nausea and vomited x1, reports also L-sided chest pressure for few minutes, vitals with /70s in the ER.  Trop's negative and pt discharged after a stay for observation.     (2021 14:28)    HOME MEDICATIONS:  aspirin 81 mg oral delayed release tablet: 1 tab(s) orally once a day (2021 17:37)  atorvastatin 40 mg oral tablet: 1 tab(s) orally once a day (2021 17:37)  Eliquis 5 mg oral tablet: 1 tab(s) orally 2 times a day (2021 17:37)  ferrous sulfate 300 mg (60 mg elemental iron) oral tablet: 1 tab(s) orally once a day (2021 17:37)  Flomax 0.4 mg oral capsule: 1 cap(s) orally once a day (2021 17:37)  hydrALAZINE 25 mg oral tablet: 2 tab(s) orally 3 times a day (2021 17:37)  isosorbide mononitrate 30 mg oral tablet, extended release: 1 tab(s) orally once a day (2021 17:37)  Lantus 100 units/mL subcutaneous solution: 17 unit(s) subcutaneous 2 times a day (2021 17:37)  magnesium oxide 400 mg (241.3 mg elemental magnesium) oral tablet: 1 tab(s) orally once a day (2021 17:37)  omeprazole 40 mg oral delayed release capsule: 1 cap(s) orally once a day (2021 17:37)  potassium chloride 20 mEq oral tablet, extended release: 2 tab(s) orally once a day (2021 17:37)  torsemide 20 mg oral tablet: 2 tab(s) orally once a day (2021 17:37)    ROS: All review of systems negative unless indicated otherwise below.                         PHYSICAL EXAM:  GENERAL: NAD  NECK: Supple, No JVD  NERVOUS SYSTEM:  Alert & Oriented X3, non focal neuro exam.   CHEST/LUNG: clear lungs, No rales, rhonchi, wheezing, or rubs  HEART: Regular rate and rhythm; s1 and s2 auscultated, No murmurs, rubs, or gallops  ABDOMEN: Soft, Nontender, Nondistended; Bowel sounds present and normoactive.   EXTREMITIES:  2+ Peripheral Pulses, No clubbing, cyanosis, or edema    Vital Signs Last 24 Hrs  T(C): 37.2 (2021 08:00), Max: 37.2 (2021 18:42)  T(F): 99 (2021 08:00), Max: 99 (2021 08:00)  HR: 94 (2021 12:00) (67 - 94)  BP: 113/62 (2021 12:00) (113/62 - 187/82)  BP(mean): 83 (2021 04:00) (83 - 118)  RR: 18 (2021 12:00) (18 - 23)  SpO2: 100% (2021 12:00) (97% - 100%)                                                   DAILY WEIGHTS - 48 HOUR TREND   Daily Weight in k.6 (2021 04:00), Weight in k (2021 05:00)                             INTAKE AND OUTPUT - 48 HOUR TREND   21 @ 07:  -  21 @ 07:00  --------------------------------------------------------  IN:  Total IN: 0 mL    OUT:    Voided (mL): 500 mL  Total OUT: 500 mL    Total NET: -500 mL      21 @ 07:01  -  21 @ 07:00  --------------------------------------------------------  IN:  Total IN: 0 mL    OUT:    Voided (mL): 1330 mL  Total OUT: 1330 mL    Total NET: -1330 mL                                LAB RESULTS                 COMPLETE BLOOD COUNT( 2021 05:43 )                            7.6 g/dL<L>  9.09 K/uL )---------------( 176 K/uL                        24.0 %<L>      Automated Differential     Auto Basophil # - 0.03 K/uL  Auto Basophil % - 0.3 %  Auto Eosinophil # - 0.14 K/uL  Auto Eosinophil % - 1.5 %  Auto Immature Granulocyte # - X      Auto Immature Granulocyte % - 0.6 %  Auto Lymphocyte # - 0.56 K/uL<L>  Auto Lymphocyte % - 6.2 %<L>  Auto Monocyte # - 0.59 K/uL  Auto Monocyte % - 6.5 %  Auto Neutrophil # - 7.72 K/uL<H>  Auto Neutrophil % - 84.9 %<H>                                  CHEMISTRY                 Basic Metabolic Panel (21 @ 05:43)    142  |  108<H>  |  53.6<H>  ----------------------------<  187<H>  3.8   |  22.0  |  1.89<H>    Ca    8.7      2021 05:43  Phos  3.4     06-07  Mg     1.9     06-07                    Liver Functions (21 @ 05:43))  TPro  6.1  /  Alb  3.4  /  TBili  0.7  /  DBili  x   /  AST  16  /  ALT  12  /  AlkPhos  103     PT/INR/PTT ( 2021 05:43 )                        :                       :       X            :       67.7                  .        .                   .              .           .       X           .                                                                 Cardiac Enzymes   ( 2021 12:00 )  Troponin T  0.06 ,  CPK  X    , CKMB  X    , BNP X        , ( 28 May 2021 20:38 )  Troponin T  0.05 ,  CPK  X    , CKMB  X    , BNP X        , ( 28 May 2021 15:45 )  Troponin T  0.07<H>,  CPK  X    , CKMB  X    , BNP 40618<H>                             Current Admission Active Medications    atorvastatin 40 milliGRAM(s) Oral at bedtime  chlorhexidine 2% Cloths 1 Application(s) Topical <User Schedule>  dextrose 40% Gel 15 Gram(s) Oral once  dextrose 5%. 1000 milliLiter(s) (50 mL/Hr) IV Continuous <Continuous>  dextrose 5%. 1000 milliLiter(s) (100 mL/Hr) IV Continuous <Continuous>  dextrose 50% Injectable 25 Gram(s) IV Push once  dextrose 50% Injectable 12.5 Gram(s) IV Push once  dextrose 50% Injectable 25 Gram(s) IV Push once  glucagon  Injectable 1 milliGRAM(s) IntraMuscular once  heparin  Infusion.  Unit(s)/Hr (18 mL/Hr) IV Continuous <Continuous>  hydrALAZINE 75 milliGRAM(s) Oral three times a day  insulin lispro (ADMELOG) corrective regimen sliding scale   SubCutaneous three times a day before meals  isosorbide   mononitrate ER Tablet (IMDUR) 30 milliGRAM(s) Oral daily  pantoprazole  Injectable 40 milliGRAM(s) IV Push two times a day  tamsulosin 0.4 milliGRAM(s) Oral at bedtime  torsemide 20 milliGRAM(s) Oral daily                          RADIOLOGY RESULTS: Personally visualized   < from: Xray Chest 1 View- PORTABLE-Urgent (Xray Chest 1 View- PORTABLE-Urgent .) (21 @ 12:55) >    IMPRESSION:  No acute radiographic findingsand no change    < end of copied text >    < from: CT Head No Cont (21 @ 03:33) >  Impression: No evidence of acute intracranial trauma or acute displaced cervical spine fracture.    < end of copied text >                          CARDIOLOGY RESULTS: Official Report/Preliminary Verbal Reports  < from: MARLO Echo Doppler (21 @ 16:06) >  Summary:   1. Left ventricular ejection fraction, by visual estimation, is 30 to 35%.   2. Moderately decreased global left ventricular systolic function.   3. Severely enlarged right atrium.   4. Severely enlarged right ventricle.   5. Severely reduced RV systolic function.   6. Mild to moderately enlarged left atrium.   7. There is no evidence of pericardial effusion.   8. Mild mitral annular calcification.   9. Mild mitral valve regurgitation.  10. Moderate-severe tricuspid regurgitation.  11. Color flow doppler and intravenous injection of agitated saline demonstrates the presence of an intact intra atrial septum.  12. No left atrial appendage thrombus and decreased left atrial appendage velocities.    BasavarajDesai, MD Electronically signed on 2021 at 4:52:38 PM    < end of copied text >                          CARDIOLOGY REVIEW: Personally visualized and reviewed  Telemetry:  [id number]

## 2021-06-07 NOTE — PROGRESS NOTE ADULT - ASSESSMENT
80 y.o M with a history of coronary artery disease, CABG, atrial fibrillation, diabetes, hypertension, chronic kidney disease, and BPH who presented with lightheadedness and near syncope found to be orthostatic with anemia. The patient was noted to have melena and packed red blood cells were transfused for acute blood loss anemia. The patient underwent colonoscopy with diverticulosis and internal hemorrhoids. 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     #Acute blood loss anemia / Gastrointestinal hemorrhage   - S/p PRBC transfusions  - S/p colonoscopy and endoscopy, + bleeding duodenal diverticulum   - S/p IR Embolization    - Monitor H/H, todays Hb.6. No overt bleeding, repeat cbc in am  - C/w pantoprazole   - Diet advanced to regular today     #Atrial fibrillation  - On heparin infusion for anticoagulation, eventual transition back to Eliquis on DC   - Seen in consult by EP for watchman work up  - Per EP, possible CV this admission ? Follow recs  - Cardiology recs appreciated   - Continue to monitor on tele    # Coronary artery disease  - On atorvastatin    #Chronic kidney disease   - Stable  - Baseline Cr appears to be around 2    #Hypertension   - Hydralazine increased to 75mg TID due to persistently elevated BP readings   - C/w Imdur & torsemide  - Titrate meds as needed    #Diabetes   - Accu checks stable  - Continue current insulin regimen     #BPH   - On tamsulosin    DNR/DNI    PT eval ordered     DISPO: pending improvement/stabilization of hbg & final EP afib plan

## 2021-06-07 NOTE — PROGRESS NOTE ADULT - ATTENDING COMMENTS
Patient reports still orthostatic today walking to the bathroom, SBP 170s--> 100s upon standing, would discontinue Imdur 30mg, and consider reducing hydralazine dose down to 50mg BID for now with permissive HTN (acceptable for SBP <160) given recurrent symptomatic orthostasis, compression stockings  -still with melenic stool, repeat H/H stable, if further downtrend in Hb to stop anticoagulation and monitor off; eventual elective Watchman with EPS.   -continue reduced dose Torsemide 20mg for HFrEF        Gamal Duke DO, [facility]  Faculty Non-Invasive Cardiologist  [phone] Patient reports still orthostatic today walking to the bathroom, SBP 170s--> 100s upon standing, would discontinue Imdur 30mg, and consider reducing hydralazine dose down to 50mg TID for now with permissive HTN (acceptable for SBP <160) given recurrent symptomatic orthostasis, compression stockings  -still with melenic stool, repeat H/H stable, if further downtrend in Hb to stop anticoagulation and monitor off; eventual elective Watchman with EPS.   -continue reduced dose Torsemide 20mg for HFrEF        Gamal Duke DO, [facility]  Faculty Non-Invasive Cardiologist  [phone]

## 2021-06-07 NOTE — PROGRESS NOTE ADULT - ASSESSMENT
80 year old male patient with CAD s/p CABG c/b sepsis and sternal wound infection s/p sternum removal and pec flap and angioplasty, chronic HFrEF- (EF 35-40% on TTE and 41% on nuclear stress test, and 30-35% on MARLO), AFL/AFIB, s/p MARLO-DCCV on 5/11, CHADS-VASc = 6 on Eliquis, HTN, DM2, CKD, and Bell's Palsy, AFib, AFL, dizziness, bifascicular block and cardiomyopathy back in 5/2021. He underwent a MARLO-DCCV and ILR on 5/11/2021 admitted with an acute anemia secondary to GIB. Found to have acute bleeding from duodenal diverticulum s/p coil embolization. Heparin restarted with therapeutic PTT; H/H remains stable.     Recommendations:   Pt maintaining sinus rhythm w/ intact conduction. No acute EP intervention required.   Per GI - progress diet today. If pt continues to tolerated a/c w/ heparin while on regular diet, anticipate trial of oral a/c w/ Eliquis.   For now, continue heparin drip w/ goal PTT 50-70. Titrate drip only - NO bolus dosing.   Will d/w Dr. Mantilla; further recs to follow.

## 2021-06-07 NOTE — PROGRESS NOTE ADULT - PROBLEM SELECTOR PROBLEM 2
Congestive heart failure, unspecified HF chronicity, unspecified heart failure type
Anemia secondary to renal failure

## 2021-06-07 NOTE — PROGRESS NOTE ADULT - ATTENDING COMMENTS
pt seen and examined at bedside  Reports last BM yesterday with some black stool none since then  Has not been out of bed   no other complaints  Agree with above findings and documentation   ct monitor H&H, Blood transfusion prn if Hgb <7 or vitals unstable   ct heparin, rate control and evental watchman procedure   PT

## 2021-06-07 NOTE — PROGRESS NOTE ADULT - PROBLEM SELECTOR PLAN 4
Palliative Care consulted to assist with GOC. Patient with complex medical  history.   Patient feeling well, in good spirits, no discomforting symptoms to report.  He stated that he may get a Watchman procedure one day for his afib.   MOLST completed on 6/6/21 - DNR/I  Patient continuing to be medically optimized.  Monitoring Hg - 7.6  Cont support

## 2021-06-07 NOTE — PROGRESS NOTE ADULT - PROBLEM SELECTOR PLAN 2
s/p transfusions.  monitor Hg
- goal directed medical treatment as per cardiology  - restart home medications as tolerated  - will need greater glucose control as he takes more PO

## 2021-06-07 NOTE — PROGRESS NOTE ADULT - SUBJECTIVE AND OBJECTIVE BOX
Pt doing well. Stable overnight on heparin gtt. Denies complaint.   TELE: sinus rhythm w/ BBB, moderately frequent PVCs    MEDICATIONS  (STANDING):  atorvastatin 40 milliGRAM(s) Oral at bedtime  chlorhexidine 2% Cloths 1 Application(s) Topical <User Schedule>  dextrose 40% Gel 15 Gram(s) Oral once  dextrose 5%. 1000 milliLiter(s) (50 mL/Hr) IV Continuous <Continuous>  dextrose 5%. 1000 milliLiter(s) (100 mL/Hr) IV Continuous <Continuous>  dextrose 50% Injectable 25 Gram(s) IV Push once  dextrose 50% Injectable 12.5 Gram(s) IV Push once  dextrose 50% Injectable 25 Gram(s) IV Push once  glucagon  Injectable 1 milliGRAM(s) IntraMuscular once  heparin  Infusion.  Unit(s)/Hr (18 mL/Hr) IV Continuous <Continuous>  hydrALAZINE 50 milliGRAM(s) Oral three times a day  insulin lispro (ADMELOG) corrective regimen sliding scale   SubCutaneous three times a day before meals  isosorbide   mononitrate ER Tablet (IMDUR) 30 milliGRAM(s) Oral daily  pantoprazole   Suspension 40 milliGRAM(s) Oral two times a day  tamsulosin 0.4 milliGRAM(s) Oral at bedtime  torsemide 20 milliGRAM(s) Oral daily        Allergies  No Known Allergies    Vital Signs Last 24 Hrs  T(C): 37.2 (07 Jun 2021 08:00), Max: 37.2 (06 Jun 2021 18:42)  T(F): 99 (07 Jun 2021 08:00), Max: 99 (07 Jun 2021 08:00)  HR: 87 (07 Jun 2021 08:00) (67 - 87)  BP: 162/87 (07 Jun 2021 08:00) (145/63 - 187/82)  BP(mean): 83 (07 Jun 2021 04:00) (83 - 118)  RR: 18 (07 Jun 2021 08:00) (18 - 23)  SpO2: 99% (07 Jun 2021 08:00) (97% - 100%)    Physical Exam:  Constitutional: NAD, AAOx3  Cardiovascular: +S1S2 RRR, occ PVC  Pulmonary: CTA b/l, unlabored  GI: soft NTND +BS  Extremities: no pedal edema, +distal pulses b/l  Neuro: non focal, HONG x4    LABS:                        7.6    9.09  )-----------( 176      ( 07 Jun 2021 05:43 )             24.0     142  |  108<H>  |  53.6<H>  ----------------------------<  187<H>  3.8   |  22.0  |  1.89<H>    Ca    8.7      07 Jun 2021 05:43  Phos  3.4     06-07  Mg     1.9     06-07    TPro  6.1<L>  /  Alb  3.4  /  TBili  0.7  /  DBili  x   /  AST  16  /  ALT  12  /  AlkPhos  103  06-07    PTT - ( 07 Jun 2021 05:43 )  PTT:67.7 sec

## 2021-06-07 NOTE — PROGRESS NOTE ADULT - ASSESSMENT
80M h/o CAD s/p CABG c/b sepsis and sternal wound infection s/p sternum removal and pec flap and angioplasty, chronic HFrEF, p Afib (on Eliquis), HTN, DM2, and Bell's Palsy recently moved to [place] for few months from [place] presented to [facility] on 5/9/21 with complaints of back pain and dizziness after a mechanical fall also noted for the last few months he has been having continued shortness of breath and leg swelling, and was recently started on Lasix in addition to metolazone by his cardiologist in [place], repeat CT head no cerebral bleed, admitted for ADHF/bi-V faiure and KEN on CKD (Cr. 2.4), TTE with LV EF 35-40%, underwent IV diuresis, initial noted pBNP 10290 ---> 90604, pharm nuclear stress test without ischemia, RHC done with moderate pHTN Group 2 with PCWP 23, switched to torsemide 40mg, underwent MARLO/CV for pAflutter but reverted back to pAfib, metoprolol dose reduced to 50mg BID and had ILR implanted, had L-elbow laceration wound with bleeding due mechanical fall in the toilet, discharged home on 5/14/21, patient has had recurrent episodes of dizziness and had his medications further adjusted. Patient states that this morning after he went to the bathroom he was feeling very dizzy, hot, and with leg tingling like he might pass out. Patient states that he has never felt like he was fully going to pass out until today, and he has been feeling very dizzy every time he stands. Patient also notes that he has been having some dark stools, but states that he thought it was from his iron. Patient was noted to be orthostatic upon arrival to the ER with a hemoglobin of 6.7. Patient states he feels comfortable at rest at this time, but feels worse upon standing. Patient denies fevers, chills, CP, SOB, abdominal pain, N/V/D, headache, or vision changes.   Troponin negative x 1    6/6: Pt denies chest pain, palpitations, SOB. Tele- NSR HR @ 70s, couplets, frequent PVCs, brief episodes of atrial tachycardia. EGD results as per GI notes-unable to intervene endoscopically ( could not get optimum position to get the  bleeding site ). Unclear if ulcer or deilofoy- just blood and clot seen. GI clears patient to restart AC. Start Heparin gtt. Cont. to monitor CBC. EP evaluation for eventual Watchman device    Anemia  - Likely UGIB with +FOBT  - s/p PRBC transfusions  - s/p colonoscopy/endo showing bleeding duodenal diverticulum   - s/p IR embolization   - continue to monitor   - Continue PPI    Atrial Fibrillation   - NSR HR @ 70s, couplets, frequent PVCs, brief episodes of atrial tachycardia  - Continue telemetry monitoring    - Watchman eval as per EP   - on heparin infusion for plan for transition to eliquis   - maintain heparin for possible watchman but also as a transition period to monitor for recurrent GIB before transitioning back to eliquis   - amiodarone if recurrent afib rvr     HTN  -Recent BP-210/59  -Pt given Lopressor 5mg IVP  -Start Labetalol 10mg and give another 10mg IVP if BP remains elevated  -Start pt on BP meds when patient restarted on PO

## 2021-06-07 NOTE — PROGRESS NOTE ADULT - SUBJECTIVE AND OBJECTIVE BOX
CHU LOGAN      The patient was seen and examined at bedside  Patient complains of fatigue  Otherwise denies additional medical complaints  Denied headache, dizziness, SOB, chest pain or palpitations.    Vital Signs Last 24 Hrs  T(C): 37.2 (07 Jun 2021 08:00), Max: 37.2 (06 Jun 2021 18:42)  T(F): 99 (07 Jun 2021 08:00), Max: 99 (07 Jun 2021 08:00)  HR: 87 (07 Jun 2021 08:00) (67 - 87)  BP: 162/87 (07 Jun 2021 08:00) (145/63 - 187/82)  BP(mean): 83 (07 Jun 2021 04:00) (83 - 118)  RR: 18 (07 Jun 2021 08:00) (18 - 23)  SpO2: 99% (07 Jun 2021 08:00) (97% - 100%) on RA    PHYSICAL EXAMINATION:  General appearance: No acute distress, Awake, Alert  HEENT: Normocephalic, Atraumatic, pale Conjunctiva, EOMI  Neck: Supple, No JVD, No tenderness  Lungs: Breath sound equal bilaterally, No wheezes, No rales  Cardiovascular: S1S2, Irregular rhythm  Abdomen: Soft, Nontender, Nondistended, No guarding/rebound, Positive bowel sounds  Extremities: decrease capillary refill, No clubbing, No cyanosis, No edema, No calf tenderness, Right groin dressing in place without ecchymosis  Neuro: Strength equal bilaterally, No tremors  Psychiatric: Appropriate mood, Normal affect    LABORATORY STUDIES:                        7.6    9.09  )-----------( 176      ( 07 Jun 2021 05:43 )             24.0   06-07    142  |  108<H>  |  53.6<H>  ----------------------------<  187<H>  3.8   |  22.0  |  1.89<H>    Ca    8.7      07 Jun 2021 05:43  Phos  3.4     06-07  Mg     1.9     06-07    TPro  6.1<L>  /  Alb  3.4  /  TBili  0.7  /  DBili  x   /  AST  16  /  ALT  12  /  AlkPhos  103  06-07  PTT - ( 07 Jun 2021 05:43 )  PTT:67.7 sec      MEDICATIONS  (STANDING):  atorvastatin 40 milliGRAM(s) Oral at bedtime  chlorhexidine 2% Cloths 1 Application(s) Topical <User Schedule>  hydrALAZINE 25 milliGRAM(s) Oral three times a day  pantoprazole   Suspension 40 milliGRAM(s) Oral two times a day

## 2021-06-07 NOTE — PROGRESS NOTE ADULT - ASSESSMENT
80yr man,  CAD s/p CABG,  HFrEF- recent EF 35-40%, p Afib (on Eliquis), HTN, DM2, CKD and Bell's Palsy admitted with anemia requiring blood transfusions and IR embolization

## 2021-06-07 NOTE — DIETITIAN INITIAL EVALUATION ADULT. - OTHER INFO
80 y.o M with a history of coronary artery disease, CABG, atrial fibrillation, diabetes, hypertension, chronic kidney disease, and BPH who presented with lightheadedness and near syncope found to be orthostatic with anemia. The patient was noted to have melena and packed red blood cells were transfused for acute blood loss anemia. The patient underwent colonoscopy with diverticulosis and internal hemorrhoids. 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 Pt tolerating CLD per RN, advanced to regular diet today (6/7). Last documented BM 6/7. RD to remain available.

## 2021-06-07 NOTE — DIETITIAN INITIAL EVALUATION ADULT. - PERTINENT LABORATORY DATA
06-07 Na142 mmol/L Glu 187 mg/dL<H> K+ 3.8 mmol/L Cr  1.89 mg/dL<H> BUN 53.6 mg/dL<H> Phos 3.4 mg/dL Alb 3.4 g/dL PAB n/a

## 2021-06-07 NOTE — PROGRESS NOTE ADULT - PROBLEM SELECTOR PLAN 1
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 hemoglobin fairly stable. NO over bleeding  regular diet  PPI drip  continue to monitor on tele- check hemoglobin  in am

## 2021-06-08 LAB
ANION GAP SERPL CALC-SCNC: 14 MMOL/L — SIGNIFICANT CHANGE UP (ref 5–17)
APTT BLD: 28.3 SEC — SIGNIFICANT CHANGE UP (ref 27.5–35.5)
APTT BLD: 52.4 SEC — HIGH (ref 27.5–35.5)
BLD GP AB SCN SERPL QL: SIGNIFICANT CHANGE UP
BUN SERPL-MCNC: 45.2 MG/DL — HIGH (ref 8–20)
CALCIUM SERPL-MCNC: 8.5 MG/DL — LOW (ref 8.6–10.2)
CHLORIDE SERPL-SCNC: 105 MMOL/L — SIGNIFICANT CHANGE UP (ref 98–107)
CO2 SERPL-SCNC: 21 MMOL/L — LOW (ref 22–29)
CREAT SERPL-MCNC: 2.04 MG/DL — HIGH (ref 0.5–1.3)
GLUCOSE BLDC GLUCOMTR-MCNC: 212 MG/DL — HIGH (ref 70–99)
GLUCOSE BLDC GLUCOMTR-MCNC: 216 MG/DL — HIGH (ref 70–99)
GLUCOSE BLDC GLUCOMTR-MCNC: 318 MG/DL — HIGH (ref 70–99)
GLUCOSE BLDC GLUCOMTR-MCNC: 360 MG/DL — HIGH (ref 70–99)
GLUCOSE SERPL-MCNC: 196 MG/DL — HIGH (ref 70–99)
HCT VFR BLD CALC: 22.5 % — LOW (ref 39–50)
HGB BLD-MCNC: 7.1 G/DL — LOW (ref 13–17)
MCHC RBC-ENTMCNC: 29.3 PG — SIGNIFICANT CHANGE UP (ref 27–34)
MCHC RBC-ENTMCNC: 31.6 GM/DL — LOW (ref 32–36)
MCV RBC AUTO: 93 FL — SIGNIFICANT CHANGE UP (ref 80–100)
PLATELET # BLD AUTO: 153 K/UL — SIGNIFICANT CHANGE UP (ref 150–400)
POTASSIUM SERPL-MCNC: 3.8 MMOL/L — SIGNIFICANT CHANGE UP (ref 3.5–5.3)
POTASSIUM SERPL-SCNC: 3.8 MMOL/L — SIGNIFICANT CHANGE UP (ref 3.5–5.3)
RBC # BLD: 2.42 M/UL — LOW (ref 4.2–5.8)
RBC # FLD: 16.8 % — HIGH (ref 10.3–14.5)
SODIUM SERPL-SCNC: 140 MMOL/L — SIGNIFICANT CHANGE UP (ref 135–145)
WBC # BLD: 7.5 K/UL — SIGNIFICANT CHANGE UP (ref 3.8–10.5)
WBC # FLD AUTO: 7.5 K/UL — SIGNIFICANT CHANGE UP (ref 3.8–10.5)

## 2021-06-08 PROCEDURE — 99232 SBSQ HOSP IP/OBS MODERATE 35: CPT

## 2021-06-08 PROCEDURE — 99233 SBSQ HOSP IP/OBS HIGH 50: CPT

## 2021-06-08 RX ORDER — INSULIN GLARGINE 100 [IU]/ML
10 INJECTION, SOLUTION SUBCUTANEOUS EVERY MORNING
Refills: 0 | Status: DISCONTINUED | OUTPATIENT
Start: 2021-06-08 | End: 2021-06-14

## 2021-06-08 RX ORDER — HYDRALAZINE HCL 50 MG
10 TABLET ORAL ONCE
Refills: 0 | Status: COMPLETED | OUTPATIENT
Start: 2021-06-08 | End: 2021-06-08

## 2021-06-08 RX ADMIN — CHLORHEXIDINE GLUCONATE 1 APPLICATION(S): 213 SOLUTION TOPICAL at 06:16

## 2021-06-08 RX ADMIN — HEPARIN SODIUM 2000 UNIT(S)/HR: 5000 INJECTION INTRAVENOUS; SUBCUTANEOUS at 07:19

## 2021-06-08 RX ADMIN — ATORVASTATIN CALCIUM 40 MILLIGRAM(S): 80 TABLET, FILM COATED ORAL at 21:21

## 2021-06-08 RX ADMIN — Medication 4: at 07:30

## 2021-06-08 RX ADMIN — Medication 20 MILLIGRAM(S): at 06:14

## 2021-06-08 RX ADMIN — Medication 50 MILLIGRAM(S): at 17:36

## 2021-06-08 RX ADMIN — Medication 10 MILLIGRAM(S): at 09:04

## 2021-06-08 RX ADMIN — Medication 50 MILLIGRAM(S): at 06:16

## 2021-06-08 RX ADMIN — TAMSULOSIN HYDROCHLORIDE 0.4 MILLIGRAM(S): 0.4 CAPSULE ORAL at 21:20

## 2021-06-08 RX ADMIN — Medication 8: at 11:05

## 2021-06-08 RX ADMIN — INSULIN GLARGINE 10 UNIT(S): 100 INJECTION, SOLUTION SUBCUTANEOUS at 11:05

## 2021-06-08 RX ADMIN — PANTOPRAZOLE SODIUM 40 MILLIGRAM(S): 20 TABLET, DELAYED RELEASE ORAL at 06:15

## 2021-06-08 RX ADMIN — Medication 10: at 17:36

## 2021-06-08 RX ADMIN — Medication 50 MILLIGRAM(S): at 21:20

## 2021-06-08 RX ADMIN — PANTOPRAZOLE SODIUM 40 MILLIGRAM(S): 20 TABLET, DELAYED RELEASE ORAL at 17:36

## 2021-06-08 NOTE — PROGRESS NOTE ADULT - SUBJECTIVE AND OBJECTIVE BOX
CHU LOGAN    [id number]    80y      Male    CC: weakness,   one episode of dark stool yesterday . Dizziness on walking   no other complaints    INTERVAL HPI/OVERNIGHT EVENTS: no acute events , orthostatic     REVIEW OF SYSTEMS:    CONSTITUTIONAL: No fever,  or fatigue  RESPIRATORY: No cough, wheezing, No shortness of breath  CARDIOVASCULAR: No chest pain, palpitations  GASTROINTESTINAL: No abdominal or epigastric pain. No nausea, vomiting        Vital Signs Last 24 Hrs  T(C): 37.1 (08 Jun 2021 12:00), Max: 37.9 (08 Jun 2021 00:00)  T(F): 98.8 (08 Jun 2021 12:00), Max: 100.2 (08 Jun 2021 00:00)  HR: 81 (08 Jun 2021 12:00) (78 - 97)  BP: 150/85 (08 Jun 2021 12:00) (104/59 - 167/86)  BP(mean): 103 (08 Jun 2021 12:00) (81 - 111)  RR: 16 (08 Jun 2021 12:00) (16 - 19)  SpO2: 96% (08 Jun 2021 12:00) (95% - 100%)    PHYSICAL EXAM:    GENERAL: NAD, well-groomed, pallor+  HEENT: PERRL, +EOMI  NECK: soft, Supple, No JVD,   CHEST/LUNG: Clear to percussion bilaterally; No wheezing  HEART: S1S2+, Regular rate and rhythm; No murmurs  ABDOMEN: Soft, Nontender, Nondistended; Bowel sounds present  EXTREMITIES:   No clubbing, cyanosis, or edema  SKIN: No rashes or lesions  NEURO: AAOX3      06-07 @ 07:01 - 06-08 @ 07:00  --------------------------------------------------------  IN: 916 mL / OUT: 1927 mL / NET: -1011 mL    06-08 @ 07:01 - 06-08 @ 13:34  --------------------------------------------------------  IN: 300 mL / OUT: 475 mL / NET: -175 mL        LABS:                        7.1    7.50  )-----------( 153      ( 08 Jun 2021 06:20 )             22.5     06-08    140  |  105  |  45.2<H>  ----------------------------<  196<H>  3.8   |  21.0<L>  |  2.04<H>    Ca    8.5<L>      08 Jun 2021 06:20  Phos  3.4     06-07  Mg     1.9     06-07    TPro  6.1<L>  /  Alb  3.4  /  TBili  0.7  /  DBili  x   /  AST  16  /  ALT  12  /  AlkPhos  103  06-07    PTT - ( 08 Jun 2021 06:20 )  PTT:52.4 sec        MEDICATIONS  (STANDING):  atorvastatin 40 milliGRAM(s) Oral at bedtime  chlorhexidine 2% Cloths 1 Application(s) Topical <User Schedule>  dextrose 40% Gel 15 Gram(s) Oral once  dextrose 5%. 1000 milliLiter(s) (50 mL/Hr) IV Continuous <Continuous>  dextrose 5%. 1000 milliLiter(s) (100 mL/Hr) IV Continuous <Continuous>  dextrose 50% Injectable 25 Gram(s) IV Push once  dextrose 50% Injectable 12.5 Gram(s) IV Push once  dextrose 50% Injectable 25 Gram(s) IV Push once  glucagon  Injectable 1 milliGRAM(s) IntraMuscular once  hydrALAZINE 50 milliGRAM(s) Oral three times a day  insulin glargine Injectable (LANTUS) 10 Unit(s) SubCutaneous every morning  insulin lispro (ADMELOG) corrective regimen sliding scale   SubCutaneous three times a day before meals  pantoprazole  Injectable 40 milliGRAM(s) IV Push two times a day  tamsulosin 0.4 milliGRAM(s) Oral at bedtime  torsemide 20 milliGRAM(s) Oral daily    MEDICATIONS  (PRN):      RADIOLOGY & ADDITIONAL TESTS:

## 2021-06-08 NOTE — PROGRESS NOTE ADULT - ASSESSMENT
80yr man,  CAD s/p CABG,  HFrEF- recent EF 35-40%, p Afib (on Eliquis), HTN, DM2, CKD and Bell's Palsy admitted with anemia requiring blood transfusions and IR embolization     Problem/Plan - 1:  ·  Problem: Upper GI bleed.  Plan: s/p IR embolization  PPI  monitoring Hg       Problem/Plan - 2:  ·  Problem: Anemia secondary to renal failure.  Plan: s/p transfusions.  monitor Hg.   Heparin stopped secondary to drop in Hg     Problem/Plan - 3:  ·  Problem: Atrial fibrillation, unspecified type.  Plan:   Patient has spoke to EP regarding various options in consideration of his GI bleed.      Problem/Plan - 4:  ·  Problem: Encounter for palliative care.  Plan: Palliative Care consulted to assist with GOC.   Patient was discussed by  EP  the various options of trying to prevent stroke given his afib, but also in consideration of  his GI issues.   He appears to understand  the risks/benefits of such options given the complexity of his medical  issues.    He expressed how he is does not want to have a stroke that would leave him bedbound and functionally debilitated.    ISAAC in chart - DNR/I   Palliative to sign off.

## 2021-06-08 NOTE — PROGRESS NOTE ADULT - ASSESSMENT
80 year old male patient with CAD s/p CABG c/b sepsis and sternal wound infection s/p sternum removal and pec flap and angioplasty, chronic HFrEF- (EF 35-40% on TTE and 41% on nuclear stress test, and 30-35% on MARLO), AFL/AFIB, s/p MARLO-DCCV on 5/11, CHADS-VASc = 6 on Eliquis, HTN, DM2, CKD, and Bell's Palsy, AFib, AFL, dizziness, bifascicular block and cardiomyopathy back in 5/2021. He underwent a MARLO-DCCV and ILR on 5/11/2021 admitted with an acute anemia secondary to GIB. Found to have acute bleeding from duodenal diverticulum s/p coil embolization.     Today,   In and out of AFib now, rate controlled. H/H dropping on heparin, necessitating stopping heparin this morning. Last BM (was black) yesterday afternoon.   d/w Dr. SPEARS and Dr. Mahoney, pt likely not candidate for any AC, including short term AC.  Denies CP, SOB or palpitation    Had a discussion with the pt and Dr. Spears and DR. Mahoney. Attempted to contact Dr. Payne, will try again later today.  pt is at very high risk of stroke, and was initially thinking of Watchman with short term (6 weeks) of AC. Given current course and further drop in his H/H this morning so heparin was stopped. Patient may not be a candidate for even short term use of AC. I discussed all options with him including   1. rechallenge with AC, when H/H stable, either for short (with Watchman) or long term  2. Antiplatelet therapy, either aspirin only or ASA+Plavix (ACTIVE A trial)  3. Consideration for ASAP TOO trial (randomization to either DAPT or DAPT plus wathcman)  4. He is not a good candidate for Lariate given prior cardiac surgery  5. No anti-thrombotic or anti-coagulant therapy at all      I discussed all the above, he is open to ASAP TOO trial if it was determined that he is high risk/not candidate for AC   Will continue to follow up, agree with stopping heparin now given drop in H/H, pls start aspirin and we can even try DAPT if tolerated aspirin.   Patient top priority is to minimize risk of stroke.

## 2021-06-08 NOTE — PROGRESS NOTE ADULT - ASSESSMENT
80 y.o M with a history of coronary artery disease, CABG, atrial fibrillation, diabetes, hypertension, chronic kidney disease, and BPH who presented with lightheadedness and near syncope found to be orthostatic with anemia. The patient was noted to have melena and packed red blood cells were transfused for acute blood loss anemia. The patient underwent colonoscopy with diverticulosis and internal hemorrhoids. 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     #Acute blood loss anemia / Gastrointestinal hemorrhage   - S/p PRBC transfusions - 1unit today , had dark BM yesterday - agree with holding off AC   - S/p colonoscopy and endoscopy, + bleeding duodenal diverticulum   - S/p IR Embolization    - Monitor H/H, todays Hb.1, repeat cbc in am  - C/w pantoprazole   - await Gi recs     #Atrial fibrillation  - high CHADS2 score - agree with trial of asa+plavix if ok with GI    - Seen in consult by EP for watchman work up - possibly outpatient - rate control for AFIb  - Cardiology recs appreciated   - Continue to monitor on tele    # Coronary artery disease  - On atorvastatin    #Chronic kidney disease   - Stable  - Baseline Cr appears to be around 2    #Hypertension   - Hydralazine 50 mg TID    - C/w  torsemide. Imdur held per cardio recs  - Titrate meds as needed    #Diabetes   - Accu checks stable  - Continue current insulin regimen     #BPH   - On tamsulosin    DNR/DNI  PT eval - home with assistance     DISPO: pending improvement/stabilization of hgb and GI recs

## 2021-06-08 NOTE — PROGRESS NOTE ADULT - SUBJECTIVE AND OBJECTIVE BOX
Patient seen and examined; chart reviewed. Had been on a heparin drip for past day but Hgb dropped and now receiving another transfusion, his 4th for this admission.  Alleges to have had a dark brown BM this morning.  Denies pain.  Has chronic nausea.  No vomiting.  No diarrhea.  Heparin drip stopped..  Had been on Eliquis for A Fib PTA.  Has made himself a DNR.    Tolerates a DASH diet.      PAST MEDICAL & SURGICAL HISTORY:  Coronary artery disease involving native heart, angina presence unspecified, unspecified vessel or lesion type  nuc stress 5/2021 negative    Atrial fibrillation, unspecified type    Congestive heart failure, unspecified HF chronicity, unspecified heart failure type  EF 35-40%    Bell&#x27;s palsy  Right side of face    Essential hypertension    Type 2 diabetes mellitus without complication, unspecified whether long term insulin use  insulin plus orals    Osteomyelitis of sternum    Chronic kidney disease, unspecified CKD stage  stage III. Recent BL cr 2.0-2.5    Anemia secondary to renal failure  together with iron deficiency    GIB (gastrointestinal bleeding)  h/o GIB 2019 in [place].  told hemorrhoidal    S/P CABG (coronary artery bypass graft)  complicated by osteomyleitis of the sternum and sepsis; sternum removed        ROS:  No Heartburn, regurgitation, dysphagia, odynophagia.  No dyspepsia  No abdominal pain.    No Nausea, vomiting.  No Bleeding.  No hematemesis.   No diarrhea.    No hematochesia.  No weight loss, anorexia.  No edema.      MEDICATIONS  (STANDING):  atorvastatin 40 milliGRAM(s) Oral at bedtime  chlorhexidine 2% Cloths 1 Application(s) Topical <User Schedule>  dextrose 40% Gel 15 Gram(s) Oral once  dextrose 5%. 1000 milliLiter(s) (50 mL/Hr) IV Continuous <Continuous>  dextrose 5%. 1000 milliLiter(s) (100 mL/Hr) IV Continuous <Continuous>  dextrose 50% Injectable 25 Gram(s) IV Push once  dextrose 50% Injectable 12.5 Gram(s) IV Push once  dextrose 50% Injectable 25 Gram(s) IV Push once  glucagon  Injectable 1 milliGRAM(s) IntraMuscular once  hydrALAZINE 50 milliGRAM(s) Oral three times a day  insulin glargine Injectable (LANTUS) 10 Unit(s) SubCutaneous every morning  insulin lispro (ADMELOG) corrective regimen sliding scale   SubCutaneous three times a day before meals  pantoprazole  Injectable 40 milliGRAM(s) IV Push two times a day  tamsulosin 0.4 milliGRAM(s) Oral at bedtime  torsemide 20 milliGRAM(s) Oral daily    MEDICATIONS  (PRN):      Allergies    No Known Allergies    Intolerances        Vital Signs Last 24 Hrs  T(C): 37.1 (08 Jun 2021 12:00), Max: 37.9 (08 Jun 2021 00:00)  T(F): 98.8 (08 Jun 2021 12:00), Max: 100.2 (08 Jun 2021 00:00)  HR: 81 (08 Jun 2021 12:00) (78 - 97)  BP: 150/85 (08 Jun 2021 12:00) (104/59 - 167/86)  BP(mean): 103 (08 Jun 2021 12:00) (81 - 111)  RR: 16 (08 Jun 2021 12:00) (16 - 19)  SpO2: 96% (08 Jun 2021 12:00) (95% - 100%)    PHYSICAL EXAM:    GENERAL: NAD, well-groomed, well-developed  HEAD:  Atraumatic, Normocephalic  EYES: EOMI, PERRLA, conjunctiva and sclera clear  ENMT: No tonsillar erythema, exudates, or enlargement; Moist mucous membranes, Good dentition, No lesions  NECK: Supple, No JVD, Normal thyroid  CHEST/LUNG: Clear to percussion bilaterally; No rales, rhonchi, wheezing, or rubs  HEART: Regular rate and rhythm; No murmurs, rubs, or gallops  ABDOMEN: Soft, Nontender, Nondistended; Bowel sounds present  EXTREMITIES:  2+ Peripheral Pulses, No clubbing, cyanosis, or edema  LYMPH: No lymphadenopathy noted  SKIN: No rashes or lesions      LABS:                        7.1    7.50  )-----------( 153      ( 08 Jun 2021 06:20 )             22.5     06-08    140  |  105  |  45.2<H>  ----------------------------<  196<H>  3.8   |  21.0<L>  |  2.04<H>    Ca    8.5<L>      08 Jun 2021 06:20  Phos  3.4     06-07  Mg     1.9     06-07    TPro  6.1<L>  /  Alb  3.4  /  TBili  0.7  /  DBili  x   /  AST  16  /  ALT  12  /  AlkPhos  103  06-07    PTT - ( 08 Jun 2021 06:20 )  PTT:52.4 sec         RADIOLOGY & ADDITIONAL STUDIES:

## 2021-06-08 NOTE — PROGRESS NOTE ADULT - SUBJECTIVE AND OBJECTIVE BOX
[facility] [id number]            [facility]                          [address] [address]                       Phone: [phone]. Fax:[phone]                      ________________________________________________    HPI:  80M h/o CAD s/p CABG c/b sepsis and sternal wound infection s/p sternum removal and pec flap and angioplasty, chronic HFrEF- recent EF 35-40%, p Afib (on Eliquis), HTN, DM2, and Bell's Palsy recently moved to [place] for few months from [place] presented to ED with LH/presyncope and fatigue.  Hgb 6.7--> reduced from 9.0 on  with melanic stool and FOBT +. Cardiology and GI have been consulted.  PRBC ordered. Orthostatics positive.  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  Trop .06 however is chronically elevated. Patient denies fevers, CP, SOB, abdominal pain, N/V/D, headache, or vision changes. He did not notice any black stools, but states they are always dark as he takes iron.  He has chronic unchanged intermittent diarrhea.  He did feel shaky today and had some chills.  His previous LE edema resolved with his last admission and has remained stable.    Extensive notable history: Pt admitted - with complaints of back pain and dizziness after a mechanical fall also noted for the last few months he has been having continued shortness of breath and leg swelling, and was recently started on Lasix in addition to metolazone by his cardiologist in [place], repeat CT head no cerebral bleed, admitted for ADHF/bi-V faiure and KEN on CKD (Cr. 2.4), TTE with LV EF 35-40%, underwent IV diuresis, initial noted pBNP 59953 ---> 38454, pharm nuclear stress test without ischemia, RHC done with moderate pHTN Group 2 with PCWP 23, switched to torsemide 40mg, underwent MARLO/CV for pAflutter but reverted back to pAfib, metoprolol dose reduced to 50mg BID and had ILR implanted, had L-elbow laceration wound with bleeding due mechanical fall in the toilet, discharged home on 21, had episodes of sinus bradycardia 40-50s with recurrent dizziness, metoprolol reduced to 25mg BID, seen on 21 at  outpatient cardiology visit had metoprolol discontinued for sinus bradycardia, uptitrated hydralazine dose to 50mg TID for uncontrolled HTN/HFrEF  He was seen again in ED   with episodes of recurring dizziness, nausea and vomited x1, reports also L-sided chest pressure for few minutes, vitals with /70s in the ER.  Trop's negative and pt discharged after a stay for observation.         (2021 14:28)    HOME MEDICATIONS:  aspirin 81 mg oral delayed release tablet: 1 tab(s) orally once a day (2021 17:37)  atorvastatin 40 mg oral tablet: 1 tab(s) orally once a day (2021 17:37)  Eliquis 5 mg oral tablet: 1 tab(s) orally 2 times a day (2021 17:37)  ferrous sulfate 300 mg (60 mg elemental iron) oral tablet: 1 tab(s) orally once a day (2021 17:37)  Flomax 0.4 mg oral capsule: 1 cap(s) orally once a day (2021 17:37)  hydrALAZINE 25 mg oral tablet: 2 tab(s) orally 3 times a day (2021 17:37)  isosorbide mononitrate 30 mg oral tablet, extended release: 1 tab(s) orally once a day (2021 17:37)  Lantus 100 units/mL subcutaneous solution: 17 unit(s) subcutaneous 2 times a day (2021 17:37)  magnesium oxide 400 mg (241.3 mg elemental magnesium) oral tablet: 1 tab(s) orally once a day (2021 17:37)  omeprazole 40 mg oral delayed release capsule: 1 cap(s) orally once a day (2021 17:37)  potassium chloride 20 mEq oral tablet, extended release: 2 tab(s) orally once a day (2021 17:37)  torsemide 20 mg oral tablet: 2 tab(s) orally once a day (2021 17:37)      ROS: All review of systems negative unless indicated otherwise below.                         PHYSICAL EXAM:    GENERAL: NAD  NECK: Supple, No JVD  NERVOUS SYSTEM:  Alert & Oriented X3, non focal neuro exam.   CHEST/LUNG: clear lungs, No rales, rhonchi, wheezing, or rubs  HEART: Regular rate and rhythm; s1 and s2 auscultated, No murmurs, rubs, or gallops  ABDOMEN: Soft, Nontender, Nondistended; Bowel sounds present and normoactive.   EXTREMITIES:  2+ Peripheral Pulses, No clubbing, cyanosis, or edema       Vital Signs Last 24 Hrs  T(C): 37 (2021 15:49), Max: 37.9 (2021 00:00)  T(F): 98.6 (2021 15:49), Max: 100.2 (2021 00:00)  HR: 90 (2021 15:49) (78 - 97)  BP: 154/72 (2021 15:49) (137/67 - 167/86)  BP(mean): 100 (2021 13:45) (81 - 111)  RR: 18 (2021 15:49) (16 - 19)  SpO2: 99% (2021 15:49) (95% - 99%)                                                   DAILY WEIGHTS - 48 HOUR TREND     Daily Weight in k.6 (2021 04:00)                             INTAKE AND OUTPUT - 48 HOUR TREND     21 @ 07:01  -  21 @ 07:00  --------------------------------------------------------  IN:  Total IN: 0 mL    OUT:    Voided (mL): 1330 mL  Total OUT: 1330 mL    Total NET: -1330 mL      21 @ 07:01  -  21 @ 07:00  --------------------------------------------------------  IN:  Total IN: 0 mL    OUT:    Voided (mL): 1925 mL  Total OUT: 1925 mL    Total NET: -1925 mL                                                           LAB RESULTS                 COMPLETE BLOOD COUNT( 2021 06:20 )                            7.1 g/dL<L>  7.50 K/uL )---------------( 153 K/uL                        22.5 %<L>      Automated Differential     Auto Basophil # - X      Auto Basophil % - X      Auto Eosinophil # - X      Auto Eosinophil % - X      Auto Immature Granulocyte # - X      Auto Immature Granulocyte % - X      Auto Lymphocyte # - X      Auto Lymphocyte % - X      Auto Monocyte # - X      Auto Monocyte % - X      Auto Neutrophil # - X      Auto Neutrophil % - X                                      CHEMISTRY                 Basic Metabolic Panel (21 @ 06:20)    140  |  105  |  45.2<H>  ----------------------------<  196<H>  3.8   |  21.0<L>  |  2.04<H>    Ca    8.5<L>      2021 06:20  Phos  3.4     06-07  Mg     1.9     06-07                    Liver Functions (21 @ 05:43))  TPro  6.1  /  Alb  3.4  /  TBili  0.7  /  DBili  x   /  AST  16  /  ALT  12  /  AlkPhos  103     PT/INR/PTT ( 2021 15:36 )                        :                       :       X            :       28.3                  .        .                   .              .           .       X           .                                                                 Cardiac Enzymes   ( 2021 12:00 )  Troponin T  0.06 ,  CPK  X    , CKMB  X    , BNP X        , ( 28 May 2021 20:38 )  Troponin T  0.05 ,  CPK  X    , CKMB  X    , BNP X        , ( 28 May 2021 15:45 )  Troponin T  0.07<H>,  CPK  X    , CKMB  X    , BNP 89449<H>                             Current Admission Active Medications  atorvastatin 40 milliGRAM(s) Oral at bedtime  chlorhexidine 2% Cloths 1 Application(s) Topical <User Schedule>  dextrose 40% Gel 15 Gram(s) Oral once  dextrose 5%. 1000 milliLiter(s) (50 mL/Hr) IV Continuous <Continuous>  dextrose 5%. 1000 milliLiter(s) (100 mL/Hr) IV Continuous <Continuous>  dextrose 50% Injectable 25 Gram(s) IV Push once  dextrose 50% Injectable 12.5 Gram(s) IV Push once  dextrose 50% Injectable 25 Gram(s) IV Push once  glucagon  Injectable 1 milliGRAM(s) IntraMuscular once  hydrALAZINE 50 milliGRAM(s) Oral three times a day  insulin glargine Injectable (LANTUS) 10 Unit(s) SubCutaneous every morning  insulin lispro (ADMELOG) corrective regimen sliding scale   SubCutaneous three times a day before meals  pantoprazole  Injectable 40 milliGRAM(s) IV Push two times a day  tamsulosin 0.4 milliGRAM(s) Oral at bedtime  torsemide 20 milliGRAM(s) Oral daily                          RADIOLOGY RESULTS: Personally visualized   < from: Xray Chest 1 View- PORTABLE-Urgent (Xray Chest 1 View- PORTABLE-Urgent .) (21 @ 12:55) >  IMPRESSION:  No acute radiographic findingsand no change    < end of copied text >                            CARDIOLOGY RESULTS: Official Report/Preliminary Verbal Reports  < from: MARLO Echo Doppler (21 @ 16:06) >  Summary:   1. Left ventricular ejection fraction, by visual estimation, is 30 to 35%.   2. Moderately decreased global left ventricular systolic function.   3. Severely enlarged right atrium.   4. Severely enlarged right ventricle.   5. Severely reduced RV systolic function.   6. Mild to moderately enlarged left atrium.   7. There is no evidence of pericardial effusion.   8. Mild mitral annular calcification.   9. Mild mitral valve regurgitation.  10. Moderate-severe tricuspid regurgitation.  11. Color flow doppler and intravenous injection of agitated saline demonstrates the presence of an intact intra atrial septum.  12. No left atrial appendage thrombus and decreased left atrial appendage velocities.    MD Rayo Electronically signed on 2021 at 4:52:38 PM    < end of copied text >                            CARDIOLOGY REVIEW: Personally visualized and reviewed  Telemetry:  SR 90s

## 2021-06-08 NOTE — PROGRESS NOTE ADULT - ATTENDING COMMENTS
Hb continues to downtrend on heparin gtt despite PRBC transfusions and IR embolizations, given remains in sinus rhythm and not tolerating anticoagulation with recurrent anemia would hold AC for now, transfuse another PRBC today  -if H/H stable off AC then consider challenge with ASA 81mg for eventual Watchman trial with antiplatelets option  -remains orthostatic in BP readings, off Imdur and on lowered dose of hydralazine, acceptable for resting supine -160s for now to avoid dramatic orthostatic response, continue compression stockings  -continue lower dose of torsemide 20mg to maintain euvolemia  -continue telemetry, consider resume low dose metoprolol succinate 25mg for pAfib/flutter        Gamal Duke DO, [facility]  Faculty Non-Invasive Cardiologist  [phone]

## 2021-06-08 NOTE — PROGRESS NOTE ADULT - SUBJECTIVE AND OBJECTIVE BOX
CC:  follow up GOC  INTERVAL HPI/OVERNIGHT EVENTS:  Heparin stopped - drop in Hg  Met EP who discussed with patient various options     PRESENT SYMPTOMS: SOURCE:  Patient/Family/Team    PAIN SCALE:  0 = none  1 = mild   2 = moderate  3 = severe    Pain: denies    Dyspnea:  [ ] YES [ x] NO  Anxiety:  [ ] YES [ x] NO  Fatigue: [x ] YES [ ] NO  Nausea: [ ] YES [x ] NO  Loss of Appetite: [ x] YES [ ] NO  Other symptoms: __________    MEDICATIONS  (STANDING):  atorvastatin 40 milliGRAM(s) Oral at bedtime  chlorhexidine 2% Cloths 1 Application(s) Topical <User Schedule>  dextrose 40% Gel 15 Gram(s) Oral once  dextrose 5%. 1000 milliLiter(s) (50 mL/Hr) IV Continuous <Continuous>  dextrose 5%. 1000 milliLiter(s) (100 mL/Hr) IV Continuous <Continuous>  dextrose 50% Injectable 25 Gram(s) IV Push once  dextrose 50% Injectable 12.5 Gram(s) IV Push once  dextrose 50% Injectable 25 Gram(s) IV Push once  glucagon  Injectable 1 milliGRAM(s) IntraMuscular once  hydrALAZINE 50 milliGRAM(s) Oral three times a day  insulin glargine Injectable (LANTUS) 10 Unit(s) SubCutaneous every morning  insulin lispro (ADMELOG) corrective regimen sliding scale   SubCutaneous three times a day before meals  pantoprazole  Injectable 40 milliGRAM(s) IV Push two times a day  tamsulosin 0.4 milliGRAM(s) Oral at bedtime  torsemide 20 milliGRAM(s) Oral daily    MEDICATIONS  (PRN):      Allergies    No Known Allergies    Intolerances    Karnofsky Performance Score/Palliative Performance Status Version 2:   60 %    Vital Signs Last 24 Hrs  T(C): 37.1 (08 Jun 2021 11:00), Max: 37.9 (08 Jun 2021 00:00)  T(F): 98.7 (08 Jun 2021 11:00), Max: 100.2 (08 Jun 2021 00:00)  HR: 94 (08 Jun 2021 11:00) (78 - 97)  BP: 139/64 (08 Jun 2021 11:00) (104/59 - 167/86)  BP(mean): 86 (08 Jun 2021 11:00) (81 - 111)  RR: 18 (08 Jun 2021 11:00) (16 - 19)  SpO2: 96% (08 Jun 2021 11:00) (95% - 100%)    PHYSICAL EXAM:    General: awake alert NAD  HEENT: [x ] normal  [ ] dry mouth  [ ] ET tube/trach    Lungs: [x ] comfortable [ ] tachypnea/labored breathing  [ ] excessive secretions    CV: [x ] normal  [ ] tachycardia    GI: [ x] normal  [ ] distended  [ ] tender  [ ] no BS               [ ] PEG/NG/OG tube    : [x ] normal  [ ] incontinent  [ ] oliguria/anuria  [ ] dyer    MSK: [x ] normal  [ ] weakness  [ ] edema             [ ] ambulatory  [ ] bedbound/wheelchair bound    Skin: [x ] normal  [ ] pressure ulcers- Stage_____  [ ] no rash    LABS:                        7.1    7.50  )-----------( 153      ( 08 Jun 2021 06:20 )             22.5     06-08    140  |  105  |  45.2<H>  ----------------------------<  196<H>  3.8   |  21.0<L>  |  2.04<H>    Ca    8.5<L>      08 Jun 2021 06:20  Phos  3.4     06-07  Mg     1.9     06-07    TPro  6.1<L>  /  Alb  3.4  /  TBili  0.7  /  DBili  x   /  AST  16  /  ALT  12  /  AlkPhos  103  06-07    PTT - ( 08 Jun 2021 06:20 )  PTT:52.4 sec    I&O's Summary    07 Jun 2021 07:01  -  08 Jun 2021 07:00  --------------------------------------------------------  IN: 916 mL / OUT: 1927 mL / NET: -1011 mL    08 Jun 2021 07:01  -  08 Jun 2021 11:43  --------------------------------------------------------  IN: 300 mL / OUT: 475 mL / NET: -175 mL        RADIOLOGY & ADDITIONAL STUDIES:

## 2021-06-08 NOTE — PROGRESS NOTE ADULT - SUBJECTIVE AND OBJECTIVE BOX
Today,   In and out of AFib now, rate controlled. H/H dropping on heparin, necessitating stopping heparin this morning. Last BM (was black) yesterday afternoon.   d/w Dr. SPEARS and Dr. Mahoney, pt likely not candidate for any AC, including short term AC.  Denies CP, SOB or palpitation        Allergies  No Known Allergies    Vital Signs Last 24 Hrs  T(C): 37.3 (08 Jun 2021 08:00), Max: 37.9 (08 Jun 2021 00:00)  T(F): 99.1 (08 Jun 2021 08:00), Max: 100.2 (08 Jun 2021 00:00)  HR: 97 (08 Jun 2021 10:00) (78 - 97)  BP: 141/56 (08 Jun 2021 10:00) (104/59 - 167/86)  BP(mean): 81 (08 Jun 2021 10:00) (81 - 111)  RR: 18 (08 Jun 2021 10:00) (16 - 19)  SpO2: 98% (08 Jun 2021 10:00) (95% - 100%)      Physical Exam:  Constitutional: NAD, AAOx3  Cardiovascular: +S1S2 irregular pulse now  Pulmonary: CTA b/l, unlabored  GI: soft NTND  Extremities: no pedal edema, +distal pulses b/l  Neuro: non focal, HONG x4    LABS:                        7.1    7.50  )-----------( 153      ( 08 Jun 2021 06:20 )             22.5   06-08    140  |  105  |  45.2<H>  ----------------------------<  196<H>  3.8   |  21.0<L>  |  2.04<H>    Ca    8.5<L>      08 Jun 2021 06:20  Phos  3.4     06-07  Mg     1.9     06-07    TPro  6.1<L>  /  Alb  3.4  /  TBili  0.7  /  DBili  x   /  AST  16  /  ALT  12  /  AlkPhos  103  06-07

## 2021-06-08 NOTE — PROGRESS NOTE ADULT - ASSESSMENT
80M h/o CAD s/p CABG c/b sepsis and sternal wound infection s/p sternum removal and pec flap and angioplasty, chronic HFrEF, p Afib (on Eliquis), HTN, DM2, and Bell's Palsy recently moved to [place] for few months from [place] presented to [facility] on 5/9/21 with complaints of back pain and dizziness after a mechanical fall also noted for the last few months he has been having continued shortness of breath and leg swelling, and was recently started on Lasix in addition to metolazone by his cardiologist in [place], repeat CT head no cerebral bleed, admitted for ADHF/bi-V faiure and KEN on CKD (Cr. 2.4), TTE with LV EF 35-40%, underwent IV diuresis, initial noted pBNP 96555 ---> 86706, pharm nuclear stress test without ischemia, RHC done with moderate pHTN Group 2 with PCWP 23, switched to torsemide 40mg, underwent MAROL/CV for pAflutter but reverted back to pAfib, metoprolol dose reduced to 50mg BID and had ILR implanted, had L-elbow laceration wound with bleeding due mechanical fall in the toilet, discharged home on 5/14/21, patient has had recurrent episodes of dizziness and had his medications further adjusted. Patient states that this morning after he went to the bathroom he was feeling very dizzy, hot, and with leg tingling like he might pass out. Patient states that he has never felt like he was fully going to pass out until today, and he has been feeling very dizzy every time he stands. Patient also notes that he has been having some dark stools, but states that he thought it was from his iron. Patient was noted to be orthostatic upon arrival to the ER with a hemoglobin of 6.7. Patient states he feels comfortable at rest at this time, but feels worse upon standing. Patient denies fevers, chills, CP, SOB, abdominal pain, N/V/D, headache, or vision changes.   Troponin negative x 1    6/6: Pt denies chest pain, palpitations, SOB. Tele- NSR HR @ 70s, couplets, frequent PVCs, brief episodes of atrial tachycardia. EGD results as per GI notes-unable to intervene endoscopically ( could not get optimum position to get the  bleeding site ). Unclear if ulcer or deilofoy- just blood and clot seen. GI clears patient to restart AC. Start Heparin gtt. Cont. to monitor CBC. EP evaluation for eventual Watchman device    Upper GIB  - +FOBT   - melena overnight  - s/p PRBC transfusions  - s/p colonoscopy/endo showing bleeding duodenal diverticulum   - s/p IR embolization   - continue to monitor   - h/h is downtrending, hold anticoagulation  - Continue PPI    Atrial Fibrillation   - NSR HR @ 70s, couplets, frequent PVCs, brief episodes of atrial tachycardia  - Continue telemetry monitoring    - Watchman eval as per EP   - AC on hold as patient is w/ downtrending Hgb   - maintain heparin for possible watchman but also as a transition period to monitor for recurrent GIB before transitioning back to eliquis   - amiodarone if recurrent afib rvr     HTN  -orthostatic now   - hold imdur  - reduced hydralazine to 50mg TID   - use compression stockings     HFrEF  - ADHF/bi-V faiure and KEN on CKD (Cr. 2.4)  - TTE with LV EF 35-40%, underwent IV diuresis  -  initial noted pBNP 88497 ---> 12608  -  pharm nuclear stress test without ischemia  - RHC done with moderate pHTN Group 2 with PCWP 23  - continue reduced torsemide 20mg daily   - continue   - Diet/lifestyle modifications and medication compliance heavily reinforced

## 2021-06-09 DIAGNOSIS — K92.2 GASTROINTESTINAL HEMORRHAGE, UNSPECIFIED: ICD-10-CM

## 2021-06-09 LAB
ANION GAP SERPL CALC-SCNC: 12 MMOL/L — SIGNIFICANT CHANGE UP (ref 5–17)
BUN SERPL-MCNC: 37.9 MG/DL — HIGH (ref 8–20)
CALCIUM SERPL-MCNC: 8.5 MG/DL — LOW (ref 8.6–10.2)
CHLORIDE SERPL-SCNC: 103 MMOL/L — SIGNIFICANT CHANGE UP (ref 98–107)
CO2 SERPL-SCNC: 22 MMOL/L — SIGNIFICANT CHANGE UP (ref 22–29)
CREAT SERPL-MCNC: 1.89 MG/DL — HIGH (ref 0.5–1.3)
GLUCOSE BLDC GLUCOMTR-MCNC: 171 MG/DL — HIGH (ref 70–99)
GLUCOSE BLDC GLUCOMTR-MCNC: 215 MG/DL — HIGH (ref 70–99)
GLUCOSE BLDC GLUCOMTR-MCNC: 226 MG/DL — HIGH (ref 70–99)
GLUCOSE BLDC GLUCOMTR-MCNC: 313 MG/DL — HIGH (ref 70–99)
GLUCOSE SERPL-MCNC: 186 MG/DL — HIGH (ref 70–99)
HCT VFR BLD CALC: 27.1 % — LOW (ref 39–50)
HGB BLD-MCNC: 8.8 G/DL — LOW (ref 13–17)
MAGNESIUM SERPL-MCNC: 1.7 MG/DL — SIGNIFICANT CHANGE UP (ref 1.6–2.6)
MCHC RBC-ENTMCNC: 29.5 PG — SIGNIFICANT CHANGE UP (ref 27–34)
MCHC RBC-ENTMCNC: 32.5 GM/DL — SIGNIFICANT CHANGE UP (ref 32–36)
MCV RBC AUTO: 90.9 FL — SIGNIFICANT CHANGE UP (ref 80–100)
PHOSPHATE SERPL-MCNC: 3 MG/DL — SIGNIFICANT CHANGE UP (ref 2.4–4.7)
PLATELET # BLD AUTO: 178 K/UL — SIGNIFICANT CHANGE UP (ref 150–400)
POTASSIUM SERPL-MCNC: 3.5 MMOL/L — SIGNIFICANT CHANGE UP (ref 3.5–5.3)
POTASSIUM SERPL-SCNC: 3.5 MMOL/L — SIGNIFICANT CHANGE UP (ref 3.5–5.3)
RBC # BLD: 2.98 M/UL — LOW (ref 4.2–5.8)
RBC # FLD: 16 % — HIGH (ref 10.3–14.5)
SODIUM SERPL-SCNC: 137 MMOL/L — SIGNIFICANT CHANGE UP (ref 135–145)
WBC # BLD: 8.1 K/UL — SIGNIFICANT CHANGE UP (ref 3.8–10.5)
WBC # FLD AUTO: 8.1 K/UL — SIGNIFICANT CHANGE UP (ref 3.8–10.5)

## 2021-06-09 PROCEDURE — 99233 SBSQ HOSP IP/OBS HIGH 50: CPT

## 2021-06-09 RX ORDER — ASPIRIN/CALCIUM CARB/MAGNESIUM 324 MG
81 TABLET ORAL DAILY
Refills: 0 | Status: DISCONTINUED | OUTPATIENT
Start: 2021-06-09 | End: 2021-06-10

## 2021-06-09 RX ORDER — INSULIN LISPRO 100/ML
4 VIAL (ML) SUBCUTANEOUS
Refills: 0 | Status: DISCONTINUED | OUTPATIENT
Start: 2021-06-09 | End: 2021-06-15

## 2021-06-09 RX ORDER — INSULIN LISPRO 100/ML
6 VIAL (ML) SUBCUTANEOUS ONCE
Refills: 0 | Status: COMPLETED | OUTPATIENT
Start: 2021-06-09 | End: 2021-06-09

## 2021-06-09 RX ORDER — POTASSIUM CHLORIDE 20 MEQ
40 PACKET (EA) ORAL EVERY 4 HOURS
Refills: 0 | Status: COMPLETED | OUTPATIENT
Start: 2021-06-09 | End: 2021-06-09

## 2021-06-09 RX ORDER — MAGNESIUM SULFATE 500 MG/ML
2 VIAL (ML) INJECTION ONCE
Refills: 0 | Status: COMPLETED | OUTPATIENT
Start: 2021-06-09 | End: 2021-06-09

## 2021-06-09 RX ADMIN — ATORVASTATIN CALCIUM 40 MILLIGRAM(S): 80 TABLET, FILM COATED ORAL at 21:28

## 2021-06-09 RX ADMIN — Medication 81 MILLIGRAM(S): at 11:25

## 2021-06-09 RX ADMIN — Medication 4 UNIT(S): at 17:26

## 2021-06-09 RX ADMIN — PANTOPRAZOLE SODIUM 40 MILLIGRAM(S): 20 TABLET, DELAYED RELEASE ORAL at 17:26

## 2021-06-09 RX ADMIN — Medication 50 GRAM(S): at 11:22

## 2021-06-09 RX ADMIN — Medication 2: at 17:27

## 2021-06-09 RX ADMIN — Medication 6 UNIT(S): at 22:22

## 2021-06-09 RX ADMIN — Medication 40 MILLIEQUIVALENT(S): at 17:27

## 2021-06-09 RX ADMIN — INSULIN GLARGINE 10 UNIT(S): 100 INJECTION, SOLUTION SUBCUTANEOUS at 08:45

## 2021-06-09 RX ADMIN — CHLORHEXIDINE GLUCONATE 1 APPLICATION(S): 213 SOLUTION TOPICAL at 05:43

## 2021-06-09 RX ADMIN — Medication 20 MILLIGRAM(S): at 05:44

## 2021-06-09 RX ADMIN — Medication 4: at 08:45

## 2021-06-09 RX ADMIN — Medication 4 UNIT(S): at 12:19

## 2021-06-09 RX ADMIN — Medication 50 MILLIGRAM(S): at 13:20

## 2021-06-09 RX ADMIN — Medication 50 MILLIGRAM(S): at 21:28

## 2021-06-09 RX ADMIN — PANTOPRAZOLE SODIUM 40 MILLIGRAM(S): 20 TABLET, DELAYED RELEASE ORAL at 05:44

## 2021-06-09 RX ADMIN — Medication 4: at 12:20

## 2021-06-09 RX ADMIN — Medication 50 MILLIGRAM(S): at 05:44

## 2021-06-09 RX ADMIN — Medication 40 MILLIEQUIVALENT(S): at 13:20

## 2021-06-09 NOTE — PROGRESS NOTE ADULT - SUBJECTIVE AND OBJECTIVE BOX
CHU LOGAN  80y  Male    Interval/overnight events/pt complaints:  Patient was seen and examined at bedside this afternoon. patient states he feels much better. per patient, he had a BM and it was brown in color.     MEDICATIONS  (STANDING):  aspirin enteric coated 81 milliGRAM(s) Oral daily  atorvastatin 40 milliGRAM(s) Oral at bedtime  chlorhexidine 2% Cloths 1 Application(s) Topical <User Schedule>  dextrose 40% Gel 15 Gram(s) Oral once  dextrose 5%. 1000 milliLiter(s) (50 mL/Hr) IV Continuous <Continuous>  dextrose 5%. 1000 milliLiter(s) (100 mL/Hr) IV Continuous <Continuous>  dextrose 50% Injectable 25 Gram(s) IV Push once  dextrose 50% Injectable 12.5 Gram(s) IV Push once  dextrose 50% Injectable 25 Gram(s) IV Push once  glucagon  Injectable 1 milliGRAM(s) IntraMuscular once  hydrALAZINE 50 milliGRAM(s) Oral three times a day  insulin glargine Injectable (LANTUS) 10 Unit(s) SubCutaneous every morning  insulin lispro (ADMELOG) corrective regimen sliding scale   SubCutaneous three times a day before meals  insulin lispro Injectable (ADMELOG) 4 Unit(s) SubCutaneous three times a day before meals  pantoprazole  Injectable 40 milliGRAM(s) IV Push two times a day    MEDICATIONS  (PRN):    AllergiesNo Known Allergies      Vital Signs Last 24 Hrs  T(C): 37.4 (09 Jun 2021 16:00), Max: 37.6 (09 Jun 2021 08:00)  T(F): 99.4 (09 Jun 2021 16:00), Max: 99.6 (09 Jun 2021 08:00)  HR: 79 (09 Jun 2021 16:00) (75 - 95)  BP: 154/72 (09 Jun 2021 16:00) (103/69 - 163/80)  BP(mean): --  RR: 16 (09 Jun 2021 16:00) (16 - 16)  SpO2: 97% (09 Jun 2021 16:00) (95% - 98%)  I&O's Summary    08 Jun 2021 07:01  -  09 Jun 2021 07:00  --------------------------------------------------------  IN: 1020 mL / OUT: 1275 mL / NET: -255 mL    09 Jun 2021 07:01  -  09 Jun 2021 17:27  --------------------------------------------------------  IN: 290 mL / OUT: 1500 mL / NET: -1210 mL        PHYSICAL EXAM:  GENERAL:  Well-appearing M patient lying comfortably in bed.  NAD  CV: + s1,s2 regular rate and rhythm  Lungs: normal effort, CTA bilat. no wheezes, rales or rhonchi  ABDOMEN: soft, nontender, nondistended  EXTREMITIES:  no edema, cyanosis  skin: warm, dry, no rashes.   Neuro: A&O x 3, no sensory or motor deficit.      CAPILLARY BLOOD GLUCOSE  POCT Blood Glucose.: 171 mg/dL (09 Jun 2021 17:01)  POCT Blood Glucose.: 215 mg/dL (09 Jun 2021 12:12)  POCT Blood Glucose.: 226 mg/dL (09 Jun 2021 08:01)  POCT Blood Glucose.: 216 mg/dL (08 Jun 2021 21:18)      LABS    (06-09 @ 06:01)                      8.8  8.10 )-----------( 178                 27.1      06-09    137  |  103  |  37.9<H>  ----------------------------<  186<H>  3.5   |  22.0  |  1.89<H>    Ca    8.5<L>      09 Jun 2021 06:01  Phos  3.0     06-09  Mg     1.7     06-09      IMAGING:    Consultant(s) Notes Reviewed:  [X] YES  [ ] NO

## 2021-06-09 NOTE — PROGRESS NOTE ADULT - ASSESSMENT
80M h/o CAD s/p CABG c/b sepsis and sternal wound infection s/p sternum removal and pec flap and angioplasty, chronic HFrEF, p Afib (on Eliquis), HTN, DM2, and Bell's Palsy recently moved to [place] for few months from [place] presented to [facility] 5/9/21 with complaints of back pain and dizziness after a mechanical fall also noted for the last few months he has been having continued shortness of breath and leg swelling, and was recently started on Lasix in addition to metolazone by his cardiologist in [place], repeat CT head no cerebral bleed, admitted for ADHF/bi-V faiure and KEN on CKD (Cr. 2.4), TTE with LV EF 35-40%, underwent IV diuresis, initial noted pBNP 38432 ---> 28042, pharm nuclear stress test without ischemia, RHC done with moderate pHTN Group 2 with PCWP 23, switched to torsemide 40mg, underwent MARLO/CV for pAflutter but reverted back to pAfib, metoprolol dose reduced to 50mg BID and had ILR implanted, had L-elbow laceration wound with bleeding due mechanical fall in the toilet, discharged home on 5/14/21, patient has had recurrent episodes of dizziness and had his medications further adjusted. Patient states that this morning after he went to the bathroom he was feeling very dizzy, hot, and with leg tingling like he might pass out. Patient states that he has never felt like he was fully going to pass out until today, and he has been feeling very dizzy every time he stands. Patient also notes that he has been having some dark stools, but states that he thought it was from his iron. Patient was noted to be orthostatic upon arrival to the ER with a hemoglobin of 6.7. 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

## 2021-06-09 NOTE — PROGRESS NOTE ADULT - PROBLEM SELECTOR PLAN 1
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 NO embolized. Hgb stable off anticoagulation  -  If patient requires anticoagulation, may again give a trial of IV heparin without bolus and monitor hemoglobin

## 2021-06-09 NOTE — PROGRESS NOTE ADULT - ATTENDING COMMENTS
-Hb responded s/p 1 PRBC transfusion yesterday, off anticoagulant, no recurrent melenic stool, started on ASA 81mg, will decide if to rechallenge with AC vs. use of DAPT for eventual elective Watchman, continue PPI  -remains orthostatic, stop tamsulosin use for now  -hold torsemide as well given appear euvolemic, trend pBNP if uptrending may need resume low dose diuretic for maintenance of HFrEF  -will have to tolerate some degree of supine HTN to avoid symptomatic orthostasis, acceptable for SBP 150s-160s for now until orthostasis resolve        Gamal Duke DO, [facility]  Faculty Non-Invasive Cardiologist  [phone]

## 2021-06-09 NOTE — PROGRESS NOTE ADULT - SUBJECTIVE AND OBJECTIVE BOX
[facility]  [address], [place] [address]    REASON FOR VISIT:  Follow up on orthostatics changes  UPDATE:  HGB up to 8 still profoundly orthostatis  TELEMETRY MONITORING: atrial fib controlled ventricular response    06-09    137  |  103  |  37.9<H>  ----------------------------<  186<H>  3.5   |  22.0  |  1.89<H>    Ca    8.5<L>      09 Jun 2021 06:01  Phos  3.0     06-09  Mg     1.7     06-09      MEDICATIONS  (STANDING):  aspirin enteric coated 81 milliGRAM(s) Oral daily  atorvastatin 40 milliGRAM(s) Oral at bedtime  glucagon  Injectable 1 milliGRAM(s) IntraMuscular once  hydrALAZINE 50 milliGRAM(s) Oral three times a day  insulin glargine Injectable (LANTUS) 10 Unit(s) SubCutaneous every morning  insulin lispro (ADMELOG) corrective regimen sliding scale   SubCutaneous three times a day before meals  insulin lispro Injectable (ADMELOG) 4 Unit(s) SubCutaneous three times a day before meals  pantoprazole  Injectable 40 milliGRAM(s) IV Push two times a day  torsemide 20 milliGRAM(s) Oral daily    ROS:  No fever chills  Cardiac  No cp sob or palp  Resp  no cough no mucus production  Gi  no abd pain no melana  Ext No calf tenderness, no edema    Vital Signs Last 24 Hrs  T(C): 37.6 (09 Jun 2021 08:00), Max: 37.6 (09 Jun 2021 08:00)  T(F): 99.6 (09 Jun 2021 08:00), Max: 99.6 (09 Jun 2021 08:00)  HR: 87 (09 Jun 2021 08:00) (75 - 94)  BP: 157/75 (09 Jun 2021 08:00) (103/69 - 163/80)  BP(mean): 87 (08 Jun 2021 16:00) (87 - 100)  RR: 16 (09 Jun 2021 08:00) (16 - 18)  SpO2: 98% (09 Jun 2021 08:00) (95% - 99%)  T(C): 37.6 (06-09-21 @ 08:00), Max: 37.6 (06-09-21 @ 08:00)  HR: 87 (06-09-21 @ 08:00) (75 - 94)  BP: 157/75 (06-09-21 @ 08:00) (103/69 - 163/80)  RR: 16 (06-09-21 @ 08:00) (16 - 18)  SpO2: 98% (06-09-21 @ 08:00) (95% - 99%)    HEENT Head atraumatic eomi, oral mucosa moist  CV S1&S2      No r/g/ m   RESP  clear  GI  Soft active bowel sounds non tender  EXT  No clubbing/Cyanosis /Edema  surgical stockings on  NEURO  Alert oriented  No gross motor or sensory deficits    < from: MARLO Echo Doppler (05.11.21 @ 16:06) >   1. Left ventricular ejection fraction, by visual estimation, is 30 to 35%.   2. Moderately decreased global left ventricular systolic function.   3. Severely enlarged right atrium.   4. Severely enlarged right ventricle.   5. Severely reduced RV systolic function.   6. Mild to moderately enlarged left atrium.   7. There is no evidence of pericardial effusion.   8. Mild mitral annular calcification.   9. Mild mitral valve regurgitation.  10. Moderate-severe tricuspid regurgitation.  11. Color flow doppler and intravenous injection of agitated saline demonstrates the presence of an intact intra atrial septum.  : Cardiac Cath Lab - Adult (05.11.21 @ 16:42) >  COMPLICATIONS: No complications occurred during the cath lab visit.  DIAGNOSTIC IMPRESSIONS: Moderate Elevation of Right Heart Pressures:  RA=15 mmHg; RV=61/18 mmHg; PCWP=23 mmHg  Moderate Pulmonary Hypertension=62/22 mmHg  Reduced CO=4.8 L/min and CI=2.06 L/min/m2  DIAGNOSTIC RECOMMENDATIONS: The patient should continue with the present  medications. Patient management should include aggressive medical therapy,  close monitoring of BUN and creatinine, and resumption of all previous  activities in 2 days. Medical management is recommended.  INTERVENTIONAL IMPRESSIONS: Moderate Elevation of Right Heart Pressures:  RA=15 mmHg; RV=61/18 mmHg; PCWP=23 mmHg  Moderate Pulmonary Hypertension=62/22 mmHg  Reduced CO=4.8 L/min and CI=2.06 L/min/m2  Prepared and signed by

## 2021-06-09 NOTE — PROGRESS NOTE ADULT - SUBJECTIVE AND OBJECTIVE BOX
Patient is a 80y old  Male who presents with a chief complaint of GIB/anemia (09 Jun 2021 17:27)      HPI:  80M - no further bleeding, hemoglobin stable. No melena.  AS per nurse, pt  orthstatic BP this am. Did no appear to by symptomatic       REVIEW OF SYSTEMS:  Constitutional: No fever, weight loss or fatigue  ENMT:  No difficulty hearing, tinnitus, vertigo; No sinus or throat pain  Respiratory: No cough, wheezing, chills or hemoptysis  Cardiovascular: No chest pain, palpitations, dizziness or leg swelling  Gastrointestinal: No abdominal or epigastric pain. No nausea, vomiting or hematemesis; No diarrhea or constipation. No melena or hematochezia.  Skin: No itching, burning, rashes or lesions   Musculoskeletal: No joint pain or swelling; No muscle, back or extremity pain    PAST MEDICAL & SURGICAL HISTORY:  Coronary artery disease involving native heart, angina presence unspecified, unspecified vessel or lesion type  nuc stress 5/2021 negative    Atrial fibrillation, unspecified type    Congestive heart failure, unspecified HF chronicity, unspecified heart failure type  EF 35-40%    Bell&#x27;s palsy  Right side of face    Essential hypertension    Type 2 diabetes mellitus without complication, unspecified whether long term insulin use  insulin plus orals    Osteomyelitis of sternum    Chronic kidney disease, unspecified CKD stage  stage III. Recent BL cr 2.0-2.5    Anemia secondary to renal failure  together with iron deficiency    GIB (gastrointestinal bleeding)  h/o GIB 2019 in [place].  told hemorrhoidal    S/P CABG (coronary artery bypass graft)  complicated by osteomyleitis of the sternum and sepsis; sternum removed        FAMILY HISTORY:  No pertinent family history in first degree relatives  cad        SOCIAL HISTORY:  Smoking Status: [ ] Current, [ ] Former, [ ] Never  Pack Years:  [  ] EtOH-no  [  ] IVDA    MEDICATIONS:  MEDICATIONS  (STANDING):  aspirin enteric coated 81 milliGRAM(s) Oral daily  atorvastatin 40 milliGRAM(s) Oral at bedtime  chlorhexidine 2% Cloths 1 Application(s) Topical <User Schedule>  dextrose 40% Gel 15 Gram(s) Oral once  dextrose 5%. 1000 milliLiter(s) (50 mL/Hr) IV Continuous <Continuous>  dextrose 5%. 1000 milliLiter(s) (100 mL/Hr) IV Continuous <Continuous>  dextrose 50% Injectable 25 Gram(s) IV Push once  dextrose 50% Injectable 12.5 Gram(s) IV Push once  dextrose 50% Injectable 25 Gram(s) IV Push once  glucagon  Injectable 1 milliGRAM(s) IntraMuscular once  hydrALAZINE 50 milliGRAM(s) Oral three times a day  insulin glargine Injectable (LANTUS) 10 Unit(s) SubCutaneous every morning  insulin lispro (ADMELOG) corrective regimen sliding scale   SubCutaneous three times a day before meals  insulin lispro Injectable (ADMELOG) 4 Unit(s) SubCutaneous three times a day before meals  pantoprazole  Injectable 40 milliGRAM(s) IV Push two times a day    MEDICATIONS  (PRN):      Allergies    No Known Allergies    Intolerances        Vital Signs Last 24 Hrs  T(C): 37.4 (09 Jun 2021 16:00), Max: 37.6 (09 Jun 2021 08:00)  T(F): 99.4 (09 Jun 2021 16:00), Max: 99.6 (09 Jun 2021 08:00)  HR: 79 (09 Jun 2021 16:00) (75 - 95)  BP: 154/72 (09 Jun 2021 16:00) (103/69 - 163/80)  BP(mean): --  RR: 16 (09 Jun 2021 16:00) (16 - 16)  SpO2: 97% (09 Jun 2021 16:00) (95% - 98%)    06-08 @ 07:01  -  06-09 @ 07:00  --------------------------------------------------------  IN: 1020 mL / OUT: 1275 mL / NET: -255 mL    06-09 @ 07:01  -  06-09 @ 18:42  --------------------------------------------------------  IN: 290 mL / OUT: 1900 mL / NET: -1610 mL          PHYSICAL EXAM:    General: Well developed; well nourished; in no acute distress  HEENT: MMM, conjunctiva and sclera clear  H- RRR  L- CTA   Gastrointestinal: Soft, non-tender non-distended; Normal bowel sounds; No rebound or guarding  Extremities: Normal range of motion, No clubbing, cyanosis or edema  Neurological: Alert and oriented x3  Skin: Warm and dry. No obvious rash      LABS:                        8.8    8.10  )-----------( 178      ( 09 Jun 2021 06:01 )             27.1     09 Jun 2021 06:01    137    |  103    |  37.9   ----------------------------<  186    3.5     |  22.0   |  1.89     Ca    8.5        09 Jun 2021 06:01  Phos  3.0       09 Jun 2021 06:01  Mg     1.7       09 Jun 2021 06:01                RADIOLOGY & ADDITIONAL STUDIES:

## 2021-06-09 NOTE — PROGRESS NOTE ADULT - ATTENDING COMMENTS
pt seen and examined at bedside  Feels tired no other complaints, no Melanotic stools  Orthostatic   flomax, imdur and torsemide discontinued  repeat H&H in am, AC was discontinued. started on asa  possible downgrade to tele bed soon   appreciate GI and cardio recs

## 2021-06-09 NOTE — PROGRESS NOTE ADULT - ASSESSMENT
80 y.o M with a history of coronary artery disease, CABG, atrial fibrillation, diabetes, hypertension, chronic kidney disease, and BPH who presented with lightheadedness and near syncope found to be orthostatic with anemia. The patient was noted to have melena and packed red blood cells were transfused for acute blood loss anemia. The patient underwent colonoscopy with diverticulosis and internal hemorrhoids. 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     #Acute blood loss anemia / Gastrointestinal hemorrhage   - s/p 5 U [facility], last transfusion 6/8  - S/p colonoscopy and endoscopy, + bleeding duodenal diverticulum   - S/p IR Embolization    - Monitor H/H,- hgb this AM 8.8, uptrending from 7.1   - per patient no melena today  - C/w pantoprazole   - monitor off AC     #Atrial fibrillation  - high CHADS2 score- c/w ASA for now  - Seen in consult by EP for watchman work up - possibly outpatient - rate control for AFIb  - Cardiology recs appreciated   - Continue to monitor on tele    # Coronary artery disease  - On atorvastatin    #Chronic kidney disease   - Stable  - Baseline Cr appears to be around 2    #Hypertension   - Hydralazine 50 mg TID  - patient with orthostasis, imdur and torsemide held.  - will check bnp per cardio    #Diabetes   - Accu checks stable  - Continue current insulin regimen     #BPH   - On tamsulosin    DNR/DNI  PT eval - home with assistance     DISPO: hgb improving, will repeat in AM.

## 2021-06-10 ENCOUNTER — TRANSCRIPTION ENCOUNTER (OUTPATIENT)
Age: 81
End: 2021-06-10

## 2021-06-10 LAB
ANION GAP SERPL CALC-SCNC: 13 MMOL/L — SIGNIFICANT CHANGE UP (ref 5–17)
APTT BLD: 27.8 SEC — SIGNIFICANT CHANGE UP (ref 27.5–35.5)
APTT BLD: 35.2 SEC — SIGNIFICANT CHANGE UP (ref 27.5–35.5)
BUN SERPL-MCNC: 36.5 MG/DL — HIGH (ref 8–20)
CALCIUM SERPL-MCNC: 8.5 MG/DL — LOW (ref 8.6–10.2)
CHLORIDE SERPL-SCNC: 103 MMOL/L — SIGNIFICANT CHANGE UP (ref 98–107)
CO2 SERPL-SCNC: 22 MMOL/L — SIGNIFICANT CHANGE UP (ref 22–29)
CREAT SERPL-MCNC: 1.79 MG/DL — HIGH (ref 0.5–1.3)
GLUCOSE BLDC GLUCOMTR-MCNC: 173 MG/DL — HIGH (ref 70–99)
GLUCOSE BLDC GLUCOMTR-MCNC: 223 MG/DL — HIGH (ref 70–99)
GLUCOSE BLDC GLUCOMTR-MCNC: 270 MG/DL — HIGH (ref 70–99)
GLUCOSE BLDC GLUCOMTR-MCNC: 275 MG/DL — HIGH (ref 70–99)
GLUCOSE SERPL-MCNC: 157 MG/DL — HIGH (ref 70–99)
HCT VFR BLD CALC: 25 % — LOW (ref 39–50)
HCT VFR BLD CALC: 27.2 % — LOW (ref 39–50)
HGB BLD-MCNC: 7.9 G/DL — LOW (ref 13–17)
HGB BLD-MCNC: 9 G/DL — LOW (ref 13–17)
MCHC RBC-ENTMCNC: 29.2 PG — SIGNIFICANT CHANGE UP (ref 27–34)
MCHC RBC-ENTMCNC: 29.8 PG — SIGNIFICANT CHANGE UP (ref 27–34)
MCHC RBC-ENTMCNC: 31.6 GM/DL — LOW (ref 32–36)
MCHC RBC-ENTMCNC: 33.1 GM/DL — SIGNIFICANT CHANGE UP (ref 32–36)
MCV RBC AUTO: 90.1 FL — SIGNIFICANT CHANGE UP (ref 80–100)
MCV RBC AUTO: 92.3 FL — SIGNIFICANT CHANGE UP (ref 80–100)
NT-PROBNP SERPL-SCNC: HIGH PG/ML (ref 0–300)
PLATELET # BLD AUTO: 178 K/UL — SIGNIFICANT CHANGE UP (ref 150–400)
PLATELET # BLD AUTO: 204 K/UL — SIGNIFICANT CHANGE UP (ref 150–400)
POTASSIUM SERPL-MCNC: 4 MMOL/L — SIGNIFICANT CHANGE UP (ref 3.5–5.3)
POTASSIUM SERPL-SCNC: 4 MMOL/L — SIGNIFICANT CHANGE UP (ref 3.5–5.3)
RBC # BLD: 2.71 M/UL — LOW (ref 4.2–5.8)
RBC # BLD: 3.02 M/UL — LOW (ref 4.2–5.8)
RBC # FLD: 15.5 % — HIGH (ref 10.3–14.5)
RBC # FLD: 15.6 % — HIGH (ref 10.3–14.5)
SODIUM SERPL-SCNC: 138 MMOL/L — SIGNIFICANT CHANGE UP (ref 135–145)
WBC # BLD: 9.39 K/UL — SIGNIFICANT CHANGE UP (ref 3.8–10.5)
WBC # BLD: 9.53 K/UL — SIGNIFICANT CHANGE UP (ref 3.8–10.5)
WBC # FLD AUTO: 9.39 K/UL — SIGNIFICANT CHANGE UP (ref 3.8–10.5)
WBC # FLD AUTO: 9.53 K/UL — SIGNIFICANT CHANGE UP (ref 3.8–10.5)

## 2021-06-10 PROCEDURE — 99232 SBSQ HOSP IP/OBS MODERATE 35: CPT

## 2021-06-10 PROCEDURE — 99233 SBSQ HOSP IP/OBS HIGH 50: CPT

## 2021-06-10 RX ORDER — ACETAMINOPHEN 500 MG
650 TABLET ORAL EVERY 6 HOURS
Refills: 0 | Status: DISCONTINUED | OUTPATIENT
Start: 2021-06-10 | End: 2021-06-15

## 2021-06-10 RX ORDER — HEPARIN SODIUM 5000 [USP'U]/ML
INJECTION INTRAVENOUS; SUBCUTANEOUS
Qty: 25000 | Refills: 0 | Status: DISCONTINUED | OUTPATIENT
Start: 2021-06-10 | End: 2021-06-11

## 2021-06-10 RX ADMIN — Medication 6: at 17:03

## 2021-06-10 RX ADMIN — Medication 50 MILLIGRAM(S): at 17:00

## 2021-06-10 RX ADMIN — Medication 50 MILLIGRAM(S): at 05:27

## 2021-06-10 RX ADMIN — PANTOPRAZOLE SODIUM 40 MILLIGRAM(S): 20 TABLET, DELAYED RELEASE ORAL at 17:07

## 2021-06-10 RX ADMIN — INSULIN GLARGINE 10 UNIT(S): 100 INJECTION, SOLUTION SUBCUTANEOUS at 08:38

## 2021-06-10 RX ADMIN — Medication 2: at 12:16

## 2021-06-10 RX ADMIN — Medication 650 MILLIGRAM(S): at 08:59

## 2021-06-10 RX ADMIN — Medication 50 MILLIGRAM(S): at 22:29

## 2021-06-10 RX ADMIN — PANTOPRAZOLE SODIUM 40 MILLIGRAM(S): 20 TABLET, DELAYED RELEASE ORAL at 05:27

## 2021-06-10 RX ADMIN — HEPARIN SODIUM 1800 UNIT(S)/HR: 5000 INJECTION INTRAVENOUS; SUBCUTANEOUS at 16:57

## 2021-06-10 RX ADMIN — ATORVASTATIN CALCIUM 40 MILLIGRAM(S): 80 TABLET, FILM COATED ORAL at 22:29

## 2021-06-10 RX ADMIN — Medication 4: at 08:38

## 2021-06-10 RX ADMIN — Medication 650 MILLIGRAM(S): at 07:52

## 2021-06-10 RX ADMIN — Medication 4 UNIT(S): at 08:37

## 2021-06-10 RX ADMIN — Medication 4 UNIT(S): at 17:03

## 2021-06-10 RX ADMIN — Medication 4 UNIT(S): at 12:15

## 2021-06-10 RX ADMIN — CHLORHEXIDINE GLUCONATE 1 APPLICATION(S): 213 SOLUTION TOPICAL at 05:25

## 2021-06-10 NOTE — PROGRESS NOTE ADULT - SUBJECTIVE AND OBJECTIVE BOX
Patient seen today in bed. Discussed future plans with regards to anticoagulation and/or Watchman device insertion. All questions answered.     TELE: Currently SR in the 70s.     MEDICATIONS  (STANDING):  atorvastatin 40 milliGRAM(s) Oral at bedtime  chlorhexidine 2% Cloths 1 Application(s) Topical <User Schedule>  dextrose 40% Gel 15 Gram(s) Oral once  dextrose 5%. 1000 milliLiter(s) (50 mL/Hr) IV Continuous <Continuous>  dextrose 5%. 1000 milliLiter(s) (100 mL/Hr) IV Continuous <Continuous>  dextrose 50% Injectable 25 Gram(s) IV Push once  dextrose 50% Injectable 12.5 Gram(s) IV Push once  dextrose 50% Injectable 25 Gram(s) IV Push once  glucagon  Injectable 1 milliGRAM(s) IntraMuscular once  heparin  Infusion.  Unit(s)/Hr (18 mL/Hr) IV Continuous <Continuous>  hydrALAZINE 50 milliGRAM(s) Oral three times a day  insulin glargine Injectable (LANTUS) 10 Unit(s) SubCutaneous every morning  insulin lispro (ADMELOG) corrective regimen sliding scale   SubCutaneous three times a day before meals  insulin lispro Injectable (ADMELOG) 4 Unit(s) SubCutaneous three times a day before meals  pantoprazole  Injectable 40 milliGRAM(s) IV Push two times a day    MEDICATIONS  (PRN):  acetaminophen   Tablet .. 650 milliGRAM(s) Oral every 6 hours PRN Mild Pain (1 - 3)    Allergies  No Known Allergies    PAST MEDICAL & SURGICAL HISTORY:  Coronary artery disease involving native heart, angina presence unspecified, unspecified vessel or lesion type  nuc stress 5/2021 negative  Atrial fibrillation, unspecified type  Congestive heart failure, unspecified HF chronicity, unspecified heart failure type  EF 35-40%  Bell&#x27;s palsy  Right side of face  Essential hypertension  Type 2 diabetes mellitus without complication, unspecified whether long term insulin use  insulin plus orals  Osteomyelitis of sternum  Chronic kidney disease, unspecified CKD stage  stage III. Recent BL cr 2.0-2.5  Anemia secondary to renal failure  together with iron deficiency  GIB (gastrointestinal bleeding)  h/o GIB 2019 in [place].  told hemorrhoidal  S/P CABG (coronary artery bypass graft)  complicated by osteomyleitis of the sternum and sepsis; sternum removed    Vital Signs Last 24 Hrs  T(C): 37.2 (10 Eddie 2021 08:00), Max: 37.4 (09 Jun 2021 16:00)  T(F): 98.9 (10 Eddie 2021 08:00), Max: 99.4 (09 Jun 2021 16:00)  HR: 86 (10 Eddie 2021 08:00) (74 - 95)  BP: 92/76 (10 Eddie 2021 08:00) (92/76 - 164/80)  RR: 16 (10 Eddie 2021 08:00) (16 - 16)  SpO2: 100% (10 Eddie 2021 08:00) (95% - 100%)    Physical Exam:  Constitutional: NAD, AAOx3  Cardiovascular: +S1S2 RRR  Pulmonary: CTA b/l, unlabored  GI: soft NTND +BS  Extremities: no pedal edema,   Neuro: non focal, HONG x4    LABS:                        9.0    9.53  )-----------( 204      ( 10 Eddie 2021 06:22 )             27.2     138  |  103  |  36.5<H>  ----------------------------<  157<H>  4.0   |  22.0  |  1.79<H>  Ca    8.5<L>      10 Eddie 2021 06:22  Phos  3.0     06-09  Mg     1.7     06-09  PTT - ( 08 Jun 2021 15:36 )  PTT:28.3 sec    A/P  80 year old male patient with CAD s/p CABG c/b sepsis and sternal wound infection s/p sternum removal and pec flap and angioplasty, chronic HFrEF- (EF 35-40% on TTE and 41% on nuclear stress test, and 30-35% on MARLO), AFL/AFIB, s/p MARLO-DCCV on 5/11, CHADS-VASc = 6 on Eliquis, HTN, DM2, CKD, and Bell's Palsy, AFib, AFL, dizziness, bifascicular block and cardiomyopathy back in 5/2021. He underwent a MARLO-DCCV and ILR on 5/11/2021 admitted with an acute anemia secondary to GIB. Found to have acute bleeding from duodenal diverticulum s/p coil embolization.     Today he reports he had a brown stool. Yesterday was black he reports. Discussed with Dr. Duke    - Plan to re-challenge with AC today. Would start Heparin (no bolus) and monitor H/H  - If tolerated then Watchman either this Monday or as an outpatient  - Will follow closely with you.

## 2021-06-10 NOTE — PROGRESS NOTE ADULT - SUBJECTIVE AND OBJECTIVE BOX
CHU LOGAN  80y  Male    Interval/overnight events/pt complaints:  The patient seen and examine this morning, patient has no complains. The patient has no change in BM and the stool color is brown. This morning his Hgb is 9.  He denies any chest pain, SOB, or dizziness.    MEDICATIONS  (STANDING):  aspirin enteric coated 81 milliGRAM(s) Oral daily  atorvastatin 40 milliGRAM(s) Oral at bedtime  chlorhexidine 2% Cloths 1 Application(s) Topical <User Schedule>  dextrose 40% Gel 15 Gram(s) Oral once  dextrose 5%. 1000 milliLiter(s) (50 mL/Hr) IV Continuous <Continuous>  dextrose 5%. 1000 milliLiter(s) (100 mL/Hr) IV Continuous <Continuous>  dextrose 50% Injectable 25 Gram(s) IV Push once  dextrose 50% Injectable 12.5 Gram(s) IV Push once  dextrose 50% Injectable 25 Gram(s) IV Push once  glucagon  Injectable 1 milliGRAM(s) IntraMuscular once  hydrALAZINE 50 milliGRAM(s) Oral three times a day  insulin glargine Injectable (LANTUS) 10 Unit(s) SubCutaneous every morning  insulin lispro (ADMELOG) corrective regimen sliding scale   SubCutaneous three times a day before meals  insulin lispro Injectable (ADMELOG) 4 Unit(s) SubCutaneous three times a day before meals  pantoprazole  Injectable 40 milliGRAM(s) IV Push two times a day    MEDICATIONS  (PRN):  acetaminophen   Tablet .. 650 milliGRAM(s) Oral every 6 hours PRN Mild Pain (1 - 3)    AllergiesNo Known Allergies      Vital Signs Last 24 Hrs  Vital Signs Last 24 Hrs  T(C): 37.2 (10 Eddie 2021 08:00), Max: 37.4 (09 Jun 2021 16:00)  T(F): 98.9 (10 Eddie 2021 08:00), Max: 99.4 (09 Jun 2021 16:00)  HR: 86 (10 Eddie 2021 08:00) (74 - 95)  BP: 92/76 (10 Eddie 2021 08:00) (92/76 - 164/80)  RR: 16 (10 Eddie 2021 08:00) (16 - 16)  SpO2: 100% (10 Eddie 2021 08:00) (95% - 100%)      PHYSICAL EXAM:  GENERAL: NAD, sitting OOB in chair  NECK: Supple, No JVD  CHEST/LUNG: Clear to auscultation bilaterally; No rales, rhonchi, wheezing  HEART: Regular rate and rhythm; No murmurs, rubs, or gallops  ABDOMEN: Soft, Nontender  EXTREMITIES: No clubbing, cyanosis, edema, calf tenderness  NEURO:  A&O x 3, no sensory or motor deficit.  SKIN: No rashes or lesions, warm, dry    CAPILLARY BLOOD GLUCOSE    POCT Blood Glucose.: 223 mg/dL (10 Eddie 2021 08:01)      LABS    (06-10 @ 06:22)                      9.0  9.53 )-----------( 204                 27.2      06-10    138  |  103  |  36.5<H>  ----------------------------<  157<H>  4.0   |  22.0  |  1.79<H>    Ca    8.5<L>      10 Eddie 2021 06:22  Phos  3.0     06-09  Mg     1.7     06-09      Consultant(s) Notes Reviewed:  [X] YES  [ ] NO

## 2021-06-10 NOTE — PROGRESS NOTE ADULT - SUBJECTIVE AND OBJECTIVE BOX
[facility]                                                               Office: [address], [place] [address]                                                              Telephone: [phone]. Fax:[phone]                                                                             PROGRESS NOTE  Reason for follow up: GIB/anemia  Overnight: No new events.   Update: 6/10. Pt denies chest pain, palpitations, SOB. Tele- NSR HR @ 80-90s, with PVCs, PACs, BBB. Torsemide currently DC'd for recurrence of orthostatic symptoms. If signs of HF occur consider low dose Lasix. GI/EP approve of restarting heparin gtt, Hg currently stable at 9.      Subjective: No new events    	  Vitals:  T(C): 37.2 (06-10-21 @ 16:00), Max: 37.2 (06-09-21 @ 21:07)  HR: 78 (06-10-21 @ 16:00) (74 - 87)  BP: 156/71 (06-10-21 @ 16:00) (92/76 - 164/80)  RR: 16 (06-10-21 @ 16:00) (16 - 16)  SpO2: 100% (06-10-21 @ 16:00) (97% - 100%)    I&O's Summary    09 Jun 2021 07:01  -  10 Eddie 2021 07:00  --------------------------------------------------------  IN: 770 mL / OUT: 2900 mL / NET: -2130 mL    10 Eddie 2021 07:01  -  10 Eddie 2021 18:18  --------------------------------------------------------  IN: 636 mL / OUT: 300 mL / NET: 336 mL            PHYSICAL EXAM:  Appearance: Comfortable. No acute distress  HEENT:  Head and neck: Atraumatic. Normocephalic.  Normal oral mucosa, PERRL, Neck is supple. No JVD, No carotid bruit.   Neurologic: A & O x 3, no focal deficits. EOMI.  Lymphatic: No cervical lymphadenopathy  Cardiovascular: Normal S1 S2, No murmur, rubs/gallops. No JVD, No edema  Respiratory: Lungs clear to auscultation  Gastrointestinal:  Soft, Non-tender, + BS  Lower Extremities: No edema  Psychiatry: Patient is calm. No agitation. Mood & affect appropriate  Skin: No rashes/ ecchymoses/cyanosis/ulcers visualized on the face, hands or feet.      CURRENT MEDICATIONS:  hydrALAZINE 50 milliGRAM(s) Oral three times a day  pantoprazole  Injectable  atorvastatin  dextrose 40% Gel  dextrose 50% Injectable  dextrose 50% Injectable  dextrose 50% Injectable  glucagon  Injectable  insulin glargine Injectable (LANTUS)  insulin lispro (ADMELOG) corrective regimen sliding scale  insulin lispro Injectable (ADMELOG)  chlorhexidine 2% Cloths  dextrose 5%.  dextrose 5%.  heparin  Infusion.      DIAGNOSTIC TESTING:    [ ] Echocardiogram: < from: MARLO Echo Doppler (05.11.21 @ 16:06) >  Summary:   1. Left ventricular ejection fraction, by visual estimation, is 30 to 35%.   2. Moderately decreased global left ventricular systolic function.   3. Severely enlarged right atrium.   4. Severely enlarged right ventricle.   5. Severely reduced RV systolic function.   6. Mild to moderately enlarged left atrium.   7. There is no evidence of pericardial effusion.   8. Mild mitral annular calcification.   9. Mild mitral valve regurgitation.  10. Moderate-severe tricuspid regurgitation.  11. Color flow doppler and intravenous injection of agitated saline demonstrates the presence of an intact intra atrial septum.  12. No left atrial appendage thrombus and decreased left atrial appendage velocities.           ] Stress Test:  < from: Nuclear Stress Test-Pharmacologic (05.10.21 @ 07:59) >  IMPRESSIONS:  * Myocardial Perfusion SPECT results are abnormal.  * There is a medium sized, moderate defect in basal to mid  inferior wall that is fixed, suggestive of infarct. No  evidence of ischemia.  * Post-stress resting myocardial perfusion gated SPECT  imaging was performed (LVEF = 41 %;LVEDV = 141 ml.)  * Dilated right ventricle noted.        LABS:	 	  CARDIAC MARKERS ( 10 Eddie 2021 06:22 )  x     / x     / x     / x     / x      p-BNP 10 Eddie 2021 06:22: 16779 pg/mL                          9.0    9.53  )-----------( 204      ( 10 Eddie 2021 06:22 )             27.2     06-10    138  |  103  |  36.5<H>  ----------------------------<  157<H>  4.0   |  22.0  |  1.79<H>    Ca    8.5<L>      10 Eddie 2021 06:22  Phos  3.0     06-09  Mg     1.7     06-09      proBNP: Serum Pro-Brain Natriuretic Peptide: 72036 pg/mL (06-10 @ 06:22)      TELEMETRY: NSR HR @ 80-90s, with PVCs, PACs, BBB

## 2021-06-10 NOTE — DISCHARGE NOTE NURSING/CASE MANAGEMENT/SOCIAL WORK - PATIENT PORTAL LINK FT
You can access the FollowMyHealth Patient Portal offered by [facility] by registering at the following website: [email or web address]. By joining [facility]’s FollowMyHealth portal, you will also be able to view your health information using other applications (apps) compatible with our system.

## 2021-06-10 NOTE — PROGRESS NOTE ADULT - ASSESSMENT
80 y.o M with a history of coronary artery disease, CABG, atrial fibrillation, diabetes, hypertension, chronic kidney disease, and BPH who presented with lightheadedness and near syncope found to be orthostatic with anemia. The patient was noted to have melena and packed red blood cells were transfused for acute blood loss anemia. The patient underwent colonoscopy with diverticulosis and internal hemorrhoids. 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     #Acute blood loss anemia / Gastrointestinal hemorrhage   - s/p 5 U PBRC, last transfusion 6/8  - S/p colonoscopy and endoscopy, + bleeding duodenal diverticulum   - S/p IR Embolization    - Monitor H/H,- hgb this AM 9  - Per patient no melena today  - C/w pantoprazole   -  currently off AC. Per GI if the patient requires AC, may give a trial of IV heparin without bolus.        #Atrial fibrillation  - High CHADS2 score- c/w ASA for now  - Seen in consult by EP for watchman work up - possibly outpatient - rate control for AFIb  - Cardiology recs appreciated   - GI ok with AC. cardio restarted IV heparin     # HFrEF  - Echo done on 05/11/21 and EF is 30-35%.  - BNP is 19986.  -  per cardio may start with low dose of diuretic, awaiting rec    # Coronary artery disease  - On atorvastatin    #Chronic kidney disease   - Stable  - Baseline Cr appears to be around 2    #Hypertension   - still orthostatic, continue to monitor BP   - Hydralazine 50 mg TID  -  imdur and tamsulosin on hold.      #Diabetes   - Accu checks stable  - Continue current insulin regimen     #BPH   - Hold tamsulosin due to orthostasis     DNR/DNI  PT eval - home with assistance     DISPO: Hgb uptrending and clinically improving. Patient started back on AC,  will monitor patient if able to tolerate AC for eventual elective watchman. 80 y.o M with a history of coronary artery disease, CABG, atrial fibrillation, diabetes, hypertension, chronic kidney disease, and BPH who presented with lightheadedness and near syncope found to be orthostatic with anemia. The patient was noted to have melena and packed red blood cells were transfused for acute blood loss anemia. The patient underwent colonoscopy with diverticulosis and internal hemorrhoids. 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     #Acute blood loss anemia / Gastrointestinal hemorrhage   - s/p 5 U PBRC, last transfusion 6/8  - S/p colonoscopy and endoscopy, + bleeding duodenal diverticulum   - S/p IR Embolization    - Monitor H/H,- hgb this AM 9  - Per patient no melena today  - C/w pantoprazole   -  currently off AC. Per GI if the patient requires AC, may give a trial of IV heparin without bolus.        #Atrial fibrillation  - High CHADS2 score- c/w ASA for now  - Seen in consult by EP for watchman work up - possibly outpatient - rate control for AFIb  - Cardiology recs appreciated   - GI ok with AC. cardio restarted IV heparin     # HFrEF  - Echo done on 05/11/21 and EF is 30-35%.  - BNP is 19986.  -  per cardio may start with low dose of diuretic, awaiting rec    # Coronary artery disease  - On atorvastatin    #Chronic kidney disease   - Stable  - Baseline Cr appears to be around 2    #Hypertension   - still orthostatic, continue to monitor BP   - Hydralazine 50 mg TID  -  imdur and tamsulosin on hold.      #Diabetes   - Accu checks stable  - Continue current insulin regimen     #BPH   - Hold tamsulosin due to orthostasis     DNR/DNI  PT eval - home with assistance     DISPO: Hgb uptrending and clinically improving. Patient started back on AC.  per EP, if tolerating AC possible watchman on 6/14 vs. outpatient 80 y.o M with a history of coronary artery disease, CABG, atrial fibrillation, diabetes, hypertension, chronic kidney disease, and BPH who presented with lightheadedness and near syncope found to be orthostatic with anemia. The patient was noted to have melena and packed red blood cells were transfused for acute blood loss anemia. The patient underwent colonoscopy with diverticulosis and internal hemorrhoids. 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 Required ICU stay, had IR embolization. Was on AC for his Afib - with heparin however H&H dropped initially, hence Ac was held.  H&H stabilized. was decided per EP, CArdio and GI to resume heparin and monitor 48hrs and consider  for watchman procedure for monday    #Acute blood loss anemia / Gastrointestinal hemorrhage   - s/p 5 U PBRC, last transfusion 6/8  - S/p colonoscopy and endoscopy, + bleeding duodenal diverticulum   - S/p IR Embolization  6/5/21  - Monitor H/H,- hgb this AM 9  - Per patient no melena today  - C/w pantoprazole   -  currently off AC. Per GI if the patient requires AC, may give a trial of IV heparin without bolus.        #Atrial fibrillation  - High CHADS2 score- c/w ASA for now  - Seen in consult by EP for watchman  - Cardiology recs appreciated   - GI ok with AC. cardio restarted IV heparin     # HFrEF  - Echo done on 05/11/21 and EF is 30-35%.  - BNP is 19986.  -  per cardio may start with low dose of diuretic, awaiting rec    # Coronary artery disease  - On atorvastatin    #Chronic kidney disease   - Stable  - Baseline Cr appears to be around 2    #Hypertension   - still orthostatic, continue to monitor BP   - Hydralazine 50 mg TID  -  imdur and tamsulosin on hold.      #Diabetes   - Accu checks stable  - Continue current insulin regimen     #BPH   - Hold tamsulosin due to orthostasis     DNR/DNI  PT eval - home with assistance     DISPO: Patient started back on AC.  monitor H&H, per EP, if tolerating AC possible watchman on 6/14 vs. outpatient

## 2021-06-10 NOTE — PROGRESS NOTE ADULT - ATTENDING COMMENTS
-rechallenging with IV heparin to see if can tolerate AC for eventual Watchman, otherwise will have to do it off-label with antiplatelets agent  -elevated pBNP but appears euvolemic on exam, monitor off diuretic for another day, consider resume PO torsemide 20mg tomorrow if no significant orthostasis, off tamsulosin, tolerate supine -160s for now        Gamal Duke DO, [facility]  Faculty Non-Invasive Cardiologist  [phone]

## 2021-06-10 NOTE — PROGRESS NOTE ADULT - ATTENDING COMMENTS
pt seen and examined at bedside   pt feels better   no more Bloody BMs   H&H stable off AC   decision per cardio, EP and GI to resume AC today and monitor for bleed   consideration for watchman on monday.   monitor H&H   Downgrade to tele bed

## 2021-06-10 NOTE — DISCHARGE NOTE NURSING/CASE MANAGEMENT/SOCIAL WORK - NSDCFUADDAPPT_GEN_ALL_CORE_FT
SPOUSE DECLINED [facility] MEDS TO BED  PH[facility]ACY:  [facility]  THE PT. DOES NOT HAVE ANY DIFFICULTIES IN OBTAINING OR TAKING HIS MEDICATIONS    CARDIOLOGY FOLLOW UP APPOINTMENT SCHEDULED WITH  DR. SPEARS ([phone])  ON 6/15/2021 AT 11:45 AM   PLEASE BRING YOUR D/C PAPERWORK TO YOUR APPOINTMENT    Cardiology follow-up appointment scheduled with Dr. Spears [phone] 6/15/21 11:45 [address] [facility].        SPOUSE DECLINED [facility]S TO BED  PHARMACY:  [facility]  THE PT. DOES NOT HAVE ANY DIFFICULTIES IN OBTAINING OR TAKING HIS MEDICATIONS    CARDIOLOGY FOLLOW UP APPOINTMENT SCHEDULED WITH  DR. SPEARS ([phone])  ON 6/15/2021 AT 11:45 AM   [address] [place] [id number]  PLEASE BRING YOUR D/C PAPERWORK TO YOUR APPOINTMENT

## 2021-06-10 NOTE — PROGRESS NOTE ADULT - ASSESSMENT
80M h/o CAD s/p CABG c/b sepsis and sternal wound infection s/p sternum removal and pec flap and angioplasty, chronic HFrEF, p Afib (on Eliquis), HTN, DM2, and Bell's Palsy recently moved to [place] for few months from [place] presented to [facility] on 5/9/21 with complaints of back pain and dizziness after a mechanical fall also noted for the last few months he has been having continued shortness of breath and leg swelling, and was recently started on Lasix in addition to metolazone by his cardiologist in [place], repeat CT head no cerebral bleed, admitted for ADHF/bi-V faiure and KEN on CKD (Cr. 2.4), TTE with LV EF 35-40%, underwent IV diuresis, initial noted pBNP 50540 ---> 66266, pharm nuclear stress test without ischemia, RHC done with moderate pHTN Group 2 with PCWP 23, switched to torsemide 40mg, underwent MARLO/CV for pAflutter but reverted back to pAfib, metoprolol dose reduced to 50mg BID and had ILR implanted, had L-elbow laceration wound with bleeding due mechanical fall in the toilet, discharged home on 5/14/21, patient has had recurrent episodes of dizziness and had his medications further adjusted. Patient states that this morning after he went to the bathroom he was feeling very dizzy, hot, and with leg tingling like he might pass out. Patient states that he has never felt like he was fully going to pass out until today, and he has been feeling very dizzy every time he stands. Patient also notes that he has been having some dark stools, but states that he thought it was from his iron. Patient was noted to be orthostatic upon arrival to the ER with a hemoglobin of 6.7. transfused still significantly orthostatic    6/10. Pt denies chest pain, palpitations, SOB. Tele- NSR HR @ 80-90s, with PVCs, PACs, BBB. Torsemide currently DC'd for recurrence of orthostatic symptoms. If signs of HF occur consider low dose Lasix. GI/EP approve of restarting heparin gtt, Hg currently stable at 9.      GI BLEED WITH ORTHOSTATIC HYPOTENSION   s/p Duodenal Tic embolized by IR  Would d/c torsemide and observe closely for heart failure  Hgb is 9, currently stable  No melana no abd pain  GI/EP approve of restarting heparin gtt     HFrEF    BNP-19,986  euvolemic on exam  Hold torsemide due to profound orthostatics  If signs of HF occur consider low dose Lasix   I&O    ATRIAL FIB  GI/EP approve of restarting heparin gtt

## 2021-06-10 NOTE — PROGRESS NOTE ADULT - SUBJECTIVE AND OBJECTIVE BOX
Chief Complaint: This is a 80y old man patient being seen in follow-up consultation for anemia.    HPI / 24H events:  Patient denies any melena or rectal bleeding.  Has no GI complaints at this time.  Tolerating diet without issue.    ROS: A 14-point review of systems was reviewed and was otherwise negative save what was reported in the HPI.    PAST MEDICAL/SURGICAL HISTORY:  Coronary artery disease involving native heart, angina presence unspecified, unspecified vessel or lesion type  nuc stress 5/2021 negative    Atrial fibrillation, unspecified type    Congestive heart failure, unspecified HF chronicity, unspecified heart failure type  EF 35-40%    Bell&#x27;s palsy  Right side of face    Essential hypertension    Type 2 diabetes mellitus without complication, unspecified whether long term insulin use  insulin plus orals    Osteomyelitis of sternum    Chronic kidney disease, unspecified CKD stage  stage III. Recent BL cr 2.0-2.5    Anemia secondary to renal failure  together with iron deficiency    GIB (gastrointestinal bleeding)  h/o GIB 2019 in [place].  told hemorrhoidal    S/P CABG (coronary artery bypass graft)  complicated by osteomyleitis of the sternum and sepsis; sternum removed      MEDICATIONS  (STANDING):  atorvastatin 40 milliGRAM(s) Oral at bedtime  chlorhexidine 2% Cloths 1 Application(s) Topical <User Schedule>  dextrose 40% Gel 15 Gram(s) Oral once  dextrose 5%. 1000 milliLiter(s) (50 mL/Hr) IV Continuous <Continuous>  dextrose 5%. 1000 milliLiter(s) (100 mL/Hr) IV Continuous <Continuous>  dextrose 50% Injectable 25 Gram(s) IV Push once  dextrose 50% Injectable 12.5 Gram(s) IV Push once  dextrose 50% Injectable 25 Gram(s) IV Push once  glucagon  Injectable 1 milliGRAM(s) IntraMuscular once  heparin  Infusion.  Unit(s)/Hr (18 mL/Hr) IV Continuous <Continuous>  hydrALAZINE 50 milliGRAM(s) Oral three times a day  insulin glargine Injectable (LANTUS) 10 Unit(s) SubCutaneous every morning  insulin lispro (ADMELOG) corrective regimen sliding scale   SubCutaneous three times a day before meals  insulin lispro Injectable (ADMELOG) 4 Unit(s) SubCutaneous three times a day before meals  pantoprazole  Injectable 40 milliGRAM(s) IV Push two times a day    MEDICATIONS  (PRN):  acetaminophen   Tablet .. 650 milliGRAM(s) Oral every 6 hours PRN Mild Pain (1 - 3)    No Known Allergies    T(C): 37.2 (06-10-21 @ 08:00), Max: 37.2 (06-09-21 @ 21:07)  HR: 83 (06-10-21 @ 12:00) (74 - 87)  BP: 134/58 (06-10-21 @ 12:00) (92/76 - 164/80)  RR: 16 (06-10-21 @ 12:00) (16 - 16)  SpO2: 100% (06-10-21 @ 12:00) (97% - 100%)    I&O's Summary    09 Jun 2021 07:01  -  10 Eddie 2021 07:00  --------------------------------------------------------  IN: 770 mL / OUT: 2900 mL / NET: -2130 mL    10 Eddie 2021 07:01  -  10 Eddie 2021 16:06  --------------------------------------------------------  IN: 600 mL / OUT: 300 mL / NET: 300 mL      PHYSICAL EXAM:  Constitutional: Well-developed, well-nourished, in no apparent distress  Eyes: Sclerae anicteric, conjunctivae normal  ENMT: Mucus membranes moist, no oropharyngeal thrush noted  Neck: No thyroid nodules appreciated, no significant cervical or supraclavicular lymphadenopathy  Respiratory: Breathing nonlabored; clear to auscultation  Cardiovascular: Regular rate and rhythm  Gastrointestinal: Soft, nontender, nondistended, normoactive bowel sounds; no hepatosplenomegaly appreciated; no rebound tenderness or involuntary guarding  Extremities: No clubbing, cyanosis or edema  Neurological: Alert and oriented to person, place and time; no asterixis  Skin: No jaundice  Lymph Nodes: No significant lymphadenopathy  Musculoskeletal: No significant peripheral atrophy  Psychiatric: Affect and mood appropriate      LABS:               9.0    9.53  )-----------( 204      ( 06-10 @ 06:22 )             27.2                8.8    8.10  )-----------( 178      ( 06-09 @ 06:01 )             27.1                7.1    7.50  )-----------( 153      ( 06-08 @ 06:20 )             22.5       06-10    138  |  103  |  36.5<H>  ----------------------------<  157<H>  4.0   |  22.0  |  1.79<H>    Ca    8.5<L>      10 Jun 2021 06:22  Phos  3.0     06-09  Mg     1.7     06-09

## 2021-06-11 LAB
ANION GAP SERPL CALC-SCNC: 15 MMOL/L — SIGNIFICANT CHANGE UP (ref 5–17)
APTT BLD: 41.9 SEC — HIGH (ref 27.5–35.5)
APTT BLD: 46 SEC — HIGH (ref 27.5–35.5)
BUN SERPL-MCNC: 36.5 MG/DL — HIGH (ref 8–20)
CALCIUM SERPL-MCNC: 8.9 MG/DL — SIGNIFICANT CHANGE UP (ref 8.6–10.2)
CHLORIDE SERPL-SCNC: 99 MMOL/L — SIGNIFICANT CHANGE UP (ref 98–107)
CO2 SERPL-SCNC: 21 MMOL/L — LOW (ref 22–29)
CREAT SERPL-MCNC: 1.9 MG/DL — HIGH (ref 0.5–1.3)
GLUCOSE BLDC GLUCOMTR-MCNC: 156 MG/DL — HIGH (ref 70–99)
GLUCOSE BLDC GLUCOMTR-MCNC: 207 MG/DL — HIGH (ref 70–99)
GLUCOSE BLDC GLUCOMTR-MCNC: 250 MG/DL — HIGH (ref 70–99)
GLUCOSE BLDC GLUCOMTR-MCNC: 301 MG/DL — HIGH (ref 70–99)
GLUCOSE SERPL-MCNC: 176 MG/DL — HIGH (ref 70–99)
HCT VFR BLD CALC: 27 % — LOW (ref 39–50)
HCT VFR BLD CALC: 29.1 % — LOW (ref 39–50)
HGB BLD-MCNC: 8.8 G/DL — LOW (ref 13–17)
HGB BLD-MCNC: 9.2 G/DL — LOW (ref 13–17)
INR BLD: 1.1 RATIO — SIGNIFICANT CHANGE UP (ref 0.88–1.16)
MCHC RBC-ENTMCNC: 29.1 PG — SIGNIFICANT CHANGE UP (ref 27–34)
MCHC RBC-ENTMCNC: 29.3 PG — SIGNIFICANT CHANGE UP (ref 27–34)
MCHC RBC-ENTMCNC: 31.6 GM/DL — LOW (ref 32–36)
MCHC RBC-ENTMCNC: 32.6 GM/DL — SIGNIFICANT CHANGE UP (ref 32–36)
MCV RBC AUTO: 90 FL — SIGNIFICANT CHANGE UP (ref 80–100)
MCV RBC AUTO: 92.1 FL — SIGNIFICANT CHANGE UP (ref 80–100)
PLATELET # BLD AUTO: 211 K/UL — SIGNIFICANT CHANGE UP (ref 150–400)
PLATELET # BLD AUTO: 265 K/UL — SIGNIFICANT CHANGE UP (ref 150–400)
POTASSIUM SERPL-MCNC: 4 MMOL/L — SIGNIFICANT CHANGE UP (ref 3.5–5.3)
POTASSIUM SERPL-SCNC: 4 MMOL/L — SIGNIFICANT CHANGE UP (ref 3.5–5.3)
PROTHROM AB SERPL-ACNC: 12.7 SEC — SIGNIFICANT CHANGE UP (ref 10.6–13.6)
RBC # BLD: 3 M/UL — LOW (ref 4.2–5.8)
RBC # BLD: 3.16 M/UL — LOW (ref 4.2–5.8)
RBC # FLD: 15.1 % — HIGH (ref 10.3–14.5)
RBC # FLD: 15.1 % — HIGH (ref 10.3–14.5)
SARS-COV-2 RNA SPEC QL NAA+PROBE: SIGNIFICANT CHANGE UP
SODIUM SERPL-SCNC: 135 MMOL/L — SIGNIFICANT CHANGE UP (ref 135–145)
URATE SERPL-MCNC: 9.6 MG/DL — HIGH (ref 3.4–7)
WBC # BLD: 11.49 K/UL — HIGH (ref 3.8–10.5)
WBC # BLD: 8.66 K/UL — SIGNIFICANT CHANGE UP (ref 3.8–10.5)
WBC # FLD AUTO: 11.49 K/UL — HIGH (ref 3.8–10.5)
WBC # FLD AUTO: 8.66 K/UL — SIGNIFICANT CHANGE UP (ref 3.8–10.5)

## 2021-06-11 PROCEDURE — 99233 SBSQ HOSP IP/OBS HIGH 50: CPT

## 2021-06-11 PROCEDURE — 99232 SBSQ HOSP IP/OBS MODERATE 35: CPT

## 2021-06-11 RX ORDER — HEPARIN SODIUM 5000 [USP'U]/ML
8000 INJECTION INTRAVENOUS; SUBCUTANEOUS EVERY 6 HOURS
Refills: 0 | Status: DISCONTINUED | OUTPATIENT
Start: 2021-06-11 | End: 2021-06-13

## 2021-06-11 RX ORDER — HEPARIN SODIUM 5000 [USP'U]/ML
INJECTION INTRAVENOUS; SUBCUTANEOUS
Qty: 25000 | Refills: 0 | Status: DISCONTINUED | OUTPATIENT
Start: 2021-06-11 | End: 2021-06-13

## 2021-06-11 RX ORDER — METOPROLOL TARTRATE 50 MG
25 TABLET ORAL DAILY
Refills: 0 | Status: DISCONTINUED | OUTPATIENT
Start: 2021-06-11 | End: 2021-06-15

## 2021-06-11 RX ORDER — HEPARIN SODIUM 5000 [USP'U]/ML
4000 INJECTION INTRAVENOUS; SUBCUTANEOUS EVERY 6 HOURS
Refills: 0 | Status: DISCONTINUED | OUTPATIENT
Start: 2021-06-11 | End: 2021-06-13

## 2021-06-11 RX ADMIN — Medication 650 MILLIGRAM(S): at 08:26

## 2021-06-11 RX ADMIN — PANTOPRAZOLE SODIUM 40 MILLIGRAM(S): 20 TABLET, DELAYED RELEASE ORAL at 18:24

## 2021-06-11 RX ADMIN — Medication 50 MILLIGRAM(S): at 22:54

## 2021-06-11 RX ADMIN — Medication 650 MILLIGRAM(S): at 22:54

## 2021-06-11 RX ADMIN — HEPARIN SODIUM 4000 UNIT(S): 5000 INJECTION INTRAVENOUS; SUBCUTANEOUS at 19:44

## 2021-06-11 RX ADMIN — CHLORHEXIDINE GLUCONATE 1 APPLICATION(S): 213 SOLUTION TOPICAL at 06:18

## 2021-06-11 RX ADMIN — HEPARIN SODIUM 2200 UNIT(S)/HR: 5000 INJECTION INTRAVENOUS; SUBCUTANEOUS at 00:15

## 2021-06-11 RX ADMIN — Medication 650 MILLIGRAM(S): at 07:58

## 2021-06-11 RX ADMIN — HEPARIN SODIUM 2000 UNIT(S)/HR: 5000 INJECTION INTRAVENOUS; SUBCUTANEOUS at 19:39

## 2021-06-11 RX ADMIN — Medication 4 UNIT(S): at 17:52

## 2021-06-11 RX ADMIN — Medication 4: at 12:16

## 2021-06-11 RX ADMIN — HEPARIN SODIUM 1800 UNIT(S)/HR: 5000 INJECTION INTRAVENOUS; SUBCUTANEOUS at 11:10

## 2021-06-11 RX ADMIN — Medication 650 MILLIGRAM(S): at 23:24

## 2021-06-11 RX ADMIN — ATORVASTATIN CALCIUM 40 MILLIGRAM(S): 80 TABLET, FILM COATED ORAL at 22:54

## 2021-06-11 RX ADMIN — Medication 50 MILLIGRAM(S): at 13:22

## 2021-06-11 RX ADMIN — Medication 4 UNIT(S): at 12:17

## 2021-06-11 RX ADMIN — PANTOPRAZOLE SODIUM 40 MILLIGRAM(S): 20 TABLET, DELAYED RELEASE ORAL at 06:19

## 2021-06-11 RX ADMIN — Medication 2: at 17:52

## 2021-06-11 RX ADMIN — Medication 50 MILLIGRAM(S): at 06:19

## 2021-06-11 NOTE — PROGRESS NOTE ADULT - SUBJECTIVE AND OBJECTIVE BOX
Patient seen today resting comfortably in bed. No overnight complaints. Repeat CBC today reveals stable H/H. EGD tentatively planned for this afternoon has been cancelled. Heparin gtt has been resumed    TELE: SR with rates in the 60s. Some PACs also noted.     MEDICATIONS  (STANDING):  atorvastatin 40 milliGRAM(s) Oral at bedtime  chlorhexidine 2% Cloths 1 Application(s) Topical <User Schedule>  dextrose 40% Gel 15 Gram(s) Oral once  dextrose 5%. 1000 milliLiter(s) (50 mL/Hr) IV Continuous <Continuous>  dextrose 5%. 1000 milliLiter(s) (100 mL/Hr) IV Continuous <Continuous>  dextrose 50% Injectable 25 Gram(s) IV Push once  dextrose 50% Injectable 12.5 Gram(s) IV Push once  dextrose 50% Injectable 25 Gram(s) IV Push once  glucagon  Injectable 1 milliGRAM(s) IntraMuscular once  heparin  Infusion.  Unit(s)/Hr (18 mL/Hr) IV Continuous <Continuous>  hydrALAZINE 50 milliGRAM(s) Oral three times a day  insulin glargine Injectable (LANTUS) 10 Unit(s) SubCutaneous every morning  insulin lispro (ADMELOG) corrective regimen sliding scale   SubCutaneous three times a day before meals  insulin lispro Injectable (ADMELOG) 4 Unit(s) SubCutaneous three times a day before meals  pantoprazole  Injectable 40 milliGRAM(s) IV Push two times a day    MEDICATIONS  (PRN):  acetaminophen   Tablet .. 650 milliGRAM(s) Oral every 6 hours PRN Mild Pain (1 - 3)  heparin   Injectable 8000 Unit(s) IV Push every 6 hours PRN For aPTT less than 40  heparin   Injectable 4000 Unit(s) IV Push every 6 hours PRN For aPTT between 40 - 57    Allergies  No Known Allergies    PAST MEDICAL & SURGICAL HISTORY:  Coronary artery disease involving native heart, angina presence unspecified, unspecified vessel or lesion type  nuc stress 5/2021 negative  Atrial fibrillation, unspecified type  Congestive heart failure, unspecified HF chronicity, unspecified heart failure type  EF 35-40%  Bell&#x27;s palsy  Right side of face  Essential hypertension  Type 2 diabetes mellitus without complication, unspecified whether long term insulin use  insulin plus orals  Osteomyelitis of sternum  Chronic kidney disease, unspecified CKD stage  stage III. Recent BL cr 2.0-2.5  Anemia secondary to renal failure  together with iron deficiency  GIB (gastrointestinal bleeding)  h/o GIB 2019 in [place].  told hemorrhoidal  S/P CABG (coronary artery bypass graft)  complicated by osteomyleitis of the sternum and sepsis; sternum removed    Vital Signs Last 24 Hrs  T(C): 37 (11 Jun 2021 10:24), Max: 37.2 (10 Eddie 2021 16:00)  T(F): 98.6 (11 Jun 2021 10:24), Max: 98.9 (10 Eddie 2021 16:00)  HR: 75 (11 Jun 2021 10:24) (65 - 83)  BP: 167/76 (11 Jun 2021 10:24) (134/58 - 169/85)  BP(mean): 104 (11 Jun 2021 04:00) (90 - 110)  RR: 18 (11 Jun 2021 10:24) (16 - 18)  SpO2: 96% (11 Jun 2021 10:24) (96% - 100%)    Physical Exam:  Constitutional: NAD, AAOx3  Cardiovascular: +S1S2 RRR. SR at time of exam  Pulmonary: CTA b/l, unlabored  GI: soft NTND +BS  Extremities: no pedal edema,  Neuro: non focal, HONG x4    LABS:                        8.8    8.66  )-----------( 211      ( 11 Jun 2021 07:44 )             27.0     135  |  99  |  36.5<H>  ----------------------------<  176<H>  4.0   |  21.0<L>  |  1.90<H>  Ca    8.9      11 Jun 2021 08:47  PT/INR - ( 11 Jun 2021 07:47 )   PT: 12.7 sec;   INR: 1.10 ratio    PTT - ( 11 Jun 2021 07:47 )  PTT:46.0 sec    A/P  80 year old male patient with CAD s/p CABG c/b sepsis and sternal wound infection s/p sternum removal and pec flap and angioplasty, chronic HFrEF- (EF 35-40% on TTE and 41% on nuclear stress test, and 30-35% on MARLO), AFL/AFIB, s/p MARLO-DCCV on 5/11, CHADS-VASc = 6 on Eliquis, HTN, DM2, CKD, and Bell's Palsy, AFib, AFL, dizziness, bifascicular block and cardiomyopathy back in 5/2021. He underwent a MARLO-DCCV and ILR on 5/11/2021 admitted with an acute anemia secondary to GIB. Found to have acute bleeding from duodenal diverticulum s/p coil embolization.     Currently the H/H is stable after a repeat CBC was resulted this AM and confirmed a low but stable anemia.    - Anticoagulation with Heparin has been resumed. Would avoid any boluses in this patient.   - Monitor over the weekend.   - Will follow again on Monday to determine planning for potential Watchman implantation (outpatient vs inpatient). This decision will be based on the patient's ability to tolerate anticoagulation (DAPT vs DOAC vs nothing).    None

## 2021-06-11 NOTE — PROGRESS NOTE ADULT - ASSESSMENT
80M h/o CAD s/p CABG c/b sepsis and sternal wound infection s/p sternum removal and pec flap and angioplasty, chronic HFrEF, p Afib (on Eliquis), HTN, DM2, and Bell's Palsy recently moved to [place] for few months from [place] presented to [facility] on 5/9/21 with complaints of back pain and dizziness after a mechanical fall also noted for the last few months he has been having continued shortness of breath and leg swelling, and was recently started on Lasix in addition to metolazone by his cardiologist in [place], repeat CT head no cerebral bleed, admitted for ADHF/bi-V faiure and KEN on CKD (Cr. 2.4), TTE with LV EF 35-40%, underwent IV diuresis, initial noted pBNP 81586 ---> 64429, pharm nuclear stress test without ischemia, RHC done with moderate pHTN Group 2 with PCWP 23, switched to torsemide 40mg, underwent MARLO/CV for pAflutter but reverted back to pAfib, metoprolol dose reduced to 50mg BID and had ILR implanted, had L-elbow laceration wound with bleeding due mechanical fall in the toilet, discharged home on 5/14/21, patient has had recurrent episodes of dizziness and had his medications further adjusted. Patient states that this morning after he went to the bathroom he was feeling very dizzy, hot, and with leg tingling like he might pass out. Patient states that he has never felt like he was fully going to pass out until today, and he has been feeling very dizzy every time he stands. Patient also notes that he has been having some dark stools, but states that he thought it was from his iron. Patient was noted to be orthostatic upon arrival to the ER with a hemoglobin of 6.7. transfused still significantly orthostatic    6/10. Pt denies chest pain, palpitations, SOB. Tele- NSR HR @ 80-90s, with PVCs, PACs, BBB. Torsemide currently DC'd for recurrence of orthostatic symptoms. If signs of HF occur consider low dose Lasix. GI/EP approve of restarting heparin gtt, Hg currently stable at 9.  6/11/2021:  Plan for endoscopy today. NPO, heparin on hold.   /83, toursamide on hold, net -152ml.   Complaints of foot pain, concern for gout.  Uric acid added to am labs.      GI BLEED WITH ORTHOSTATIC HYPOTENSION   s/p Duodenal Tic embolized by IR  Would d/c torsemide and observe closely for heart failure  Hgb is  currently stable  No melana no abd pain  GI/EP approve of restarting heparin gtt     HFrEF    BNP-19,986  euvolemic on exam  Hold torsemide due to profound orthostatics  If signs of HF occur consider low dose Lasix   I&O daily  daily weight     ATRIAL FIB  GI/EP on hold for endo today    Foot pain  uric acid  report to PCP for follow up

## 2021-06-11 NOTE — PROGRESS NOTE ADULT - SUBJECTIVE AND OBJECTIVE BOX
CHU LOGAN  80y  Male    Interval/overnight events/pt complaints:  The patient seen and exam at bedside. The patient complains Rt foot pain, the pain in the proximal foot below the lateral malleolus, the pain throbbing, 6/10 . There is no change in BM, no blood in stool or change in the color. The patient denies any CP, SOB, palpitation or dizziness. Hgb today 8.8      MEDICATIONS  (STANDING):  atorvastatin 40 milliGRAM(s) Oral at bedtime  chlorhexidine 2% Cloths 1 Application(s) Topical <User Schedule>  dextrose 40% Gel 15 Gram(s) Oral once  dextrose 5%. 1000 milliLiter(s) (50 mL/Hr) IV Continuous <Continuous>  dextrose 5%. 1000 milliLiter(s) (100 mL/Hr) IV Continuous <Continuous>  dextrose 50% Injectable 25 Gram(s) IV Push once  dextrose 50% Injectable 12.5 Gram(s) IV Push once  dextrose 50% Injectable 25 Gram(s) IV Push once  glucagon  Injectable 1 milliGRAM(s) IntraMuscular once  heparin  Infusion.  Unit(s)/Hr (18 mL/Hr) IV Continuous <Continuous>  hydrALAZINE 50 milliGRAM(s) Oral three times a day  insulin glargine Injectable (LANTUS) 10 Unit(s) SubCutaneous every morning  insulin lispro (ADMELOG) corrective regimen sliding scale   SubCutaneous three times a day before meals  insulin lispro Injectable (ADMELOG) 4 Unit(s) SubCutaneous three times a day before meals  pantoprazole  Injectable 40 milliGRAM(s) IV Push two times a day    MEDICATIONS  (PRN):  acetaminophen   Tablet .. 650 milliGRAM(s) Oral every 6 hours PRN Mild Pain (1 - 3)  heparin   Injectable 8000 Unit(s) IV Push every 6 hours PRN For aPTT less than 40  heparin   Injectable 4000 Unit(s) IV Push every 6 hours PRN For aPTT between 40 - 57    AllergiesNo Known Allergies      Vital Signs Last 24 Hrs  T(C): 37 (11 Jun 2021 10:24), Max: 37.2 (10 Eddie 2021 16:00)  T(F): 98.6 (11 Jun 2021 10:24), Max: 98.9 (10 Eddie 2021 16:00)  HR: 75 (11 Jun 2021 10:24) (65 - 83)  BP: 167/76 (11 Jun 2021 10:24) (134/58 - 169/85)  BP(mean): 104 (11 Jun 2021 04:00) (90 - 110)  RR: 18 (11 Jun 2021 10:24) (16 - 18)  SpO2: 96% (11 Jun 2021 10:24) (96% - 100%)  I&O's Summary    10 Eddie 2021 07:01  -  11 Jun 2021 07:00  --------------------------------------------------------  IN: 848 mL / OUT: 1000 mL / NET: -152 mL        PHYSICAL EXAM:  GENERAL: NAD, sitting OOB in chair  NECK: Supple, No JVD  CHEST/LUNG: Clear to auscultation bilaterally; No rales, rhonchi, wheezing  HEART: Regular rate and rhythm; No murmurs, rubs, or gallops  ABDOMEN: Soft, Nontender  EXTREMITIES:  Redness and tenderness of the proximal base of Rt foot below the lateral malleolus, No clubbing, cyanosis, edema, calf tenderness  NEURO:  A&O x 3, no sensory or motor deficit.  SKIN: no rashes, warm, dry      CAPILLARY BLOOD GLUCOSE  POCT Blood Glucose.: 207 mg/dL (11 Jun 2021 07:54)      LABS    (06-11 @ 07:44)                      8.8  8.66 )-----------( 211                 27.0      06-11    135  |  99  |  36.5<H>  ----------------------------<  176<H>  4.0   |  21.0<L>  |  1.90<H>    Ca    8.9      11 Jun 2021 08:47      ( 11 Jun 2021 07:47 )   PT: 12.7 sec;   INR: 1.10 ratio;      Consultant(s) Notes Reviewed:  [X] YES  [ ] NO     CHU LOGAN  80y  Male    Interval/overnight events/pt complaints:  The patient seen and exam at bedside. The patient complains Rt foot pain, the pain in the proximal foot below the lateral malleolus, the pain throbbing, 6/10 . There is no change in BM, no blood in stool or change in the color. The patient denies any CP, SOB, palpitation or dizziness.       MEDICATIONS  (STANDING):  atorvastatin 40 milliGRAM(s) Oral at bedtime  chlorhexidine 2% Cloths 1 Application(s) Topical <User Schedule>  dextrose 40% Gel 15 Gram(s) Oral once  dextrose 5%. 1000 milliLiter(s) (50 mL/Hr) IV Continuous <Continuous>  dextrose 5%. 1000 milliLiter(s) (100 mL/Hr) IV Continuous <Continuous>  dextrose 50% Injectable 25 Gram(s) IV Push once  dextrose 50% Injectable 12.5 Gram(s) IV Push once  dextrose 50% Injectable 25 Gram(s) IV Push once  glucagon  Injectable 1 milliGRAM(s) IntraMuscular once  heparin  Infusion.  Unit(s)/Hr (18 mL/Hr) IV Continuous <Continuous>  hydrALAZINE 50 milliGRAM(s) Oral three times a day  insulin glargine Injectable (LANTUS) 10 Unit(s) SubCutaneous every morning  insulin lispro (ADMELOG) corrective regimen sliding scale   SubCutaneous three times a day before meals  insulin lispro Injectable (ADMELOG) 4 Unit(s) SubCutaneous three times a day before meals  pantoprazole  Injectable 40 milliGRAM(s) IV Push two times a day    MEDICATIONS  (PRN):  acetaminophen   Tablet .. 650 milliGRAM(s) Oral every 6 hours PRN Mild Pain (1 - 3)  heparin   Injectable 8000 Unit(s) IV Push every 6 hours PRN For aPTT less than 40  heparin   Injectable 4000 Unit(s) IV Push every 6 hours PRN For aPTT between 40 - 57    AllergiesNo Known Allergies      Vital Signs Last 24 Hrs  T(C): 37 (11 Jun 2021 10:24), Max: 37.2 (10 Eddie 2021 16:00)  T(F): 98.6 (11 Jun 2021 10:24), Max: 98.9 (10 Eddie 2021 16:00)  HR: 75 (11 Jun 2021 10:24) (65 - 83)  BP: 167/76 (11 Jun 2021 10:24) (134/58 - 169/85)  BP(mean): 104 (11 Jun 2021 04:00) (90 - 110)  RR: 18 (11 Jun 2021 10:24) (16 - 18)  SpO2: 96% (11 Jun 2021 10:24) (96% - 100%)  I&O's Summary    10 Eddie 2021 07:01  -  11 Jun 2021 07:00  --------------------------------------------------------  IN: 848 mL / OUT: 1000 mL / NET: -152 mL    PHYSICAL EXAM:  GENERAL: NAD, sitting OOB in chair  NECK: Supple, No JVD  CHEST/LUNG: Clear to auscultation bilaterally; No rales, rhonchi, wheezing  HEART: Regular rate and rhythm; No murmurs, rubs, or gallops  ABDOMEN: Soft, Nontender  EXTREMITIES:  Redness and tenderness of the proximal base of Rt foot below the lateral malleolus, No clubbing, cyanosis, edema, calf tenderness  NEURO:  A&O x 3, no sensory or motor deficit.  SKIN: no rashes, warm, dry      CAPILLARY BLOOD GLUCOSE  POCT Blood Glucose.: 207 mg/dL (11 Jun 2021 07:54)      LABS    (06-11 @ 07:44)                      8.8  8.66 )-----------( 211                 27.0      06-11    135  |  99  |  36.5<H>  ----------------------------<  176<H>  4.0   |  21.0<L>  |  1.90<H>    Ca    8.9      11 Jun 2021 08:47      ( 11 Jun 2021 07:47 )   PT: 12.7 sec;   INR: 1.10 ratio;      Consultant(s) Notes Reviewed:  [X] YES  [ ] NO     CHU LOGAN  80y  Male    Interval/overnight events/pt complaints:  The patient seen and exam at bedside. The patient complains Rt foot pain, the pain in the proximal foot below the lateral malleolus, the pain throbbing, 6/10 . There is no change in BM, no blood in stool or change in the color. The patient denies any CP, SOB, palpitation or dizziness.     Hospital course:   80 y.o M with a history of coronary artery disease, CABG, atrial fibrillation, diabetes, hypertension, chronic kidney disease, and BPH who presented with lightheadedness and near syncope found to be orthostatic with anemia. The patient was noted to have melena and packed red blood cells were transfused for acute blood loss anemia. The patient underwent colonoscopy with diverticulosis and internal hemorrhoids. 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 Required ICU stay, had IR embolization. Was on AC for his Afib - with heparin however H&H dropped initially, hence Ac was held.  H&H stabilized, GI okay with IV heparin challenge. Patient resumed on heparin gtt on 6/10. pan to monitor 48hrs for eventual Watchman, either inpatient or outpatient.       MEDICATIONS  (STANDING):  atorvastatin 40 milliGRAM(s) Oral at bedtime  chlorhexidine 2% Cloths 1 Application(s) Topical <User Schedule>  dextrose 40% Gel 15 Gram(s) Oral once  dextrose 5%. 1000 milliLiter(s) (50 mL/Hr) IV Continuous <Continuous>  dextrose 5%. 1000 milliLiter(s) (100 mL/Hr) IV Continuous <Continuous>  dextrose 50% Injectable 25 Gram(s) IV Push once  dextrose 50% Injectable 12.5 Gram(s) IV Push once  dextrose 50% Injectable 25 Gram(s) IV Push once  glucagon  Injectable 1 milliGRAM(s) IntraMuscular once  heparin  Infusion.  Unit(s)/Hr (18 mL/Hr) IV Continuous <Continuous>  hydrALAZINE 50 milliGRAM(s) Oral three times a day  insulin glargine Injectable (LANTUS) 10 Unit(s) SubCutaneous every morning  insulin lispro (ADMELOG) corrective regimen sliding scale   SubCutaneous three times a day before meals  insulin lispro Injectable (ADMELOG) 4 Unit(s) SubCutaneous three times a day before meals  pantoprazole  Injectable 40 milliGRAM(s) IV Push two times a day    MEDICATIONS  (PRN):  acetaminophen   Tablet .. 650 milliGRAM(s) Oral every 6 hours PRN Mild Pain (1 - 3)  heparin   Injectable 8000 Unit(s) IV Push every 6 hours PRN For aPTT less than 40  heparin   Injectable 4000 Unit(s) IV Push every 6 hours PRN For aPTT between 40 - 57    AllergiesNo Known Allergies      Vital Signs Last 24 Hrs  T(C): 37 (11 Jun 2021 10:24), Max: 37.2 (10 Eddie 2021 16:00)  T(F): 98.6 (11 Jun 2021 10:24), Max: 98.9 (10 Eddie 2021 16:00)  HR: 75 (11 Jun 2021 10:24) (65 - 83)  BP: 167/76 (11 Jun 2021 10:24) (134/58 - 169/85)  BP(mean): 104 (11 Jun 2021 04:00) (90 - 110)  RR: 18 (11 Jun 2021 10:24) (16 - 18)  SpO2: 96% (11 Jun 2021 10:24) (96% - 100%)  I&O's Summary    10 Eddie 2021 07:01  -  11 Jun 2021 07:00  --------------------------------------------------------  IN: 848 mL / OUT: 1000 mL / NET: -152 mL    PHYSICAL EXAM:  GENERAL: NAD, sitting OOB in chair  NECK: Supple, No JVD  CHEST/LUNG: Clear to auscultation bilaterally; No rales, rhonchi, wheezing  HEART: Regular rate and rhythm; No murmurs, rubs, or gallops  ABDOMEN: Soft, Nontender  EXTREMITIES:  Redness and tenderness of the proximal base of Rt foot below the lateral malleolus, No clubbing, cyanosis, edema, calf tenderness  NEURO:  A&O x 3, no sensory or motor deficit.  SKIN: no rashes, warm, dry      CAPILLARY BLOOD GLUCOSE  POCT Blood Glucose.: 207 mg/dL (11 Jun 2021 07:54)      LABS    (06-11 @ 07:44)                      8.8  8.66 )-----------( 211                 27.0      06-11    135  |  99  |  36.5<H>  ----------------------------<  176<H>  4.0   |  21.0<L>  |  1.90<H>    Ca    8.9      11 Jun 2021 08:47      ( 11 Jun 2021 07:47 )   PT: 12.7 sec;   INR: 1.10 ratio;      Consultant(s) Notes Reviewed:  [X] YES  [ ] NO

## 2021-06-11 NOTE — PROGRESS NOTE ADULT - ATTENDING COMMENTS
pt seen and evaluated at bedside  No complaints. NO BM in 2 days   Feels ok, Has not been ambulating much 2/2 orthostasis.   will repeat orthostatics   BP is on higher side today since bp meds and tamsulosin was discontinued.  H&H stable - Per GI and EP - Monitor over weekend for Acute gi bleed and possible watchman later.

## 2021-06-11 NOTE — PROGRESS NOTE ADULT - ASSESSMENT
80 y.o M with a history of coronary artery disease, CABG, atrial fibrillation, diabetes, hypertension, chronic kidney disease, and BPH who presented with lightheadedness and near syncope found to be orthostatic with anemia. The patient was noted to have melena and packed red blood cells were transfused for acute blood loss anemia. The patient underwent colonoscopy with diverticulosis and internal hemorrhoids. 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 Required ICU stay, had IR embolization. Was on AC for his Afib - with heparin however H&H dropped initially, hence Ac was held.  H&H stabilized. was decided per EP, CArdio and GI to resume heparin and monitor 48hrs and consider  for watchman procedure for monday    #Acute blood loss anemia / Gastrointestinal hemorrhage   - s/p 5 U [facility], last transfusion 6/8  - S/p colonoscopy and endoscopy, + bleeding duodenal diverticulum   - S/p IR Embolization  6/5/21  - Monitor H/H,- hgb this AM 9  - Per patient no melena today  - C/w pantoprazole           #Atrial fibrillation  - High CHADS2 score- c/w ASA for now  - Seen in consult by EP for watchman  - Cardiology recs appreciated   - GI ok with AC. cardio restarted IV heparin     # HFrEF  - Echo done on 05/11/21 and EF is 30-35%.  - [place] [address].  - Hold torsemide due to profound orthostatics  - Per cardio If signs of HF occur consider low dose Lasix     # Coronary artery disease  - On atorvastatin    #Chronic kidney disease   - Stable  - Baseline Cr appears to be around 2    #Hypertension   - still orthostatic, continue to monitor BP   - Hydralazine 50 mg TID  - Imdur and tamsulosin on hold.      #Diabetes   - Accu checks stable  - Continue current insulin regimen     #BPH   - Hold tamsulosin due to orthostasis.    # Rt foot pain    DNR/DNI  PT eval - home with assistance     DISPO: Patient started back on AC.  monitor H&H, per EP, if tolerating AC possible watchman on 6/14 vs. outpatient 80 y.o M with a history of coronary artery disease, CABG, atrial fibrillation, diabetes, hypertension, chronic kidney disease, and BPH who presented with lightheadedness and near syncope found to be orthostatic with anemia. The patient was noted to have melena and packed red blood cells were transfused for acute blood loss anemia. The patient underwent colonoscopy with diverticulosis and internal hemorrhoids. 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 Required ICU stay, had IR embolization. Was on AC for his Afib - with heparin however H&H dropped initially, hence Ac was held.  H&H stabilized, GI okay with IV heparin challenge. Patient resumed on heparin gtt. will monitor 48hrs for eventual Watchman, either inpatient or outpatient.    #Acute blood loss anemia / Gastrointestinal hemorrhage   - s/p 5 U [facility], last transfusion 6/8  - S/p colonoscopy and endoscopy, + bleeding duodenal diverticulum   - S/p IR Embolization  6/5/21  - Monitor H/H,- hgb this AM 8.8  - Per patient no melena today  - C/w pantoprazole     #Atrial fibrillation  - High CHADS2 score- c/w ASA for now  - EP following- will f/u on monday to determine if watchman will be inpatient or outpatient  - Cardiology recs appreciated   - GI ok with AC. cardio restarted IV heparin     # HFrEF  - Echo done on 05/11/21 and EF is 30-35%.  - BNP [place] [address].  - Hold torsemide due to profound orthostatics      # Coronary artery disease  - On atorvastatin    #Chronic kidney disease   - Stable  - Baseline Cr appears to be around 2    #Hypertension   -  orthostasis , continue to monitor BP   - Hydralazine 50 mg TID  - Imdur and tamsulosin on hold.    #Diabetes   - Accu checks stable  - Continue current insulin regimen     #BPH   - Hold tamsulosin due to orthostasis.    # Rt foot pain  -possible gout  - uric acid ordered by cardio    DNR/DNI  PT eval - home with assistance     DISPO:  Monitor H/H on heparin gtt. Pending decision if watchman to be inpatient or outpatient 80 y.o M with a history of coronary artery disease, CABG, atrial fibrillation, diabetes, hypertension, chronic kidney disease, and BPH who presented with lightheadedness and near syncope found to be orthostatic with anemia 2/2 bleeding duodenal diverticulum with clot. s/p  IR embolization. Now resumed on AC for his Afib with close watch -for Watchman, either inpatient or outpatient.    #Acute blood loss anemia / Gastrointestinal hemorrhage   - s/p 5 U [facility] last transfusion 6/8  - S/p colonoscopy and endoscopy, + bleeding duodenal diverticulum   - S/p IR Embolization  6/5/21  - Monitor H/H,- hgb this AM 8.8  - Per patient no melena today  - C/w pantoprazole     #Atrial fibrillation  - High CHADS2 score- c/w ASA for now  - EP following- will f/u on monday to determine if watchman will be inpatient or outpatient  - Cardiology recs appreciated   - GI ok with AC. cardio restarted IV heparin     # HFrEF  - Echo done on 05/11/21 and EF is 30-35%.  - BNP is [address]86.  - Hold torsemide due to profound orthostatics      # Coronary artery disease  - On atorvastatin    #Chronic kidney disease   - Stable  - Baseline Cr appears to be around 2    #Hypertension   -  orthostasis , continue to monitor BP   - Hydralazine 50 mg TID  - Imdur and tamsulosin on hold.    #Diabetes   - Accu checks stable  - Continue current insulin regimen     #BPH   - Hold tamsulosin due to orthostasis.    # Rt foot pain  -possible gout  - uric acid ordered by cardio    DNR/DNI  PT eval - home with assistance     DISPO:  Monitor H/H on heparin gtt. Pending decision if watchman to be inpatient or outpatient

## 2021-06-11 NOTE — PROGRESS NOTE ADULT - ATTENDING COMMENTS
-no drop in H/H with resumption of heparin gtt however PTT not therapeutic yet, plan is for outpatient Watchman once can he can demonstrate tolerating AC, hold AC for 1 week per GI for allow healing, if H/H stable then discharge home on ASA 81mg on Sunday to then switch to Eliquis as outpatient in 1 week.   -euvolemic on exam, orthostasis improved off Imdur/tamsulosin and allowing higher supine SBP of 150-160s, restart low dose metoprolol succinate 25mg; elevated pBNP, consider restart low dose Lasix 20mg daily for discharge home on Sunday for HFrEF  -severe L-foot pain/warmth possible acute gout attack- consider low dose colchicine and monitor Cr.         Gamal Duke DO, [facility]  Faculty Non-Invasive Cardiologist  [phone]

## 2021-06-11 NOTE — PROGRESS NOTE ADULT - PROBLEM SELECTOR PLAN 1
- duodenal diverticulum bleed  - hemoglobin stable  - spoke to Dr Duke- will start Heparin and observe over the weekend  - continue BID PPI - duodenal diverticulum bleed  - hemoglobin stable  - spoke to Dr Duke- will start Heparin and observe over the weekend  - continue BID PPI  addednum- spoke to EP. Will challenge pt with IV heparin over the weekend. Start asa. Will start Elaquis in about a week.  Plan for possible Watchman in 2-3 weeks if pt able to tolerate Elaquis

## 2021-06-11 NOTE — PROGRESS NOTE ADULT - SUBJECTIVE AND OBJECTIVE BOX
[facility] Faculty Practice                                                        Office: [address] [address]                                                       Telephone: [phone]. Fax:[phone]                                                                             PROGRESS NOTE   Reason for follow up:   GIB/anemia                            Overnight: No new events.   Update:    6/11/2021:  Plan for endoscopy today. NPO, heparin on hold.   /83, toursamide on hold, net -152ml.   Complaints of foot pain, concern for gout.  Uric acid added to am labs.     Subjective: "My foot hurts"   Complains of:  Foot pain, throbbing, on proximal side of the foot base, 8/0, red, warm and throbbing- concerned that is gout.   Denies injury, fall, or trauma    Review of symptoms: Cardiac:  No chest pain. No dyspnea. No palpitations.  Respiratory: no cough. No dyspnea  Gastrointestinal: No diarrhea. No abdominal pain. No bleeding.     Past medical history: No updates.   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 No acute distress  HEENT:  Head and neck: Atraumatic. Normocephalic.  Normal oral mucosa, PERRL, Neck is supple. No JVD, No carotid bruit.   Neurologic: A & O x 3, no focal deficits. EOMI , Cranial nerves are intact.  Lymphatic: No cervical lymphadenopathy  Cardiovascular: Normal S1 S2, No murmur, rubs/gallops. No JVD, No edema  Respiratory: Lungs clear to auscultation  Gastrointestinal:  Soft, Non-tender, + BS  Lower Extremities: No edema, right foot with slight swelling, red  Psychiatry: Patient is calm. No agitation. Mood & affect appropriate  Skin: No rashes/ ecchymoses/cyanosis/ulcers visualized on the face, hands or feet.    CURRENT MEDICATIONS:  hydrALAZINE 50 milliGRAM(s) Oral three times a day  pantoprazole  Injectable  atorvastatin  glucagon  Injectable  insulin glargine Injectable (LANTUS)  insulin lispro (ADMELOG) corrective regimen sliding scale  insulin lispro Injectable (ADMELOG)  chlorhexidine 2% Clot      LABS:	 	  CARDIAC MARKERS ( 10 Eddie 2021 06:22 )  x     / x     / x     / x     / x      p-BNP 10 Eddie 2021 06:22: 00138 pg/mL                       8.8    8.66  )-----------( 211      ( 11 Jun 2021 07:44 )             27.0   138  |  103  |  36.5<H>  ----------------------------<  157<H>  4.0   |  22.0  |  1.79<H>  Ca    8.5<L>      10 Eddie 2021 06:22  proBNP: Serum Pro-Brain Natriuretic Peptide: 89248 pg/mL (06-10 @ 06:22)    TELEMETRY: Reviewed

## 2021-06-11 NOTE — PROGRESS NOTE ADULT - ATTENDING COMMENTS
Seen and examined  d/w Gilda Payne from GI and Srikanth from cardiology. Dr. Payne prefers to delay oral AC and watchman for 1-3 weeks if possible to allow to the GIB to heal.  As of now, will use IV heparin challenge and ASPIRIN. If no bleeding then home on aspirin only, and start eliquis in one week as suggested by Dr. Payne.    I will plan to see him in 2-3 weeks as an outpatient to further decide about Watchman timing  will plan to use short term (6 weeks of AC) if can be tolerated     P.AF rate is controlled  EPS will sign off, pls call us if needed. No other EP interventions as an inpatient  D/C timing TBD by GI and cardiology as well as the primary team,

## 2021-06-11 NOTE — PROGRESS NOTE ADULT - SUBJECTIVE AND OBJECTIVE BOX
Patient is a 80y old  Male who presents with a chief complaint of GIB/anemia (11 Jun 2021 10:44)      HPI:  80M h/o CAD s/p CABG c/b sepsis and sternal wound infection s/p sternum removal and pec flap and angioplasty, chronic HFrEF- recent EF 35-40%, p Afib (on Eliquis), HTN, DM2, and Bell's Palsy.      Patient has no abdominal pain. NO fevers. Tolerating diet . No BM in 2 days.  Hgb went from 9 to 7.9, then 8.8 on repeat without transfusion.       (04 Jun 2021 14:28)      REVIEW OF SYSTEMS:  Constitutional: No fever, weight loss or fatigue  ENMT:  No difficulty hearing, tinnitus, vertigo; No sinus or throat pain  Respiratory: No cough, wheezing, chills or hemoptysis  Cardiovascular: No chest pain, palpitations, dizziness or leg swelling  Gastrointestinal: No abdominal or epigastric pain. No nausea, vomiting or hematemesis; No diarrhea or constipation. No melena or hematochezia.  Skin: No itching, burning, rashes or lesions   Musculoskeletal: No joint pain or swelling; No muscle, back or extremity pain    PAST MEDICAL & SURGICAL HISTORY:  Coronary artery disease involving native heart, angina presence unspecified, unspecified vessel or lesion type  nuc stress 5/2021 negative    Atrial fibrillation, unspecified type    Congestive heart failure, unspecified HF chronicity, unspecified heart failure type  EF 35-40%    Bell&#x27;s palsy  Right side of face    Essential hypertension    Type 2 diabetes mellitus without complication, unspecified whether long term insulin use  insulin plus orals    Osteomyelitis of sternum    Chronic kidney disease, unspecified CKD stage  stage III. Recent BL cr 2.0-2.5    Anemia secondary to renal failure  together with iron deficiency    GIB (gastrointestinal bleeding)  h/o GIB 2019 in [place].  told hemorrhoidal    S/P CABG (coronary artery bypass graft)  complicated by osteomyleitis of the sternum and sepsis; sternum removed        FAMILY HISTORY:  No pertinent family history in first degree relatives  cad        SOCIAL HISTORY:  Smoking Status: [ ] Current, [ ] Former, [ ] Never  Pack Years:  [  ] EtOH-no  [  ] IVDA    MEDICATIONS:  MEDICATIONS  (STANDING):  atorvastatin 40 milliGRAM(s) Oral at bedtime  chlorhexidine 2% Cloths 1 Application(s) Topical <User Schedule>  dextrose 40% Gel 15 Gram(s) Oral once  dextrose 5%. 1000 milliLiter(s) (50 mL/Hr) IV Continuous <Continuous>  dextrose 5%. 1000 milliLiter(s) (100 mL/Hr) IV Continuous <Continuous>  dextrose 50% Injectable 25 Gram(s) IV Push once  dextrose 50% Injectable 12.5 Gram(s) IV Push once  dextrose 50% Injectable 25 Gram(s) IV Push once  glucagon  Injectable 1 milliGRAM(s) IntraMuscular once  heparin  Infusion.  Unit(s)/Hr (18 mL/Hr) IV Continuous <Continuous>  hydrALAZINE 50 milliGRAM(s) Oral three times a day  insulin glargine Injectable (LANTUS) 10 Unit(s) SubCutaneous every morning  insulin lispro (ADMELOG) corrective regimen sliding scale   SubCutaneous three times a day before meals  insulin lispro Injectable (ADMELOG) 4 Unit(s) SubCutaneous three times a day before meals  pantoprazole  Injectable 40 milliGRAM(s) IV Push two times a day    MEDICATIONS  (PRN):  acetaminophen   Tablet .. 650 milliGRAM(s) Oral every 6 hours PRN Mild Pain (1 - 3)  heparin   Injectable 8000 Unit(s) IV Push every 6 hours PRN For aPTT less than 40  heparin   Injectable 4000 Unit(s) IV Push every 6 hours PRN For aPTT between 40 - 57      Allergies    No Known Allergies    Intolerances        Vital Signs Last 24 Hrs  T(C): 37 (11 Jun 2021 10:24), Max: 37.2 (10 Eddie 2021 16:00)  T(F): 98.6 (11 Jun 2021 10:24), Max: 98.9 (10 Eddie 2021 16:00)  HR: 75 (11 Jun 2021 10:24) (65 - 78)  BP: 167/76 (11 Jun 2021 10:24) (141/71 - 169/85)  BP(mean): 104 (11 Jun 2021 04:00) (90 - 110)  RR: 18 (11 Jun 2021 10:24) (16 - 18)  SpO2: 96% (11 Jun 2021 10:24) (96% - 100%)    06-10 @ 07:01  -  06-11 @ 07:00  --------------------------------------------------------  IN: 848 mL / OUT: 1000 mL / NET: -152 mL          PHYSICAL EXAM:    General: Well developed; well nourished; in no acute distress  HEENT: MMM, conjunctiva and sclera clear  Gastrointestinal: Soft, non-tender non-distended; Normal bowel sounds; No rebound or guarding  Extremities: Normal range of motion, No clubbing, cyanosis or edema  Neurological: Alert and oriented x3  Skin: Warm and dry. No obvious rash      LABS:                        8.8    8.66  )-----------( 211      ( 11 Jun 2021 07:44 )             27.0     11 Jun 2021 08:47    135    |  99     |  36.5   ----------------------------<  176    4.0     |  21.0   |  1.90     Ca    8.9        11 Jun 2021 08:47                RADIOLOGY & ADDITIONAL STUDIES:

## 2021-06-12 LAB
APTT BLD: 36.3 SEC — HIGH (ref 27.5–35.5)
APTT BLD: 52.8 SEC — HIGH (ref 27.5–35.5)
APTT BLD: 64.9 SEC — HIGH (ref 27.5–35.5)
GLUCOSE BLDC GLUCOMTR-MCNC: 166 MG/DL — HIGH (ref 70–99)
GLUCOSE BLDC GLUCOMTR-MCNC: 208 MG/DL — HIGH (ref 70–99)
GLUCOSE BLDC GLUCOMTR-MCNC: 216 MG/DL — HIGH (ref 70–99)
GLUCOSE BLDC GLUCOMTR-MCNC: 242 MG/DL — HIGH (ref 70–99)
HCT VFR BLD CALC: 27.2 % — LOW (ref 39–50)
HGB BLD-MCNC: 8.5 G/DL — LOW (ref 13–17)
MCHC RBC-ENTMCNC: 28.8 PG — SIGNIFICANT CHANGE UP (ref 27–34)
MCHC RBC-ENTMCNC: 31.3 GM/DL — LOW (ref 32–36)
MCV RBC AUTO: 92.2 FL — SIGNIFICANT CHANGE UP (ref 80–100)
PLATELET # BLD AUTO: 219 K/UL — SIGNIFICANT CHANGE UP (ref 150–400)
RBC # BLD: 2.95 M/UL — LOW (ref 4.2–5.8)
RBC # FLD: 14.9 % — HIGH (ref 10.3–14.5)
WBC # BLD: 7.64 K/UL — SIGNIFICANT CHANGE UP (ref 3.8–10.5)
WBC # FLD AUTO: 7.64 K/UL — SIGNIFICANT CHANGE UP (ref 3.8–10.5)

## 2021-06-12 PROCEDURE — 99233 SBSQ HOSP IP/OBS HIGH 50: CPT

## 2021-06-12 PROCEDURE — 99232 SBSQ HOSP IP/OBS MODERATE 35: CPT

## 2021-06-12 RX ORDER — ALLOPURINOL 300 MG
100 TABLET ORAL DAILY
Refills: 0 | Status: DISCONTINUED | OUTPATIENT
Start: 2021-06-12 | End: 2021-06-15

## 2021-06-12 RX ADMIN — Medication 25 MILLIGRAM(S): at 07:13

## 2021-06-12 RX ADMIN — Medication 4: at 09:03

## 2021-06-12 RX ADMIN — HEPARIN SODIUM 2600 UNIT(S)/HR: 5000 INJECTION INTRAVENOUS; SUBCUTANEOUS at 18:03

## 2021-06-12 RX ADMIN — INSULIN GLARGINE 10 UNIT(S): 100 INJECTION, SOLUTION SUBCUTANEOUS at 09:04

## 2021-06-12 RX ADMIN — ATORVASTATIN CALCIUM 40 MILLIGRAM(S): 80 TABLET, FILM COATED ORAL at 21:21

## 2021-06-12 RX ADMIN — Medication 650 MILLIGRAM(S): at 21:46

## 2021-06-12 RX ADMIN — HEPARIN SODIUM 4000 UNIT(S): 5000 INJECTION INTRAVENOUS; SUBCUTANEOUS at 02:25

## 2021-06-12 RX ADMIN — PANTOPRAZOLE SODIUM 40 MILLIGRAM(S): 20 TABLET, DELAYED RELEASE ORAL at 17:55

## 2021-06-12 RX ADMIN — HEPARIN SODIUM 2200 UNIT(S)/HR: 5000 INJECTION INTRAVENOUS; SUBCUTANEOUS at 02:20

## 2021-06-12 RX ADMIN — Medication 2: at 17:55

## 2021-06-12 RX ADMIN — Medication 4 UNIT(S): at 12:14

## 2021-06-12 RX ADMIN — Medication 50 MILLIGRAM(S): at 14:28

## 2021-06-12 RX ADMIN — HEPARIN SODIUM 2200 UNIT(S)/HR: 5000 INJECTION INTRAVENOUS; SUBCUTANEOUS at 09:31

## 2021-06-12 RX ADMIN — Medication 4 UNIT(S): at 17:55

## 2021-06-12 RX ADMIN — HEPARIN SODIUM 8000 UNIT(S): 5000 INJECTION INTRAVENOUS; SUBCUTANEOUS at 18:05

## 2021-06-12 RX ADMIN — Medication 4: at 12:14

## 2021-06-12 RX ADMIN — Medication 50 MILLIGRAM(S): at 07:13

## 2021-06-12 RX ADMIN — PANTOPRAZOLE SODIUM 40 MILLIGRAM(S): 20 TABLET, DELAYED RELEASE ORAL at 07:13

## 2021-06-12 RX ADMIN — Medication 50 MILLIGRAM(S): at 21:16

## 2021-06-12 RX ADMIN — Medication 4 UNIT(S): at 09:04

## 2021-06-12 RX ADMIN — Medication 100 MILLIGRAM(S): at 11:08

## 2021-06-12 RX ADMIN — CHLORHEXIDINE GLUCONATE 1 APPLICATION(S): 213 SOLUTION TOPICAL at 07:12

## 2021-06-12 RX ADMIN — Medication 650 MILLIGRAM(S): at 21:16

## 2021-06-12 NOTE — PROGRESS NOTE ADULT - SUBJECTIVE AND OBJECTIVE BOX
[facility] [id number]            [facility]                          [address] [address]                       Phone: [phone]. Fax:[phone]                      ________________________________________________    HPI:  80M h/o CAD s/p CABG c/b sepsis and sternal wound infection s/p sternum removal and pec flap and angioplasty, chronic HFrEF- recent EF 35-40%, p Afib (on Eliquis), HTN, DM2, and Bell's Palsy recently moved to [place] for few months from [place] presented to ED with LH/presyncope and fatigue.  Hgb 6.7--> reduced from 9.0 on  with melanic stool and FOBT +. Cardiology and GI have been consulted.  PRBC ordered. Orthostatics positive.  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  Trop .06 however is chronically elevated. Patient denies fevers, CP, SOB, abdominal pain, N/V/D, headache, or vision changes. He did not notice any black stools, but states they are always dark as he takes iron.  He has chronic unchanged intermittent diarrhea.  He did feel shaky today and had some chills.  His previous LE edema resolved with his last admission and has remained stable.    Extensive notable history: Pt admitted - with complaints of back pain and dizziness after a mechanical fall also noted for the last few months he has been having continued shortness of breath and leg swelling, and was recently started on Lasix in addition to metolazone by his cardiologist in [place], repeat CT head no cerebral bleed, admitted for ADHF/bi-V faiure and KEN on CKD (Cr. 2.4), TTE with LV EF 35-40%, underwent IV diuresis, initial noted pBNP [id number] ---> [id number], pharm nuclear stress test without ischemia, RHC done with moderate pHTN Group 2 with PCWP 23, switched to torsemide 40mg, underwent MARLO/CV for pAflutter but reverted back to pAfib, metoprolol dose reduced to 50mg BID and had ILR implanted, had L-elbow laceration wound with bleeding due mechanical fall in the toilet, discharged home on 21, had episodes of sinus bradycardia 40-50s with recurrent dizziness, metoprolol reduced to 25mg BID, seen on 21 at  outpatient cardiology visit had metoprolol discontinued for sinus bradycardia, uptitrated hydralazine dose to 50mg TID for uncontrolled HTN/HFrEF  He was seen again in ED   with episodes of recurring dizziness, nausea and vomited x1, reports also L-sided chest pressure for few minutes, vitals with /70s in the ER.  Trop's negative and pt discharged after a stay for observation.         (2021 14:28)    HOME MEDICATIONS:  aspirin 81 mg oral delayed release tablet: 1 tab(s) orally once a day (2021 17:37)  atorvastatin 40 mg oral tablet: 1 tab(s) orally once a day (2021 17:37)  Eliquis 5 mg oral tablet: 1 tab(s) orally 2 times a day (2021 17:37)  ferrous sulfate 300 mg (60 mg elemental iron) oral tablet: 1 tab(s) orally once a day (2021 17:37)  Flomax 0.4 mg oral capsule: 1 cap(s) orally once a day (2021 17:37)  hydrALAZINE 25 mg oral tablet: 2 tab(s) orally 3 times a day (2021 17:37)  isosorbide mononitrate 30 mg oral tablet, extended release: 1 tab(s) orally once a day (2021 17:37)  Lantus 100 units/mL subcutaneous solution: 17 unit(s) subcutaneous 2 times a day (2021 17:37)  magnesium oxide 400 mg (241.3 mg elemental magnesium) oral tablet: 1 tab(s) orally once a day (2021 17:37)  omeprazole 40 mg oral delayed release capsule: 1 cap(s) orally once a day (2021 17:37)  potassium chloride 20 mEq oral tablet, extended release: 2 tab(s) orally once a day (2021 17:37)  torsemide 20 mg oral tablet: 2 tab(s) orally once a day (2021 17:37)      ROS: All review of systems negative unless indicated otherwise below.                         PHYSICAL EXAM:    GENERAL: NAD  NECK: Supple, No JVD  NERVOUS SYSTEM:  Alert & Oriented X3, non focal neuro exam.   CHEST/LUNG: clear lungs, No rales, rhonchi, wheezing, or rubs  HEART: Regular rate and rhythm; s1 and s2 auscultated, No murmurs, rubs, or gallops  ABDOMEN: Soft, Nontender, Nondistended; Bowel sounds present and normoactive.   EXTREMITIES:  2+ Peripheral Pulses, No clubbing, cyanosis, or edema       Vital Signs Last 24 Hrs  T(C): 36.8 (2021 16:00), Max: 37.2 (2021 06:55)  T(F): 98.3 (2021 16:00), Max: 99 (2021 06:55)  HR: 70 (2021 13:45) (70 - 77)  BP: 140/70 (2021 13:45) (140/70 - 177/76)  BP(mean): --  RR: 18 (2021 06:55) (18 - 18)  SpO2: 96% (2021 06:55) (96% - 96%)                                                   DAILY WEIGHTS - 48 HOUR TREND     Daily Weight in k.8 (2021 05:11), Weight in k.2 (2021 04:30)                             INTAKE AND OUTPUT - 48 HOUR TREND     06-10-21 @ 07:  -  21 @ 07:00  --------------------------------------------------------  IN:  Total IN: 0 mL    OUT:    Voided (mL): 1000 mL  Total OUT: 1000 mL    Total NET: -1000 mL      21 @ 07:01  -  21 @ 07:00  --------------------------------------------------------  IN:  Total IN: 0 mL    OUT:    Voided (mL): 1425 mL  Total OUT: 1425 mL    Total NET: -1425 mL                                                           LAB RESULTS                 COMPLETE BLOOD COUNT( 2021 08:41 )                            8.5 g/dL<L>  7.64 K/uL )---------------( 219 K/uL                        27.2 %<L>      Automated Differential     Auto Basophil # - X      Auto Basophil % - X      Auto Eosinophil # - X      Auto Eosinophil % - X      Auto Immature Granulocyte # - X      Auto Immature Granulocyte % - X      Auto Lymphocyte # - X      Auto Lymphocyte % - X      Auto Monocyte # - X      Auto Monocyte % - X      Auto Neutrophil # - X      Auto Neutrophil % - X                                      CHEMISTRY                 Basic Metabolic Panel (21 @ 08:47)    135  |  99  |  36.5<H>  ----------------------------<  176<H>  4.0   |  21.0<L>  |  1.90<H>    Ca    8.9      2021 08:47  Phos  3.0     06-  Mg     1.7     06-09                    Liver Functions (21 @ 05:43))  TPro  6.1  /  Alb  3.4  /  TBili  0.7  /  DBili  x   /  AST  16  /  ALT  12  /  AlkPhos  103     PT/INR/PTT ( 2021 16:07 )                        :                       :      X            :       36.3                  .        .                   .              .           .       X           .                                                                 Cardiac Enzymes   ( 10 Eddie 2021 06:22 )  Troponin T  X    ,  CPK  X    , CKMB  X    , BNP <H>    , ( 2021 12:00 )  Troponin T  0.06 ,  CPK  X    , CKMB  X    , BNP X                                 Current Admission Active Medications    acetaminophen   Tablet .. 650 milliGRAM(s) Oral every 6 hours PRN Mild Pain (1 - 3)  allopurinol 100 milliGRAM(s) Oral daily  atorvastatin 40 milliGRAM(s) Oral at bedtime  chlorhexidine 2% Cloths 1 Application(s) Topical <User Schedule>  dextrose 40% Gel 15 Gram(s) Oral once  dextrose 5%. 1000 milliLiter(s) (50 mL/Hr) IV Continuous <Continuous>  dextrose 5%. 1000 milliLiter(s) (100 mL/Hr) IV Continuous <Continuous>  dextrose 50% Injectable 25 Gram(s) IV Push once  dextrose 50% Injectable 12.5 Gram(s) IV Push once  dextrose 50% Injectable 25 Gram(s) IV Push once  glucagon  Injectable 1 milliGRAM(s) IntraMuscular once  heparin   Injectable 8000 Unit(s) IV Push every 6 hours PRN For aPTT less than 40  heparin   Injectable 4000 Unit(s) IV Push every 6 hours PRN For aPTT between 40 - 57  heparin  Infusion.  Unit(s)/Hr (18 mL/Hr) IV Continuous <Continuous>  hydrALAZINE 50 milliGRAM(s) Oral three times a day  insulin glargine Injectable (LANTUS) 10 Unit(s) SubCutaneous every morning  insulin lispro (ADMELOG) corrective regimen sliding scale   SubCutaneous three times a day before meals  insulin lispro Injectable (ADMELOG) 4 Unit(s) SubCutaneous three times a day before meals  metoprolol succinate ER 25 milliGRAM(s) Oral daily  pantoprazole  Injectable 40 milliGRAM(s) IV Push two times a day                          CARDIOLOGY RESULTS: Official Report/Preliminary Verbal Reports  < from: MARLO Echo Doppler (21 @ 16:06) >  Summary:   1. Left ventricular ejection fraction, by visual estimation, is 30 to 35%.   2. Moderately decreased global left ventricular systolic function.   3. Severely enlarged right atrium.   4. Severely enlarged right ventricle.   5. Severely reduced RV systolic function.   6. Mild to moderately enlarged left atrium.   7. There is no evidence of pericardial effusion.   8. Mild mitral annular calcification.   9. Mild mitral valve regurgitation.  10. Moderate-severe tricuspid regurgitation.  11. Color flow doppler and intravenous injection of agitated saline demonstrates the presence of an intact intra atrial septum.  12. No left atrial appendage thrombus and decreased left atrial appendage velocities.    MD Rayo Electronically signed on 2021 at 4:52:38 PM    < end of copied text >                          CARDIOLOGY REVIEW: Personally visualized and reviewed  Telemetry: SR 70s, PVC, PAC

## 2021-06-12 NOTE — PROGRESS NOTE ADULT - SUBJECTIVE AND OBJECTIVE BOX
Chief Complaint: This is a 80y old man patient being seen in follow-up consultation for UGIB.    HPI / 24H events:  Patient denies any rectal bleeding or melena.  Denies any recent BMs.  Tolerating diet without issue.  Only complaint is gouty arthritis.    ROS: A 14-point review of systems was reviewed and was otherwise negative save what was reported in the HPI.    PAST MEDICAL/SURGICAL HISTORY:  Coronary artery disease involving native heart, angina presence unspecified, unspecified vessel or lesion type  nuc stress 5/2021 negative    Atrial fibrillation, unspecified type    Congestive heart failure, unspecified HF chronicity, unspecified heart failure type  EF 35-40%    Bell&#x27;s palsy  Right side of face    Essential hypertension    Type 2 diabetes mellitus without complication, unspecified whether long term insulin use  insulin plus orals    Osteomyelitis of sternum    Chronic kidney disease, unspecified CKD stage  stage III. Recent BL cr 2.0-2.5    Anemia secondary to renal failure  together with iron deficiency    GIB (gastrointestinal bleeding)  h/o GIB 2019 in [place].  told hemorrhoidal    S/P CABG (coronary artery bypass graft)  complicated by osteomyleitis of the sternum and sepsis; sternum removed      MEDICATIONS  (STANDING):  allopurinol 100 milliGRAM(s) Oral daily  atorvastatin 40 milliGRAM(s) Oral at bedtime  chlorhexidine 2% Cloths 1 Application(s) Topical <User Schedule>  dextrose 40% Gel 15 Gram(s) Oral once  dextrose 5%. 1000 milliLiter(s) (50 mL/Hr) IV Continuous <Continuous>  dextrose 5%. 1000 milliLiter(s) (100 mL/Hr) IV Continuous <Continuous>  dextrose 50% Injectable 25 Gram(s) IV Push once  dextrose 50% Injectable 12.5 Gram(s) IV Push once  dextrose 50% Injectable 25 Gram(s) IV Push once  glucagon  Injectable 1 milliGRAM(s) IntraMuscular once  heparin  Infusion.  Unit(s)/Hr (18 mL/Hr) IV Continuous <Continuous>  hydrALAZINE 50 milliGRAM(s) Oral three times a day  insulin glargine Injectable (LANTUS) 10 Unit(s) SubCutaneous every morning  insulin lispro (ADMELOG) corrective regimen sliding scale   SubCutaneous three times a day before meals  insulin lispro Injectable (ADMELOG) 4 Unit(s) SubCutaneous three times a day before meals  metoprolol succinate ER 25 milliGRAM(s) Oral daily  pantoprazole  Injectable 40 milliGRAM(s) IV Push two times a day    MEDICATIONS  (PRN):  acetaminophen   Tablet .. 650 milliGRAM(s) Oral every 6 hours PRN Mild Pain (1 - 3)  heparin   Injectable 8000 Unit(s) IV Push every 6 hours PRN For aPTT less than 40  heparin   Injectable 4000 Unit(s) IV Push every 6 hours PRN For aPTT between 40 - 57    No Known Allergies    T(C): 36.8 (06-12-21 @ 16:00), Max: 37.2 (06-12-21 @ 06:55)  HR: 70 (06-12-21 @ 13:45) (70 - 77)  BP: 140/70 (06-12-21 @ 13:45) (140/70 - 177/76)  RR: 18 (06-12-21 @ 06:55) (18 - 18)  SpO2: 96% (06-12-21 @ 06:55) (96% - 96%)    I&O's Summary    11 Jun 2021 07:01  -  12 Jun 2021 07:00  --------------------------------------------------------  IN: 342 mL / OUT: 1425 mL / NET: -1083 mL      PHYSICAL EXAM:  Constitutional: Well-developed, well-nourished, in no apparent distress  Eyes: Sclerae anicteric, conjunctivae normal  ENMT: Mucus membranes moist, no oropharyngeal thrush noted  Neck: No thyroid nodules appreciated, no significant cervical or supraclavicular lymphadenopathy  Respiratory: Breathing nonlabored; clear to auscultation  Cardiovascular: Regular rate and rhythm  Gastrointestinal: Soft, nontender, nondistended, normoactive bowel sounds; no hepatosplenomegaly appreciated; no rebound tenderness or involuntary guarding  Extremities: No clubbing, cyanosis or edema  Neurological: Alert and oriented to person, place and time; no asterixis  Skin: No jaundice  Lymph Nodes: No significant lymphadenopathy  Musculoskeletal: No significant peripheral atrophy  Psychiatric: Affect and mood appropriate      LABS:               8.5    7.64  )-----------( 219      ( 06-12 @ 08:41 )             27.2                9.2    11.49 )-----------( 265      ( 06-11 @ 18:25 )             29.1                8.8    8.66  )-----------( 211      ( 06-11 @ 07:44 )             27.0                7.9    9.39  )-----------( 178      ( 06-10 @ 23:41 )             25.0                9.0    9.53  )-----------( 204      ( 06-10 @ 06:22 )             27.2       06-11    135  |  99  |  36.5<H>  ----------------------------<  176<H>  4.0   |  21.0<L>  |  1.90<H>    Ca    8.9      11 Jun 2021 08:47            PT/INR - ( 11 Jun 2021 07:47 )   PT: 12.7 sec;   INR: 1.10 ratio         PTT - ( 12 Jun 2021 16:07 )  PTT:36.3 sec

## 2021-06-12 NOTE — PROGRESS NOTE ADULT - SUBJECTIVE AND OBJECTIVE BOX
HOSPITALIST PROGRESS NOTE    CHU LOGAN  [id number]  80yMale    Patient is a 80y old  Male who presents with a chief complaint of GIB/anemia (12 Jun 2021 16:37)      SUBJECTIVE:   Chart reviewed.  Patient seen and examined at bedside for GIB, AF      OBJECTIVE:  Vital Signs Last 24 Hrs  T(C): 36.8 (12 Jun 2021 16:00), Max: 37.2 (12 Jun 2021 06:55)  T(F): 98.3 (12 Jun 2021 16:00), Max: 99 (12 Jun 2021 06:55)  HR: 70 (12 Jun 2021 13:45) (70 - 77)  BP: 140/70 (12 Jun 2021 13:45) (140/70 - 177/76)   RR: 18 (12 Jun 2021 06:55) (18 - 18)  SpO2: 96% (12 Jun 2021 06:55) (96% - 96%)    PHYSICAL EXAMINATION  General:   HEENT:    NECK:    CVS:   RESP:    GI:    :   MSK:    CNS:    INTEG:    PSYCH:      MONITOR:  CAPILLARY BLOOD GLUCOSE      POCT Blood Glucose.: 166 mg/dL (12 Jun 2021 17:52)  POCT Blood Glucose.: 242 mg/dL (12 Jun 2021 11:55)  POCT Blood Glucose.: 208 mg/dL (12 Jun 2021 08:20)  POCT Blood Glucose.: 301 mg/dL (11 Jun 2021 21:59)        I&O's Summary    11 Jun 2021 07:01  -  12 Jun 2021 07:00  --------------------------------------------------------  IN: 342 mL / OUT: 1425 mL / NET: -1083 mL    12 Jun 2021 07:01  -  12 Jun 2021 19:56  --------------------------------------------------------  IN: 636 mL / OUT: 600 mL / NET: 36 mL                            8.5    7.64  )-----------( 219      ( 12 Jun 2021 08:41 )             27.2     PT/INR - ( 11 Jun 2021 07:47 )   PT: 12.7 sec;   INR: 1.10 ratio         PTT - ( 12 Jun 2021 16:07 )  PTT:36.3 sec  06-11    135  |  99  |  36.5<H>  ----------------------------<  176<H>  4.0   |  21.0<L>  |  1.90<H>    Ca    8.9      11 Jun 2021 08:47              Culture:    TTE:    RADIOLOGY        MEDICATIONS  (STANDING):  allopurinol 100 milliGRAM(s) Oral daily  atorvastatin 40 milliGRAM(s) Oral at bedtime  chlorhexidine 2% Cloths 1 Application(s) Topical <User Schedule>  dextrose 40% Gel 15 Gram(s) Oral once  dextrose 5%. 1000 milliLiter(s) (50 mL/Hr) IV Continuous <Continuous>  dextrose 5%. 1000 milliLiter(s) (100 mL/Hr) IV Continuous <Continuous>  dextrose 50% Injectable 25 Gram(s) IV Push once  dextrose 50% Injectable 12.5 Gram(s) IV Push once  dextrose 50% Injectable 25 Gram(s) IV Push once  glucagon  Injectable 1 milliGRAM(s) IntraMuscular once  heparin  Infusion.  Unit(s)/Hr (18 mL/Hr) IV Continuous <Continuous>  hydrALAZINE 50 milliGRAM(s) Oral three times a day  insulin glargine Injectable (LANTUS) 10 Unit(s) SubCutaneous every morning  insulin lispro (ADMELOG) corrective regimen sliding scale   SubCutaneous three times a day before meals  insulin lispro Injectable (ADMELOG) 4 Unit(s) SubCutaneous three times a day before meals  metoprolol succinate ER 25 milliGRAM(s) Oral daily  pantoprazole  Injectable 40 milliGRAM(s) IV Push two times a day      MEDICATIONS  (PRN):  acetaminophen   Tablet .. 650 milliGRAM(s) Oral every 6 hours PRN Mild Pain (1 - 3)  heparin   Injectable 8000 Unit(s) IV Push every 6 hours PRN For aPTT less than 40  heparin   Injectable 4000 Unit(s) IV Push every 6 hours PRN For aPTT between 40 - 57     HOSPITALIST PROGRESS NOTE    CHU LOGAN  [id number]  80yMale    Patient is a 80y old  Male who presents with a chief complaint of GIB/anemia (12 Jun 2021 16:37)      SUBJECTIVE:   Chart reviewed.  Patient seen and examined at bedside for GIB, ABLA, AF  Denies any dizziness, chest pain, palpitations, dyspnea, abdominal pain, nausea, vomiting. Had brown BM      OBJECTIVE:  Vital Signs Last 24 Hrs  T(C): 36.8 (12 Jun 2021 16:00), Max: 37.2 (12 Jun 2021 06:55)  T(F): 98.3 (12 Jun 2021 16:00), Max: 99 (12 Jun 2021 06:55)  HR: 70 (12 Jun 2021 13:45) (70 - 77)  BP: 140/70 (12 Jun 2021 13:45) (140/70 - 177/76)   RR: 18 (12 Jun 2021 06:55) (18 - 18)  SpO2: 96% (12 Jun 2021 06:55) (96% - 96%)    PHYSICAL EXAMINATION  General: Lying in bed, NAD  HEENT:  EOMI  NECK:  Supple  CVS: Irregular S1 S2. Midline sternotomy scar well healed  RESP:  CTAB  GI:  Soft nondistended nontender BS+  : No suprapubic tenderness  MSK:  FROM  CNS:  No gross focal or global deficit appreciated  INTEG:  Warm dry skin  PSYCH:  Fair mood    MONITOR:  CAPILLARY BLOOD GLUCOSE      POCT Blood Glucose.: 166 mg/dL (12 Jun 2021 17:52)  POCT Blood Glucose.: 242 mg/dL (12 Jun 2021 11:55)  POCT Blood Glucose.: 208 mg/dL (12 Jun 2021 08:20)  POCT Blood Glucose.: 301 mg/dL (11 Jun 2021 21:59)        I&O's Summary    11 Jun 2021 07:01  -  12 Jun 2021 07:00  --------------------------------------------------------  IN: 342 mL / OUT: 1425 mL / NET: -1083 mL    12 Jun 2021 07:01  -  12 Jun 2021 19:56  --------------------------------------------------------  IN: 636 mL / OUT: 600 mL / NET: 36 mL                            8.5    7.64  )-----------( 219      ( 12 Jun 2021 08:41 )             27.2     PT/INR - ( 11 Jun 2021 07:47 )   PT: 12.7 sec;   INR: 1.10 ratio         PTT - ( 12 Jun 2021 16:07 )  PTT:36.3 sec  06-11    135  |  99  |  36.5<H>  ----------------------------<  176<H>  4.0   |  21.0<L>  |  1.90<H>    Ca    8.9      11 Jun 2021 08:47              Culture:    TTE:    RADIOLOGY        MEDICATIONS  (STANDING):  allopurinol 100 milliGRAM(s) Oral daily  atorvastatin 40 milliGRAM(s) Oral at bedtime  chlorhexidine 2% Cloths 1 Application(s) Topical <User Schedule>  dextrose 40% Gel 15 Gram(s) Oral once  dextrose 5%. 1000 milliLiter(s) (50 mL/Hr) IV Continuous <Continuous>  dextrose 5%. 1000 milliLiter(s) (100 mL/Hr) IV Continuous <Continuous>  dextrose 50% Injectable 25 Gram(s) IV Push once  dextrose 50% Injectable 12.5 Gram(s) IV Push once  dextrose 50% Injectable 25 Gram(s) IV Push once  glucagon  Injectable 1 milliGRAM(s) IntraMuscular once  heparin  Infusion.  Unit(s)/Hr (18 mL/Hr) IV Continuous <Continuous>  hydrALAZINE 50 milliGRAM(s) Oral three times a day  insulin glargine Injectable (LANTUS) 10 Unit(s) SubCutaneous every morning  insulin lispro (ADMELOG) corrective regimen sliding scale   SubCutaneous three times a day before meals  insulin lispro Injectable (ADMELOG) 4 Unit(s) SubCutaneous three times a day before meals  metoprolol succinate ER 25 milliGRAM(s) Oral daily  pantoprazole  Injectable 40 milliGRAM(s) IV Push two times a day      MEDICATIONS  (PRN):  acetaminophen   Tablet .. 650 milliGRAM(s) Oral every 6 hours PRN Mild Pain (1 - 3)  heparin   Injectable 8000 Unit(s) IV Push every 6 hours PRN For aPTT less than 40  heparin   Injectable 4000 Unit(s) IV Push every 6 hours PRN For aPTT between 40 - 57

## 2021-06-12 NOTE — PROGRESS NOTE ADULT - ATTENDING COMMENTS
Above noted,  PT was seen and examined. Plan of care as per above. No more bleeding, off AC  Pt is in nsr, cont to monitor hgb.  Plan for watchman evaluation once cleared by GI to start AC  Pt with orthostatic changes, probably overshot with diuretics  I agree with a/p. Above noted,  PT was seen and examined. Plan of care as per above. No more bleeding, On heparin drip.   Pt is in nsr, cont to monitor hgb.  Plan for watchman evaluation once can tolerate po ac  Hx of CAD, HFrEF, s/p CABG.   Pt with orthostatic changes, probably overshot with diuretics. We will reevaluate in am  I agree with a/p.

## 2021-06-12 NOTE — PROGRESS NOTE ADULT - ASSESSMENT
80 y.o M with a history of coronary artery disease, CABG, atrial fibrillation, diabetes, hypertension, chronic kidney disease, and BPH who presented with lightheadedness and near syncope found to be orthostatic with anemia 2/2 bleeding duodenal diverticulum with clot. s/p  IR embolization. Now resumed on AC for his Afib with close watch -for Watchman, either inpatient or outpatient.    #Acute blood loss anemia / Gastrointestinal hemorrhage   - s/p 5 U [facility], last transfusion 6/8  - S/p colonoscopy and endoscopy, + bleeding duodenal diverticulum   - S/p IR Embolization  6/5/21  - Monitor H/H,- hgb this AM 8.8  - Per patient no melena today  - C/w pantoprazole     #Atrial fibrillation  - High CHADS2 score- c/w ASA for now  - EP following- will f/u on monday to determine if watchman will be inpatient or outpatient  - Cardiology recs appreciated   - GI ok with AC. cardio restarted IV heparin     # HFrEF  - Echo done on 05/11/21 and EF is 30-35%.  - BNP is 19986.  - Hold torsemide due to profound orthostatics      # Coronary artery disease  - On atorvastatin    #Chronic kidney disease   - Stable  - Baseline Cr appears to be around 2    #Hypertension   -  orthostasis , continue to monitor BP   - Hydralazine 50 mg TID  - Imdur and tamsulosin on hold.    #Diabetes   - Accu checks stable  - Continue current insulin regimen     #BPH   - Hold tamsulosin due to orthostasis.    # Rt foot pain  -possible gout  - uric acid ordered by cardio    DNR/DNI  PT eval - home with assistance     DISPO:  Monitor H/H on heparin gtt. Pending decision if watchman to be inpatient or outpatient 80 y.o M with a history of coronary artery disease, CABG, atrial fibrillation, diabetes, hypertension, chronic kidney disease, and BPH who presented with lightheadedness and near syncope found to be orthostatic with anemia 2/2 bleeding duodenal diverticulum with clot. s/p  IR embolization. Now resumed on AC for his Afib with close watch -for Watchman, either inpatient or outpatient.    #Acute blood loss anemia / Gastrointestinal hemorrhage   - s/p 5 U [facility] last transfusion 6/8  - S/p colonoscopy and endoscopy, + bleeding duodenal diverticulum   - S/p IR Embolization  6/5/21  - Monitor H/H,- hgb this AM 8.5  - C/w pantoprazole     #Atrial fibrillation  - High CHADS2 score- c/w ASA for now  - EP following- will f/u on monday to determine if watchman will be inpatient or outpatient  - Cardiology recs appreciated   - GI ok with AC. cardio restarted IV heparin which patient seems to be tolerating    # HFrEF  - Echo done on 05/11/21 and EF is 30-35%.  - BNP is 19986.  - Hold torsemide due to profound orthostatics; reevaluate in am      # Coronary artery disease  - On atorvastatin    #Chronic kidney disease   - Stable  - Baseline Cr appears to be around 2    #Hypertension   -  orthostasis , continue to monitor BP   - Hydralazine 50 mg TID  - Imdur and tamsulosin on hold.    #Diabetes   - Accu checks stable  - Continue current insulin regimen     #BPH   - Hold tamsulosin due to orthostasis.    # Rt foot pain  -possible gout  - uric acid ordered by cardio    DNR/DNI  PT eval - home with assistance     DISPO:  Monitor H/H on heparin gtt. Pending decision if watchman to be inpatient or outpatient

## 2021-06-12 NOTE — PROGRESS NOTE ADULT - ASSESSMENT
80M h/o CAD s/p CABG c/b sepsis and sternal wound infection s/p sternum removal and pec flap and angioplasty, chronic HFrEF, p Afib (on Eliquis), HTN, DM2, and Bell's Palsy recently moved to [place] for few months from [place] presented to [facility] on 5/9/21 with complaints of back pain and dizziness after a mechanical fall also noted for the last few months he has been having continued shortness of breath and leg swelling, and was recently started on Lasix in addition to metolazone by his cardiologist in [place], repeat CT head no cerebral bleed, admitted for ADHF/bi-V faiure and KEN on CKD (Cr. 2.4), TTE with LV EF 35-40%, underwent IV diuresis, initial noted pBNP 36450 ---> 32748, pharm nuclear stress test without ischemia, RHC done with moderate pHTN Group 2 with PCWP 23, switched to torsemide 40mg, underwent MARLO/CV for pAflutter but reverted back to pAfib, metoprolol dose reduced to 50mg BID and had ILR implanted, had L-elbow laceration wound with bleeding due mechanical fall in the toilet, discharged home on 5/14/21, patient has had recurrent episodes of dizziness and had his medications further adjusted. Patient states that this morning after he went to the bathroom he was feeling very dizzy, hot, and with leg tingling like he might pass out. Patient states that he has never felt like he was fully going to pass out until today, and he has been feeling very dizzy every time he stands. Patient also notes that he has been having some dark stools, but states that he thought it was from his iron. Patient was noted to be orthostatic upon arrival to the ER with a hemoglobin of 6.7. transfused still significantly orthostatic    6/10. Pt denies chest pain, palpitations, SOB. Tele- NSR HR @ 80-90s, with PVCs, PACs, BBB. Torsemide currently DC'd for recurrence of orthostatic symptoms. If signs of HF occur consider low dose Lasix. GI/EP approve of restarting heparin gtt, Hg currently stable at 9.  6/11/2021:  Plan for endoscopy today. NPO, heparin on hold.   /83, toursamide on hold, net -152ml.   Complaints of foot pain, concern for gout.  Uric acid added to am labs.      Upper GIB  - +FOBT   - no further melena   - s/p PRBC transfusions  - s/p colonoscopy/endo showing bleeding duodenal diverticulum   - s/p IR embolization   - continue to monitor   - Watchman once can he can demonstrate tolerating AC, hold AC for 1 week per GI for allow healing, if H/H stable then discharge home on ASA 81mg on Sunday to then switch to Eliquis as outpatient in 1 week.   - Continue PPI    Atrial Fibrillation   - NSR HR @ 70s, couplets, frequent PVCs, brief episodes of atrial tachycardia  - Continue telemetry monitoring    - Watchman eval as per EP   - AC on hold as patient is w/ downtrending Hgb   - maintain heparin for possible watchman but also as a transition period to monitor for recurrent GIB before transitioning back to eliquis   - amiodarone if recurrent afib rvr     HTN  -orthostatic now   - hold imdur  - reduced hydralazine to 50mg TID   - use compression stockings     HFrEF  - ADHF/bi-V failure and KEN on CKD (Cr. 2.4)  - TTE with LV EF 35-40%, underwent IV diuresis  -  initial noted pBNP 79520 ---> 34139  -  pharm nuclear stress test without ischemia  - RHC done with moderate pHTN Group 2 with PCWP 23  - GDMT on hold 2/2 to orthostatic hypotension   - orthostasis is improved off imdur/flomax   - restart low dose metoprolol succinate 25mg; elevated pBNP, consider restart low dose Lasix 20mg daily for discharge home on Sunday for HFrEF  - Diet/lifestyle modifications and medication compliance heavily reinforced     Gout   - severe L-foot pain/warmth possible acute gout attack- consider low dose colchicine and monitor Cr.

## 2021-06-13 LAB
ANION GAP SERPL CALC-SCNC: 13 MMOL/L — SIGNIFICANT CHANGE UP (ref 5–17)
APTT BLD: 80.4 SEC — HIGH (ref 27.5–35.5)
APTT BLD: 89.2 SEC — HIGH (ref 27.5–35.5)
BLD GP AB SCN SERPL QL: SIGNIFICANT CHANGE UP
BUN SERPL-MCNC: 37.9 MG/DL — HIGH (ref 8–20)
CALCIUM SERPL-MCNC: 8.6 MG/DL — SIGNIFICANT CHANGE UP (ref 8.6–10.2)
CHLORIDE SERPL-SCNC: 103 MMOL/L — SIGNIFICANT CHANGE UP (ref 98–107)
CO2 SERPL-SCNC: 20 MMOL/L — LOW (ref 22–29)
CREAT SERPL-MCNC: 1.7 MG/DL — HIGH (ref 0.5–1.3)
GLUCOSE BLDC GLUCOMTR-MCNC: 178 MG/DL — HIGH (ref 70–99)
GLUCOSE BLDC GLUCOMTR-MCNC: 183 MG/DL — HIGH (ref 70–99)
GLUCOSE BLDC GLUCOMTR-MCNC: 184 MG/DL — HIGH (ref 70–99)
GLUCOSE BLDC GLUCOMTR-MCNC: 248 MG/DL — HIGH (ref 70–99)
GLUCOSE SERPL-MCNC: 155 MG/DL — HIGH (ref 70–99)
HCT VFR BLD CALC: 23.4 % — LOW (ref 39–50)
HCT VFR BLD CALC: 26.9 % — LOW (ref 39–50)
HGB BLD-MCNC: 7.3 G/DL — LOW (ref 13–17)
HGB BLD-MCNC: 8.6 G/DL — LOW (ref 13–17)
MCHC RBC-ENTMCNC: 28.7 PG — SIGNIFICANT CHANGE UP (ref 27–34)
MCHC RBC-ENTMCNC: 29.6 PG — SIGNIFICANT CHANGE UP (ref 27–34)
MCHC RBC-ENTMCNC: 31.2 GM/DL — LOW (ref 32–36)
MCHC RBC-ENTMCNC: 32 GM/DL — SIGNIFICANT CHANGE UP (ref 32–36)
MCV RBC AUTO: 92.1 FL — SIGNIFICANT CHANGE UP (ref 80–100)
MCV RBC AUTO: 92.4 FL — SIGNIFICANT CHANGE UP (ref 80–100)
PLATELET # BLD AUTO: 219 K/UL — SIGNIFICANT CHANGE UP (ref 150–400)
PLATELET # BLD AUTO: 265 K/UL — SIGNIFICANT CHANGE UP (ref 150–400)
POTASSIUM SERPL-MCNC: 3.9 MMOL/L — SIGNIFICANT CHANGE UP (ref 3.5–5.3)
POTASSIUM SERPL-SCNC: 3.9 MMOL/L — SIGNIFICANT CHANGE UP (ref 3.5–5.3)
RBC # BLD: 2.54 M/UL — LOW (ref 4.2–5.8)
RBC # BLD: 2.91 M/UL — LOW (ref 4.2–5.8)
RBC # FLD: 14.9 % — HIGH (ref 10.3–14.5)
RBC # FLD: 15.1 % — HIGH (ref 10.3–14.5)
SODIUM SERPL-SCNC: 136 MMOL/L — SIGNIFICANT CHANGE UP (ref 135–145)
WBC # BLD: 7.01 K/UL — SIGNIFICANT CHANGE UP (ref 3.8–10.5)
WBC # BLD: 7.86 K/UL — SIGNIFICANT CHANGE UP (ref 3.8–10.5)
WBC # FLD AUTO: 7.01 K/UL — SIGNIFICANT CHANGE UP (ref 3.8–10.5)
WBC # FLD AUTO: 7.86 K/UL — SIGNIFICANT CHANGE UP (ref 3.8–10.5)

## 2021-06-13 PROCEDURE — 99232 SBSQ HOSP IP/OBS MODERATE 35: CPT

## 2021-06-13 RX ORDER — OXYCODONE AND ACETAMINOPHEN 5; 325 MG/1; MG/1
1 TABLET ORAL ONCE
Refills: 0 | Status: DISCONTINUED | OUTPATIENT
Start: 2021-06-13 | End: 2021-06-13

## 2021-06-13 RX ORDER — APIXABAN 2.5 MG/1
2.5 TABLET, FILM COATED ORAL
Refills: 0 | Status: DISCONTINUED | OUTPATIENT
Start: 2021-06-13 | End: 2021-06-15

## 2021-06-13 RX ADMIN — PANTOPRAZOLE SODIUM 40 MILLIGRAM(S): 20 TABLET, DELAYED RELEASE ORAL at 17:39

## 2021-06-13 RX ADMIN — Medication 4: at 12:14

## 2021-06-13 RX ADMIN — Medication 2: at 08:39

## 2021-06-13 RX ADMIN — HEPARIN SODIUM 2600 UNIT(S)/HR: 5000 INJECTION INTRAVENOUS; SUBCUTANEOUS at 00:10

## 2021-06-13 RX ADMIN — Medication 50 MILLIGRAM(S): at 21:04

## 2021-06-13 RX ADMIN — INSULIN GLARGINE 10 UNIT(S): 100 INJECTION, SOLUTION SUBCUTANEOUS at 08:40

## 2021-06-13 RX ADMIN — Medication 25 MILLIGRAM(S): at 06:11

## 2021-06-13 RX ADMIN — Medication 50 MILLIGRAM(S): at 06:11

## 2021-06-13 RX ADMIN — APIXABAN 2.5 MILLIGRAM(S): 2.5 TABLET, FILM COATED ORAL at 15:54

## 2021-06-13 RX ADMIN — HEPARIN SODIUM 2600 UNIT(S)/HR: 5000 INJECTION INTRAVENOUS; SUBCUTANEOUS at 07:16

## 2021-06-13 RX ADMIN — OXYCODONE AND ACETAMINOPHEN 1 TABLET(S): 5; 325 TABLET ORAL at 21:34

## 2021-06-13 RX ADMIN — OXYCODONE AND ACETAMINOPHEN 1 TABLET(S): 5; 325 TABLET ORAL at 09:00

## 2021-06-13 RX ADMIN — OXYCODONE AND ACETAMINOPHEN 1 TABLET(S): 5; 325 TABLET ORAL at 21:04

## 2021-06-13 RX ADMIN — Medication 4 UNIT(S): at 12:15

## 2021-06-13 RX ADMIN — Medication 4 UNIT(S): at 17:39

## 2021-06-13 RX ADMIN — ATORVASTATIN CALCIUM 40 MILLIGRAM(S): 80 TABLET, FILM COATED ORAL at 21:04

## 2021-06-13 RX ADMIN — OXYCODONE AND ACETAMINOPHEN 1 TABLET(S): 5; 325 TABLET ORAL at 08:39

## 2021-06-13 RX ADMIN — Medication 2: at 17:38

## 2021-06-13 RX ADMIN — Medication 4 UNIT(S): at 08:39

## 2021-06-13 RX ADMIN — Medication 50 MILLIGRAM(S): at 14:02

## 2021-06-13 RX ADMIN — CHLORHEXIDINE GLUCONATE 1 APPLICATION(S): 213 SOLUTION TOPICAL at 06:11

## 2021-06-13 RX ADMIN — Medication 100 MILLIGRAM(S): at 08:39

## 2021-06-13 RX ADMIN — PANTOPRAZOLE SODIUM 40 MILLIGRAM(S): 20 TABLET, DELAYED RELEASE ORAL at 06:11

## 2021-06-13 NOTE — PROGRESS NOTE ADULT - SUBJECTIVE AND OBJECTIVE BOX
Chief Complaint: This is a 80y old man patient being seen in follow-up consultation for UGIB.    HPI / 24H events:  No melena, no rectal bleeding.  Feeling fine.  No complaints.    ROS: A 14-point review of systems was reviewed and was otherwise negative save what was reported in the HPI.    PAST MEDICAL/SURGICAL HISTORY:  Coronary artery disease involving native heart, angina presence unspecified, unspecified vessel or lesion type  nuc stress 5/2021 negative    Atrial fibrillation, unspecified type    Congestive heart failure, unspecified HF chronicity, unspecified heart failure type  EF 35-40%    Bell&#x27;s palsy  Right side of face    Essential hypertension    Type 2 diabetes mellitus without complication, unspecified whether long term insulin use  insulin plus orals    Osteomyelitis of sternum    Chronic kidney disease, unspecified CKD stage  stage III. Recent BL cr 2.0-2.5    Anemia secondary to renal failure  together with iron deficiency    GIB (gastrointestinal bleeding)  h/o GIB 2019 in [place].  told hemorrhoidal    S/P CABG (coronary artery bypass graft)  complicated by osteomyleitis of the sternum and sepsis; sternum removed      MEDICATIONS  (STANDING):  allopurinol 100 milliGRAM(s) Oral daily  atorvastatin 40 milliGRAM(s) Oral at bedtime  chlorhexidine 2% Cloths 1 Application(s) Topical <User Schedule>  dextrose 40% Gel 15 Gram(s) Oral once  dextrose 5%. 1000 milliLiter(s) (50 mL/Hr) IV Continuous <Continuous>  dextrose 5%. 1000 milliLiter(s) (100 mL/Hr) IV Continuous <Continuous>  dextrose 50% Injectable 25 Gram(s) IV Push once  dextrose 50% Injectable 12.5 Gram(s) IV Push once  dextrose 50% Injectable 25 Gram(s) IV Push once  glucagon  Injectable 1 milliGRAM(s) IntraMuscular once  heparin  Infusion.  Unit(s)/Hr (18 mL/Hr) IV Continuous <Continuous>  hydrALAZINE 50 milliGRAM(s) Oral three times a day  insulin glargine Injectable (LANTUS) 10 Unit(s) SubCutaneous every morning  insulin lispro (ADMELOG) corrective regimen sliding scale   SubCutaneous three times a day before meals  insulin lispro Injectable (ADMELOG) 4 Unit(s) SubCutaneous three times a day before meals  metoprolol succinate ER 25 milliGRAM(s) Oral daily  pantoprazole  Injectable 40 milliGRAM(s) IV Push two times a day    MEDICATIONS  (PRN):  acetaminophen   Tablet .. 650 milliGRAM(s) Oral every 6 hours PRN Mild Pain (1 - 3)  heparin   Injectable 8000 Unit(s) IV Push every 6 hours PRN For aPTT less than 40  heparin   Injectable 4000 Unit(s) IV Push every 6 hours PRN For aPTT between 40 - 57    No Known Allergies    T(C): 36.3 (06-13-21 @ 08:33), Max: 37.4 (06-13-21 @ 06:17)  HR: 65 (06-13-21 @ 14:03) (65 - 73)  BP: 117/79 (06-13-21 @ 14:03) (117/79 - 166/75)  RR: 17 (06-13-21 @ 08:33) (17 - 18)  SpO2: 97% (06-13-21 @ 08:33) (96% - 97%)    I&O's Summary    12 Jun 2021 07:01  -  13 Jun 2021 07:00  --------------------------------------------------------  IN: 1058 mL / OUT: 1150 mL / NET: -92 mL    13 Jun 2021 07:01  -  13 Jun 2021 15:08  --------------------------------------------------------  IN: 104 mL / OUT: 300 mL / NET: -196 mL      PHYSICAL EXAM:  Constitutional: Well-developed, well-nourished, in no apparent distress  Eyes: Sclerae anicteric, conjunctivae normal  ENMT: Mucus membranes moist, no oropharyngeal thrush noted  Neck: No thyroid nodules appreciated, no significant cervical or supraclavicular lymphadenopathy  Respiratory: Breathing nonlabored; clear to auscultation  Cardiovascular: Regular rate and rhythm  Gastrointestinal: Soft, nontender, nondistended, normoactive bowel sounds; no hepatosplenomegaly appreciated; no rebound tenderness or involuntary guarding  Extremities: No clubbing, cyanosis or edema  Neurological: Alert and oriented to person, place and time; no asterixis  Skin: No jaundice  Lymph Nodes: No significant lymphadenopathy  Musculoskeletal: No significant peripheral atrophy  Psychiatric: Affect and mood appropriate      LABS:               8.6    7.86  )-----------( 265      ( 06-13 @ 10:34 )             26.9                7.3    7.01  )-----------( 219      ( 06-13 @ 06:17 )             23.4                8.5    7.64  )-----------( 219      ( 06-12 @ 08:41 )             27.2                9.2    11.49 )-----------( 265      ( 06-11 @ 18:25 )             29.1                8.8    8.66  )-----------( 211      ( 06-11 @ 07:44 )             27.0                7.9    9.39  )-----------( 178      ( 06-10 @ 23:41 )             25.0       06-13    136  |  103  |  37.9<H>  ----------------------------<  155<H>  3.9   |  20.0<L>  |  1.70<H>    Ca    8.6      13 Jun 2021 06:17            PTT - ( 13 Jun 2021 06:17 )  PTT:80.4 sec

## 2021-06-13 NOTE — PROGRESS NOTE ADULT - ASSESSMENT
80M h/o CAD s/p CABG c/b sepsis and sternal wound infection s/p sternum removal and pec flap and angioplasty, chronic HFrEF, p Afib (on Eliquis), HTN, DM2, and Bell's Palsy recently moved to [place] for few months from [place] presented to [facility] on 5/9/21 with complaints of back pain and dizziness after a mechanical fall also noted for the last few months he has been having continued shortness of breath and leg swelling, and was recently started on Lasix in addition to metolazone by his cardiologist in [place], repeat CT head no cerebral bleed, admitted for ADHF/bi-V faiure and KEN on CKD (Cr. 2.4), TTE with LV EF 35-40%, underwent IV diuresis, initial noted pBNP 27991 ---> 57217, pharm nuclear stress test without ischemia, RHC done with moderate pHTN Group 2 with PCWP 23, switched to torsemide 40mg, underwent MARLO/CV for pAflutter but reverted back to pAfib, metoprolol dose reduced to 50mg BID and had ILR implanted, had L-elbow laceration wound with bleeding due mechanical fall in the toilet, discharged home on 5/14/21, patient has had recurrent episodes of dizziness and had his medications further adjusted. Patient states that this morning after he went to the bathroom he was feeling very dizzy, hot, and with leg tingling like he might pass out. Patient states that he has never felt like he was fully going to pass out until today, and he has been feeling very dizzy every time he stands. Patient also notes that he has been having some dark stools, but states that he thought it was from his iron. Patient was noted to be orthostatic upon arrival to the ER with a hemoglobin of 6.7. transfused still significantly orthostatic    6/10. Pt denies chest pain, palpitations, SOB. Tele- NSR HR @ 80-90s, with PVCs, PACs, BBB. Torsemide currently DC'd for recurrence of orthostatic symptoms. If signs of HF occur consider low dose Lasix. GI/EP approve of restarting heparin gtt, Hg currently stable at 9.  6/11/2021:  Plan for endoscopy today. NPO, heparin on hold.   /83, toursamide on hold, net -152ml.   Complaints of foot pain, concern for gout.  Uric acid added to am labs.    6/12/21 - orthostatic       Upper GIB  - +FOBT   - no further melena   - s/p PRBC transfusions  - s/p colonoscopy/endo showing bleeding duodenal diverticulum   - s/p IR embolization   - continue to monitor   - Watchman once can he can demonstrate tolerating AC, hold AC for 1 week per GI for allow healing, if H/H stable then discharge home on ASA 81mg on Sunday to then switch to Eliquis as outpatient in 1 week.   - Continue PPI    Atrial Fibrillation   - NSR HR @ 70s, couplets, frequent PVCs, brief episodes of atrial tachycardia  - Continue telemetry monitoring    - Watchman eval as per EP   - AC on hold as patient is w/ downtrending Hgb   - maintain heparin for possible watchman but also as a transition period to monitor for recurrent GIB before transitioning back to eliquis   - amiodarone if recurrent afib rvr     HTN  -orthostatic now   - likely in setting of overdiuresis  - continue toprol   - hold imdur  - reduced hydralazine to 50mg TID   - recheck today  - use compression stockings     HFrEF  - ADHF/bi-V failure and KEN on CKD (Cr. 2.4)  - TTE with LV EF 35-40%, underwent IV diuresis  -  initial noted pBNP 66081 ---> 92531  -  pharm nuclear stress test without ischemia  - RHC done with moderate pHTN Group 2 with PCWP 23  - GDMT on hold 2/2 to orthostatic hypotension   - orthostasis is improved off imdur/flomax   - continue toprol   - can resume low dose lasix 20mg daily when stable for DC   - Diet/lifestyle modifications and medication compliance heavily reinforced     Gout   - severe L-foot pain/warmth possible acute gout attack- consider low dose colchicine and monitor Cr. 80M h/o CAD s/p CABG c/b sepsis and sternal wound infection s/p sternum removal and pec flap and angioplasty, chronic HFrEF, p Afib (on Eliquis), HTN, DM2, and Bell's Palsy recently moved to [place] for few months from [place] presented to [facility] on 5/9/21 with complaints of back pain and dizziness after a mechanical fall also noted for the last few months he has been having continued shortness of breath and leg swelling, and was recently started on Lasix in addition to metolazone by his cardiologist in [place], repeat CT head no cerebral bleed, admitted for ADHF/bi-V faiure and KEN on CKD (Cr. 2.4), TTE with LV EF 35-40%, underwent IV diuresis, initial noted pBNP 59049 ---> 56583, pharm nuclear stress test without ischemia, RHC done with moderate pHTN Group 2 with PCWP 23, switched to torsemide 40mg, underwent MARLO/CV for pAflutter but reverted back to pAfib, metoprolol dose reduced to 50mg BID and had ILR implanted, had L-elbow laceration wound with bleeding due mechanical fall in the toilet, discharged home on 5/14/21, patient has had recurrent episodes of dizziness and had his medications further adjusted. Patient states that this morning after he went to the bathroom he was feeling very dizzy, hot, and with leg tingling like he might pass out. Patient states that he has never felt like he was fully going to pass out until today, and he has been feeling very dizzy every time he stands. Patient also notes that he has been having some dark stools, but states that he thought it was from his iron. Patient was noted to be orthostatic upon arrival to the ER with a hemoglobin of 6.7. transfused still significantly orthostatic    6/10. Pt denies chest pain, palpitations, SOB. Tele- NSR HR @ 80-90s, with PVCs, PACs, BBB. Torsemide currently DC'd for recurrence of orthostatic symptoms. If signs of HF occur consider low dose Lasix. GI/EP approve of restarting heparin gtt, Hg currently stable at 9.  6/11/2021:  Plan for endoscopy today. NPO, heparin on hold.   /83, toursamide on hold, net -152ml.   Complaints of foot pain, concern for gout.  Uric acid added to am labs.    6/12/21 - orthostatic       Upper GIB  - +FOBT   - no further melena   - s/p PRBC transfusions  - s/p colonoscopy/endo showing bleeding duodenal diverticulum   - s/p IR embolization   - continue to monitor   - Watchman once can he can demonstrate tolerating AC, hold AC for 1 week per GI for allow healing, if H/H stable then discharge home on ASA 81mg on Sunday to then switch to Eliquis as outpatient in 1 week.   - Continue PPI    Atrial Fibrillation   - NSR HR @ 70s, couplets, frequent PVCs, brief episodes of atrial tachycardia  - Continue telemetry monitoring    - Watchman eval as per EP   - AC on hold as patient is w/ downtrending Hgb     HTN  -orthostatic now   - likely in setting of overdiuresis  - continue toprol   - hold imdur  - reduced hydralazine to 50mg TID   - recheck today  - use compression stockings     HFrEF  - ADHF/bi-V failure and KEN on CKD (Cr. 2.4)  - TTE with LV EF 35-40%, underwent IV diuresis  -  initial noted pBNP 68493 ---> 31533  -  pharm nuclear stress test without ischemia  - RHC done with moderate pHTN Group 2 with PCWP 23  - GDMT on hold 2/2 to orthostatic hypotension   - orthostasis is improved off imdur/flomax   - continue toprol   - can resume low dose lasix 20mg daily when stable for DC   - Diet/lifestyle modifications and medication compliance heavily reinforced     Gout   - severe L-foot pain/warmth possible acute gout attack- consider low dose colchicine and monitor Cr.

## 2021-06-13 NOTE — PROGRESS NOTE ADULT - ASSESSMENT
Patient tolerating heparin challenge.  Recommend indefinite use of pantoprazole.  No objection to anticoagulation use in any way deemed necessary by cardiology.      GI will sign off.  Please reconsult as needed and call with any questions or concerns.

## 2021-06-13 NOTE — PROGRESS NOTE ADULT - SUBJECTIVE AND OBJECTIVE BOX
[facility] [id number]            [facility]                          [address] [address]                       Phone: [phone]. Fax:[phone]                      ________________________________________________    HPI:  80M h/o CAD s/p CABG c/b sepsis and sternal wound infection s/p sternum removal and pec flap and angioplasty, chronic HFrEF- recent EF 35-40%, p Afib (on Eliquis), HTN, DM2, and Bell's Palsy recently moved to [place] for few months from [place] presented to ED with LH/presyncope and fatigue.  Hgb 6.7--> reduced from 9.0 on  with melanic stool and FOBT +. Cardiology and GI have been consulted.  PRBC ordered. Orthostatics positive.  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  Trop .06 however is chronically elevated. Patient denies fevers, CP, SOB, abdominal pain, N/V/D, headache, or vision changes. He did not notice any black stools, but states they are always dark as he takes iron.  He has chronic unchanged intermittent diarrhea.  He did feel shaky today and had some chills.  His previous LE edema resolved with his last admission and has remained stable.    Extensive notable history: Pt admitted - with complaints of back pain and dizziness after a mechanical fall also noted for the last few months he has been having continued shortness of breath and leg swelling, and was recently started on Lasix in addition to metolazone by his cardiologist in [place], repeat CT head no cerebral bleed, admitted for ADHF/bi-V faiure and KEN on CKD (Cr. 2.4), TTE with LV EF 35-40%, underwent IV diuresis, initial noted pBNP 41298 ---> 79710, pharm nuclear stress test without ischemia, RHC done with moderate pHTN Group 2 with PCWP 23, switched to torsemide 40mg, underwent MARLO/CV for pAflutter but reverted back to pAfib, metoprolol dose reduced to 50mg BID and had ILR implanted, had L-elbow laceration wound with bleeding due mechanical fall in the toilet, discharged home on 21, had episodes of sinus bradycardia 40-50s with recurrent dizziness, metoprolol reduced to 25mg BID, seen on 21 at  outpatient cardiology visit had metoprolol discontinued for sinus bradycardia, uptitrated hydralazine dose to 50mg TID for uncontrolled HTN/HFrEF  He was seen again in ED   with episodes of recurring dizziness, nausea and vomited x1, reports also L-sided chest pressure for few minutes, vitals with /70s in the ER.  Trop's negative and pt discharged after a stay for observation.         (2021 14:28)    HOME MEDICATIONS:  aspirin 81 mg oral delayed release tablet: 1 tab(s) orally once a day (2021 17:37)  atorvastatin 40 mg oral tablet: 1 tab(s) orally once a day (2021 17:37)  Eliquis 5 mg oral tablet: 1 tab(s) orally 2 times a day (2021 17:37)  ferrous sulfate 300 mg (60 mg elemental iron) oral tablet: 1 tab(s) orally once a day (2021 17:37)  Flomax 0.4 mg oral capsule: 1 cap(s) orally once a day (2021 17:37)  hydrALAZINE 25 mg oral tablet: 2 tab(s) orally 3 times a day (2021 17:37)  isosorbide mononitrate 30 mg oral tablet, extended release: 1 tab(s) orally once a day (2021 17:37)  Lantus 100 units/mL subcutaneous solution: 17 unit(s) subcutaneous 2 times a day (2021 17:37)  magnesium oxide 400 mg (241.3 mg elemental magnesium) oral tablet: 1 tab(s) orally once a day (2021 17:37)  omeprazole 40 mg oral delayed release capsule: 1 cap(s) orally once a day (2021 17:37)  potassium chloride 20 mEq oral tablet, extended release: 2 tab(s) orally once a day (2021 17:37)  torsemide 20 mg oral tablet: 2 tab(s) orally once a day (2021 17:37)      ROS: All review of systems negative unless indicated otherwise below.                         PHYSICAL EXAM:    GENERAL: NAD  NECK: Supple, No JVD  NERVOUS SYSTEM:  Alert & Oriented X3, non focal neuro exam.   CHEST/LUNG: clear lungs, No rales, rhonchi, wheezing, or rubs  HEART: Regular rate and rhythm; s1 and s2 auscultated, No murmurs, rubs, or gallops  ABDOMEN: Soft, Nontender, Nondistended; Bowel sounds present and normoactive.   EXTREMITIES:  2+ Peripheral Pulses, No clubbing, cyanosis, or edema       Vital Signs Last 24 Hrs  T(C): 36.3 (2021 08:33), Max: 37.4 (2021 06:17)  T(F): 97.3 (2021 08:33), Max: 99.3 (2021 06:17)  HR: 66 (2021 08:33) (65 - 73)  BP: 152/81 (2021 08:33) (140/70 - 166/75)  BP(mean): --  RR: 17 (2021 08:33) (17 - 18)  SpO2: 97% (2021 08:33) (96% - 97%)                                                   DAILY WEIGHTS - 48 HOUR TREND     Daily Weight in k.8 (2021 04:35), Weight in k.8 (2021 05:11)                             INTAKE AND OUTPUT - 48 HOUR TREND     21 @ 07:01  -  21 @ 07:00  --------------------------------------------------------  IN:  Total IN: 0 mL    OUT:    Voided (mL): 1425 mL  Total OUT: 1425 mL    Total NET: -1425 mL      21 @ 07:01  -  21 @ 07:00  --------------------------------------------------------  IN:  Total IN: 0 mL    OUT:    Voided (mL): 1150 mL  Total OUT: 1150 mL    Total NET: -1150 mL                                                           LAB RESULTS                 COMPLETE BLOOD COUNT( 2021 10:34 )                            8.6 g/dL<L>  7.86 K/uL )---------------( 265 K/uL                        26.9 %<L>      Automated Differential     Auto Basophil # - X      Auto Basophil % - X      Auto Eosinophil # - X      Auto Eosinophil % - X      Auto Immature Granulocyte # - X      Auto Immature Granulocyte % - X      Auto Lymphocyte # - X      Auto Lymphocyte % - X      Auto Monocyte # - X      Auto Monocyte % - X      Auto Neutrophil # - X      Auto Neutrophil % - X                                      CHEMISTRY                 Basic Metabolic Panel (21 @ 06:17)    136  |  103  |  37.9<H>  ----------------------------<  155<H>  3.9   |  20.0<L>  |  1.70<H>    Ca    8.6      2021 06:17  Phos  3.0     06-09  Mg     1.7     06-09                    Liver Functions (21 @ 05:43))  TPro  6.1  /  Alb  3.4  /  TBili  0.7  /  DBili  x   /  AST  16  /  ALT  12  /  AlkPhos  103     PT/INR/PTT ( 2021 06:17 )                        :                       :      X            :       80.4                  .        .                   .              .           .       X           .                                                                 Cardiac Enzymes   ( 10 Eddie 2021 06:22 )  Troponin T  X    ,  CPK  X    , CKMB  X    , BNP 61826<H>    , ( 2021 12:00 )  Troponin T  0.06 ,  CPK  X    , CKMB  X    , BNP X                                 Current Admission Active Medications    acetaminophen   Tablet .. 650 milliGRAM(s) Oral every 6 hours PRN Mild Pain (1 - 3)  allopurinol 100 milliGRAM(s) Oral daily  atorvastatin 40 milliGRAM(s) Oral at bedtime  chlorhexidine 2% Cloths 1 Application(s) Topical <User Schedule>  dextrose 40% Gel 15 Gram(s) Oral once  dextrose 5%. 1000 milliLiter(s) (100 mL/Hr) IV Continuous <Continuous>  dextrose 5%. 1000 milliLiter(s) (50 mL/Hr) IV Continuous <Continuous>  dextrose 50% Injectable 25 Gram(s) IV Push once  dextrose 50% Injectable 12.5 Gram(s) IV Push once  dextrose 50% Injectable 25 Gram(s) IV Push once  glucagon  Injectable 1 milliGRAM(s) IntraMuscular once  heparin   Injectable 8000 Unit(s) IV Push every 6 hours PRN For aPTT less than 40  heparin   Injectable 4000 Unit(s) IV Push every 6 hours PRN For aPTT between 40 - 57  heparin  Infusion.  Unit(s)/Hr (18 mL/Hr) IV Continuous <Continuous>  hydrALAZINE 50 milliGRAM(s) Oral three times a day  insulin glargine Injectable (LANTUS) 10 Unit(s) SubCutaneous every morning  insulin lispro (ADMELOG) corrective regimen sliding scale   SubCutaneous three times a day before meals  insulin lispro Injectable (ADMELOG) 4 Unit(s) SubCutaneous three times a day before meals  metoprolol succinate ER 25 milliGRAM(s) Oral daily  pantoprazole  Injectable 40 milliGRAM(s) IV Push two times a day                            CARDIOLOGY RESULTS: Official Report/Preliminary Verbal Reports  < from: MARLO Echo Doppler (21 @ 16:06) >  Summary:   1. Left ventricular ejection fraction, by visual estimation, is 30 to 35%.   2. Moderately decreased global left ventricular systolic function.   3. Severely enlarged right atrium.   4. Severely enlarged right ventricle.   5. Severely reduced RV systolic function.   6. Mild to moderately enlarged left atrium.   7. There is no evidence of pericardial effusion.   8. Mild mitral annular calcification.   9. Mild mitral valve regurgitation.  10. Moderate-severe tricuspid regurgitation.  11. Color flow doppler and intravenous injection of agitated saline demonstrates the presence of an intact intra atrial septum.  12. No left atrial appendage thrombus and decreased left atrial appendage velocities.    MD Rayo Electronically signed on 2021 at 4:52:38 PM    < end of copied text >                          CARDIOLOGY REVIEW: Personally visualized and reviewed  Telemetry: SR 70s, PVC, PAC

## 2021-06-13 NOTE — PROGRESS NOTE ADULT - ATTENDING COMMENTS
pt with BM today, brwon, no bleeding   HGB is stable  BP is better controlled and euvolemic  I spoke to Dr Funez, Start low dose Eliquis 2.5 mg bid and if no bleeding can progress to full dose in a few days.

## 2021-06-13 NOTE — PROGRESS NOTE ADULT - ASSESSMENT
80 y.o M with a history of coronary artery disease, CABG, atrial fibrillation, diabetes, hypertension, chronic kidney disease, and BPH who presented with lightheadedness and near syncope found to be orthostatic with anemia 2/2 bleeding duodenal diverticulum with clot. s/p  IR embolization. Now resumed on AC for his Afib with close watch -for Watchman, either inpatient or outpatient.    #Acute blood loss anemia / Gastrointestinal hemorrhage  - appears to be stable   - s/p 5 U PBRC, last transfusion 6/8  - S/p colonoscopy and endoscopy, + bleeding duodenal diverticulum   - S/p IR Embolization  6/5/21  - Monitor H/H,  - C/w pantoprazole     #Atrial fibrillation  - High CHADS2 score  - GI ok with AC. cardio restarted IV heparin which patient seems to be tolerating - discussed with Cardiology Dr Duvall 6/13; no watchman currently - recommend restarting Apixaban at lower dose; observing for bleeding    # HFrEF  - Echo done on 05/11/21 and EF is 30-35%.  - BNP [place] 19986.  - Hold torsemide due to profound orthostatics; reevaluate in am      # Coronary artery disease  - On atorvastatin    #Chronic kidney disease   - Stable  - Baseline Cr appears to be around 2    #Hypertension   -  orthostasis , continue to monitor BP   - Hydralazine 50 mg TID  - Imdur and tamsulosin on hold.    #Diabetes   - Accu checks stable  - Continue current insulin regimen     #BPH   - Hold tamsulosin due to orthostasis.    # Rt foot pain  -possible gout  - uric acid ordered by cardio    DNR/DNI  PT eval - home with assistance     DISPO:  Monitor H/H on resuming Apixaban

## 2021-06-13 NOTE — PROGRESS NOTE ADULT - SUBJECTIVE AND OBJECTIVE BOX
HOSPITALIST PROGRESS NOTE    CHU LOGAN  [id number]  80yMale    Patient is a 80y old  Male who presents with a chief complaint of GIB/anemia (13 Jun 2021 15:08)      SUBJECTIVE:   Chart reviewed since last visit.  Patient seen and examined at bedside for GIB, ABLA, CHFrEF  Denies any dizziness, chest pain, abdominal pain, palpitations.  Denies any nausea, vomiting or melena      OBJECTIVE:  Vital Signs Last 24 Hrs  T(C): 36.3 (13 Jun 2021 08:33), Max: 37.4 (13 Jun 2021 06:17)  T(F): 97.3 (13 Jun 2021 08:33), Max: 99.3 (13 Jun 2021 06:17)  HR: 65 (13 Jun 2021 14:03) (65 - 73)  BP: 117/79 (13 Jun 2021 14:03) (117/79 - 166/75)   RR: 17 (13 Jun 2021 08:33) (17 - 18)  SpO2: 97% (13 Jun 2021 08:33) (96% - 97%)      PHYSICAL EXAMINATION  General: Sitting in chair, NAD  HEENT:  EOMI  NECK:  Supple  CVS: Irregular S1 S2. Midline sternotomy scar well healed  RESP:  CTAB  GI:  Soft nondistended nontender BS+  : No suprapubic tenderness  MSK:  FROM  CNS:  No gross focal or global deficit appreciated  INTEG:  Warm dry skin  PSYCH:  Fair mood    MONITOR:  CAPILLARY BLOOD GLUCOSE      POCT Blood Glucose.: 248 mg/dL (13 Jun 2021 11:46)  POCT Blood Glucose.: 184 mg/dL (13 Jun 2021 08:36)  POCT Blood Glucose.: 216 mg/dL (12 Jun 2021 21:23)  POCT Blood Glucose.: 166 mg/dL (12 Jun 2021 17:52)        I&O's Summary    12 Jun 2021 07:01  -  13 Jun 2021 07:00  --------------------------------------------------------  IN: 1058 mL / OUT: 1150 mL / NET: -92 mL    13 Jun 2021 07:01  -  13 Jun 2021 15:31  --------------------------------------------------------  IN: 104 mL / OUT: 300 mL / NET: -196 mL                            8.6    7.86  )-----------( 265      ( 13 Jun 2021 10:34 )             26.9     PTT - ( 13 Jun 2021 06:17 )  PTT:80.4 sec  06-13    136  |  103  |  37.9<H>  ----------------------------<  155<H>  3.9   |  20.0<L>  |  1.70<H>    Ca    8.6      13 Jun 2021 06:17              Culture:    TTE:    RADIOLOGY        MEDICATIONS  (STANDING):  allopurinol 100 milliGRAM(s) Oral daily  apixaban 2.5 milliGRAM(s) Oral two times a day  atorvastatin 40 milliGRAM(s) Oral at bedtime  chlorhexidine 2% Cloths 1 Application(s) Topical <User Schedule>  dextrose 40% Gel 15 Gram(s) Oral once  dextrose 5%. 1000 milliLiter(s) (50 mL/Hr) IV Continuous <Continuous>  dextrose 5%. 1000 milliLiter(s) (100 mL/Hr) IV Continuous <Continuous>  dextrose 50% Injectable 25 Gram(s) IV Push once  dextrose 50% Injectable 12.5 Gram(s) IV Push once  dextrose 50% Injectable 25 Gram(s) IV Push once  glucagon  Injectable 1 milliGRAM(s) IntraMuscular once  hydrALAZINE 50 milliGRAM(s) Oral three times a day  insulin glargine Injectable (LANTUS) 10 Unit(s) SubCutaneous every morning  insulin lispro (ADMELOG) corrective regimen sliding scale   SubCutaneous three times a day before meals  insulin lispro Injectable (ADMELOG) 4 Unit(s) SubCutaneous three times a day before meals  metoprolol succinate ER 25 milliGRAM(s) Oral daily  pantoprazole  Injectable 40 milliGRAM(s) IV Push two times a day      MEDICATIONS  (PRN):  acetaminophen   Tablet .. 650 milliGRAM(s) Oral every 6 hours PRN Mild Pain (1 - 3)

## 2021-06-14 LAB
ALBUMIN SERPL ELPH-MCNC: 2.8 G/DL — LOW (ref 3.3–5.2)
ALP SERPL-CCNC: 121 U/L — HIGH (ref 40–120)
ALT FLD-CCNC: 14 U/L — SIGNIFICANT CHANGE UP
ANION GAP SERPL CALC-SCNC: 10 MMOL/L — SIGNIFICANT CHANGE UP (ref 5–17)
APTT BLD: 32.3 SEC — SIGNIFICANT CHANGE UP (ref 27.5–35.5)
AST SERPL-CCNC: 15 U/L — SIGNIFICANT CHANGE UP
BILIRUB SERPL-MCNC: 0.5 MG/DL — SIGNIFICANT CHANGE UP (ref 0.4–2)
BUN SERPL-MCNC: 39 MG/DL — HIGH (ref 8–20)
CALCIUM SERPL-MCNC: 8.6 MG/DL — SIGNIFICANT CHANGE UP (ref 8.6–10.2)
CHLORIDE SERPL-SCNC: 101 MMOL/L — SIGNIFICANT CHANGE UP (ref 98–107)
CO2 SERPL-SCNC: 23 MMOL/L — SIGNIFICANT CHANGE UP (ref 22–29)
CREAT SERPL-MCNC: 1.77 MG/DL — HIGH (ref 0.5–1.3)
GLUCOSE BLDC GLUCOMTR-MCNC: 164 MG/DL — HIGH (ref 70–99)
GLUCOSE BLDC GLUCOMTR-MCNC: 168 MG/DL — HIGH (ref 70–99)
GLUCOSE BLDC GLUCOMTR-MCNC: 200 MG/DL — HIGH (ref 70–99)
GLUCOSE BLDC GLUCOMTR-MCNC: 255 MG/DL — HIGH (ref 70–99)
GLUCOSE SERPL-MCNC: 152 MG/DL — HIGH (ref 70–99)
HCT VFR BLD CALC: 24.7 % — LOW (ref 39–50)
HGB BLD-MCNC: 7.6 G/DL — LOW (ref 13–17)
MCHC RBC-ENTMCNC: 28.6 PG — SIGNIFICANT CHANGE UP (ref 27–34)
MCHC RBC-ENTMCNC: 30.8 GM/DL — LOW (ref 32–36)
MCV RBC AUTO: 92.9 FL — SIGNIFICANT CHANGE UP (ref 80–100)
PLATELET # BLD AUTO: 247 K/UL — SIGNIFICANT CHANGE UP (ref 150–400)
POTASSIUM SERPL-MCNC: 4.5 MMOL/L — SIGNIFICANT CHANGE UP (ref 3.5–5.3)
POTASSIUM SERPL-SCNC: 4.5 MMOL/L — SIGNIFICANT CHANGE UP (ref 3.5–5.3)
PROT SERPL-MCNC: 6.2 G/DL — LOW (ref 6.6–8.7)
RBC # BLD: 2.66 M/UL — LOW (ref 4.2–5.8)
RBC # FLD: 15 % — HIGH (ref 10.3–14.5)
SODIUM SERPL-SCNC: 134 MMOL/L — LOW (ref 135–145)
WBC # BLD: 6.04 K/UL — SIGNIFICANT CHANGE UP (ref 3.8–10.5)
WBC # FLD AUTO: 6.04 K/UL — SIGNIFICANT CHANGE UP (ref 3.8–10.5)

## 2021-06-14 PROCEDURE — 99233 SBSQ HOSP IP/OBS HIGH 50: CPT

## 2021-06-14 PROCEDURE — 99232 SBSQ HOSP IP/OBS MODERATE 35: CPT

## 2021-06-14 RX ORDER — INSULIN GLARGINE 100 [IU]/ML
15 INJECTION, SOLUTION SUBCUTANEOUS AT BEDTIME
Refills: 0 | Status: DISCONTINUED | OUTPATIENT
Start: 2021-06-14 | End: 2021-06-15

## 2021-06-14 RX ADMIN — Medication 2: at 08:22

## 2021-06-14 RX ADMIN — Medication 4 UNIT(S): at 08:22

## 2021-06-14 RX ADMIN — ATORVASTATIN CALCIUM 40 MILLIGRAM(S): 80 TABLET, FILM COATED ORAL at 21:15

## 2021-06-14 RX ADMIN — APIXABAN 2.5 MILLIGRAM(S): 2.5 TABLET, FILM COATED ORAL at 05:15

## 2021-06-14 RX ADMIN — Medication 50 MILLIGRAM(S): at 05:15

## 2021-06-14 RX ADMIN — Medication 2: at 12:47

## 2021-06-14 RX ADMIN — Medication 650 MILLIGRAM(S): at 05:15

## 2021-06-14 RX ADMIN — Medication 50 MILLIGRAM(S): at 21:15

## 2021-06-14 RX ADMIN — PANTOPRAZOLE SODIUM 40 MILLIGRAM(S): 20 TABLET, DELAYED RELEASE ORAL at 05:16

## 2021-06-14 RX ADMIN — PANTOPRAZOLE SODIUM 40 MILLIGRAM(S): 20 TABLET, DELAYED RELEASE ORAL at 17:11

## 2021-06-14 RX ADMIN — INSULIN GLARGINE 15 UNIT(S): 100 INJECTION, SOLUTION SUBCUTANEOUS at 21:45

## 2021-06-14 RX ADMIN — APIXABAN 2.5 MILLIGRAM(S): 2.5 TABLET, FILM COATED ORAL at 17:11

## 2021-06-14 RX ADMIN — Medication 4 UNIT(S): at 17:11

## 2021-06-14 RX ADMIN — CHLORHEXIDINE GLUCONATE 1 APPLICATION(S): 213 SOLUTION TOPICAL at 05:16

## 2021-06-14 RX ADMIN — Medication 50 MILLIGRAM(S): at 13:50

## 2021-06-14 RX ADMIN — Medication 4 UNIT(S): at 12:47

## 2021-06-14 RX ADMIN — Medication 100 MILLIGRAM(S): at 08:23

## 2021-06-14 RX ADMIN — Medication 2: at 17:11

## 2021-06-14 RX ADMIN — INSULIN GLARGINE 10 UNIT(S): 100 INJECTION, SOLUTION SUBCUTANEOUS at 08:21

## 2021-06-14 RX ADMIN — Medication 25 MILLIGRAM(S): at 05:15

## 2021-06-14 NOTE — PROGRESS NOTE ADULT - SUBJECTIVE AND OBJECTIVE BOX
HOSPITALIST PROGRESS NOTE    CHU LOGAN  [id number]  80yMale    Patient is a 80y old  Male who presents with a chief complaint of GIB/anemia (14 Jun 2021 09:04)      SUBJECTIVE:   Chart reviewed since last visit.  Patient seen and examined at bedside for GIB, ABLA and AF  Denies any dyspnea, chest pain, palpitations, dizziness  BM yesterday - brown      OBJECTIVE:  Vital Signs Last 24 Hrs  T(C): 36.5 (14 Jun 2021 08:20), Max: 36.9 (13 Jun 2021 16:02)  T(F): 97.7 (14 Jun 2021 08:20), Max: 98.4 (13 Jun 2021 16:02)  HR: 58 (14 Jun 2021 13:52) (58 - 66)  BP: 129/62 (14 Jun 2021 13:52) (129/62 - 162/76)   RR: 17 (14 Jun 2021 08:20) (17 - 18)  SpO2: 97% (14 Jun 2021 08:20) (96% - 98%)    PHYSICAL EXAMINATION  General: Sitting in chair, NAD  HEENT:  EOMI  NECK:  Supple  CVS: Irregular S1 S2. Midline sternotomy scar well healed  RESP:  CTAB  GI:  Soft nondistended nontender BS+  : No suprapubic tenderness  MSK:  FROM  CNS:  No gross focal or global deficit appreciated  INTEG:  Warm dry skin  PSYCH:  Fair mood      MONITOR:  CAPILLARY BLOOD GLUCOSE      POCT Blood Glucose.: 200 mg/dL (14 Jun 2021 12:10)  POCT Blood Glucose.: 164 mg/dL (14 Jun 2021 07:57)  POCT Blood Glucose.: 178 mg/dL (13 Jun 2021 21:20)  POCT Blood Glucose.: 183 mg/dL (13 Jun 2021 17:10)        I&O's Summary    13 Jun 2021 07:01  -  14 Jun 2021 07:00  --------------------------------------------------------  IN: 1022 mL / OUT: 1542 mL / NET: -520 mL    14 Jun 2021 07:01  -  14 Jun 2021 14:47  --------------------------------------------------------  IN: 0 mL / OUT: 350 mL / NET: -350 mL                            7.6    6.04  )-----------( 247      ( 14 Jun 2021 06:01 )             24.7     PTT - ( 14 Jun 2021 06:03 )  PTT:32.3 sec  06-14    134<L>  |  101  |  39.0<H>  ----------------------------<  152<H>  4.5   |  23.0  |  1.77<H>    Ca    8.6      14 Jun 2021 06:01    TPro  6.2<L>  /  Alb  2.8<L>  /  TBili  0.5  /  DBili  x   /  AST  15  /  ALT  14  /  AlkPhos  121<H>  06-14            Culture:    TTE:    RADIOLOGY        MEDICATIONS  (STANDING):  allopurinol 100 milliGRAM(s) Oral daily  apixaban 2.5 milliGRAM(s) Oral two times a day  atorvastatin 40 milliGRAM(s) Oral at bedtime  chlorhexidine 2% Cloths 1 Application(s) Topical <User Schedule>  dextrose 40% Gel 15 Gram(s) Oral once  dextrose 5%. 1000 milliLiter(s) (50 mL/Hr) IV Continuous <Continuous>  dextrose 5%. 1000 milliLiter(s) (100 mL/Hr) IV Continuous <Continuous>  dextrose 50% Injectable 25 Gram(s) IV Push once  dextrose 50% Injectable 12.5 Gram(s) IV Push once  dextrose 50% Injectable 25 Gram(s) IV Push once  glucagon  Injectable 1 milliGRAM(s) IntraMuscular once  hydrALAZINE 50 milliGRAM(s) Oral three times a day  insulin glargine Injectable (LANTUS) 15 Unit(s) SubCutaneous at bedtime  insulin lispro (ADMELOG) corrective regimen sliding scale   SubCutaneous three times a day before meals  insulin lispro Injectable (ADMELOG) 4 Unit(s) SubCutaneous three times a day before meals  metoprolol succinate ER 25 milliGRAM(s) Oral daily  pantoprazole  Injectable 40 milliGRAM(s) IV Push two times a day      MEDICATIONS  (PRN):  acetaminophen   Tablet .. 650 milliGRAM(s) Oral every 6 hours PRN Mild Pain (1 - 3)

## 2021-06-14 NOTE — PROGRESS NOTE ADULT - SUBJECTIVE AND OBJECTIVE BOX
SUBJECTIVE:  Cardiology NP F/U note:  GIB / Anemia  "feels good " denies complaints of chest pain/sob/dizziness/palps denies abd pain or dark stools  chuy diet/ OOB         	  MEDICATIONS  (STANDING):  allopurinol 100 milliGRAM(s) Oral daily  apixaban 2.5 milliGRAM(s) Oral two times a day  atorvastatin 40 milliGRAM(s) Oral at bedtime  chlorhexidine 2% Cloths 1 Application(s) Topical <User Schedule>  dextrose 40% Gel 15 Gram(s) Oral once  dextrose 5%. 1000 milliLiter(s) IV Continuous <Continuous>  dextrose 5%. 1000 milliLiter(s) IV Continuous <Continuous>  dextrose 50% Injectable 25 Gram(s) IV Push once  dextrose 50% Injectable 12.5 Gram(s) IV Push once  dextrose 50% Injectable 25 Gram(s) IV Push once  glucagon  Injectable 1 milliGRAM(s) IntraMuscular once  hydrALAZINE 50 milliGRAM(s) Oral three times a day  insulin glargine Injectable (LANTUS) 15 Unit(s) SubCutaneous at bedtime  insulin lispro (ADMELOG) corrective regimen sliding scale   SubCutaneous three times a day before meals  insulin lispro Injectable (ADMELOG) 4 Unit(s) SubCutaneous three times a day before meals  metoprolol succinate ER 25 milliGRAM(s) Oral daily  pantoprazole  Injectable 40 milliGRAM(s) IV Push two times a day    MEDICATIONS  (PRN):  acetaminophen   Tablet .. 650 milliGRAM(s) Oral every 6 hours PRN Mild Pain (1 - 3)      PHYSICAL EXAM:    T(C): 36.8 (21 @ 05:22), Max: 36.9 (21 @ 16:02)  HR: 65 (21 @ 07:32) (65 - 66)  BP: 162/76 (21 @ 05:22) (117/79 - 162/76)  RR: 18 (21 @ 05:22) (18 - 18)  SpO2: 98% (21 @ 05:22) (96% - 98%)  Wt(kg): --    I&O's Summary    2021 07:01  -  2021 07:00  --------------------------------------------------------  IN: 1022 mL / OUT: 1542 mL / NET: -520 mL        Daily     Daily Weight in k (2021 04:35)    Appearance: NAD	  Cardiovascular: Normal S1 S2,RRR 50's  No JVD, No murmurs, No edema  Respiratory: Lungs clear to auscultation	  Psychiatry: A & O x 3, Mood & affect appropriate  Gastrointestinal:  Soft, Non-tender, + BS	  Skin: warm and dry  Neurologic: Non-focal  Extremities: Normal range of motion,:  Vascular: Peripheral pulses palpable 2+ bilaterally    TELEMETRY: RSRB 50's 	no events    [ ] Echocardiogram:  < from: [facility] Echo Doppler (21 @ 16:06) >  Left ventricular ejection fraction, by visual estimation, is 30 to 35%.   2. Moderately decreased global left ventricular systolic function.   3. Severely enlarged right atrium.   4. Severely enlarged right ventricle.   5. Severely reduced RV systolic function.   6. Mild to moderately enlarged left atrium.   7. There is no evidence of pericardial effusion.   8. Mild mitral annular calcification.   9. Mild mitral valve regurgitation.  10. Moderate-severe tricuspid regurgitation.  11. Color flow doppler and intravenous injection of agitated saline demonstrates the presence of an intact intra atrial septum.  12. No left atrial appendage thrombus and decreased left atrial appendage velocities.    < end of copied text >    [ ]  Catheterization:  < from: Cardiac Cath Lab - Adult (21 @ 16:42) >  DIAGNOSTIC IMPRESSIONS: Moderate Elevation of Right Heart Pressures:  RA=15 mmHg; RV=61/18 mmHg; PCWP=23 mmHg  Moderate Pulmonary Hypertension=62/22 mmHg  Reduced CO=4.8 L/min and CI=2.06 L/min/m2  DIAGNOSTIC RECOMMENDATIONS: The patient should continue with the present  medications. Patient management should include aggressive medical therapy,  close monitoring of BUN and creatinine, and resumption of all previous  activities in 2 days. Medical management is recommended	                                    7.6    6.04  )-----------( 247      ( 2021 06:01 )             24.7     06-14    134<L>  |  101  |  39.0<H>  ----------------------------<  152<H>  4.5   |  23.0  |  1.77<H>    Ca    8.6      2021 06:01    TPro  6.2<L>  /  Alb  2.8<L>  /  TBili  0.5  /  DBili  x   /  AST  15  /  ALT  14  /  AlkPhos  121<H>  06-14      ASSESSMENT:  80M h/o CAD s/p CABG c/b sepsis and sternal wound infection s/p sternum removal and pec flap and angioplasty, chronic HFrEF, p Afib (on Eliquis), HTN, DM2, and Bell's Palsy   recently moved to [place] for few months from [place] presented to [facility] on 21 with complaints of back pain and dizziness after a mechanical fall also noted for the last few months he has been having continued shortness of breath and leg swelling    SP : Upper GIB: H&H 7. today: no active bleeding   Atrial Fibrillation : rate controlled now on Eliquis only    HFrEF: stable /euvolemic/ no ACE ARB secondary to CKD    Plan:  continue to monitor pt tolerating AC  follow H&H  GI F/U noted no objection to AC  Will discuss restarting ASA prior to D/c  Out patient EP follow up for Josef

## 2021-06-14 NOTE — PROGRESS NOTE ADULT - ASSESSMENT
80 y.o M with a history of coronary artery disease, CABG, atrial fibrillation, diabetes, hypertension, chronic kidney disease, and BPH who presented with lightheadedness and near syncope found to be orthostatic with anemia 2/2 bleeding duodenal diverticulum with clot. s/p  IR embolization. Now resumed on AC for his Afib with close watch -for Watchman, either inpatient or outpatient.    #Acute blood loss anemia / Gastrointestinal hemorrhage  - appears to be stable   - s/p 5 U [facility], last transfusion 6/8  - S/p colonoscopy and endoscopy, + bleeding duodenal diverticulum   - S/p IR Embolization  6/5/21  - Monitor H/H,  - C/w pantoprazole, change to PO    #Atrial fibrillation. Started on Apixaban (2.5mg) on 6/13 which is a high risk medication especially since GIB  - High CHADS2 score  - GI ok with AC. cardio restarted IV heparin which patient seems to be tolerating - discussed with Cardiology Dr Duvall 6/13; no watchman currently - recommend restarting Apixaban at lower dose; observing for bleeding  - If remains stable then anticipate discharge in next 24 hours on half dose Apixaban; increase to 5mg in 1 week and then follow up with EP for planning watchman procedure    # HFrEF  - Echo done on 05/11/21 and EF is 30-35%.  - [place] 199[address].  - Hold torsemide due to profound orthostatics; re-evaluate      # Coronary artery disease  - On atorvastatin    #Chronic kidney disease   - Stable  - Baseline Cr appears to be around 2    #Hypertension   - orthostasis , continue to monitor BP   - Hydralazine 50 mg TID  - Imdur and tamsulosin on hold.    #Diabetes   - Accu checks stable  - Continue current insulin regimen     #BPH   - Hold tamsulosin due to orthostasis.    # Rt foot pain  -possible gout  - uric acid ordered by cardio    DNR/DNI  PT eval - home with assistance     DISPO:  Monitor H/H on resuming Apixaban; if remains stable anticipate home in next 24 hours

## 2021-06-14 NOTE — PROGRESS NOTE ADULT - ATTENDING COMMENTS
-slight fluctuation in H/H but denies recurrence of melenic stool, on low dose Eliquis  -denies dizziness with ambulation, orthostatic BP not available  -remains euvolemic, continue to observe off maintenance diuretic for now  -intermittent sinus didi 40-50s on low dose metoprolol  -R-foot still swollen, less tender, suspect gout?  -possible discharge home tomorrow if H/H remains stable, elective outpatient Watchman with EPS/Dr. Annalee Duke DO, [facility]  Faculty Non-Invasive Cardiologist  [phone]

## 2021-06-15 ENCOUNTER — TRANSCRIPTION ENCOUNTER (OUTPATIENT)
Age: 81
End: 2021-06-15

## 2021-06-15 ENCOUNTER — APPOINTMENT (OUTPATIENT)
Dept: CARDIOLOGY | Facility: CLINIC | Age: 81
End: 2021-06-15

## 2021-06-15 VITALS
DIASTOLIC BLOOD PRESSURE: 62 MMHG | HEART RATE: 65 BPM | OXYGEN SATURATION: 98 % | SYSTOLIC BLOOD PRESSURE: 123 MMHG | RESPIRATION RATE: 16 BRPM

## 2021-06-15 LAB
ALBUMIN SERPL ELPH-MCNC: 3.1 G/DL — LOW (ref 3.3–5.2)
ALP SERPL-CCNC: 127 U/L — HIGH (ref 40–120)
ALT FLD-CCNC: 16 U/L — SIGNIFICANT CHANGE UP
ANION GAP SERPL CALC-SCNC: 11 MMOL/L — SIGNIFICANT CHANGE UP (ref 5–17)
AST SERPL-CCNC: 16 U/L — SIGNIFICANT CHANGE UP
BILIRUB SERPL-MCNC: 0.3 MG/DL — LOW (ref 0.4–2)
BUN SERPL-MCNC: 47.7 MG/DL — HIGH (ref 8–20)
CALCIUM SERPL-MCNC: 8.6 MG/DL — SIGNIFICANT CHANGE UP (ref 8.6–10.2)
CHLORIDE SERPL-SCNC: 102 MMOL/L — SIGNIFICANT CHANGE UP (ref 98–107)
CO2 SERPL-SCNC: 21 MMOL/L — LOW (ref 22–29)
CREAT SERPL-MCNC: 1.87 MG/DL — HIGH (ref 0.5–1.3)
GLUCOSE BLDC GLUCOMTR-MCNC: 137 MG/DL — HIGH (ref 70–99)
GLUCOSE BLDC GLUCOMTR-MCNC: 172 MG/DL — HIGH (ref 70–99)
GLUCOSE SERPL-MCNC: 178 MG/DL — HIGH (ref 70–99)
HCT VFR BLD CALC: 24.4 % — LOW (ref 39–50)
HGB BLD-MCNC: 7.6 G/DL — LOW (ref 13–17)
MCHC RBC-ENTMCNC: 28.8 PG — SIGNIFICANT CHANGE UP (ref 27–34)
MCHC RBC-ENTMCNC: 31.1 GM/DL — LOW (ref 32–36)
MCV RBC AUTO: 92.4 FL — SIGNIFICANT CHANGE UP (ref 80–100)
PLATELET # BLD AUTO: 282 K/UL — SIGNIFICANT CHANGE UP (ref 150–400)
POTASSIUM SERPL-MCNC: 4.6 MMOL/L — SIGNIFICANT CHANGE UP (ref 3.5–5.3)
POTASSIUM SERPL-SCNC: 4.6 MMOL/L — SIGNIFICANT CHANGE UP (ref 3.5–5.3)
PROT SERPL-MCNC: 6.3 G/DL — LOW (ref 6.6–8.7)
RBC # BLD: 2.64 M/UL — LOW (ref 4.2–5.8)
RBC # FLD: 15.2 % — HIGH (ref 10.3–14.5)
SODIUM SERPL-SCNC: 134 MMOL/L — LOW (ref 135–145)
URATE SERPL-MCNC: 8 MG/DL — HIGH (ref 3.4–7)
WBC # BLD: 6.37 K/UL — SIGNIFICANT CHANGE UP (ref 3.8–10.5)
WBC # FLD AUTO: 6.37 K/UL — SIGNIFICANT CHANGE UP (ref 3.8–10.5)

## 2021-06-15 PROCEDURE — 83550 IRON BINDING TEST: CPT

## 2021-06-15 PROCEDURE — 84466 ASSAY OF TRANSFERRIN: CPT

## 2021-06-15 PROCEDURE — 85014 HEMATOCRIT: CPT

## 2021-06-15 PROCEDURE — 99285 EMERGENCY DEPT VISIT HI MDM: CPT | Mod: 25

## 2021-06-15 PROCEDURE — 83540 ASSAY OF IRON: CPT

## 2021-06-15 PROCEDURE — 84295 ASSAY OF SERUM SODIUM: CPT

## 2021-06-15 PROCEDURE — 86901 BLOOD TYPING SEROLOGIC RH(D): CPT

## 2021-06-15 PROCEDURE — 36430 TRANSFUSION BLD/BLD COMPNT: CPT

## 2021-06-15 PROCEDURE — 93005 ELECTROCARDIOGRAM TRACING: CPT

## 2021-06-15 PROCEDURE — 83735 ASSAY OF MAGNESIUM: CPT

## 2021-06-15 PROCEDURE — 97116 GAIT TRAINING THERAPY: CPT

## 2021-06-15 PROCEDURE — 71045 X-RAY EXAM CHEST 1 VIEW: CPT

## 2021-06-15 PROCEDURE — 76942 ECHO GUIDE FOR BIOPSY: CPT

## 2021-06-15 PROCEDURE — 82803 BLOOD GASES ANY COMBINATION: CPT

## 2021-06-15 PROCEDURE — U0005: CPT

## 2021-06-15 PROCEDURE — 85025 COMPLETE CBC W/AUTO DIFF WBC: CPT

## 2021-06-15 PROCEDURE — 36415 COLL VENOUS BLD VENIPUNCTURE: CPT

## 2021-06-15 PROCEDURE — C1760: CPT

## 2021-06-15 PROCEDURE — 80048 BASIC METABOLIC PNL TOTAL CA: CPT

## 2021-06-15 PROCEDURE — 82962 GLUCOSE BLOOD TEST: CPT

## 2021-06-15 PROCEDURE — 86769 SARS-COV-2 COVID-19 ANTIBODY: CPT

## 2021-06-15 PROCEDURE — 84132 ASSAY OF SERUM POTASSIUM: CPT

## 2021-06-15 PROCEDURE — P9059: CPT

## 2021-06-15 PROCEDURE — 87635 SARS-COV-2 COVID-19 AMP PRB: CPT

## 2021-06-15 PROCEDURE — P9016: CPT

## 2021-06-15 PROCEDURE — 76000 FLUOROSCOPY <1 HR PHYS/QHP: CPT

## 2021-06-15 PROCEDURE — 85730 THROMBOPLASTIN TIME PARTIAL: CPT

## 2021-06-15 PROCEDURE — 82435 ASSAY OF BLOOD CHLORIDE: CPT

## 2021-06-15 PROCEDURE — 83605 ASSAY OF LACTIC ACID: CPT

## 2021-06-15 PROCEDURE — 96375 TX/PRO/DX INJ NEW DRUG ADDON: CPT

## 2021-06-15 PROCEDURE — 82272 OCCULT BLD FECES 1-3 TESTS: CPT

## 2021-06-15 PROCEDURE — C1889: CPT

## 2021-06-15 PROCEDURE — U0003: CPT

## 2021-06-15 PROCEDURE — 82746 ASSAY OF FOLIC ACID SERUM: CPT

## 2021-06-15 PROCEDURE — 97110 THERAPEUTIC EXERCISES: CPT

## 2021-06-15 PROCEDURE — C1894: CPT

## 2021-06-15 PROCEDURE — 85027 COMPLETE CBC AUTOMATED: CPT

## 2021-06-15 PROCEDURE — 86923 COMPATIBILITY TEST ELECTRIC: CPT

## 2021-06-15 PROCEDURE — 84100 ASSAY OF PHOSPHORUS: CPT

## 2021-06-15 PROCEDURE — 84550 ASSAY OF BLOOD/URIC ACID: CPT

## 2021-06-15 PROCEDURE — 99232 SBSQ HOSP IP/OBS MODERATE 35: CPT

## 2021-06-15 PROCEDURE — 84484 ASSAY OF TROPONIN QUANT: CPT

## 2021-06-15 PROCEDURE — 86900 BLOOD TYPING SEROLOGIC ABO: CPT

## 2021-06-15 PROCEDURE — 97163 PT EVAL HIGH COMPLEX 45 MIN: CPT

## 2021-06-15 PROCEDURE — 85018 HEMOGLOBIN: CPT

## 2021-06-15 PROCEDURE — 82607 VITAMIN B-12: CPT

## 2021-06-15 PROCEDURE — 82947 ASSAY GLUCOSE BLOOD QUANT: CPT

## 2021-06-15 PROCEDURE — 82330 ASSAY OF CALCIUM: CPT

## 2021-06-15 PROCEDURE — 82728 ASSAY OF FERRITIN: CPT

## 2021-06-15 PROCEDURE — 85610 PROTHROMBIN TIME: CPT

## 2021-06-15 PROCEDURE — 83880 ASSAY OF NATRIURETIC PEPTIDE: CPT

## 2021-06-15 PROCEDURE — 96376 TX/PRO/DX INJ SAME DRUG ADON: CPT

## 2021-06-15 PROCEDURE — 96374 THER/PROPH/DIAG INJ IV PUSH: CPT

## 2021-06-15 PROCEDURE — 80053 COMPREHEN METABOLIC PANEL: CPT

## 2021-06-15 PROCEDURE — 86850 RBC ANTIBODY SCREEN: CPT

## 2021-06-15 PROCEDURE — 99239 HOSP IP/OBS DSCHRG MGMT >30: CPT

## 2021-06-15 PROCEDURE — 75894 X-RAYS TRANSCATH THERAPY: CPT

## 2021-06-15 PROCEDURE — C1769: CPT

## 2021-06-15 RX ORDER — APIXABAN 2.5 MG/1
1 TABLET, FILM COATED ORAL
Qty: 60 | Refills: 0
Start: 2021-06-15 | End: 2021-07-14

## 2021-06-15 RX ORDER — METOPROLOL TARTRATE 50 MG
1 TABLET ORAL
Qty: 30 | Refills: 0
Start: 2021-06-15 | End: 2021-07-14

## 2021-06-15 RX ORDER — ALLOPURINOL 300 MG
1 TABLET ORAL
Qty: 30 | Refills: 0
Start: 2021-06-15 | End: 2021-07-14

## 2021-06-15 RX ORDER — APIXABAN 2.5 MG/1
1 TABLET, FILM COATED ORAL
Qty: 0 | Refills: 0 | DISCHARGE
Start: 2021-06-15

## 2021-06-15 RX ADMIN — Medication 100 MILLIGRAM(S): at 08:15

## 2021-06-15 RX ADMIN — APIXABAN 2.5 MILLIGRAM(S): 2.5 TABLET, FILM COATED ORAL at 05:12

## 2021-06-15 RX ADMIN — Medication 4 UNIT(S): at 08:15

## 2021-06-15 RX ADMIN — Medication 4 UNIT(S): at 12:08

## 2021-06-15 RX ADMIN — Medication 50 MILLIGRAM(S): at 14:16

## 2021-06-15 RX ADMIN — Medication 50 MILLIGRAM(S): at 05:12

## 2021-06-15 RX ADMIN — Medication 25 MILLIGRAM(S): at 05:12

## 2021-06-15 RX ADMIN — Medication 2: at 08:14

## 2021-06-15 RX ADMIN — PANTOPRAZOLE SODIUM 40 MILLIGRAM(S): 20 TABLET, DELAYED RELEASE ORAL at 05:12

## 2021-06-15 NOTE — PROGRESS NOTE ADULT - TIME BILLING
reviewed xray images, labs, notes
reviewed notes, labs, discussed case with ICU  MD
as noted above in the A&P
GI bleed, orthostasis, pAfib, HFrEF
reviewed labs, notes, spoke to cardiology
GI bleed, HF, HTN
Acute GI bleed, orthostasis, HFrEF
GI bleed, HFrEF, orthostasis
reviewed labs, discussed plan with EP attending
Acute blood loss anemia, orthostatic hypotension, pAfib
GI bleed, Afib, orthostasis
GI bleed, pAfib, orthostasis, HFrEF

## 2021-06-15 NOTE — PROGRESS NOTE ADULT - SUBJECTIVE AND OBJECTIVE BOX
SUBJECTIVE:  Cardiology NP F/U note:  GIB/ Anemia  " feels good today, wants to go home"   denies complaints of chest pain/sob/dizziness/palps/ dark stools / abd pain   chuy diet OOB   	  MEDICATIONS  (STANDING):  allopurinol 100 milliGRAM(s) Oral daily  apixaban 2.5 milliGRAM(s) Oral two times a day  atorvastatin 40 milliGRAM(s) Oral at bedtime  dextrose 40% Gel 15 Gram(s) Oral once  dextrose 5%. 1000 milliLiter(s) IV Continuous <Continuous>  dextrose 5%. 1000 milliLiter(s) IV Continuous <Continuous>  dextrose 50% Injectable 25 Gram(s) IV Push once  dextrose 50% Injectable 12.5 Gram(s) IV Push once  dextrose 50% Injectable 25 Gram(s) IV Push once  glucagon  Injectable 1 milliGRAM(s) IntraMuscular once  hydrALAZINE 50 milliGRAM(s) Oral three times a day  insulin glargine Injectable (LANTUS) 15 Unit(s) SubCutaneous at bedtime  insulin lispro (ADMELOG) corrective regimen sliding scale   SubCutaneous three times a day before meals  insulin lispro Injectable (ADMELOG) 4 Unit(s) SubCutaneous three times a day before meals  metoprolol succinate ER 25 milliGRAM(s) Oral daily  pantoprazole  Injectable 40 milliGRAM(s) IV Push two times a day    MEDICATIONS  (PRN):  acetaminophen   Tablet .. 650 milliGRAM(s) Oral every 6 hours PRN Mild Pain (1 - 3)      PHYSICAL EXAM:    T(C): 37.2 (06-15-21 @ 08:15), Max: 37.2 (06-15-21 @ 08:15)  HR: 63 (06-15-21 @ 08:15) (54 - 70)  BP: 150/64 (06-15-21 @ 08:15) (129/62 - 163/70)  RR: 17 (06-15-21 @ 08:15) (17 - 18)  SpO2: 96% (06-15-21 @ 08:15) (96% - 98%)  Wt(kg): --    I&O's Summary    2021 07:01  -  15 Eddie 2021 07:00  --------------------------------------------------------  IN: 0 mL / OUT: 1625 mL / NET: -1625 mL        Daily     Daily Weight in k.1 (15 Eddie 2021 04:40)    Appearance: NAD/ comfortable	  Cardiovascular: Normal S1 S2,RRR 60 No JVD, No murmurs, No edema  Respiratory: Lungs clear to auscultation	  Psychiatry: A & O x 3, Mood & affect appropriate  Gastrointestinal:  Soft, Non-tender, + BS	  Skin: warm and dry  Neurologic: Non-focal  Extremities: Normal range of motion,:  Vascular: Peripheral pulses palpable 2+ bilaterally    TELEMETRY: 	RSTR 60;s no events    :   DIAGNOSTIC TESTING:  [ ] Echocardiogram:  from: [facility] Echo Doppler (21 @ 16:06) >  Left ventricular ejection fraction, by visual estimation, is 30 to 35%.   2. Moderately decreased global left ventricular systolic function.   3. Severely enlarged right atrium.   4. Severely enlarged right ventricle.   5. Severely reduced RV systolic function.   6. Mild to moderately enlarged left atrium.   7. There is no evidence of pericardial effusion.   8. Mild mitral annular calcification.   9. Mild mitral valve regurgitation.  10. Moderate-severe tricuspid regurgitation.  11. Color flow doppler and intravenous injection of agitated saline demonstrates the presence of an intact intra atrial septum.  12. No left atrial appendage thrombus and decreased left atrial appendage velocities.  [ ]  Catheterization:   	from: Cardiac Cath Lab - Adult (21 @ 16:42) >  DIAGNOSTIC IMPRESSIONS: Moderate Elevation of Right Heart Pressures:  RA=15 mmHg; RV=61/18 mmHg; PCWP=23 mmHg  Moderate Pulmonary Hypertension=62/22 mmHg  Reduced CO=4.8 L/min and CI=2.06 L/min/m2  DIAGNOSTIC RECOMMENDATIONS: The patient should continue with the present  medications. Patient management should include aggressive medical therapy,  close monitoring of BUN and creatinine, and resumption of all previous  activities in 2 days. Medical management is recommended	    CARDIAC MARKERS:                                  7.6    6.37  )-----------( 282      ( 15 Eddie 2021 06:52 )             24.4     06-15    134<L>  |  102  |  47.7<H>  ----------------------------<  178<H>  4.6   |  21.0<L>  |  1.87<H>    Ca    8.6      15 Eddie 2021 06:53    TPro  6.3<L>  /  Alb  3.1<L>  /  TBili  0.3<L>  /  DBili  x   /  AST  16  /  ALT  16  /  AlkPhos  127<H>  06-15    ASSESSMENT:  80M h/o CAD s/p CABG c/b sepsis and sternal wound infection s/p sternum removal and pec flap and angioplasty, chronic HFrEF, p Afib (on Eliquis), HTN, DM2, and Bell's Palsy   recently moved to [place] for few months from [place] presented to [facility] on 21 with complaints of back pain and dizziness after a mechanical fall also noted for the last few months he has been having continued shortness of breath and leg swelling    SP : Upper GIB: H&H 7.6 today: same as yesterday/ no active bleeding   Atrial Fibrillation : rate controlled now on Eliquis only    HFrEF: stable /euvolemic/ no ACE ARB secondary to CKD    Plan:  Continue to monitor pt tolerating AC   H&H:  stable at 7.6/24   GI F/U noted no objection to AC  Will discuss restarting ASA prior to D/c  Out patient EP follow up for Josef SUBJECTIVE:  Cardiology NP F/U note:  GIB/ Anemia  " feels good today, wants to go home"   denies complaints of chest pain/sob/dizziness/palps/ dark stools / abd pain   chuy diet OOB   	  MEDICATIONS  (STANDING):  allopurinol 100 milliGRAM(s) Oral daily  apixaban 2.5 milliGRAM(s) Oral two times a day  atorvastatin 40 milliGRAM(s) Oral at bedtime  dextrose 40% Gel 15 Gram(s) Oral once  dextrose 5%. 1000 milliLiter(s) IV Continuous <Continuous>  dextrose 5%. 1000 milliLiter(s) IV Continuous <Continuous>  dextrose 50% Injectable 25 Gram(s) IV Push once  dextrose 50% Injectable 12.5 Gram(s) IV Push once  dextrose 50% Injectable 25 Gram(s) IV Push once  glucagon  Injectable 1 milliGRAM(s) IntraMuscular once  hydrALAZINE 50 milliGRAM(s) Oral three times a day  insulin glargine Injectable (LANTUS) 15 Unit(s) SubCutaneous at bedtime  insulin lispro (ADMELOG) corrective regimen sliding scale   SubCutaneous three times a day before meals  insulin lispro Injectable (ADMELOG) 4 Unit(s) SubCutaneous three times a day before meals  metoprolol succinate ER 25 milliGRAM(s) Oral daily  pantoprazole  Injectable 40 milliGRAM(s) IV Push two times a day    MEDICATIONS  (PRN):  acetaminophen   Tablet .. 650 milliGRAM(s) Oral every 6 hours PRN Mild Pain (1 - 3)      PHYSICAL EXAM:    T(C): 37.2 (06-15-21 @ 08:15), Max: 37.2 (06-15-21 @ 08:15)  HR: 63 (06-15-21 @ 08:15) (54 - 70)  BP: 150/64 (06-15-21 @ 08:15) (129/62 - 163/70)  RR: 17 (06-15-21 @ 08:15) (17 - 18)  SpO2: 96% (06-15-21 @ 08:15) (96% - 98%)  Wt(kg): --    I&O's Summary    2021 07:01  -  15 Eddie 2021 07:00  --------------------------------------------------------  IN: 0 mL / OUT: 1625 mL / NET: -1625 mL        Daily     Daily Weight in k.1 (15 Eddie 2021 04:40)    Appearance: NAD/ comfortable	  Cardiovascular: Normal S1 S2,RRR 60 No JVD, No murmurs, No edema  Respiratory: Lungs clear to auscultation	  Psychiatry: A & O x 3, Mood & affect appropriate  Gastrointestinal:  Soft, Non-tender, + BS	  Skin: warm and dry  Neurologic: Non-focal  Extremities: Normal range of motion,:  Vascular: Peripheral pulses palpable 2+ bilaterally    TELEMETRY: 	RSTR 60;s no events    :   DIAGNOSTIC TESTING:  [ ] Echocardiogram:  from: [facility] Echo Doppler (21 @ 16:06) >  Left ventricular ejection fraction, by visual estimation, is 30 to 35%.   2. Moderately decreased global left ventricular systolic function.   3. Severely enlarged right atrium.   4. Severely enlarged right ventricle.   5. Severely reduced RV systolic function.   6. Mild to moderately enlarged left atrium.   7. There is no evidence of pericardial effusion.   8. Mild mitral annular calcification.   9. Mild mitral valve regurgitation.  10. Moderate-severe tricuspid regurgitation.  11. Color flow doppler and intravenous injection of agitated saline demonstrates the presence of an intact intra atrial septum.  12. No left atrial appendage thrombus and decreased left atrial appendage velocities.  [ ]  Catheterization:   	from: Cardiac Cath Lab - Adult (21 @ 16:42) >  DIAGNOSTIC IMPRESSIONS: Moderate Elevation of Right Heart Pressures:  RA=15 mmHg; RV=61/18 mmHg; PCWP=23 mmHg  Moderate Pulmonary Hypertension=62/22 mmHg  Reduced CO=4.8 L/min and CI=2.06 L/min/m2  DIAGNOSTIC RECOMMENDATIONS: The patient should continue with the present  medications. Patient management should include aggressive medical therapy,  close monitoring of BUN and creatinine, and resumption of all previous  activities in 2 days. Medical management is recommended	    CARDIAC MARKERS:                                  7.6    6.37  )-----------( 282      ( 15 Eddie 2021 06:52 )             24.4     06-15    134<L>  |  102  |  47.7<H>  ----------------------------<  178<H>  4.6   |  21.0<L>  |  1.87<H>    Ca    8.6      15 Eddie 2021 06:53    TPro  6.3<L>  /  Alb  3.1<L>  /  TBili  0.3<L>  /  DBili  x   /  AST  16  /  ALT  16  /  AlkPhos  127<H>  06-15    ASSESSMENT:  80M h/o CAD s/p CABG c/b sepsis and sternal wound infection s/p sternum removal and pec flap and angioplasty, chronic HFrEF, p Afib (on Eliquis), HTN, DM2, and Bell's Palsy   recently moved to [place] for few months from [place] presented to [facility] on 21 with complaints of back pain and dizziness after a mechanical fall also noted for the last few months he has been having continued shortness of breath and leg swelling    SP : Upper GIB: H&H 7. today: same as yesterday/ no active bleeding   Atrial Fibrillation : rate controlled now on Eliquis only    HFrEF: stable /euvolemic/ no ACE ARB secondary to CKD    Plan:   pt tolerating AC   H&H:  stable at 7.624 pt asymptomatic no further meleana/ no need for transfusion as discussed with Dr. Duke   consider adding Iron supplementation at discharge   GI F/U noted no objection to AC  Hold  ASA  for now, will restart as out patient   Out patient EP follow up for Watchman  stable for d/c home today  follow up with Dr. Duke in 7-10 days

## 2021-06-15 NOTE — CHART NOTE - NSCHARTNOTEFT_GEN_A_CORE
After discussing case with Cardiology they wish for pt. to have EGD and Colonoscopy tomorrow as they do not want pt. to be w/o AC over the weekend and are reluctant to start IV Heparin over the weekend. Therefore he will get FFP tonight and bowel prep tonight for EGD and Colonoscopy tomorrow AM in the OR. Pt will be informed of this change in management plan.
Seen and examined post procedure   Resting in bed, comfortably, calm, NAD, no acute complaints, "feel pretty good"  HD stable, albeit BP high normal side  Repeat HgB stable  Dc'd IVF's given hx of CHF  Will remain on protonix gtt  NPO overnight for now, monitor for signs of further bleeding  serial CBC  SCDs only, will need to d/w cardi/GI when safe to resume A/C  Hydralazine IV PRN for now until can safely restart PO meds
Source: Patient [x ]  Family [ ]   other [ ]    Current Diet: Diet, Consistent Carbohydrate Renal w/Evening Snack (06-11-21 @ 12:19)    PO intake:  < 50% [ ]   50-75%  [ ]   %  [ x]  other :    Current Weight:   (6/15) 207.4 lbs  (6/13) 195.7 lbs  (6/11) 205.4 lbs  (6/9) 190.4 lbs  (6/7) 199.7 lbs  (6/6) 229.2 lbs  -Obtain daily wts, continue to monitor. 2+ R leg edema noted.     Pertinent Medications: MEDICATIONS  (STANDING):  allopurinol 100 milliGRAM(s) Oral daily  apixaban 2.5 milliGRAM(s) Oral two times a day  atorvastatin 40 milliGRAM(s) Oral at bedtime  dextrose 40% Gel 15 Gram(s) Oral once  dextrose 5%. 1000 milliLiter(s) (50 mL/Hr) IV Continuous <Continuous>  dextrose 5%. 1000 milliLiter(s) (100 mL/Hr) IV Continuous <Continuous>  dextrose 50% Injectable 25 Gram(s) IV Push once  dextrose 50% Injectable 12.5 Gram(s) IV Push once  dextrose 50% Injectable 25 Gram(s) IV Push once  glucagon  Injectable 1 milliGRAM(s) IntraMuscular once  hydrALAZINE 50 milliGRAM(s) Oral three times a day  insulin glargine Injectable (LANTUS) 15 Unit(s) SubCutaneous at bedtime  insulin lispro (ADMELOG) corrective regimen sliding scale   SubCutaneous three times a day before meals  insulin lispro Injectable (ADMELOG) 4 Unit(s) SubCutaneous three times a day before meals  metoprolol succinate ER 25 milliGRAM(s) Oral daily  pantoprazole  Injectable 40 milliGRAM(s) IV Push two times a day    MEDICATIONS  (PRN):  acetaminophen   Tablet .. 650 milliGRAM(s) Oral every 6 hours PRN Mild Pain (1 - 3)    Pertinent Labs: CBC Full  -  ( 15 Eddie 2021 06:52 )  WBC Count : 6.37 K/uL  RBC Count : 2.64 M/uL  Hemoglobin : 7.6 g/dL  Hematocrit : 24.4 %  Platelet Count - Automated : 282 K/uL  Mean Cell Volume : 92.4 fl  Mean Cell Hemoglobin : 28.8 pg  Mean Cell Hemoglobin Concentration : 31.1 gm/dL  Auto Neutrophil # : x  Auto Lymphocyte # : x  Auto Monocyte # : x  Auto Eosinophil # : x  Auto Basophil # : x  Auto Neutrophil % : x  Auto Lymphocyte % : x  Auto Monocyte % : x  Auto Eosinophil % : x  Auto Basophil % : x  06-15 Na134 mmol/L<L> Glu 178 mg/dL<H> K+ 4.6 mmol/L Cr  1.87 mg/dL<H> BUN 47.7 mg/dL<H> Phos n/a   Alb 3.1 g/dL<L> PAB n/a       Skin: surgical incision    Nutrition focused physical exam conducted - found signs of malnutrition [x ]absent [ ]present    Subcutaneous fat loss: [ ] Orbital fat pads region, [ ]Buccal fat region, [ ]Triceps region,  [ ]Ribs region    Muscle wasting: [ ]Temples region, [ ]Clavicle region, [ ]Shoulder region, [ ]Scapula region, [ ]Interosseous region,  [ ]thigh region, [ ]Calf region    Estimated Needs:   [x ] no change since previous assessment  [ ] recalculated:     Current Nutrition Diagnosis: Pt remains at high nutrition risk secondary to Increased Nutrient Needs related to increased physiological demand for nutrient as evidenced by GI bleed, s/p IR embolization. Pt reported good po intake, consuming >75% of meals. Pt stated his wife encourages diet adherence, reinforced importance of DM diet. Last documented BM 6/14. RD to remain available.     Recommendations:   1) d/c renal restrictions from diet order  2) Encourage po intake and HBV protein  3) Monitor wts and labs    Monitoring and Evaluation:   [ x] PO intake [ x] Tolerance to diet prescription [X] Weights  [X] Follow up per protocol [X] Labs:
RN called me to see patient who states he was told by MD yesterday he would be given some gout medication to ease his foot pain. I reviewed the Cardiologist and Hospitalist chart note from yesterday and both indicated patient most likely has gout. Uric acid level was done with elevated results. I explained to patient his kidney function tests BUN and Creatinine are elevated which ANSAIDs are not recommended  for long term and that the MD may want to change to a different medication later this am. However, patient complains of 10/10 pain bilat feet, right > left foot. He also states Tylenol does not help the pain at all. Patient states he cannot stand the pain and need something to treat the pain now.    Brief exam:  Both feet are swollen. right foot swollen greater that left foot. There is swelling and redness at base of big toe left foot, tenderness, and pain on light touch of area. Patient states he cannot tolerate even a feather-like touch of the feet.    IMP:   >Gouty arthritis    PLAN:  > Allopurinol 100mg po now.  > RN told MD may change Allopurinol to a different medication, considering BUN and Creatinine elevated.   > I will check on patient again later on today to see if meds worked and if he complains of any adverse side effects.
RN called to notify me that the patients' Hgb and Hct was decreasing in value from earlier test this am and may need a blood transfusion. I repeated CBC stat, ordered type and cross match, and obtained consent from patient for possible blood transfusion. Patient stated he has had many blood transfusions in past without experiencing any adverse side effects post transfusion.  I explained to patient it is possible during or after blood transfusion to develop a rash, fever, headache, dark urine, backache, flank pain, chills, fainting or dizziness, skin flushing, shortness of breath or difficulty breathing. Patient states he understands fully and agreed to have blood transfusion if it becomes necessary.   After reviewing the repeat STAT CBC, the hgb and HCT actually increased in value. I explained to patient he does not need blood transfusion at this time. Patient was happy the numbers increased in Hgb and Hct.    CBC Full  -  ( 13 Jun 2021 10:34 )  WBC Count : 7.86 K/uL  RBC Count : 2.91 M/uL  Hemoglobin : 8.6 g/dL  Hematocrit : 26.9 %  Platelet Count - Automated : 265 K/uL  Mean Cell Volume : 92.4 fl  Mean Cell Hemoglobin : 29.6 pg  Mean Cell Hemoglobin Concentration : 32.0 gm/dL  Auto Neutrophil # : x  Auto Lymphocyte # : x  Auto Monocyte # : x  Auto Eosinophil # : x  Auto Basophil # : x  Auto Neutrophil % : x  Auto Lymphocyte % : x  Auto Monocyte % : x  Auto Eosinophil % : x  Auto Basophil % : x    Patient is sitting up in chair without any complaints of headache, N/V/D. he had a B/M this am without any noticeable blood in stool. appetite is good. No chest pain, palpitations, sob, light headedness/dizziness, difficulty breathing/cough, fevers/chills, abdominal pain, n/v, diarrhea/constipation, dysuria or increased urinary frequency., SOB, Dyspnea, headache, blurred vision, tinnitus, cough or fever.    PLAN: continue to monitor H/H for decrease in value for necessity for blood transfusion.

## 2021-06-15 NOTE — DISCHARGE NOTE PROVIDER - NSDCFUADDAPPT_GEN_ALL_CORE_FT
SPOUSE DECLINED VIVO MEDS TO BED  PHARMACY:  [facility]  THE PT. DOES NOT HAVE ANY DIFFICULTIES IN OBTAINING OR TAKING HIS MEDICATIONS    CARDIOLOGY FOLLOW UP APPOINTMENT SCHEDULED WITH  DR. SPEARS ([phone])  ON 6/15/2021 AT 11:45 AM   [address] [place] [id number]  PLEASE BRING YOUR D/C PAPERWORK TO YOUR APPOINTMENT

## 2021-06-15 NOTE — DISCHARGE NOTE PROVIDER - CARE PROVIDER_API CALL
Gamal Duke (DO)  Cardiology; Internal Medicine  [address] [facility], [place] [address]  Phone: [phone]  Fax: [phone]  Follow Up Time:

## 2021-06-15 NOTE — PROGRESS NOTE ADULT - PROVIDER SPECIALTY LIST ADULT
Hospitalist
Cardiology
Critical Care
Electrophysiology
Gastroenterology
Hospitalist
Cardiology
Cardiology
Hospitalist
Hospitalist
Palliative Care
Cardiology
Electrophysiology
Gastroenterology
Gastroenterology
Hospitalist
Cardiology
Electrophysiology
Gastroenterology
Hospitalist
Hospitalist
Cardiology
Cardiology
Electrophysiology
Gastroenterology
Gastroenterology
Critical Care
Gastroenterology
Gastroenterology
Palliative Care

## 2021-06-15 NOTE — PROGRESS NOTE ADULT - REASON FOR ADMISSION
GIB/anemia

## 2021-06-15 NOTE — PROGRESS NOTE ADULT - NSICDXPILOT_GEN_ALL_CORE
[place]

## 2021-06-15 NOTE — DISCHARGE NOTE PROVIDER - NSDCPNSUBOBJ_GEN_ALL_CORE
HOSPITALIST PROGRESS NOTE    CHU LOGAN  [id number]  80yMale    Patient is a 80y old  Male who presents with a chief complaint of GIB/anemia (15 Eddie 2021 09:03)      SUBJECTIVE:   Chart reviewed since last visit.  Patient seen and examined at bedside for GIB, Anemia, AF  Denies any dizziness, abdominal pain, palpitations, chest pain or dyspnea.  Brown BM - no nausea      OBJECTIVE:  Vital Signs Last 24 Hrs  T(C): 37.2 (15 Eddie 2021 08:15), Max: 37.2 (15 Eddie 2021 08:15)  T(F): 98.9 (15 Eddie 2021 08:15), Max: 98.9 (15 Eddie 2021 08:15)  HR: 63 (15 Eddie 2021 08:15) (54 - 70)  BP: 150/64 (15 Eddie 2021 08:15) (129/62 - 163/70)   RR: 17 (15 Eddie 2021 08:15) (17 - 18)  SpO2: 96% (15 Eddie 2021 08:15) (96% - 98%)    PHYSICAL EXAMINATION  General: Sitting in chair, NAD  HEENT:  EOMI  NECK:  Supple  CVS: Irregular S1 S2. Midline sternotomy scar well healed  RESP:  CTAB  GI:  Soft nondistended nontender BS+  : No suprapubic tenderness  MSK:  FROM  CNS:  No gross focal or global deficit appreciated  INTEG:  Warm dry skin  PSYCH:  Fair mood      MONITOR:  CAPILLARY BLOOD GLUCOSE      POCT Blood Glucose.: 137 mg/dL (15 Eddie 2021 11:32)  POCT Blood Glucose.: 172 mg/dL (15 Eddie 2021 07:49)  POCT Blood Glucose.: 255 mg/dL (14 Jun 2021 21:45)  POCT Blood Glucose.: 168 mg/dL (14 Jun 2021 16:46)        I&O's Summary    14 Jun 2021 07:01  -  15 Eddie 2021 07:00  --------------------------------------------------------  IN: 0 mL / OUT: 1625 mL / NET: -1625 mL                            7.6    6.37  )-----------( 282      ( 15 Eddie 2021 06:52 )             24.4     PTT - ( 14 Jun 2021 06:03 )  PTT:32.3 sec  06-15    134<L>  |  102  |  47.7<H>  ----------------------------<  178<H>  4.6   |  21.0<L>  |  1.87<H>    Ca    8.6      15 Eddie 2021 06:53    TPro  6.3<L>  /  Alb  3.1<L>  /  TBili  0.3<L>  /  DBili  x   /  AST  16  /  ALT  16  /  AlkPhos  127<H>  06-15            Culture:    TTE:    RADIOLOGY        MEDICATIONS  (STANDING):  allopurinol 100 milliGRAM(s) Oral daily  apixaban 2.5 milliGRAM(s) Oral two times a day  atorvastatin 40 milliGRAM(s) Oral at bedtime  dextrose 40% Gel 15 Gram(s) Oral once  dextrose 5%. 1000 milliLiter(s) (50 mL/Hr) IV Continuous <Continuous>  dextrose 5%. 1000 milliLiter(s) (100 mL/Hr) IV Continuous <Continuous>  dextrose 50% Injectable 25 Gram(s) IV Push once  dextrose 50% Injectable 12.5 Gram(s) IV Push once  dextrose 50% Injectable 25 Gram(s) IV Push once  glucagon  Injectable 1 milliGRAM(s) IntraMuscular once  hydrALAZINE 50 milliGRAM(s) Oral three times a day  insulin glargine Injectable (LANTUS) 15 Unit(s) SubCutaneous at bedtime  insulin lispro (ADMELOG) corrective regimen sliding scale   SubCutaneous three times a day before meals  insulin lispro Injectable (ADMELOG) 4 Unit(s) SubCutaneous three times a day before meals  metoprolol succinate ER 25 milliGRAM(s) Oral daily  pantoprazole  Injectable 40 milliGRAM(s) IV Push two times a day      MEDICATIONS  (PRN):  acetaminophen   Tablet .. 650 milliGRAM(s) Oral every 6 hours PRN Mild Pain (1 - 3)

## 2021-06-15 NOTE — DISCHARGE NOTE PROVIDER - NSDCMRMEDTOKEN_GEN_ALL_CORE_FT
apixaban 2.5 mg oral tablet: 1 tab(s) orally 2 times a day  atorvastatin 40 mg oral tablet: 1 tab(s) orally once a day  ferrous sulfate 300 mg (60 mg elemental iron) oral tablet: 1 tab(s) orally once a day  hydrALAZINE 25 mg oral tablet: 2 tab(s) orally 3 times a day  Lantus 100 units/mL subcutaneous solution: 17 unit(s) subcutaneous 2 times a day  magnesium oxide 400 mg (241.3 mg elemental magnesium) oral tablet: 1 tab(s) orally once a day  metoprolol succinate 25 mg oral tablet, extended release: 1 tab(s) orally once a day  omeprazole 40 mg oral delayed release capsule: 1 cap(s) orally 2 times a day

## 2021-06-15 NOTE — DISCHARGE NOTE PROVIDER - NSDCCPCAREPLAN_GEN_ALL_CORE_FT
PRINCIPAL DISCHARGE DIAGNOSIS  Diagnosis: Diverticulum of duodenum with complication  Assessment and Plan of Treatment: Continue Omeprazol twice daily  Follow up with GI      SECONDARY DISCHARGE DIAGNOSES  Diagnosis: Anemia due to acute blood loss  Assessment and Plan of Treatment: Follow up with GI    Diagnosis: Paroxysmal atrial fibrillation  Assessment and Plan of Treatment: Continue Eliquis 2.5 twice daily  Follow up with Cardiology after 1 week for doasge increase    Diagnosis: HTN (hypertension)  Assessment and Plan of Treatment: Continue medications as prescribed  Follow up with Cardiology    Diagnosis: T2DM (type 2 diabetes mellitus)  Assessment and Plan of Treatment: Continue home medications    Diagnosis: CAD (coronary atherosclerotic disease)  Assessment and Plan of Treatment: Hold ASA  Continue other medications as prescribed    Diagnosis: Chronic systolic congestive heart failure  Assessment and Plan of Treatment: Hold Torsemide for now  Follow up with Cardiology    Diagnosis: Stage 3 chronic kidney disease  Assessment and Plan of Treatment: stable    Diagnosis: Acute gout of right foot  Assessment and Plan of Treatment: Continue Allopurinol

## 2021-06-15 NOTE — PROGRESS NOTE ADULT - ATTENDING COMMENTS
-no further drop in Hb stable at 7.6 on low dose Eliquis 2.5, continue to hold ASA 81mg, no longer orthostatic off tamsulosin/Imdur and remains euvolemic off diuretic  -elective Watchman with EPS/Dr. Mantilla in next few weeks.   -stable for discharge home today, will gradual uptitrate meds and restart diuretic as outpatient, follow up next week in the office.         Gamal Duke DO, [facility]  Faculty Non-Invasive Cardiologist  [phone]

## 2021-06-15 NOTE — DISCHARGE NOTE PROVIDER - HOSPITAL COURSE
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 EGD and Colonscopy 6/5/21 revealed bleeding duodenal diverticular ulcer which couldn't   be cauterized due to location and patient subsequently had IR embolization of gastroepiploic, SPDA, and GDA the same day. He received PRBC x5 and FFP x 1 during stay.  Given high CHADSVASC and EP recommendation for anticoagulation patient was started on Heparin and then transitioned to low dose Eliquis. Evaluated by EP with recommendations for Watchman procedure as outpatient. ASA to be continued to be held for now.  Flomax and Torsemide were discontinued due to Orthostatic hypotension and Metoprolol was started. CKD 3 remained stable. Glycemic control achieved with Insulins. Right foot pain attributed to Gout resolved with Allopurinol  Asymptomatic, ambulatory, having brown BM, normoxic.  Medically stable for discharge home. 39 minutes

## 2021-06-16 ENCOUNTER — APPOINTMENT (OUTPATIENT)
Dept: CARE COORDINATION | Facility: HOME HEALTH | Age: 81
End: 2021-06-16
Payer: MEDICARE

## 2021-06-16 VITALS
DIASTOLIC BLOOD PRESSURE: 87 MMHG | TEMPERATURE: 97.1 F | RESPIRATION RATE: 18 BRPM | HEART RATE: 59 BPM | BODY MASS INDEX: 28.25 KG/M2 | WEIGHT: 220 LBS | OXYGEN SATURATION: 95 % | SYSTOLIC BLOOD PRESSURE: 165 MMHG

## 2021-06-16 PROCEDURE — 99496 TRANSJ CARE MGMT HIGH F2F 7D: CPT

## 2021-06-16 RX ORDER — ATORVASTATIN CALCIUM 40 MG/1
40 TABLET, FILM COATED ORAL
Qty: 90 | Refills: 1 | Status: ACTIVE | COMMUNITY
Start: 2021-05-19 | End: 1900-01-01

## 2021-06-16 RX ORDER — ISOSORBIDE MONONITRATE 60 MG/1
60 TABLET, EXTENDED RELEASE ORAL
Qty: 30 | Refills: 0 | Status: DISCONTINUED | COMMUNITY
Start: 2021-05-14 | End: 2021-06-16

## 2021-06-16 RX ORDER — POTASSIUM CHLORIDE 1500 MG/1
20 TABLET, FILM COATED, EXTENDED RELEASE ORAL DAILY
Qty: 30 | Refills: 3 | Status: DISCONTINUED | COMMUNITY
Start: 2021-05-19 | End: 2021-06-16

## 2021-06-16 RX ORDER — ALLOPURINOL 100 MG/1
100 TABLET ORAL DAILY
Qty: 30 | Refills: 3 | Status: ACTIVE | COMMUNITY
Start: 2021-06-16

## 2021-06-16 RX ORDER — ASPIRIN 81 MG/1
81 TABLET ORAL DAILY
Qty: 30 | Refills: 2 | Status: DISCONTINUED | COMMUNITY
Start: 2021-05-19 | End: 2021-06-16

## 2021-06-16 RX ORDER — TORSEMIDE 20 MG/1
20 TABLET ORAL DAILY
Qty: 30 | Refills: 3 | Status: DISCONTINUED | COMMUNITY
Start: 2021-05-19 | End: 2021-06-16

## 2021-06-17 ENCOUNTER — TRANSCRIPTION ENCOUNTER (OUTPATIENT)
Age: 81
End: 2021-06-17

## 2021-06-18 ENCOUNTER — NON-APPOINTMENT (OUTPATIENT)
Age: 81
End: 2021-06-18

## 2021-06-18 ENCOUNTER — APPOINTMENT (OUTPATIENT)
Dept: ELECTROPHYSIOLOGY | Facility: CLINIC | Age: 81
End: 2021-06-18
Payer: MEDICARE

## 2021-06-18 ENCOUNTER — APPOINTMENT (OUTPATIENT)
Dept: NEUROLOGY | Facility: CLINIC | Age: 81
End: 2021-06-18

## 2021-06-18 PROCEDURE — 93298 REM INTERROG DEV EVAL SCRMS: CPT

## 2021-06-18 PROCEDURE — G2066: CPT

## 2021-06-20 NOTE — ASSESSMENT
[FreeTextEntry1] : [] CHF\par - s/p ECHO (5/11) EF 30-35%\par - BNP 19986 @ admission\par - Torsemide on hold for orthostatics. \par \par [] Afib/ bradycardia\par - bradycardia improved in 50s.\par - continue Eliquis 2.5mg BID, Toprol XL 25mg QD\par \par [] GI bleed/anemia\par - stable \par - s/p 5units of PRBC, s/p colonoscopy and endoscopy - bleeding duodenal diverticulum s/p IR embolization (6/5)\par - Continue pantoprazole 40mg QD, Ferrous sulfate 300mg\par \par [] gout\par - hamilton feet swelling and pain. improving.\par - continue allopurinol 100mg QD \par \par [] CKD\par - stable, Cr @2\par - educated on avoiding NSAIDS use \par \par [] DM2\par - continue daily glucose monitoring\par - continue Lantus 17units BID\par \par [] HTN\par - hypertensive on exam\par - continue hydralazine 50mg TID, Toprol XL 25mg QD\par \par [] HLD\par - continue atorvastatin 40mg\par \par \par [] Care Coordination\par - Pt needs home PT and needs provider to sign off home care. TCM will provide [facility] Bridge program to sign off HC order. Home Care referral sent. SOC 6/17\par - New PCP appt with Dr. Rankin on 6/24\par - Cardiology appt Dr. Duke 6/21\par - EP Dr. Mantilla on 6/28\par \par Reviewed all medications at length with patient, All questions addressed. Worsening symptoms discussed with pt understanding. No issues or concerns at this time. Pt encouraged to call CCC/CN at [phone] w/any questions, concerns or issues. Will continue to follow up with patient status\par \par

## 2021-06-20 NOTE — PHYSICAL EXAM
[No Acute Distress] : no acute distress [Well Nourished] : well nourished [Well Developed] : well developed [Well-Appearing] : well-appearing [Normal Sclera/Conjunctiva] : normal sclera/conjunctiva [PERRL] : pupils equal round and reactive to light [EOMI] : extraocular movements intact [Normal Outer Ear/Nose] : the outer ears and nose were normal in appearance [Normal Oropharynx] : the oropharynx was normal [No JVD] : no jugular venous distention [Supple] : supple [No Lymphadenopathy] : no lymphadenopathy [Thyroid Normal, No Nodules] : the thyroid was normal and there were no nodules present [No Respiratory Distress] : no respiratory distress  [Clear to Auscultation] : lungs were clear to auscultation bilaterally [No Accessory Muscle Use] : no accessory muscle use [Normal S1, S2] : normal S1 and S2 [No Murmur] : no murmur heard [No Carotid Bruits] : no carotid bruits [No Abdominal Bruit] : a ~M bruit was not heard ~T in the abdomen [No Varicosities] : no varicosities [Pedal Pulses Present] : the pedal pulses are present [No Edema] : there was no peripheral edema [No Extremity Clubbing/Cyanosis] : no extremity clubbing/cyanosis [Soft] : abdomen soft [Non Tender] : non-tender [Non-distended] : non-distended [No Masses] : no abdominal mass palpated [No HSM] : no HSM [Normal Bowel Sounds] : normal bowel sounds [No Spinal Tenderness] : no spinal tenderness [Grossly Normal Strength/Tone] : grossly normal strength/tone [No Rash] : no rash [Normal Gait] : normal gait [Coordination Grossly Intact] : coordination grossly intact [No Focal Deficits] : no focal deficits [Deep Tendon Reflexes (DTR)] : deep tendon reflexes were 2+ and symmetric [Speech Grossly Normal] : speech grossly normal [Memory Grossly Normal] : memory grossly normal [Normal Affect] : the affect was normal [Alert and Oriented x3] : oriented to person, place, and time [Normal Mood] : the mood was normal [Normal Insight/Judgement] : insight and judgment were intact [de-identified] : irregular bradycardia of 56 [de-identified] : hamilton feet joint grossly swollen

## 2021-06-20 NOTE — HISTORY OF PRESENT ILLNESS
[Post-hospitalization from ___ Hospital] : Post-hospitalization from [unfilled] Hospital [Admitted on: ___] : The patient was admitted on [unfilled] [Discharged on ___] : discharged on [unfilled] [Discharge Summary] : discharge summary [Pertinent Labs] : pertinent labs [Radiology Findings] : radiology findings [Discharge Med List] : discharge medication list [Other: ____] : [unfilled] [Med Reconciliation] : medication reconciliation has been completed [Patient Contacted By: ____] : and contacted by [unfilled] [FreeTextEntry2] : Copied from [employer]\par '80 year old male with PMH HTN, Dyslipidemia, T2DM, 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 EGD and Colonscopy 6/5/21 revealed bleeding duodenal diverticular ulcer which couldn't\par  be cauterized due to location and patient subsequently had IR embolization of gastroepiploic, SPDA, and GDA the same day. He received PRBC x5 and FFP x 1 during stay.\par Given high CHADSVASC and EP recommendation for anticoagulation patient was started on Heparin and then transitioned to low dose Eliquis. Evaluated by EP with recommendations for Watchman procedure as outpatient. ASA to be continued to be held for now.\par Flomax and Torsemide were discontinued due to Orthostatic hypotension and Metoprolol was started. CKD 3 remained stable. Glycemic control achieved with Insulins. Right foot pain attributed to Gout resolved with Allopurinol\par Asymptomatic, ambulatory, having brown BM, normoxic.\par Medically stable for discharge home.'\par \par

## 2021-06-20 NOTE — COUNSELING
[Fall prevention counseling provided] : Fall prevention counseling provided [Adequate lighting] : Adequate lighting [No throw rugs] : No throw rugs [Use proper foot wear] : Use proper foot wear [Use recommended devices] : Use recommended devices [None] : None [Good understanding] : Patient has a good understanding of lifestyle changes and steps needed to achieve self management goal [de-identified] : 1. CHF\par    - low sodium diet\par    - 1-1.2L fluid restriction (including soup, tea, juice and waster)\par    - Daily weight monitor; if wt gain >1-2lb in 2-3 days, > 3-5lbs in a week, please call CCC/CN \par    - Daily BP monitor, if SBP<100, do not give BP meds, wait 1 hrs to recheck BP, if SBP<100 again, call CCC/CN\par \par

## 2021-06-20 NOTE — REVIEW OF SYSTEMS
[Lower Ext Edema] : lower extremity edema [Dyspnea on Exertion] : dyspnea on exertion [Joint Pain] : joint pain [Joint Swelling] : joint swelling [Itching] : itching [Skin Rash] : skin rash [Unsteady Walk] : ataxia [Negative] : Heme/Lymph [Fever] : no fever [Chills] : no chills [Fatigue] : no fatigue [Chest Pain] : no chest pain [Palpitations] : no palpitations [Claudication] : no  leg claudication [Orthopena] : no orthopnea [Paroxysmal Nocturnal Dyspnea] : no paroxysmal nocturnal dyspnea [Shortness Of Breath] : no shortness of breath [Wheezing] : no wheezing [Cough] : no cough [Abdominal Pain] : no abdominal pain [Nausea] : no nausea [Constipation] : no constipation [Diarrhea] : no diarrhea [Vomiting] : no vomiting [Heartburn] : no heartburn [Melena] : no melena [Dysuria] : no dysuria [Incontinence] : no incontinence [Hesitancy] : no hesitancy [Nocturia] : no nocturia [Hematuria] : no hematuria [Frequency] : no frequency [Impotence] : no impotency [Joint Stiffness] : no joint stiffness [Muscle Pain] : no muscle pain [Muscle Weakness] : no muscle weakness [Back Pain] : no back pain [Mole Changes] : no mole changes [Nail Changes] : no nail changes [Hair Changes] : no hair changes [Headache] : no headache [Dizziness] : no dizziness [Fainting] : no fainting [Confusion] : no confusion [Memory Loss] : no memory loss [Insomnia] : no insomnia [Anxiety] : no anxiety [Depression] : no depression [FreeTextEntry9] : hamilton feet pain and swelling from gout [de-identified] : generalized scabs from skin dryness

## 2021-06-21 ENCOUNTER — LABORATORY RESULT (OUTPATIENT)
Age: 81
End: 2021-06-21

## 2021-06-21 ENCOUNTER — APPOINTMENT (OUTPATIENT)
Dept: CARDIOLOGY | Facility: CLINIC | Age: 81
End: 2021-06-21
Payer: MEDICARE

## 2021-06-21 ENCOUNTER — NON-APPOINTMENT (OUTPATIENT)
Age: 81
End: 2021-06-21

## 2021-06-21 VITALS
OXYGEN SATURATION: 98 % | WEIGHT: 223 LBS | DIASTOLIC BLOOD PRESSURE: 62 MMHG | HEART RATE: 54 BPM | HEIGHT: 74 IN | SYSTOLIC BLOOD PRESSURE: 160 MMHG | BODY MASS INDEX: 28.62 KG/M2 | TEMPERATURE: 98 F

## 2021-06-21 VITALS — DIASTOLIC BLOOD PRESSURE: 60 MMHG | SYSTOLIC BLOOD PRESSURE: 145 MMHG

## 2021-06-21 VITALS — DIASTOLIC BLOOD PRESSURE: 62 MMHG | SYSTOLIC BLOOD PRESSURE: 148 MMHG

## 2021-06-21 PROCEDURE — 93000 ELECTROCARDIOGRAM COMPLETE: CPT

## 2021-06-21 PROCEDURE — 99214 OFFICE O/P EST MOD 30 MIN: CPT

## 2021-06-21 RX ORDER — OMEPRAZOLE 40 MG/1
40 CAPSULE, DELAYED RELEASE ORAL
Qty: 30 | Refills: 2 | Status: DISCONTINUED | COMMUNITY
Start: 2021-05-19 | End: 2021-06-21

## 2021-06-21 RX ORDER — MAGNESIUM OXIDE 400 MG
400 (241.3 MG) TABLET ORAL
Qty: 90 | Refills: 1 | Status: ACTIVE | COMMUNITY
Start: 2021-06-21

## 2021-06-21 NOTE — REVIEW OF SYSTEMS
[Lower Ext Edema] : lower extremity edema [Joint Pain] : joint pain [Limb Weakness (Paresis)] : limb weakness (Paresis) [Negative] : Heme/Lymph

## 2021-06-21 NOTE — DISCUSSION/SUMMARY
[FreeTextEntry1] : 80M h/o CAD s/p CABG (2017) c/b sepsis and sternal wound infection s/p sternum removal and pec flap and angioplasty, chronic HFrEF, pAfib (on Eliquis), HTN, DM2, CKD, chrnoic anemia and Bell's Palsy recently moved to [place] for few months from [place] presented to [facility] on 5/9/21 with back pain/dizziness s/p mechanical fall noted ADHFrEF (35-40%), RV dysfunction, initial noted pBNP [id number] ---> 96819, pharm nuclear stress test without ischemia, RHC done with moderate pHTN Group 2 with PCWP 23, switched to torsemide 40mg, underwent MARLO/CV for pAflutter but reverted back to pAfibpAfib/flutter with bradycardia s/p ILR implant c/b recurrent mechanical fall with L-elbow laceration, s/p repeated ER visits 5/28 & 5/30 for recurrent dizziness, readmitted 6/4-6/15/21 found with severe orthostasis and acute on chronic anemia (Hb seema 6.3) s/p EGD/colonoscopy found with duodenal bulb bleeding s/p IR embolization, discharged home with Hb 7.6 off ASA 81mg and rechallenged low dose Eliquis 2.5mg, Cr. 1.87, monitored off torsemide, Imdur and tamsulosin due to symptomatic orthostasis, now presents for follow up. \par \par Orthostasis resolved likely was in setting of ongoing occult upper GI bleed and polypharmacy, currently remains euvolemic off maintenance diuretic, will recheck CBC, BMP and pBNP if able to resume higher dose Eliquis 5mg for eventual Watchmen implant, if uptrending pBNP may restart lower dose torsemide 20mg as well. \par \par \par 1. Recent upper GI bleed\par -pending CBC result if can resume higher dose of Eliquis 5mg BID\par -continue PPI and GI follow up\par \par 2. HFrEF with Severely Reduced RV function, CAD/CABG \par -euvolemic on exam, ACC/AHA stage C, NYHA class II-  off maintenance diuretic, await repeat pBNP result, consider elective CardioMems in the future once Watchman is done \par -  EF 35-40% with severely reduced RV function, no ischemia on stress test and advanced CKD not candidate for LHC \par - s/p RHC - RA 15, RV 61/18, PCWP 23, CO 4.,8, CI 2.06 \par - off Imdur due to orthostasis, continue hydralazine and uptitrate dose 75mg TID\par - continue to hold ASA 81mg for now with recent GI bleed; continue atorvastatin 40mg \par - off Mg supplement as frequent loose stool  \par -may consider PYP scan to rule out cardiac amyloid given baseline elevated pBNP and Trop. \par \par 3. pAtrial Fibrillation, flutter \par - continue ILR monitoring if tachy-didi syndrome to require PPM\par - outpatient EPS/Dr. Mantilla next weeks- follow up for elective Afib ablation  and CRT-D device?\par - monitor on low dose metoprolol succinate 25mg as prior sinus didi \par - uptitrate Eliquis to 5mg BID if H/H stable, await Watchman implant given prior recurrent falls \par  and recent severe GI bleed, MARLO done inpatient adequate size. \par  \par 4. HTN \par -off lisionopril and amlodipine  due to CKD/HFrEF\par -monitor on hydralazine increase dose to 75mg TID  as orthostasis resolved, goal SBP <140\par \par 5. CKD \par -unknown baseline Cr. but initial admission fo 2.1 and now 1.8 \par -consider resume Torsemide 20mg dialy if pBNP uptrending \par -nephrology follow up. \par \par 6. DM2 \par -consider addition of SGLT-2 inhibitor if eEFR >30  \par  \par \par \par \par Follow up in 2 months.

## 2021-06-21 NOTE — CARDIOLOGY SUMMARY
[de-identified] : 5/24/21- Sinus 45, RAD. RBBB, QTc 502\par 6/21/21- Sinus 55, RAD, RBBB [de-identified] : 5/10/21- pharm nuclear stress- IMPRESSIONS: \par * Myocardial Perfusion SPECT results are abnormal. \par * There is a medium sized, moderate defect in basal to mid \par inferior wall that is fixed, suggestive of infarct. No \par evidence of ischemia. \par * Post-stress resting myocardial perfusion gated SPECT \par imaging was performed (LVEF = 41 %;LVEDV = 141 ml.) \par * Dilated right ventricle noted. [de-identified] : 5/9/21- 1. Technically difficult study. \par  2. Endocardial visualization was enhanced with intravenous echo contrast. \par  3. Left ventricularejection fraction, by visual estimation, is 35 to 40%. \par  4. Paradoxical septal motion with moderately decreased segmental left ventricular systolic function. \par  5. Severe hypokinesis of the basal to mid segements, the apex and distal segments are preserved. \par  6. The mitral in-flow pattern reveals no discernable A-wave, therefore no comment on diastolic function can be made. \par  7. Severely enlarged right ventricle with severely reduced systolic function, estimated PASP least 45 mmHg mild to moderately elevated. \par  8. Right atrial enlargement. \par  9. Moderately enlarged left atrium. \par 10. Mild mitral annular calcification. \par 11. Trace mitral valve regurgitation. \par 12. Thickening and calcification of the anterior and posterior mitral valve leaflets. \par 13. Mild-moderate tricuspid regurgitation. \par 14. Sclerotic aortic valve with decreased opening. \par 15. There is mild aortic root calcification. \par 16. There is no evidence of pericardial effusion. \par 17. No prior study available for comparison. [de-identified] : 5/11/21- RHC- INTERVENTIONAL IMPRESSIONS: Moderate Elevation of Right Heart Pressures: \par RA=15 mmHg; RV=61/18 mmHg; PCWP=23 mmHg \par Moderate Pulmonary Hypertension=62/22 mmHg \par Reduced CO=4.8 L/min and CI=2.06 L/min/m2

## 2021-06-21 NOTE — HISTORY OF PRESENT ILLNESS
[FreeTextEntry1] : 80M h/o CAD s/p CABG (2017) c/b sepsis and sternal wound infection s/p sternum removal and pec flap and angioplasty, chronic HFrEF, pAfib (on Eliquis), HTN, DM2, CKD, chrnoic anemia and Bell's Palsy recently moved to [place] for few months from [place] presented to [facility] on 5/9/21 with back pain/dizziness s/p mechanical fall noted ADHFrEF (35-40%), RV dysfunction, initial noted pBNP 92458 ---> 33924, pharm nuclear stress test without ischemia, RHC done with moderate pHTN Group 2 with PCWP 23, switched to torsemide 40mg, underwent MARLO/CV for pAflutter but reverted back to pAfibpAfib/flutter with bradycardia s/p ILR implant c/b recurrent mechanical fall with L-elbow laceration, s/p repeated ER visits 5/28 & 5/30 for recurrent dizziness, readmitted 6/4-6/15/21 found with severe orthostasis and acute on chronic anemia (Hb seema 6.3) s/p EGD/colonoscopy found with duodenal bulb bleeding s/p IR embolization, discharged home with Hb 7.6 off ASA 81mg and rechallenged low dose Eliquis 2.5mg, Cr. 1.87, monitored off torsemide, Imdur and tamsulosin due to symptomatic orthostasis, now presents for follow up. \par \par Reports feeling well since discharged home, no recurrence of orthostatic dizziness, suspected gout pain in his foot is getting better. Labile home SBP up to 160-180s. Denies any dyspnea/orthopnea. Using cane for ambulation at times for balance as legs would feel weak at times. Reports an episode of bright red blood the day of discharge home but no recurrence since. \par \par pBNP 28232 (6/10/21), 74034 (5/28/21)\par \par Prior visit 6/1/21: \par He reports was very dizzy while walking into the bathroom on Sunday and felt room spinning and fell in the toilet, initial SBP 80s checked by his wife, then when EMS arrive SBP 110s. Still dizzy and worst  even with standing and walking. Continues to have chronic diarrhea 4x daily, did not stop the magnesium supplement. \par \par \par Prior visit 5/24/21: \pantera Reports having intermittent shortness of breath and also continues to have dizziness at times, been on reduced dose of metoprolol 25mg BID since last week, denies syncope, no chest pain. He reports is able to ambulate better with less exertional dyspnea compared to prior to recent hospitalization, no edema since discharge. Has not yet make appointment with nephrology for follow up. L-elbow wound has been healing, no recurrent bleeding back on ASA/Eliquis. \par \par Him and his wife and planning to return to [place] in October for 6 months.

## 2021-06-21 NOTE — PHYSICAL EXAM
[Normal] : alert and oriented, normal memory [de-identified] : pale [de-identified] : +1 edema ankle bilateral [de-identified] : dry skin scabs marks

## 2021-06-23 LAB
ANION GAP SERPL CALC-SCNC: 13 MMOL/L
BUN SERPL-MCNC: 39 MG/DL
CALCIUM SERPL-MCNC: 9 MG/DL
CHLORIDE SERPL-SCNC: 107 MMOL/L
CO2 SERPL-SCNC: 17 MMOL/L
CREAT SERPL-MCNC: 2 MG/DL
DEPRECATED KAPPA LC FREE/LAMBDA SER: 1.4 RATIO
GLUCOSE SERPL-MCNC: 185 MG/DL
KAPPA LC CSF-MCNC: 5.2 MG/DL
KAPPA LC SERPL-MCNC: 7.28 MG/DL
POTASSIUM SERPL-SCNC: 5 MMOL/L
SODIUM SERPL-SCNC: 137 MMOL/L

## 2021-06-24 ENCOUNTER — APPOINTMENT (OUTPATIENT)
Dept: FAMILY MEDICINE | Facility: CLINIC | Age: 81
End: 2021-06-24
Payer: MEDICARE

## 2021-06-24 ENCOUNTER — RX RENEWAL (OUTPATIENT)
Age: 81
End: 2021-06-24

## 2021-06-24 VITALS
WEIGHT: 224 LBS | DIASTOLIC BLOOD PRESSURE: 70 MMHG | HEIGHT: 74 IN | HEART RATE: 58 BPM | TEMPERATURE: 97.5 F | SYSTOLIC BLOOD PRESSURE: 140 MMHG | BODY MASS INDEX: 28.75 KG/M2

## 2021-06-24 DIAGNOSIS — K92.2 GASTROINTESTINAL HEMORRHAGE, UNSPECIFIED: ICD-10-CM

## 2021-06-24 DIAGNOSIS — S50.312A ABRASION OF LEFT ELBOW, INITIAL ENCOUNTER: ICD-10-CM

## 2021-06-24 DIAGNOSIS — H26.9 UNSPECIFIED CATARACT: ICD-10-CM

## 2021-06-24 PROCEDURE — 99204 OFFICE O/P NEW MOD 45 MIN: CPT

## 2021-06-27 PROBLEM — K92.2 GI BLEED: Status: ACTIVE | Noted: 2021-06-20

## 2021-06-27 PROBLEM — S50.312A ABRASION OF ELBOW, LEFT: Status: RESOLVED | Noted: 2021-05-19 | Resolved: 2021-06-27

## 2021-06-27 NOTE — HISTORY OF PRESENT ILLNESS
[Spouse] : spouse [FreeTextEntry1] : ESTABLISH CARE [de-identified] : MR. LOGAN IS A PLEASANT 79 YO PRESENTING TO [facility].  HE HAS A HISTORY OF CAD S/P CABG IN 2017, CHRONIC CHF, PAROXYSMAL AFIB ON ANTICOAGULATION WITH ELIQUIS, HYPERTENSION, CKD, DIABETES, CHRONIC ANEMIA AND HISTORY OF BELLS PALSY AFFECTING THE RIGHT SIDE.  HE UNDERWENT MARLO AND CARDIOVERSION FOR PAROXYSMAL AFLUTTER BUT REVERTED BACK TO PAROXYSMAL AFIB/FLUTTER WITH BRADYCARDIA S/P ILR IMPLANT.  ADMITTED IN JUNE WITH ACUTE ON CHRONIC ANEMIA S/P ENDOSCOPY AND COLONOSCOPY.  FOUND TO HAVE DUODENAL BLEEDING S/P IR EMBOLIZATION.    WAS STARTED BACK ON ELIQUIS.  FOLLOWING WITH CARDIOLOGY ROUTINELY.  THEY HAVE DISCUSSED WATCHMAN PROCEDURE AND CARDIOMEMS.  MEDICATIONS ARE BEING ADJUSTED.  NEPHROLOGY FOLLOW-UP WAS ADVISED FOR CKD.  HAS APPOINTMENT SCHEDULED.  SPENDS HIS TIME BETWEEN HERE AND IN [place].  PLANS TO RETURN IN OCTOBER.  HAS HOME CARE VISITING NURSE AND IS GETTING PHYSICAL THERAPY.  NEEDS TO SCHEDULE GI FOLLOW-UP APPOINTMENT FROM HOSPITAL.  IS ON ALLOPURINOL FOR GOUT PROPHYLAXIS AND ZOLOFT FOR DEPRESSION.  HAS TAKEN FOR YEARS AND FEELING WELL ON CURRENT DOSAGE.\par ophthalmology: [facility]\par dermatology: [place]\par hematology

## 2021-06-27 NOTE — PHYSICAL EXAM
[No Acute Distress] : no acute distress [Well Nourished] : well nourished [Well-Appearing] : well-appearing [Normal Sclera/Conjunctiva] : normal sclera/conjunctiva [PERRL] : pupils equal round and reactive to light [No Lymphadenopathy] : no lymphadenopathy [Supple] : supple [No Respiratory Distress] : no respiratory distress  [No Accessory Muscle Use] : no accessory muscle use [Clear to Auscultation] : lungs were clear to auscultation bilaterally [Normal Rate] : normal rate  [Normal S1, S2] : normal S1 and S2 [Soft] : abdomen soft [Non Tender] : non-tender [Normal Bowel Sounds] : normal bowel sounds [Grossly Normal Strength/Tone] : grossly normal strength/tone [Speech Grossly Normal] : speech grossly normal [Memory Grossly Normal] : memory grossly normal [Normal Mood] : the mood was normal

## 2021-06-28 ENCOUNTER — APPOINTMENT (OUTPATIENT)
Dept: ELECTROPHYSIOLOGY | Facility: CLINIC | Age: 81
End: 2021-06-28
Payer: MEDICARE

## 2021-06-28 VITALS
HEIGHT: 74 IN | DIASTOLIC BLOOD PRESSURE: 74 MMHG | TEMPERATURE: 97.8 F | OXYGEN SATURATION: 97 % | BODY MASS INDEX: 28.75 KG/M2 | HEART RATE: 68 BPM | SYSTOLIC BLOOD PRESSURE: 180 MMHG | WEIGHT: 224 LBS

## 2021-06-28 DIAGNOSIS — R00.1 BRADYCARDIA, UNSPECIFIED: ICD-10-CM

## 2021-06-28 PROCEDURE — 99215 OFFICE O/P EST HI 40 MIN: CPT | Mod: 25

## 2021-06-28 PROCEDURE — 93000 ELECTROCARDIOGRAM COMPLETE: CPT | Mod: 59

## 2021-06-29 ENCOUNTER — APPOINTMENT (OUTPATIENT)
Dept: CARDIOLOGY | Facility: CLINIC | Age: 81
End: 2021-06-29
Payer: MEDICARE

## 2021-06-29 PROCEDURE — A9538 TC99M PYROPHOSPHATE: CPT

## 2021-06-29 PROCEDURE — 78803 RP LOCLZJ TUM SPECT 1 AREA: CPT

## 2021-06-29 NOTE — HISTORY OF PRESENT ILLNESS
[FreeTextEntry1] : This is a 80 years old man with h/o CAD s/p CABG c/b sepsis and sternal wound infection s/p sternum removal and pec flap and angioplasty, chronic HFrEF- recent EF 35-40%, Bifascicular block, persistent AFib, CHADS-VASc = 6 (HD, HTN, DM, CAD and age) on Eliquis s/p MARLO-DCCV and ILR on 5/11/2021 with recurrence of AF after that, HTN, DM2, CKD, falls, GI bleeding, pulmonary hypertension, and Bell's Palsy.\par \par In 5/2021, he presented with dizziness and fall. Given his bifascicular block, I performed an ILR for monitoring. We also performed a MARLO DCCV on 5/11/2021 for his AF and HF. His KAROLINA anatomy noted to be suitable for Watchman at that time. He was tolerating eliquis at that time, but Watchman was considered given history of falls. He had few other ED visits with dizziness and presyncope after that. His ILR showed normal sinus rhythm during those episodes which later on thought to be mostly due to labile HTN and severe postural hypotension/dizziness. His cardiac medications adjusted few times by Dr. Duke and my partner Dr. Suggs who saw him in one of his ED visits. \par \par He presented again to the [facility] on 6/4/2021 with presyncope and fatigue. His Hb noted to have dropped to 6.7 from 9.0 on 5/28. His AC was interrupted and his workup showed a bleeding diverticulum in D2. No endoscopic intervention performed and patient underwent IR embolization via right CFA targeting gastroepiploic, SPDA, and GDA. His Hb drifted with heparin challenge and this was interrupted during his hospitalization. Finally, and after consultation with cardiology and GI, we were able to restart heparin without further drop in Hb. The rest of his endoscopy showed diverticulosis in the descending and sigmoid colon as well as internal hemorrhoid., EGD showed hiatal hernia and normal D1 and then the bleeding diverticulum described above. Watchman was discussed and considered as an inpatient with DAPT, but finally we elected to send him home on low dose eliquis pluls aspirin. He received total of 5 units of PRBC during his hospitalization. \par \par Since discharge on 6/15/2021, he saw Dr. Duke who increased his eluiquis to full dose of 5 mg bid on 6/21 after confirming that his Hb has been stable. He is here for EP follow up and was accompanied by his wife. He has been feeling stronger and better. He had no more dizziness, syncope, or presyncope. He noted improvement in his energy level and had no recurrence of the GI bleed or the black stool. He has been compliant with all meds as prescribed. He has stable PINA, class 2. He denies any fevers, chills, cough, sweats, chest pain, palpitations,  orthopnea, paroxysmal nocturnal dyspnea, peripheral edema, lightheadedness, dizziness, syncope, nausea, vomiting, melena, hematochezia, or hematemesis.   \par \par He saw Dr. Ruiz for primary care, and is going to see renal on August. He was seen by neurology while inpatient. He did not see GI yet. \par \par \par Cardiac Tests:\par EKG 6/21/2021: Sinus with Bifasciular block,  ms and  ms\par \par ILR check today: \par Sensing 0.38 mv\par Battery OK\par AF 0.2% since 6/4/2021\par No symptoms or tachycardia or pauses since June 4\par \par MARLO 5/11/21\par Summary:\par  1. Left ventricular ejection fraction, by visual estimation, is 30 to 35%.\par  2. Moderately decreased global left ventricular systolic function.\par  3. Severely enlarged right atrium.\par  4. Severely enlarged right ventricle.\par  5. Severely reduced RV systolic function.\par  6. Mild to moderately enlarged left atrium.\par  7. There is no evidence of pericardial effusion.\par  8. Mild mitral annular calcification.\par  9. Mild mitral valve regurgitation.\par 10. Moderate-severe tricuspid regurgitation.\par 11. Color flow doppler and intravenous injection of agitated saline demonstrates the presence of an intact intra atrial septum.\par 12. No left atrial appendage thrombus and decreased left atrial appendage velocities.\par \par TTE 5/9/21\par Summary:\par  1. Technically difficult study.\par  2. Endocardial visualization was enhanced with intravenous echo contrast.\par  3. Left ventricular ejection fraction, by visual estimation, is 35 to 40%.\par  4. Paradoxical septal motion with moderately decreased segmental left ventricular systolic function.\par  5. Severe hypokinesis of the basal to mid segements, the apex and distal segments are preserved.\par  6. The mitral in-flow pattern reveals no discernable A-wave, therefore no comment on diastolic function can be made.\par  7. Severely enlarged right ventricle with severely reduced systolic function, estimated PASP least 45 mmHg mild to moderately elevated.\par  8. Right atrial enlargement.\par  9. Moderately enlarged left atrium.\par 10. Mild mitral annular calcification.\par 11. Trace mitral valve regurgitation.\par 12. Thickening and calcification of the anterior and posterior mitral valve leaflets.\par 13. Mild-moderate tricuspid regurgitation.\par 14. Sclerotic aortic valve with decreased opening.\par 15. There is mild aortic root calcification.\par 16. There is no evidence of pericardial effusion.\par 17. No prior study available for comparison.\par \par \par < from: Nuclear Stress Test-Pharmacologic (05.10.21 @ 07:59) >\par IMPRESSIONS:\par * Myocardial Perfusion SPECT results are abnormal.\par * There is a medium sized, moderate defect in basal to mid inferior wall that is fixed, suggestive of infarct. No evidence of ischemia.\par * Post-stress resting myocardial perfusion gated SPECT imaging was performed (LVEF = 41 %;LVEDV = 141 ml.)\par * Dilated right ventricle noted.\par \par No prior study is available for comparison.

## 2021-06-29 NOTE — REVIEW OF SYSTEMS
[Dyspnea on exertion] : dyspnea during exertion [Fever] : no fever [Chills] : no chills [Chest Discomfort] : no chest discomfort [Lower Ext Edema] : no extremity edema [Leg Claudication] : no intermittent leg claudication [Palpitations] : no palpitations [Orthopnea] : no orthopnea [PND] : no PND [Syncope] : no syncope [Cough] : no cough [Wheezing] : no wheezing [Abdominal Pain] : no abdominal pain [Nausea] : no nausea [Vomiting] : no vomiting [Hematuria] : no hematuria [Joint Pain] : no joint pain [Rash] : no rash [Dizziness] : no dizziness [Easy Bleeding] : no tendency for easy bleeding

## 2021-06-29 NOTE — DISCUSSION/SUMMARY
[FreeTextEntry1] : 1. AFL/AFIB, s/p MARLO-DCCV on 5/11, CHADS-VASc = 6 on elqiuis. No prior ablation. MARLO from 5/11 showed KAROLINA is suitable for Watchman.  We discussed the role of left atrial appendage occlusion at length. I informed the patient that the Watchman device is not a replacement for anticoagulation, as anticoagulation is still the best method of preventing stroke. However, in individuals who are intolerant of anticoagulation, the Watchman is proven to be non-inferior to anticoagulation with warfarin (PREVAIL trial).\par The risks with Watchman implantation were described in detail and include, but are not limited to: pain, bleeding, infection, vascular injury, cardiac perforation and tamponade with potential for requiring open heart surgery, radiation-induced injury, death, heart attack, or stroke, of which the overall rates of occurrence up to 2%. The patient expressed understanding and was provided the opportunity to ask questions, of which all were answered to the patient's satisfaction. Anticoagulation will need to be continued before implantation and up to 6 weeks after implant, at which time a MARLO will be repeated. If MARLO is acceptable, then anticoagulation will be changed to ASA + Plavix for 6 months, after which ASA will be continued alone. A MARLO is repeated at 1 year to confirm adequate seal of the Watchman device and to rule out thrombus.\par -  Continue with eliquis 5 mg bid and check CBC today\par - If Hb stable, will book him for Watchman. Patient is keen on proceeding with this ASAP to minimize AC duration. They are planning to go to [place] in October and like to be off AC before then. \par - Can consider rhythm control in the future (with AAT or ablation) if recurrent symptomatic AF and/or HF\par \par 2. Coronary artery disease, sp CABG and PCI. Nuc stress 5/2021 negative\par - follow up with Dr. Duke\par \par 3. Chronic congestive heart failure, ? ischemic,   EF 35-40%\par - No current indication for ICD or CRTD. If EF dropped or HF worsened can consider resynchronization therapy, perhaps with HIS, though he may not be a CS-CRT responder as he has RBBB with OR < 230 ms. \par \par 4. Bifascicular block with no signs of high grade AVb or pauses on ILR. Tolerating BB.  \par - No current indication for pacing support. If one is needed then will need CRT if expected to have high pacing burden vs Leadless if expected to need low pacing burden\par - c/w beta blocker

## 2021-06-29 NOTE — ASSESSMENT
[FreeTextEntry1] : This is a 80 years old man with h/o CAD s/p CABG c/b sepsis and sternal wound infection s/p sternum removal and pec flap and angioplasty, chronic HFrEF- recent EF 35-40%, Bifascicular block, persistent AFib, CHADS-VASc = 6 (HD, HTN, DM, CAD and age) on Eliquis s/p MARLO-DCCV and ILR on 5/11/2021 with recurrence of AF after that, HTN, DM2, CKD, falls, GI bleeding, pulmonary hypertension, and Bell's Palsy.\par \par In 5/2021, he presented with dizziness and fall. Given his bifascicular block, I performed an ILR for monitoring. We also performed a MARLO DCCV on 5/11/2021 for his AF and HF. His KAROLINA anatomy noted to be suitable for Watchman at that time. He was tolerating eliquis at that time, but Watchman was considered given history of falls. He had few other ED visits with dizziness and presyncope after that. His ILR showed normal sinus rhythm during those episodes which later on thought to be mostly due to labile HTN and severe postural hypotension/dizziness. His cardiac medications adjusted few times by Dr. Duke and my partner Dr. Suggs who saw him in one of his ED visits. \par \par He presented again to the [facility] on 6/4/2021 with presyncope and fatigue. His Hb noted to have dropped to 6.7 from 9.0 on 5/28. His AC was interrupted and his workup showed a bleeding diverticulum in D2. No endoscopic intervention performed and patient underwent IR embolization via right CFA targeting gastroepiploic, SPDA, and GDA. His Hb drifted with heparin challenge and this was interrupted during his hospitalization. Finally, and after consultation with cardiology and GI, we were able to restart heparin without further drop in Hb. The rest of his endoscopy showed diverticulosis in the descending and sigmoid colon as well as internal hemorrhoid., EGD showed hiatal hernia and normal D1 and then the bleeding diverticulum described above. Watchman was discussed and considered as an inpatient with DAPT, but finally we elected to send him home on low dose eliquis pluls aspirin. He received total of 5 units of PRBC during his hospitalization. \par \par Since discharge on 6/15/2021, he saw Dr. Duke who increased his eluiquis to full dose of 5 mg bid on 6/21 after confirming that his Hb has been stable. He is here for EP follow up and was accompanied by his wife. He has been feeling stronger and better. He had no more dizziness, syncope, or presyncope. He noted improvement in his energy level and had no recurrence of the GI bleed or the black stool. He has been compliant with all meds as prescribed. He has stable PINA, class 2.

## 2021-06-29 NOTE — PHYSICAL EXAM
[Well Developed] : well developed [Well Nourished] : well nourished [Normal Venous Pressure] : normal venous pressure [Normal S1, S2] : normal S1, S2 [No Murmur] : no murmur [Clear Lung Fields] : clear lung fields [Good Air Entry] : good air entry [Soft] : abdomen soft [Non Tender] : non-tender [No Rash] : no rash [Moves all extremities] : moves all extremities [No Focal Deficits] : no focal deficits [Alert and Oriented] : alert and oriented

## 2021-06-30 LAB
BASOPHILS # BLD AUTO: 0.04 K/UL
BASOPHILS NFR BLD AUTO: 0.5 %
EOSINOPHIL # BLD AUTO: 0.15 K/UL
EOSINOPHIL NFR BLD AUTO: 1.9 %
HCT VFR BLD CALC: 32.9 %
HGB BLD-MCNC: 10.2 G/DL
IMM GRANULOCYTES NFR BLD AUTO: 0.4 %
LYMPHOCYTES # BLD AUTO: 0.84 K/UL
LYMPHOCYTES NFR BLD AUTO: 10.4 %
MAN DIFF?: NORMAL
MCHC RBC-ENTMCNC: 28.7 PG
MCHC RBC-ENTMCNC: 31 GM/DL
MCV RBC AUTO: 92.4 FL
MONOCYTES # BLD AUTO: 0.54 K/UL
MONOCYTES NFR BLD AUTO: 6.7 %
NEUTROPHILS # BLD AUTO: 6.45 K/UL
NEUTROPHILS NFR BLD AUTO: 80.1 %
PLATELET # BLD AUTO: 232 K/UL
RBC # BLD: 3.56 M/UL
RBC # FLD: 16.7 %
WBC # FLD AUTO: 8.05 K/UL

## 2021-07-01 LAB
BASOPHILS # BLD AUTO: 0.05 K/UL
BASOPHILS NFR BLD AUTO: 0.6 %
CHOLEST SERPL-MCNC: 95 MG/DL
CREAT SPEC-SCNC: 71 MG/DL
EOSINOPHIL # BLD AUTO: 0.18 K/UL
EOSINOPHIL NFR BLD AUTO: 2.2 %
ESTIMATED AVERAGE GLUCOSE: 131 MG/DL
FERRITIN SERPL-MCNC: 156 NG/ML
HBA1C MFR BLD HPLC: 6.2 %
HCT VFR BLD CALC: 35.3 %
HDLC SERPL-MCNC: 35 MG/DL
HGB BLD-MCNC: 10.4 G/DL
IMM GRANULOCYTES NFR BLD AUTO: 0.6 %
IRON SATN MFR SERPL: 12 %
IRON SERPL-MCNC: 41 UG/DL
LDLC SERPL CALC-MCNC: 36 MG/DL
LYMPHOCYTES # BLD AUTO: 0.79 K/UL
LYMPHOCYTES NFR BLD AUTO: 9.8 %
M PROTEIN SPEC IFE-MCNC: NORMAL
MAN DIFF?: NORMAL
MCHC RBC-ENTMCNC: 28.5 PG
MCHC RBC-ENTMCNC: 29.5 GM/DL
MCV RBC AUTO: 96.7 FL
MICROALBUMIN 24H UR DL<=1MG/L-MCNC: 47.9 MG/DL
MICROALBUMIN/CREAT 24H UR-RTO: 677 MG/G
MONOCYTES # BLD AUTO: 0.58 K/UL
MONOCYTES NFR BLD AUTO: 7.2 %
NEUTROPHILS # BLD AUTO: 6.39 K/UL
NEUTROPHILS NFR BLD AUTO: 79.6 %
NONHDLC SERPL-MCNC: 60 MG/DL
PLATELET # BLD AUTO: 250 K/UL
RBC # BLD: 3.65 M/UL
RBC # FLD: 17 %
T4 FREE SERPL-MCNC: 1.3 NG/DL
TIBC SERPL-MCNC: 336 UG/DL
TRIGL SERPL-MCNC: 120 MG/DL
TSH SERPL-ACNC: 3.08 UIU/ML
UIBC SERPL-MCNC: 296 UG/DL
WBC # FLD AUTO: 8.04 K/UL

## 2021-07-05 DIAGNOSIS — H53.8 OTHER VISUAL DISTURBANCES: ICD-10-CM

## 2021-07-08 PROBLEM — H53.8 BLURRY VISION, BILATERAL: Status: ACTIVE | Noted: 2021-07-08

## 2021-07-12 VITALS — OXYGEN SATURATION: 96 % | DIASTOLIC BLOOD PRESSURE: 70 MMHG | SYSTOLIC BLOOD PRESSURE: 158 MMHG | HEART RATE: 64 BPM

## 2021-07-21 ENCOUNTER — APPOINTMENT (OUTPATIENT)
Dept: ULTRASOUND IMAGING | Facility: CLINIC | Age: 81
End: 2021-07-21
Payer: MEDICARE

## 2021-07-21 ENCOUNTER — RESULT REVIEW (OUTPATIENT)
Age: 81
End: 2021-07-21

## 2021-07-21 ENCOUNTER — NON-APPOINTMENT (OUTPATIENT)
Age: 81
End: 2021-07-21

## 2021-07-21 ENCOUNTER — APPOINTMENT (OUTPATIENT)
Dept: FAMILY MEDICINE | Facility: CLINIC | Age: 81
End: 2021-07-21
Payer: MEDICARE

## 2021-07-21 ENCOUNTER — OUTPATIENT (OUTPATIENT)
Dept: OUTPATIENT SERVICES | Facility: HOSPITAL | Age: 81
LOS: 1 days | End: 2021-07-21
Payer: MEDICARE

## 2021-07-21 VITALS
RESPIRATION RATE: 20 BRPM | OXYGEN SATURATION: 96 % | WEIGHT: 240 LBS | SYSTOLIC BLOOD PRESSURE: 152 MMHG | BODY MASS INDEX: 30.8 KG/M2 | DIASTOLIC BLOOD PRESSURE: 90 MMHG | HEIGHT: 74 IN | HEART RATE: 73 BPM

## 2021-07-21 DIAGNOSIS — M79.89 OTHER SPECIFIED SOFT TISSUE DISORDERS: ICD-10-CM

## 2021-07-21 DIAGNOSIS — Z95.1 PRESENCE OF AORTOCORONARY BYPASS GRAFT: Chronic | ICD-10-CM

## 2021-07-21 PROCEDURE — 93971 EXTREMITY STUDY: CPT | Mod: 26

## 2021-07-21 PROCEDURE — 93971 EXTREMITY STUDY: CPT

## 2021-07-21 PROCEDURE — 99214 OFFICE O/P EST MOD 30 MIN: CPT

## 2021-07-23 ENCOUNTER — APPOINTMENT (OUTPATIENT)
Dept: ELECTROPHYSIOLOGY | Facility: CLINIC | Age: 81
End: 2021-07-23
Payer: MEDICARE

## 2021-07-23 ENCOUNTER — NON-APPOINTMENT (OUTPATIENT)
Age: 81
End: 2021-07-23

## 2021-07-23 PROCEDURE — 93298 REM INTERROG DEV EVAL SCRMS: CPT

## 2021-07-23 PROCEDURE — G2066: CPT

## 2021-07-25 PROBLEM — M79.89 LEFT LEG SWELLING: Status: ACTIVE | Noted: 2021-07-21

## 2021-07-25 NOTE — HISTORY OF PRESENT ILLNESS
[FreeTextEntry1] : F/U LAB WORK [de-identified] : MR. LOGAN IS A PLEASANT 79 YO PRESENTING FOR FOLLOW-UP TO REVIEW LAB WORK.  HE HAS A HISTORY OF CAD S/P CABG IN 2017, CHRONIC CHF, PAROXYSMAL AFIB ON ANTICOAGULATION WITH ELIQUIS, HYPERTENSION, CKD, DIABETES, CHRONIC ANEMIA.  IS TO HAVE WATCHMEN PROCEDURE NEXT TUESDAY.  BLOOD PRESSURE MEDICATIONS BEING ADJUSTED BY CARDIOLOGY. GIVEN HYDRALAZINE AND AMLODIPINE.  AMLODIPINE STARTED AND NOW SWELLING IN LEFT LEG.  PRIOR WEIGHT STATED.  DENIES FALLS.  NO SIGNS OR SYMPTOMS OF BLEEDING.

## 2021-07-25 NOTE — PHYSICAL EXAM
[No Acute Distress] : no acute distress [Well Nourished] : well nourished [Well-Appearing] : well-appearing [Normal Sclera/Conjunctiva] : normal sclera/conjunctiva [No Lymphadenopathy] : no lymphadenopathy [PERRL] : pupils equal round and reactive to light [Supple] : supple [No Respiratory Distress] : no respiratory distress  [No Accessory Muscle Use] : no accessory muscle use [Clear to Auscultation] : lungs were clear to auscultation bilaterally [Normal Rate] : normal rate  [Regular Rhythm] : with a regular rhythm [Soft] : abdomen soft [Normal S1, S2] : normal S1 and S2 [Non Tender] : non-tender [Normal Bowel Sounds] : normal bowel sounds [de-identified] : LEFT > RIGHT LOWER EXTREMITY EDEMA

## 2021-07-25 NOTE — PLAN
[FreeTextEntry1] : SODIUM AVOIDANCE AND LEG ELEVATION\par MONITOR WEIGHT\par WILL SEND FOR VENOUS DUPLEX\par ? AMLODIPINE CAUSE OF SWELLING\par MR. LOGAN WILL CALL HIS CARDIOLOGIST TODAY TO DISCUSS FURTHER\par PRIOR CARDIOLOGY NOTE APPRECIATED\par HEALTHY DIET AND LIFESTYLE MODIFICATIONS\par HEALTHY WEIGHT LOSS \par FALL PRECAUTIONS\par LIPIDS AND A1C AT GOAL, HEMOGLOBIN IMPROVED\par FOLLOW-UP ALL SPECIALISTS AS DIRECTED \par CT HEAD REVIEWED\par CALL WITH ANY QUESTIONS, CONCERNS OR CHANGES

## 2021-07-26 ENCOUNTER — APPOINTMENT (OUTPATIENT)
Dept: CARDIOLOGY | Facility: CLINIC | Age: 81
End: 2021-07-26
Payer: MEDICARE

## 2021-07-26 VITALS
HEIGHT: 74 IN | DIASTOLIC BLOOD PRESSURE: 70 MMHG | TEMPERATURE: 98.4 F | OXYGEN SATURATION: 96 % | HEART RATE: 58 BPM | WEIGHT: 227 LBS | BODY MASS INDEX: 29.13 KG/M2 | SYSTOLIC BLOOD PRESSURE: 146 MMHG

## 2021-07-26 VITALS — SYSTOLIC BLOOD PRESSURE: 160 MMHG | DIASTOLIC BLOOD PRESSURE: 80 MMHG

## 2021-07-26 PROCEDURE — 99214 OFFICE O/P EST MOD 30 MIN: CPT

## 2021-07-26 RX ORDER — FERROUS SULFATE 325(65) MG
325 (65 FE) TABLET ORAL DAILY
Refills: 0 | Status: DISCONTINUED | COMMUNITY
Start: 2021-05-19 | End: 2021-07-26

## 2021-07-26 NOTE — PHYSICAL EXAM
[Well Developed] : well developed [Well Nourished] : well nourished [No Acute Distress] : no acute distress [Normal Conjunctiva] : normal conjunctiva [Normal Venous Pressure] : normal venous pressure [No Carotid Bruit] : no carotid bruit [Normal S1, S2] : normal S1, S2 [No Murmur] : no murmur [No Rub] : no rub [No Gallop] : no gallop [Clear Lung Fields] : clear lung fields [Good Air Entry] : good air entry [No Respiratory Distress] : no respiratory distress  [Soft] : abdomen soft [Non Tender] : non-tender [No Masses/organomegaly] : no masses/organomegaly [Normal Bowel Sounds] : normal bowel sounds [Normal Gait] : normal gait [No Edema] : no edema [No Cyanosis] : no cyanosis [No Clubbing] : no clubbing [No Varicosities] : no varicosities [Moves all extremities] : moves all extremities [No Focal Deficits] : no focal deficits [Normal Speech] : normal speech [Alert and Oriented] : alert and oriented [Normal memory] : normal memory [de-identified] : +scabs cris

## 2021-07-26 NOTE — DISCUSSION/SUMMARY
[FreeTextEntry1] : 80M h/o CAD s/p CABG (2017) c/b sepsis and sternal wound infection s/p sternum removal and pec flap and angioplasty, chronic HFrEF, pAfib (on Eliquis), HTN, DM2, CKD, chrnoic anemia and Bell's Palsy recently moved to [place] for few months from [place] presented to [facility] on 5/9/21 with back pain/dizziness s/p mechanical fall noted ADHFrEF (35-40%), RV dysfunction, initial noted pBNP 42842 ---> 94591, pharm nuclear stress test without ischemia, RHC done with moderate pHTN Group 2 with PCWP 23, switched to torsemide 40mg, underwent MARLO/CV for pAflutter but reverted back to pAfibpAfib/flutter with bradycardia s/p ILR implant c/b recurrent mechanical fall with L-elbow laceration, s/p repeated ER visits 5/28 & 5/30 for recurrent dizziness, readmitted 6/4-6/15/21 found with severe orthostasis and acute on chronic anemia (Hb seema 6.3) s/p EGD/colonoscopy found with duodenal bulb bleeding s/p IR embolization, discharged home with Hb 7.6 off ASA 81mg and rechallenged low dose Eliquis 2.5mg, Cr. 1.87, monitored off torsemide, Imdur and tamsulosin due to symptomatic orthostasis, last seen in office 6/21/21 resumed Eliquis 5mg with stable H/H, noted elevated BP increased hydralazine dose to 100mg TID, 24-Hr BP monitoring still with SBP 160s added amlodipine 5mg, cardiac amyloid workup negative, had recent increased salt intake and gained >16 lb, restarted Torsemide 20mg, planning for elective Watchman with EPS/Dr. Mantilla, now presents for follow up. \par \par Orthostasis resolved likely was in setting of ongoing occult upper GI bleed and polypharmacy. BP been elevated recently and with LE edema, resumed on antihypertensives and maintenance diuretic. \par \par \par 1. Recent severe upper GI bleed\par -continue PPI and GI follow up\par -plan for elective Watchman tomorrow then stop DOAC use after 45 days if stable. \par \par 2. HFrEF with Severely Reduced RV function, CAD/CABG \par -euvolemic on exam, ACC/AHA stage C, NYHA class II-  continue Torsemide 20mg daily may switch to ever other day if reduced fluid intake, consider elective CardioMems in the future once Watchman is done \par -  EF 35-40% with severely reduced RV function, no ischemia on stress test and advanced CKD not candidate for LHC \par - s/p RHC - RA 15, RV 61/18, PCWP 23, CO 4.,8, CI 2.06 \par - continue hydralazine 100mg TID, consider addition of Imdur if BP remains elevated. \par - will resume ASA 81mg after Watchman; continue atorvastatin 40mg \par \par 3. pAtrial Fibrillation, flutter \par - continue ILR monitoring if tachy-didi syndrome to require PPM\par -follow up with EPS/Dr. Mantilla next weeks- future elective Afib ablation  and CRT-D device?\par - monitor on low dose metoprolol succinate 25mg as prior sinus didi \par - tolerating resumtion of Eliquis 5mg BID with H/H stable, await Watchman implant given prior recurrent falls  and recent severe GI bleed, MARLO done inpatient adequate size. \par  \par 4. HTN \par -off lisionopril due to CKD\par -uptitrate amlodipine 10mg and monitor on hydralazine dose [id number] TID, goal SBP <140\par \par 5. CKD \par -unknown baseline Cr. but initial admission fo 2.1 and now 1.8 \par -monitor with Torsemide 20mg dialy \par -nephrology follow up. \par \par 6. DM2 \par -consider addition of SGLT-2 inhibitor if eEFR >30  \par  \par 7. Gait imbalance- resume home PT\par \par \par \par Follow up in 3 months.

## 2021-07-26 NOTE — CARDIOLOGY SUMMARY
[de-identified] : 5/24/21- Sinus 45, RAD. RBBB, QTc 502\par 6/21/21- Sinus 55, RAD, RBBB [de-identified] : 5/10/21- pharm nuclear stress- IMPRESSIONS: \par * Myocardial Perfusion SPECT results are abnormal. \par * There is a medium sized, moderate defect in basal to mid \par inferior wall that is fixed, suggestive of infarct. No \par evidence of ischemia. \par * Post-stress resting myocardial perfusion gated SPECT \par imaging was performed (LVEF = 41 %;LVEDV = 141 ml.) \par * Dilated right ventricle noted. [de-identified] : 5/9/21- 1. Technically difficult study. \par  2. Endocardial visualization was enhanced with intravenous echo contrast. \par  3. Left ventricularejection fraction, by visual estimation, is 35 to 40%. \par  4. Paradoxical septal motion with moderately decreased segmental left ventricular systolic function. \par  5. Severe hypokinesis of the basal to mid segements, the apex and distal segments are preserved. \par  6. The mitral in-flow pattern reveals no discernable A-wave, therefore no comment on diastolic function can be made. \par  7. Severely enlarged right ventricle with severely reduced systolic function, estimated PASP least 45 mmHg mild to moderately elevated. \par  8. Right atrial enlargement. \par  9. Moderately enlarged left atrium. \par 10. Mild mitral annular calcification. \par 11. Trace mitral valve regurgitation. \par 12. Thickening and calcification of the anterior and posterior mitral valve leaflets. \par 13. Mild-moderate tricuspid regurgitation. \par 14. Sclerotic aortic valve with decreased opening. \par 15. There is mild aortic root calcification. \par 16. There is no evidence of pericardial effusion. \par 17. No prior study available for comparison. [de-identified] : 5/11/21- RHC- INTERVENTIONAL IMPRESSIONS: Moderate Elevation of Right Heart Pressures: \par RA=15 mmHg; RV=61/18 mmHg; PCWP=23 mmHg \par Moderate Pulmonary Hypertension=62/22 mmHg \par Reduced CO=4.8 L/min and CI=2.06 L/min/m2

## 2021-07-26 NOTE — HISTORY OF PRESENT ILLNESS
[FreeTextEntry1] : 80M h/o CAD s/p CABG (2017) c/b sepsis and sternal wound infection s/p sternum removal and pec flap and angioplasty, chronic HFrEF, pAfib (on Eliquis), HTN, DM2, CKD, chrnoic anemia and Bell's Palsy recently moved to [place] for few months from [place] presented to [facility] on 5/9/21 with back pain/dizziness s/p mechanical fall noted ADHFrEF (35-40%), RV dysfunction, initial noted pBNP 44002 ---> 46915, pharm nuclear stress test without ischemia, RHC done with moderate pHTN Group 2 with PCWP 23, switched to torsemide 40mg, underwent MARLO/CV for pAflutter but reverted back to pAfibpAfib/flutter with bradycardia s/p ILR implant c/b recurrent mechanical fall with L-elbow laceration, s/p repeated ER visits 5/28 & 5/30 for recurrent dizziness, readmitted 6/4-6/15/21 found with severe orthostasis and acute on chronic anemia (Hb seema 6.3) s/p EGD/colonoscopy found with duodenal bulb bleeding s/p IR embolization, discharged home with Hb 7.6 off ASA 81mg and rechallenged low dose Eliquis 2.5mg, Cr. 1.87, monitored off torsemide, Imdur and tamsulosin due to symptomatic orthostasis, last seen in office 6/21/21 resumed Eliquis 5mg with stable H/H, noted elevated BP increased hydralazine dose to 100mg TID, 24-Hr BP monitoring still with SBP 160s added amlodipine 5mg, cardiac amyloid workup negative, had recent increased salt intake and gained >16 lb, restarted Torsemide 20mg, planning for elective Watchman with EPS/Dr. Mantilla, now presents for follow up. \par \par Reports BP has been better controlled, denies any bleeding or dark stool, wife noticed that he is off balance at times, denies dizziness or lightheaded, has not seen neurology recently, had been on home PT in the past. Pending elective Watchman procedure tomorrow and also planning to return to [place] in late October. Constantly feels chills at time. \par \par \par Prior visit 6/2021: \par Reports feeling well since discharged home, no recurrence of orthostatic dizziness, suspected gout pain in his foot is getting better. Labile home SBP up to 160-180s. Denies any dyspnea/orthopnea. Using cane for ambulation at times for balance as legs would feel weak at times. Reports an episode of bright red blood the day of discharge home but no recurrence since. \par \par pBNP 01241 (6/10/21), 44822 (5/28/21)\par \par Prior visit 6/1/21: \par He reports was very dizzy while walking into the bathroom on Sunday and felt room spinning and fell in the toilet, initial SBP 80s checked by his wife, then when EMS arrive SBP 110s. Still dizzy and worst  even with standing and walking. Continues to have chronic diarrhea 4x daily, did not stop the magnesium supplement. \par \par \par Prior visit 5/24/21: \par Reports having intermittent shortness of breath and also continues to have dizziness at times, been on reduced dose of metoprolol 25mg BID since last week, denies syncope, no chest pain. He reports is able to ambulate better with less exertional dyspnea compared to prior to recent hospitalization, no edema since discharge. Has not yet make appointment with nephrology for follow up. L-elbow wound has been healing, no recurrent bleeding back on ASA/Eliquis. \par \par Him and his wife and planning to return to [place] in October for 6 months.

## 2021-07-27 ENCOUNTER — TRANSCRIPTION ENCOUNTER (OUTPATIENT)
Age: 81
End: 2021-07-27

## 2021-07-27 ENCOUNTER — INPATIENT (INPATIENT)
Facility: HOSPITAL | Age: 81
LOS: 1 days | Discharge: HOME | DRG: 274 | End: 2021-07-29
Attending: STUDENT IN AN ORGANIZED HEALTH CARE EDUCATION/TRAINING PROGRAM | Admitting: STUDENT IN AN ORGANIZED HEALTH CARE EDUCATION/TRAINING PROGRAM
Payer: MEDICARE

## 2021-07-27 VITALS
HEIGHT: 73 IN | RESPIRATION RATE: 18 BRPM | SYSTOLIC BLOOD PRESSURE: 160 MMHG | WEIGHT: 225.97 LBS | TEMPERATURE: 99 F | HEART RATE: 65 BPM | OXYGEN SATURATION: 94 % | DIASTOLIC BLOOD PRESSURE: 76 MMHG

## 2021-07-27 DIAGNOSIS — Z95.1 PRESENCE OF AORTOCORONARY BYPASS GRAFT: Chronic | ICD-10-CM

## 2021-07-27 DIAGNOSIS — Z98.890 OTHER SPECIFIED POSTPROCEDURAL STATES: Chronic | ICD-10-CM

## 2021-07-27 DIAGNOSIS — I48.91 UNSPECIFIED ATRIAL FIBRILLATION: ICD-10-CM

## 2021-07-27 LAB
ANION GAP SERPL CALC-SCNC: 13 MMOL/L — SIGNIFICANT CHANGE UP (ref 5–17)
APTT BLD: 37.9 SEC — HIGH (ref 27.5–35.5)
BLD GP AB SCN SERPL QL: SIGNIFICANT CHANGE UP
BUN SERPL-MCNC: 43.8 MG/DL — HIGH (ref 8–20)
CALCIUM SERPL-MCNC: 9.3 MG/DL — SIGNIFICANT CHANGE UP (ref 8.6–10.2)
CHLORIDE SERPL-SCNC: 98 MMOL/L — SIGNIFICANT CHANGE UP (ref 98–107)
CO2 SERPL-SCNC: 25 MMOL/L — SIGNIFICANT CHANGE UP (ref 22–29)
CREAT SERPL-MCNC: 2.09 MG/DL — HIGH (ref 0.5–1.3)
GLUCOSE BLDC GLUCOMTR-MCNC: 170 MG/DL — HIGH (ref 70–99)
GLUCOSE SERPL-MCNC: 93 MG/DL — SIGNIFICANT CHANGE UP (ref 70–99)
HCT VFR BLD CALC: 31.2 % — LOW (ref 39–50)
HGB BLD-MCNC: 9.5 G/DL — LOW (ref 13–17)
INR BLD: 1.5 RATIO — HIGH (ref 0.88–1.16)
MAGNESIUM SERPL-MCNC: 1.7 MG/DL — SIGNIFICANT CHANGE UP (ref 1.6–2.6)
MCHC RBC-ENTMCNC: 26.9 PG — LOW (ref 27–34)
MCHC RBC-ENTMCNC: 30.4 GM/DL — LOW (ref 32–36)
MCV RBC AUTO: 88.4 FL — SIGNIFICANT CHANGE UP (ref 80–100)
PLATELET # BLD AUTO: 270 K/UL — SIGNIFICANT CHANGE UP (ref 150–400)
POTASSIUM SERPL-MCNC: 3.5 MMOL/L — SIGNIFICANT CHANGE UP (ref 3.5–5.3)
POTASSIUM SERPL-SCNC: 3.5 MMOL/L — SIGNIFICANT CHANGE UP (ref 3.5–5.3)
PROTHROM AB SERPL-ACNC: 17.1 SEC — HIGH (ref 10.6–13.6)
RBC # BLD: 3.53 M/UL — LOW (ref 4.2–5.8)
RBC # FLD: 17 % — HIGH (ref 10.3–14.5)
SODIUM SERPL-SCNC: 136 MMOL/L — SIGNIFICANT CHANGE UP (ref 135–145)
WBC # BLD: 10.11 K/UL — SIGNIFICANT CHANGE UP (ref 3.8–10.5)
WBC # FLD AUTO: 10.11 K/UL — SIGNIFICANT CHANGE UP (ref 3.8–10.5)

## 2021-07-27 PROCEDURE — 33340 PERQ CLSR TCAT L ATR APNDGE: CPT | Mod: Q0

## 2021-07-27 PROCEDURE — 93355 ECHO TRANSESOPHAGEAL (TEE): CPT

## 2021-07-27 PROCEDURE — 93010 ELECTROCARDIOGRAM REPORT: CPT | Mod: 76

## 2021-07-27 RX ORDER — METOPROLOL TARTRATE 50 MG
25 TABLET ORAL DAILY
Refills: 0 | Status: DISCONTINUED | OUTPATIENT
Start: 2021-07-27 | End: 2021-07-29

## 2021-07-27 RX ORDER — ACETAMINOPHEN 500 MG
650 TABLET ORAL EVERY 6 HOURS
Refills: 0 | Status: DISCONTINUED | OUTPATIENT
Start: 2021-07-27 | End: 2021-07-29

## 2021-07-27 RX ORDER — SODIUM CHLORIDE 9 MG/ML
1000 INJECTION, SOLUTION INTRAVENOUS
Refills: 0 | Status: DISCONTINUED | OUTPATIENT
Start: 2021-07-27 | End: 2021-07-29

## 2021-07-27 RX ORDER — DEXTROSE 50 % IN WATER 50 %
15 SYRINGE (ML) INTRAVENOUS ONCE
Refills: 0 | Status: DISCONTINUED | OUTPATIENT
Start: 2021-07-27 | End: 2021-07-29

## 2021-07-27 RX ORDER — DEXTROSE 50 % IN WATER 50 %
12.5 SYRINGE (ML) INTRAVENOUS ONCE
Refills: 0 | Status: DISCONTINUED | OUTPATIENT
Start: 2021-07-27 | End: 2021-07-29

## 2021-07-27 RX ORDER — DEXTROSE 50 % IN WATER 50 %
25 SYRINGE (ML) INTRAVENOUS ONCE
Refills: 0 | Status: DISCONTINUED | OUTPATIENT
Start: 2021-07-27 | End: 2021-07-29

## 2021-07-27 RX ORDER — GLUCAGON INJECTION, SOLUTION 0.5 MG/.1ML
1 INJECTION, SOLUTION SUBCUTANEOUS ONCE
Refills: 0 | Status: DISCONTINUED | OUTPATIENT
Start: 2021-07-27 | End: 2021-07-29

## 2021-07-27 RX ORDER — BENZOCAINE AND MENTHOL 5; 1 G/100ML; G/100ML
1 LIQUID ORAL
Refills: 0 | Status: DISCONTINUED | OUTPATIENT
Start: 2021-07-27 | End: 2021-07-29

## 2021-07-27 RX ORDER — APIXABAN 2.5 MG/1
5 TABLET, FILM COATED ORAL
Refills: 0 | Status: DISCONTINUED | OUTPATIENT
Start: 2021-07-27 | End: 2021-07-29

## 2021-07-27 RX ORDER — FERROUS SULFATE 325(65) MG
325 TABLET ORAL DAILY
Refills: 0 | Status: DISCONTINUED | OUTPATIENT
Start: 2021-07-27 | End: 2021-07-29

## 2021-07-27 RX ORDER — INSULIN GLARGINE 100 [IU]/ML
17 INJECTION, SOLUTION SUBCUTANEOUS AT BEDTIME
Refills: 0 | Status: DISCONTINUED | OUTPATIENT
Start: 2021-07-27 | End: 2021-07-29

## 2021-07-27 RX ORDER — ALPRAZOLAM 0.25 MG
0.25 TABLET ORAL EVERY 6 HOURS
Refills: 0 | Status: DISCONTINUED | OUTPATIENT
Start: 2021-07-27 | End: 2021-07-29

## 2021-07-27 RX ORDER — POTASSIUM CHLORIDE 20 MEQ
10 PACKET (EA) ORAL ONCE
Refills: 0 | Status: COMPLETED | OUTPATIENT
Start: 2021-07-27 | End: 2021-07-27

## 2021-07-27 RX ORDER — ALLOPURINOL 300 MG
100 TABLET ORAL DAILY
Refills: 0 | Status: DISCONTINUED | OUTPATIENT
Start: 2021-07-27 | End: 2021-07-29

## 2021-07-27 RX ORDER — HYDRALAZINE HCL 50 MG
50 TABLET ORAL THREE TIMES A DAY
Refills: 0 | Status: DISCONTINUED | OUTPATIENT
Start: 2021-07-27 | End: 2021-07-29

## 2021-07-27 RX ORDER — MAGNESIUM SULFATE 500 MG/ML
2 VIAL (ML) INJECTION ONCE
Refills: 0 | Status: COMPLETED | OUTPATIENT
Start: 2021-07-27 | End: 2021-07-27

## 2021-07-27 RX ORDER — INSULIN LISPRO 100/ML
VIAL (ML) SUBCUTANEOUS AT BEDTIME
Refills: 0 | Status: DISCONTINUED | OUTPATIENT
Start: 2021-07-27 | End: 2021-07-29

## 2021-07-27 RX ORDER — ATORVASTATIN CALCIUM 80 MG/1
40 TABLET, FILM COATED ORAL AT BEDTIME
Refills: 0 | Status: DISCONTINUED | OUTPATIENT
Start: 2021-07-27 | End: 2021-07-29

## 2021-07-27 RX ORDER — INSULIN GLARGINE 100 [IU]/ML
17 INJECTION, SOLUTION SUBCUTANEOUS EVERY MORNING
Refills: 0 | Status: DISCONTINUED | OUTPATIENT
Start: 2021-07-27 | End: 2021-07-29

## 2021-07-27 RX ORDER — PANTOPRAZOLE SODIUM 20 MG/1
40 TABLET, DELAYED RELEASE ORAL
Refills: 0 | Status: DISCONTINUED | OUTPATIENT
Start: 2021-07-27 | End: 2021-07-29

## 2021-07-27 RX ORDER — INSULIN LISPRO 100/ML
VIAL (ML) SUBCUTANEOUS
Refills: 0 | Status: DISCONTINUED | OUTPATIENT
Start: 2021-07-27 | End: 2021-07-29

## 2021-07-27 RX ADMIN — APIXABAN 5 MILLIGRAM(S): 2.5 TABLET, FILM COATED ORAL at 21:33

## 2021-07-27 RX ADMIN — Medication 100 MILLIEQUIVALENT(S): at 18:52

## 2021-07-27 RX ADMIN — INSULIN GLARGINE 17 UNIT(S): 100 INJECTION, SOLUTION SUBCUTANEOUS at 21:45

## 2021-07-27 RX ADMIN — Medication 50 MILLIGRAM(S): at 21:33

## 2021-07-27 RX ADMIN — ATORVASTATIN CALCIUM 40 MILLIGRAM(S): 80 TABLET, FILM COATED ORAL at 21:33

## 2021-07-27 RX ADMIN — Medication 50 GRAM(S): at 14:49

## 2021-07-27 NOTE — ASU PATIENT PROFILE, ADULT - PMH
Anemia secondary to renal failure  together with iron deficiency  Atrial fibrillation, unspecified type  s/p MARLO/DCCV 5/11/21  Bell's palsy  Right side of face  Bifascicular block  s/p ILR implant  Chronic kidney disease, unspecified CKD stage  stage III. Recent BL cr 2.0-2.5  Congestive heart failure, unspecified HF chronicity, unspecified heart failure type  EF 35-40%  Coronary artery disease involving native heart, angina presence unspecified, unspecified vessel or lesion type  nuc stress 5/2021 negative  Essential hypertension    GIB (gastrointestinal bleeding)  s/p coil embolization  Osteomyelitis of sternum    Type 2 diabetes mellitus without complication, unspecified whether long term insulin use  insulin plus orals

## 2021-07-27 NOTE — DISCHARGE NOTE PROVIDER - CARE PROVIDER_API CALL
Gamal Duke (DO)  Cardiology; Internal Medicine  [address] [facility], [address]  [place] [address]  Phone: [phone]  Fax: [phone]  Follow Up Time:     Jacqueline Mantilla)  Cardiac Electrophysiology; Cardiovascular Disease; Internal Medicine  [address] [place] [address]  Phone: [phone]  Fax: [phone]  Follow Up Time:    Gamal Duke (DO)  Cardiology; Internal Medicine  [address] [facility], [address]  [place] [address]  Phone: [phone]  Fax: [phone]  Established Patient  Follow Up Time: Routine    Jacqueline Mantilla)  Cardiac Electrophysiology; Cardiovascular Disease; Internal Medicine  [address] [place] [address]  Phone: [phone]  Fax: [phone]  Established Patient  Follow Up Time: 1 month    Cinthya Washington (DO)  Family Medicine  [address] [place] [address]  Phone: [phone]  Fax: [phone]  Established Patient  Follow Up Time: 2 weeks

## 2021-07-27 NOTE — DISCHARGE NOTE PROVIDER - NSDCMRMEDTOKEN_GEN_ALL_CORE_FT
allopurinol 100 mg oral tablet: 1 tab(s) orally once a day  apixaban 5 mg oral tablet: 1 tab(s) orally 2 times a day  atorvastatin 40 mg oral tablet: 1 tab(s) orally once a day  ferrous sulfate 300 mg (60 mg elemental iron) oral tablet: 1 tab(s) orally once a day  hydrALAZINE 25 mg oral tablet: 2 tab(s) orally 3 times a day  Lantus 100 units/mL subcutaneous solution: 17 unit(s) subcutaneous 2 times a day  metoprolol succinate 25 mg oral tablet, extended release: 1 tab(s) orally once a day  omeprazole 40 mg oral delayed release capsule: 1 cap(s) orally 2 times a day

## 2021-07-27 NOTE — H&P PST ADULT - ASSESSMENT
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 Following embolization, he has remained stable on Eliquis for ~ 6 weeks. Given high risk for recurrent GI bleed on long term anticoagulation, but high risk of CVA off anticoagulation, the patient now presents for elective KAROLINA occlusion with Watchman device implant.     - IVL, labs, ECG  - confirmed npo>10 hours  - last dose of Eliquis was.....  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 Following embolization, he has remained stable on Eliquis for ~ 6 weeks. Given high risk for recurrent GI bleed on long term anticoagulation, but high risk of CVA off anticoagulation, the patient now presents for elective KAROLINA occlusion with Watchman device implant.     - IVL, labs, ECG  - confirmed npo>10 hours  - last dose of Eliquis was 7/26/21 in the evening  - 1/2 dose of lantus last night 7/26/21

## 2021-07-27 NOTE — DISCHARGE NOTE PROVIDER - NSDCFUSCHEDAPPT_GEN_ALL_CORE_FT
[facility] ; 08/13/2021 ; [facility] Nephro [address] [facility] ; 08/23/2021 ; NPP Familymed [address] [facility] ; 08/27/2021 ; NPP Cardio Electro [address] [facility] ; 09/15/2021 ; NPP Neurology [address] [facility] ; 10/01/2021 ; NPP Cardio Electro [address] [facility] ; 10/05/2021 ; NPP Cardio [address] [facility]

## 2021-07-27 NOTE — DISCHARGE NOTE PROVIDER - NSDCCPTREATMENT_GEN_ALL_CORE_FT
PRINCIPAL PROCEDURE  Procedure: Insertion, Watchman left atrial appendage closure device  Findings and Treatment: [facility] will call you to schedule a 2 week post op follow up, please call [phone] if you dont hear from them or need to be seen sooner.  - Bruising at the groin, sometimes extending down the leg, and/or a small lump under the skin at the groin access site is normal and will resolve within 2 – 3 weeks.   - Occasional skipped beats or palpitations that last for a few beats are common and generally resolve within 1-2 months.   - You make walk and take stairs at a regular pace.   - Do not perform any exercise more strenuous than walking for 1 week.   - Do not strain or lift heavy objects for 1 week.  - You may shower the day after the procedure.  - Do not soak in water (such as tub baths, hot tubs, swimming, etc.) for 1 week.   - You may resume all other activities the day after the procedure.  Call your doctor if:   - you notice bleeding, redness, drainage, swelling, increased tenderness or a hot sensation around the catheter insertion site.   - your temperature is greater than 100 degrees F for more than 24 hours.  - your rapid heart rhythm returns.  - you have any questions or concerns regarding the procedure.  If significant bleeding and/or a large lump (the size of a golf ball or bigger) occurs:  - Lie flat and apply continuous direct pressure just above the puncture site for at least 10 minutes  - If the issue resolves, notify your physician immediately.    - If the bleeding cannot be controlled, please seek immediate medical attention.  If you experience increased difficulty breathing or chest pain, or if you faint or have dizzy spells, please seek immediate medical attention.

## 2021-07-27 NOTE — H&P PST ADULT - COMMENTS
denies recent cough, fever, chills, dysuria, hematuria, recent travel or COVID 19 infection  Covid Vaccine: completed denies recent cough, fever, chills, dysuria, hematuria, recent travel or COVID 19 infection  Covid Vaccine: Moderna completed 2/25/21

## 2021-07-27 NOTE — DISCHARGE NOTE PROVIDER - CARE PROVIDERS DIRECT ADDRESSES
,DirectAddress_Unknown,DirectAddress_Unknown ,DirectAddress_Unknown,DirectAddress_Unknown,[email or web address]

## 2021-07-27 NOTE — PROGRESS NOTE ADULT - SUBJECTIVE AND OBJECTIVE BOX
Admission Criteria  Please admit the patient to the following service:  Major Criteria:    - Creatinine >1.5 mcg/dl, CFR <60ml/min, hemodialysis, ERSD (choose one): CKD Stage III (eGFR 30-59), CKD Stage IV (eGFR 15-29), CKD Stage V (eGFR <15)    - LV dysfunction (EF 30-40%)    Major Criteria:  - Continuous EKG monitoring is required for condition causing arrhythmia (hyperkalemia, etc) s/p Watchman implant   - Significant hypotension during procedure (SPB <100mmHG )requiring intraop pressor support   - Significant volume load > 200 ml      Admit to: (1 Major ciriteria/2 or more minor criteria) Patient is being admitted to the inpatient service due to high risk characteristics and need for further management/monitoring and is considered to be at a significantly increased risk of major adverse cardiac and vascular events if discharged.

## 2021-07-27 NOTE — PROGRESS NOTE ADULT - SUBJECTIVE AND OBJECTIVE BOX
PROCEDURE(S): KAROLINA Occlusion via Watchman Device    ELECTRPHYSIOLOGIST(S): Jacqueline Mantilla MD         COMPLICATIONS:  none        DISPOSITION:  observation     CONDITION: stable      Pt doing well s/p Watchman device implant. Remains sleepy; otherwise denies complaint.       MEDICATIONS  (STANDING):  allopurinol 100 milliGRAM(s) Oral daily  apixaban 5 milliGRAM(s) Oral <User Schedule>  atorvastatin 40 milliGRAM(s) Oral at bedtime  dextrose 40% Gel 15 Gram(s) Oral once  dextrose 5%. 1000 milliLiter(s) (50 mL/Hr) IV Continuous <Continuous>  dextrose 5%. 1000 milliLiter(s) (100 mL/Hr) IV Continuous <Continuous>  dextrose 50% Injectable 25 Gram(s) IV Push once  dextrose 50% Injectable 12.5 Gram(s) IV Push once  dextrose 50% Injectable 25 Gram(s) IV Push once  ferrous    sulfate 325 milliGRAM(s) Oral daily  glucagon  Injectable 1 milliGRAM(s) IntraMuscular once  hydrALAZINE 50 milliGRAM(s) Oral three times a day  insulin glargine Injectable (LANTUS) 17 Unit(s) SubCutaneous every morning  insulin glargine Injectable (LANTUS) 17 Unit(s) SubCutaneous at bedtime  insulin lispro (ADMELOG) corrective regimen sliding scale   SubCutaneous three times a day before meals  insulin lispro (ADMELOG) corrective regimen sliding scale   SubCutaneous at bedtime  metoprolol succinate ER 25 milliGRAM(s) Oral daily  pantoprazole    Tablet 40 milliGRAM(s) Oral two times a day    MEDICATIONS  (PRN):  acetaminophen   Tablet .. 650 milliGRAM(s) Oral every 6 hours PRN Mild Pain (1 - 3)  ALPRAZolam 0.25 milliGRAM(s) Oral every 6 hours PRN anxiety and/or insomnia  aluminum hydroxide/magnesium hydroxide/simethicone Suspension 30 milliLiter(s) Oral every 4 hours PRN Dyspepsia  benzocaine 15 mG/menthol 3.6 mG (Sugar-Free) Lozenge 1 Lozenge Oral every 2 hours PRN Sore Throat      Allergies  No Known Allergies      Exam:   T(C): 37 (07-27-21 @ 13:32), Max: 37 (07-27-21 @ 12:59)  HR: 63 (07-27-21 @ 18:20) (62 - 72)  BP: 134/65 (07-27-21 @ 18:20) (134/65 - 160/76)  RR: 18 (07-27-21 @ 18:20) (18 - 21)  SpO2: 96% (07-27-21 @ 18:20) (94% - 97%)  VSS, NAD, A&O x 3  Card: S1/S2, RRR, no m/g/r  Resp: lungs CTA b/l  Abd: S/NT/ND  Groin (right only): hemostatic suture in place; site C/D/I; no bleeding, hematoma, erythema, exudate or edema  Ext: trace - 1+ pitting edema to b/l ankles; distal pulses intact  Neuro: baseline right sided facial droop (Bell's palsy)    ECG: sinus rhythm w/ intact conduction, RBBB, LPFB, no acute changes.     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 Following embolization, he has remained stable on Eliquis for ~ 6 weeks. He is therefore at high risk for recurrent GI bleed on long term anticoagulation, but also has high risk of CVA off anticoagulation. Now status post uncomplicated KAROLINA occlusion with Watchman device.      Plan:   Bedrest x 6 hours, then OOB with assistance and progress as tolerated.   Groin suture to be removed by EP service in AM.   Radial art line to be removed once pt fully awake with stable vitals >1 hour post op.   Pending groin status: Eliquis 5mg BID to resume at 2100.   Continue other home medications.   Strict I/Os.  Please encourage incentive spirometry and ambulation once able.  Observation and monitoring on telemetry overnight with anticipated discharge in the AM and outpt follow up in 1 month.   Follow up MARLO in 45 days. If well seated device, will stop OAC and start DAPT, followed by ASA alone after 6 months.

## 2021-07-27 NOTE — H&P PST ADULT - HISTORY OF PRESENT ILLNESS
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 Following embolization, he has remained stable on Eliquis for ~ 6 weeks. Given high risk for recurrent GI bleed on long term anticoagulation, but high risk of CVA off anticoagulation, the patient now presents for elective KAROLINA occlusion with Watchman device implant.    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 Following embolization, he has remained stable on Eliquis for ~ 6 weeks. Given high risk for recurrent GI bleed on long term anticoagulation, but high risk of CVA off anticoagulation, the patient now presents for elective KAROLINA occlusion with Watchman device implant.       Stress Test: 5/10/21- pharm nuclear stress: * Myocardial Perfusion SPECT results are abnormal. There is a medium sized, moderate defect in basal to mid inferior wall that is fixed, suggestive of infarct. No  evidence of ischemia.  * Post-stress resting myocardial perfusion gated SPECT  imaging was performed (LVEF = 41 %;LVEDV = 141 ml.)  * Dilated right ventricle noted.   Echo: 5/9/21- LVEF 35 to 40%, paradoxical septal motion with moderately decreased segmental left ventricular systolic function, severe hypokinesis of the basal to mid segements, the apex and distal segments are preserved, severely enlarged right ventricle with severely reduced systolic function, estimated PASP least 45 mmHg mild to moderately elevated, Moderately enlarged left atrium, trace MR, mild- moderate TR  Cardiac Cath/PCI: 5/11/21- [facility]- INTERVENTIONAL IMPRESSIONS: Moderate Elevation of Right Heart Pressures: RA=15 mmHg; RV=61/18 mmHg; PCWP=23 mmHg, Moderate Pulmonary Hypertension=62/22 mmHg, Reduced CO=4.8 L/min and CI=2.06 L/min/m2    80 year old gentleman with history of HTN, DM2, CKD, Bell's Palsy, pulm HTN, CAD s/p remote CABG c/b sternal wound infection requiring sternum removal with pec flap, ischemic cardiomyopathy, chronic HFrEF (variable EF 30-41%), AFL/AFIB s/p MARLO/DCCV 5/11/21 with CHADS-VASc = 6, bifascicular block s/p ILR implant and 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 Following embolization, he has remained stable on Eliquis for ~ 6 weeks. Given high risk for recurrent GI bleed on long term anticoagulation, but high risk of CVA off anticoagulation, the patient now presents for elective KAROLINA occlusion with Watchman device implant.     Stress Test: 5/10/21- pharm nuclear stress: * Myocardial Perfusion SPECT results are abnormal. There is a medium sized, moderate defect in basal to mid inferior wall that is fixed, suggestive of infarct. No evidence of ischemia.  * Post-stress resting myocardial perfusion gated SPECT imaging was performed (LVEF = 41 %;LVEDV = 141 ml.) * Dilated right ventricle noted.   Echo: 5/9/21- LVEF 35 to 40%, paradoxical septal motion with moderately decreased segmental left ventricular systolic function, severe hypokinesis of the basal to mid segements, the apex and distal segments are preserved, severely enlarged right ventricle with severely reduced systolic function, estimated PASP least 45 mmHg mild to moderately elevated, Moderately enlarged left atrium, trace MR, mild- moderate TR  Cardiac Cath/PCI: 5/11/21- [facility]- INTERVENTIONAL IMPRESSIONS: Moderate Elevation of Right Heart Pressures: RA=15 mmHg; RV=61/18 mmHg; PCWP=23 mmHg, Moderate Pulmonary Hypertension=62/22 mmHg, Reduced CO=4.8 L/min and CI=2.06 L/min/m2    80 year old gentleman with history of HTN, DM2, CKD, Bell's Palsy, pulm HTN, CAD s/p prior PCI & remote CABG c/b sternal wound infection requiring sternum removal with pec flap, ischemic cardiomyopathy, chronic HFrEF (variable EF 30-41%), AFL/AFIB s/p MARLO/DCCV 5/11/21 with CHADS-VASc = 6, bifascicular block s/p MDT ILR implant (5/2021-Annalee)  and 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 Following embolization, he has remained stable on Eliquis for ~ 6 weeks. Given high risk for recurrent GI bleed on long term anticoagulation, but high risk of CVA off anticoagulation, the patient now presents for elective KAROLINA occlusion with Watchman device implant.     Stress Test: 5/10/21- pharm nuclear stress: * Myocardial Perfusion SPECT results are abnormal. There is a medium sized, moderate defect in basal to mid inferior wall that is fixed, suggestive of infarct. No evidence of ischemia.  * Post-stress resting myocardial perfusion gated SPECT imaging was performed (LVEF = 41 %;LVEDV = 141 ml.) * Dilated right ventricle noted.   Echo: 5/9/21- LVEF 35 to 40%, paradoxical septal motion with moderately decreased segmental left ventricular systolic function, severe hypokinesis of the basal to mid segements, the apex and distal segments are preserved, severely enlarged right ventricle with severely reduced systolic function, estimated PASP least 45 mmHg mild to moderately elevated, Moderately enlarged left atrium, trace MR, mild- moderate TR  Cardiac Cath/PCI: 5/11/21- [facility]- INTERVENTIONAL IMPRESSIONS: Moderate Elevation of Right Heart Pressures: RA=15 mmHg; RV=61/18 mmHg; PCWP=23 mmHg, Moderate Pulmonary Hypertension=62/22 mmHg, Reduced CO=4.8 L/min and CI=2.06 L/min/m2

## 2021-07-27 NOTE — H&P PST ADULT - ATTENDING COMMENTS
Seen and examined, well known to me. Stable CKD and anemia. Tolerating short term AC.  Here for elective Watchman as discussed before. All benefits, risks and alternatives were discussed and include but not limited to bleeding, vascular complication, dislodgment, cardiac perforation that may need surgery, stroke, heart attack, and death. He provided informed consent. Seen and examined, well known to me. Stable CKD and anemia. Tolerating short term AC.  Here for elective Watchman as discussed before. All benefits, risks and alternatives were discussed and include but not limited to bleeding, vascular complication, dislodgment, cardiac perforation that may need surgery, stroke, heart attack, renal failure, and death. He provided informed consent.

## 2021-07-27 NOTE — DISCHARGE NOTE PROVIDER - PROVIDER TOKENS
PROVIDER:[TOKEN:[[id number]]],PROVIDER:[TOKEN:[[id number]]] PROVIDER:[TOKEN:[[id number]],FOLLOWUP:[Routine],ESTABLISHEDPATIENT:[T]],PROVIDER:[TOKEN:[[id number]],FOLLOWUP:[1 month],ESTABLISHEDPATIENT:[T]],PROVIDER:[TOKEN:[[id number]],FOLLOWUP:[2 weeks],ESTABLISHEDPATIENT:[T]]

## 2021-07-27 NOTE — H&P PST ADULT - NSICDXPASTSURGICALHX_GEN_ALL_CORE_FT
PAST SURGICAL HISTORY:  History of loop recorder     S/P CABG (coronary artery bypass graft) complicated by osteomyleitis of the sternum and sepsis; sternum removed

## 2021-07-27 NOTE — DISCHARGE NOTE PROVIDER - NSDCFUADDINST_GEN_ALL_CORE_FT
Follow up with Dr. Mantilla in 2-4 weeks time. The office will call you with an appointment in 3-5 days.  Follow up with Dr. Mantilla in 2-4 weeks time. The office will call you with an appointment in 3-5 days.   Recheck your BMP in 1-2 weeks with Dr. Washington

## 2021-07-27 NOTE — DISCHARGE NOTE PROVIDER - HOSPITAL COURSE
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 Following embolization, he has remained stable on Eliquis for ~ 6 weeks. He is at high risk for recurrent GI bleed on long term anticoagulation, but also has high risk of CVA off anticoagulation. Accordingly, he presented electively 7/27/21 and underwent uncomplicated KAROLINA occlusion with Watchman device.

## 2021-07-27 NOTE — H&P PST ADULT - NSICDXPASTMEDICALHX_GEN_ALL_CORE_FT
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 Recent BL cr 2.0-2.5    Congestive heart failure, unspecified HF chronicity, unspecified heart failure type EF 35-40%    Coronary artery disease involving native heart, angina presence unspecified, unspecified vessel or lesion type nuc stress 5/2021 negative    Essential hypertension     GIB (gastrointestinal bleeding) s/p coil embolization    Osteomyelitis of sternum     Type 2 diabetes mellitus without complication, unspecified whether long term insulin use insulin plus orals

## 2021-07-28 ENCOUNTER — TRANSCRIPTION ENCOUNTER (OUTPATIENT)
Age: 81
End: 2021-07-28

## 2021-07-28 LAB
ANION GAP SERPL CALC-SCNC: 14 MMOL/L — SIGNIFICANT CHANGE UP (ref 5–17)
ANION GAP SERPL CALC-SCNC: 15 MMOL/L — SIGNIFICANT CHANGE UP (ref 5–17)
BUN SERPL-MCNC: 53.4 MG/DL — HIGH (ref 8–20)
BUN SERPL-MCNC: 57.3 MG/DL — HIGH (ref 8–20)
CALCIUM SERPL-MCNC: 8.5 MG/DL — LOW (ref 8.6–10.2)
CALCIUM SERPL-MCNC: 8.6 MG/DL — SIGNIFICANT CHANGE UP (ref 8.6–10.2)
CHLORIDE SERPL-SCNC: 101 MMOL/L — SIGNIFICANT CHANGE UP (ref 98–107)
CHLORIDE SERPL-SCNC: 99 MMOL/L — SIGNIFICANT CHANGE UP (ref 98–107)
CO2 SERPL-SCNC: 23 MMOL/L — SIGNIFICANT CHANGE UP (ref 22–29)
CO2 SERPL-SCNC: 23 MMOL/L — SIGNIFICANT CHANGE UP (ref 22–29)
COVID-19 SPIKE DOMAIN AB INTERP: POSITIVE
COVID-19 SPIKE DOMAIN ANTIBODY RESULT: 190 U/ML — HIGH
CREAT SERPL-MCNC: 2.59 MG/DL — HIGH (ref 0.5–1.3)
CREAT SERPL-MCNC: 2.6 MG/DL — HIGH (ref 0.5–1.3)
GLUCOSE BLDC GLUCOMTR-MCNC: 172 MG/DL — HIGH (ref 70–99)
GLUCOSE BLDC GLUCOMTR-MCNC: 207 MG/DL — HIGH (ref 70–99)
GLUCOSE BLDC GLUCOMTR-MCNC: 250 MG/DL — HIGH (ref 70–99)
GLUCOSE BLDC GLUCOMTR-MCNC: 278 MG/DL — HIGH (ref 70–99)
GLUCOSE SERPL-MCNC: 208 MG/DL — HIGH (ref 70–99)
GLUCOSE SERPL-MCNC: 253 MG/DL — HIGH (ref 70–99)
HCT VFR BLD CALC: 25.4 % — LOW (ref 39–50)
HCT VFR BLD CALC: 28 % — LOW (ref 39–50)
HCT VFR BLD CALC: 29.3 % — LOW (ref 39–50)
HGB BLD-MCNC: 7.7 G/DL — LOW (ref 13–17)
HGB BLD-MCNC: 8.5 G/DL — LOW (ref 13–17)
HGB BLD-MCNC: 8.6 G/DL — LOW (ref 13–17)
MAGNESIUM SERPL-MCNC: 2.2 MG/DL — SIGNIFICANT CHANGE UP (ref 1.6–2.6)
MCHC RBC-ENTMCNC: 27.1 PG — SIGNIFICANT CHANGE UP (ref 27–34)
MCHC RBC-ENTMCNC: 27.2 PG — SIGNIFICANT CHANGE UP (ref 27–34)
MCHC RBC-ENTMCNC: 27.2 PG — SIGNIFICANT CHANGE UP (ref 27–34)
MCHC RBC-ENTMCNC: 29.4 GM/DL — LOW (ref 32–36)
MCHC RBC-ENTMCNC: 30.3 GM/DL — LOW (ref 32–36)
MCHC RBC-ENTMCNC: 30.4 GM/DL — LOW (ref 32–36)
MCV RBC AUTO: 89.7 FL — SIGNIFICANT CHANGE UP (ref 80–100)
MCV RBC AUTO: 89.8 FL — SIGNIFICANT CHANGE UP (ref 80–100)
MCV RBC AUTO: 92.4 FL — SIGNIFICANT CHANGE UP (ref 80–100)
PLATELET # BLD AUTO: 244 K/UL — SIGNIFICANT CHANGE UP (ref 150–400)
PLATELET # BLD AUTO: 257 K/UL — SIGNIFICANT CHANGE UP (ref 150–400)
PLATELET # BLD AUTO: 262 K/UL — SIGNIFICANT CHANGE UP (ref 150–400)
POTASSIUM SERPL-MCNC: 4.5 MMOL/L — SIGNIFICANT CHANGE UP (ref 3.5–5.3)
POTASSIUM SERPL-MCNC: 4.7 MMOL/L — SIGNIFICANT CHANGE UP (ref 3.5–5.3)
POTASSIUM SERPL-SCNC: 4.5 MMOL/L — SIGNIFICANT CHANGE UP (ref 3.5–5.3)
POTASSIUM SERPL-SCNC: 4.7 MMOL/L — SIGNIFICANT CHANGE UP (ref 3.5–5.3)
RBC # BLD: 2.83 M/UL — LOW (ref 4.2–5.8)
RBC # BLD: 3.12 M/UL — LOW (ref 4.2–5.8)
RBC # BLD: 3.17 M/UL — LOW (ref 4.2–5.8)
RBC # FLD: 16.8 % — HIGH (ref 10.3–14.5)
RBC # FLD: 16.8 % — HIGH (ref 10.3–14.5)
RBC # FLD: 17.1 % — HIGH (ref 10.3–14.5)
SARS-COV-2 IGG+IGM SERPL QL IA: 190 U/ML — HIGH
SARS-COV-2 IGG+IGM SERPL QL IA: POSITIVE
SODIUM SERPL-SCNC: 137 MMOL/L — SIGNIFICANT CHANGE UP (ref 135–145)
SODIUM SERPL-SCNC: 138 MMOL/L — SIGNIFICANT CHANGE UP (ref 135–145)
WBC # BLD: 10.31 K/UL — SIGNIFICANT CHANGE UP (ref 3.8–10.5)
WBC # BLD: 11.02 K/UL — HIGH (ref 3.8–10.5)
WBC # BLD: 8.97 K/UL — SIGNIFICANT CHANGE UP (ref 3.8–10.5)
WBC # FLD AUTO: 10.31 K/UL — SIGNIFICANT CHANGE UP (ref 3.8–10.5)
WBC # FLD AUTO: 11.02 K/UL — HIGH (ref 3.8–10.5)
WBC # FLD AUTO: 8.97 K/UL — SIGNIFICANT CHANGE UP (ref 3.8–10.5)

## 2021-07-28 PROCEDURE — 93010 ELECTROCARDIOGRAM REPORT: CPT

## 2021-07-28 RX ADMIN — Medication 3: at 08:13

## 2021-07-28 RX ADMIN — Medication 100 MILLIGRAM(S): at 13:12

## 2021-07-28 RX ADMIN — PANTOPRAZOLE SODIUM 40 MILLIGRAM(S): 20 TABLET, DELAYED RELEASE ORAL at 05:53

## 2021-07-28 RX ADMIN — APIXABAN 5 MILLIGRAM(S): 2.5 TABLET, FILM COATED ORAL at 08:11

## 2021-07-28 RX ADMIN — APIXABAN 5 MILLIGRAM(S): 2.5 TABLET, FILM COATED ORAL at 21:54

## 2021-07-28 RX ADMIN — Medication 2: at 12:34

## 2021-07-28 RX ADMIN — PANTOPRAZOLE SODIUM 40 MILLIGRAM(S): 20 TABLET, DELAYED RELEASE ORAL at 16:38

## 2021-07-28 RX ADMIN — Medication 50 MILLIGRAM(S): at 13:12

## 2021-07-28 RX ADMIN — Medication 1: at 16:36

## 2021-07-28 RX ADMIN — Medication 50 MILLIGRAM(S): at 05:53

## 2021-07-28 RX ADMIN — ATORVASTATIN CALCIUM 40 MILLIGRAM(S): 80 TABLET, FILM COATED ORAL at 21:54

## 2021-07-28 RX ADMIN — Medication 50 MILLIGRAM(S): at 21:54

## 2021-07-28 RX ADMIN — INSULIN GLARGINE 17 UNIT(S): 100 INJECTION, SOLUTION SUBCUTANEOUS at 08:12

## 2021-07-28 RX ADMIN — INSULIN GLARGINE 17 UNIT(S): 100 INJECTION, SOLUTION SUBCUTANEOUS at 21:53

## 2021-07-28 RX ADMIN — Medication 325 MILLIGRAM(S): at 08:13

## 2021-07-28 RX ADMIN — Medication 25 MILLIGRAM(S): at 05:53

## 2021-07-28 NOTE — PROGRESS NOTE ADULT - SUBJECTIVE AND OBJECTIVE BOX
Patient seen today in bed. Figure 8 suture removed from right groin without complication. No overnight complaints    EKG: SR at 60bpm; QRSD 176ms; RBBB LAHB  TELE: SR. No events.     MEDICATIONS  (STANDING):  allopurinol 100 milliGRAM(s) Oral daily  apixaban 5 milliGRAM(s) Oral <User Schedule>  atorvastatin 40 milliGRAM(s) Oral at bedtime  dextrose 40% Gel 15 Gram(s) Oral once  dextrose 5%. 1000 milliLiter(s) (50 mL/Hr) IV Continuous <Continuous>  dextrose 5%. 1000 milliLiter(s) (100 mL/Hr) IV Continuous <Continuous>  dextrose 50% Injectable 25 Gram(s) IV Push once  dextrose 50% Injectable 12.5 Gram(s) IV Push once  dextrose 50% Injectable 25 Gram(s) IV Push once  ferrous    sulfate 325 milliGRAM(s) Oral daily  glucagon  Injectable 1 milliGRAM(s) IntraMuscular once  hydrALAZINE 50 milliGRAM(s) Oral three times a day  insulin glargine Injectable (LANTUS) 17 Unit(s) SubCutaneous every morning  insulin glargine Injectable (LANTUS) 17 Unit(s) SubCutaneous at bedtime  insulin lispro (ADMELOG) corrective regimen sliding scale   SubCutaneous three times a day before meals  insulin lispro (ADMELOG) corrective regimen sliding scale   SubCutaneous at bedtime  metoprolol succinate ER 25 milliGRAM(s) Oral daily  pantoprazole    Tablet 40 milliGRAM(s) Oral two times a day    MEDICATIONS  (PRN):  acetaminophen   Tablet .. 650 milliGRAM(s) Oral every 6 hours PRN Mild Pain (1 - 3)  ALPRAZolam 0.25 milliGRAM(s) Oral every 6 hours PRN anxiety and/or insomnia  aluminum hydroxide/magnesium hydroxide/simethicone Suspension 30 milliLiter(s) Oral every 4 hours PRN Dyspepsia  benzocaine 15 mG/menthol 3.6 mG (Sugar-Free) Lozenge 1 Lozenge Oral every 2 hours PRN Sore Throat    Allergies  No Known Allergies    PAST MEDICAL & SURGICAL HISTORY:  Coronary artery disease involving native heart, angina presence unspecified, unspecified vessel or lesion type  nuc stress 5/2021 negative  Atrial fibrillation, unspecified type  s/p MARLO/DCCV 5/11/21  Congestive heart failure, unspecified HF chronicity, unspecified heart failure type  EF 35-40%  Bell&#x27;s palsy  Right side of face  Essential hypertension  Type 2 diabetes mellitus without complication, unspecified whether long term insulin use  insulin plus orals  Osteomyelitis of sternum  Chronic kidney disease, unspecified CKD stage  stage III. Recent BL cr 2.0-2.5  Anemia secondary to renal failure  together with iron deficiency  GIB (gastrointestinal bleeding)  s/p coil embolization  Bifascicular block  s/p ILR implant  S/P CABG (coronary artery bypass graft)  complicated by osteomyleitis of the sternum and sepsis; sternum removed  History of loop recorder    Vital Signs Last 24 Hrs  T(C): 36.6 (28 Jul 2021 08:18), Max: 37 (27 Jul 2021 12:59)  T(F): 97.9 (28 Jul 2021 08:18), Max: 98.6 (27 Jul 2021 12:59)  HR: 70 (28 Jul 2021 08:18) (59 - 72)  BP: 120/67 (28 Jul 2021 08:18) (108/54 - 160/76)  BP(mean): 104 (27 Jul 2021 12:59) (104 - 104)  RR: 18 (28 Jul 2021 08:18) (15 - 21)  SpO2: 96% (28 Jul 2021 08:18) (90% - 98%)    Physical Exam:  Constitutional: NAD, AAOx3  Cardiovascular: +S1S2 RRR  Pulmonary: CTA b/l, unlabored  GI: soft NTND +BS  Extremities: no pedal edema  Right groin: No hematoma. mild ecchymosis  Neuro: non focal, HONG x4    LABS:                        8.6    8.97  )-----------( 257      ( 28 Jul 2021 07:38 )             29.3     137  |  99  |  53.4<H>  ----------------------------<  253<H>  4.7   |  23.0  |  2.59<H>  Ca    8.6      28 Jul 2021 07:38  Mg     2.2     07-28  PT/INR - ( 27 Jul 2021 13:52 )   PT: 17.1 sec;   INR: 1.50 ratio    PTT - ( 27 Jul 2021 13:52 )  PTT:37.9 sec    A/P  80 year old gentleman with history of HTN, DM2, CKD, Bell's Palsy, pulm HTN, CAD s/p prior PCI & remote CABG c/b sternal wound infection requiring sternum removal with pec flap, ischemic cardiomyopathy, chronic HFrEF (variable EF 30-41%), AFL/AFIB s/p MARLO/DCCV 5/11/21 with CHADS-VASc = 6, bifascicular block s/p MDT ILR implant (5/2021-Annalee)  and 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 Following embolization, he has remained stable on Eliquis for ~ 6 weeks. He is therefore at high risk for recurrent GI bleed on long term anticoagulation, but also has high risk of CVA off anticoagulation. Now POD#1  status post uncomplicated KAROLINA occlusion with Watchman device.    - Outpatient Nephrology follow up  - Discharge home today       Patient seen today in bed. Figure 8 suture removed from right groin without complication. No overnight complaints    EKG: SR at 60bpm; QRSD 176ms; RBBB LAHB  TELE: SR. No events.     MEDICATIONS  (STANDING):  allopurinol 100 milliGRAM(s) Oral daily  apixaban 5 milliGRAM(s) Oral <User Schedule>  atorvastatin 40 milliGRAM(s) Oral at bedtime  dextrose 40% Gel 15 Gram(s) Oral once  dextrose 5%. 1000 milliLiter(s) (50 mL/Hr) IV Continuous <Continuous>  dextrose 5%. 1000 milliLiter(s) (100 mL/Hr) IV Continuous <Continuous>  dextrose 50% Injectable 25 Gram(s) IV Push once  dextrose 50% Injectable 12.5 Gram(s) IV Push once  dextrose 50% Injectable 25 Gram(s) IV Push once  ferrous    sulfate 325 milliGRAM(s) Oral daily  glucagon  Injectable 1 milliGRAM(s) IntraMuscular once  hydrALAZINE 50 milliGRAM(s) Oral three times a day  insulin glargine Injectable (LANTUS) 17 Unit(s) SubCutaneous every morning  insulin glargine Injectable (LANTUS) 17 Unit(s) SubCutaneous at bedtime  insulin lispro (ADMELOG) corrective regimen sliding scale   SubCutaneous three times a day before meals  insulin lispro (ADMELOG) corrective regimen sliding scale   SubCutaneous at bedtime  metoprolol succinate ER 25 milliGRAM(s) Oral daily  pantoprazole    Tablet 40 milliGRAM(s) Oral two times a day    MEDICATIONS  (PRN):  acetaminophen   Tablet .. 650 milliGRAM(s) Oral every 6 hours PRN Mild Pain (1 - 3)  ALPRAZolam 0.25 milliGRAM(s) Oral every 6 hours PRN anxiety and/or insomnia  aluminum hydroxide/magnesium hydroxide/simethicone Suspension 30 milliLiter(s) Oral every 4 hours PRN Dyspepsia  benzocaine 15 mG/menthol 3.6 mG (Sugar-Free) Lozenge 1 Lozenge Oral every 2 hours PRN Sore Throat    Allergies  No Known Allergies    PAST MEDICAL & SURGICAL HISTORY:  Coronary artery disease involving native heart, angina presence unspecified, unspecified vessel or lesion type  nuc stress 5/2021 negative  Atrial fibrillation, unspecified type  s/p MARLO/DCCV 5/11/21  Congestive heart failure, unspecified HF chronicity, unspecified heart failure type  EF 35-40%  Bell&#x27;s palsy  Right side of face  Essential hypertension  Type 2 diabetes mellitus without complication, unspecified whether long term insulin use  insulin plus orals  Osteomyelitis of sternum  Chronic kidney disease, unspecified CKD stage  stage III. Recent BL cr 2.0-2.5  Anemia secondary to renal failure  together with iron deficiency  GIB (gastrointestinal bleeding)  s/p coil embolization  Bifascicular block  s/p ILR implant  S/P CABG (coronary artery bypass graft)  complicated by osteomyleitis of the sternum and sepsis; sternum removed  History of loop recorder    Vital Signs Last 24 Hrs  T(C): 36.6 (28 Jul 2021 08:18), Max: 37 (27 Jul 2021 12:59)  T(F): 97.9 (28 Jul 2021 08:18), Max: 98.6 (27 Jul 2021 12:59)  HR: 70 (28 Jul 2021 08:18) (59 - 72)  BP: 120/67 (28 Jul 2021 08:18) (108/54 - 160/76)  BP(mean): 104 (27 Jul 2021 12:59) (104 - 104)  RR: 18 (28 Jul 2021 08:18) (15 - 21)  SpO2: 96% (28 Jul 2021 08:18) (90% - 98%)    Physical Exam:  Constitutional: NAD, AAOx3  Cardiovascular: +S1S2 RRR  Pulmonary: CTA b/l, unlabored  GI: soft NTND +BS  Extremities: no pedal edema  Right groin: No hematoma. mild ecchymosis  Neuro: non focal, HONG x4    LABS:                        8.6    8.97  )-----------( 257      ( 28 Jul 2021 07:38 )             29.3     137  |  99  |  53.4<H>  ----------------------------<  253<H>  4.7   |  23.0  |  2.59<H>  Ca    8.6      28 Jul 2021 07:38  Mg     2.2     07-28  PT/INR - ( 27 Jul 2021 13:52 )   PT: 17.1 sec;   INR: 1.50 ratio    PTT - ( 27 Jul 2021 13:52 )  PTT:37.9 sec    A/P  80 year old gentleman with history of HTN, DM2, CKD, Bell's Palsy, pulm HTN, CAD s/p prior PCI & remote CABG c/b sternal wound infection requiring sternum removal with pec flap, ischemic cardiomyopathy, chronic HFrEF (variable EF 30-41%), AFL/AFIB s/p MARLO/DCCV 5/11/21 with CHADS-VASc = 6, bifascicular block s/p MDT ILR implant (5/2021-Annalee)  and 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 Following embolization, he has remained stable on Eliquis for ~ 6 weeks. He is therefore at high risk for recurrent GI bleed on long term anticoagulation, but also has high risk of CVA off anticoagulation. Now POD#1  status post uncomplicated KAROLINA occlusion with Watchman device.    - Outpatient Nephrology follow up  - Repeat CBC and BMP at noon.   - Eventual discharge home today

## 2021-07-28 NOTE — CHART NOTE - NSCHARTNOTEFT_GEN_A_CORE
Patient seen and examined today multiple times. AM and PM lab work reviewed. Significant increase in BUN/Cr as well as worsening anemia. Plan for repeat CBC at 1600 with surveillance for symptoms and lab work. Remain admitted overnight with repeat AM lab work. Potential discharge home tomorrow AM.

## 2021-07-28 NOTE — DISCHARGE NOTE NURSING/CASE MANAGEMENT/SOCIAL WORK - PATIENT PORTAL LINK FT
You can access the FollowMyHealth Patient Portal offered by [facility] by registering at the following website: [email or web address]. By joining [facility]’s FollowMyHealth portal, you will also be able to view your health information using other applications (apps) compatible with our system.

## 2021-07-29 VITALS
OXYGEN SATURATION: 93 % | DIASTOLIC BLOOD PRESSURE: 86 MMHG | RESPIRATION RATE: 20 BRPM | HEART RATE: 70 BPM | SYSTOLIC BLOOD PRESSURE: 160 MMHG

## 2021-07-29 LAB
ANION GAP SERPL CALC-SCNC: 15 MMOL/L — SIGNIFICANT CHANGE UP (ref 5–17)
BUN SERPL-MCNC: 62.6 MG/DL — HIGH (ref 8–20)
CALCIUM SERPL-MCNC: 8.4 MG/DL — LOW (ref 8.6–10.2)
CHLORIDE SERPL-SCNC: 102 MMOL/L — SIGNIFICANT CHANGE UP (ref 98–107)
CO2 SERPL-SCNC: 22 MMOL/L — SIGNIFICANT CHANGE UP (ref 22–29)
CREAT SERPL-MCNC: 2.74 MG/DL — HIGH (ref 0.5–1.3)
GLUCOSE BLDC GLUCOMTR-MCNC: 149 MG/DL — HIGH (ref 70–99)
GLUCOSE BLDC GLUCOMTR-MCNC: 152 MG/DL — HIGH (ref 70–99)
GLUCOSE SERPL-MCNC: 137 MG/DL — HIGH (ref 70–99)
HCT VFR BLD CALC: 25.3 % — LOW (ref 39–50)
HCT VFR BLD CALC: 27.5 % — LOW (ref 39–50)
HGB BLD-MCNC: 7.5 G/DL — LOW (ref 13–17)
HGB BLD-MCNC: 8.1 G/DL — LOW (ref 13–17)
MCHC RBC-ENTMCNC: 26.5 PG — LOW (ref 27–34)
MCHC RBC-ENTMCNC: 26.8 PG — LOW (ref 27–34)
MCHC RBC-ENTMCNC: 29.5 GM/DL — LOW (ref 32–36)
MCHC RBC-ENTMCNC: 29.6 GM/DL — LOW (ref 32–36)
MCV RBC AUTO: 89.4 FL — SIGNIFICANT CHANGE UP (ref 80–100)
MCV RBC AUTO: 91.1 FL — SIGNIFICANT CHANGE UP (ref 80–100)
PLATELET # BLD AUTO: 172 K/UL — SIGNIFICANT CHANGE UP (ref 150–400)
PLATELET # BLD AUTO: 186 K/UL — SIGNIFICANT CHANGE UP (ref 150–400)
POTASSIUM SERPL-MCNC: 4.3 MMOL/L — SIGNIFICANT CHANGE UP (ref 3.5–5.3)
POTASSIUM SERPL-SCNC: 4.3 MMOL/L — SIGNIFICANT CHANGE UP (ref 3.5–5.3)
RBC # BLD: 2.83 M/UL — LOW (ref 4.2–5.8)
RBC # BLD: 3.02 M/UL — LOW (ref 4.2–5.8)
RBC # FLD: 16.8 % — HIGH (ref 10.3–14.5)
RBC # FLD: 17.1 % — HIGH (ref 10.3–14.5)
SODIUM SERPL-SCNC: 138 MMOL/L — SIGNIFICANT CHANGE UP (ref 135–145)
WBC # BLD: 6.68 K/UL — SIGNIFICANT CHANGE UP (ref 3.8–10.5)
WBC # BLD: 7.68 K/UL — SIGNIFICANT CHANGE UP (ref 3.8–10.5)
WBC # FLD AUTO: 6.68 K/UL — SIGNIFICANT CHANGE UP (ref 3.8–10.5)
WBC # FLD AUTO: 7.68 K/UL — SIGNIFICANT CHANGE UP (ref 3.8–10.5)

## 2021-07-29 PROCEDURE — C1769: CPT

## 2021-07-29 PROCEDURE — C1893: CPT

## 2021-07-29 PROCEDURE — C1887: CPT

## 2021-07-29 PROCEDURE — 83735 ASSAY OF MAGNESIUM: CPT

## 2021-07-29 PROCEDURE — C1889: CPT

## 2021-07-29 PROCEDURE — 80048 BASIC METABOLIC PNL TOTAL CA: CPT

## 2021-07-29 PROCEDURE — 74176 CT ABD & PELVIS W/O CONTRAST: CPT

## 2021-07-29 PROCEDURE — 36415 COLL VENOUS BLD VENIPUNCTURE: CPT

## 2021-07-29 PROCEDURE — C1894: CPT

## 2021-07-29 PROCEDURE — 86850 RBC ANTIBODY SCREEN: CPT

## 2021-07-29 PROCEDURE — 93005 ELECTROCARDIOGRAM TRACING: CPT

## 2021-07-29 PROCEDURE — 85027 COMPLETE CBC AUTOMATED: CPT

## 2021-07-29 PROCEDURE — 86900 BLOOD TYPING SEROLOGIC ABO: CPT

## 2021-07-29 PROCEDURE — 33340 PERQ CLSR TCAT L ATR APNDGE: CPT | Mod: Q0

## 2021-07-29 PROCEDURE — 85610 PROTHROMBIN TIME: CPT

## 2021-07-29 PROCEDURE — 86769 SARS-COV-2 COVID-19 ANTIBODY: CPT

## 2021-07-29 PROCEDURE — 82962 GLUCOSE BLOOD TEST: CPT

## 2021-07-29 PROCEDURE — 86901 BLOOD TYPING SEROLOGIC RH(D): CPT

## 2021-07-29 PROCEDURE — 93320 DOPPLER ECHO COMPLETE: CPT

## 2021-07-29 PROCEDURE — 74176 CT ABD & PELVIS W/O CONTRAST: CPT | Mod: 26

## 2021-07-29 PROCEDURE — 93325 DOPPLER ECHO COLOR FLOW MAPG: CPT

## 2021-07-29 PROCEDURE — 93312 ECHO TRANSESOPHAGEAL: CPT

## 2021-07-29 PROCEDURE — 85730 THROMBOPLASTIN TIME PARTIAL: CPT

## 2021-07-29 PROCEDURE — C9399: CPT

## 2021-07-29 RX ORDER — POLYETHYLENE GLYCOL 3350 17 G/17G
17 POWDER, FOR SOLUTION ORAL ONCE
Refills: 0 | Status: COMPLETED | OUTPATIENT
Start: 2021-07-29 | End: 2021-07-29

## 2021-07-29 RX ADMIN — PANTOPRAZOLE SODIUM 40 MILLIGRAM(S): 20 TABLET, DELAYED RELEASE ORAL at 05:58

## 2021-07-29 RX ADMIN — Medication 25 MILLIGRAM(S): at 05:58

## 2021-07-29 RX ADMIN — Medication 50 MILLIGRAM(S): at 05:58

## 2021-07-29 RX ADMIN — Medication 100 MILLIGRAM(S): at 07:59

## 2021-07-29 RX ADMIN — APIXABAN 5 MILLIGRAM(S): 2.5 TABLET, FILM COATED ORAL at 07:58

## 2021-07-29 RX ADMIN — POLYETHYLENE GLYCOL 3350 17 GRAM(S): 17 POWDER, FOR SOLUTION ORAL at 08:56

## 2021-07-29 RX ADMIN — INSULIN GLARGINE 17 UNIT(S): 100 INJECTION, SOLUTION SUBCUTANEOUS at 07:58

## 2021-07-29 RX ADMIN — Medication 1: at 07:57

## 2021-07-29 RX ADMIN — Medication 50 MILLIGRAM(S): at 13:05

## 2021-07-29 RX ADMIN — Medication 325 MILLIGRAM(S): at 07:58

## 2021-07-29 NOTE — PROGRESS NOTE ADULT - ATTENDING COMMENTS
repeat b/w is stable  can go home today, with follow up b/w on 8/2/2021  need renal follow up as an outpatinet. pt will arrange for that. he has few names

## 2021-07-29 NOTE — PROGRESS NOTE ADULT - SUBJECTIVE AND OBJECTIVE BOX
Patient seen today in bed sitting up. No overnight events noted. Patient complaining of constipation. Miralax ordered.     TELE: SR no events. No blocks.     MEDICATIONS  (STANDING):  allopurinol 100 milliGRAM(s) Oral daily  apixaban 5 milliGRAM(s) Oral <User Schedule>  atorvastatin 40 milliGRAM(s) Oral at bedtime  dextrose 40% Gel 15 Gram(s) Oral once  dextrose 5%. 1000 milliLiter(s) (50 mL/Hr) IV Continuous <Continuous>  dextrose 5%. 1000 milliLiter(s) (100 mL/Hr) IV Continuous <Continuous>  dextrose 50% Injectable 25 Gram(s) IV Push once  dextrose 50% Injectable 12.5 Gram(s) IV Push once  dextrose 50% Injectable 25 Gram(s) IV Push once  ferrous    sulfate 325 milliGRAM(s) Oral daily  glucagon  Injectable 1 milliGRAM(s) IntraMuscular once  hydrALAZINE 50 milliGRAM(s) Oral three times a day  insulin glargine Injectable (LANTUS) 17 Unit(s) SubCutaneous every morning  insulin glargine Injectable (LANTUS) 17 Unit(s) SubCutaneous at bedtime  insulin lispro (ADMELOG) corrective regimen sliding scale   SubCutaneous three times a day before meals  insulin lispro (ADMELOG) corrective regimen sliding scale   SubCutaneous at bedtime  metoprolol succinate ER 25 milliGRAM(s) Oral daily  pantoprazole    Tablet 40 milliGRAM(s) Oral two times a day    MEDICATIONS  (PRN):  acetaminophen   Tablet .. 650 milliGRAM(s) Oral every 6 hours PRN Mild Pain (1 - 3)  ALPRAZolam 0.25 milliGRAM(s) Oral every 6 hours PRN anxiety and/or insomnia  aluminum hydroxide/magnesium hydroxide/simethicone Suspension 30 milliLiter(s) Oral every 4 hours PRN Dyspepsia  benzocaine 15 mG/menthol 3.6 mG (Sugar-Free) Lozenge 1 Lozenge Oral every 2 hours PRN Sore Throat    Allergies  No Known Allergies    PAST MEDICAL & SURGICAL HISTORY:  Coronary artery disease involving native heart, angina presence unspecified, unspecified vessel or lesion type  nuc stress 5/2021 negative  Atrial fibrillation, unspecified type  s/p MARLO/DCCV 5/11/21  Congestive heart failure, unspecified HF chronicity, unspecified heart failure type  EF 35-40%  Bell&#x27;s palsy  Right side of face  Essential hypertension  Type 2 diabetes mellitus without complication, unspecified whether long term insulin use  insulin plus orals  Osteomyelitis of sternum  Chronic kidney disease, unspecified CKD stage  stage III. Recent BL cr 2.0-2.5  Anemia secondary to renal failure  together with iron deficiency  GIB (gastrointestinal bleeding)  s/p coil embolization  Bifascicular block  s/p ILR implant  S/P CABG (coronary artery bypass graft)  complicated by osteomyleitis of the sternum and sepsis; sternum removed  History of loop recorder    Vital Signs Last 24 Hrs  T(C): 36.9 (29 Jul 2021 10:59), Max: 36.9 (29 Jul 2021 10:59)  T(F): 98.4 (29 Jul 2021 10:59), Max: 98.4 (29 Jul 2021 10:59)  HR: 63 (29 Jul 2021 10:59) (54 - 65)  BP: 161/70 (29 Jul 2021 10:59) (115/50 - 161/70)  BP(mean): 89 (29 Jul 2021 05:56) (84 - 89)  RR: 20 (29 Jul 2021 10:59) (18 - 22)  SpO2: 94% (29 Jul 2021 10:59) (91% - 98%)    Physical Exam:  Constitutional: NAD, AAOx3  Cardiovascular: +S1S2 RRR  Pulmonary: CTA b/l, unlabored  GI: soft NTND +BS  Extremities: no pedal edema  Right groin: No hematoma. mild ecchymosis  Neuro: non focal, HONG x4    LABS:                        7.5    6.68  )-----------( 186      ( 29 Jul 2021 06:12 )             25.3     138  |  102  |  62.6<H>  ----------------------------<  137<H>  4.3   |  22.0  |  2.74<H>  Ca    8.4<L>      29 Jul 2021 06:12  Mg     2.2     07-28  PT/INR - ( 27 Jul 2021 13:52 )   PT: 17.1 sec;   INR: 1.50 ratio    PTT - ( 27 Jul 2021 13:52 )  PTT:37.9 sec    CT 7/29/21  IMPRESSION:  No retroperitoneal hematoma.  Mild subcutaneous stranding in the right groin, compatible with recent procedure.  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 Now POD#2 status post uncomplicated KAROLINA occlusion with Watchman device.    - CT scan negative for acute bleed  - Repeat blood work at noon  - Discharge home today if stable and CBC stabilizes

## 2021-08-03 ENCOUNTER — NON-APPOINTMENT (OUTPATIENT)
Age: 81
End: 2021-08-03

## 2021-08-10 ENCOUNTER — RESULT REVIEW (OUTPATIENT)
Age: 81
End: 2021-08-10

## 2021-08-10 ENCOUNTER — APPOINTMENT (OUTPATIENT)
Dept: DERMATOLOGY | Facility: CLINIC | Age: 81
End: 2021-08-10
Payer: MEDICARE

## 2021-08-10 PROCEDURE — 17281 DSTR MAL LS F/E/E/N/L/M .6-1: CPT

## 2021-08-10 PROCEDURE — 17000 DESTRUCT PREMALG LESION: CPT | Mod: 59

## 2021-08-10 PROCEDURE — 99203 OFFICE O/P NEW LOW 30 MIN: CPT | Mod: 25

## 2021-08-10 PROCEDURE — 11102 TANGNTL BX SKIN SINGLE LES: CPT | Mod: 59

## 2021-08-17 PROBLEM — Z00.00 ENCOUNTER FOR PREVENTIVE HEALTH EXAMINATION: Status: ACTIVE | Noted: 2021-08-17

## 2021-08-20 ENCOUNTER — APPOINTMENT (OUTPATIENT)
Dept: NEPHROLOGY | Facility: CLINIC | Age: 81
End: 2021-08-20
Payer: MEDICARE

## 2021-08-20 VITALS
SYSTOLIC BLOOD PRESSURE: 136 MMHG | WEIGHT: 224 LBS | BODY MASS INDEX: 28.75 KG/M2 | DIASTOLIC BLOOD PRESSURE: 78 MMHG | HEART RATE: 62 BPM | HEIGHT: 74 IN | OXYGEN SATURATION: 92 %

## 2021-08-20 DIAGNOSIS — R52 PAIN, UNSPECIFIED: ICD-10-CM

## 2021-08-20 DIAGNOSIS — Z00.00 ENCOUNTER FOR GENERAL ADULT MEDICAL EXAMINATION W/OUT ABNORMAL FINDINGS: ICD-10-CM

## 2021-08-20 DIAGNOSIS — E87.5 HYPERKALEMIA: ICD-10-CM

## 2021-08-20 DIAGNOSIS — E78.5 HYPERLIPIDEMIA, UNSPECIFIED: ICD-10-CM

## 2021-08-20 PROCEDURE — 99215 OFFICE O/P EST HI 40 MIN: CPT

## 2021-08-20 PROCEDURE — 99205 OFFICE O/P NEW HI 60 MIN: CPT

## 2021-08-20 NOTE — PHYSICAL EXAM
[General Appearance - Alert] : alert [General Appearance - In No Acute Distress] : in no acute distress [Sclera] : the sclera and conjunctiva were normal [PERRL With Normal Accommodation] : pupils were equal in size, round, and reactive to light [Extraocular Movements] : extraocular movements were intact [Outer Ear] : the ears and nose were normal in appearance [Oropharynx] : the oropharynx was normal [Neck Appearance] : the appearance of the neck was normal [Neck Cervical Mass (___cm)] : no neck mass was observed [Jugular Venous Distention Increased] : there was no jugular-venous distention [Thyroid Diffuse Enlargement] : the thyroid was not enlarged [Thyroid Nodule] : there were no palpable thyroid nodules [Auscultation Breath Sounds / Voice Sounds] : lungs were clear to auscultation bilaterally [Heart Rate And Rhythm] : heart rate was normal and rhythm regular [Heart Sounds] : normal S1 and S2 [Heart Sounds Gallop] : no gallops [Murmurs] : no murmurs [Heart Sounds Pericardial Friction Rub] : no pericardial rub [Breast Palpation Mass] : no palpable masses [Bowel Sounds] : normal bowel sounds [Abdomen Soft] : soft [Abdomen Tenderness] : non-tender [Abdomen Mass (___ Cm)] : no abdominal mass palpated [Liver Enlargement] : was enlarged [___ Cm Below Costal Margin] : and was palpable [unfilled] cm below the costal margin [Cervical Lymph Nodes Enlarged Posterior Bilaterally] : posterior cervical [Cervical Lymph Nodes Enlarged Anterior Bilaterally] : anterior cervical [Supraclavicular Lymph Nodes Enlarged Bilaterally] : supraclavicular [Axillary Lymph Nodes Enlarged Bilaterally] : axillary [Femoral Lymph Nodes Enlarged Bilaterally] : femoral [Inguinal Lymph Nodes Enlarged Bilaterally] : inguinal [No CVA Tenderness] : no ~M costovertebral angle tenderness [No Spinal Tenderness] : no spinal tenderness [Abnormal Walk] : normal gait [Nail Clubbing] : no clubbing  or cyanosis of the fingernails [Musculoskeletal - Swelling] : no joint swelling seen [Motor Tone] : muscle strength and tone were normal [Skin Color & Pigmentation] : normal skin color and pigmentation [Skin Turgor] : normal skin turgor [] : no rash [Deep Tendon Reflexes (DTR)] : deep tendon reflexes were 2+ and symmetric [Sensation] : the sensory exam was normal to light touch and pinprick [No Focal Deficits] : no focal deficits [Oriented To Time, Place, And Person] : oriented to person, place, and time [Impaired Insight] : insight and judgment were intact [Affect] : the affect was normal

## 2021-08-20 NOTE — ASSESSMENT
[FreeTextEntry1] : CRS \par \par S/P Watchman Procedure,\par \par +I LR,\par \par Rec : [facility] - CRS\par \par Anemia & CKD Management \par \par Will Review [facility] & Meds,

## 2021-08-20 NOTE — HISTORY OF PRESENT ILLNESS
[FreeTextEntry1] : 80M h/o CAD s/p CABG (2017) c/b sepsis and sternal wound infection s/p sternum removal and pec flap and angioplasty, chronic HFrEF, pAfib (on Eliquis), HTN, DM2, CKD, chrnoic anemia and Bell's Palsy recently moved to [place] for few months from [place] presented to [facility] on 5/9/21 with back pain/dizziness s/p mechanical fall noted ADHFrEF (35-40%), RV dysfunction, initial noted pBNP 31404 ---> 36589, pharm nuclear stress test without ischemia, RHC done with moderate pHTN Group 2 with PCWP 23, switched to torsemide 40mg, underwent MARLO/CV for pAflutter but reverted back to pAfibpAfib/flutter with bradycardia s/p ILR implant c/b recurrent mechanical fall with L-elbow laceration, s/p repeated ER visits 5/28 & 5/30 for recurrent dizziness, readmitted 6/4-6/15/21 found with severe orthostasis and acute on chronic anemia (Hb seema 6.3) s/p EGD/colonoscopy found with duodenal bulb bleeding s/p IR embolization, discharged home with Hb 7.6 off ASA 81mg and rechallenged low dose Eliquis 2.5mg, Cr. 1.87, monitored off torsemide, Imdur and tamsulosin due to symptomatic orthostasis, last seen in office 6/21/21 resumed Eliquis 5mg with stable H/H, noted elevated BP increased hydralazine dose to 100mg TID, 24-Hr BP monitoring still with SBP 160s added amlodipine 5mg, cardiac amyloid workup negative, had recent increased salt intake and gained >16 lb, restarted Torsemide 20mg, planning for elective Watchman with EPS/Dr. Mantilla, now presents for follow up. \par \par Orthostasis resolved likely was in setting of ongoing occult upper GI bleed and polypharmacy. BP been elevated recently and with LE edema, resumed on antihypertensives and maintenance diuretic. \par \par \par 1. Recent severe upper GI bleed\par -continue PPI and GI follow up\par -plan for elective Watchman tomorrow then stop DOAC use after 45 days if stable. \par \par 2. HFrEF with Severely Reduced RV function, CAD/CABG\par -euvolemic on exam, ACC/AHA stage C, NYHA class II- continue Torsemide 20mg daily may switch to ever other day if reduced fluid intake, consider elective CardioMems in the future once Watchman is done \par - EF 35-40% with severely reduced RV function, no ischemia on stress test and advanced CKD not candidate for LHC\par - s/p RHC - RA 15, RV 61/18, PCWP 23, CO 4.,8, CI 2.06\par - continue hydralazine 100mg TID, consider addition of Imdur if BP remains elevated. \par - will resume ASA 81mg after Watchman; continue atorvastatin 40mg\par \par 3. pAtrial Fibrillation, flutter\par - continue ILR monitoring if tachy-didi syndrome to require PPM\par -follow up with EPS/Dr. Mantilla next weeks- future elective Afib ablation and CRT-D device?\par - monitor on low dose metoprolol succinate 25mg as prior sinus didi \par - tolerating resumtion of Eliquis 5mg BID with H/H stable, await Watchman implant given prior recurrent falls and recent severe GI bleed, MARLO done inpatient adequate size. \par \par 4. HTN\par -off lisionopril due to CKD\par -uptitrate amlodipine 10mg and monitor on hydralazine dose [id number] TID, goal SBP <140\par \par 5. CKD\par -unknown baseline Cr. but initial admission fo 2.1 and now 1.8 \par -monitor with Torsemide 20mg dialy \par -nephrology follow up. \par \par 6. DM2\par -consider addition of SGLT-2 inhibitor if eEFR >30 \par \par 7. Gait imbalance- resume home PT

## 2021-08-23 ENCOUNTER — RESULT REVIEW (OUTPATIENT)
Age: 81
End: 2021-08-23

## 2021-08-23 ENCOUNTER — APPOINTMENT (OUTPATIENT)
Dept: FAMILY MEDICINE | Facility: CLINIC | Age: 81
End: 2021-08-23
Payer: MEDICARE

## 2021-08-23 ENCOUNTER — OUTPATIENT (OUTPATIENT)
Dept: OUTPATIENT SERVICES | Facility: HOSPITAL | Age: 81
LOS: 1 days | End: 2021-08-23
Payer: MEDICARE

## 2021-08-23 ENCOUNTER — APPOINTMENT (OUTPATIENT)
Dept: ULTRASOUND IMAGING | Facility: CLINIC | Age: 81
End: 2021-08-23
Payer: MEDICARE

## 2021-08-23 VITALS
HEART RATE: 58 BPM | OXYGEN SATURATION: 96 % | DIASTOLIC BLOOD PRESSURE: 72 MMHG | HEIGHT: 74 IN | SYSTOLIC BLOOD PRESSURE: 130 MMHG | WEIGHT: 224 LBS | BODY MASS INDEX: 28.75 KG/M2 | TEMPERATURE: 97.6 F

## 2021-08-23 DIAGNOSIS — Z98.890 OTHER SPECIFIED POSTPROCEDURAL STATES: Chronic | ICD-10-CM

## 2021-08-23 DIAGNOSIS — I50.9 HEART FAILURE, UNSPECIFIED: ICD-10-CM

## 2021-08-23 DIAGNOSIS — Z95.1 PRESENCE OF AORTOCORONARY BYPASS GRAFT: Chronic | ICD-10-CM

## 2021-08-23 DIAGNOSIS — I10 ESSENTIAL (PRIMARY) HYPERTENSION: ICD-10-CM

## 2021-08-23 DIAGNOSIS — Z95.818 PRESENCE OF OTHER CARDIAC IMPLANTS AND GRAFTS: ICD-10-CM

## 2021-08-23 DIAGNOSIS — M25.511 PAIN IN RIGHT SHOULDER: ICD-10-CM

## 2021-08-23 PROCEDURE — 76770 US EXAM ABDO BACK WALL COMP: CPT

## 2021-08-23 PROCEDURE — 73030 X-RAY EXAM OF SHOULDER: CPT

## 2021-08-23 PROCEDURE — 99214 OFFICE O/P EST MOD 30 MIN: CPT

## 2021-08-23 PROCEDURE — 76770 US EXAM ABDO BACK WALL COMP: CPT | Mod: 26

## 2021-08-23 PROCEDURE — 73030 X-RAY EXAM OF SHOULDER: CPT | Mod: 26,RT

## 2021-08-25 PROBLEM — M25.511 SHOULDER PAIN, RIGHT: Status: ACTIVE | Noted: 2021-08-23

## 2021-08-25 NOTE — PHYSICAL EXAM
[No Acute Distress] : no acute distress [Well Nourished] : well nourished [Well-Appearing] : well-appearing [Normal Sclera/Conjunctiva] : normal sclera/conjunctiva [PERRL] : pupils equal round and reactive to light [No Lymphadenopathy] : no lymphadenopathy [Supple] : supple [No Respiratory Distress] : no respiratory distress  [No Accessory Muscle Use] : no accessory muscle use [Clear to Auscultation] : lungs were clear to auscultation bilaterally [Normal Rate] : normal rate  [Regular Rhythm] : with a regular rhythm [Normal S1, S2] : normal S1 and S2 [No Joint Swelling] : no joint swelling [Grossly Normal Strength/Tone] : grossly normal strength/tone [No Focal Deficits] : no focal deficits [Deep Tendon Reflexes (DTR)] : deep tendon reflexes were 2+ and symmetric [de-identified] : DIFFICULTY RAISING RIGHT ARM DUE TO PAIN

## 2021-08-25 NOTE — HISTORY OF PRESENT ILLNESS
[FreeTextEntry1] : SHOULDER PAIN [de-identified] : MR. LOGAN IS A PLEASANT 81 YO PRESENTING FOR FOLLOW-UP.  HE HAS A HISTORY OF CAD S/P CABG IN 2017, CHRONIC CHF, PAROXYSMAL AFIB ON ANTICOAGULATION WITH ELIQUIS, HYPERTENSION, CKD, DIABETES, CHRONIC ANEMIA.  HE WAS ADMITTED TO [facility] THE END OF JULY AND HAD THE WATCHMAN PROCEDURE COMPLETED.  ALSO SAW NEPHROLOGY AND IS TO HAVE RENAL SONOGRAM PERFORMED TODAY.  WITH COMPLAINT OF RIGHT SHOULDER PAIN SINCE MAY.  HAD IMAGING IN HOSPITAL DUE TO FALLS BUT NOT OF HIS SHOULDER.  WIFE HELPING HIM GET DRESSED.  TROUBLE RAISING ARM UP DUE TO PAIN AND DIFFICULTY MOVING IT.  NO PRIOR INJURY TO IT.  NO SWELLING.  NO NUMBNESS/TINGLING EXTREMITIES.  TAKING TRAMADOL AS NEEDED.  COMPLETED PHYSICAL THERAPY.  [facility] [place]  WAS TAKING MORE LANTUS THAN HE WAS SUPPOSED TO PER WIFE REPORT.  IS NOW TAKING AS DIRECTED.  NO LOWS.  BLOOD PRESSURE CONTROLLED.  HAS HAD NO FALLS OR SIGNS/SYMPTOMS OF BLEEDING.

## 2021-08-25 NOTE — PLAN
[FreeTextEntry1] : TO BRING ALL MEDICATIONS TO FOLLOW-UP APPOINTMENT\par HAD REPEAT CBC WITH CARDIOLOGIST\par WILL CONSIDER PT\par CHECK XRAY SHOULDER TO START\par RX TRAMADOL ONE TIME RENEWAL - ALL FUTURE TO COME FROM PAIN MANAGEMENT; TO USE LOWEST EFFECTIVE DOSE (DOES NOT TAKE DAILY).  AWARE TO NOT DRIVE, DRINK ETOH, ETC. WITH USE\par NEPHROLOGY CONSULTANT NOTE APPRECIATED\par REVIEWED LAB WORK; HBA1C UNDER EXCELLENT CONTROL BUT WIFE ACKNOWLEDGES HE WAS TAKING MORE MEDICATION THAN PRESCRIBED.  IS NOW TAKING CORRECTLY AND ENCOURAGED TO DO SO.  WILL HAVE TO HAVE HBA1C MONITORED ON CORRECT DOSAGE TO DETERMINE IF ADJUSTMENT IN MEDICATION NEEDED.  [facility] [place] AND IS TO FOLLOW-UP\par FALL PRECAUTIONS\par FOLLOW-UP ALL SPECIALISTS AS DIRECTED \par VACCINATIONS DISCUSSED INCLUDING COVID, FLU; STILL NEED VACCINE RECORDS\par CALL WITH ANY QUESTIONS, CONCERNS OR CHANGES \par AWARE WHEN TO SEEK EMERGENT CARE

## 2021-08-27 ENCOUNTER — NON-APPOINTMENT (OUTPATIENT)
Age: 81
End: 2021-08-27

## 2021-08-27 ENCOUNTER — APPOINTMENT (OUTPATIENT)
Dept: ELECTROPHYSIOLOGY | Facility: CLINIC | Age: 81
End: 2021-08-27
Payer: MEDICARE

## 2021-08-27 ENCOUNTER — RX RENEWAL (OUTPATIENT)
Age: 81
End: 2021-08-27

## 2021-08-27 PROCEDURE — G2066: CPT

## 2021-08-27 PROCEDURE — 93298 REM INTERROG DEV EVAL SCRMS: CPT

## 2021-08-30 ENCOUNTER — APPOINTMENT (OUTPATIENT)
Dept: ELECTROPHYSIOLOGY | Facility: CLINIC | Age: 81
End: 2021-08-30
Payer: MEDICARE

## 2021-08-30 VITALS
DIASTOLIC BLOOD PRESSURE: 60 MMHG | OXYGEN SATURATION: 95 % | BODY MASS INDEX: 28.36 KG/M2 | HEIGHT: 74 IN | TEMPERATURE: 97.5 F | SYSTOLIC BLOOD PRESSURE: 140 MMHG | HEART RATE: 59 BPM | WEIGHT: 221 LBS

## 2021-08-30 DIAGNOSIS — Z95.818 PRESENCE OF OTHER CARDIAC IMPLANTS AND GRAFTS: ICD-10-CM

## 2021-08-30 DIAGNOSIS — R00.1 BRADYCARDIA, UNSPECIFIED: ICD-10-CM

## 2021-08-30 PROCEDURE — 99213 OFFICE O/P EST LOW 20 MIN: CPT

## 2021-08-30 NOTE — REVIEW OF SYSTEMS
[Headache] : no headache [Feeling Fatigued] : not feeling fatigued [SOB] : no shortness of breath [Dyspnea on exertion] : not dyspnea during exertion [Chest Discomfort] : no chest discomfort [Dizziness] : no dizziness [Easy Bleeding] : no tendency for easy bleeding

## 2021-08-30 NOTE — ED CDU PROVIDER INITIAL DAY NOTE - PMH
[facility] 45 Transitions Initial Follow Up Call- 1st attempt COVID risk follow up call        Call within 2 business days of discharge: Yes    Patient: Theresa Alcaraz Patient : 1987   MRN: [id number]  Reason for Admission: COVID-19 pneumonia, hypoxia, sepsis  Discharge Date: 21 RARS: Readmission Risk Score: 12      Last Discharge [facility]       Complaint Diagnosis Description Type Department Provider    21 SOB COVID-19 . .. ED to Hosp-Admission (Discharged) (ADMITTED) [facility] CAR 3 Maricarmen Zheng MD; Lavelle Simpson. .. Date/Time:  2021 12:27 PM  Attempted to reach patient by telephone. Call within 2 business days of discharge: Yes. Second number has message \"welcome to [facility]. Your call cannot be completed as dialed. Please check the number and try again\". Left HIPPA compliant message requesting a return call. Will attempt to reach patient again.       Follow Up  Future Appointments   Date Time Provider Karlo Corea   10/4/2021 10:00 AM SCHEDULE, ULTRASOUND [id number] Amy Smith RN Atrial fibrillation, unspecified type    Bell's palsy  Right side of face  Congestive heart failure, unspecified HF chronicity, unspecified heart failure type    Coronary artery disease involving native heart, angina presence unspecified, unspecified vessel or lesion type

## 2021-08-30 NOTE — DISCUSSION/SUMMARY
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 Now  status post uncomplicated KAROLINA occlusion with Watchman device 7/27/21.\par \par Presents today for post watchman f/u. Since implant he reports he has been feeling well. Followed up with Nephrology Dr. Mims as advised. Denies easy bruising, bleeding, or falls maintained on Eliquis. Denies palpitations, lightheadedness, dizziness, near syncope, syncope. ILR reveals AT/AF burden 0.1% since last follow up, many episodes c/w SR with ectopy. No tachy or didi events. PVC burden 2%. \par \par Recommendation:\par \par -Mr. Aguilera is doing well s/p Watchman placement 7/28/21. TO arrange 45 day post watchman MARLO. If well seated device free from thrombus, will then stop OAC and start dual antiplatelet therapy, and transition to single antiplatelet therapy after 6 months as tolerated. \par -AT/AF burden 0.1% since last follow up with EGMs c/w SR with ectopy. As discussed in past if recurrent symptomatic AF could consider rhythm control strategies in the future .\par -Bifascicular block- to continue close remote ILR monitoring for bradyarrhythmias. \par -To arrange EP follow up with Dr. Mantilla prior to leaving for [place] mid October (pt typically stays for 5-6 months, counseled to bring remote monitoring with him).\par \par Eunice Zaidi-C

## 2021-09-01 ENCOUNTER — APPOINTMENT (OUTPATIENT)
Dept: DERMATOLOGY | Facility: CLINIC | Age: 81
End: 2021-09-01
Payer: MEDICARE

## 2021-09-01 PROCEDURE — 99214 OFFICE O/P EST MOD 30 MIN: CPT | Mod: 57

## 2021-09-01 PROCEDURE — 17311 MOHS 1 STAGE H/N/HF/G: CPT

## 2021-09-01 PROCEDURE — 17312 MOHS ADDL STAGE: CPT

## 2021-09-01 PROCEDURE — 14041 TIS TRNFR F/C/C/M/N/A/G/H/F: CPT

## 2021-09-08 ENCOUNTER — APPOINTMENT (OUTPATIENT)
Dept: DERMATOLOGY | Facility: CLINIC | Age: 81
End: 2021-09-08
Payer: MEDICARE

## 2021-09-08 DIAGNOSIS — C44.329 SQUAMOUS CELL CARCINOMA OF SKIN OF OTHER PARTS OF FACE: ICD-10-CM

## 2021-09-08 PROCEDURE — 17311 MOHS 1 STAGE H/N/HF/G: CPT | Mod: 79

## 2021-09-08 PROCEDURE — 13132 CMPLX RPR F/C/C/M/N/AX/G/H/F: CPT | Mod: 79

## 2021-09-08 PROCEDURE — 17312 MOHS ADDL STAGE: CPT | Mod: 79

## 2021-09-09 PROBLEM — C44.329 SQUAMOUS CELL CARCINOMA OF SKIN OF RIGHT CHEEK: Status: ACTIVE | Noted: 2021-09-08

## 2021-09-13 ENCOUNTER — APPOINTMENT (OUTPATIENT)
Dept: DERMATOLOGY | Facility: CLINIC | Age: 81
End: 2021-09-13
Payer: MEDICARE

## 2021-09-13 DIAGNOSIS — T14.8XXA OTHER INJURY OF UNSPECIFIED BODY REGION, INITIAL ENCOUNTER: ICD-10-CM

## 2021-09-13 DIAGNOSIS — C44.329 SQUAMOUS CELL CARCINOMA OF SKIN OF OTHER PARTS OF FACE: ICD-10-CM

## 2021-09-13 PROCEDURE — 99024 POSTOP FOLLOW-UP VISIT: CPT

## 2021-09-14 PROBLEM — T14.8XXA HEMATOMA: Status: ACTIVE | Noted: 2021-09-14

## 2021-09-14 PROBLEM — C44.329 SQUAMOUS CELL CARCINOMA OF FOREHEAD: Status: ACTIVE | Noted: 2021-09-01

## 2021-09-15 ENCOUNTER — APPOINTMENT (OUTPATIENT)
Dept: NEUROLOGY | Facility: CLINIC | Age: 81
End: 2021-09-15

## 2021-09-15 DIAGNOSIS — Z01.818 ENCOUNTER FOR OTHER PREPROCEDURAL EXAMINATION: ICD-10-CM

## 2021-09-17 ENCOUNTER — APPOINTMENT (OUTPATIENT)
Dept: DISASTER EMERGENCY | Facility: CLINIC | Age: 81
End: 2021-09-17

## 2021-09-18 LAB — SARS-COV-2 N GENE NPH QL NAA+PROBE: NOT DETECTED

## 2021-09-19 ENCOUNTER — FORM ENCOUNTER (OUTPATIENT)
Age: 81
End: 2021-09-19

## 2021-09-20 ENCOUNTER — TRANSCRIPTION ENCOUNTER (OUTPATIENT)
Age: 81
End: 2021-09-20

## 2021-09-20 ENCOUNTER — OUTPATIENT (OUTPATIENT)
Dept: OUTPATIENT SERVICES | Facility: HOSPITAL | Age: 81
LOS: 1 days | End: 2021-09-20
Payer: MEDICARE

## 2021-09-20 VITALS
OXYGEN SATURATION: 96 % | SYSTOLIC BLOOD PRESSURE: 170 MMHG | DIASTOLIC BLOOD PRESSURE: 85 MMHG | HEART RATE: 69 BPM | RESPIRATION RATE: 18 BRPM

## 2021-09-20 VITALS — HEIGHT: 73 IN | WEIGHT: 225.09 LBS

## 2021-09-20 DIAGNOSIS — Z95.818 PRESENCE OF OTHER CARDIAC IMPLANTS AND GRAFTS: ICD-10-CM

## 2021-09-20 DIAGNOSIS — Z98.890 OTHER SPECIFIED POSTPROCEDURAL STATES: Chronic | ICD-10-CM

## 2021-09-20 DIAGNOSIS — Z95.1 PRESENCE OF AORTOCORONARY BYPASS GRAFT: Chronic | ICD-10-CM

## 2021-09-20 DIAGNOSIS — Z95.818 PRESENCE OF OTHER CARDIAC IMPLANTS AND GRAFTS: Chronic | ICD-10-CM

## 2021-09-20 LAB
ANION GAP SERPL CALC-SCNC: 19 MMOL/L — HIGH (ref 5–17)
BUN SERPL-MCNC: 56.4 MG/DL — HIGH (ref 8–20)
CALCIUM SERPL-MCNC: 10.1 MG/DL — SIGNIFICANT CHANGE UP (ref 8.6–10.2)
CHLORIDE SERPL-SCNC: 99 MMOL/L — SIGNIFICANT CHANGE UP (ref 98–107)
CO2 SERPL-SCNC: 20 MMOL/L — LOW (ref 22–29)
CREAT SERPL-MCNC: 2.59 MG/DL — HIGH (ref 0.5–1.3)
GLUCOSE SERPL-MCNC: 168 MG/DL — HIGH (ref 70–99)
HCT VFR BLD CALC: 31.4 % — LOW (ref 39–50)
HGB BLD-MCNC: 9.5 G/DL — LOW (ref 13–17)
MAGNESIUM SERPL-MCNC: 2 MG/DL — SIGNIFICANT CHANGE UP (ref 1.6–2.6)
MCHC RBC-ENTMCNC: 25 PG — LOW (ref 27–34)
MCHC RBC-ENTMCNC: 30.3 GM/DL — LOW (ref 32–36)
MCV RBC AUTO: 82.6 FL — SIGNIFICANT CHANGE UP (ref 80–100)
PLATELET # BLD AUTO: 340 K/UL — SIGNIFICANT CHANGE UP (ref 150–400)
POTASSIUM SERPL-MCNC: 4.2 MMOL/L — SIGNIFICANT CHANGE UP (ref 3.5–5.3)
POTASSIUM SERPL-SCNC: 4.2 MMOL/L — SIGNIFICANT CHANGE UP (ref 3.5–5.3)
RBC # BLD: 3.8 M/UL — LOW (ref 4.2–5.8)
RBC # FLD: 17.2 % — HIGH (ref 10.3–14.5)
SODIUM SERPL-SCNC: 138 MMOL/L — SIGNIFICANT CHANGE UP (ref 135–145)
WBC # BLD: 12.25 K/UL — HIGH (ref 3.8–10.5)
WBC # FLD AUTO: 12.25 K/UL — HIGH (ref 3.8–10.5)

## 2021-09-20 PROCEDURE — 93325 DOPPLER ECHO COLOR FLOW MAPG: CPT | Mod: 26

## 2021-09-20 PROCEDURE — 93312 ECHO TRANSESOPHAGEAL: CPT

## 2021-09-20 PROCEDURE — 83735 ASSAY OF MAGNESIUM: CPT

## 2021-09-20 PROCEDURE — 93320 DOPPLER ECHO COMPLETE: CPT | Mod: 26

## 2021-09-20 PROCEDURE — 93010 ELECTROCARDIOGRAM REPORT: CPT

## 2021-09-20 PROCEDURE — 76376 3D RENDER W/INTRP POSTPROCES: CPT | Mod: 26

## 2021-09-20 PROCEDURE — 93320 DOPPLER ECHO COMPLETE: CPT

## 2021-09-20 PROCEDURE — 80048 BASIC METABOLIC PNL TOTAL CA: CPT

## 2021-09-20 PROCEDURE — 93312 ECHO TRANSESOPHAGEAL: CPT | Mod: 26

## 2021-09-20 PROCEDURE — 93005 ELECTROCARDIOGRAM TRACING: CPT

## 2021-09-20 PROCEDURE — 93325 DOPPLER ECHO COLOR FLOW MAPG: CPT

## 2021-09-20 PROCEDURE — 85027 COMPLETE CBC AUTOMATED: CPT

## 2021-09-20 PROCEDURE — 36415 COLL VENOUS BLD VENIPUNCTURE: CPT

## 2021-09-20 RX ORDER — CLOPIDOGREL BISULFATE 75 MG/1
1 TABLET, FILM COATED ORAL
Qty: 90 | Refills: 1
Start: 2021-09-20 | End: 2022-01-01

## 2021-09-20 RX ORDER — ASPIRIN/CALCIUM CARB/MAGNESIUM 324 MG
1 TABLET ORAL
Qty: 90 | Refills: 1
Start: 2021-09-20 | End: 2022-01-01

## 2021-09-20 NOTE — DISCHARGE NOTE PROVIDER - NSDCMRMEDTOKEN_GEN_ALL_CORE_FT
allopurinol 100 mg oral tablet: 1 tab(s) orally once a day  amLODIPine 10 mg oral tablet: 1 tab(s) orally once a day  Aspirin Enteric Coated 81 mg oral delayed release tablet: 1 tab(s) orally once a day   atorvastatin 40 mg oral tablet: 1 tab(s) orally once a day  clopidogrel 75 mg oral tablet: 1 tab(s) orally once a day   hydrALAZINE 100 mg oral tablet: 1 tab(s) orally 3 times a day  Lantus 100 units/mL subcutaneous solution: 17 unit(s) subcutaneous 2 times a day  metoprolol succinate 25 mg oral tablet, extended release: 1 tab(s) orally once a day  omeprazole 40 mg oral delayed release capsule: 1 cap(s) orally 2 times a day  sertraline 100 mg oral tablet: 1 tab(s) orally once a day  torsemide 20 mg oral tablet: 1 tab(s) orally once a day

## 2021-09-20 NOTE — DISCHARGE NOTE PROVIDER - NSDCFUSCHEDAPPT_GEN_ALL_CORE_FT
[facility] ; 09/21/2021 ; NPP Familymed [address] [facility] ; 09/24/2021 ; NPP Nephro [address] [facility] ; 10/01/2021 ; NPP Cardio Electro [address] [facility] ; 10/04/2021 ; NPP Cardio Electro [address] [facility] ; 10/05/2021 ; NPP Cardio [address] [facility]

## 2021-09-20 NOTE — ASU PATIENT PROFILE, ADULT - NSICDXPASTMEDICALHX_GEN_ALL_CORE_FT
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 Recent BL cr 2.0-2.5    Congestive heart failure, unspecified HF chronicity, unspecified heart failure type EF 35-40%    Coronary artery disease involving native heart, angina presence unspecified, unspecified vessel or lesion type nuc stress 5/2021 negative    Essential hypertension     GIB (gastrointestinal bleeding) s/p coil embolization    Osteomyelitis of sternum     Squamous cell skin cancer     Type 2 diabetes mellitus without complication, unspecified whether long term insulin use insulin plus orals

## 2021-09-20 NOTE — H&P PST ADULT - NSICDXPASTSURGICALHX_GEN_ALL_CORE_FT
PAST SURGICAL HISTORY:  History of loop recorder     S/P CABG (coronary artery bypass graft) complicated by osteomyleitis of the sternum and sepsis; sternum removed     PAST SURGICAL HISTORY:  History of loop recorder     Presence of Watchman left atrial appendage closure device     S/P CABG (coronary artery bypass graft) complicated by osteomyleitis of the sternum and sepsis; sternum removed    S/P skin cancer resection

## 2021-09-20 NOTE — ASU PATIENT PROFILE, ADULT - NSICDXPASTSURGICALHX_GEN_ALL_CORE_FT
PAST SURGICAL HISTORY:  History of loop recorder     Presence of Watchman left atrial appendage closure device     S/P CABG (coronary artery bypass graft) complicated by osteomyleitis of the sternum and sepsis; sternum removed    S/P skin cancer resection

## 2021-09-20 NOTE — DISCHARGE NOTE PROVIDER - HOSPITAL COURSE
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 Given high risk for recurrent GI bleed on long term anticoagulation, but high risk of CVA off anticoagulation, he underwent a KAROLINA occlusion with Watchman device on 7/27/21. He presented electively and is now s/p 45 day post Watchman MARLO which demonstrated well seated device w/o associated leak or thrombus. Pt instructed to discontinue Eliquis; prescriptions for Plavix and Asprin e-scribed. The patient was observed per post procedure protocol, then discharged home with a plan for outpatient follow up.

## 2021-09-20 NOTE — H&P PST ADULT - COMMENTS
denies recent cough, fever, chills, dysuria, hematuria, recent travel or COVID 19 infection  Covid Vaccine: Moderna completed 2/25/21

## 2021-09-20 NOTE — H&P PST ADULT - ASSESSMENT
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 Given high risk for recurrent GI bleed on long term anticoagulation, but high risk of CVA off anticoagulation, he underwent a KAROLINA occlusion with Watchman device on 7/27/21. He presents today for elective 45 day post Watchman MARLO.     Plan:  - iv heplock  - stat labs, ECG  - consent edi/MD

## 2021-09-20 NOTE — DISCHARGE NOTE PROVIDER - CARE PROVIDER_API CALL
Jacqueline Mantilla (MD)  Cardiac Electrophysiology; Cardiovascular Disease; Internal Medicine  [address] [place] [address]  Phone: [phone]  Fax: [phone]  Follow Up Time:     Gamal Duke (DO)  Cardiovascular Disease; Internal Medicine  [address] [place] [address]  Phone: [phone]  Fax: [phone]  Follow Up Time:

## 2021-09-20 NOTE — H&P PST ADULT - NSICDXFAMILYHX_GEN_ALL_CORE_FT
FAMILY HISTORY:  No pertinent family history in first degree relatives FAMILY HISTORY:  No pertinent family history in first degree relatives, cad

## 2021-09-20 NOTE — DISCHARGE NOTE PROVIDER - NSDCCPTREATMENT_GEN_ALL_CORE_FT
PRINCIPAL PROCEDURE  Procedure: Transesophageal echocardiogram  Findings and Treatment: Notify your doctor if you develop a fever, cough up blood, have any chest pain, palpitations or difficulty breathing. You may take a throat lozenge if you develop a sore throat or try warm salt water gargle. Resume your diet with soft foods first. Follow up with your cardiologist within 2 weeks for a follow up or sooner with any concerns. Report to the nearest ER with any emergencies.

## 2021-09-20 NOTE — DISCHARGE NOTE NURSING/CASE MANAGEMENT/SOCIAL WORK - PATIENT PORTAL LINK FT
You can access the FollowMyHealth Patient Portal offered by [facility] by registering at the following website: [email or web address]. By joining [email or web address]’s FollowMyHealth portal, you will also be able to view your health information using other applications (apps) compatible with our system.

## 2021-09-20 NOTE — DISCHARGE NOTE PROVIDER - NSDCFUADDINST_GEN_ALL_CORE_FT
Follow up with Dr. Mantilla in 3-4 months. Our office will contact you in 3-5 days to schedule this appointment. Please call [phone] with questions or concerns.

## 2021-09-20 NOTE — H&P PST ADULT - HISTORY OF PRESENT ILLNESS
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 Following embolization, he has remained stable on Eliquis for ~ 6 weeks. Given high risk for recurrent GI bleed on long term anticoagulation, but high risk of CVA off anticoagulation, the patient underwent a KAROLINA occlusion with Watchman device on 7/27/21    Cardiac Summary  Stress Test: 5/10/21- pharm nuclear stress: * Myocardial Perfusion SPECT results are abnormal. There is a medium sized, moderate defect in basal to mid inferior wall that is fixed, suggestive of infarct. No evidence of ischemia.  * Post-stress resting myocardial perfusion gated SPECT imaging was performed (LVEF = 41 %;LVEDV = 141 ml.) * Dilated right ventricle noted.   Echo: 5/9/21- LVEF 35 to 40%, paradoxical septal motion with moderately decreased segmental left ventricular systolic function, severe hypokinesis of the basal to mid segements, the apex and distal segments are preserved, severely enlarged right ventricle with severely reduced systolic function, estimated PASP least 45 mmHg mild to moderately elevated, Moderately enlarged left atrium, trace MR, mild- moderate TR  Cardiac Cath/PCI: 5/11/21- RHC- INTERVENTIONAL IMPRESSIONS: Moderate Elevation of Right Heart Pressures: RA=15 mmHg; RV=61/18 mmHg; PCWP=23 mmHg, Moderate Pulmonary Hypertension=62/22 mmHg, Reduced CO=4.8 L/min and CI=2.06 L/min/m2  80 year old gentleman with history of HTN, DM2, CKD, Bell's Palsy, pulm HTN, CAD s/p prior PCI & remote CABG c/b sternal wound infection requiring sternum removal with pec flap, ischemic cardiomyopathy, chronic HFrEF (variable EF 30-41%), AFL/AFIB s/p MARLO/DCCV 5/11/21 with CHADS-VASc = 6, bifascicular block s/p MDT ILR implant (5/2021-Annalee)  and 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 Given high risk for recurrent GI bleed on long term anticoagulation, but high risk of CVA off anticoagulation, he underwent a KAROLINA occlusion with Watchman device on 7/27/21. He presents today for elective 45 day post Watchman MARLO.     Cardiac Summary  Stress Test: 5/10/21- pharm nuclear stress: * Myocardial Perfusion SPECT results are abnormal. There is a medium sized, moderate defect in basal to mid inferior wall that is fixed, suggestive of infarct. No evidence of ischemia.  * Post-stress resting myocardial perfusion gated SPECT imaging was performed (LVEF = 41 %;LVEDV = 141 ml.) * Dilated right ventricle noted.   Echo: 5/9/21- LVEF 35 to 40%, paradoxical septal motion with moderately decreased segmental left ventricular systolic function, severe hypokinesis of the basal to mid segements, the apex and distal segments are preserved, severely enlarged right ventricle with severely reduced systolic function, estimated PASP least 45 mmHg mild to moderately elevated, Moderately enlarged left atrium, trace MR, mild- moderate TR  Cardiac Cath/PCI: 5/11/21- RHC- INTERVENTIONAL IMPRESSIONS: Moderate Elevation of Right Heart Pressures: RA=15 mmHg; RV=61/18 mmHg; PCWP=23 mmHg, Moderate Pulmonary Hypertension=62/22 mmHg, Reduced CO=4.8 L/min and CI=2.06 L/min/m2

## 2021-09-20 NOTE — H&P PST ADULT - NSICDXPASTMEDICALHX_GEN_ALL_CORE_FT
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 Recent BL cr 2.0-2.5    Congestive heart failure, unspecified HF chronicity, unspecified heart failure type EF 35-40%    Coronary artery disease involving native heart, angina presence unspecified, unspecified vessel or lesion type nuc stress 5/2021 negative    Essential hypertension     GIB (gastrointestinal bleeding) s/p coil embolization    Osteomyelitis of sternum     Type 2 diabetes mellitus without complication, unspecified whether long term insulin use insulin plus orals     PAST MEDICAL HISTORY:  Anemia secondary to renal failure together with iron deficiency    Atrial fibrillation, unspecified type s/p MARLO/DCCV 5/11/21    Bell's palsy Right side of face    Bifascicular block s/p ILR implant    Chronic kidney disease, unspecified CKD stage stage III. Recent BL cr 2.0-2.5    Congestive heart failure, unspecified HF chronicity, unspecified heart failure type EF 35-40%    Coronary artery disease involving native heart, angina presence unspecified, unspecified vessel or lesion type nuc stress 5/2021 negative    Essential hypertension     GIB (gastrointestinal bleeding) s/p coil embolization    Osteomyelitis of sternum     Squamous cell skin cancer     Type 2 diabetes mellitus without complication, unspecified whether long term insulin use insulin plus orals

## 2021-09-20 NOTE — PROGRESS NOTE ADULT - SUBJECTIVE AND OBJECTIVE BOX
Pt doing well s/p uncomplicated 45 day post Watchman MARLO. Pt denies complaint post procedure.     PAST MEDICAL & SURGICAL HISTORY:  Coronary artery disease involving native heart, angina presence unspecified, unspecified vessel or lesion type  nuc stress 5/2021 negative    Atrial fibrillation, unspecified type  s/p MARLO/DCCV 5/11/21  Congestive heart failure, unspecified HF chronicity, unspecified heart failure type EF 35-40%  Blessing palsy  Right side of face  Essential hypertension  Type 2 diabetes mellitus without complication, unspecified whether long term insulin use insulin plus orals  Osteomyelitis of sternum  Chronic kidney disease, unspecified CKD stage stage III. Recent BL cr 2.0-2.5  Anemia secondary to renal failure   GIB (gastrointestinal bleeding) s/p coil embolization  Bifascicular block  s/p ILR implant  Squamous cell skin cancer  S/P CABG (coronary artery bypass graft) complicated by osteomyleitis of the sternum and sepsis; sternum removed  History of loop recorder  Presence of Watchman left atrial appendage closure device  S/P skin cancer resection    Post-procedure VS: /69 HR 63 O2 sat 90% RR 16  NAD, A&O x 3  Card: S1/S2, RRR, no m/g/r  Resp: lungs CTA b/l  Abd: S/NT/ND  Ext: no edema, distal pulses intact    MARLO: EF 35%, mod MR, no clot or leak at watchman site (verbal report)     Assessment:   80 year old gentleman with history of HTN, DM2, CKD, Bell's Palsy, pulm HTN, CAD s/p prior PCI & remote CABG c/b sternal wound infection requiring sternum removal with pec flap, ischemic cardiomyopathy, chronic HFrEF (variable EF 30-41%), AFL/AFIB s/p MARLO/DCCV 5/11/21 with CHADS-VASc = 6, bifascicular block s/p MDT ILR implant (5/2021-Annalee)  and 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 Given high risk for recurrent GI bleed on long term anticoagulation, but high risk of CVA off anticoagulation, he underwent a KAROLINA occlusion with Watchman device on 7/27/21. He presented electively and is now s/p 45 day post Watchman MARLO.     Plan:   Observation on telemetry per post op protocol.    Resume PO intake.   Ambulate w/ assist once fully awake & back to baseline mental status w/ VSS.  Discontinue Eliquis.   Start Plavix 75mg and aspirin 81mg daily.   Resume other home medications.   Anticipate d/c home once all criteria met, with outpt f/up in 1 month.

## 2021-09-21 ENCOUNTER — APPOINTMENT (OUTPATIENT)
Dept: FAMILY MEDICINE | Facility: CLINIC | Age: 81
End: 2021-09-21
Payer: MEDICARE

## 2021-09-21 VITALS
SYSTOLIC BLOOD PRESSURE: 110 MMHG | DIASTOLIC BLOOD PRESSURE: 60 MMHG | BODY MASS INDEX: 27.85 KG/M2 | HEIGHT: 74 IN | WEIGHT: 217 LBS | HEART RATE: 66 BPM

## 2021-09-21 DIAGNOSIS — Z23 ENCOUNTER FOR IMMUNIZATION: ICD-10-CM

## 2021-09-21 PROCEDURE — 99496 TRANSJ CARE MGMT HIGH F2F 7D: CPT | Mod: 25

## 2021-09-21 PROCEDURE — 90662 IIV NO PRSV INCREASED AG IM: CPT

## 2021-09-21 PROCEDURE — G0008: CPT

## 2021-09-21 RX ORDER — APIXABAN 5 MG/1
5 TABLET, FILM COATED ORAL
Qty: 60 | Refills: 6 | Status: DISCONTINUED | COMMUNITY
Start: 2021-05-19 | End: 2021-09-21

## 2021-09-21 NOTE — PHYSICAL EXAM
[No Acute Distress] : no acute distress [Well Nourished] : well nourished [Well-Appearing] : well-appearing [Normal Sclera/Conjunctiva] : normal sclera/conjunctiva [PERRL] : pupils equal round and reactive to light [No Respiratory Distress] : no respiratory distress  [No Accessory Muscle Use] : no accessory muscle use [Clear to Auscultation] : lungs were clear to auscultation bilaterally [Normal Rate] : normal rate  [Regular Rhythm] : with a regular rhythm [Normal S1, S2] : normal S1 and S2 [No Joint Swelling] : no joint swelling [Grossly Normal Strength/Tone] : grossly normal strength/tone [Speech Grossly Normal] : speech grossly normal [Memory Grossly Normal] : memory grossly normal [Normal Mood] : the mood was normal

## 2021-09-21 NOTE — PLAN
[FreeTextEntry1] : HOSPITAL DISCHARGE REVIEWED AND SUMMARIZED IN HPI\par MEDICATION LIST REVIEWED AND UPDATED\par FALL PRECAUTIONS\par LAB WORK REVIEWED FROM HOSPITAL\par IS LEAVING FOR [place] IN A COUPLE WEEKS.  HAS P[facility] AND C[facility] [place] AND HAS APPOINTMENT ALREADY SET-UP AND ALREADY HAS A SLIP FOR LAB WORK.  NEEDS CBC MONITORED\par CARDIOLOGY FOLLOW-UP\par FLU VACCINE GIVEN AND TOLERATED WELL \par FOLLOW-UP ALL SPECIALISTS AS DIRECTED \par BLOOD PRESSURE WELL CONTROLLED\par CALL WITH ANY QUESTIONS, CONCERNS OR CHANGES

## 2021-09-24 ENCOUNTER — APPOINTMENT (OUTPATIENT)
Dept: NEPHROLOGY | Facility: CLINIC | Age: 81
End: 2021-09-24
Payer: MEDICARE

## 2021-09-24 VITALS
HEART RATE: 68 BPM | OXYGEN SATURATION: 98 % | BODY MASS INDEX: 27.72 KG/M2 | WEIGHT: 216 LBS | SYSTOLIC BLOOD PRESSURE: 124 MMHG | DIASTOLIC BLOOD PRESSURE: 62 MMHG | HEIGHT: 74 IN

## 2021-09-24 DIAGNOSIS — M10.9 GOUT, UNSPECIFIED: ICD-10-CM

## 2021-09-24 PROCEDURE — 99214 OFFICE O/P EST MOD 30 MIN: CPT | Mod: 25

## 2021-09-24 PROCEDURE — 36415 COLL VENOUS BLD VENIPUNCTURE: CPT

## 2021-09-24 RX ORDER — FOLIC ACID/VIT B COMPLEX AND C 0.8 MG
0.8 TABLET ORAL
Qty: 90 | Refills: 3 | Status: ACTIVE | COMMUNITY
Start: 2021-09-24 | End: 1900-01-01

## 2021-09-24 RX ORDER — TRAMADOL HYDROCHLORIDE 50 MG/1
50 TABLET, COATED ORAL
Qty: 7 | Refills: 0 | Status: DISCONTINUED | COMMUNITY
End: 2021-09-24

## 2021-09-24 RX ORDER — MECLIZINE HYDROCHLORIDE 25 MG/1
25 TABLET ORAL 3 TIMES DAILY
Qty: 42 | Refills: 0 | Status: DISCONTINUED | COMMUNITY
Start: 2021-06-01 | End: 2021-09-24

## 2021-09-24 NOTE — HISTORY OF PRESENT ILLNESS
[FreeTextEntry1] : 80M h/o CAD s/p CABG (2017) c/b sepsis and sternal wound infection s/p sternum removal and pec flap and angioplasty, chronic HFrEF, pAfib (on Eliquis), HTN, DM2, CKD, chrnoic anemia and Bell's Palsy recently moved to [place] for few months from [place] presented to [facility] on 5/9/21 with back pain/dizziness s/p mechanical fall noted ADHFrEF (35-40%), RV dysfunction, initial noted pBNP 53108 ---> 40683, pharm nuclear stress test without ischemia, RHC done with moderate pHTN Group 2 with PCWP 23, switched to torsemide 40mg, underwent MARLO/CV for pAflutter but reverted back to pAfibpAfib/flutter with bradycardia s/p ILR implant c/b recurrent mechanical fall with L-elbow laceration, s/p repeated ER visits 5/28 & 5/30 for recurrent dizziness, readmitted 6/4-6/15/21 found with severe orthostasis and acute on chronic anemia (Hb seema 6.3) s/p EGD/colonoscopy found with duodenal bulb bleeding s/p IR embolization, discharged home with Hb 7.6 off ASA 81mg and rechallenged low dose Eliquis 2.5mg, Cr. 1.87, monitored off torsemide, Imdur and tamsulosin due to symptomatic orthostasis, last seen in office 6/21/21 resumed Eliquis 5mg with stable H/H, noted elevated BP increased hydralazine dose to 100mg TID, 24-Hr BP monitoring still with SBP 160s added amlodipine 5mg, cardiac amyloid workup negative, had recent increased salt intake and gained >16 lb, restarted Torsemide 20mg, planning for elective Watchman with EPS/Dr. Mantilla, now presents for follow up. \par \par Orthostasis resolved likely was in setting of ongoing occult upper GI bleed and polypharmacy. BP been elevated recently and with LE edema, resumed on antihypertensives and maintenance diuretic. \par \par \par 1. Recent severe upper GI bleed\par -continue PPI and GI follow up\par -plan for elective Watchman tomorrow then stop DOAC use after 45 days if stable. \par \par 2. HFrEF with Severely Reduced RV function, CAD/CABG\par -euvolemic on exam, ACC/AHA stage C, NYHA class II- continue Torsemide 20mg daily may switch to ever other day if reduced fluid intake, consider elective CardioMems in the future once Watchman is done \par - EF 35-40% with severely reduced RV function, no ischemia on stress test and advanced CKD not candidate for LHC\par - s/p RHC - RA 15, RV 61/18, PCWP 23, CO 4.,8, CI 2.06\par - continue hydralazine 100mg TID, consider addition of Imdur if BP remains elevated. \par - will resume ASA 81mg after Watchman; continue atorvastatin 40mg\par \par 3. pAtrial Fibrillation, flutter\par - continue ILR monitoring if tachy-didi syndrome to require PPM\par -follow up with EPS/Dr. Mantilla next weeks- future elective Afib ablation and CRT-D device?\par - monitor on low dose metoprolol succinate 25mg as prior sinus didi \par - tolerating resumtion of Eliquis 5mg BID with H/H stable, await Watchman implant given prior recurrent falls and recent severe GI bleed, MARLO done inpatient adequate size. \par \par 4. HTN\par -off lisionopril due to CKD\par -uptitrate amlodipine 10mg and monitor on hydralazine dose [id number] TID, goal SBP <140\par \par 5. CKD\par -unknown baseline Cr. but initial admission fo 2.1 and now 1.8 \par -monitor with Torsemide 20mg dialy \par -nephrology follow up. \par \par 6. DM2\par -consider addition of SGLT-2 inhibitor if eEFR >30 \par \par 7. Gait imbalance- resume home PT

## 2021-09-24 NOTE — PHYSICAL EXAM
[General Appearance - In No Acute Distress] : in no acute distress [General Appearance - Alert] : alert [Sclera] : the sclera and conjunctiva were normal [PERRL With Normal Accommodation] : pupils were equal in size, round, and reactive to light [Extraocular Movements] : extraocular movements were intact [Outer Ear] : the ears and nose were normal in appearance [Oropharynx] : the oropharynx was normal [Neck Appearance] : the appearance of the neck was normal [Neck Cervical Mass (___cm)] : no neck mass was observed [Jugular Venous Distention Increased] : there was no jugular-venous distention [Thyroid Diffuse Enlargement] : the thyroid was not enlarged [Thyroid Nodule] : there were no palpable thyroid nodules [Auscultation Breath Sounds / Voice Sounds] : lungs were clear to auscultation bilaterally [Heart Rate And Rhythm] : heart rate was normal and rhythm regular [Heart Sounds] : normal S1 and S2 [Heart Sounds Gallop] : no gallops [Heart Sounds Pericardial Friction Rub] : no pericardial rub [Murmurs] : no murmurs [Breast Palpation Mass] : no palpable masses [Bowel Sounds] : normal bowel sounds [Abdomen Soft] : soft [Abdomen Tenderness] : non-tender [Abdomen Mass (___ Cm)] : no abdominal mass palpated [Liver Enlargement] : was enlarged [___ Cm Below Costal Margin] : and was palpable [unfilled] cm below the costal margin [Cervical Lymph Nodes Enlarged Posterior Bilaterally] : posterior cervical [Cervical Lymph Nodes Enlarged Anterior Bilaterally] : anterior cervical [Supraclavicular Lymph Nodes Enlarged Bilaterally] : supraclavicular [Axillary Lymph Nodes Enlarged Bilaterally] : axillary [Femoral Lymph Nodes Enlarged Bilaterally] : femoral [Inguinal Lymph Nodes Enlarged Bilaterally] : inguinal [No CVA Tenderness] : no ~M costovertebral angle tenderness [No Spinal Tenderness] : no spinal tenderness [Nail Clubbing] : no clubbing  or cyanosis of the fingernails [Abnormal Walk] : normal gait [Musculoskeletal - Swelling] : no joint swelling seen [Motor Tone] : muscle strength and tone were normal [Skin Color & Pigmentation] : normal skin color and pigmentation [Skin Turgor] : normal skin turgor [] : no rash [Deep Tendon Reflexes (DTR)] : deep tendon reflexes were 2+ and symmetric [Sensation] : the sensory exam was normal to light touch and pinprick [No Focal Deficits] : no focal deficits [Oriented To Time, Place, And Person] : oriented to person, place, and time [Impaired Insight] : insight and judgment were intact [Affect] : the affect was normal

## 2021-09-24 NOTE — ASSESSMENT
[FreeTextEntry1] : CRS \par \par S/P Watchman Procedure, MARLO ( Device Check )\par \par +I LR,\par \par rec : Anemia & CKD Management .\par \par W.U & Meds reviewed, \par \par Decrease Diuresis, \par \par Begin  Renal  vitamin  & Oral Fe, \par \par F.U X 4 mo., \par \par

## 2021-09-28 LAB
25(OH)D3 SERPL-MCNC: 41.4 NG/ML
ALBUMIN SERPL ELPH-MCNC: 4.3 G/DL
ANION GAP SERPL CALC-SCNC: 15 MMOL/L
APPEARANCE: ABNORMAL
BACTERIA: ABNORMAL
BASOPHILS # BLD AUTO: 0.09 K/UL
BASOPHILS NFR BLD AUTO: 1 %
BILIRUBIN URINE: NEGATIVE
BLOOD URINE: NEGATIVE
BUN SERPL-MCNC: 51 MG/DL
CALCIUM SERPL-MCNC: 9.4 MG/DL
CALCIUM SERPL-MCNC: 9.4 MG/DL
CHLORIDE SERPL-SCNC: 99 MMOL/L
CHOLEST SERPL-MCNC: 100 MG/DL
CO2 SERPL-SCNC: 21 MMOL/L
COLOR: NORMAL
CREAT SERPL-MCNC: 2.28 MG/DL
CREAT SPEC-SCNC: 49 MG/DL
CREAT/PROT UR: 2.7 RATIO
EOSINOPHIL # BLD AUTO: 0.45 K/UL
EOSINOPHIL NFR BLD AUTO: 5.2 %
GLUCOSE QUALITATIVE U: NEGATIVE
GLUCOSE SERPL-MCNC: 399 MG/DL
HCT VFR BLD CALC: 31.8 %
HDLC SERPL-MCNC: 29 MG/DL
HGB BLD-MCNC: 8.9 G/DL
HYALINE CASTS: 1 /LPF
IMM GRANULOCYTES NFR BLD AUTO: 0.3 %
KETONES URINE: NEGATIVE
LDLC SERPL CALC-MCNC: 34 MG/DL
LEUKOCYTE ESTERASE URINE: NEGATIVE
LYMPHOCYTES # BLD AUTO: 0.51 K/UL
LYMPHOCYTES NFR BLD AUTO: 5.9 %
MAGNESIUM SERPL-MCNC: 1.7 MG/DL
MAN DIFF?: NORMAL
MCHC RBC-ENTMCNC: 24.3 PG
MCHC RBC-ENTMCNC: 28 GM/DL
MCV RBC AUTO: 86.9 FL
MICROSCOPIC-UA: NORMAL
MONOCYTES # BLD AUTO: 0.57 K/UL
MONOCYTES NFR BLD AUTO: 6.6 %
NEUTROPHILS # BLD AUTO: 7 K/UL
NEUTROPHILS NFR BLD AUTO: 81 %
NITRITE URINE: NEGATIVE
NONHDLC SERPL-MCNC: 71 MG/DL
PARATHYROID HORMONE INTACT: 62 PG/ML
PH URINE: 5.5
PHOSPHATE SERPL-MCNC: 3.2 MG/DL
PLATELET # BLD AUTO: 270 K/UL
POTASSIUM SERPL-SCNC: 5 MMOL/L
PROT UR-MCNC: 133 MG/DL
PROTEIN URINE: ABNORMAL
RBC # BLD: 3.66 M/UL
RBC # FLD: 17.5 %
RED BLOOD CELLS URINE: 1 /HPF
SODIUM SERPL-SCNC: 136 MMOL/L
SPECIFIC GRAVITY URINE: 1.01
SQUAMOUS EPITHELIAL CELLS: 1 /HPF
TRIGL SERPL-MCNC: 186 MG/DL
URATE SERPL-MCNC: 9.3 MG/DL
UROBILINOGEN URINE: NORMAL
WBC # FLD AUTO: 8.65 K/UL
WHITE BLOOD CELLS URINE: 0 /HPF

## 2021-09-30 ENCOUNTER — APPOINTMENT (OUTPATIENT)
Dept: DERMATOLOGY | Facility: CLINIC | Age: 81
End: 2021-09-30
Payer: MEDICARE

## 2021-09-30 PROCEDURE — 99212 OFFICE O/P EST SF 10 MIN: CPT | Mod: 24

## 2021-09-30 RX ORDER — APIXABAN 2.5 MG/1
1 TABLET, FILM COATED ORAL
Qty: 0 | Refills: 0 | DISCHARGE

## 2021-09-30 RX ORDER — FUROSEMIDE 40 MG
1 TABLET ORAL
Qty: 0 | Refills: 0 | DISCHARGE

## 2021-09-30 RX ORDER — SERTRALINE 25 MG/1
1 TABLET, FILM COATED ORAL
Qty: 0 | Refills: 0 | DISCHARGE

## 2021-09-30 RX ORDER — TAMSULOSIN HYDROCHLORIDE 0.4 MG/1
1 CAPSULE ORAL
Qty: 0 | Refills: 0 | DISCHARGE

## 2021-09-30 RX ORDER — LISINOPRIL 2.5 MG/1
1 TABLET ORAL
Qty: 0 | Refills: 0 | DISCHARGE

## 2021-09-30 RX ORDER — ISOSORBIDE MONONITRATE 60 MG/1
1 TABLET, EXTENDED RELEASE ORAL
Qty: 0 | Refills: 0 | DISCHARGE

## 2021-09-30 RX ORDER — METOPROLOL TARTRATE 50 MG
1 TABLET ORAL
Qty: 0 | Refills: 0 | DISCHARGE

## 2021-09-30 RX ORDER — POTASSIUM CHLORIDE 20 MEQ
2 PACKET (EA) ORAL
Qty: 0 | Refills: 0 | DISCHARGE

## 2021-09-30 RX ORDER — FERROUS SULFATE 325(65) MG
1 TABLET ORAL
Qty: 0 | Refills: 0 | DISCHARGE

## 2021-09-30 RX ORDER — OMEPRAZOLE 10 MG/1
1 CAPSULE, DELAYED RELEASE ORAL
Qty: 0 | Refills: 0 | DISCHARGE

## 2021-09-30 RX ORDER — MAGNESIUM OXIDE 400 MG ORAL TABLET 241.3 MG
1 TABLET ORAL
Qty: 0 | Refills: 0 | DISCHARGE

## 2021-09-30 RX ORDER — MECLIZINE HCL 12.5 MG
1 TABLET ORAL
Qty: 0 | Refills: 0 | DISCHARGE

## 2021-09-30 RX ORDER — TRAMADOL HYDROCHLORIDE 50 MG/1
1 TABLET ORAL
Qty: 0 | Refills: 0 | DISCHARGE

## 2021-10-01 ENCOUNTER — APPOINTMENT (OUTPATIENT)
Dept: ELECTROPHYSIOLOGY | Facility: CLINIC | Age: 81
End: 2021-10-01
Payer: MEDICARE

## 2021-10-01 ENCOUNTER — NON-APPOINTMENT (OUTPATIENT)
Age: 81
End: 2021-10-01

## 2021-10-01 ENCOUNTER — APPOINTMENT (OUTPATIENT)
Dept: NEPHROLOGY | Facility: CLINIC | Age: 81
End: 2021-10-01
Payer: MEDICARE

## 2021-10-01 VITALS
HEIGHT: 74 IN | WEIGHT: 216 LBS | SYSTOLIC BLOOD PRESSURE: 108 MMHG | HEART RATE: 72 BPM | DIASTOLIC BLOOD PRESSURE: 62 MMHG | BODY MASS INDEX: 27.72 KG/M2 | OXYGEN SATURATION: 98 %

## 2021-10-01 PROCEDURE — 99214 OFFICE O/P EST MOD 30 MIN: CPT | Mod: 25

## 2021-10-01 PROCEDURE — 93298 REM INTERROG DEV EVAL SCRMS: CPT | Mod: NC

## 2021-10-01 PROCEDURE — G2066: CPT

## 2021-10-01 PROCEDURE — 96372 THER/PROPH/DIAG INJ SC/IM: CPT

## 2021-10-01 RX ORDER — ERYTHROPOIETIN 40000 [IU]/ML
40000 INJECTION, SOLUTION INTRAVENOUS; SUBCUTANEOUS
Qty: 1 | Refills: 0 | Status: COMPLETED | OUTPATIENT
Start: 2021-10-01

## 2021-10-01 RX ADMIN — ERYTHROPOIETIN 0 UNIT/ML: 40000 INJECTION, SOLUTION INTRAVENOUS; SUBCUTANEOUS at 00:00

## 2021-10-01 NOTE — HISTORY OF PRESENT ILLNESS
[FreeTextEntry1] : 80M h/o CAD s/p CABG (2017) c/b sepsis and sternal wound infection s/p sternum removal and pec flap and angioplasty, chronic HFrEF, pAfib (on Eliquis), HTN, DM2, CKD, chrnoic anemia and Bell's Palsy recently moved to [place] for few months from [place] presented to [facility]-ER on 5/9/21 with back pain/dizziness s/p mechanical fall noted ADHFrEF (35-40%), RV dysfunction, initial noted pBNP 49220 ---> 55928, pharm nuclear stress test without ischemia, RHC done with moderate pHTN Group 2 with PCWP 23, switched to torsemide 40mg, underwent MARLO/CV for pAflutter but reverted back to pAfibpAfib/flutter with bradycardia s/p ILR implant c/b recurrent mechanical fall with L-elbow laceration, s/p repeated ER visits 5/28 & 5/30 for recurrent dizziness, readmitted 6/4-6/15/21 found with severe orthostasis and acute on chronic anemia (Hb seema 6.3) s/p EGD/colonoscopy found with duodenal bulb bleeding s/p IR embolization, discharged home with Hb 7.6 off ASA 81mg and rechallenged low dose Eliquis 2.5mg, Cr. 1.87, monitored off torsemide, Imdur and tamsulosin due to symptomatic orthostasis, last seen in office 6/21/21 resumed Eliquis 5mg with stable H/H, noted elevated BP increased hydralazine dose to 100mg TID, 24-Hr BP monitoring still with SBP 160s added amlodipine 5mg, cardiac amyloid workup negative, had recent increased salt intake and gained >16 lb, restarted Torsemide 20mg, planning for elective Watchman with EPS/Dr. Mantilla, now presents for follow up. \par \par Orthostasis resolved likely was in setting of ongoing occult upper GI bleed and polypharmacy. BP been elevated recently and with LE edema, resumed on antihypertensives and maintenance diuretic. \par \par \par 1. Recent severe upper GI bleed\par -continue PPI and GI follow up\par -plan for elective Watchman tomorrow then stop DOAC use after 45 days if stable. \par \par 2. HFrEF with Severely Reduced RV function, CAD/CABG\par -euvolemic on exam, ACC/AHA stage C, NYHA class II- continue Torsemide 20mg daily may switch to ever other day if reduced fluid intake, consider elective CardioMems in the future once Watchman is done \par - EF 35-40% with severely reduced RV function, no ischemia on stress test and advanced CKD not candidate for LHC\par - s/p RHC - RA 15, RV 61/18, PCWP 23, CO 4.,8, CI 2.06\par - continue hydralazine 100mg TID, consider addition of Imdur if BP remains elevated. \par - will resume ASA 81mg after Watchman; continue atorvastatin 40mg\par \par 3. pAtrial Fibrillation, flutter\par - continue ILR monitoring if tachy-didi syndrome to require PPM\par -follow up with EPS/Dr. Mantilla next weeks- future elective Afib ablation and CRT-D device?\par - monitor on low dose metoprolol succinate 25mg as prior sinus didi \par - tolerating resumtion of Eliquis 5mg BID with H/H stable, await Watchman implant given prior recurrent falls and recent severe GI bleed, MARLO done inpatient adequate size. \par \par 4. HTN\par -off lisionopril due to CKD\par -uptitrate amlodipine 10mg and monitor on hydralazine dose [id number] TID, goal SBP <140\par \par 5. CKD\par -unknown baseline Cr. but initial admission fo 2.1 and now 1.8 \par -monitor with Torsemide 20mg dialy \par -nephrology follow up. \par \par 6. DM2\par -consider addition of SGLT-2 inhibitor if eEFR >30 \par \par 7. Gait imbalance- resume home PT

## 2021-10-01 NOTE — ASSESSMENT
[FreeTextEntry1] : CRS \par \par S/P Watchman Procedure, MARLO ( Device Check )\par \par +I LR,\par \par rec : Anemia & CKD Management .\par \par W.U & Meds reviewed, \par \par Decrease Diuresis, \par \par Begin  Renal  vitamin  & Oral Fe, \par \par EPO Administered, \par \par To [place] Until 5 - 22,\par \par Intolerant of Oral Fe, \par \par

## 2021-10-02 PROBLEM — I48.91 UNSPECIFIED ATRIAL FIBRILLATION: Chronic | Status: ACTIVE | Noted: 2021-05-08

## 2021-10-02 PROBLEM — I25.10 ATHEROSCLEROTIC HEART DISEASE OF NATIVE CORONARY ARTERY WITHOUT ANGINA PECTORIS: Chronic | Status: ACTIVE | Noted: 2021-05-08

## 2021-10-02 PROBLEM — I10 ESSENTIAL (PRIMARY) HYPERTENSION: Chronic | Status: ACTIVE | Noted: 2021-05-28

## 2021-10-02 PROBLEM — N18.9 CHRONIC KIDNEY DISEASE, UNSPECIFIED: Chronic | Status: ACTIVE | Noted: 2021-06-04

## 2021-10-02 PROBLEM — C44.92 SQUAMOUS CELL CARCINOMA OF SKIN, UNSPECIFIED: Chronic | Status: ACTIVE | Noted: 2021-09-20

## 2021-10-02 PROBLEM — I45.2 BIFASCICULAR BLOCK: Chronic | Status: ACTIVE | Noted: 2021-07-27

## 2021-10-02 PROBLEM — N18.9 CHRONIC KIDNEY DISEASE, UNSPECIFIED: Chronic | Status: ACTIVE | Noted: 2021-05-28

## 2021-10-02 PROBLEM — M86.9 OSTEOMYELITIS, UNSPECIFIED: Chronic | Status: ACTIVE | Noted: 2021-05-28

## 2021-10-02 PROBLEM — G51.0 BELL'S PALSY: Chronic | Status: ACTIVE | Noted: 2021-05-08

## 2021-10-02 PROBLEM — I50.9 HEART FAILURE, UNSPECIFIED: Chronic | Status: ACTIVE | Noted: 2021-05-08

## 2021-10-02 PROBLEM — E11.9 TYPE 2 DIABETES MELLITUS WITHOUT COMPLICATIONS: Chronic | Status: ACTIVE | Noted: 2021-05-28

## 2021-10-02 PROBLEM — K92.2 GASTROINTESTINAL HEMORRHAGE, UNSPECIFIED: Chronic | Status: ACTIVE | Noted: 2021-06-04

## 2021-10-04 ENCOUNTER — APPOINTMENT (OUTPATIENT)
Dept: ELECTROPHYSIOLOGY | Facility: CLINIC | Age: 81
End: 2021-10-04
Payer: MEDICARE

## 2021-10-04 ENCOUNTER — NON-APPOINTMENT (OUTPATIENT)
Age: 81
End: 2021-10-04

## 2021-10-04 VITALS
DIASTOLIC BLOOD PRESSURE: 74 MMHG | WEIGHT: 221 LBS | HEART RATE: 62 BPM | TEMPERATURE: 97.8 F | OXYGEN SATURATION: 95 % | BODY MASS INDEX: 28.36 KG/M2 | SYSTOLIC BLOOD PRESSURE: 154 MMHG | HEIGHT: 74 IN

## 2021-10-04 PROCEDURE — 93000 ELECTROCARDIOGRAM COMPLETE: CPT

## 2021-10-04 PROCEDURE — 99214 OFFICE O/P EST MOD 30 MIN: CPT

## 2021-10-04 RX ORDER — CEPHALEXIN 500 MG/1
500 CAPSULE ORAL
Qty: 14 | Refills: 0 | Status: DISCONTINUED | COMMUNITY
Start: 2021-09-01 | End: 2021-10-04

## 2021-10-04 RX ORDER — HYDRALAZINE HYDROCHLORIDE 50 MG/1
50 TABLET ORAL
Qty: 90 | Refills: 0 | Status: DISCONTINUED | COMMUNITY
Start: 2021-05-24

## 2021-10-04 RX ORDER — APIXABAN 2.5 MG/1
2.5 TABLET, FILM COATED ORAL
Qty: 60 | Refills: 0 | Status: DISCONTINUED | COMMUNITY
Start: 2021-06-15

## 2021-10-04 NOTE — HISTORY OF PRESENT ILLNESS
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 Now status post uncomplicated KAROLINA occlusion with Watchman device 7/27/21.\par \par MARLO on 9/20/21 showed well seated device with no deng-device leak or thrombus.  Eliquis was discontinued and started on ASA 81mg and Plavix. Tolerating DAPT with report of bleeding issues. Doing well and remains asymptomatic. ILR reveals AT/AF burden 0.1% since last follow up, many episodes c/w SR with ectopy. No tachy or didi events. PVC burden 2%. \par

## 2021-10-04 NOTE — PHYSICAL EXAM
[Well Developed] : well developed [No Acute Distress] : no acute distress [No Respiratory Distress] : no respiratory distress  [No Edema] : no edema [No Rash] : no rash [Moves all extremities] : moves all extremities [Alert and Oriented] : alert and oriented

## 2021-10-04 NOTE — DISCUSSION/SUMMARY
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 Now status post uncomplicated KAROLINA occlusion with Watchman device 7/27/21.\par \par MARLO on 9/20/21 showed well seated device with no deng-device leak or thrombus, LVEF 40-45%.  Eliquis was discontinued and started on ASA 81mg and Plavix. Tolerating DAPT with report of bleeding issues. Doing well and remains asymptomatic. ILR reveals AT/AF burden 0.1% since last follow up, many episodes c/w SR with ectopy. No tachy or didi events. PVC burden 2%. \par \par - Continue DAPT until 6 months post watchman as tolerated.  Can stop Plavix 1/27/21 and continue life long Aspirin.\par - AT/AF burden 0.1% since last follow up with EGMs c/w SR with ectopy. As discussed in past if recurrent symptomatic AF could consider rhythm control strategies in the future.\par - History of CM with known LV dysfunction which remains stable on recent MARLO.  No indication for ICD at this time.\par - Bifascicular block- to continue close remote ILR monitoring for bradyarrhythmias. \par \par Seen and d/w Dr. Mantilla\par Farida Huffman PAC\par \par

## 2021-10-04 NOTE — REVIEW OF SYSTEMS
[Syncope] : syncope [Fever] : no fever [Chills] : no chills [Feeling Fatigued] : not feeling fatigued [Dyspnea on exertion] : not dyspnea during exertion [Chest Discomfort] : no chest discomfort [Leg Claudication] : no intermittent leg claudication [Palpitations] : no palpitations [Cough] : no cough [Dizziness] : no dizziness [Easy Bleeding] : no tendency for easy bleeding [Easy Bruising] : no tendency for easy bruising

## 2021-10-05 ENCOUNTER — APPOINTMENT (OUTPATIENT)
Dept: CARDIOLOGY | Facility: CLINIC | Age: 81
End: 2021-10-05
Payer: MEDICARE

## 2021-10-05 VITALS
HEART RATE: 66 BPM | SYSTOLIC BLOOD PRESSURE: 146 MMHG | OXYGEN SATURATION: 95 % | TEMPERATURE: 98 F | WEIGHT: 218 LBS | HEIGHT: 74 IN | DIASTOLIC BLOOD PRESSURE: 70 MMHG | BODY MASS INDEX: 27.98 KG/M2

## 2021-10-05 DIAGNOSIS — I25.10 ATHEROSCLEROTIC HEART DISEASE OF NATIVE CORONARY ARTERY W/OUT ANGINA PECTORIS: ICD-10-CM

## 2021-10-05 DIAGNOSIS — I25.5 HEART FAILURE, UNSPECIFIED: ICD-10-CM

## 2021-10-05 DIAGNOSIS — I50.9 HEART FAILURE, UNSPECIFIED: ICD-10-CM

## 2021-10-05 DIAGNOSIS — I48.0 PAROXYSMAL ATRIAL FIBRILLATION: ICD-10-CM

## 2021-10-05 PROCEDURE — 99214 OFFICE O/P EST MOD 30 MIN: CPT

## 2021-10-05 RX ORDER — CLOPIDOGREL 75 MG/1
75 TABLET, FILM COATED ORAL DAILY
Qty: 1 | Refills: 3 | Status: DISCONTINUED | COMMUNITY
End: 2021-10-05

## 2021-10-05 RX ORDER — HYDRALAZINE HYDROCHLORIDE 100 MG/1
100 TABLET ORAL 3 TIMES DAILY
Qty: 270 | Refills: 2 | Status: ACTIVE | COMMUNITY
Start: 2021-05-24 | End: 1900-01-01

## 2021-10-05 RX ORDER — ASPIRIN 81 MG
81 TABLET, DELAYED RELEASE (ENTERIC COATED) ORAL DAILY
Refills: 0 | Status: DISCONTINUED | COMMUNITY
End: 2021-10-05

## 2021-10-05 RX ORDER — ASPIRIN 81 MG/1
81 TABLET, DELAYED RELEASE ORAL DAILY
Qty: 90 | Refills: 3 | Status: ACTIVE | COMMUNITY
Start: 2021-10-05 | End: 1900-01-01

## 2021-10-05 RX ORDER — AMLODIPINE BESYLATE 5 MG/1
5 TABLET ORAL DAILY
Qty: 30 | Refills: 0 | Status: DISCONTINUED | COMMUNITY
Start: 2021-07-15 | End: 2021-10-05

## 2021-10-05 NOTE — HISTORY OF PRESENT ILLNESS
[FreeTextEntry1] : 80M h/o CAD s/p CABG (2017) c/b sepsis and sternal wound infection s/p sternum removal and pec flap and angioplasty, chronic HFrEF, pAfib (on Eliquis), HTN, DM2, CKD, chrnoic anemia and Bell's Palsy recently moved to [place] for few months from [place] presented to [facility]ER on 5/9/21 with back pain/dizziness s/p mechanical fall noted ADHFrEF (35-40%), RV dysfunction, initial noted pBNP 83329 ---> 47770, pharm nuclear stress test without ischemia, RHC done with moderate pHTN Group 2 with PCWP 23, switched to torsemide 40mg, underwent MARLO/CV for pAflutter but reverted back to pAfibpAfib/flutter with bradycardia s/p [employer] ILR implant c/b recurrent mechanical fall with L-elbow laceration, s/p repeated ER visits 5/28 & 5/30 for recurrent dizziness, readmitted 6/4-6/15/21 found with severe orthostasis and acute on chronic anemia (Hb seema 6.3) s/p EGD/colonoscopy found with duodenal bulb bleeding s/p IR embolization, discharged home with Hb 7.6 off ASA 81mg and rechallenged low dose Eliquis 2.5mg, Cr. 1.87, monitored off torsemide, Imdur and tamsulosin due to symptomatic orthostasis, last seen in office 6/21/21 resumed Eliquis 5mg with stable H/H, noted elevated BP increased hydralazine dose to 100mg TID, 24-Hr BP monitoring still with SBP 160s added amlodipine 5mg, cardiac amyloid workup negative, had recent increased salt intake and gained >16 lb, restarted Torsemide 20mg, last visit 7/2021, underwent elective Watchman and with EPS/Dr. Mantilla in 7/27/21 had post 45 days MARLO now off Eliquis switched to ASA/clopidogrel, now presents for follow up. \par \par Received EPO injection from nephrologist, most recent Hb 9.5 and Cr. 2.6. \par \par Reports home -140s, no recurrence of dizziness, denies chest discomfort, ambulating few minutes daily been getting physical therapy. Planning to return [place] for 6 months next week. \par \par \par Prior visit 7/2021: \par Reports BP has been better controlled, denies any bleeding or dark stool, wife noticed that he is off balance at times, denies dizziness or lightheaded, has not seen neurology recently, had been on home PT in the past. Pending elective Watchman procedure tomorrow and also planning to return to [place] in late October. Constantly feels chills at time. \par \par \par Prior visit 6/2021: \par Reports feeling well since discharged home, no recurrence of orthostatic dizziness, suspected gout pain in his foot is getting better. Labile home SBP up to 160-180s. Denies any dyspnea/orthopnea. Using cane for ambulation at times for balance as legs would feel weak at times. Reports an episode of bright red blood the day of discharge home but no recurrence since. \par \par pBNP 05296 (6/10/21), 45162 (5/28/21)\par \par Prior visit 6/1/21: \par He reports was very dizzy while walking into the bathroom on Sunday and felt room spinning and fell in the toilet, initial SBP 80s checked by his wife, then when EMS arrive SBP 110s. Still dizzy and worst  even with standing and walking. Continues to have chronic diarrhea 4x daily, did not stop the magnesium supplement. \par \par \par Prior visit 5/24/21: \par Reports having intermittent shortness of breath and also continues to have dizziness at times, been on reduced dose of metoprolol 25mg BID since last week, denies syncope, no chest pain. He reports is able to ambulate better with less exertional dyspnea compared to prior to recent hospitalization, no edema since discharge. Has not yet make appointment with nephrology for follow up. L-elbow wound has been healing, no recurrent bleeding back on ASA/Eliquis. \par \par Him and his wife and planning to return to [place] in October for 6 months.

## 2021-10-05 NOTE — CARDIOLOGY SUMMARY
[de-identified] : 5/24/21- Sinus 45, RAD. RBBB, QTc 502\par 6/21/21- Sinus 55, RAD, RBBB\par 10/4/21- Sinus 61, RAD RBBB, QTc 511 [de-identified] : 5/10/21- pharm nuclear stress- IMPRESSIONS: \par * Myocardial Perfusion SPECT results are abnormal. \par * There is a medium sized, moderate defect in basal to mid \par inferior wall that is fixed, suggestive of infarct. No \par evidence of ischemia. \par * Post-stress resting myocardial perfusion gated SPECT \par imaging was performed (LVEF = 41 %;LVEDV = 141 ml.) \par * Dilated right ventricle noted. [de-identified] : 5/9/21- 1. Technically difficult study. \par  2. Endocardial visualization was enhanced with intravenous echo contrast. \par  3. Left ventricularejection fraction, by visual estimation, is 35 to 40%. \par  4. Paradoxical septal motion with moderately decreased segmental left ventricular systolic function. \par  5. Severe hypokinesis of the basal to mid segements, the apex and distal segments are preserved. \par  6. The mitral in-flow pattern reveals no discernable A-wave, therefore no comment on diastolic function can be made. \par  7. Severely enlarged right ventricle with severely reduced systolic function, estimated PASP least 45 mmHg mild to moderately elevated. \par  8. Right atrial enlargement. \par  9. Moderately enlarged left atrium. \par 10. Mild mitral annular calcification. \par 11. Trace mitral valve regurgitation. \par 12. Thickening and calcification of the anterior and posterior mitral valve leaflets. \par 13. Mild-moderate tricuspid regurgitation. \par 14. Sclerotic aortic valve with decreased opening. \par 15. There is mild aortic root calcification. \par 16. There is no evidence of pericardial effusion. \par 17. No prior study available for comparison.\par \par 9/20/21- Summary:\par  1. Left ventricular ejection fraction, by visual estimation, is 40 to 45%.\par  2. Mildly decreased global left ventricular systolic function.\par  3. Moderately enlarged right ventricle with severely reduced systolic function, estimatd RVSP least 52 mmHg.\par  4. Severely enlarged left atrium.\par  5. Severely enlarged right atrium.\par  6. A well-seated Watchman device present without overlying thrombus and no evidence of deng-device leak.\par  7. Degenerative mitral valve.\par  8. Moderate mitral valve regurgitation.\par  9. Mild tricuspid regurgitation.\par 10. Mild pulmonic valve regurgitation.\par 11. Small patent foramen ovale, with predominantly right to left shunting across the atrial septum.\par 12. There is mild aortic root calcification with simple atheroma at the aortic arch and descending aorta.\par 13. There is no evidence of pericardial effusion.\par 14. Compared to the prior MARLO study from 7/27/21, LV systolic function remain mild-to-moderately reduced and known RV dysfunction. Well-seated Watchman without deng-device leak or overlying thrombus. [de-identified] : 5/11/21- RHC- INTERVENTIONAL IMPRESSIONS: Moderate Elevation of Right Heart Pressures: \par RA=15 mmHg; RV=61/18 mmHg; PCWP=23 mmHg \par Moderate Pulmonary Hypertension=62/22 mmHg \par Reduced CO=4.8 L/min and CI=2.06 L/min/m2

## 2021-10-05 NOTE — DISCUSSION/SUMMARY
[FreeTextEntry1] : 80M h/o CAD s/p CABG (2017) c/b sepsis and sternal wound infection s/p sternum removal and pec flap and angioplasty, chronic HFrEF, pAfib (on Eliquis), HTN, DM2, CKD, chrnoic anemia and Bell's Palsy recently moved to [place] for few months from [place] presented to [facility] on 5/9/21 with back pain/dizziness s/p mechanical fall noted ADHFrEF (35-40%), RV dysfunction, initial noted pBNP [id number] ---> [id number], pharm nuclear stress test without ischemia, RHC done with moderate pHTN Group 2 with PCWP 23, switched to torsemide 40mg, underwent MARLO/CV for pAflutter but reverted back to pAfibpAfib/flutter with bradycardia s/p Medtronic ILR implant c/b recurrent mechanical fall with L-elbow laceration, s/p repeated ER visits 5/28 & 5/30 for recurrent dizziness, readmitted 6/4-6/15/21 found with severe orthostasis and acute on chronic anemia (Hb seema 6.3) s/p EGD/colonoscopy found with duodenal bulb bleeding s/p IR embolization, discharged home with Hb 7.6 off ASA 81mg and rechallenged low dose Eliquis 2.5mg, Cr. 1.87, monitored off torsemide, Imdur and tamsulosin due to symptomatic orthostasis, last seen in office 6/21/21 resumed Eliquis 5mg with stable H/H, noted elevated BP increased hydralazine dose to 100mg TID, 24-Hr BP monitoring still with SBP 160s added amlodipine 5mg, cardiac amyloid workup negative, had recent increased salt intake and gained >16 lb, restarted Torsemide 20mg, last visit 7/2021, underwent elective Watchman and with EPS/Dr. Mantilla in 7/27/21 had post 45 days MARLO now off Eliquis switched to ASA/clopidogrel, now presents for follow up. \par \par Orthostasis resolved likely was in setting prior occult upper GI bleed and polypharmacy. BP been improved with resolved LE swellings and resumed on antihypertensives and maintenance diuretic. \par \par \par 1. Recent severe upper GI bleed\par -continue PPI and GI follow up\par -s/p elective Watchman now off anticoagulant\par \par 2. HFrEF with Severely Reduced RV function, CAD/CABG \par -euvolemic on exam, ACC/AHA stage C, NYHA class II-  continue Torsemide 20mg daily may switch to ever other day if reduced fluid intake, consider elective CardioMems in the future once Watchman is done \par -  EF 40-45% with severely reduced RV function, no ischemia on stress test and advanced CKD not candidate for LHC \par - s/p RHC - RA 15, RV 61/18, PCWP 23, CO 4.,8, CI 2.06 \par - continue hydralazine 100mg TID, consider addition of Imdur if BP remains elevated. \par - continue ASA 81mg and atorvastatin 40mg \par \par 3. pAtrial Fibrillation, flutter \par - continue ILR monitoring if tachy-didi syndrome\par -follow up with EPS/Dr. Mantilla next weeks- future elective Afib ablation  and CRT-D device?\par - monitor on low dose metoprolol succinate 25mg as prior sinus didi \par - s/p Watchman plan for ASA/clopidogrel plan for DAPT for 6 months then ASA 81mg alone. \par \par 4. HTN \par -off lisinopril due to CKD\par -continue amlodipine 10mg and monitor on hydralazine dose [id number] TID, goal SBP <140\par \par 5. CKD \par -current Cr. 2.6, following with nephrology\par -monitor with Torsemide 20mg dialy  \par \par 6. DM2 \par -consider addition of SGLT-2 inhibitor if eEFR >30  \par  \par 7. Gait imbalance- continue PT\par \par \par \par Follow up with cardiologist in [place] and return to [place] for follow up after 6 months.

## 2021-10-12 NOTE — ED ADULT NURSE NOTE - CCCP TRG CHIEF CMPLNT
syncope [place] Counseling:  I discussed with the patient the risks of imiquimod including but not limited to erythema, scaling, itching, weeping, crusting, and pain.  Patient understands that the inflammatory response to imiquimod is variable from person to person and was educated regarded proper titration schedule.  If flu-like symptoms develop, patient knows to discontinue the medication and contact us.

## 2021-11-05 ENCOUNTER — APPOINTMENT (OUTPATIENT)
Dept: ELECTROPHYSIOLOGY | Facility: CLINIC | Age: 81
End: 2021-11-05
Payer: MEDICARE

## 2021-11-05 ENCOUNTER — NON-APPOINTMENT (OUTPATIENT)
Age: 81
End: 2021-11-05

## 2021-11-05 PROCEDURE — G2066: CPT | Mod: NC

## 2021-11-05 PROCEDURE — 93298 REM INTERROG DEV EVAL SCRMS: CPT | Mod: NC

## 2021-12-10 ENCOUNTER — APPOINTMENT (OUTPATIENT)
Dept: ELECTROPHYSIOLOGY | Facility: CLINIC | Age: 81
End: 2021-12-10
Payer: MEDICARE

## 2021-12-10 ENCOUNTER — NON-APPOINTMENT (OUTPATIENT)
Age: 81
End: 2021-12-10

## 2021-12-10 PROCEDURE — G2066: CPT

## 2021-12-10 PROCEDURE — 93298 REM INTERROG DEV EVAL SCRMS: CPT

## 2021-12-10 NOTE — INPATIENT CERTIFICATION FOR MEDICARE PATIENTS - NS ICMP TWO DAYS INPATIENT
No
PROVIDER CONTACT ASSESSMENT NOTE---  Electronically signed by Estephania Garg PA-C   DD: 12/10/21       Estephania Garg PA-C  12/10/21 150

## 2022-01-01 ENCOUNTER — APPOINTMENT (OUTPATIENT)
Dept: ELECTROPHYSIOLOGY | Facility: CLINIC | Age: 82
End: 2022-01-01
Payer: MEDICARE

## 2022-01-01 ENCOUNTER — APPOINTMENT (OUTPATIENT)
Dept: CARDIOLOGY | Facility: CLINIC | Age: 82
End: 2022-01-01

## 2022-01-01 ENCOUNTER — TRANSCRIPTION ENCOUNTER (OUTPATIENT)
Age: 82
End: 2022-01-01

## 2022-01-01 ENCOUNTER — NON-APPOINTMENT (OUTPATIENT)
Age: 82
End: 2022-01-01

## 2022-01-01 ENCOUNTER — OUTPATIENT (OUTPATIENT)
Dept: OUTPATIENT SERVICES | Facility: HOSPITAL | Age: 82
LOS: 1 days | Discharge: HOME | End: 2022-01-01

## 2022-01-01 ENCOUNTER — RX RENEWAL (OUTPATIENT)
Age: 82
End: 2022-01-01

## 2022-01-01 ENCOUNTER — APPOINTMENT (OUTPATIENT)
Dept: CARDIOLOGY | Facility: CLINIC | Age: 82
End: 2022-01-01
Payer: MEDICARE

## 2022-01-01 ENCOUNTER — APPOINTMENT (OUTPATIENT)
Age: 82
End: 2022-01-01

## 2022-01-01 ENCOUNTER — RESULT REVIEW (OUTPATIENT)
Age: 82
End: 2022-01-01

## 2022-01-01 ENCOUNTER — APPOINTMENT (OUTPATIENT)
Dept: NEUROLOGY | Facility: CLINIC | Age: 82
End: 2022-01-01

## 2022-01-01 ENCOUNTER — APPOINTMENT (OUTPATIENT)
Dept: ELECTROPHYSIOLOGY | Facility: CLINIC | Age: 82
End: 2022-01-01

## 2022-01-01 ENCOUNTER — APPOINTMENT (OUTPATIENT)
Dept: NEPHROLOGY | Facility: CLINIC | Age: 82
End: 2022-01-01
Payer: MEDICARE

## 2022-01-01 ENCOUNTER — INPATIENT (INPATIENT)
Facility: HOSPITAL | Age: 82
LOS: 6 days | Discharge: NURSING, REHAB OR LONG-TERM CARE | DRG: 386 | End: 2022-10-13
Attending: INTERNAL MEDICINE | Admitting: HOSPITALIST
Payer: MEDICARE

## 2022-01-01 ENCOUNTER — APPOINTMENT (OUTPATIENT)
Dept: NEUROLOGY | Facility: CLINIC | Age: 82
End: 2022-01-01
Payer: MEDICARE

## 2022-01-01 ENCOUNTER — APPOINTMENT (RX ONLY)
Dept: URBAN - METROPOLITAN AREA CLINIC 148 | Facility: CLINIC | Age: 82
Setting detail: DERMATOLOGY
End: 2022-01-01

## 2022-01-01 ENCOUNTER — LABORATORY RESULT (OUTPATIENT)
Age: 82
End: 2022-01-01

## 2022-01-01 ENCOUNTER — APPOINTMENT (OUTPATIENT)
Dept: FAMILY MEDICINE | Facility: CLINIC | Age: 82
End: 2022-01-01
Payer: MEDICARE

## 2022-01-01 ENCOUNTER — APPOINTMENT (OUTPATIENT)
Dept: DERMATOLOGY | Facility: CLINIC | Age: 82
End: 2022-01-01

## 2022-01-01 ENCOUNTER — APPOINTMENT (OUTPATIENT)
Dept: DERMATOLOGY | Facility: CLINIC | Age: 82
End: 2022-01-01
Payer: MEDICARE

## 2022-01-01 ENCOUNTER — APPOINTMENT (OUTPATIENT)
Dept: NEPHROLOGY | Facility: CLINIC | Age: 82
End: 2022-01-01

## 2022-01-01 ENCOUNTER — EMERGENCY (EMERGENCY)
Facility: HOSPITAL | Age: 82
LOS: 1 days | Discharge: HOME | End: 2022-01-01
Attending: STUDENT IN AN ORGANIZED HEALTH CARE EDUCATION/TRAINING PROGRAM
Payer: MEDICARE

## 2022-01-01 ENCOUNTER — APPOINTMENT (OUTPATIENT)
Dept: FAMILY MEDICINE | Facility: CLINIC | Age: 82
End: 2022-01-01

## 2022-01-01 ENCOUNTER — INPATIENT (INPATIENT)
Facility: HOSPITAL | Age: 82
LOS: 15 days | Discharge: HOME | DRG: 291 | End: 2022-07-11
Attending: FAMILY MEDICINE | Admitting: STUDENT IN AN ORGANIZED HEALTH CARE EDUCATION/TRAINING PROGRAM
Payer: MEDICARE

## 2022-01-01 ENCOUNTER — APPOINTMENT (OUTPATIENT)
Dept: HEMATOLOGY ONCOLOGY | Facility: CLINIC | Age: 82
End: 2022-01-01
Payer: MEDICARE

## 2022-01-01 ENCOUNTER — APPOINTMENT (OUTPATIENT)
Dept: HEMATOLOGY ONCOLOGY | Facility: CLINIC | Age: 82
End: 2022-01-01

## 2022-01-01 VITALS
HEIGHT: 73 IN | RESPIRATION RATE: 16 BRPM | WEIGHT: 190.04 LBS | HEART RATE: 71 BPM | SYSTOLIC BLOOD PRESSURE: 105 MMHG | OXYGEN SATURATION: 94 % | DIASTOLIC BLOOD PRESSURE: 61 MMHG | TEMPERATURE: 98 F

## 2022-01-01 VITALS
OXYGEN SATURATION: 97 % | HEIGHT: 74 IN | WEIGHT: 227 LBS | SYSTOLIC BLOOD PRESSURE: 124 MMHG | HEART RATE: 89 BPM | BODY MASS INDEX: 29.13 KG/M2 | DIASTOLIC BLOOD PRESSURE: 78 MMHG

## 2022-01-01 VITALS
OXYGEN SATURATION: 95 % | DIASTOLIC BLOOD PRESSURE: 102 MMHG | HEIGHT: 73 IN | RESPIRATION RATE: 18 BRPM | SYSTOLIC BLOOD PRESSURE: 174 MMHG | HEART RATE: 83 BPM | TEMPERATURE: 98 F

## 2022-01-01 VITALS
HEIGHT: 74 IN | OXYGEN SATURATION: 95 % | WEIGHT: 240 LBS | SYSTOLIC BLOOD PRESSURE: 115 MMHG | HEART RATE: 96 BPM | BODY MASS INDEX: 30.8 KG/M2 | TEMPERATURE: 97.8 F | DIASTOLIC BLOOD PRESSURE: 70 MMHG

## 2022-01-01 VITALS
SYSTOLIC BLOOD PRESSURE: 116 MMHG | WEIGHT: 244 LBS | HEIGHT: 74 IN | OXYGEN SATURATION: 97 % | HEART RATE: 76 BPM | DIASTOLIC BLOOD PRESSURE: 80 MMHG | BODY MASS INDEX: 31.32 KG/M2

## 2022-01-01 VITALS
TEMPERATURE: 97.9 F | WEIGHT: 241 LBS | DIASTOLIC BLOOD PRESSURE: 74 MMHG | HEART RATE: 95 BPM | SYSTOLIC BLOOD PRESSURE: 119 MMHG | BODY MASS INDEX: 31.94 KG/M2 | OXYGEN SATURATION: 96 % | HEIGHT: 73 IN

## 2022-01-01 VITALS
DIASTOLIC BLOOD PRESSURE: 84 MMHG | SYSTOLIC BLOOD PRESSURE: 134 MMHG | TEMPERATURE: 98 F | RESPIRATION RATE: 18 BRPM | OXYGEN SATURATION: 98 % | HEART RATE: 89 BPM

## 2022-01-01 VITALS
RESPIRATION RATE: 18 BRPM | SYSTOLIC BLOOD PRESSURE: 144 MMHG | DIASTOLIC BLOOD PRESSURE: 96 MMHG | HEART RATE: 62 BPM | TEMPERATURE: 99 F | HEIGHT: 73 IN | OXYGEN SATURATION: 96 % | WEIGHT: 240.97 LBS

## 2022-01-01 VITALS
TEMPERATURE: 98.4 F | DIASTOLIC BLOOD PRESSURE: 65 MMHG | HEIGHT: 74 IN | WEIGHT: 243 LBS | SYSTOLIC BLOOD PRESSURE: 105 MMHG | OXYGEN SATURATION: 92 % | BODY MASS INDEX: 31.18 KG/M2 | HEART RATE: 98 BPM

## 2022-01-01 VITALS
HEIGHT: 74 IN | WEIGHT: 242 LBS | BODY MASS INDEX: 31.06 KG/M2 | OXYGEN SATURATION: 94 % | HEART RATE: 96 BPM | SYSTOLIC BLOOD PRESSURE: 111 MMHG | DIASTOLIC BLOOD PRESSURE: 75 MMHG

## 2022-01-01 VITALS
TEMPERATURE: 98 F | OXYGEN SATURATION: 96 % | SYSTOLIC BLOOD PRESSURE: 104 MMHG | HEART RATE: 102 BPM | DIASTOLIC BLOOD PRESSURE: 64 MMHG | RESPIRATION RATE: 17 BRPM

## 2022-01-01 VITALS
OXYGEN SATURATION: 96 % | SYSTOLIC BLOOD PRESSURE: 169 MMHG | RESPIRATION RATE: 18 BRPM | HEART RATE: 69 BPM | DIASTOLIC BLOOD PRESSURE: 98 MMHG | TEMPERATURE: 98 F

## 2022-01-01 DIAGNOSIS — Z95.818 PRESENCE OF OTHER CARDIAC IMPLANTS AND GRAFTS: Chronic | ICD-10-CM

## 2022-01-01 DIAGNOSIS — R26.89 OTHER ABNORMALITIES OF GAIT AND MOBILITY: ICD-10-CM

## 2022-01-01 DIAGNOSIS — N18.4 CHRONIC KIDNEY DISEASE, STAGE 4 (SEVERE): ICD-10-CM

## 2022-01-01 DIAGNOSIS — K51.00 ULCERATIVE (CHRONIC) PANCOLITIS WITHOUT COMPLICATIONS: ICD-10-CM

## 2022-01-01 DIAGNOSIS — D64.9 ANEMIA, UNSPECIFIED: ICD-10-CM

## 2022-01-01 DIAGNOSIS — Z95.1 PRESENCE OF AORTOCORONARY BYPASS GRAFT: Chronic | ICD-10-CM

## 2022-01-01 DIAGNOSIS — I10 ESSENTIAL (PRIMARY) HYPERTENSION: ICD-10-CM

## 2022-01-01 DIAGNOSIS — R60.0 LOCALIZED EDEMA: ICD-10-CM

## 2022-01-01 DIAGNOSIS — Z98.890 OTHER SPECIFIED POSTPROCEDURAL STATES: Chronic | ICD-10-CM

## 2022-01-01 DIAGNOSIS — I45.2 BIFASCICULAR BLOCK: ICD-10-CM

## 2022-01-01 DIAGNOSIS — G51.0 BELL'S PALSY: ICD-10-CM

## 2022-01-01 DIAGNOSIS — E11.9 TYPE 2 DIABETES MELLITUS W/OUT COMPLICATIONS: ICD-10-CM

## 2022-01-01 DIAGNOSIS — I25.10 ATHEROSCLEROTIC HEART DISEASE OF NATIVE CORONARY ARTERY WITHOUT ANGINA PECTORIS: ICD-10-CM

## 2022-01-01 DIAGNOSIS — F41.9 ANXIETY DISORDER, UNSPECIFIED: ICD-10-CM

## 2022-01-01 DIAGNOSIS — Z95.818 PRESENCE OF OTHER CARDIAC IMPLANTS AND GRAFTS: ICD-10-CM

## 2022-01-01 DIAGNOSIS — R29.898 OTHER SYMPTOMS AND SIGNS INVOLVING THE MUSCULOSKELETAL SYSTEM: ICD-10-CM

## 2022-01-01 DIAGNOSIS — I50.20 UNSPECIFIED SYSTOLIC (CONGESTIVE) HEART FAILURE: ICD-10-CM

## 2022-01-01 DIAGNOSIS — D50.9 IRON DEFICIENCY ANEMIA, UNSPECIFIED: ICD-10-CM

## 2022-01-01 DIAGNOSIS — R60.1 GENERALIZED EDEMA: ICD-10-CM

## 2022-01-01 DIAGNOSIS — R53.1 WEAKNESS: ICD-10-CM

## 2022-01-01 DIAGNOSIS — F32.A ANXIETY DISORDER, UNSPECIFIED: ICD-10-CM

## 2022-01-01 DIAGNOSIS — N18.9 CHRONIC KIDNEY DISEASE, UNSPECIFIED: ICD-10-CM

## 2022-01-01 DIAGNOSIS — R29.6 REPEATED FALLS: ICD-10-CM

## 2022-01-01 DIAGNOSIS — N28.9 DISORDER OF KIDNEY AND URETER, UNSPECIFIED: ICD-10-CM

## 2022-01-01 DIAGNOSIS — I48.91 UNSPECIFIED ATRIAL FIBRILLATION: ICD-10-CM

## 2022-01-01 DIAGNOSIS — I50.23 ACUTE ON CHRONIC SYSTOLIC (CONGESTIVE) HEART FAILURE: ICD-10-CM

## 2022-01-01 DIAGNOSIS — I50.9 HEART FAILURE, UNSPECIFIED: ICD-10-CM

## 2022-01-01 DIAGNOSIS — Z86.79 PERSONAL HISTORY OF OTHER DISEASES OF THE CIRCULATORY SYSTEM: ICD-10-CM

## 2022-01-01 DIAGNOSIS — I50.43 ACUTE ON CHRONIC COMBINED SYSTOLIC (CONGESTIVE) AND DIASTOLIC (CONGESTIVE) HEART FAILURE: ICD-10-CM

## 2022-01-01 LAB
25(OH)D3 SERPL-MCNC: 47.3 NG/ML
A1C WITH ESTIMATED AVERAGE GLUCOSE RESULT: 5.1 % — SIGNIFICANT CHANGE UP (ref 4–5.6)
A1C WITH ESTIMATED AVERAGE GLUCOSE RESULT: 6.7 % — HIGH (ref 4–5.6)
ALBUMIN SERPL ELPH-MCNC: 2.8 G/DL — LOW (ref 3.3–5.2)
ALBUMIN SERPL ELPH-MCNC: 2.9 G/DL — LOW (ref 3.3–5.2)
ALBUMIN SERPL ELPH-MCNC: 3.1 G/DL — LOW (ref 3.3–5.2)
ALBUMIN SERPL ELPH-MCNC: 3.7 G/DL — SIGNIFICANT CHANGE UP (ref 3.3–5.2)
ALBUMIN SERPL ELPH-MCNC: 3.8 G/DL — SIGNIFICANT CHANGE UP (ref 3.3–5.2)
ALBUMIN SERPL ELPH-MCNC: 4.3 G/DL
ALBUMIN SERPL ELPH-MCNC: 4.3 G/DL — SIGNIFICANT CHANGE UP (ref 3.3–5.2)
ALBUMIN SERPL ELPH-MCNC: 4.3 G/DL — SIGNIFICANT CHANGE UP (ref 3.3–5.2)
ALBUMIN SERPL ELPH-MCNC: 4.7 G/DL
ALP BLD-CCNC: 171 U/L
ALP SERPL-CCNC: 110 U/L — SIGNIFICANT CHANGE UP (ref 40–120)
ALP SERPL-CCNC: 121 U/L — HIGH (ref 40–120)
ALP SERPL-CCNC: 135 U/L — HIGH (ref 40–120)
ALP SERPL-CCNC: 151 U/L — HIGH (ref 40–120)
ALP SERPL-CCNC: 184 U/L — HIGH (ref 40–120)
ALP SERPL-CCNC: 190 U/L — HIGH (ref 40–120)
ALP SERPL-CCNC: 194 U/L — HIGH (ref 40–120)
ALT FLD-CCNC: 10 U/L — SIGNIFICANT CHANGE UP
ALT FLD-CCNC: 11 U/L — SIGNIFICANT CHANGE UP
ALT FLD-CCNC: 13 U/L — SIGNIFICANT CHANGE UP
ALT FLD-CCNC: 13 U/L — SIGNIFICANT CHANGE UP
ALT FLD-CCNC: 17 U/L — SIGNIFICANT CHANGE UP
ALT FLD-CCNC: 18 U/L — SIGNIFICANT CHANGE UP
ALT FLD-CCNC: 19 U/L — SIGNIFICANT CHANGE UP
ALT SERPL-CCNC: 18 U/L
ANION GAP SERPL CALC-SCNC: 11 MMOL/L — SIGNIFICANT CHANGE UP (ref 5–17)
ANION GAP SERPL CALC-SCNC: 11 MMOL/L — SIGNIFICANT CHANGE UP (ref 5–17)
ANION GAP SERPL CALC-SCNC: 12 MMOL/L — SIGNIFICANT CHANGE UP (ref 5–17)
ANION GAP SERPL CALC-SCNC: 13 MMOL/L — SIGNIFICANT CHANGE UP (ref 5–17)
ANION GAP SERPL CALC-SCNC: 14 MMOL/L
ANION GAP SERPL CALC-SCNC: 14 MMOL/L — SIGNIFICANT CHANGE UP (ref 5–17)
ANION GAP SERPL CALC-SCNC: 15 MMOL/L — SIGNIFICANT CHANGE UP (ref 5–17)
ANION GAP SERPL CALC-SCNC: 16 MMOL/L — SIGNIFICANT CHANGE UP (ref 5–17)
ANION GAP SERPL CALC-SCNC: 16 MMOL/L — SIGNIFICANT CHANGE UP (ref 5–17)
ANION GAP SERPL CALC-SCNC: 17 MMOL/L — SIGNIFICANT CHANGE UP (ref 5–17)
ANION GAP SERPL CALC-SCNC: 18 MMOL/L
ANION GAP SERPL CALC-SCNC: 18 MMOL/L — HIGH (ref 5–17)
ANION GAP SERPL CALC-SCNC: 8 MMOL/L — SIGNIFICANT CHANGE UP (ref 5–17)
ANISOCYTOSIS BLD QL: SLIGHT — SIGNIFICANT CHANGE UP
APPEARANCE UR: ABNORMAL
APPEARANCE UR: CLEAR — SIGNIFICANT CHANGE UP
APPEARANCE: CLEAR
APTT BLD: 27.1 SEC — LOW (ref 27.5–35.5)
APTT BLD: 28.7 SEC — SIGNIFICANT CHANGE UP (ref 27.5–35.5)
APTT BLD: 30.1 SEC — SIGNIFICANT CHANGE UP (ref 27.5–35.5)
AST SERPL-CCNC: 19 U/L — SIGNIFICANT CHANGE UP
AST SERPL-CCNC: 20 U/L — SIGNIFICANT CHANGE UP
AST SERPL-CCNC: 20 U/L — SIGNIFICANT CHANGE UP
AST SERPL-CCNC: 22 U/L — SIGNIFICANT CHANGE UP
AST SERPL-CCNC: 22 U/L — SIGNIFICANT CHANGE UP
AST SERPL-CCNC: 23 U/L
AST SERPL-CCNC: 26 U/L — SIGNIFICANT CHANGE UP
AST SERPL-CCNC: 30 U/L — SIGNIFICANT CHANGE UP
BACTERIA # UR AUTO: ABNORMAL
BACTERIA: NEGATIVE
BASE EXCESS BLDV CALC-SCNC: 5 MMOL/L — HIGH (ref -2–3)
BASOPHILS # BLD AUTO: 0 K/UL — SIGNIFICANT CHANGE UP (ref 0–0.2)
BASOPHILS # BLD AUTO: 0.05 K/UL — SIGNIFICANT CHANGE UP (ref 0–0.2)
BASOPHILS # BLD AUTO: 0.06 K/UL
BASOPHILS # BLD AUTO: 0.08 K/UL — SIGNIFICANT CHANGE UP (ref 0–0.2)
BASOPHILS # BLD AUTO: 0.1 K/UL — SIGNIFICANT CHANGE UP (ref 0–0.2)
BASOPHILS NFR BLD AUTO: 0 % — SIGNIFICANT CHANGE UP (ref 0–2)
BASOPHILS NFR BLD AUTO: 0.7 % — SIGNIFICANT CHANGE UP (ref 0–2)
BASOPHILS NFR BLD AUTO: 0.8 %
BASOPHILS NFR BLD AUTO: 0.8 % — SIGNIFICANT CHANGE UP (ref 0–2)
BASOPHILS NFR BLD AUTO: 1.3 % — SIGNIFICANT CHANGE UP (ref 0–2)
BILIRUB SERPL-MCNC: 0.3 MG/DL — LOW (ref 0.4–2)
BILIRUB SERPL-MCNC: 0.4 MG/DL — SIGNIFICANT CHANGE UP (ref 0.4–2)
BILIRUB SERPL-MCNC: 0.4 MG/DL — SIGNIFICANT CHANGE UP (ref 0.4–2)
BILIRUB SERPL-MCNC: 0.6 MG/DL
BILIRUB SERPL-MCNC: 0.7 MG/DL — SIGNIFICANT CHANGE UP (ref 0.4–2)
BILIRUB SERPL-MCNC: 0.9 MG/DL — SIGNIFICANT CHANGE UP (ref 0.4–2)
BILIRUB SERPL-MCNC: 1 MG/DL — SIGNIFICANT CHANGE UP (ref 0.4–2)
BILIRUB SERPL-MCNC: 1.2 MG/DL — SIGNIFICANT CHANGE UP (ref 0.4–2)
BILIRUB UR-MCNC: ABNORMAL
BILIRUB UR-MCNC: NEGATIVE — SIGNIFICANT CHANGE UP
BILIRUBIN URINE: NEGATIVE
BLD GP AB SCN SERPL QL: SIGNIFICANT CHANGE UP
BLOOD URINE: NEGATIVE
BUN SERPL-MCNC: 100.4 MG/DL — HIGH (ref 8–20)
BUN SERPL-MCNC: 25 MG/DL — HIGH (ref 8–20)
BUN SERPL-MCNC: 26.4 MG/DL — HIGH (ref 8–20)
BUN SERPL-MCNC: 28.9 MG/DL — HIGH (ref 8–20)
BUN SERPL-MCNC: 30.8 MG/DL — HIGH (ref 8–20)
BUN SERPL-MCNC: 36.6 MG/DL — HIGH (ref 8–20)
BUN SERPL-MCNC: 45.3 MG/DL — HIGH (ref 8–20)
BUN SERPL-MCNC: 46.5 MG/DL — HIGH (ref 8–20)
BUN SERPL-MCNC: 46.7 MG/DL — HIGH (ref 8–20)
BUN SERPL-MCNC: 52.3 MG/DL — HIGH (ref 8–20)
BUN SERPL-MCNC: 53.2 MG/DL — HIGH (ref 8–20)
BUN SERPL-MCNC: 53.5 MG/DL — HIGH (ref 8–20)
BUN SERPL-MCNC: 54 MG/DL
BUN SERPL-MCNC: 55.4 MG/DL — HIGH (ref 8–20)
BUN SERPL-MCNC: 58.4 MG/DL — HIGH (ref 8–20)
BUN SERPL-MCNC: 62 MG/DL
BUN SERPL-MCNC: 62.2 MG/DL — HIGH (ref 8–20)
BUN SERPL-MCNC: 66.3 MG/DL — HIGH (ref 8–20)
BUN SERPL-MCNC: 68 MG/DL — HIGH (ref 8–20)
BUN SERPL-MCNC: 70.1 MG/DL — HIGH (ref 8–20)
BUN SERPL-MCNC: 73.1 MG/DL — HIGH (ref 8–20)
BUN SERPL-MCNC: 73.9 MG/DL — HIGH (ref 8–20)
BUN SERPL-MCNC: 74.8 MG/DL — HIGH (ref 8–20)
BUN SERPL-MCNC: 79.9 MG/DL — HIGH (ref 8–20)
BUN SERPL-MCNC: 84.4 MG/DL — HIGH (ref 8–20)
BUN SERPL-MCNC: 87.6 MG/DL — HIGH (ref 8–20)
BUN SERPL-MCNC: 91.7 MG/DL — HIGH (ref 8–20)
BUN SERPL-MCNC: 96.7 MG/DL — HIGH (ref 8–20)
C DIFF BY PCR RESULT: SIGNIFICANT CHANGE UP
C DIFF TOX GENS STL QL NAA+PROBE: SIGNIFICANT CHANGE UP
CA-I SERPL-SCNC: 0.92 MMOL/L — LOW (ref 1.15–1.33)
CALCIUM SERPL-MCNC: 7.7 MG/DL — LOW (ref 8.4–10.5)
CALCIUM SERPL-MCNC: 7.7 MG/DL — LOW (ref 8.4–10.5)
CALCIUM SERPL-MCNC: 7.8 MG/DL — LOW (ref 8.4–10.5)
CALCIUM SERPL-MCNC: 8 MG/DL — LOW (ref 8.4–10.5)
CALCIUM SERPL-MCNC: 8.1 MG/DL — LOW (ref 8.4–10.5)
CALCIUM SERPL-MCNC: 8.3 MG/DL — LOW (ref 8.4–10.5)
CALCIUM SERPL-MCNC: 8.3 MG/DL — LOW (ref 8.4–10.5)
CALCIUM SERPL-MCNC: 8.4 MG/DL — LOW (ref 8.6–10.2)
CALCIUM SERPL-MCNC: 8.5 MG/DL — LOW (ref 8.6–10.2)
CALCIUM SERPL-MCNC: 8.5 MG/DL — SIGNIFICANT CHANGE UP (ref 8.4–10.5)
CALCIUM SERPL-MCNC: 8.5 MG/DL — SIGNIFICANT CHANGE UP (ref 8.4–10.5)
CALCIUM SERPL-MCNC: 8.6 MG/DL — SIGNIFICANT CHANGE UP (ref 8.6–10.2)
CALCIUM SERPL-MCNC: 8.6 MG/DL — SIGNIFICANT CHANGE UP (ref 8.6–10.2)
CALCIUM SERPL-MCNC: 8.7 MG/DL — SIGNIFICANT CHANGE UP (ref 8.6–10.2)
CALCIUM SERPL-MCNC: 8.8 MG/DL — SIGNIFICANT CHANGE UP (ref 8.6–10.2)
CALCIUM SERPL-MCNC: 8.9 MG/DL — SIGNIFICANT CHANGE UP (ref 8.6–10.2)
CALCIUM SERPL-MCNC: 9 MG/DL — SIGNIFICANT CHANGE UP (ref 8.6–10.2)
CALCIUM SERPL-MCNC: 9.1 MG/DL — SIGNIFICANT CHANGE UP (ref 8.6–10.2)
CALCIUM SERPL-MCNC: 9.3 MG/DL
CALCIUM SERPL-MCNC: 9.3 MG/DL — SIGNIFICANT CHANGE UP (ref 8.6–10.2)
CALCIUM SERPL-MCNC: 9.5 MG/DL
CALCIUM SERPL-MCNC: 9.5 MG/DL
CHLORIDE BLDV-SCNC: 101 MMOL/L — SIGNIFICANT CHANGE UP (ref 98–107)
CHLORIDE SERPL-SCNC: 100 MMOL/L — SIGNIFICANT CHANGE UP (ref 98–107)
CHLORIDE SERPL-SCNC: 101 MMOL/L — SIGNIFICANT CHANGE UP (ref 98–107)
CHLORIDE SERPL-SCNC: 102 MMOL/L
CHLORIDE SERPL-SCNC: 102 MMOL/L — SIGNIFICANT CHANGE UP (ref 98–107)
CHLORIDE SERPL-SCNC: 103 MMOL/L — SIGNIFICANT CHANGE UP (ref 98–107)
CHLORIDE SERPL-SCNC: 105 MMOL/L
CHLORIDE SERPL-SCNC: 105 MMOL/L — SIGNIFICANT CHANGE UP (ref 98–107)
CHLORIDE SERPL-SCNC: 106 MMOL/L — SIGNIFICANT CHANGE UP (ref 98–107)
CHLORIDE SERPL-SCNC: 107 MMOL/L — SIGNIFICANT CHANGE UP (ref 98–107)
CHLORIDE SERPL-SCNC: 107 MMOL/L — SIGNIFICANT CHANGE UP (ref 98–107)
CHLORIDE SERPL-SCNC: 108 MMOL/L — HIGH (ref 98–107)
CHLORIDE SERPL-SCNC: 92 MMOL/L — LOW (ref 98–107)
CHLORIDE SERPL-SCNC: 93 MMOL/L — LOW (ref 98–107)
CHLORIDE SERPL-SCNC: 93 MMOL/L — LOW (ref 98–107)
CHLORIDE SERPL-SCNC: 94 MMOL/L — LOW (ref 98–107)
CHLORIDE SERPL-SCNC: 95 MMOL/L — LOW (ref 98–107)
CHLORIDE SERPL-SCNC: 95 MMOL/L — LOW (ref 98–107)
CHLORIDE SERPL-SCNC: 97 MMOL/L — LOW (ref 98–107)
CHLORIDE SERPL-SCNC: 98 MMOL/L — SIGNIFICANT CHANGE UP (ref 98–107)
CHLORIDE SERPL-SCNC: 98 MMOL/L — SIGNIFICANT CHANGE UP (ref 98–107)
CHLORIDE SERPL-SCNC: 99 MMOL/L — SIGNIFICANT CHANGE UP (ref 98–107)
CHOLEST SERPL-MCNC: 72 MG/DL
CK SERPL-CCNC: 42 U/L — SIGNIFICANT CHANGE UP (ref 30–200)
CK SERPL-CCNC: 90 U/L — SIGNIFICANT CHANGE UP (ref 30–200)
CK SERPL-CCNC: 92 U/L — SIGNIFICANT CHANGE UP (ref 30–200)
CO2 SERPL-SCNC: 19 MMOL/L — LOW (ref 22–29)
CO2 SERPL-SCNC: 20 MMOL/L
CO2 SERPL-SCNC: 21 MMOL/L
CO2 SERPL-SCNC: 21 MMOL/L — LOW (ref 22–29)
CO2 SERPL-SCNC: 23 MMOL/L — SIGNIFICANT CHANGE UP (ref 22–29)
CO2 SERPL-SCNC: 24 MMOL/L — SIGNIFICANT CHANGE UP (ref 22–29)
CO2 SERPL-SCNC: 25 MMOL/L — SIGNIFICANT CHANGE UP (ref 22–29)
CO2 SERPL-SCNC: 26 MMOL/L — SIGNIFICANT CHANGE UP (ref 22–29)
CO2 SERPL-SCNC: 27 MMOL/L — SIGNIFICANT CHANGE UP (ref 22–29)
CO2 SERPL-SCNC: 28 MMOL/L — SIGNIFICANT CHANGE UP (ref 22–29)
CO2 SERPL-SCNC: 29 MMOL/L — SIGNIFICANT CHANGE UP (ref 22–29)
CO2 SERPL-SCNC: 31 MMOL/L — HIGH (ref 22–29)
CO2 SERPL-SCNC: 31 MMOL/L — HIGH (ref 22–29)
COD CRY URNS QL: ABNORMAL
COLOR SPEC: ABNORMAL
COLOR SPEC: YELLOW — SIGNIFICANT CHANGE UP
COLOR: NORMAL
CREAT ?TM UR-MCNC: 29 MG/DL — SIGNIFICANT CHANGE UP
CREAT SERPL-MCNC: 1.7 MG/DL — HIGH (ref 0.5–1.3)
CREAT SERPL-MCNC: 1.77 MG/DL — HIGH (ref 0.5–1.3)
CREAT SERPL-MCNC: 1.87 MG/DL — HIGH (ref 0.5–1.3)
CREAT SERPL-MCNC: 1.89 MG/DL — HIGH (ref 0.5–1.3)
CREAT SERPL-MCNC: 2.11 MG/DL — HIGH (ref 0.5–1.3)
CREAT SERPL-MCNC: 2.46 MG/DL — HIGH (ref 0.5–1.3)
CREAT SERPL-MCNC: 2.49 MG/DL — HIGH (ref 0.5–1.3)
CREAT SERPL-MCNC: 2.52 MG/DL — HIGH (ref 0.5–1.3)
CREAT SERPL-MCNC: 2.58 MG/DL — HIGH (ref 0.5–1.3)
CREAT SERPL-MCNC: 2.69 MG/DL — HIGH (ref 0.5–1.3)
CREAT SERPL-MCNC: 2.7 MG/DL — HIGH (ref 0.5–1.3)
CREAT SERPL-MCNC: 2.8 MG/DL
CREAT SERPL-MCNC: 2.81 MG/DL — HIGH (ref 0.5–1.3)
CREAT SERPL-MCNC: 2.83 MG/DL — HIGH (ref 0.5–1.3)
CREAT SERPL-MCNC: 2.91 MG/DL — HIGH (ref 0.5–1.3)
CREAT SERPL-MCNC: 2.92 MG/DL — HIGH (ref 0.5–1.3)
CREAT SERPL-MCNC: 2.93 MG/DL — HIGH (ref 0.5–1.3)
CREAT SERPL-MCNC: 2.93 MG/DL — HIGH (ref 0.5–1.3)
CREAT SERPL-MCNC: 2.95 MG/DL — HIGH (ref 0.5–1.3)
CREAT SERPL-MCNC: 2.99 MG/DL — HIGH (ref 0.5–1.3)
CREAT SERPL-MCNC: 3 MG/DL — HIGH (ref 0.5–1.3)
CREAT SERPL-MCNC: 3.01 MG/DL
CREAT SERPL-MCNC: 3.02 MG/DL — HIGH (ref 0.5–1.3)
CREAT SERPL-MCNC: 3.12 MG/DL — HIGH (ref 0.5–1.3)
CREAT SERPL-MCNC: 3.19 MG/DL — HIGH (ref 0.5–1.3)
CREAT SERPL-MCNC: 3.47 MG/DL — HIGH (ref 0.5–1.3)
CREAT SERPL-MCNC: 4.08 MG/DL — HIGH (ref 0.5–1.3)
CREAT SERPL-MCNC: 4.22 MG/DL — HIGH (ref 0.5–1.3)
CREAT SPEC-SCNC: 71 MG/DL
CREAT/PROT UR: 0.7 RATIO
CULTURE RESULTS: SIGNIFICANT CHANGE UP
DIFF PNL FLD: ABNORMAL
DIFF PNL FLD: NEGATIVE — SIGNIFICANT CHANGE UP
DIGOXIN SERPL-MCNC: 1.8 NG/ML — SIGNIFICANT CHANGE UP (ref 0.8–2)
DIGOXIN SERPL-MCNC: 2.2 NG/ML — HIGH (ref 0.8–2)
EGFR: 14 ML/MIN/1.73M2 — LOW
EGFR: 14 ML/MIN/1.73M2 — LOW
EGFR: 17 ML/MIN/1.73M2 — LOW
EGFR: 19 ML/MIN/1.73M2 — LOW
EGFR: 20 ML/MIN/1.73M2
EGFR: 20 ML/MIN/1.73M2 — LOW
EGFR: 21 ML/MIN/1.73M2 — LOW
EGFR: 22 ML/MIN/1.73M2
EGFR: 22 ML/MIN/1.73M2 — LOW
EGFR: 22 ML/MIN/1.73M2 — LOW
EGFR: 23 ML/MIN/1.73M2 — LOW
EGFR: 23 ML/MIN/1.73M2 — LOW
EGFR: 25 ML/MIN/1.73M2 — LOW
EGFR: 25 ML/MIN/1.73M2 — LOW
EGFR: 26 ML/MIN/1.73M2 — LOW
EGFR: 26 ML/MIN/1.73M2 — LOW
EGFR: 31 ML/MIN/1.73M2 — LOW
EGFR: 36 ML/MIN/1.73M2 — LOW
EGFR: 36 ML/MIN/1.73M2 — LOW
EGFR: 39 ML/MIN/1.73M2 — LOW
EGFR: 40 ML/MIN/1.73M2 — LOW
ELLIPTOCYTES BLD QL SMEAR: SLIGHT — SIGNIFICANT CHANGE UP
EOSINOPHIL # BLD AUTO: 0.09 K/UL
EOSINOPHIL # BLD AUTO: 0.15 K/UL — SIGNIFICANT CHANGE UP (ref 0–0.5)
EOSINOPHIL # BLD AUTO: 0.2 K/UL — SIGNIFICANT CHANGE UP (ref 0–0.5)
EOSINOPHIL # BLD AUTO: 0.21 K/UL — SIGNIFICANT CHANGE UP (ref 0–0.5)
EOSINOPHIL # BLD AUTO: 0.26 K/UL — SIGNIFICANT CHANGE UP (ref 0–0.5)
EOSINOPHIL # BLD AUTO: 0.29 K/UL — SIGNIFICANT CHANGE UP (ref 0–0.5)
EOSINOPHIL # BLD AUTO: 0.35 K/UL — SIGNIFICANT CHANGE UP (ref 0–0.5)
EOSINOPHIL NFR BLD AUTO: 1.2 %
EOSINOPHIL NFR BLD AUTO: 1.8 % — SIGNIFICANT CHANGE UP (ref 0–6)
EOSINOPHIL NFR BLD AUTO: 2.1 % — SIGNIFICANT CHANGE UP (ref 0–6)
EOSINOPHIL NFR BLD AUTO: 2.2 % — SIGNIFICANT CHANGE UP (ref 0–6)
EOSINOPHIL NFR BLD AUTO: 3.8 % — SIGNIFICANT CHANGE UP (ref 0–6)
EOSINOPHIL NFR BLD AUTO: 4.1 % — SIGNIFICANT CHANGE UP (ref 0–6)
EOSINOPHIL NFR BLD AUTO: 4.7 % — SIGNIFICANT CHANGE UP (ref 0–6)
EPI CELLS # UR: SIGNIFICANT CHANGE UP
ESTIMATED AVERAGE GLUCOSE: 100 MG/DL — SIGNIFICANT CHANGE UP (ref 68–114)
ESTIMATED AVERAGE GLUCOSE: 146 MG/DL — HIGH (ref 68–114)
ESTIMATED AVERAGE GLUCOSE: 194 MG/DL
FERRITIN SERPL-MCNC: 609 NG/ML — HIGH (ref 30–400)
FERRITIN SERPL-MCNC: 75 NG/ML
FLUAV AG NPH QL: SIGNIFICANT CHANGE UP
FLUBV AG NPH QL: SIGNIFICANT CHANGE UP
FOLATE SERPL-MCNC: >20 NG/ML
GAS PNL BLDV: 136 MMOL/L — SIGNIFICANT CHANGE UP (ref 136–145)
GAS PNL BLDV: SIGNIFICANT CHANGE UP
GI PCR PANEL: SIGNIFICANT CHANGE UP
GLUCOSE BLDC GLUCOMTR-MCNC: 109 MG/DL — HIGH (ref 70–99)
GLUCOSE BLDC GLUCOMTR-MCNC: 127 MG/DL — HIGH (ref 70–99)
GLUCOSE BLDC GLUCOMTR-MCNC: 127 MG/DL — HIGH (ref 70–99)
GLUCOSE BLDC GLUCOMTR-MCNC: 129 MG/DL — HIGH (ref 70–99)
GLUCOSE BLDC GLUCOMTR-MCNC: 129 MG/DL — HIGH (ref 70–99)
GLUCOSE BLDC GLUCOMTR-MCNC: 130 MG/DL — HIGH (ref 70–99)
GLUCOSE BLDC GLUCOMTR-MCNC: 130 MG/DL — HIGH (ref 70–99)
GLUCOSE BLDC GLUCOMTR-MCNC: 131 MG/DL — HIGH (ref 70–99)
GLUCOSE BLDC GLUCOMTR-MCNC: 132 MG/DL — HIGH (ref 70–99)
GLUCOSE BLDC GLUCOMTR-MCNC: 133 MG/DL — HIGH (ref 70–99)
GLUCOSE BLDC GLUCOMTR-MCNC: 133 MG/DL — HIGH (ref 70–99)
GLUCOSE BLDC GLUCOMTR-MCNC: 134 MG/DL — HIGH (ref 70–99)
GLUCOSE BLDC GLUCOMTR-MCNC: 141 MG/DL — HIGH (ref 70–99)
GLUCOSE BLDC GLUCOMTR-MCNC: 142 MG/DL — HIGH (ref 70–99)
GLUCOSE BLDC GLUCOMTR-MCNC: 143 MG/DL — HIGH (ref 70–99)
GLUCOSE BLDC GLUCOMTR-MCNC: 144 MG/DL — HIGH (ref 70–99)
GLUCOSE BLDC GLUCOMTR-MCNC: 144 MG/DL — HIGH (ref 70–99)
GLUCOSE BLDC GLUCOMTR-MCNC: 145 MG/DL — HIGH (ref 70–99)
GLUCOSE BLDC GLUCOMTR-MCNC: 146 MG/DL — HIGH (ref 70–99)
GLUCOSE BLDC GLUCOMTR-MCNC: 156 MG/DL — HIGH (ref 70–99)
GLUCOSE BLDC GLUCOMTR-MCNC: 165 MG/DL — HIGH (ref 70–99)
GLUCOSE BLDC GLUCOMTR-MCNC: 166 MG/DL — HIGH (ref 70–99)
GLUCOSE BLDC GLUCOMTR-MCNC: 168 MG/DL — HIGH (ref 70–99)
GLUCOSE BLDC GLUCOMTR-MCNC: 168 MG/DL — HIGH (ref 70–99)
GLUCOSE BLDC GLUCOMTR-MCNC: 170 MG/DL — HIGH (ref 70–99)
GLUCOSE BLDC GLUCOMTR-MCNC: 171 MG/DL — HIGH (ref 70–99)
GLUCOSE BLDC GLUCOMTR-MCNC: 173 MG/DL — HIGH (ref 70–99)
GLUCOSE BLDC GLUCOMTR-MCNC: 174 MG/DL — HIGH (ref 70–99)
GLUCOSE BLDC GLUCOMTR-MCNC: 180 MG/DL — HIGH (ref 70–99)
GLUCOSE BLDC GLUCOMTR-MCNC: 181 MG/DL — HIGH (ref 70–99)
GLUCOSE BLDC GLUCOMTR-MCNC: 182 MG/DL — HIGH (ref 70–99)
GLUCOSE BLDC GLUCOMTR-MCNC: 184 MG/DL — HIGH (ref 70–99)
GLUCOSE BLDC GLUCOMTR-MCNC: 186 MG/DL — HIGH (ref 70–99)
GLUCOSE BLDC GLUCOMTR-MCNC: 186 MG/DL — HIGH (ref 70–99)
GLUCOSE BLDC GLUCOMTR-MCNC: 187 MG/DL — HIGH (ref 70–99)
GLUCOSE BLDC GLUCOMTR-MCNC: 187 MG/DL — HIGH (ref 70–99)
GLUCOSE BLDC GLUCOMTR-MCNC: 188 MG/DL — HIGH (ref 70–99)
GLUCOSE BLDC GLUCOMTR-MCNC: 190 MG/DL — HIGH (ref 70–99)
GLUCOSE BLDC GLUCOMTR-MCNC: 192 MG/DL — HIGH (ref 70–99)
GLUCOSE BLDC GLUCOMTR-MCNC: 194 MG/DL — HIGH (ref 70–99)
GLUCOSE BLDC GLUCOMTR-MCNC: 199 MG/DL — HIGH (ref 70–99)
GLUCOSE BLDC GLUCOMTR-MCNC: 200 MG/DL — HIGH (ref 70–99)
GLUCOSE BLDC GLUCOMTR-MCNC: 204 MG/DL — HIGH (ref 70–99)
GLUCOSE BLDC GLUCOMTR-MCNC: 204 MG/DL — HIGH (ref 70–99)
GLUCOSE BLDC GLUCOMTR-MCNC: 210 MG/DL — HIGH (ref 70–99)
GLUCOSE BLDC GLUCOMTR-MCNC: 211 MG/DL — HIGH (ref 70–99)
GLUCOSE BLDC GLUCOMTR-MCNC: 211 MG/DL — HIGH (ref 70–99)
GLUCOSE BLDC GLUCOMTR-MCNC: 212 MG/DL — HIGH (ref 70–99)
GLUCOSE BLDC GLUCOMTR-MCNC: 212 MG/DL — HIGH (ref 70–99)
GLUCOSE BLDC GLUCOMTR-MCNC: 215 MG/DL — HIGH (ref 70–99)
GLUCOSE BLDC GLUCOMTR-MCNC: 216 MG/DL — HIGH (ref 70–99)
GLUCOSE BLDC GLUCOMTR-MCNC: 221 MG/DL — HIGH (ref 70–99)
GLUCOSE BLDC GLUCOMTR-MCNC: 223 MG/DL — HIGH (ref 70–99)
GLUCOSE BLDC GLUCOMTR-MCNC: 226 MG/DL — HIGH (ref 70–99)
GLUCOSE BLDC GLUCOMTR-MCNC: 227 MG/DL — HIGH (ref 70–99)
GLUCOSE BLDC GLUCOMTR-MCNC: 227 MG/DL — HIGH (ref 70–99)
GLUCOSE BLDC GLUCOMTR-MCNC: 228 MG/DL — HIGH (ref 70–99)
GLUCOSE BLDC GLUCOMTR-MCNC: 229 MG/DL — HIGH (ref 70–99)
GLUCOSE BLDC GLUCOMTR-MCNC: 231 MG/DL — HIGH (ref 70–99)
GLUCOSE BLDC GLUCOMTR-MCNC: 233 MG/DL — HIGH (ref 70–99)
GLUCOSE BLDC GLUCOMTR-MCNC: 236 MG/DL — HIGH (ref 70–99)
GLUCOSE BLDC GLUCOMTR-MCNC: 237 MG/DL — HIGH (ref 70–99)
GLUCOSE BLDC GLUCOMTR-MCNC: 237 MG/DL — HIGH (ref 70–99)
GLUCOSE BLDC GLUCOMTR-MCNC: 238 MG/DL — HIGH (ref 70–99)
GLUCOSE BLDC GLUCOMTR-MCNC: 239 MG/DL — HIGH (ref 70–99)
GLUCOSE BLDC GLUCOMTR-MCNC: 242 MG/DL — HIGH (ref 70–99)
GLUCOSE BLDC GLUCOMTR-MCNC: 243 MG/DL — HIGH (ref 70–99)
GLUCOSE BLDC GLUCOMTR-MCNC: 244 MG/DL — HIGH (ref 70–99)
GLUCOSE BLDC GLUCOMTR-MCNC: 245 MG/DL — HIGH (ref 70–99)
GLUCOSE BLDC GLUCOMTR-MCNC: 248 MG/DL — HIGH (ref 70–99)
GLUCOSE BLDC GLUCOMTR-MCNC: 249 MG/DL — HIGH (ref 70–99)
GLUCOSE BLDC GLUCOMTR-MCNC: 256 MG/DL — HIGH (ref 70–99)
GLUCOSE BLDC GLUCOMTR-MCNC: 260 MG/DL — HIGH (ref 70–99)
GLUCOSE BLDC GLUCOMTR-MCNC: 269 MG/DL — HIGH (ref 70–99)
GLUCOSE BLDC GLUCOMTR-MCNC: 269 MG/DL — HIGH (ref 70–99)
GLUCOSE BLDC GLUCOMTR-MCNC: 277 MG/DL — HIGH (ref 70–99)
GLUCOSE BLDC GLUCOMTR-MCNC: 278 MG/DL — HIGH (ref 70–99)
GLUCOSE BLDC GLUCOMTR-MCNC: 285 MG/DL — HIGH (ref 70–99)
GLUCOSE BLDC GLUCOMTR-MCNC: 295 MG/DL — HIGH (ref 70–99)
GLUCOSE BLDC GLUCOMTR-MCNC: 310 MG/DL — HIGH (ref 70–99)
GLUCOSE BLDC GLUCOMTR-MCNC: 313 MG/DL — HIGH (ref 70–99)
GLUCOSE BLDC GLUCOMTR-MCNC: 325 MG/DL — HIGH (ref 70–99)
GLUCOSE BLDC GLUCOMTR-MCNC: 332 MG/DL — HIGH (ref 70–99)
GLUCOSE BLDC GLUCOMTR-MCNC: 45 MG/DL — CRITICAL LOW (ref 70–99)
GLUCOSE BLDC GLUCOMTR-MCNC: 46 MG/DL — CRITICAL LOW (ref 70–99)
GLUCOSE BLDC GLUCOMTR-MCNC: 74 MG/DL — SIGNIFICANT CHANGE UP (ref 70–99)
GLUCOSE BLDC GLUCOMTR-MCNC: 77 MG/DL — SIGNIFICANT CHANGE UP (ref 70–99)
GLUCOSE BLDC GLUCOMTR-MCNC: 80 MG/DL — SIGNIFICANT CHANGE UP (ref 70–99)
GLUCOSE BLDC GLUCOMTR-MCNC: 81 MG/DL — SIGNIFICANT CHANGE UP (ref 70–99)
GLUCOSE BLDC GLUCOMTR-MCNC: 87 MG/DL — SIGNIFICANT CHANGE UP (ref 70–99)
GLUCOSE BLDC GLUCOMTR-MCNC: 93 MG/DL — SIGNIFICANT CHANGE UP (ref 70–99)
GLUCOSE BLDV-MCNC: 120 MG/DL — HIGH (ref 70–99)
GLUCOSE QUALITATIVE U: NEGATIVE
GLUCOSE SERPL-MCNC: 109 MG/DL — HIGH (ref 70–99)
GLUCOSE SERPL-MCNC: 120 MG/DL — HIGH (ref 70–99)
GLUCOSE SERPL-MCNC: 122 MG/DL — HIGH (ref 70–99)
GLUCOSE SERPL-MCNC: 129 MG/DL — HIGH (ref 70–99)
GLUCOSE SERPL-MCNC: 134 MG/DL — HIGH (ref 70–99)
GLUCOSE SERPL-MCNC: 135 MG/DL — HIGH (ref 70–99)
GLUCOSE SERPL-MCNC: 143 MG/DL — HIGH (ref 70–99)
GLUCOSE SERPL-MCNC: 143 MG/DL — HIGH (ref 70–99)
GLUCOSE SERPL-MCNC: 152 MG/DL — HIGH (ref 70–99)
GLUCOSE SERPL-MCNC: 163 MG/DL — HIGH (ref 70–99)
GLUCOSE SERPL-MCNC: 177 MG/DL — HIGH (ref 70–99)
GLUCOSE SERPL-MCNC: 178 MG/DL — HIGH (ref 70–99)
GLUCOSE SERPL-MCNC: 180 MG/DL — HIGH (ref 70–99)
GLUCOSE SERPL-MCNC: 182 MG/DL — HIGH (ref 70–99)
GLUCOSE SERPL-MCNC: 190 MG/DL — HIGH (ref 70–99)
GLUCOSE SERPL-MCNC: 197 MG/DL — HIGH (ref 70–99)
GLUCOSE SERPL-MCNC: 217 MG/DL — HIGH (ref 70–99)
GLUCOSE SERPL-MCNC: 223 MG/DL
GLUCOSE SERPL-MCNC: 231 MG/DL — HIGH (ref 70–99)
GLUCOSE SERPL-MCNC: 52 MG/DL — CRITICAL LOW (ref 70–99)
GLUCOSE SERPL-MCNC: 68 MG/DL — LOW (ref 70–99)
GLUCOSE SERPL-MCNC: 75 MG/DL — SIGNIFICANT CHANGE UP (ref 70–99)
GLUCOSE SERPL-MCNC: 86 MG/DL
GLUCOSE SERPL-MCNC: 86 MG/DL — SIGNIFICANT CHANGE UP (ref 70–99)
GLUCOSE SERPL-MCNC: 89 MG/DL — SIGNIFICANT CHANGE UP (ref 70–99)
GLUCOSE SERPL-MCNC: 89 MG/DL — SIGNIFICANT CHANGE UP (ref 70–99)
GLUCOSE SERPL-MCNC: 97 MG/DL — SIGNIFICANT CHANGE UP (ref 70–99)
GLUCOSE SERPL-MCNC: 98 MG/DL — SIGNIFICANT CHANGE UP (ref 70–99)
GLUCOSE UR QL: NEGATIVE MG/DL — SIGNIFICANT CHANGE UP
GLUCOSE UR QL: NEGATIVE MG/DL — SIGNIFICANT CHANGE UP
GLUCOSE UR QL: NEGATIVE — SIGNIFICANT CHANGE UP
GLUCOSE UR QL: NEGATIVE — SIGNIFICANT CHANGE UP
HBA1C MFR BLD HPLC: 8.4 %
HCO3 BLDV-SCNC: 29 MMOL/L — SIGNIFICANT CHANGE UP (ref 22–29)
HCT VFR BLD CALC: 26.9 % — LOW (ref 39–50)
HCT VFR BLD CALC: 27 % — LOW (ref 39–50)
HCT VFR BLD CALC: 27.4 % — LOW (ref 39–50)
HCT VFR BLD CALC: 27.5 % — LOW (ref 39–50)
HCT VFR BLD CALC: 27.6 % — LOW (ref 39–50)
HCT VFR BLD CALC: 28.8 % — LOW (ref 39–50)
HCT VFR BLD CALC: 29.1 % — LOW (ref 39–50)
HCT VFR BLD CALC: 29.1 % — LOW (ref 39–50)
HCT VFR BLD CALC: 29.6 % — LOW (ref 39–50)
HCT VFR BLD CALC: 30 % — LOW (ref 39–50)
HCT VFR BLD CALC: 30.2 % — LOW (ref 39–50)
HCT VFR BLD CALC: 30.3 %
HCT VFR BLD CALC: 30.3 % — LOW (ref 39–50)
HCT VFR BLD CALC: 30.4 % — LOW (ref 39–50)
HCT VFR BLD CALC: 31.4 % — LOW (ref 39–50)
HCT VFR BLD CALC: 32.1 % — LOW (ref 39–50)
HCT VFR BLD CALC: 32.4 % — LOW (ref 39–50)
HCT VFR BLD CALC: 33.2 % — LOW (ref 39–50)
HCT VFR BLD CALC: 37.1 % — LOW (ref 39–50)
HCT VFR BLDA CALC: 29 % — SIGNIFICANT CHANGE UP
HDLC SERPL-MCNC: 28 MG/DL
HGB BLD CALC-MCNC: 9.8 G/DL — LOW (ref 12.6–17.4)
HGB BLD-MCNC: 11.5 G/DL — LOW (ref 13–17)
HGB BLD-MCNC: 8.2 G/DL — LOW (ref 13–17)
HGB BLD-MCNC: 8.2 G/DL — LOW (ref 13–17)
HGB BLD-MCNC: 8.4 G/DL — LOW (ref 13–17)
HGB BLD-MCNC: 8.4 G/DL — LOW (ref 13–17)
HGB BLD-MCNC: 8.5 G/DL — LOW (ref 13–17)
HGB BLD-MCNC: 8.6 G/DL
HGB BLD-MCNC: 8.7 G/DL — LOW (ref 13–17)
HGB BLD-MCNC: 8.7 G/DL — LOW (ref 13–17)
HGB BLD-MCNC: 8.8 G/DL — LOW (ref 13–17)
HGB BLD-MCNC: 8.9 G/DL — LOW (ref 13–17)
HGB BLD-MCNC: 9 G/DL — LOW (ref 13–17)
HGB BLD-MCNC: 9 G/DL — LOW (ref 13–17)
HGB BLD-MCNC: 9.1 G/DL — LOW (ref 13–17)
HGB BLD-MCNC: 9.3 G/DL — LOW (ref 13–17)
HGB BLD-MCNC: 9.5 G/DL — LOW (ref 13–17)
HGB BLD-MCNC: 9.6 G/DL — LOW (ref 13–17)
HGB BLD-MCNC: 9.7 G/DL — LOW (ref 13–17)
HGB BLD-MCNC: 9.8 G/DL — LOW (ref 13–17)
HYALINE CASTS # UR AUTO: ABNORMAL /LPF
HYALINE CASTS: 1 /LPF
HYPOCHROMIA BLD QL: SLIGHT — SIGNIFICANT CHANGE UP
IMM GRANULOCYTES NFR BLD AUTO: 0.4 % — SIGNIFICANT CHANGE UP (ref 0–0.9)
IMM GRANULOCYTES NFR BLD AUTO: 0.4 % — SIGNIFICANT CHANGE UP (ref 0–1.5)
IMM GRANULOCYTES NFR BLD AUTO: 0.5 % — SIGNIFICANT CHANGE UP (ref 0–1.5)
IMM GRANULOCYTES NFR BLD AUTO: 0.7 %
IMM GRANULOCYTES NFR BLD AUTO: 1.3 % — SIGNIFICANT CHANGE UP (ref 0–1.5)
INR BLD: 1.19 RATIO — HIGH (ref 0.88–1.16)
INR BLD: 1.21 RATIO — HIGH (ref 0.88–1.16)
INR BLD: 1.24 RATIO — HIGH (ref 0.88–1.16)
IRON SATN MFR SERPL: 11 % — LOW (ref 16–55)
IRON SATN MFR SERPL: 32 UG/DL — LOW (ref 59–158)
IRON SATN MFR SERPL: 7 %
IRON SERPL-MCNC: 23 UG/DL
KETONES UR-MCNC: ABNORMAL
KETONES UR-MCNC: NEGATIVE — SIGNIFICANT CHANGE UP
KETONES URINE: NEGATIVE
LACTATE BLDV-MCNC: 0.9 MMOL/L — SIGNIFICANT CHANGE UP (ref 0.5–2)
LACTATE BLDV-MCNC: 1.1 MMOL/L — SIGNIFICANT CHANGE UP (ref 0.5–2)
LDLC SERPL CALC-MCNC: 24 MG/DL
LEUKOCYTE ESTERASE UR-ACNC: ABNORMAL
LEUKOCYTE ESTERASE UR-ACNC: NEGATIVE — SIGNIFICANT CHANGE UP
LEUKOCYTE ESTERASE URINE: NEGATIVE
LIDOCAIN IGE QN: 28 U/L — SIGNIFICANT CHANGE UP (ref 22–51)
LYMPHOCYTES # BLD AUTO: 0.48 K/UL
LYMPHOCYTES # BLD AUTO: 0.52 K/UL — LOW (ref 1–3.3)
LYMPHOCYTES # BLD AUTO: 0.56 K/UL — LOW (ref 1–3.3)
LYMPHOCYTES # BLD AUTO: 0.65 K/UL — LOW (ref 1–3.3)
LYMPHOCYTES # BLD AUTO: 0.68 K/UL — LOW (ref 1–3.3)
LYMPHOCYTES # BLD AUTO: 0.8 K/UL — LOW (ref 1–3.3)
LYMPHOCYTES # BLD AUTO: 1 K/UL — SIGNIFICANT CHANGE UP (ref 1–3.3)
LYMPHOCYTES # BLD AUTO: 10.4 % — LOW (ref 13–44)
LYMPHOCYTES # BLD AUTO: 6.9 % — LOW (ref 13–44)
LYMPHOCYTES # BLD AUTO: 7.6 % — LOW (ref 13–44)
LYMPHOCYTES # BLD AUTO: 7.9 % — LOW (ref 13–44)
LYMPHOCYTES # BLD AUTO: 8.7 % — LOW (ref 13–44)
LYMPHOCYTES # BLD AUTO: 9.6 % — LOW (ref 13–44)
LYMPHOCYTES NFR BLD AUTO: 6.3 %
MAGNESIUM SERPL-MCNC: 1.3 MG/DL — LOW (ref 1.6–2.6)
MAGNESIUM SERPL-MCNC: 1.4 MG/DL — LOW (ref 1.6–2.6)
MAGNESIUM SERPL-MCNC: 1.8 MG/DL — SIGNIFICANT CHANGE UP (ref 1.6–2.6)
MAGNESIUM SERPL-MCNC: 1.8 MG/DL — SIGNIFICANT CHANGE UP (ref 1.6–2.6)
MAGNESIUM SERPL-MCNC: 1.9 MG/DL — SIGNIFICANT CHANGE UP (ref 1.6–2.6)
MAGNESIUM SERPL-MCNC: 2 MG/DL — SIGNIFICANT CHANGE UP (ref 1.8–2.6)
MAGNESIUM SERPL-MCNC: 2 MG/DL — SIGNIFICANT CHANGE UP (ref 1.8–2.6)
MAN DIFF?: NORMAL
MANUAL SMEAR VERIFICATION: SIGNIFICANT CHANGE UP
MCHC RBC-ENTMCNC: 25.4 PG
MCHC RBC-ENTMCNC: 26.2 PG — LOW (ref 27–34)
MCHC RBC-ENTMCNC: 26.5 PG — LOW (ref 27–34)
MCHC RBC-ENTMCNC: 27.2 PG — SIGNIFICANT CHANGE UP (ref 27–34)
MCHC RBC-ENTMCNC: 27.2 PG — SIGNIFICANT CHANGE UP (ref 27–34)
MCHC RBC-ENTMCNC: 27.3 PG — SIGNIFICANT CHANGE UP (ref 27–34)
MCHC RBC-ENTMCNC: 27.4 PG — SIGNIFICANT CHANGE UP (ref 27–34)
MCHC RBC-ENTMCNC: 27.5 PG — SIGNIFICANT CHANGE UP (ref 27–34)
MCHC RBC-ENTMCNC: 27.5 PG — SIGNIFICANT CHANGE UP (ref 27–34)
MCHC RBC-ENTMCNC: 27.6 PG — SIGNIFICANT CHANGE UP (ref 27–34)
MCHC RBC-ENTMCNC: 27.9 PG — SIGNIFICANT CHANGE UP (ref 27–34)
MCHC RBC-ENTMCNC: 28.3 PG — SIGNIFICANT CHANGE UP (ref 27–34)
MCHC RBC-ENTMCNC: 28.4 GM/DL
MCHC RBC-ENTMCNC: 28.4 PG — SIGNIFICANT CHANGE UP (ref 27–34)
MCHC RBC-ENTMCNC: 29.3 PG — SIGNIFICANT CHANGE UP (ref 27–34)
MCHC RBC-ENTMCNC: 29.5 GM/DL — LOW (ref 32–36)
MCHC RBC-ENTMCNC: 29.5 PG — SIGNIFICANT CHANGE UP (ref 27–34)
MCHC RBC-ENTMCNC: 29.5 PG — SIGNIFICANT CHANGE UP (ref 27–34)
MCHC RBC-ENTMCNC: 29.6 G/DL — LOW (ref 32–36)
MCHC RBC-ENTMCNC: 29.6 PG — SIGNIFICANT CHANGE UP (ref 27–34)
MCHC RBC-ENTMCNC: 29.7 GM/DL — LOW (ref 32–36)
MCHC RBC-ENTMCNC: 29.9 GM/DL — LOW (ref 32–36)
MCHC RBC-ENTMCNC: 30 GM/DL — LOW (ref 32–36)
MCHC RBC-ENTMCNC: 30 PG — SIGNIFICANT CHANGE UP (ref 27–34)
MCHC RBC-ENTMCNC: 30.1 GM/DL — LOW (ref 32–36)
MCHC RBC-ENTMCNC: 30.1 PG — SIGNIFICANT CHANGE UP (ref 27–34)
MCHC RBC-ENTMCNC: 30.2 GM/DL — LOW (ref 32–36)
MCHC RBC-ENTMCNC: 30.2 GM/DL — LOW (ref 32–36)
MCHC RBC-ENTMCNC: 30.4 GM/DL — LOW (ref 32–36)
MCHC RBC-ENTMCNC: 30.5 GM/DL — LOW (ref 32–36)
MCHC RBC-ENTMCNC: 30.6 GM/DL — LOW (ref 32–36)
MCHC RBC-ENTMCNC: 30.8 GM/DL — LOW (ref 32–36)
MCHC RBC-ENTMCNC: 30.9 GM/DL — LOW (ref 32–36)
MCHC RBC-ENTMCNC: 31 GM/DL — LOW (ref 32–36)
MCHC RBC-ENTMCNC: 31.1 GM/DL — LOW (ref 32–36)
MCV RBC AUTO: 88.4 FL — SIGNIFICANT CHANGE UP (ref 80–100)
MCV RBC AUTO: 88.5 FL — SIGNIFICANT CHANGE UP (ref 80–100)
MCV RBC AUTO: 89.4 FL
MCV RBC AUTO: 90.3 FL — SIGNIFICANT CHANGE UP (ref 80–100)
MCV RBC AUTO: 90.6 FL — SIGNIFICANT CHANGE UP (ref 80–100)
MCV RBC AUTO: 90.9 FL — SIGNIFICANT CHANGE UP (ref 80–100)
MCV RBC AUTO: 91.3 FL — SIGNIFICANT CHANGE UP (ref 80–100)
MCV RBC AUTO: 91.5 FL — SIGNIFICANT CHANGE UP (ref 80–100)
MCV RBC AUTO: 91.7 FL — SIGNIFICANT CHANGE UP (ref 80–100)
MCV RBC AUTO: 92 FL — SIGNIFICANT CHANGE UP (ref 80–100)
MCV RBC AUTO: 93.5 FL — SIGNIFICANT CHANGE UP (ref 80–100)
MCV RBC AUTO: 94.4 FL — SIGNIFICANT CHANGE UP (ref 80–100)
MCV RBC AUTO: 97.4 FL — SIGNIFICANT CHANGE UP (ref 80–100)
MCV RBC AUTO: 98 FL — SIGNIFICANT CHANGE UP (ref 80–100)
MCV RBC AUTO: 98.4 FL — SIGNIFICANT CHANGE UP (ref 80–100)
MCV RBC AUTO: 98.4 FL — SIGNIFICANT CHANGE UP (ref 80–100)
MCV RBC AUTO: 99 FL — SIGNIFICANT CHANGE UP (ref 80–100)
MICROSCOPIC-UA: NORMAL
MONOCYTES # BLD AUTO: 0.29 K/UL — SIGNIFICANT CHANGE UP (ref 0–0.9)
MONOCYTES # BLD AUTO: 0.61 K/UL
MONOCYTES # BLD AUTO: 0.65 K/UL — SIGNIFICANT CHANGE UP (ref 0–0.9)
MONOCYTES # BLD AUTO: 0.73 K/UL — SIGNIFICANT CHANGE UP (ref 0–0.9)
MONOCYTES # BLD AUTO: 0.73 K/UL — SIGNIFICANT CHANGE UP (ref 0–0.9)
MONOCYTES # BLD AUTO: 0.8 K/UL — SIGNIFICANT CHANGE UP (ref 0–0.9)
MONOCYTES # BLD AUTO: 1 K/UL — HIGH (ref 0–0.9)
MONOCYTES NFR BLD AUTO: 10.1 % — SIGNIFICANT CHANGE UP (ref 2–14)
MONOCYTES NFR BLD AUTO: 10.3 % — SIGNIFICANT CHANGE UP (ref 2–14)
MONOCYTES NFR BLD AUTO: 3.5 % — SIGNIFICANT CHANGE UP (ref 2–14)
MONOCYTES NFR BLD AUTO: 7.9 %
MONOCYTES NFR BLD AUTO: 8.3 % — SIGNIFICANT CHANGE UP (ref 2–14)
MONOCYTES NFR BLD AUTO: 9.4 % — SIGNIFICANT CHANGE UP (ref 2–14)
MONOCYTES NFR BLD AUTO: 9.8 % — SIGNIFICANT CHANGE UP (ref 2–14)
MRSA PCR RESULT.: SIGNIFICANT CHANGE UP
NEUTROPHILS # BLD AUTO: 5.37 K/UL — SIGNIFICANT CHANGE UP (ref 1.8–7.4)
NEUTROPHILS # BLD AUTO: 5.4 K/UL — SIGNIFICANT CHANGE UP (ref 1.8–7.4)
NEUTROPHILS # BLD AUTO: 5.59 K/UL — SIGNIFICANT CHANGE UP (ref 1.8–7.4)
NEUTROPHILS # BLD AUTO: 6.39 K/UL
NEUTROPHILS # BLD AUTO: 7.05 K/UL — SIGNIFICANT CHANGE UP (ref 1.8–7.4)
NEUTROPHILS # BLD AUTO: 7.4 K/UL — SIGNIFICANT CHANGE UP (ref 1.8–7.4)
NEUTROPHILS # BLD AUTO: 7.86 K/UL — HIGH (ref 1.8–7.4)
NEUTROPHILS NFR BLD AUTO: 74.8 % — SIGNIFICANT CHANGE UP (ref 43–77)
NEUTROPHILS NFR BLD AUTO: 76.6 % — SIGNIFICANT CHANGE UP (ref 43–77)
NEUTROPHILS NFR BLD AUTO: 77.8 % — HIGH (ref 43–77)
NEUTROPHILS NFR BLD AUTO: 78.1 % — HIGH (ref 43–77)
NEUTROPHILS NFR BLD AUTO: 79.6 % — HIGH (ref 43–77)
NEUTROPHILS NFR BLD AUTO: 83.1 %
NEUTROPHILS NFR BLD AUTO: 84.2 % — HIGH (ref 43–77)
NITRITE UR-MCNC: NEGATIVE — SIGNIFICANT CHANGE UP
NITRITE UR-MCNC: NEGATIVE — SIGNIFICANT CHANGE UP
NITRITE UR-MCNC: POSITIVE
NITRITE UR-MCNC: POSITIVE
NITRITE URINE: NEGATIVE
NONHDLC SERPL-MCNC: 45 MG/DL
NT-PROBNP SERPL-MCNC: ABNORMAL PG/ML
NT-PROBNP SERPL-SCNC: HIGH PG/ML (ref 0–300)
OSMOLALITY UR: 326 MOSM/KG — SIGNIFICANT CHANGE UP (ref 300–1000)
OVALOCYTES BLD QL SMEAR: SLIGHT — SIGNIFICANT CHANGE UP
PARATHYROID HORMONE INTACT: 126 PG/ML
PCO2 BLDV: 43 MMHG — SIGNIFICANT CHANGE UP (ref 42–55)
PH BLDV: 7.44 — HIGH (ref 7.32–7.43)
PH UR: 5 — SIGNIFICANT CHANGE UP (ref 5–8)
PH UR: 6 — SIGNIFICANT CHANGE UP (ref 5–8)
PH URINE: 6
PHOSPHATE SERPL-MCNC: 3.3 MG/DL — SIGNIFICANT CHANGE UP (ref 2.4–4.7)
PHOSPHATE SERPL-MCNC: 3.4 MG/DL — SIGNIFICANT CHANGE UP (ref 2.4–4.7)
PHOSPHATE SERPL-MCNC: 3.5 MG/DL — SIGNIFICANT CHANGE UP (ref 2.4–4.7)
PHOSPHATE SERPL-MCNC: 3.6 MG/DL — SIGNIFICANT CHANGE UP (ref 2.4–4.7)
PHOSPHATE SERPL-MCNC: 3.9 MG/DL
PLAT MORPH BLD: NORMAL — SIGNIFICANT CHANGE UP
PLATELET # BLD AUTO: 139 K/UL — LOW (ref 150–400)
PLATELET # BLD AUTO: 153 K/UL — SIGNIFICANT CHANGE UP (ref 150–400)
PLATELET # BLD AUTO: 156 K/UL — SIGNIFICANT CHANGE UP (ref 150–400)
PLATELET # BLD AUTO: 165 K/UL
PLATELET # BLD AUTO: 166 K/UL — SIGNIFICANT CHANGE UP (ref 150–400)
PLATELET # BLD AUTO: 173 K/UL — SIGNIFICANT CHANGE UP (ref 150–400)
PLATELET # BLD AUTO: 175 K/UL — SIGNIFICANT CHANGE UP (ref 150–400)
PLATELET # BLD AUTO: 176 K/UL — SIGNIFICANT CHANGE UP (ref 150–400)
PLATELET # BLD AUTO: 178 K/UL — SIGNIFICANT CHANGE UP (ref 150–400)
PLATELET # BLD AUTO: 180 K/UL — SIGNIFICANT CHANGE UP (ref 150–400)
PLATELET # BLD AUTO: 182 K/UL — SIGNIFICANT CHANGE UP (ref 150–400)
PLATELET # BLD AUTO: 187 K/UL — SIGNIFICANT CHANGE UP (ref 150–400)
PLATELET # BLD AUTO: 191 K/UL — SIGNIFICANT CHANGE UP (ref 150–400)
PLATELET # BLD AUTO: 198 K/UL — SIGNIFICANT CHANGE UP (ref 150–400)
PLATELET # BLD AUTO: 205 K/UL — SIGNIFICANT CHANGE UP (ref 150–400)
PLATELET # BLD AUTO: 211 K/UL — SIGNIFICANT CHANGE UP (ref 150–400)
PLATELET # BLD AUTO: 225 K/UL — SIGNIFICANT CHANGE UP (ref 150–400)
PO2 BLDV: 130 MMHG — HIGH (ref 25–45)
POIKILOCYTOSIS BLD QL AUTO: SIGNIFICANT CHANGE UP
POLYCHROMASIA BLD QL SMEAR: SLIGHT — SIGNIFICANT CHANGE UP
POTASSIUM BLDV-SCNC: 4.9 MMOL/L — SIGNIFICANT CHANGE UP (ref 3.5–5.1)
POTASSIUM SERPL-MCNC: 3.3 MMOL/L — LOW (ref 3.5–5.3)
POTASSIUM SERPL-MCNC: 3.4 MMOL/L — LOW (ref 3.5–5.3)
POTASSIUM SERPL-MCNC: 3.5 MMOL/L — SIGNIFICANT CHANGE UP (ref 3.5–5.3)
POTASSIUM SERPL-MCNC: 3.6 MMOL/L — SIGNIFICANT CHANGE UP (ref 3.5–5.3)
POTASSIUM SERPL-MCNC: 3.7 MMOL/L — SIGNIFICANT CHANGE UP (ref 3.5–5.3)
POTASSIUM SERPL-MCNC: 3.7 MMOL/L — SIGNIFICANT CHANGE UP (ref 3.5–5.3)
POTASSIUM SERPL-MCNC: 3.8 MMOL/L — SIGNIFICANT CHANGE UP (ref 3.5–5.3)
POTASSIUM SERPL-MCNC: 3.9 MMOL/L — SIGNIFICANT CHANGE UP (ref 3.5–5.3)
POTASSIUM SERPL-MCNC: 3.9 MMOL/L — SIGNIFICANT CHANGE UP (ref 3.5–5.3)
POTASSIUM SERPL-MCNC: 4.1 MMOL/L — SIGNIFICANT CHANGE UP (ref 3.5–5.3)
POTASSIUM SERPL-MCNC: 4.2 MMOL/L — SIGNIFICANT CHANGE UP (ref 3.5–5.3)
POTASSIUM SERPL-MCNC: 4.3 MMOL/L — SIGNIFICANT CHANGE UP (ref 3.5–5.3)
POTASSIUM SERPL-MCNC: 4.5 MMOL/L — SIGNIFICANT CHANGE UP (ref 3.5–5.3)
POTASSIUM SERPL-MCNC: 4.6 MMOL/L — SIGNIFICANT CHANGE UP (ref 3.5–5.3)
POTASSIUM SERPL-MCNC: 4.7 MMOL/L — SIGNIFICANT CHANGE UP (ref 3.5–5.3)
POTASSIUM SERPL-MCNC: 4.9 MMOL/L — SIGNIFICANT CHANGE UP (ref 3.5–5.3)
POTASSIUM SERPL-SCNC: 3.3 MMOL/L — LOW (ref 3.5–5.3)
POTASSIUM SERPL-SCNC: 3.4 MMOL/L — LOW (ref 3.5–5.3)
POTASSIUM SERPL-SCNC: 3.5 MMOL/L — SIGNIFICANT CHANGE UP (ref 3.5–5.3)
POTASSIUM SERPL-SCNC: 3.6 MMOL/L — SIGNIFICANT CHANGE UP (ref 3.5–5.3)
POTASSIUM SERPL-SCNC: 3.7 MMOL/L — SIGNIFICANT CHANGE UP (ref 3.5–5.3)
POTASSIUM SERPL-SCNC: 3.7 MMOL/L — SIGNIFICANT CHANGE UP (ref 3.5–5.3)
POTASSIUM SERPL-SCNC: 3.8 MMOL/L — SIGNIFICANT CHANGE UP (ref 3.5–5.3)
POTASSIUM SERPL-SCNC: 3.9 MMOL/L — SIGNIFICANT CHANGE UP (ref 3.5–5.3)
POTASSIUM SERPL-SCNC: 3.9 MMOL/L — SIGNIFICANT CHANGE UP (ref 3.5–5.3)
POTASSIUM SERPL-SCNC: 4.1 MMOL/L — SIGNIFICANT CHANGE UP (ref 3.5–5.3)
POTASSIUM SERPL-SCNC: 4.2 MMOL/L — SIGNIFICANT CHANGE UP (ref 3.5–5.3)
POTASSIUM SERPL-SCNC: 4.3 MMOL/L — SIGNIFICANT CHANGE UP (ref 3.5–5.3)
POTASSIUM SERPL-SCNC: 4.5 MMOL/L — SIGNIFICANT CHANGE UP (ref 3.5–5.3)
POTASSIUM SERPL-SCNC: 4.6 MMOL/L — SIGNIFICANT CHANGE UP (ref 3.5–5.3)
POTASSIUM SERPL-SCNC: 4.7 MMOL/L — SIGNIFICANT CHANGE UP (ref 3.5–5.3)
POTASSIUM SERPL-SCNC: 4.9 MMOL/L
POTASSIUM SERPL-SCNC: 4.9 MMOL/L — SIGNIFICANT CHANGE UP (ref 3.5–5.3)
POTASSIUM SERPL-SCNC: 5.6 MMOL/L
POTASSIUM UR-SCNC: 21 MMOL/L — SIGNIFICANT CHANGE UP
PROT ?TM UR-MCNC: 56 MG/DL — HIGH (ref 0–12)
PROT SERPL-MCNC: 6.2 G/DL — LOW (ref 6.6–8.7)
PROT SERPL-MCNC: 6.4 G/DL — LOW (ref 6.6–8.7)
PROT SERPL-MCNC: 6.7 G/DL — SIGNIFICANT CHANGE UP (ref 6.6–8.7)
PROT SERPL-MCNC: 6.7 G/DL — SIGNIFICANT CHANGE UP (ref 6.6–8.7)
PROT SERPL-MCNC: 6.9 G/DL — SIGNIFICANT CHANGE UP (ref 6.6–8.7)
PROT SERPL-MCNC: 7 G/DL
PROT SERPL-MCNC: 7.3 G/DL — SIGNIFICANT CHANGE UP (ref 6.6–8.7)
PROT SERPL-MCNC: 7.4 G/DL — SIGNIFICANT CHANGE UP (ref 6.6–8.7)
PROT UR-MCNC: 100
PROT UR-MCNC: 15
PROT UR-MCNC: 15
PROT UR-MCNC: 47 MG/DL
PROT UR-MCNC: NEGATIVE — SIGNIFICANT CHANGE UP
PROTEIN URINE: ABNORMAL
PROTHROM AB SERPL-ACNC: 13.8 SEC — HIGH (ref 10.5–13.4)
PROTHROM AB SERPL-ACNC: 14.1 SEC — HIGH (ref 10.5–13.4)
PROTHROM AB SERPL-ACNC: 14.4 SEC — HIGH (ref 10.5–13.4)
PSA FLD-MCNC: 1.09 NG/ML — SIGNIFICANT CHANGE UP (ref 0–4)
RAPID RVP RESULT: DETECTED
RBC # BLD: 2.97 M/UL — LOW (ref 4.2–5.8)
RBC # BLD: 2.98 M/UL — LOW (ref 4.2–5.8)
RBC # BLD: 2.99 M/UL — LOW (ref 4.2–5.8)
RBC # BLD: 3 M/UL — LOW (ref 4.2–5.8)
RBC # BLD: 3.01 M/UL — LOW (ref 4.2–5.8)
RBC # BLD: 3.02 M/UL — LOW (ref 4.2–5.8)
RBC # BLD: 3.05 M/UL — LOW (ref 4.2–5.8)
RBC # BLD: 3.07 M/UL — LOW (ref 4.2–5.8)
RBC # BLD: 3.1 M/UL — LOW (ref 4.2–5.8)
RBC # BLD: 3.18 M/UL — LOW (ref 4.2–5.8)
RBC # BLD: 3.19 M/UL — LOW (ref 4.2–5.8)
RBC # BLD: 3.2 M/UL — LOW (ref 4.2–5.8)
RBC # BLD: 3.2 M/UL — LOW (ref 4.2–5.8)
RBC # BLD: 3.31 M/UL — LOW (ref 4.2–5.8)
RBC # BLD: 3.39 M/UL
RBC # BLD: 3.54 M/UL
RBC # BLD: 3.55 M/UL — LOW (ref 4.2–5.8)
RBC # BLD: 3.63 M/UL — LOW (ref 4.2–5.8)
RBC # BLD: 3.66 M/UL — LOW (ref 4.2–5.8)
RBC # BLD: 3.93 M/UL — LOW (ref 4.2–5.8)
RBC # FLD: 17.7 % — HIGH (ref 10.3–14.5)
RBC # FLD: 17.8 % — HIGH (ref 10.3–14.5)
RBC # FLD: 17.9 % — HIGH (ref 10.3–14.5)
RBC # FLD: 18.1 % — HIGH (ref 10.3–14.5)
RBC # FLD: 18.2 % — HIGH (ref 10.3–14.5)
RBC # FLD: 18.4 % — HIGH (ref 10.3–14.5)
RBC # FLD: 20.5 % — HIGH (ref 10.3–14.5)
RBC # FLD: 20.6 %
RBC # FLD: 23 % — HIGH (ref 10.3–14.5)
RBC # FLD: 23 % — HIGH (ref 10.3–14.5)
RBC # FLD: 23.6 % — HIGH (ref 10.3–14.5)
RBC # FLD: 23.9 % — HIGH (ref 10.3–14.5)
RBC # FLD: 23.9 % — HIGH (ref 10.3–14.5)
RBC # FLD: 24.1 % — HIGH (ref 10.3–14.5)
RBC # FLD: 24.2 % — HIGH (ref 10.3–14.5)
RBC # FLD: 24.3 % — HIGH (ref 10.3–14.5)
RBC # FLD: 24.5 % — HIGH (ref 10.3–14.5)
RBC # FLD: 24.6 % — HIGH (ref 10.3–14.5)
RBC # FLD: 25.1 % — HIGH (ref 10.3–14.5)
RBC BLD AUTO: ABNORMAL
RBC CASTS # UR COMP ASSIST: >50 /HPF (ref 0–4)
RBC CASTS # UR COMP ASSIST: ABNORMAL /HPF (ref 0–4)
RBC CASTS # UR COMP ASSIST: ABNORMAL /HPF (ref 0–4)
RBC CASTS # UR COMP ASSIST: NEGATIVE /HPF — SIGNIFICANT CHANGE UP (ref 0–4)
RED BLOOD CELLS URINE: 0 /HPF
RETICS # AUTO: 1.9 %
RETICS AGGREG/RBC NFR: 67.3 K/UL
ROULEAUX BLD QL SMEAR: PRESENT
RSV RNA NPH QL NAA+NON-PROBE: SIGNIFICANT CHANGE UP
RV+EV RNA SPEC QL NAA+PROBE: DETECTED
S AUREUS DNA NOSE QL NAA+PROBE: DETECTED
SAO2 % BLDV: 99.9 % — SIGNIFICANT CHANGE UP
SARS-COV-2 RNA SPEC QL NAA+PROBE: SIGNIFICANT CHANGE UP
SCHISTOCYTES BLD QL AUTO: SLIGHT — SIGNIFICANT CHANGE UP
SODIUM SERPL-SCNC: 135 MMOL/L — SIGNIFICANT CHANGE UP (ref 135–145)
SODIUM SERPL-SCNC: 136 MMOL/L — SIGNIFICANT CHANGE UP (ref 135–145)
SODIUM SERPL-SCNC: 137 MMOL/L — SIGNIFICANT CHANGE UP (ref 135–145)
SODIUM SERPL-SCNC: 138 MMOL/L — SIGNIFICANT CHANGE UP (ref 135–145)
SODIUM SERPL-SCNC: 139 MMOL/L — SIGNIFICANT CHANGE UP (ref 135–145)
SODIUM SERPL-SCNC: 140 MMOL/L
SODIUM SERPL-SCNC: 140 MMOL/L — SIGNIFICANT CHANGE UP (ref 135–145)
SODIUM SERPL-SCNC: 141 MMOL/L
SODIUM SERPL-SCNC: 141 MMOL/L — SIGNIFICANT CHANGE UP (ref 135–145)
SODIUM SERPL-SCNC: 141 MMOL/L — SIGNIFICANT CHANGE UP (ref 135–145)
SODIUM SERPL-SCNC: 142 MMOL/L — SIGNIFICANT CHANGE UP (ref 135–145)
SODIUM SERPL-SCNC: 142 MMOL/L — SIGNIFICANT CHANGE UP (ref 135–145)
SODIUM SERPL-SCNC: 144 MMOL/L — SIGNIFICANT CHANGE UP (ref 135–145)
SODIUM UR-SCNC: 99 MMOL/L — SIGNIFICANT CHANGE UP
SP GR SPEC: 1.01 — SIGNIFICANT CHANGE UP (ref 1.01–1.02)
SPECIFIC GRAVITY URINE: 1.01
SPECIMEN SOURCE: SIGNIFICANT CHANGE UP
SQUAMOUS EPITHELIAL CELLS: 0 /HPF
TIBC SERPL-MCNC: 293 UG/DL — SIGNIFICANT CHANGE UP (ref 220–430)
TIBC SERPL-MCNC: 313 UG/DL
TRANSFERRIN SERPL-MCNC: 205 MG/DL — SIGNIFICANT CHANGE UP (ref 180–329)
TRIGL SERPL-MCNC: 104 MG/DL
TROPONIN T SERPL-MCNC: 0.11 NG/ML — HIGH (ref 0–0.06)
TROPONIN T SERPL-MCNC: 0.12 NG/ML — HIGH (ref 0–0.06)
TROPONIN T SERPL-MCNC: 0.13 NG/ML — HIGH (ref 0–0.06)
TROPONIN T SERPL-MCNC: 0.21 NG/ML — HIGH (ref 0–0.06)
TROPONIN T SERPL-MCNC: 0.26 NG/ML — HIGH (ref 0–0.06)
UIBC SERPL-MCNC: 290 UG/DL
URATE SERPL-MCNC: 7.2 MG/DL
URATE UR-MCNC: 4.2 MG/DL — SIGNIFICANT CHANGE UP
UROBILINOGEN FLD QL: 4
UROBILINOGEN FLD QL: NEGATIVE MG/DL — SIGNIFICANT CHANGE UP
UROBILINOGEN FLD QL: NEGATIVE MG/DL — SIGNIFICANT CHANGE UP
UROBILINOGEN FLD QL: NEGATIVE — SIGNIFICANT CHANGE UP
UROBILINOGEN URINE: NORMAL
UUN UR-MCNC: 229 MG/DL — SIGNIFICANT CHANGE UP
VARIANT LYMPHS # BLD: 0.9 % — SIGNIFICANT CHANGE UP (ref 0–6)
VIT B12 SERPL-MCNC: 742 PG/ML
WBC # BLD: 10.16 K/UL — SIGNIFICANT CHANGE UP (ref 3.8–10.5)
WBC # BLD: 5.84 K/UL — SIGNIFICANT CHANGE UP (ref 3.8–10.5)
WBC # BLD: 6.25 K/UL — SIGNIFICANT CHANGE UP (ref 3.8–10.5)
WBC # BLD: 6.68 K/UL — SIGNIFICANT CHANGE UP (ref 3.8–10.5)
WBC # BLD: 6.88 K/UL — SIGNIFICANT CHANGE UP (ref 3.8–10.5)
WBC # BLD: 7 K/UL — SIGNIFICANT CHANGE UP (ref 3.8–10.5)
WBC # BLD: 7.01 K/UL — SIGNIFICANT CHANGE UP (ref 3.8–10.5)
WBC # BLD: 7.06 K/UL — SIGNIFICANT CHANGE UP (ref 3.8–10.5)
WBC # BLD: 7.29 K/UL — SIGNIFICANT CHANGE UP (ref 3.8–10.5)
WBC # BLD: 7.38 K/UL — SIGNIFICANT CHANGE UP (ref 3.8–10.5)
WBC # BLD: 7.47 K/UL — SIGNIFICANT CHANGE UP (ref 3.8–10.5)
WBC # BLD: 8.34 K/UL — SIGNIFICANT CHANGE UP (ref 3.8–10.5)
WBC # BLD: 8.37 K/UL — SIGNIFICANT CHANGE UP (ref 3.8–10.5)
WBC # BLD: 8.39 K/UL — SIGNIFICANT CHANGE UP (ref 3.8–10.5)
WBC # BLD: 8.96 K/UL — SIGNIFICANT CHANGE UP (ref 3.8–10.5)
WBC # BLD: 9.5 K/UL — SIGNIFICANT CHANGE UP (ref 3.8–10.5)
WBC # BLD: 9.59 K/UL — SIGNIFICANT CHANGE UP (ref 3.8–10.5)
WBC # BLD: 9.88 K/UL — SIGNIFICANT CHANGE UP (ref 3.8–10.5)
WBC # FLD AUTO: 10.16 K/UL — SIGNIFICANT CHANGE UP (ref 3.8–10.5)
WBC # FLD AUTO: 5.84 K/UL — SIGNIFICANT CHANGE UP (ref 3.8–10.5)
WBC # FLD AUTO: 6.25 K/UL — SIGNIFICANT CHANGE UP (ref 3.8–10.5)
WBC # FLD AUTO: 6.68 K/UL — SIGNIFICANT CHANGE UP (ref 3.8–10.5)
WBC # FLD AUTO: 6.88 K/UL — SIGNIFICANT CHANGE UP (ref 3.8–10.5)
WBC # FLD AUTO: 7 K/UL — SIGNIFICANT CHANGE UP (ref 3.8–10.5)
WBC # FLD AUTO: 7.01 K/UL — SIGNIFICANT CHANGE UP (ref 3.8–10.5)
WBC # FLD AUTO: 7.06 K/UL — SIGNIFICANT CHANGE UP (ref 3.8–10.5)
WBC # FLD AUTO: 7.29 K/UL — SIGNIFICANT CHANGE UP (ref 3.8–10.5)
WBC # FLD AUTO: 7.38 K/UL — SIGNIFICANT CHANGE UP (ref 3.8–10.5)
WBC # FLD AUTO: 7.47 K/UL — SIGNIFICANT CHANGE UP (ref 3.8–10.5)
WBC # FLD AUTO: 7.68 K/UL
WBC # FLD AUTO: 8.34 K/UL — SIGNIFICANT CHANGE UP (ref 3.8–10.5)
WBC # FLD AUTO: 8.37 K/UL — SIGNIFICANT CHANGE UP (ref 3.8–10.5)
WBC # FLD AUTO: 8.39 K/UL — SIGNIFICANT CHANGE UP (ref 3.8–10.5)
WBC # FLD AUTO: 8.96 K/UL — SIGNIFICANT CHANGE UP (ref 3.8–10.5)
WBC # FLD AUTO: 9.5 K/UL — SIGNIFICANT CHANGE UP (ref 3.8–10.5)
WBC # FLD AUTO: 9.59 K/UL — SIGNIFICANT CHANGE UP (ref 3.8–10.5)
WBC # FLD AUTO: 9.88 K/UL — SIGNIFICANT CHANGE UP (ref 3.8–10.5)
WBC UR QL: >50 /HPF (ref 0–5)
WBC UR QL: SIGNIFICANT CHANGE UP /HPF (ref 0–5)
WHITE BLOOD CELLS URINE: 0 /HPF

## 2022-01-01 PROCEDURE — 74176 CT ABD & PELVIS W/O CONTRAST: CPT | Mod: 26,MG

## 2022-01-01 PROCEDURE — 99214 OFFICE O/P EST MOD 30 MIN: CPT

## 2022-01-01 PROCEDURE — 82728 ASSAY OF FERRITIN: CPT

## 2022-01-01 PROCEDURE — 72192 CT PELVIS W/O DYE: CPT | Mod: MA

## 2022-01-01 PROCEDURE — 73060 X-RAY EXAM OF HUMERUS: CPT | Mod: 26,RT

## 2022-01-01 PROCEDURE — 83550 IRON BINDING TEST: CPT

## 2022-01-01 PROCEDURE — 93985 DUP-SCAN HEMO COMPL BI STD: CPT

## 2022-01-01 PROCEDURE — 84560 ASSAY OF URINE/URIC ACID: CPT

## 2022-01-01 PROCEDURE — 84466 ASSAY OF TRANSFERRIN: CPT

## 2022-01-01 PROCEDURE — U0003: CPT

## 2022-01-01 PROCEDURE — 84295 ASSAY OF SERUM SODIUM: CPT

## 2022-01-01 PROCEDURE — 87086 URINE CULTURE/COLONY COUNT: CPT

## 2022-01-01 PROCEDURE — 84100 ASSAY OF PHOSPHORUS: CPT

## 2022-01-01 PROCEDURE — 85610 PROTHROMBIN TIME: CPT

## 2022-01-01 PROCEDURE — 99233 SBSQ HOSP IP/OBS HIGH 50: CPT

## 2022-01-01 PROCEDURE — G0378: CPT

## 2022-01-01 PROCEDURE — 85014 HEMATOCRIT: CPT

## 2022-01-01 PROCEDURE — 17263 DSTRJ MAL LES T/A/L 2.1-3.0: CPT

## 2022-01-01 PROCEDURE — 71250 CT THORAX DX C-: CPT | Mod: 26,MG

## 2022-01-01 PROCEDURE — G2066: CPT

## 2022-01-01 PROCEDURE — 93298 REM INTERROG DEV EVAL SCRMS: CPT

## 2022-01-01 PROCEDURE — 80162 ASSAY OF DIGOXIN TOTAL: CPT

## 2022-01-01 PROCEDURE — 93010 ELECTROCARDIOGRAM REPORT: CPT

## 2022-01-01 PROCEDURE — ? DEFER

## 2022-01-01 PROCEDURE — 93000 ELECTROCARDIOGRAM COMPLETE: CPT

## 2022-01-01 PROCEDURE — 99222 1ST HOSP IP/OBS MODERATE 55: CPT

## 2022-01-01 PROCEDURE — 74176 CT ABD & PELVIS W/O CONTRAST: CPT | Mod: 26,ME

## 2022-01-01 PROCEDURE — 83605 ASSAY OF LACTIC ACID: CPT

## 2022-01-01 PROCEDURE — 36415 COLL VENOUS BLD VENIPUNCTURE: CPT

## 2022-01-01 PROCEDURE — 96372 THER/PROPH/DIAG INJ SC/IM: CPT

## 2022-01-01 PROCEDURE — 93970 EXTREMITY STUDY: CPT | Mod: 26

## 2022-01-01 PROCEDURE — 94760 N-INVAS EAR/PLS OXIMETRY 1: CPT

## 2022-01-01 PROCEDURE — 72125 CT NECK SPINE W/O DYE: CPT | Mod: 26,MA

## 2022-01-01 PROCEDURE — 99285 EMERGENCY DEPT VISIT HI MDM: CPT

## 2022-01-01 PROCEDURE — 72125 CT NECK SPINE W/O DYE: CPT | Mod: MA

## 2022-01-01 PROCEDURE — 74176 CT ABD & PELVIS W/O CONTRAST: CPT | Mod: MG

## 2022-01-01 PROCEDURE — 70450 CT HEAD/BRAIN W/O DYE: CPT | Mod: 26,MA

## 2022-01-01 PROCEDURE — 99204 OFFICE O/P NEW MOD 45 MIN: CPT

## 2022-01-01 PROCEDURE — 93306 TTE W/DOPPLER COMPLETE: CPT | Mod: 26

## 2022-01-01 PROCEDURE — 71045 X-RAY EXAM CHEST 1 VIEW: CPT | Mod: 26

## 2022-01-01 PROCEDURE — U0005: CPT

## 2022-01-01 PROCEDURE — 99217: CPT

## 2022-01-01 PROCEDURE — 99239 HOSP IP/OBS DSCHRG MGMT >30: CPT

## 2022-01-01 PROCEDURE — 99213 OFFICE O/P EST LOW 20 MIN: CPT

## 2022-01-01 PROCEDURE — 99232 SBSQ HOSP IP/OBS MODERATE 35: CPT

## 2022-01-01 PROCEDURE — 83735 ASSAY OF MAGNESIUM: CPT

## 2022-01-01 PROCEDURE — 73060 X-RAY EXAM OF HUMERUS: CPT

## 2022-01-01 PROCEDURE — 93005 ELECTROCARDIOGRAM TRACING: CPT

## 2022-01-01 PROCEDURE — 93985 DUP-SCAN HEMO COMPL BI STD: CPT | Mod: 26

## 2022-01-01 PROCEDURE — 99233 SBSQ HOSP IP/OBS HIGH 50: CPT | Mod: FS

## 2022-01-01 PROCEDURE — 17262 DSTRJ MAL LES T/A/L 1.1-2.0: CPT | Mod: 59

## 2022-01-01 PROCEDURE — 73502 X-RAY EXAM HIP UNI 2-3 VIEWS: CPT | Mod: 26,RT

## 2022-01-01 PROCEDURE — 82962 GLUCOSE BLOOD TEST: CPT

## 2022-01-01 PROCEDURE — 85027 COMPLETE CBC AUTOMATED: CPT

## 2022-01-01 PROCEDURE — 85025 COMPLETE CBC W/AUTO DIFF WBC: CPT

## 2022-01-01 PROCEDURE — 82803 BLOOD GASES ANY COMBINATION: CPT

## 2022-01-01 PROCEDURE — 80053 COMPREHEN METABOLIC PANEL: CPT

## 2022-01-01 PROCEDURE — 72131 CT LUMBAR SPINE W/O DYE: CPT | Mod: 26,MA

## 2022-01-01 PROCEDURE — 81001 URINALYSIS AUTO W/SCOPE: CPT

## 2022-01-01 PROCEDURE — 84300 ASSAY OF URINE SODIUM: CPT

## 2022-01-01 PROCEDURE — 99285 EMERGENCY DEPT VISIT HI MDM: CPT | Mod: 25

## 2022-01-01 PROCEDURE — 76770 US EXAM ABDO BACK WALL COMP: CPT

## 2022-01-01 PROCEDURE — ? COUNSELING

## 2022-01-01 PROCEDURE — 99220: CPT | Mod: FS

## 2022-01-01 PROCEDURE — 82435 ASSAY OF BLOOD CHLORIDE: CPT

## 2022-01-01 PROCEDURE — 99223 1ST HOSP IP/OBS HIGH 75: CPT | Mod: FS

## 2022-01-01 PROCEDURE — 72192 CT PELVIS W/O DYE: CPT | Mod: 26,MA

## 2022-01-01 PROCEDURE — 74176 CT ABD & PELVIS W/O CONTRAST: CPT | Mod: MA

## 2022-01-01 PROCEDURE — 99285 EMERGENCY DEPT VISIT HI MDM: CPT | Mod: CS

## 2022-01-01 PROCEDURE — 84133 ASSAY OF URINE POTASSIUM: CPT

## 2022-01-01 PROCEDURE — 93463 DRUG ADMIN & HEMODYNMIC MEAS: CPT

## 2022-01-01 PROCEDURE — 99223 1ST HOSP IP/OBS HIGH 75: CPT

## 2022-01-01 PROCEDURE — 87637 SARSCOV2&INF A&B&RSV AMP PRB: CPT

## 2022-01-01 PROCEDURE — 93970 EXTREMITY STUDY: CPT

## 2022-01-01 PROCEDURE — 86901 BLOOD TYPING SEROLOGIC RH(D): CPT

## 2022-01-01 PROCEDURE — 82570 ASSAY OF URINE CREATININE: CPT

## 2022-01-01 PROCEDURE — 99215 OFFICE O/P EST HI 40 MIN: CPT

## 2022-01-01 PROCEDURE — 83690 ASSAY OF LIPASE: CPT

## 2022-01-01 PROCEDURE — 87640 STAPH A DNA AMP PROBE: CPT

## 2022-01-01 PROCEDURE — G1004: CPT

## 2022-01-01 PROCEDURE — 74176 CT ABD & PELVIS W/O CONTRAST: CPT | Mod: ME

## 2022-01-01 PROCEDURE — 84132 ASSAY OF SERUM POTASSIUM: CPT

## 2022-01-01 PROCEDURE — 84484 ASSAY OF TROPONIN QUANT: CPT

## 2022-01-01 PROCEDURE — 70450 CT HEAD/BRAIN W/O DYE: CPT | Mod: MA

## 2022-01-01 PROCEDURE — 94640 AIRWAY INHALATION TREATMENT: CPT

## 2022-01-01 PROCEDURE — 80048 BASIC METABOLIC PNL TOTAL CA: CPT

## 2022-01-01 PROCEDURE — 97110 THERAPEUTIC EXERCISES: CPT

## 2022-01-01 PROCEDURE — 86850 RBC ANTIBODY SCREEN: CPT

## 2022-01-01 PROCEDURE — 99232 SBSQ HOSP IP/OBS MODERATE 35: CPT | Mod: FS

## 2022-01-01 PROCEDURE — 82330 ASSAY OF CALCIUM: CPT

## 2022-01-01 PROCEDURE — 84156 ASSAY OF PROTEIN URINE: CPT

## 2022-01-01 PROCEDURE — 99222 1ST HOSP IP/OBS MODERATE 55: CPT | Mod: FS

## 2022-01-01 PROCEDURE — 83036 HEMOGLOBIN GLYCOSYLATED A1C: CPT

## 2022-01-01 PROCEDURE — 99497 ADVNCD CARE PLAN 30 MIN: CPT | Mod: 25

## 2022-01-01 PROCEDURE — 71045 X-RAY EXAM CHEST 1 VIEW: CPT

## 2022-01-01 PROCEDURE — ? ADDITIONAL NOTES

## 2022-01-01 PROCEDURE — C8929: CPT

## 2022-01-01 PROCEDURE — 87040 BLOOD CULTURE FOR BACTERIA: CPT

## 2022-01-01 PROCEDURE — 99234 HOSP IP/OBS SM DT SF/LOW 45: CPT | Mod: FS

## 2022-01-01 PROCEDURE — 87493 C DIFF AMPLIFIED PROBE: CPT

## 2022-01-01 PROCEDURE — 73080 X-RAY EXAM OF ELBOW: CPT

## 2022-01-01 PROCEDURE — 71045 X-RAY EXAM CHEST 1 VIEW: CPT | Mod: 26,76

## 2022-01-01 PROCEDURE — 76770 US EXAM ABDO BACK WALL COMP: CPT | Mod: 26

## 2022-01-01 PROCEDURE — G0103: CPT

## 2022-01-01 PROCEDURE — 83880 ASSAY OF NATRIURETIC PEPTIDE: CPT

## 2022-01-01 PROCEDURE — 72131 CT LUMBAR SPINE W/O DYE: CPT | Mod: MA

## 2022-01-01 PROCEDURE — 83540 ASSAY OF IRON: CPT

## 2022-01-01 PROCEDURE — 99213 OFFICE O/P EST LOW 20 MIN: CPT | Mod: 25

## 2022-01-01 PROCEDURE — 97163 PT EVAL HIGH COMPLEX 45 MIN: CPT

## 2022-01-01 PROCEDURE — 71250 CT THORAX DX C-: CPT | Mod: 26,MA

## 2022-01-01 PROCEDURE — 85018 HEMOGLOBIN: CPT

## 2022-01-01 PROCEDURE — 71250 CT THORAX DX C-: CPT | Mod: MA

## 2022-01-01 PROCEDURE — 96374 THER/PROPH/DIAG INJ IV PUSH: CPT

## 2022-01-01 PROCEDURE — 82550 ASSAY OF CK (CPK): CPT

## 2022-01-01 PROCEDURE — 0225U NFCT DS DNA&RNA 21 SARSCOV2: CPT

## 2022-01-01 PROCEDURE — 85730 THROMBOPLASTIN TIME PARTIAL: CPT

## 2022-01-01 PROCEDURE — 73030 X-RAY EXAM OF SHOULDER: CPT

## 2022-01-01 PROCEDURE — 73502 X-RAY EXAM HIP UNI 2-3 VIEWS: CPT

## 2022-01-01 PROCEDURE — 99214 OFFICE O/P EST MOD 30 MIN: CPT | Mod: 25

## 2022-01-01 PROCEDURE — 87641 MR-STAPH DNA AMP PROBE: CPT

## 2022-01-01 PROCEDURE — 73080 X-RAY EXAM OF ELBOW: CPT | Mod: 26,RT

## 2022-01-01 PROCEDURE — 83935 ASSAY OF URINE OSMOLALITY: CPT

## 2022-01-01 PROCEDURE — 82947 ASSAY GLUCOSE BLOOD QUANT: CPT

## 2022-01-01 PROCEDURE — 97116 GAIT TRAINING THERAPY: CPT

## 2022-01-01 PROCEDURE — 73030 X-RAY EXAM OF SHOULDER: CPT | Mod: 26,RT

## 2022-01-01 PROCEDURE — 84540 ASSAY OF URINE/UREA-N: CPT

## 2022-01-01 PROCEDURE — 99226: CPT | Mod: FS

## 2022-01-01 PROCEDURE — 96366 THER/PROPH/DIAG IV INF ADDON: CPT

## 2022-01-01 PROCEDURE — 86900 BLOOD TYPING SEROLOGIC ABO: CPT

## 2022-01-01 PROCEDURE — 74176 CT ABD & PELVIS W/O CONTRAST: CPT | Mod: 26,MA

## 2022-01-01 PROCEDURE — 71250 CT THORAX DX C-: CPT | Mod: MG

## 2022-01-01 PROCEDURE — 74176 CT ABD & PELVIS W/O CONTRAST: CPT | Mod: 26

## 2022-01-01 PROCEDURE — 87507 IADNA-DNA/RNA PROBE TQ 12-25: CPT

## 2022-01-01 PROCEDURE — 96365 THER/PROPH/DIAG IV INF INIT: CPT

## 2022-01-01 RX ORDER — SODIUM CHLORIDE 9 MG/ML
1000 INJECTION, SOLUTION INTRAVENOUS
Refills: 0 | Status: DISCONTINUED | OUTPATIENT
Start: 2022-01-01 | End: 2022-01-01

## 2022-01-01 RX ORDER — LANOLIN ALCOHOL/MO/W.PET/CERES
3 CREAM (GRAM) TOPICAL AT BEDTIME
Refills: 0 | Status: DISCONTINUED | OUTPATIENT
Start: 2022-01-01 | End: 2022-01-01

## 2022-01-01 RX ORDER — INSULIN LISPRO 100/ML
1 VIAL (ML) SUBCUTANEOUS
Qty: 0 | Refills: 0 | DISCHARGE
Start: 2022-01-01

## 2022-01-01 RX ORDER — PHENAZOPYRIDINE HCL 100 MG
200 TABLET ORAL THREE TIMES A DAY
Refills: 0 | Status: COMPLETED | OUTPATIENT
Start: 2022-01-01 | End: 2022-01-01

## 2022-01-01 RX ORDER — INSULIN LISPRO 100/ML
VIAL (ML) SUBCUTANEOUS
Refills: 0 | Status: DISCONTINUED | OUTPATIENT
Start: 2022-01-01 | End: 2022-01-01

## 2022-01-01 RX ORDER — INSULIN GLARGINE 100 [IU]/ML
17 INJECTION, SOLUTION SUBCUTANEOUS
Qty: 0 | Refills: 0 | DISCHARGE

## 2022-01-01 RX ORDER — METRONIDAZOLE 500 MG
500 TABLET ORAL ONCE
Refills: 0 | Status: COMPLETED | OUTPATIENT
Start: 2022-01-01 | End: 2022-01-01

## 2022-01-01 RX ORDER — DOXYCYCLINE HYCLATE 100 MG/1
100 CAPSULE ORAL
Refills: 0 | Status: DISCONTINUED | COMMUNITY
End: 2022-01-01

## 2022-01-01 RX ORDER — CEFTRIAXONE 500 MG/1
1000 INJECTION, POWDER, FOR SOLUTION INTRAMUSCULAR; INTRAVENOUS ONCE
Refills: 0 | Status: COMPLETED | OUTPATIENT
Start: 2022-01-01 | End: 2022-01-01

## 2022-01-01 RX ORDER — HYDRALAZINE HCL 50 MG
50 TABLET ORAL EVERY 8 HOURS
Refills: 0 | Status: DISCONTINUED | OUTPATIENT
Start: 2022-01-01 | End: 2022-01-01

## 2022-01-01 RX ORDER — AMLODIPINE BESYLATE 2.5 MG/1
5 TABLET ORAL DAILY
Refills: 0 | Status: DISCONTINUED | OUTPATIENT
Start: 2022-01-01 | End: 2022-01-01

## 2022-01-01 RX ORDER — MUPIROCIN 20 MG/G
2 OINTMENT TOPICAL TWICE DAILY
Qty: 1 | Refills: 2 | Status: DISCONTINUED | COMMUNITY
Start: 2021-09-08 | End: 2022-01-01

## 2022-01-01 RX ORDER — IRON SUCROSE 20 MG/ML
100 INJECTION, SOLUTION INTRAVENOUS EVERY 24 HOURS
Refills: 0 | Status: COMPLETED | OUTPATIENT
Start: 2022-01-01 | End: 2022-01-01

## 2022-01-01 RX ORDER — INSULIN LISPRO 100/ML
0 VIAL (ML) SUBCUTANEOUS
Qty: 0 | Refills: 0 | DISCHARGE
Start: 2022-01-01

## 2022-01-01 RX ORDER — ASPIRIN/CALCIUM CARB/MAGNESIUM 324 MG
81 TABLET ORAL DAILY
Refills: 0 | Status: DISCONTINUED | OUTPATIENT
Start: 2022-01-01 | End: 2022-01-01

## 2022-01-01 RX ORDER — DEXTROSE 50 % IN WATER 50 %
15 SYRINGE (ML) INTRAVENOUS ONCE
Refills: 0 | Status: DISCONTINUED | OUTPATIENT
Start: 2022-01-01 | End: 2022-01-01

## 2022-01-01 RX ORDER — FERROUS SULFATE 325(65) MG
325 TABLET ORAL DAILY
Refills: 0 | Status: DISCONTINUED | OUTPATIENT
Start: 2022-01-01 | End: 2022-01-01

## 2022-01-01 RX ORDER — DEXTROSE 50 % IN WATER 50 %
25 SYRINGE (ML) INTRAVENOUS ONCE
Refills: 0 | Status: DISCONTINUED | OUTPATIENT
Start: 2022-01-01 | End: 2022-01-01

## 2022-01-01 RX ORDER — CEFTRIAXONE 500 MG/1
1000 INJECTION, POWDER, FOR SOLUTION INTRAMUSCULAR; INTRAVENOUS EVERY 24 HOURS
Refills: 0 | Status: DISCONTINUED | OUTPATIENT
Start: 2022-01-01 | End: 2022-01-01

## 2022-01-01 RX ORDER — ALBUTEROL 90 UG/1
2 AEROSOL, METERED ORAL
Refills: 0 | Status: DISCONTINUED | OUTPATIENT
Start: 2022-01-01 | End: 2022-01-01

## 2022-01-01 RX ORDER — FUROSEMIDE 40 MG
40 TABLET ORAL ONCE
Refills: 0 | Status: COMPLETED | OUTPATIENT
Start: 2022-01-01 | End: 2022-01-01

## 2022-01-01 RX ORDER — TORSEMIDE 20 MG/1
20 TABLET ORAL
Qty: 90 | Refills: 0 | Status: DISCONTINUED | COMMUNITY
Start: 2021-07-21 | End: 2022-01-01

## 2022-01-01 RX ORDER — ONDANSETRON 8 MG/1
4 TABLET, FILM COATED ORAL EVERY 8 HOURS
Refills: 0 | Status: DISCONTINUED | OUTPATIENT
Start: 2022-01-01 | End: 2022-01-01

## 2022-01-01 RX ORDER — AMLODIPINE BESYLATE 2.5 MG/1
10 TABLET ORAL ONCE
Refills: 0 | Status: COMPLETED | OUTPATIENT
Start: 2022-01-01 | End: 2022-01-01

## 2022-01-01 RX ORDER — INSULIN LISPRO 100/ML
2 VIAL (ML) SUBCUTANEOUS
Refills: 0 | Status: DISCONTINUED | OUTPATIENT
Start: 2022-01-01 | End: 2022-01-01

## 2022-01-01 RX ORDER — ERYTHROPOIETIN 10000 [IU]/ML
10000 INJECTION, SOLUTION INTRAVENOUS; SUBCUTANEOUS
Refills: 0 | Status: DISCONTINUED | OUTPATIENT
Start: 2022-01-01 | End: 2022-01-01

## 2022-01-01 RX ORDER — BUMETANIDE 0.25 MG/ML
1 INJECTION INTRAMUSCULAR; INTRAVENOUS
Qty: 20 | Refills: 0 | Status: DISCONTINUED | OUTPATIENT
Start: 2022-01-01 | End: 2022-01-01

## 2022-01-01 RX ORDER — METOPROLOL SUCCINATE 25 MG/1
25 TABLET, EXTENDED RELEASE ORAL DAILY
Qty: 90 | Refills: 2 | Status: DISCONTINUED | COMMUNITY
Start: 2021-05-28 | End: 2022-01-01

## 2022-01-01 RX ORDER — POTASSIUM CHLORIDE 20 MEQ
40 PACKET (EA) ORAL ONCE
Refills: 0 | Status: COMPLETED | OUTPATIENT
Start: 2022-01-01 | End: 2022-01-01

## 2022-01-01 RX ORDER — ISOSORBIDE DINITRATE 5 MG/1
10 TABLET ORAL
Qty: 0 | Refills: 0 | DISCHARGE

## 2022-01-01 RX ORDER — CEFTRIAXONE 500 MG/1
INJECTION, POWDER, FOR SOLUTION INTRAMUSCULAR; INTRAVENOUS
Refills: 0 | Status: DISCONTINUED | OUTPATIENT
Start: 2022-01-01 | End: 2022-01-01

## 2022-01-01 RX ORDER — PANTOPRAZOLE SODIUM 20 MG/1
1 TABLET, DELAYED RELEASE ORAL
Qty: 0 | Refills: 0 | DISCHARGE
Start: 2022-01-01

## 2022-01-01 RX ORDER — ISOSORBIDE DINITRATE 5 MG/1
1 TABLET ORAL
Qty: 0 | Refills: 0 | DISCHARGE
Start: 2022-01-01

## 2022-01-01 RX ORDER — INSULIN GLARGINE 100 [IU]/ML
0 INJECTION, SOLUTION SUBCUTANEOUS
Qty: 0 | Refills: 0 | DISCHARGE

## 2022-01-01 RX ORDER — ACETAMINOPHEN 500 MG
650 TABLET ORAL EVERY 6 HOURS
Refills: 0 | Status: DISCONTINUED | OUTPATIENT
Start: 2022-01-01 | End: 2022-01-01

## 2022-01-01 RX ORDER — SODIUM BICARBONATE 1 MEQ/ML
1 SYRINGE (ML) INTRAVENOUS
Qty: 0 | Refills: 0 | DISCHARGE

## 2022-01-01 RX ORDER — METOPROLOL TARTRATE 50 MG
25 TABLET ORAL EVERY 8 HOURS
Refills: 0 | Status: DISCONTINUED | OUTPATIENT
Start: 2022-01-01 | End: 2022-01-01

## 2022-01-01 RX ORDER — ERYTHROPOIETIN 40000 [IU]/ML
40000 INJECTION, SOLUTION INTRAVENOUS; SUBCUTANEOUS
Qty: 1 | Refills: 0 | Status: COMPLETED | OUTPATIENT
Start: 2022-01-01

## 2022-01-01 RX ORDER — INSULIN GLARGINE 100 [IU]/ML
13 INJECTION, SOLUTION SUBCUTANEOUS
Qty: 0 | Refills: 0 | DISCHARGE

## 2022-01-01 RX ORDER — METRONIDAZOLE 500 MG
1 TABLET ORAL
Qty: 0 | Refills: 0 | DISCHARGE
Start: 2022-01-01

## 2022-01-01 RX ORDER — BUMETANIDE 0.25 MG/ML
1 INJECTION INTRAMUSCULAR; INTRAVENOUS
Qty: 0 | Refills: 0 | DISCHARGE
Start: 2022-01-01

## 2022-01-01 RX ORDER — BUPROPION HYDROCHLORIDE 150 MG/1
2 TABLET, EXTENDED RELEASE ORAL
Qty: 0 | Refills: 0 | DISCHARGE

## 2022-01-01 RX ORDER — INSULIN LISPRO 100/ML
4 VIAL (ML) SUBCUTANEOUS
Qty: 0 | Refills: 0 | DISCHARGE
Start: 2022-01-01

## 2022-01-01 RX ORDER — SPIRONOLACTONE 25 MG
600-400 TABLET ORAL
Refills: 0 | Status: ACTIVE | COMMUNITY

## 2022-01-01 RX ORDER — LIDOCAINE 4 G/100G
1 CREAM TOPICAL EVERY 24 HOURS
Refills: 0 | Status: DISCONTINUED | OUTPATIENT
Start: 2022-01-01 | End: 2022-01-01

## 2022-01-01 RX ORDER — ATORVASTATIN CALCIUM 80 MG/1
40 TABLET, FILM COATED ORAL AT BEDTIME
Refills: 0 | Status: DISCONTINUED | OUTPATIENT
Start: 2022-01-01 | End: 2022-01-01

## 2022-01-01 RX ORDER — INSULIN GLARGINE 100 [IU]/ML
10 INJECTION, SOLUTION SUBCUTANEOUS EVERY MORNING
Refills: 0 | Status: DISCONTINUED | OUTPATIENT
Start: 2022-01-01 | End: 2022-01-01

## 2022-01-01 RX ORDER — PANTOPRAZOLE SODIUM 20 MG/1
40 TABLET, DELAYED RELEASE ORAL
Refills: 0 | Status: DISCONTINUED | OUTPATIENT
Start: 2022-01-01 | End: 2022-01-01

## 2022-01-01 RX ORDER — DEXTROSE 50 % IN WATER 50 %
12.5 SYRINGE (ML) INTRAVENOUS ONCE
Refills: 0 | Status: DISCONTINUED | OUTPATIENT
Start: 2022-01-01 | End: 2022-01-01

## 2022-01-01 RX ORDER — POTASSIUM CHLORIDE 20 MEQ
20 PACKET (EA) ORAL ONCE
Refills: 0 | Status: COMPLETED | OUTPATIENT
Start: 2022-01-01 | End: 2022-01-01

## 2022-01-01 RX ORDER — SIMETHICONE 80 MG/1
1 TABLET, CHEWABLE ORAL
Qty: 0 | Refills: 0 | DISCHARGE
Start: 2022-01-01

## 2022-01-01 RX ORDER — ERYTHROPOIETIN 10000 [IU]/ML
10000 INJECTION, SOLUTION INTRAVENOUS; SUBCUTANEOUS
Qty: 0 | Refills: 0 | DISCHARGE
Start: 2022-01-01

## 2022-01-01 RX ORDER — ALLOPURINOL 300 MG
1 TABLET ORAL
Qty: 0 | Refills: 0 | DISCHARGE

## 2022-01-01 RX ORDER — ENOXAPARIN SODIUM 100 MG/ML
30 INJECTION SUBCUTANEOUS EVERY 24 HOURS
Refills: 0 | Status: DISCONTINUED | OUTPATIENT
Start: 2022-01-01 | End: 2022-01-01

## 2022-01-01 RX ORDER — BUMETANIDE 0.25 MG/ML
1 INJECTION INTRAMUSCULAR; INTRAVENOUS
Refills: 0 | Status: DISCONTINUED | OUTPATIENT
Start: 2022-01-01 | End: 2022-01-01

## 2022-01-01 RX ORDER — TAMSULOSIN HYDROCHLORIDE 0.4 MG/1
0.4 CAPSULE ORAL AT BEDTIME
Refills: 0 | Status: DISCONTINUED | OUTPATIENT
Start: 2022-01-01 | End: 2022-01-01

## 2022-01-01 RX ORDER — ISOSORBIDE DINITRATE 5 MG/1
10 TABLET ORAL THREE TIMES A DAY
Refills: 0 | Status: DISCONTINUED | OUTPATIENT
Start: 2022-01-01 | End: 2022-01-01

## 2022-01-01 RX ORDER — OMEPRAZOLE 40 MG/1
40 CAPSULE, DELAYED RELEASE ORAL TWICE DAILY
Refills: 0 | Status: DISCONTINUED | COMMUNITY
Start: 2021-06-21 | End: 2022-01-01

## 2022-01-01 RX ORDER — FUROSEMIDE 40 MG
20 TABLET ORAL ONCE
Refills: 0 | Status: COMPLETED | OUTPATIENT
Start: 2022-01-01 | End: 2022-01-01

## 2022-01-01 RX ORDER — CALCIUM CARBONATE/VITAMIN D3 600 MG-10
600-400 TABLET ORAL
Refills: 0 | Status: DISCONTINUED | COMMUNITY
Start: 2021-06-01 | End: 2022-01-01

## 2022-01-01 RX ORDER — SERTRALINE HYDROCHLORIDE 100 MG/1
100 TABLET, FILM COATED ORAL DAILY
Refills: 0 | Status: DISCONTINUED | COMMUNITY
Start: 2021-05-19 | End: 2022-01-01

## 2022-01-01 RX ORDER — ERYTHROPOIETIN 40000 [IU]/ML
40000 INJECTION, SOLUTION INTRAVENOUS; SUBCUTANEOUS
Qty: 3 | Refills: 3 | Status: ACTIVE | COMMUNITY
Start: 2021-09-28 | End: 1900-01-01

## 2022-01-01 RX ORDER — MAGNESIUM SULFATE 500 MG/ML
1 VIAL (ML) INJECTION ONCE
Refills: 0 | Status: COMPLETED | OUTPATIENT
Start: 2022-01-01 | End: 2022-01-01

## 2022-01-01 RX ORDER — INSULIN LISPRO 100/ML
VIAL (ML) SUBCUTANEOUS AT BEDTIME
Refills: 0 | Status: DISCONTINUED | OUTPATIENT
Start: 2022-01-01 | End: 2022-01-01

## 2022-01-01 RX ORDER — VANCOMYCIN HCL 1 G
125 VIAL (EA) INTRAVENOUS EVERY 6 HOURS
Refills: 0 | Status: DISCONTINUED | OUTPATIENT
Start: 2022-01-01 | End: 2022-01-01

## 2022-01-01 RX ORDER — POLYETHYLENE GLYCOL 3350 17 G/17G
17 POWDER, FOR SOLUTION ORAL DAILY
Refills: 0 | Status: DISCONTINUED | OUTPATIENT
Start: 2022-01-01 | End: 2022-01-01

## 2022-01-01 RX ORDER — HYDRALAZINE HCL 50 MG
100 TABLET ORAL THREE TIMES A DAY
Refills: 0 | Status: DISCONTINUED | OUTPATIENT
Start: 2022-01-01 | End: 2022-01-01

## 2022-01-01 RX ORDER — METOPROLOL TARTRATE 50 MG/1
50 TABLET, FILM COATED ORAL
Refills: 0 | Status: ACTIVE | COMMUNITY

## 2022-01-01 RX ORDER — BUPROPION HYDROCHLORIDE 150 MG/1
150 TABLET, EXTENDED RELEASE ORAL DAILY
Refills: 0 | Status: DISCONTINUED | OUTPATIENT
Start: 2022-01-01 | End: 2022-01-01

## 2022-01-01 RX ORDER — MIRTAZAPINE 45 MG/1
1 TABLET, ORALLY DISINTEGRATING ORAL
Qty: 0 | Refills: 0 | DISCHARGE

## 2022-01-01 RX ORDER — LOPERAMIDE HCL 2 MG
2 TABLET ORAL ONCE
Refills: 0 | Status: COMPLETED | OUTPATIENT
Start: 2022-01-01 | End: 2022-01-01

## 2022-01-01 RX ORDER — INSULIN GLARGINE 100 [IU]/ML
13 INJECTION, SOLUTION SUBCUTANEOUS AT BEDTIME
Refills: 0 | Status: DISCONTINUED | OUTPATIENT
Start: 2022-01-01 | End: 2022-01-01

## 2022-01-01 RX ORDER — METOPROLOL TARTRATE 50 MG
1 TABLET ORAL
Qty: 0 | Refills: 0 | DISCHARGE

## 2022-01-01 RX ORDER — INSULIN GLARGINE 100 [IU]/ML
10 INJECTION, SOLUTION SUBCUTANEOUS AT BEDTIME
Refills: 0 | Status: DISCONTINUED | OUTPATIENT
Start: 2022-01-01 | End: 2022-01-01

## 2022-01-01 RX ORDER — TORSEMIDE 20 MG/1
20 TABLET ORAL
Refills: 0 | Status: ACTIVE | COMMUNITY

## 2022-01-01 RX ORDER — HYDRALAZINE HCL 50 MG
75 TABLET ORAL EVERY 8 HOURS
Refills: 0 | Status: DISCONTINUED | OUTPATIENT
Start: 2022-01-01 | End: 2022-01-01

## 2022-01-01 RX ORDER — BUMETANIDE 0.25 MG/ML
2 INJECTION INTRAMUSCULAR; INTRAVENOUS EVERY 12 HOURS
Refills: 0 | Status: DISCONTINUED | OUTPATIENT
Start: 2022-01-01 | End: 2022-01-01

## 2022-01-01 RX ORDER — METOPROLOL TARTRATE 50 MG
1 TABLET ORAL
Qty: 0 | Refills: 0 | DISCHARGE
Start: 2022-01-01

## 2022-01-01 RX ORDER — CHLORHEXIDINE GLUCONATE 4 %
325 (65 FE) LIQUID (ML) TOPICAL DAILY
Refills: 0 | Status: ACTIVE | COMMUNITY

## 2022-01-01 RX ORDER — GLUCAGON INJECTION, SOLUTION 0.5 MG/.1ML
1 INJECTION, SOLUTION SUBCUTANEOUS ONCE
Refills: 0 | Status: DISCONTINUED | OUTPATIENT
Start: 2022-01-01 | End: 2022-01-01

## 2022-01-01 RX ORDER — BUPROPION HYDROCHLORIDE 300 MG/1
300 TABLET, EXTENDED RELEASE ORAL DAILY
Refills: 0 | Status: ACTIVE | COMMUNITY

## 2022-01-01 RX ORDER — CEFPODOXIME PROXETIL 100 MG
200 TABLET ORAL EVERY 12 HOURS
Refills: 0 | Status: DISCONTINUED | OUTPATIENT
Start: 2022-01-01 | End: 2022-01-01

## 2022-01-01 RX ORDER — SODIUM CHLORIDE 9 MG/ML
3 INJECTION INTRAMUSCULAR; INTRAVENOUS; SUBCUTANEOUS EVERY 8 HOURS
Refills: 0 | Status: DISCONTINUED | OUTPATIENT
Start: 2022-01-01 | End: 2022-01-01

## 2022-01-01 RX ORDER — SODIUM CHLORIDE 9 MG/ML
500 INJECTION INTRAMUSCULAR; INTRAVENOUS; SUBCUTANEOUS ONCE
Refills: 0 | Status: COMPLETED | OUTPATIENT
Start: 2022-01-01 | End: 2022-01-01

## 2022-01-01 RX ORDER — INSULIN GLARGINE 100 [IU]/ML
100 INJECTION, SOLUTION SUBCUTANEOUS
Refills: 0 | Status: ACTIVE | COMMUNITY

## 2022-01-01 RX ORDER — AMLODIPINE BESYLATE 2.5 MG/1
1 TABLET ORAL
Qty: 0 | Refills: 0 | DISCHARGE

## 2022-01-01 RX ORDER — TAMSULOSIN HYDROCHLORIDE 0.4 MG/1
1 CAPSULE ORAL
Qty: 0 | Refills: 0 | DISCHARGE
Start: 2022-01-01

## 2022-01-01 RX ORDER — BUMETANIDE 0.25 MG/ML
1 INJECTION INTRAMUSCULAR; INTRAVENOUS ONCE
Refills: 0 | Status: COMPLETED | OUTPATIENT
Start: 2022-01-01 | End: 2022-01-01

## 2022-01-01 RX ORDER — OMEPRAZOLE 20 MG/1
20 CAPSULE, DELAYED RELEASE ORAL
Refills: 0 | Status: ACTIVE | COMMUNITY

## 2022-01-01 RX ORDER — ACETAMINOPHEN 500 MG
2 TABLET ORAL
Qty: 0 | Refills: 0 | DISCHARGE
Start: 2022-01-01

## 2022-01-01 RX ORDER — SODIUM CHLORIDE 9 MG/ML
1000 INJECTION INTRAMUSCULAR; INTRAVENOUS; SUBCUTANEOUS
Refills: 0 | Status: DISCONTINUED | OUTPATIENT
Start: 2022-01-01 | End: 2022-01-01

## 2022-01-01 RX ORDER — OMEPRAZOLE 10 MG/1
1 CAPSULE, DELAYED RELEASE ORAL
Qty: 0 | Refills: 0 | DISCHARGE

## 2022-01-01 RX ORDER — POTASSIUM CHLORIDE 20 MEQ
40 PACKET (EA) ORAL ONCE
Refills: 0 | Status: DISCONTINUED | OUTPATIENT
Start: 2022-01-01 | End: 2022-01-01

## 2022-01-01 RX ORDER — ATORVASTATIN CALCIUM 80 MG/1
1 TABLET, FILM COATED ORAL
Qty: 0 | Refills: 0 | DISCHARGE

## 2022-01-01 RX ORDER — PHENAZOPYRIDINE HCL 100 MG
100 TABLET ORAL THREE TIMES A DAY
Refills: 0 | Status: DISCONTINUED | OUTPATIENT
Start: 2022-01-01 | End: 2022-01-01

## 2022-01-01 RX ORDER — METOPROLOL TARTRATE 50 MG
25 TABLET ORAL DAILY
Refills: 0 | Status: DISCONTINUED | OUTPATIENT
Start: 2022-01-01 | End: 2022-01-01

## 2022-01-01 RX ORDER — ALLOPURINOL 300 MG
100 TABLET ORAL DAILY
Refills: 0 | Status: DISCONTINUED | OUTPATIENT
Start: 2022-01-01 | End: 2022-01-01

## 2022-01-01 RX ORDER — ALLOPURINOL 300 MG
200 TABLET ORAL ONCE
Refills: 0 | Status: COMPLETED | OUTPATIENT
Start: 2022-01-01 | End: 2022-01-01

## 2022-01-01 RX ORDER — MAGNESIUM SULFATE 500 MG/ML
2 VIAL (ML) INJECTION ONCE
Refills: 0 | Status: COMPLETED | OUTPATIENT
Start: 2022-01-01 | End: 2022-01-01

## 2022-01-01 RX ORDER — ACETAMINOPHEN 500 MG
975 TABLET ORAL ONCE
Refills: 0 | Status: COMPLETED | OUTPATIENT
Start: 2022-01-01 | End: 2022-01-01

## 2022-01-01 RX ORDER — ALBUTEROL 90 UG/1
1 AEROSOL, METERED ORAL
Qty: 0 | Refills: 0 | DISCHARGE
Start: 2022-01-01

## 2022-01-01 RX ORDER — ALBUTEROL 90 UG/1
1 AEROSOL, METERED ORAL EVERY 4 HOURS
Refills: 0 | Status: DISCONTINUED | OUTPATIENT
Start: 2022-01-01 | End: 2022-01-01

## 2022-01-01 RX ORDER — CEFPODOXIME PROXETIL 100 MG
1 TABLET ORAL
Qty: 0 | Refills: 0 | DISCHARGE
Start: 2022-01-01 | End: 2022-01-01

## 2022-01-01 RX ORDER — RADIOPAQUE PVC MARKERS/BARIUM 24MARKERS
600 CAPSULE ORAL ONCE
Refills: 0 | Status: COMPLETED | OUTPATIENT
Start: 2022-01-01 | End: 2022-01-01

## 2022-01-01 RX ORDER — SODIUM BICARBONATE 1 MEQ/ML
650 SYRINGE (ML) INTRAVENOUS
Refills: 0 | Status: DISCONTINUED | OUTPATIENT
Start: 2022-01-01 | End: 2022-01-01

## 2022-01-01 RX ORDER — MIRTAZAPINE 45 MG/1
30 TABLET, ORALLY DISINTEGRATING ORAL AT BEDTIME
Refills: 0 | Status: DISCONTINUED | OUTPATIENT
Start: 2022-01-01 | End: 2022-01-01

## 2022-01-01 RX ORDER — SERTRALINE 25 MG/1
1 TABLET, FILM COATED ORAL
Qty: 0 | Refills: 0 | DISCHARGE

## 2022-01-01 RX ORDER — INSULIN GLARGINE 100 [IU]/ML
5 INJECTION, SOLUTION SUBCUTANEOUS AT BEDTIME
Refills: 0 | Status: DISCONTINUED | OUTPATIENT
Start: 2022-01-01 | End: 2022-01-01

## 2022-01-01 RX ORDER — AMLODIPINE BESYLATE 5 MG/1
5 TABLET ORAL DAILY
Qty: 90 | Refills: 2 | Status: ACTIVE | COMMUNITY
Start: 2021-07-15 | End: 1900-01-01

## 2022-01-01 RX ORDER — METRONIDAZOLE 500 MG
500 TABLET ORAL EVERY 8 HOURS
Refills: 0 | Status: DISCONTINUED | OUTPATIENT
Start: 2022-01-01 | End: 2022-01-01

## 2022-01-01 RX ORDER — FUROSEMIDE 40 MG
60 TABLET ORAL EVERY 12 HOURS
Refills: 0 | Status: DISCONTINUED | OUTPATIENT
Start: 2022-01-01 | End: 2022-01-01

## 2022-01-01 RX ORDER — AMLODIPINE BESYLATE 2.5 MG/1
0.5 TABLET ORAL
Qty: 0 | Refills: 0 | DISCHARGE

## 2022-01-01 RX ORDER — FERROUS SULFATE 325(65) MG
1 TABLET ORAL
Qty: 0 | Refills: 0 | DISCHARGE

## 2022-01-01 RX ORDER — VANCOMYCIN HCL 1 G
125 VIAL (EA) INTRAVENOUS ONCE
Refills: 0 | Status: COMPLETED | OUTPATIENT
Start: 2022-01-01 | End: 2022-01-01

## 2022-01-01 RX ORDER — POLYETHYLENE GLYCOL 3350 17 G/17G
17 POWDER, FOR SOLUTION ORAL
Qty: 0 | Refills: 0 | DISCHARGE
Start: 2022-01-01

## 2022-01-01 RX ORDER — HEPARIN SODIUM 5000 [USP'U]/ML
5000 INJECTION INTRAVENOUS; SUBCUTANEOUS EVERY 12 HOURS
Refills: 0 | Status: DISCONTINUED | OUTPATIENT
Start: 2022-01-01 | End: 2022-01-01

## 2022-01-01 RX ORDER — INSULIN GLARGINE 100 [IU]/ML
100 INJECTION, SOLUTION SUBCUTANEOUS
Qty: 3 | Refills: 0 | Status: DISCONTINUED | COMMUNITY
Start: 2021-06-01 | End: 2022-01-01

## 2022-01-01 RX ORDER — METOPROLOL TARTRATE 50 MG
0.5 TABLET ORAL
Qty: 0 | Refills: 0 | DISCHARGE

## 2022-01-01 RX ORDER — SIMETHICONE 80 MG/1
80 TABLET, CHEWABLE ORAL DAILY
Refills: 0 | Status: DISCONTINUED | OUTPATIENT
Start: 2022-01-01 | End: 2022-01-01

## 2022-01-01 RX ADMIN — Medication 25 MILLIGRAM(S): at 14:27

## 2022-01-01 RX ADMIN — Medication 25 MILLIGRAM(S): at 05:21

## 2022-01-01 RX ADMIN — BUMETANIDE 2 MILLIGRAM(S): 0.25 INJECTION INTRAMUSCULAR; INTRAVENOUS at 16:47

## 2022-01-01 RX ADMIN — BUPROPION HYDROCHLORIDE 150 MILLIGRAM(S): 150 TABLET, EXTENDED RELEASE ORAL at 12:51

## 2022-01-01 RX ADMIN — Medication 1: at 17:20

## 2022-01-01 RX ADMIN — Medication 2: at 21:59

## 2022-01-01 RX ADMIN — ERYTHROPOIETIN 1 UNIT/ML: 40000 INJECTION, SOLUTION INTRAVENOUS; SUBCUTANEOUS at 00:00

## 2022-01-01 RX ADMIN — Medication 1: at 11:50

## 2022-01-01 RX ADMIN — IRON SUCROSE 210 MILLIGRAM(S): 20 INJECTION, SOLUTION INTRAVENOUS at 16:31

## 2022-01-01 RX ADMIN — BUPROPION HYDROCHLORIDE 150 MILLIGRAM(S): 150 TABLET, EXTENDED RELEASE ORAL at 12:17

## 2022-01-01 RX ADMIN — ISOSORBIDE DINITRATE 10 MILLIGRAM(S): 5 TABLET ORAL at 16:49

## 2022-01-01 RX ADMIN — ISOSORBIDE DINITRATE 10 MILLIGRAM(S): 5 TABLET ORAL at 11:51

## 2022-01-01 RX ADMIN — Medication 100 MILLIGRAM(S): at 21:09

## 2022-01-01 RX ADMIN — ISOSORBIDE DINITRATE 10 MILLIGRAM(S): 5 TABLET ORAL at 12:07

## 2022-01-01 RX ADMIN — ISOSORBIDE DINITRATE 10 MILLIGRAM(S): 5 TABLET ORAL at 05:59

## 2022-01-01 RX ADMIN — Medication 25 MILLIGRAM(S): at 06:22

## 2022-01-01 RX ADMIN — Medication 650 MILLIGRAM(S): at 10:09

## 2022-01-01 RX ADMIN — ATORVASTATIN CALCIUM 40 MILLIGRAM(S): 80 TABLET, FILM COATED ORAL at 23:01

## 2022-01-01 RX ADMIN — Medication 125 MILLIGRAM(S): at 04:10

## 2022-01-01 RX ADMIN — ENOXAPARIN SODIUM 30 MILLIGRAM(S): 100 INJECTION SUBCUTANEOUS at 12:50

## 2022-01-01 RX ADMIN — ISOSORBIDE DINITRATE 10 MILLIGRAM(S): 5 TABLET ORAL at 22:13

## 2022-01-01 RX ADMIN — INSULIN GLARGINE 10 UNIT(S): 100 INJECTION, SOLUTION SUBCUTANEOUS at 21:28

## 2022-01-01 RX ADMIN — LIDOCAINE 1 PATCH: 4 CREAM TOPICAL at 19:00

## 2022-01-01 RX ADMIN — Medication 50 MILLIGRAM(S): at 05:55

## 2022-01-01 RX ADMIN — ALBUTEROL 2 PUFF(S): 90 AEROSOL, METERED ORAL at 16:57

## 2022-01-01 RX ADMIN — INSULIN GLARGINE 10 UNIT(S): 100 INJECTION, SOLUTION SUBCUTANEOUS at 21:33

## 2022-01-01 RX ADMIN — SIMETHICONE 80 MILLIGRAM(S): 80 TABLET, CHEWABLE ORAL at 11:17

## 2022-01-01 RX ADMIN — ISOSORBIDE DINITRATE 10 MILLIGRAM(S): 5 TABLET ORAL at 12:50

## 2022-01-01 RX ADMIN — ISOSORBIDE DINITRATE 10 MILLIGRAM(S): 5 TABLET ORAL at 05:55

## 2022-01-01 RX ADMIN — IRON SUCROSE 210 MILLIGRAM(S): 20 INJECTION, SOLUTION INTRAVENOUS at 18:14

## 2022-01-01 RX ADMIN — Medication 100 MILLIGRAM(S): at 13:04

## 2022-01-01 RX ADMIN — Medication 100 MILLIGRAM(S): at 21:45

## 2022-01-01 RX ADMIN — Medication 60 MILLIGRAM(S): at 11:34

## 2022-01-01 RX ADMIN — TAMSULOSIN HYDROCHLORIDE 0.4 MILLIGRAM(S): 0.4 CAPSULE ORAL at 21:33

## 2022-01-01 RX ADMIN — Medication 650 MILLIGRAM(S): at 17:10

## 2022-01-01 RX ADMIN — Medication 100 MILLIGRAM(S): at 14:27

## 2022-01-01 RX ADMIN — Medication 100 MILLIGRAM(S): at 12:18

## 2022-01-01 RX ADMIN — Medication 2: at 17:38

## 2022-01-01 RX ADMIN — Medication 1: at 12:17

## 2022-01-01 RX ADMIN — ISOSORBIDE DINITRATE 10 MILLIGRAM(S): 5 TABLET ORAL at 12:42

## 2022-01-01 RX ADMIN — BUPROPION HYDROCHLORIDE 150 MILLIGRAM(S): 150 TABLET, EXTENDED RELEASE ORAL at 08:53

## 2022-01-01 RX ADMIN — MIRTAZAPINE 30 MILLIGRAM(S): 45 TABLET, ORALLY DISINTEGRATING ORAL at 21:33

## 2022-01-01 RX ADMIN — Medication 1: at 17:22

## 2022-01-01 RX ADMIN — ISOSORBIDE DINITRATE 10 MILLIGRAM(S): 5 TABLET ORAL at 15:28

## 2022-01-01 RX ADMIN — Medication 1: at 17:45

## 2022-01-01 RX ADMIN — Medication 100 MILLIGRAM(S): at 13:51

## 2022-01-01 RX ADMIN — Medication 100 MILLIGRAM(S): at 10:20

## 2022-01-01 RX ADMIN — Medication 50 MILLIGRAM(S): at 05:24

## 2022-01-01 RX ADMIN — Medication 81 MILLIGRAM(S): at 12:50

## 2022-01-01 RX ADMIN — Medication 25 MILLIGRAM(S): at 05:20

## 2022-01-01 RX ADMIN — Medication 100 MILLIGRAM(S): at 11:59

## 2022-01-01 RX ADMIN — LIDOCAINE 1 PATCH: 4 CREAM TOPICAL at 05:39

## 2022-01-01 RX ADMIN — Medication 100 MILLIGRAM(S): at 21:42

## 2022-01-01 RX ADMIN — MIRTAZAPINE 30 MILLIGRAM(S): 45 TABLET, ORALLY DISINTEGRATING ORAL at 22:39

## 2022-01-01 RX ADMIN — Medication 100 MILLIGRAM(S): at 12:50

## 2022-01-01 RX ADMIN — PANTOPRAZOLE SODIUM 40 MILLIGRAM(S): 20 TABLET, DELAYED RELEASE ORAL at 06:17

## 2022-01-01 RX ADMIN — ISOSORBIDE DINITRATE 10 MILLIGRAM(S): 5 TABLET ORAL at 12:51

## 2022-01-01 RX ADMIN — BUMETANIDE 1 MILLIGRAM(S): 0.25 INJECTION INTRAMUSCULAR; INTRAVENOUS at 06:16

## 2022-01-01 RX ADMIN — Medication 25 MILLIGRAM(S): at 13:45

## 2022-01-01 RX ADMIN — ENOXAPARIN SODIUM 30 MILLIGRAM(S): 100 INJECTION SUBCUTANEOUS at 22:45

## 2022-01-01 RX ADMIN — Medication 25 MILLIGRAM(S): at 16:49

## 2022-01-01 RX ADMIN — Medication 50 MILLIGRAM(S): at 11:49

## 2022-01-01 RX ADMIN — Medication 100 MILLIGRAM(S): at 21:32

## 2022-01-01 RX ADMIN — BUPROPION HYDROCHLORIDE 150 MILLIGRAM(S): 150 TABLET, EXTENDED RELEASE ORAL at 10:18

## 2022-01-01 RX ADMIN — ISOSORBIDE DINITRATE 10 MILLIGRAM(S): 5 TABLET ORAL at 11:49

## 2022-01-01 RX ADMIN — BUPROPION HYDROCHLORIDE 150 MILLIGRAM(S): 150 TABLET, EXTENDED RELEASE ORAL at 12:21

## 2022-01-01 RX ADMIN — LIDOCAINE 1 PATCH: 4 CREAM TOPICAL at 16:45

## 2022-01-01 RX ADMIN — Medication 500 MILLIGRAM(S): at 21:21

## 2022-01-01 RX ADMIN — ATORVASTATIN CALCIUM 40 MILLIGRAM(S): 80 TABLET, FILM COATED ORAL at 21:25

## 2022-01-01 RX ADMIN — Medication 650 MILLIGRAM(S): at 17:41

## 2022-01-01 RX ADMIN — ALBUTEROL 2 PUFF(S): 90 AEROSOL, METERED ORAL at 02:28

## 2022-01-01 RX ADMIN — INSULIN GLARGINE 10 UNIT(S): 100 INJECTION, SOLUTION SUBCUTANEOUS at 23:38

## 2022-01-01 RX ADMIN — Medication 25 MILLIGRAM(S): at 05:06

## 2022-01-01 RX ADMIN — Medication 25 MILLIGRAM(S): at 13:51

## 2022-01-01 RX ADMIN — Medication 650 MILLIGRAM(S): at 05:50

## 2022-01-01 RX ADMIN — PANTOPRAZOLE SODIUM 40 MILLIGRAM(S): 20 TABLET, DELAYED RELEASE ORAL at 05:16

## 2022-01-01 RX ADMIN — Medication 100 MILLIGRAM(S): at 12:46

## 2022-01-01 RX ADMIN — Medication 500 MILLIGRAM(S): at 15:40

## 2022-01-01 RX ADMIN — Medication 125 MILLIGRAM(S): at 22:38

## 2022-01-01 RX ADMIN — Medication 100 MILLIGRAM(S): at 22:16

## 2022-01-01 RX ADMIN — Medication 650 MILLIGRAM(S): at 23:11

## 2022-01-01 RX ADMIN — ENOXAPARIN SODIUM 30 MILLIGRAM(S): 100 INJECTION SUBCUTANEOUS at 12:18

## 2022-01-01 RX ADMIN — Medication 60 MILLIGRAM(S): at 06:18

## 2022-01-01 RX ADMIN — ISOSORBIDE DINITRATE 10 MILLIGRAM(S): 5 TABLET ORAL at 10:20

## 2022-01-01 RX ADMIN — Medication 100 MILLIGRAM(S): at 11:33

## 2022-01-01 RX ADMIN — BUMETANIDE 5 MG/HR: 0.25 INJECTION INTRAMUSCULAR; INTRAVENOUS at 05:51

## 2022-01-01 RX ADMIN — INSULIN GLARGINE 10 UNIT(S): 100 INJECTION, SOLUTION SUBCUTANEOUS at 21:09

## 2022-01-01 RX ADMIN — Medication 50 MILLIGRAM(S): at 05:50

## 2022-01-01 RX ADMIN — ALBUTEROL 2 PUFF(S): 90 AEROSOL, METERED ORAL at 18:50

## 2022-01-01 RX ADMIN — TAMSULOSIN HYDROCHLORIDE 0.4 MILLIGRAM(S): 0.4 CAPSULE ORAL at 22:08

## 2022-01-01 RX ADMIN — Medication 1: at 18:27

## 2022-01-01 RX ADMIN — Medication 100 MILLIGRAM(S): at 11:25

## 2022-01-01 RX ADMIN — Medication 975 MILLIGRAM(S): at 13:35

## 2022-01-01 RX ADMIN — Medication 5 MILLIGRAM(S): at 05:42

## 2022-01-01 RX ADMIN — Medication 81 MILLIGRAM(S): at 11:16

## 2022-01-01 RX ADMIN — ISOSORBIDE DINITRATE 10 MILLIGRAM(S): 5 TABLET ORAL at 17:31

## 2022-01-01 RX ADMIN — Medication 2: at 12:44

## 2022-01-01 RX ADMIN — ALBUTEROL 2 PUFF(S): 90 AEROSOL, METERED ORAL at 16:54

## 2022-01-01 RX ADMIN — Medication 75 MILLIGRAM(S): at 14:27

## 2022-01-01 RX ADMIN — Medication 100 MILLIGRAM(S): at 13:45

## 2022-01-01 RX ADMIN — BUMETANIDE 1 MILLIGRAM(S): 0.25 INJECTION INTRAMUSCULAR; INTRAVENOUS at 13:04

## 2022-01-01 RX ADMIN — ISOSORBIDE DINITRATE 10 MILLIGRAM(S): 5 TABLET ORAL at 05:06

## 2022-01-01 RX ADMIN — ENOXAPARIN SODIUM 30 MILLIGRAM(S): 100 INJECTION SUBCUTANEOUS at 21:08

## 2022-01-01 RX ADMIN — Medication 5 MILLIGRAM(S): at 17:58

## 2022-01-01 RX ADMIN — BUPROPION HYDROCHLORIDE 150 MILLIGRAM(S): 150 TABLET, EXTENDED RELEASE ORAL at 12:43

## 2022-01-01 RX ADMIN — Medication 650 MILLIGRAM(S): at 17:31

## 2022-01-01 RX ADMIN — BUMETANIDE 2 MILLIGRAM(S): 0.25 INJECTION INTRAMUSCULAR; INTRAVENOUS at 05:19

## 2022-01-01 RX ADMIN — SODIUM CHLORIDE 3 MILLILITER(S): 9 INJECTION INTRAMUSCULAR; INTRAVENOUS; SUBCUTANEOUS at 14:21

## 2022-01-01 RX ADMIN — ATORVASTATIN CALCIUM 40 MILLIGRAM(S): 80 TABLET, FILM COATED ORAL at 21:42

## 2022-01-01 RX ADMIN — Medication 100 MILLIGRAM(S): at 12:45

## 2022-01-01 RX ADMIN — SODIUM CHLORIDE 3 MILLILITER(S): 9 INJECTION INTRAMUSCULAR; INTRAVENOUS; SUBCUTANEOUS at 21:22

## 2022-01-01 RX ADMIN — SODIUM CHLORIDE 3 MILLILITER(S): 9 INJECTION INTRAMUSCULAR; INTRAVENOUS; SUBCUTANEOUS at 17:36

## 2022-01-01 RX ADMIN — Medication 50 MILLIGRAM(S): at 21:33

## 2022-01-01 RX ADMIN — ISOSORBIDE DINITRATE 10 MILLIGRAM(S): 5 TABLET ORAL at 13:03

## 2022-01-01 RX ADMIN — Medication 650 MILLIGRAM(S): at 17:57

## 2022-01-01 RX ADMIN — ISOSORBIDE DINITRATE 10 MILLIGRAM(S): 5 TABLET ORAL at 15:39

## 2022-01-01 RX ADMIN — Medication 81 MILLIGRAM(S): at 18:52

## 2022-01-01 RX ADMIN — ISOSORBIDE DINITRATE 10 MILLIGRAM(S): 5 TABLET ORAL at 05:52

## 2022-01-01 RX ADMIN — Medication 50 MILLIGRAM(S): at 22:08

## 2022-01-01 RX ADMIN — Medication 100 MILLIGRAM(S): at 05:54

## 2022-01-01 RX ADMIN — Medication 1: at 12:07

## 2022-01-01 RX ADMIN — Medication 100 MILLIGRAM(S): at 06:22

## 2022-01-01 RX ADMIN — INSULIN GLARGINE 13 UNIT(S): 100 INJECTION, SOLUTION SUBCUTANEOUS at 22:34

## 2022-01-01 RX ADMIN — ATORVASTATIN CALCIUM 40 MILLIGRAM(S): 80 TABLET, FILM COATED ORAL at 21:18

## 2022-01-01 RX ADMIN — SODIUM CHLORIDE 3 MILLILITER(S): 9 INJECTION INTRAMUSCULAR; INTRAVENOUS; SUBCUTANEOUS at 22:04

## 2022-01-01 RX ADMIN — AMLODIPINE BESYLATE 5 MILLIGRAM(S): 2.5 TABLET ORAL at 06:19

## 2022-01-01 RX ADMIN — SIMETHICONE 80 MILLIGRAM(S): 80 TABLET, CHEWABLE ORAL at 10:18

## 2022-01-01 RX ADMIN — HEPARIN SODIUM 5000 UNIT(S): 5000 INJECTION INTRAVENOUS; SUBCUTANEOUS at 05:59

## 2022-01-01 RX ADMIN — Medication 500 MILLIGRAM(S): at 12:47

## 2022-01-01 RX ADMIN — Medication 100 MILLIGRAM(S): at 22:32

## 2022-01-01 RX ADMIN — Medication 81 MILLIGRAM(S): at 08:53

## 2022-01-01 RX ADMIN — ALBUTEROL 2 PUFF(S): 90 AEROSOL, METERED ORAL at 03:08

## 2022-01-01 RX ADMIN — Medication 100 MILLIGRAM(S): at 21:12

## 2022-01-01 RX ADMIN — INSULIN GLARGINE 10 UNIT(S): 100 INJECTION, SOLUTION SUBCUTANEOUS at 00:53

## 2022-01-01 RX ADMIN — Medication 2: at 12:51

## 2022-01-01 RX ADMIN — ISOSORBIDE DINITRATE 10 MILLIGRAM(S): 5 TABLET ORAL at 06:01

## 2022-01-01 RX ADMIN — Medication 2 MILLIGRAM(S): at 09:59

## 2022-01-01 RX ADMIN — Medication 50 MILLIGRAM(S): at 22:41

## 2022-01-01 RX ADMIN — Medication 2: at 18:05

## 2022-01-01 RX ADMIN — Medication 2 GRAM(S): at 04:20

## 2022-01-01 RX ADMIN — ENOXAPARIN SODIUM 30 MILLIGRAM(S): 100 INJECTION SUBCUTANEOUS at 22:23

## 2022-01-01 RX ADMIN — Medication 20 MILLIGRAM(S): at 12:19

## 2022-01-01 RX ADMIN — INSULIN GLARGINE 10 UNIT(S): 100 INJECTION, SOLUTION SUBCUTANEOUS at 22:23

## 2022-01-01 RX ADMIN — ENOXAPARIN SODIUM 30 MILLIGRAM(S): 100 INJECTION SUBCUTANEOUS at 11:59

## 2022-01-01 RX ADMIN — HEPARIN SODIUM 5000 UNIT(S): 5000 INJECTION INTRAVENOUS; SUBCUTANEOUS at 17:31

## 2022-01-01 RX ADMIN — Medication 2 UNIT(S): at 17:58

## 2022-01-01 RX ADMIN — Medication 81 MILLIGRAM(S): at 15:39

## 2022-01-01 RX ADMIN — Medication 25 MILLIGRAM(S): at 21:13

## 2022-01-01 RX ADMIN — Medication 25 MILLIGRAM(S): at 05:19

## 2022-01-01 RX ADMIN — Medication 100 MILLIGRAM(S): at 11:15

## 2022-01-01 RX ADMIN — Medication 2: at 17:36

## 2022-01-01 RX ADMIN — Medication 81 MILLIGRAM(S): at 12:43

## 2022-01-01 RX ADMIN — Medication 2: at 17:42

## 2022-01-01 RX ADMIN — BUMETANIDE 5 MG/HR: 0.25 INJECTION INTRAMUSCULAR; INTRAVENOUS at 10:04

## 2022-01-01 RX ADMIN — Medication 500 MILLIGRAM(S): at 22:08

## 2022-01-01 RX ADMIN — BUMETANIDE 2 MILLIGRAM(S): 0.25 INJECTION INTRAMUSCULAR; INTRAVENOUS at 05:20

## 2022-01-01 RX ADMIN — INSULIN GLARGINE 10 UNIT(S): 100 INJECTION, SOLUTION SUBCUTANEOUS at 22:13

## 2022-01-01 RX ADMIN — ISOSORBIDE DINITRATE 10 MILLIGRAM(S): 5 TABLET ORAL at 10:17

## 2022-01-01 RX ADMIN — Medication 2: at 17:14

## 2022-01-01 RX ADMIN — Medication 81 MILLIGRAM(S): at 12:49

## 2022-01-01 RX ADMIN — INSULIN GLARGINE 10 UNIT(S): 100 INJECTION, SOLUTION SUBCUTANEOUS at 08:01

## 2022-01-01 RX ADMIN — Medication 25 MILLIGRAM(S): at 05:54

## 2022-01-01 RX ADMIN — Medication 325 MILLIGRAM(S): at 17:35

## 2022-01-01 RX ADMIN — Medication 100 MILLIGRAM(S): at 22:33

## 2022-01-01 RX ADMIN — ATORVASTATIN CALCIUM 40 MILLIGRAM(S): 80 TABLET, FILM COATED ORAL at 22:42

## 2022-01-01 RX ADMIN — INSULIN GLARGINE 10 UNIT(S): 100 INJECTION, SOLUTION SUBCUTANEOUS at 08:53

## 2022-01-01 RX ADMIN — ISOSORBIDE DINITRATE 10 MILLIGRAM(S): 5 TABLET ORAL at 05:48

## 2022-01-01 RX ADMIN — Medication 81 MILLIGRAM(S): at 11:51

## 2022-01-01 RX ADMIN — MIRTAZAPINE 30 MILLIGRAM(S): 45 TABLET, ORALLY DISINTEGRATING ORAL at 22:07

## 2022-01-01 RX ADMIN — Medication 25 MILLIGRAM(S): at 12:00

## 2022-01-01 RX ADMIN — Medication 81 MILLIGRAM(S): at 11:59

## 2022-01-01 RX ADMIN — Medication 25 MILLIGRAM(S): at 04:55

## 2022-01-01 RX ADMIN — TAMSULOSIN HYDROCHLORIDE 0.4 MILLIGRAM(S): 0.4 CAPSULE ORAL at 21:59

## 2022-01-01 RX ADMIN — Medication 25 MILLIGRAM(S): at 12:18

## 2022-01-01 RX ADMIN — ALBUTEROL 2 PUFF(S): 90 AEROSOL, METERED ORAL at 08:58

## 2022-01-01 RX ADMIN — ATORVASTATIN CALCIUM 40 MILLIGRAM(S): 80 TABLET, FILM COATED ORAL at 21:58

## 2022-01-01 RX ADMIN — Medication 81 MILLIGRAM(S): at 12:21

## 2022-01-01 RX ADMIN — PANTOPRAZOLE SODIUM 40 MILLIGRAM(S): 20 TABLET, DELAYED RELEASE ORAL at 05:18

## 2022-01-01 RX ADMIN — Medication 100 MILLIGRAM(S): at 13:03

## 2022-01-01 RX ADMIN — SODIUM CHLORIDE 3 MILLILITER(S): 9 INJECTION INTRAMUSCULAR; INTRAVENOUS; SUBCUTANEOUS at 05:57

## 2022-01-01 RX ADMIN — Medication 500 MILLIGRAM(S): at 21:19

## 2022-01-01 RX ADMIN — Medication 40 MILLIEQUIVALENT(S): at 05:38

## 2022-01-01 RX ADMIN — Medication 25 MILLIGRAM(S): at 05:12

## 2022-01-01 RX ADMIN — ALBUTEROL 2 PUFF(S): 90 AEROSOL, METERED ORAL at 03:24

## 2022-01-01 RX ADMIN — Medication 650 MILLIGRAM(S): at 05:15

## 2022-01-01 RX ADMIN — PANTOPRAZOLE SODIUM 40 MILLIGRAM(S): 20 TABLET, DELAYED RELEASE ORAL at 05:20

## 2022-01-01 RX ADMIN — Medication 100 MILLIGRAM(S): at 21:25

## 2022-01-01 RX ADMIN — Medication 600 MILLILITER(S): at 02:22

## 2022-01-01 RX ADMIN — ALBUTEROL 2 PUFF(S): 90 AEROSOL, METERED ORAL at 09:10

## 2022-01-01 RX ADMIN — ATORVASTATIN CALCIUM 40 MILLIGRAM(S): 80 TABLET, FILM COATED ORAL at 22:26

## 2022-01-01 RX ADMIN — Medication 100 MILLIGRAM(S): at 05:15

## 2022-01-01 RX ADMIN — Medication 50 MILLIGRAM(S): at 21:21

## 2022-01-01 RX ADMIN — Medication 100 MILLIGRAM(S): at 05:12

## 2022-01-01 RX ADMIN — Medication 100 MILLIGRAM(S): at 12:05

## 2022-01-01 RX ADMIN — Medication 25 MILLIGRAM(S): at 05:03

## 2022-01-01 RX ADMIN — ATORVASTATIN CALCIUM 40 MILLIGRAM(S): 80 TABLET, FILM COATED ORAL at 22:23

## 2022-01-01 RX ADMIN — Medication 650 MILLIGRAM(S): at 00:11

## 2022-01-01 RX ADMIN — Medication 81 MILLIGRAM(S): at 13:04

## 2022-01-01 RX ADMIN — Medication 500 MILLIGRAM(S): at 05:23

## 2022-01-01 RX ADMIN — Medication 81 MILLIGRAM(S): at 14:28

## 2022-01-01 RX ADMIN — ALBUTEROL 2 PUFF(S): 90 AEROSOL, METERED ORAL at 17:15

## 2022-01-01 RX ADMIN — SODIUM CHLORIDE 3 MILLILITER(S): 9 INJECTION INTRAMUSCULAR; INTRAVENOUS; SUBCUTANEOUS at 05:39

## 2022-01-01 RX ADMIN — INSULIN GLARGINE 5 UNIT(S): 100 INJECTION, SOLUTION SUBCUTANEOUS at 22:43

## 2022-01-01 RX ADMIN — Medication 100 MILLIGRAM(S): at 13:53

## 2022-01-01 RX ADMIN — Medication 100 MILLIGRAM(S): at 05:20

## 2022-01-01 RX ADMIN — ISOSORBIDE DINITRATE 10 MILLIGRAM(S): 5 TABLET ORAL at 17:35

## 2022-01-01 RX ADMIN — Medication 650 MILLIGRAM(S): at 05:41

## 2022-01-01 RX ADMIN — LIDOCAINE 1 PATCH: 4 CREAM TOPICAL at 17:35

## 2022-01-01 RX ADMIN — Medication 25 MILLIGRAM(S): at 22:32

## 2022-01-01 RX ADMIN — TAMSULOSIN HYDROCHLORIDE 0.4 MILLIGRAM(S): 0.4 CAPSULE ORAL at 22:41

## 2022-01-01 RX ADMIN — LIDOCAINE 1 PATCH: 4 CREAM TOPICAL at 11:36

## 2022-01-01 RX ADMIN — Medication 500 MILLIGRAM(S): at 21:59

## 2022-01-01 RX ADMIN — HEPARIN SODIUM 5000 UNIT(S): 5000 INJECTION INTRAVENOUS; SUBCUTANEOUS at 17:44

## 2022-01-01 RX ADMIN — BUMETANIDE 1 MILLIGRAM(S): 0.25 INJECTION INTRAMUSCULAR; INTRAVENOUS at 13:51

## 2022-01-01 RX ADMIN — HEPARIN SODIUM 5000 UNIT(S): 5000 INJECTION INTRAVENOUS; SUBCUTANEOUS at 17:10

## 2022-01-01 RX ADMIN — SODIUM CHLORIDE 3 MILLILITER(S): 9 INJECTION INTRAMUSCULAR; INTRAVENOUS; SUBCUTANEOUS at 21:30

## 2022-01-01 RX ADMIN — BUPROPION HYDROCHLORIDE 150 MILLIGRAM(S): 150 TABLET, EXTENDED RELEASE ORAL at 12:47

## 2022-01-01 RX ADMIN — Medication 100 MILLIGRAM(S): at 13:10

## 2022-01-01 RX ADMIN — Medication 650 MILLIGRAM(S): at 22:30

## 2022-01-01 RX ADMIN — Medication 1: at 09:05

## 2022-01-01 RX ADMIN — BUPROPION HYDROCHLORIDE 150 MILLIGRAM(S): 150 TABLET, EXTENDED RELEASE ORAL at 11:51

## 2022-01-01 RX ADMIN — Medication 75 MILLIGRAM(S): at 13:43

## 2022-01-01 RX ADMIN — ATORVASTATIN CALCIUM 40 MILLIGRAM(S): 80 TABLET, FILM COATED ORAL at 22:33

## 2022-01-01 RX ADMIN — BUMETANIDE 5 MG/HR: 0.25 INJECTION INTRAMUSCULAR; INTRAVENOUS at 12:21

## 2022-01-01 RX ADMIN — Medication 81 MILLIGRAM(S): at 11:47

## 2022-01-01 RX ADMIN — Medication 5 MILLIGRAM(S): at 17:39

## 2022-01-01 RX ADMIN — ATORVASTATIN CALCIUM 40 MILLIGRAM(S): 80 TABLET, FILM COATED ORAL at 21:09

## 2022-01-01 RX ADMIN — Medication 50 MILLIGRAM(S): at 15:01

## 2022-01-01 RX ADMIN — Medication 81 MILLIGRAM(S): at 12:09

## 2022-01-01 RX ADMIN — PANTOPRAZOLE SODIUM 40 MILLIGRAM(S): 20 TABLET, DELAYED RELEASE ORAL at 06:12

## 2022-01-01 RX ADMIN — SIMETHICONE 80 MILLIGRAM(S): 80 TABLET, CHEWABLE ORAL at 13:09

## 2022-01-01 RX ADMIN — ENOXAPARIN SODIUM 30 MILLIGRAM(S): 100 INJECTION SUBCUTANEOUS at 12:34

## 2022-01-01 RX ADMIN — Medication 1: at 17:31

## 2022-01-01 RX ADMIN — Medication 60 MILLIGRAM(S): at 05:55

## 2022-01-01 RX ADMIN — SODIUM CHLORIDE 3 MILLILITER(S): 9 INJECTION INTRAMUSCULAR; INTRAVENOUS; SUBCUTANEOUS at 05:47

## 2022-01-01 RX ADMIN — Medication 100 MILLIGRAM(S): at 05:47

## 2022-01-01 RX ADMIN — Medication 100 MILLIGRAM(S): at 11:51

## 2022-01-01 RX ADMIN — Medication 500 MILLIGRAM(S): at 05:53

## 2022-01-01 RX ADMIN — Medication 50 MILLIGRAM(S): at 15:42

## 2022-01-01 RX ADMIN — ATORVASTATIN CALCIUM 40 MILLIGRAM(S): 80 TABLET, FILM COATED ORAL at 21:45

## 2022-01-01 RX ADMIN — Medication 100 MILLIGRAM(S): at 22:23

## 2022-01-01 RX ADMIN — Medication 2: at 21:48

## 2022-01-01 RX ADMIN — BUPROPION HYDROCHLORIDE 150 MILLIGRAM(S): 150 TABLET, EXTENDED RELEASE ORAL at 10:21

## 2022-01-01 RX ADMIN — Medication 100 MILLIGRAM(S): at 05:36

## 2022-01-01 RX ADMIN — LIDOCAINE 1 PATCH: 4 CREAM TOPICAL at 06:25

## 2022-01-01 RX ADMIN — Medication 25 MILLIGRAM(S): at 05:14

## 2022-01-01 RX ADMIN — Medication 325 MILLIGRAM(S): at 08:53

## 2022-01-01 RX ADMIN — Medication 500 MILLIGRAM(S): at 13:43

## 2022-01-01 RX ADMIN — BUMETANIDE 1 MILLIGRAM(S): 0.25 INJECTION INTRAMUSCULAR; INTRAVENOUS at 05:43

## 2022-01-01 RX ADMIN — Medication 650 MILLIGRAM(S): at 05:55

## 2022-01-01 RX ADMIN — Medication 3: at 18:06

## 2022-01-01 RX ADMIN — ATORVASTATIN CALCIUM 40 MILLIGRAM(S): 80 TABLET, FILM COATED ORAL at 23:38

## 2022-01-01 RX ADMIN — ISOSORBIDE DINITRATE 10 MILLIGRAM(S): 5 TABLET ORAL at 12:49

## 2022-01-01 RX ADMIN — ATORVASTATIN CALCIUM 40 MILLIGRAM(S): 80 TABLET, FILM COATED ORAL at 22:08

## 2022-01-01 RX ADMIN — Medication 100 MILLIGRAM(S): at 21:27

## 2022-01-01 RX ADMIN — IRON SUCROSE 210 MILLIGRAM(S): 20 INJECTION, SOLUTION INTRAVENOUS at 16:50

## 2022-01-01 RX ADMIN — Medication 5 MILLIGRAM(S): at 05:52

## 2022-01-01 RX ADMIN — SIMETHICONE 80 MILLIGRAM(S): 80 TABLET, CHEWABLE ORAL at 11:59

## 2022-01-01 RX ADMIN — ISOSORBIDE DINITRATE 10 MILLIGRAM(S): 5 TABLET ORAL at 12:43

## 2022-01-01 RX ADMIN — INSULIN GLARGINE 10 UNIT(S): 100 INJECTION, SOLUTION SUBCUTANEOUS at 21:26

## 2022-01-01 RX ADMIN — Medication 25 MILLIGRAM(S): at 06:17

## 2022-01-01 RX ADMIN — Medication 2: at 21:33

## 2022-01-01 RX ADMIN — Medication 81 MILLIGRAM(S): at 10:21

## 2022-01-01 RX ADMIN — BUMETANIDE 1 MILLIGRAM(S): 0.25 INJECTION INTRAMUSCULAR; INTRAVENOUS at 13:45

## 2022-01-01 RX ADMIN — TAMSULOSIN HYDROCHLORIDE 0.4 MILLIGRAM(S): 0.4 CAPSULE ORAL at 21:19

## 2022-01-01 RX ADMIN — BUMETANIDE 2 MILLIGRAM(S): 0.25 INJECTION INTRAMUSCULAR; INTRAVENOUS at 17:54

## 2022-01-01 RX ADMIN — BUPROPION HYDROCHLORIDE 150 MILLIGRAM(S): 150 TABLET, EXTENDED RELEASE ORAL at 14:27

## 2022-01-01 RX ADMIN — Medication 100 MILLIGRAM(S): at 22:22

## 2022-01-01 RX ADMIN — Medication 25 MILLIGRAM(S): at 21:32

## 2022-01-01 RX ADMIN — BUPROPION HYDROCHLORIDE 150 MILLIGRAM(S): 150 TABLET, EXTENDED RELEASE ORAL at 12:09

## 2022-01-01 RX ADMIN — ALBUTEROL 2 PUFF(S): 90 AEROSOL, METERED ORAL at 08:02

## 2022-01-01 RX ADMIN — BUPROPION HYDROCHLORIDE 150 MILLIGRAM(S): 150 TABLET, EXTENDED RELEASE ORAL at 13:09

## 2022-01-01 RX ADMIN — Medication 100 MILLIGRAM(S): at 03:53

## 2022-01-01 RX ADMIN — Medication 2: at 21:29

## 2022-01-01 RX ADMIN — Medication 2 UNIT(S): at 12:38

## 2022-01-01 RX ADMIN — SIMETHICONE 80 MILLIGRAM(S): 80 TABLET, CHEWABLE ORAL at 13:04

## 2022-01-01 RX ADMIN — Medication 650 MILLIGRAM(S): at 09:36

## 2022-01-01 RX ADMIN — ISOSORBIDE DINITRATE 10 MILLIGRAM(S): 5 TABLET ORAL at 04:55

## 2022-01-01 RX ADMIN — ALBUTEROL 2 PUFF(S): 90 AEROSOL, METERED ORAL at 11:35

## 2022-01-01 RX ADMIN — ATORVASTATIN CALCIUM 40 MILLIGRAM(S): 80 TABLET, FILM COATED ORAL at 21:21

## 2022-01-01 RX ADMIN — Medication 500 MILLIGRAM(S): at 05:55

## 2022-01-01 RX ADMIN — Medication 25 MILLIGRAM(S): at 05:48

## 2022-01-01 RX ADMIN — BUMETANIDE 1 MILLIGRAM(S): 0.25 INJECTION INTRAMUSCULAR; INTRAVENOUS at 15:39

## 2022-01-01 RX ADMIN — IRON SUCROSE 210 MILLIGRAM(S): 20 INJECTION, SOLUTION INTRAVENOUS at 17:24

## 2022-01-01 RX ADMIN — Medication 5 MILLIGRAM(S): at 05:50

## 2022-01-01 RX ADMIN — ISOSORBIDE DINITRATE 10 MILLIGRAM(S): 5 TABLET ORAL at 16:44

## 2022-01-01 RX ADMIN — BUPROPION HYDROCHLORIDE 150 MILLIGRAM(S): 150 TABLET, EXTENDED RELEASE ORAL at 21:38

## 2022-01-01 RX ADMIN — Medication 650 MILLIGRAM(S): at 05:53

## 2022-01-01 RX ADMIN — Medication 100 MILLIGRAM(S): at 04:55

## 2022-01-01 RX ADMIN — SODIUM CHLORIDE 3 MILLILITER(S): 9 INJECTION INTRAMUSCULAR; INTRAVENOUS; SUBCUTANEOUS at 22:23

## 2022-01-01 RX ADMIN — Medication 125 MILLIGRAM(S): at 15:38

## 2022-01-01 RX ADMIN — ERYTHROPOIETIN 10000 UNIT(S): 10000 INJECTION, SOLUTION INTRAVENOUS; SUBCUTANEOUS at 21:43

## 2022-01-01 RX ADMIN — Medication 25 MILLIGRAM(S): at 21:26

## 2022-01-01 RX ADMIN — INSULIN GLARGINE 10 UNIT(S): 100 INJECTION, SOLUTION SUBCUTANEOUS at 22:29

## 2022-01-01 RX ADMIN — SODIUM CHLORIDE 3 MILLILITER(S): 9 INJECTION INTRAMUSCULAR; INTRAVENOUS; SUBCUTANEOUS at 21:14

## 2022-01-01 RX ADMIN — Medication 100 MILLIGRAM(S): at 16:49

## 2022-01-01 RX ADMIN — AMLODIPINE BESYLATE 5 MILLIGRAM(S): 2.5 TABLET ORAL at 17:34

## 2022-01-01 RX ADMIN — Medication 2: at 17:44

## 2022-01-01 RX ADMIN — ERYTHROPOIETIN 10000 UNIT(S): 10000 INJECTION, SOLUTION INTRAVENOUS; SUBCUTANEOUS at 18:14

## 2022-01-01 RX ADMIN — Medication 100 MILLIGRAM(S): at 12:51

## 2022-01-01 RX ADMIN — BUPROPION HYDROCHLORIDE 150 MILLIGRAM(S): 150 TABLET, EXTENDED RELEASE ORAL at 11:26

## 2022-01-01 RX ADMIN — LIDOCAINE 1 PATCH: 4 CREAM TOPICAL at 05:13

## 2022-01-01 RX ADMIN — Medication 200 MILLIGRAM(S): at 22:35

## 2022-01-01 RX ADMIN — SODIUM CHLORIDE 500 MILLILITER(S): 9 INJECTION INTRAMUSCULAR; INTRAVENOUS; SUBCUTANEOUS at 03:52

## 2022-01-01 RX ADMIN — ENOXAPARIN SODIUM 30 MILLIGRAM(S): 100 INJECTION SUBCUTANEOUS at 21:40

## 2022-01-01 RX ADMIN — SODIUM CHLORIDE 3 MILLILITER(S): 9 INJECTION INTRAMUSCULAR; INTRAVENOUS; SUBCUTANEOUS at 05:49

## 2022-01-01 RX ADMIN — Medication 2: at 21:34

## 2022-01-01 RX ADMIN — SODIUM CHLORIDE 3 MILLILITER(S): 9 INJECTION INTRAMUSCULAR; INTRAVENOUS; SUBCUTANEOUS at 05:11

## 2022-01-01 RX ADMIN — Medication 25 GRAM(S): at 02:16

## 2022-01-01 RX ADMIN — Medication 500 MILLIGRAM(S): at 21:37

## 2022-01-01 RX ADMIN — BUMETANIDE 2 MILLIGRAM(S): 0.25 INJECTION INTRAMUSCULAR; INTRAVENOUS at 18:21

## 2022-01-01 RX ADMIN — BUMETANIDE 1 MILLIGRAM(S): 0.25 INJECTION INTRAMUSCULAR; INTRAVENOUS at 05:16

## 2022-01-01 RX ADMIN — Medication 25 MILLIGRAM(S): at 22:13

## 2022-01-01 RX ADMIN — Medication 81 MILLIGRAM(S): at 12:51

## 2022-01-01 RX ADMIN — Medication 650 MILLIGRAM(S): at 09:05

## 2022-01-01 RX ADMIN — Medication 200 MILLIGRAM(S): at 13:04

## 2022-01-01 RX ADMIN — Medication 25 MILLIGRAM(S): at 21:09

## 2022-01-01 RX ADMIN — ISOSORBIDE DINITRATE 10 MILLIGRAM(S): 5 TABLET ORAL at 11:18

## 2022-01-01 RX ADMIN — Medication 2: at 21:27

## 2022-01-01 RX ADMIN — Medication 1: at 21:13

## 2022-01-01 RX ADMIN — ISOSORBIDE DINITRATE 10 MILLIGRAM(S): 5 TABLET ORAL at 05:21

## 2022-01-01 RX ADMIN — ATORVASTATIN CALCIUM 40 MILLIGRAM(S): 80 TABLET, FILM COATED ORAL at 21:39

## 2022-01-01 RX ADMIN — PANTOPRAZOLE SODIUM 40 MILLIGRAM(S): 20 TABLET, DELAYED RELEASE ORAL at 05:19

## 2022-01-01 RX ADMIN — Medication 100 MILLIGRAM(S): at 23:01

## 2022-01-01 RX ADMIN — ALBUTEROL 2 PUFF(S): 90 AEROSOL, METERED ORAL at 20:48

## 2022-01-01 RX ADMIN — Medication 650 MILLIGRAM(S): at 21:28

## 2022-01-01 RX ADMIN — INSULIN GLARGINE 10 UNIT(S): 100 INJECTION, SOLUTION SUBCUTANEOUS at 22:16

## 2022-01-01 RX ADMIN — ERYTHROPOIETIN 10000 UNIT(S): 10000 INJECTION, SOLUTION INTRAVENOUS; SUBCUTANEOUS at 17:36

## 2022-01-01 RX ADMIN — Medication 100 MILLIGRAM(S): at 14:10

## 2022-01-01 RX ADMIN — ISOSORBIDE DINITRATE 10 MILLIGRAM(S): 5 TABLET ORAL at 11:29

## 2022-01-01 RX ADMIN — BUPROPION HYDROCHLORIDE 150 MILLIGRAM(S): 150 TABLET, EXTENDED RELEASE ORAL at 12:20

## 2022-01-01 RX ADMIN — Medication 2: at 17:30

## 2022-01-01 RX ADMIN — Medication 25 MILLIGRAM(S): at 13:10

## 2022-01-01 RX ADMIN — Medication 25 MILLIGRAM(S): at 22:17

## 2022-01-01 RX ADMIN — Medication 100 MILLIGRAM(S): at 05:19

## 2022-01-01 RX ADMIN — Medication 25 MILLIGRAM(S): at 13:04

## 2022-01-01 RX ADMIN — Medication 2: at 12:23

## 2022-01-01 RX ADMIN — Medication 3: at 13:46

## 2022-01-01 RX ADMIN — Medication 650 MILLIGRAM(S): at 05:59

## 2022-01-01 RX ADMIN — ATORVASTATIN CALCIUM 40 MILLIGRAM(S): 80 TABLET, FILM COATED ORAL at 21:28

## 2022-01-01 RX ADMIN — BUMETANIDE 2 MILLIGRAM(S): 0.25 INJECTION INTRAMUSCULAR; INTRAVENOUS at 18:53

## 2022-01-01 RX ADMIN — Medication 2: at 21:18

## 2022-01-01 RX ADMIN — Medication 100 MILLIGRAM(S): at 19:04

## 2022-01-01 RX ADMIN — Medication 1: at 09:33

## 2022-01-01 RX ADMIN — ERYTHROPOIETIN 10000 UNIT(S): 10000 INJECTION, SOLUTION INTRAVENOUS; SUBCUTANEOUS at 13:46

## 2022-01-01 RX ADMIN — Medication 500 MILLIGRAM(S): at 05:50

## 2022-01-01 RX ADMIN — Medication 81 MILLIGRAM(S): at 11:29

## 2022-01-01 RX ADMIN — Medication 50 MILLIGRAM(S): at 12:46

## 2022-01-01 RX ADMIN — ISOSORBIDE DINITRATE 10 MILLIGRAM(S): 5 TABLET ORAL at 13:10

## 2022-01-01 RX ADMIN — BUMETANIDE 1 MILLIGRAM(S): 0.25 INJECTION INTRAMUSCULAR; INTRAVENOUS at 05:03

## 2022-01-01 RX ADMIN — ISOSORBIDE DINITRATE 10 MILLIGRAM(S): 5 TABLET ORAL at 05:03

## 2022-01-01 RX ADMIN — HEPARIN SODIUM 5000 UNIT(S): 5000 INJECTION INTRAVENOUS; SUBCUTANEOUS at 05:53

## 2022-01-01 RX ADMIN — Medication 100 MILLIGRAM(S): at 11:34

## 2022-01-01 RX ADMIN — Medication 1: at 12:30

## 2022-01-01 RX ADMIN — Medication 5 MILLIGRAM(S): at 17:43

## 2022-01-01 RX ADMIN — ISOSORBIDE DINITRATE 10 MILLIGRAM(S): 5 TABLET ORAL at 16:31

## 2022-01-01 RX ADMIN — Medication 40 MILLIEQUIVALENT(S): at 12:04

## 2022-01-01 RX ADMIN — Medication 75 MILLIGRAM(S): at 21:58

## 2022-01-01 RX ADMIN — ISOSORBIDE DINITRATE 10 MILLIGRAM(S): 5 TABLET ORAL at 17:22

## 2022-01-01 RX ADMIN — ATORVASTATIN CALCIUM 40 MILLIGRAM(S): 80 TABLET, FILM COATED ORAL at 22:12

## 2022-01-01 RX ADMIN — TAMSULOSIN HYDROCHLORIDE 0.4 MILLIGRAM(S): 0.4 CAPSULE ORAL at 21:32

## 2022-01-01 RX ADMIN — Medication 1: at 21:43

## 2022-01-01 RX ADMIN — PANTOPRAZOLE SODIUM 40 MILLIGRAM(S): 20 TABLET, DELAYED RELEASE ORAL at 04:55

## 2022-01-01 RX ADMIN — Medication 1: at 22:27

## 2022-01-01 RX ADMIN — SODIUM CHLORIDE 3 MILLILITER(S): 9 INJECTION INTRAMUSCULAR; INTRAVENOUS; SUBCUTANEOUS at 11:48

## 2022-01-01 RX ADMIN — ISOSORBIDE DINITRATE 10 MILLIGRAM(S): 5 TABLET ORAL at 05:42

## 2022-01-01 RX ADMIN — BUMETANIDE 2 MILLIGRAM(S): 0.25 INJECTION INTRAMUSCULAR; INTRAVENOUS at 06:10

## 2022-01-01 RX ADMIN — TAMSULOSIN HYDROCHLORIDE 0.4 MILLIGRAM(S): 0.4 CAPSULE ORAL at 21:45

## 2022-01-01 RX ADMIN — INSULIN GLARGINE 10 UNIT(S): 100 INJECTION, SOLUTION SUBCUTANEOUS at 08:25

## 2022-01-01 RX ADMIN — TAMSULOSIN HYDROCHLORIDE 0.4 MILLIGRAM(S): 0.4 CAPSULE ORAL at 22:34

## 2022-01-01 RX ADMIN — INSULIN GLARGINE 13 UNIT(S): 100 INJECTION, SOLUTION SUBCUTANEOUS at 21:48

## 2022-01-01 RX ADMIN — ISOSORBIDE DINITRATE 10 MILLIGRAM(S): 5 TABLET ORAL at 12:46

## 2022-01-01 RX ADMIN — Medication 25 MILLIGRAM(S): at 21:28

## 2022-01-01 RX ADMIN — ATORVASTATIN CALCIUM 40 MILLIGRAM(S): 80 TABLET, FILM COATED ORAL at 21:32

## 2022-01-01 RX ADMIN — Medication 40 MILLIGRAM(S): at 14:16

## 2022-01-01 RX ADMIN — Medication 100 MILLIGRAM(S): at 22:12

## 2022-01-01 RX ADMIN — Medication 500 MILLIGRAM(S): at 17:37

## 2022-01-01 RX ADMIN — ISOSORBIDE DINITRATE 10 MILLIGRAM(S): 5 TABLET ORAL at 17:47

## 2022-01-01 RX ADMIN — MIRTAZAPINE 30 MILLIGRAM(S): 45 TABLET, ORALLY DISINTEGRATING ORAL at 21:21

## 2022-01-01 RX ADMIN — ISOSORBIDE DINITRATE 10 MILLIGRAM(S): 5 TABLET ORAL at 16:50

## 2022-01-01 RX ADMIN — SODIUM CHLORIDE 3 MILLILITER(S): 9 INJECTION INTRAMUSCULAR; INTRAVENOUS; SUBCUTANEOUS at 05:25

## 2022-01-01 RX ADMIN — Medication 5 MILLIGRAM(S): at 05:24

## 2022-01-01 RX ADMIN — ISOSORBIDE DINITRATE 10 MILLIGRAM(S): 5 TABLET ORAL at 12:22

## 2022-01-01 RX ADMIN — Medication 100 MILLIGRAM(S): at 11:49

## 2022-01-01 RX ADMIN — Medication 5 MILLIGRAM(S): at 17:10

## 2022-01-01 RX ADMIN — HEPARIN SODIUM 5000 UNIT(S): 5000 INJECTION INTRAVENOUS; SUBCUTANEOUS at 05:24

## 2022-01-01 RX ADMIN — Medication 50 MILLIGRAM(S): at 15:40

## 2022-01-01 RX ADMIN — SODIUM CHLORIDE 3 MILLILITER(S): 9 INJECTION INTRAMUSCULAR; INTRAVENOUS; SUBCUTANEOUS at 15:37

## 2022-01-01 RX ADMIN — LIDOCAINE 1 PATCH: 4 CREAM TOPICAL at 17:26

## 2022-01-01 RX ADMIN — PANTOPRAZOLE SODIUM 40 MILLIGRAM(S): 20 TABLET, DELAYED RELEASE ORAL at 05:14

## 2022-01-01 RX ADMIN — ENOXAPARIN SODIUM 30 MILLIGRAM(S): 100 INJECTION SUBCUTANEOUS at 22:33

## 2022-01-01 RX ADMIN — Medication 25 MILLIGRAM(S): at 22:23

## 2022-01-01 RX ADMIN — PANTOPRAZOLE SODIUM 40 MILLIGRAM(S): 20 TABLET, DELAYED RELEASE ORAL at 05:49

## 2022-01-01 RX ADMIN — Medication 2: at 11:58

## 2022-01-01 RX ADMIN — Medication 5 MILLIGRAM(S): at 17:22

## 2022-01-01 RX ADMIN — Medication 50 MILLIGRAM(S): at 05:59

## 2022-01-01 RX ADMIN — Medication 2: at 13:09

## 2022-01-01 RX ADMIN — ISOSORBIDE DINITRATE 10 MILLIGRAM(S): 5 TABLET ORAL at 05:18

## 2022-01-01 RX ADMIN — TAMSULOSIN HYDROCHLORIDE 0.4 MILLIGRAM(S): 0.4 CAPSULE ORAL at 21:43

## 2022-01-01 RX ADMIN — BUPROPION HYDROCHLORIDE 150 MILLIGRAM(S): 150 TABLET, EXTENDED RELEASE ORAL at 11:15

## 2022-01-01 RX ADMIN — Medication 25 MILLIGRAM(S): at 22:35

## 2022-01-01 RX ADMIN — ATORVASTATIN CALCIUM 40 MILLIGRAM(S): 80 TABLET, FILM COATED ORAL at 21:33

## 2022-01-01 RX ADMIN — Medication 5 MILLIGRAM(S): at 05:59

## 2022-01-01 RX ADMIN — AMLODIPINE BESYLATE 10 MILLIGRAM(S): 2.5 TABLET ORAL at 23:38

## 2022-01-01 RX ADMIN — SIMETHICONE 80 MILLIGRAM(S): 80 TABLET, CHEWABLE ORAL at 12:43

## 2022-01-01 RX ADMIN — Medication 25 MILLIGRAM(S): at 14:10

## 2022-01-01 RX ADMIN — ISOSORBIDE DINITRATE 10 MILLIGRAM(S): 5 TABLET ORAL at 05:12

## 2022-01-01 RX ADMIN — Medication 5 MILLIGRAM(S): at 05:55

## 2022-01-01 RX ADMIN — SODIUM CHLORIDE 3 MILLILITER(S): 9 INJECTION INTRAMUSCULAR; INTRAVENOUS; SUBCUTANEOUS at 15:21

## 2022-01-01 RX ADMIN — BUMETANIDE 2 MILLIGRAM(S): 0.25 INJECTION INTRAMUSCULAR; INTRAVENOUS at 05:06

## 2022-01-01 RX ADMIN — SODIUM CHLORIDE 3 MILLILITER(S): 9 INJECTION INTRAMUSCULAR; INTRAVENOUS; SUBCUTANEOUS at 13:25

## 2022-01-01 RX ADMIN — Medication 650 MILLIGRAM(S): at 21:45

## 2022-01-01 RX ADMIN — Medication 20 MILLIGRAM(S): at 17:15

## 2022-01-01 RX ADMIN — ISOSORBIDE DINITRATE 10 MILLIGRAM(S): 5 TABLET ORAL at 05:24

## 2022-01-01 RX ADMIN — Medication 100 MILLIGRAM(S): at 12:22

## 2022-01-01 RX ADMIN — BUMETANIDE 2 MILLIGRAM(S): 0.25 INJECTION INTRAMUSCULAR; INTRAVENOUS at 04:56

## 2022-01-01 RX ADMIN — SODIUM CHLORIDE 3 MILLILITER(S): 9 INJECTION INTRAMUSCULAR; INTRAVENOUS; SUBCUTANEOUS at 21:56

## 2022-01-01 RX ADMIN — ISOSORBIDE DINITRATE 10 MILLIGRAM(S): 5 TABLET ORAL at 17:37

## 2022-01-01 RX ADMIN — SODIUM CHLORIDE 100 MILLILITER(S): 9 INJECTION INTRAMUSCULAR; INTRAVENOUS; SUBCUTANEOUS at 17:57

## 2022-01-01 RX ADMIN — Medication 25 MILLIGRAM(S): at 13:59

## 2022-01-01 RX ADMIN — ISOSORBIDE DINITRATE 10 MILLIGRAM(S): 5 TABLET ORAL at 05:13

## 2022-01-01 RX ADMIN — BUPROPION HYDROCHLORIDE 150 MILLIGRAM(S): 150 TABLET, EXTENDED RELEASE ORAL at 15:39

## 2022-01-01 RX ADMIN — Medication 650 MILLIGRAM(S): at 17:22

## 2022-01-01 RX ADMIN — PANTOPRAZOLE SODIUM 40 MILLIGRAM(S): 20 TABLET, DELAYED RELEASE ORAL at 05:03

## 2022-01-01 RX ADMIN — INSULIN GLARGINE 10 UNIT(S): 100 INJECTION, SOLUTION SUBCUTANEOUS at 09:01

## 2022-01-01 RX ADMIN — PANTOPRAZOLE SODIUM 40 MILLIGRAM(S): 20 TABLET, DELAYED RELEASE ORAL at 05:06

## 2022-01-01 RX ADMIN — ISOSORBIDE DINITRATE 10 MILLIGRAM(S): 5 TABLET ORAL at 16:00

## 2022-01-01 RX ADMIN — Medication 650 MILLIGRAM(S): at 05:24

## 2022-01-01 RX ADMIN — Medication 100 MILLIGRAM(S): at 06:16

## 2022-01-01 RX ADMIN — ISOSORBIDE DINITRATE 10 MILLIGRAM(S): 5 TABLET ORAL at 18:53

## 2022-01-01 RX ADMIN — Medication 2: at 12:39

## 2022-01-01 RX ADMIN — Medication 1: at 09:58

## 2022-01-01 RX ADMIN — Medication 81 MILLIGRAM(S): at 12:06

## 2022-01-01 RX ADMIN — PANTOPRAZOLE SODIUM 40 MILLIGRAM(S): 20 TABLET, DELAYED RELEASE ORAL at 05:21

## 2022-01-01 RX ADMIN — ISOSORBIDE DINITRATE 10 MILLIGRAM(S): 5 TABLET ORAL at 06:17

## 2022-01-01 RX ADMIN — TAMSULOSIN HYDROCHLORIDE 0.4 MILLIGRAM(S): 0.4 CAPSULE ORAL at 21:21

## 2022-01-01 RX ADMIN — INSULIN GLARGINE 10 UNIT(S): 100 INJECTION, SOLUTION SUBCUTANEOUS at 21:43

## 2022-01-01 RX ADMIN — PANTOPRAZOLE SODIUM 40 MILLIGRAM(S): 20 TABLET, DELAYED RELEASE ORAL at 06:20

## 2022-01-01 RX ADMIN — Medication 100 MILLIGRAM(S): at 06:18

## 2022-01-01 RX ADMIN — MIRTAZAPINE 30 MILLIGRAM(S): 45 TABLET, ORALLY DISINTEGRATING ORAL at 21:59

## 2022-01-01 RX ADMIN — BUMETANIDE 2 MILLIGRAM(S): 0.25 INJECTION INTRAMUSCULAR; INTRAVENOUS at 18:14

## 2022-01-01 RX ADMIN — TAMSULOSIN HYDROCHLORIDE 0.4 MILLIGRAM(S): 0.4 CAPSULE ORAL at 22:32

## 2022-01-01 RX ADMIN — Medication 75 MILLIGRAM(S): at 05:52

## 2022-01-01 RX ADMIN — Medication 1: at 22:21

## 2022-01-01 RX ADMIN — SODIUM CHLORIDE 100 MILLILITER(S): 9 INJECTION INTRAMUSCULAR; INTRAVENOUS; SUBCUTANEOUS at 05:56

## 2022-01-01 RX ADMIN — Medication 25 MILLIGRAM(S): at 05:18

## 2022-01-01 RX ADMIN — Medication 25 MILLIGRAM(S): at 05:36

## 2022-01-01 RX ADMIN — HEPARIN SODIUM 5000 UNIT(S): 5000 INJECTION INTRAVENOUS; SUBCUTANEOUS at 05:50

## 2022-01-01 RX ADMIN — Medication 100 MILLIGRAM(S): at 13:40

## 2022-01-01 RX ADMIN — Medication 81 MILLIGRAM(S): at 12:20

## 2022-01-01 RX ADMIN — SODIUM CHLORIDE 3 MILLILITER(S): 9 INJECTION INTRAMUSCULAR; INTRAVENOUS; SUBCUTANEOUS at 12:47

## 2022-01-01 RX ADMIN — ISOSORBIDE DINITRATE 10 MILLIGRAM(S): 5 TABLET ORAL at 18:04

## 2022-01-01 RX ADMIN — ALBUTEROL 2 PUFF(S): 90 AEROSOL, METERED ORAL at 02:35

## 2022-01-01 RX ADMIN — BUPROPION HYDROCHLORIDE 150 MILLIGRAM(S): 150 TABLET, EXTENDED RELEASE ORAL at 13:04

## 2022-01-01 RX ADMIN — INSULIN GLARGINE 10 UNIT(S): 100 INJECTION, SOLUTION SUBCUTANEOUS at 21:39

## 2022-01-01 RX ADMIN — INSULIN GLARGINE 10 UNIT(S): 100 INJECTION, SOLUTION SUBCUTANEOUS at 22:14

## 2022-01-01 RX ADMIN — Medication 25 MILLIGRAM(S): at 05:13

## 2022-01-01 RX ADMIN — Medication 25 MILLIGRAM(S): at 11:34

## 2022-01-01 RX ADMIN — Medication 200 MILLIGRAM(S): at 09:05

## 2022-01-01 RX ADMIN — Medication 100 MILLIGRAM(S): at 05:03

## 2022-01-01 RX ADMIN — Medication 1: at 13:03

## 2022-01-01 RX ADMIN — Medication 1: at 12:37

## 2022-01-01 RX ADMIN — TAMSULOSIN HYDROCHLORIDE 0.4 MILLIGRAM(S): 0.4 CAPSULE ORAL at 21:42

## 2022-01-01 RX ADMIN — Medication 100 MILLIGRAM(S): at 13:58

## 2022-01-01 RX ADMIN — ALBUTEROL 2 PUFF(S): 90 AEROSOL, METERED ORAL at 08:46

## 2022-01-01 RX ADMIN — HEPARIN SODIUM 5000 UNIT(S): 5000 INJECTION INTRAVENOUS; SUBCUTANEOUS at 17:22

## 2022-01-01 RX ADMIN — Medication 100 MILLIGRAM(S): at 21:39

## 2022-01-01 RX ADMIN — Medication 500 MILLIGRAM(S): at 14:27

## 2022-01-01 RX ADMIN — INSULIN GLARGINE 10 UNIT(S): 100 INJECTION, SOLUTION SUBCUTANEOUS at 21:13

## 2022-01-01 RX ADMIN — Medication 1: at 17:59

## 2022-01-01 RX ADMIN — CEFTRIAXONE 100 MILLIGRAM(S): 500 INJECTION, POWDER, FOR SOLUTION INTRAMUSCULAR; INTRAVENOUS at 09:06

## 2022-01-01 RX ADMIN — Medication 650 MILLIGRAM(S): at 21:32

## 2022-01-01 RX ADMIN — ENOXAPARIN SODIUM 30 MILLIGRAM(S): 100 INJECTION SUBCUTANEOUS at 21:42

## 2022-01-01 RX ADMIN — Medication 50 MILLIGRAM(S): at 05:39

## 2022-01-01 RX ADMIN — ISOSORBIDE DINITRATE 10 MILLIGRAM(S): 5 TABLET ORAL at 11:16

## 2022-01-01 RX ADMIN — BUMETANIDE 1 MILLIGRAM(S): 0.25 INJECTION INTRAMUSCULAR; INTRAVENOUS at 09:59

## 2022-01-01 RX ADMIN — Medication 100 MILLIGRAM(S): at 05:14

## 2022-01-01 RX ADMIN — Medication 25 MILLIGRAM(S): at 21:38

## 2022-01-01 RX ADMIN — Medication 650 MILLIGRAM(S): at 23:20

## 2022-01-01 RX ADMIN — Medication 1: at 09:04

## 2022-01-01 RX ADMIN — ATORVASTATIN CALCIUM 40 MILLIGRAM(S): 80 TABLET, FILM COATED ORAL at 22:16

## 2022-01-01 RX ADMIN — Medication 20 MILLIEQUIVALENT(S): at 15:27

## 2022-01-01 RX ADMIN — Medication 650 MILLIGRAM(S): at 17:44

## 2022-01-01 RX ADMIN — ATORVASTATIN CALCIUM 40 MILLIGRAM(S): 80 TABLET, FILM COATED ORAL at 21:12

## 2022-01-01 RX ADMIN — MIRTAZAPINE 30 MILLIGRAM(S): 45 TABLET, ORALLY DISINTEGRATING ORAL at 21:19

## 2022-01-01 RX ADMIN — ALBUTEROL 2 PUFF(S): 90 AEROSOL, METERED ORAL at 12:10

## 2022-01-01 RX ADMIN — BUPROPION HYDROCHLORIDE 150 MILLIGRAM(S): 150 TABLET, EXTENDED RELEASE ORAL at 11:47

## 2022-01-01 RX ADMIN — CEFTRIAXONE 100 MILLIGRAM(S): 500 INJECTION, POWDER, FOR SOLUTION INTRAMUSCULAR; INTRAVENOUS at 06:29

## 2022-01-01 RX ADMIN — Medication 25 MILLIGRAM(S): at 12:22

## 2022-01-01 RX ADMIN — BUPROPION HYDROCHLORIDE 150 MILLIGRAM(S): 150 TABLET, EXTENDED RELEASE ORAL at 14:07

## 2022-01-01 RX ADMIN — Medication 81 MILLIGRAM(S): at 12:46

## 2022-01-01 RX ADMIN — Medication 25 GRAM(S): at 13:05

## 2022-01-01 RX ADMIN — Medication 100 MILLIGRAM(S): at 13:13

## 2022-01-01 RX ADMIN — ENOXAPARIN SODIUM 30 MILLIGRAM(S): 100 INJECTION SUBCUTANEOUS at 12:36

## 2022-01-01 RX ADMIN — PANTOPRAZOLE SODIUM 40 MILLIGRAM(S): 20 TABLET, DELAYED RELEASE ORAL at 05:12

## 2022-01-01 RX ADMIN — Medication 25 MILLIGRAM(S): at 21:45

## 2022-01-01 RX ADMIN — Medication 500 MILLIGRAM(S): at 11:48

## 2022-01-01 RX ADMIN — ERYTHROPOIETIN 10000 UNIT(S): 10000 INJECTION, SOLUTION INTRAVENOUS; SUBCUTANEOUS at 12:38

## 2022-01-01 RX ADMIN — Medication 50 MILLIGRAM(S): at 21:18

## 2022-01-01 RX ADMIN — Medication 650 MILLIGRAM(S): at 09:50

## 2022-01-01 RX ADMIN — ISOSORBIDE DINITRATE 10 MILLIGRAM(S): 5 TABLET ORAL at 17:10

## 2022-01-01 RX ADMIN — Medication 2: at 08:35

## 2022-01-01 RX ADMIN — ISOSORBIDE DINITRATE 10 MILLIGRAM(S): 5 TABLET ORAL at 05:14

## 2022-01-01 RX ADMIN — BUPROPION HYDROCHLORIDE 150 MILLIGRAM(S): 150 TABLET, EXTENDED RELEASE ORAL at 12:05

## 2022-01-01 RX ADMIN — BUMETANIDE 2 MILLIGRAM(S): 0.25 INJECTION INTRAMUSCULAR; INTRAVENOUS at 17:25

## 2022-01-01 RX ADMIN — BUMETANIDE 1 MILLIGRAM(S): 0.25 INJECTION INTRAMUSCULAR; INTRAVENOUS at 14:28

## 2022-01-01 RX ADMIN — LIDOCAINE 1 PATCH: 4 CREAM TOPICAL at 04:35

## 2022-01-01 RX ADMIN — Medication 81 MILLIGRAM(S): at 12:17

## 2022-01-01 RX ADMIN — Medication 650 MILLIGRAM(S): at 23:50

## 2022-01-01 RX ADMIN — Medication 650 MILLIGRAM(S): at 22:54

## 2022-01-01 RX ADMIN — ATORVASTATIN CALCIUM 40 MILLIGRAM(S): 80 TABLET, FILM COATED ORAL at 22:32

## 2022-01-01 RX ADMIN — BUPROPION HYDROCHLORIDE 150 MILLIGRAM(S): 150 TABLET, EXTENDED RELEASE ORAL at 12:46

## 2022-01-01 RX ADMIN — Medication 25 MILLIGRAM(S): at 21:42

## 2022-01-01 RX ADMIN — ISOSORBIDE DINITRATE 10 MILLIGRAM(S): 5 TABLET ORAL at 12:00

## 2022-01-01 RX ADMIN — LIDOCAINE 1 PATCH: 4 CREAM TOPICAL at 16:33

## 2022-01-01 RX ADMIN — HEPARIN SODIUM 5000 UNIT(S): 5000 INJECTION INTRAVENOUS; SUBCUTANEOUS at 05:55

## 2022-01-01 RX ADMIN — ISOSORBIDE DINITRATE 10 MILLIGRAM(S): 5 TABLET ORAL at 05:20

## 2022-01-01 RX ADMIN — SODIUM CHLORIDE 3 MILLILITER(S): 9 INJECTION INTRAMUSCULAR; INTRAVENOUS; SUBCUTANEOUS at 07:38

## 2022-01-01 RX ADMIN — Medication 25 MILLIGRAM(S): at 13:40

## 2022-01-01 RX ADMIN — Medication 1: at 09:34

## 2022-01-01 RX ADMIN — ALBUTEROL 2 PUFF(S): 90 AEROSOL, METERED ORAL at 10:11

## 2022-01-01 RX ADMIN — Medication 81 MILLIGRAM(S): at 10:17

## 2022-01-01 RX ADMIN — Medication 5 MILLIGRAM(S): at 17:31

## 2022-01-01 RX ADMIN — Medication 200 MILLIGRAM(S): at 23:39

## 2022-01-01 RX ADMIN — SODIUM CHLORIDE 100 MILLILITER(S): 9 INJECTION INTRAMUSCULAR; INTRAVENOUS; SUBCUTANEOUS at 21:22

## 2022-01-01 RX ADMIN — ISOSORBIDE DINITRATE 10 MILLIGRAM(S): 5 TABLET ORAL at 12:18

## 2022-01-01 RX ADMIN — Medication 500 MILLIGRAM(S): at 05:59

## 2022-01-01 RX ADMIN — Medication 100 MILLIGRAM(S): at 08:54

## 2022-01-01 RX ADMIN — ALBUTEROL 2 PUFF(S): 90 AEROSOL, METERED ORAL at 13:47

## 2022-02-09 PROBLEM — C44.92 SQUAMOUS CELL CARCINOMA OF SKIN, UNSPECIFIED: Status: ACTIVE | Noted: 2022-01-01

## 2022-02-09 NOTE — HPI: SKIN LESION
How Severe Is Your Skin Lesion?: mild
Has Your Skin Lesion Been Treated?: not been treated
Is This A New Presentation, Or A Follow-Up?: Skin Lesion
Additional History: [facility] and is [facility] for evaluation and to schedule a Mohs surgery for a biopsy proven SCC

## 2022-02-09 NOTE — PROCEDURE: ADDITIONAL NOTES
Render Risk Assessment In Note?: no
Detail Level: Zone
Additional Notes: Picture attachment [facility] paperwork from the [facility] is in the system

## 2022-03-07 NOTE — HISTORY OF PRESENT ILLNESS
[FreeTextEntry1] : HOSPITAL FOLLOW-UP [de-identified] : ADMITTED [facility] SEPTEMBER 20, 2021 - SEPTEMBER 2021\par \par MR. LOGAN IS A PLEASANT 79 YO WITH A HISTORY OF DIABETES, CKD, HYEPRTENSION, PULMONARY HYPERTENSION, ISCHEMIC CARDIOMYOPATHY, AFIB, PRIOR GI BLEED.  DUE TO HIGH RISK FOR RECURRENT GI BLEED ON LONG TERM ANITCOAGULATION BUT HIGH RISK OF CVA OFF ANTICOAGULATION, HE UNDERWENT A [place] OCCLUSION WITH WATCHMAN DEVICE ON 7/27/21.  ELECTIVELY PRESENTED TO THE HOSPITAL 45 DAYS POST WATCHMAN PROCEDURE FOR MARLO.  MARLO DEMONSTRATED TO LEAK OR THROMBUS AND ELIQUIS WAS DISCONTINUED.  IS NOT ON ASPIRIN AND PLAVIX.  TO FOLLOW-UP WITH CARDIOLOGY.  HAS HAD NO FALLS OR SIGNS OR SYMPTOMS OF BLEEDING.  IS TIRED.  HAD LAB WORK AT HOSPITAL.  SEES ENDOCRINE AT [facility].  [facility] NOW 15 UNITS; HAS APPOINTMENT.  LEAVING FOR [place].  HAVING SLIP TO HAVE LAB WORK DONE. No costovertebral angle tenderness

## 2022-05-20 PROBLEM — N18.9 CHRONIC KIDNEY DISEASE, UNSPECIFIED CKD STAGE: Status: ACTIVE | Noted: 2021-05-24

## 2022-05-20 PROBLEM — Z86.79 H/O ORTHOSTATIC HYPOTENSION: Status: ACTIVE | Noted: 2021-06-21

## 2022-05-20 PROBLEM — I10 ESSENTIAL HYPERTENSION: Status: ACTIVE | Noted: 2021-05-19

## 2022-05-20 PROBLEM — E11.9 DIABETES MELLITUS: Status: ACTIVE | Noted: 2021-05-19

## 2022-05-20 PROBLEM — I50.9 CHF (CONGESTIVE HEART FAILURE): Status: ACTIVE | Noted: 2021-05-19

## 2022-05-20 NOTE — HISTORY OF PRESENT ILLNESS
[FreeTextEntry1] : 80M h/o CAD s/p CABG (2017) c/b sepsis and sternal wound infection s/p sternum removal and pec flap and angioplasty, chronic HFrEF, pAfib (on Eliquis), HTN, DM2, CKD, chrnoic anemia and Bell's Palsy recently moved to [place] for few months from [place] presented to [facility] on 5/9/21 with back pain/dizziness s/p mechanical fall noted ADHFrEF (35-40%), RV dysfunction, initial noted pBNP 93192 ---> 66961, pharm nuclear stress test without ischemia, RHC done with moderate pHTN Group 2 with PCWP 23, switched to torsemide 40mg, underwent MARLO/CV for pAflutter but reverted back to pAfibpAfib/flutter with bradycardia s/p ILR implant c/b recurrent mechanical fall with L-elbow laceration, s/p repeated ER visits 5/28 & 5/30 for recurrent dizziness, readmitted 6/4-6/15/21 found with severe orthostasis and acute on chronic anemia (Hb seema 6.3) s/p EGD/colonoscopy found with duodenal bulb bleeding s/p IR embolization, discharged home with Hb 7.6 off ASA 81mg and rechallenged low dose Eliquis 2.5mg, Cr. 1.87, monitored off torsemide, Imdur and tamsulosin due to symptomatic orthostasis, last seen in office 6/21/21 resumed Eliquis 5mg with stable H/H, noted elevated BP increased hydralazine dose to 100mg TID, 24-Hr BP monitoring still with SBP 160s added amlodipine 5mg, cardiac amyloid workup negative, had recent increased salt intake and gained >16 lb, restarted Torsemide 20mg, planning for elective Watchman with EPS/Dr. Mantilla, now presents for follow up. \par \par Orthostasis resolved likely was in setting of ongoing occult upper GI bleed and polypharmacy. BP been elevated recently and with LE edema, resumed on antihypertensives and maintenance diuretic. \par \par \par 1. Recent severe upper GI bleed\par -continue PPI and GI follow up\par -plan for elective Watchman tomorrow then stop DOAC use after 45 days if stable. \par \par 2. HFrEF with Severely Reduced RV function, CAD/CABG\par -euvolemic on exam, ACC/AHA stage C, NYHA class II- continue Torsemide 20mg daily may switch to ever other day if reduced fluid intake, consider elective CardioMems in the future once Watchman is done \par - EF 35-40% with severely reduced RV function, no ischemia on stress test and advanced CKD not candidate for LHC\par - s/p RHC - RA 15, RV 61/18, PCWP 23, CO 4.,8, CI 2.06\par - continue hydralazine 100mg TID, consider addition of Imdur if BP remains elevated. \par - will resume ASA 81mg after Watchman; continue atorvastatin 40mg\par \par 3. pAtrial Fibrillation, flutter\par - continue ILR monitoring if tachy-didi syndrome to require PPM\par -follow up with EPS/Dr. Mantilla next weeks- future elective Afib ablation and CRT-D device?\par - monitor on low dose metoprolol succinate 25mg as prior sinus didi \par - tolerating resumtion of Eliquis 5mg BID with H/H stable, await Watchman implant given prior recurrent falls and recent severe GI bleed, MARLO done inpatient adequate size. \par \par 4. HTN\par -off lisionopril due to CKD\par -uptitrate amlodipine 10mg and monitor on hydralazine dose [id number] TID, goal SBP <140\par \par 5. CKD\par -unknown baseline Cr. but initial admission fo 2.1 and now 1.8 \par -monitor with Torsemide 20mg dialy \par -nephrology follow up. \par \par 6. DM2\par -consider addition of SGLT-2 inhibitor if eEFR >30 \par \par 7. Gait imbalance- resume home PT

## 2022-06-02 PROBLEM — D64.9 ANEMIA: Status: ACTIVE | Noted: 2021-06-20

## 2022-06-02 NOTE — ASSESSMENT
[FreeTextEntry1] : 81 M hx of CAD s/p CABG, DM2, CKD 4 here for evaluation of anemia. Labs on 5/23/22 showed Hb 8.6, normal MCV. Anemia dating back to 6/2021. Pt has been on procrit 40k units/mon for 6 months without much improvement. He was iron deficient and has been on PO iron supplement BID. Has  No repeat iron levels.\par \par \par # Anemia\par -likely due to CKD4, iron deficiency may contribute\par -on procirt 40,000 units/mon\par -pt on PO iron BID\par -Will check iron studies - serum iron, TIBC, transferrin saturation, ferritin\par -check B12, Folate \par -will give IV iron if serum iron still low despite PO iron supplement\par -continue procrit injection with nephrology\par \par \par RTC in 4 weeks

## 2022-06-02 NOTE — HISTORY OF PRESENT ILLNESS
[de-identified] : 81 M hx of CAD s/p CABG, DM2, CKD 4 here for evaluation of anemia. Labs on 5/23/22 showed Hb 8.6, normal MCV. Anemia dating back to 6/2021. Pt has been on procrit 40k units/mon for 6 months without much improvement. He was iron deficient and has been on PO iron supplement BID. Has  No repeat iron levels.\par \par Pt just returned from [place] after staying there for the winter. Had recent fall. Lives with wife and walks with a walker.  Endorses feeling fatigue. Sleeps a lot. Denies HA. CP, SOB, abd pain, constipation, diarrhea, melena, hematuria, dysuria. \par \par

## 2022-06-06 PROBLEM — I50.20 HFREF (HEART FAILURE WITH REDUCED EJECTION FRACTION): Status: ACTIVE | Noted: 2021-05-19

## 2022-06-06 PROBLEM — R26.89 IMBALANCE: Status: ACTIVE | Noted: 2021-07-26

## 2022-06-06 PROBLEM — R60.0 LEG EDEMA: Status: ACTIVE | Noted: 2021-07-21

## 2022-06-06 PROBLEM — R29.6 RECURRENT FALLS: Status: ACTIVE | Noted: 2021-06-02

## 2022-06-06 NOTE — CARDIOLOGY SUMMARY
[de-identified] : 5/24/21- Sinus 45, RAD. RBBB, QTc 502\par 6/21/21- Sinus 55, RAD, RBBB\par 10/4/21- Sinus 61, RAD RBBB, QTc 511\par 6/6/22- Sinus 98, RBBB, LAFB, QTc 506 [de-identified] : 5/10/21- pharm nuclear stress- IMPRESSIONS: \par * Myocardial Perfusion SPECT results are abnormal. \par * There is a medium sized, moderate defect in basal to mid \par inferior wall that is fixed, suggestive of infarct. No \par evidence of ischemia. \par * Post-stress resting myocardial perfusion gated SPECT \par imaging was performed (LVEF = 41 %;LVEDV = 141 ml.) \par * Dilated right ventricle noted. [de-identified] : 5/9/21- 1. Technically difficult study. \par  2. Endocardial visualization was enhanced with intravenous echo contrast. \par  3. Left ventricularejection fraction, by visual estimation, is 35 to 40%. \par  4. Paradoxical septal motion with moderately decreased segmental left ventricular systolic function. \par  5. Severe hypokinesis of the basal to mid segements, the apex and distal segments are preserved. \par  6. The mitral in-flow pattern reveals no discernable A-wave, therefore no comment on diastolic function can be made. \par  7. Severely enlarged right ventricle with severely reduced systolic function, estimated PASP least 45 mmHg mild to moderately elevated. \par  8. Right atrial enlargement. \par  9. Moderately enlarged left atrium. \par 10. Mild mitral annular calcification. \par 11. Trace mitral valve regurgitation. \par 12. Thickening and calcification of the anterior and posterior mitral valve leaflets. \par 13. Mild-moderate tricuspid regurgitation. \par 14. Sclerotic aortic valve with decreased opening. \par 15. There is mild aortic root calcification. \par 16. There is no evidence of pericardial effusion. \par 17. No prior study available for comparison.\par \par 9/20/21- Summary:\par  1. Left ventricular ejection fraction, by visual estimation, is 40 to 45%.\par  2. Mildly decreased global left ventricular systolic function.\par  3. Moderately enlarged right ventricle with severely reduced systolic function, estimatd RVSP least 52 mmHg.\par  4. Severely enlarged left atrium.\par  5. Severely enlarged right atrium.\par  6. A well-seated Watchman device present without overlying thrombus and no evidence of deng-device leak.\par  7. Degenerative mitral valve.\par  8. Moderate mitral valve regurgitation.\par  9. Mild tricuspid regurgitation.\par 10. Mild pulmonic valve regurgitation.\par 11. Small patent foramen ovale, with predominantly right to left shunting across the atrial septum.\par 12. There is mild aortic root calcification with simple atheroma at the aortic arch and descending aorta.\par 13. There is no evidence of pericardial effusion.\par 14. Compared to the prior MARLO study from 7/27/21, LV systolic function remain mild-to-moderately reduced and known RV dysfunction. Well-seated Watchman without deng-device leak or overlying thrombus. [de-identified] : 5/11/21- RHC- INTERVENTIONAL IMPRESSIONS: Moderate Elevation of Right Heart Pressures: \par RA=15 mmHg; RV=61/18 mmHg; PCWP=23 mmHg \par Moderate Pulmonary Hypertension=62/22 mmHg \par Reduced CO=4.8 L/min and CI=2.06 L/min/m2

## 2022-06-06 NOTE — DISCUSSION/SUMMARY
[FreeTextEntry1] : 81M h/o CAD s/p CABG (2017) c/b sepsis and sternal wound infection s/p sternum removal and pec flap and angioplasty, chronic HFrEF, pAfib (on Eliquis), HTN, DM2, CKD (Cr. 2.8), chrnoic anemia and Bell's Palsy recently moved to [place] for few months from [place] presented to [facility] on 5/9/21 with back pain/dizziness s/p mechanical fall noted ADHFrEF (35-40%), RV dysfunction, initial noted pBNP [id number] ---> 79604, pharm nuclear stress test without ischemia, RHC done with moderate pHTN Group 2 with PCWP 23, switched to torsemide 40mg, underwent MARLO/CV for pAflutter but reverted back to pAfibpAfib/flutter with bradycardia s/p Medtronic ILR implant c/b recurrent mechanical fall with L-elbow laceration, s/p repeated ER visits 5/28 & 5/30 for recurrent dizziness, readmitted 6/4-6/15/21 found with severe orthostasis and acute on chronic anemia (Hb seema 6.3) s/p EGD/colonoscopy found with duodenal bulb bleeding s/p IR embolization, discharged home with Hb 7.6 off ASA 81mg and rechallenged low dose Eliquis 2.5mg, Cr. 1.87, monitored off torsemide, Imdur and tamsulosin due to symptomatic orthostasis, last seen in office 6/21/21 resumed Eliquis 5mg with stable H/H, noted elevated BP increased hydralazine dose to 100mg TID, 24-Hr BP monitoring still with SBP 160s added amlodipine 5mg, cardiac amyloid workup negative, had recent increased salt intake and gained >16 lb, restarted Torsemide 20mg, last visit 7/2021, underwent elective Watchman and with EPS/Dr. Mantilla in 7/27/21 had post 45 days MARLO now off Eliquis switched to ASA/clopidogrel, last visit 10/2021, iron-def. seen by hematology for IV iron, been off clopidogrel since 6 months from Watchman, now presents for follow up. \par \par Recurrent falls with worsening imbalance advised need to restart PT for gait training; worsening R-facila droop with chronic Bell's palsy now affect his swallowing; chronic LE edema L>R, no crackles on lung exam, will reduce amlodipine dose to 5mg and trial of ACE bandage wrap. \par \par \par 1. LE edema (L>R)\par -continue same dose Torsemide 40mg once daily\par -reduce amlodipine to 5mg\par -ACE bandage wrapping\par \par 2. HFrEF with Severely Reduced RV function, CAD/CABG \par -euvolemic on lung exam, ACC/AHA stage C, NYHA class II-  continue Torsemide 40mg daily, may consider elective CardioMems in the future if difficult to manage fluid status\par -  EF 40-45% with severely reduced RV function, no ischemia on prior stress test and advanced CKD not candidate for LHC \par - s/p RHC - RA 15, RV 61/18, PCWP 23, CO 4.,8, CI 2.06 \par - continue hydralazine 100mg BD\par - continue ASA 81mg and atorvastatin 40mg \par \par 3. pAtrial Fibrillation, flutter \par - continue ILR monitoring if tachy-didi syndrome, EKG with baseline RBBB+LPFB\par -follow up with EPS/Dr. Mantilla \par - monitor on low dose metoprolol tartrate 50mg BID\par -with history of GI bleeding/anemia- s/p Watchman off AC and only on ASA 81mg alone. \par \par 4. HTN \par -off lisinopril due to CKD\par -reduce amlodipine 5mg and monitor on hydralazine 100mg BID, goal SBP <140, caution on orthostasis. \par \par 5. CKD \par -current Cr. 2.8, following with nephrology with plan for elective AV fistula? \par -monitor with Torsemide 40mg dialy  \par \par 6. DM2 \par -consider addition of SGLT-2 inhibitor if eEFR >30  \par  \par 7. Gait imbalance, recurrent falls- restart PT\par \par 8. Chronic Bell's palsy, worsening facial droop- follow up with neurology. \par \par \par Follow up with cardiologist in [place] and return to [place] for follow up after 6 months.

## 2022-06-06 NOTE — HISTORY OF PRESENT ILLNESS
[FreeTextEntry1] : 81M h/o CAD s/p CABG (2017) c/b sepsis and sternal wound infection s/p sternum removal and pec flap and angioplasty, chronic HFrEF, pAfib (on Eliquis), HTN, DM2, CKD (Cr. 2.8), chrnoic anemia and Bell's Palsy recently moved to [place] for few months from [place] presented to [facility] on 5/9/21 with back pain/dizziness s/p mechanical fall noted ADHFrEF (35-40%), RV dysfunction, initial noted pBNP 55358 ---> 64586, pharm nuclear stress test without ischemia, RHC done with moderate pHTN Group 2 with PCWP 23, switched to torsemide 40mg, underwent MARLO/CV for pAflutter but reverted back to pAfibpAfib/flutter with bradycardia s/p Med[employer] ILR implant c/b recurrent mechanical fall with L-elbow laceration, s/p repeated ER visits 5/28 & 5/30 for recurrent dizziness, readmitted 6/4-6/15/21 found with severe orthostasis and acute on chronic anemia (Hb seema 6.3) s/p EGD/colonoscopy found with duodenal bulb bleeding s/p IR embolization, discharged home with Hb 7.6 off ASA 81mg and rechallenged low dose Eliquis 2.5mg, Cr. 1.87, monitored off torsemide, Imdur and tamsulosin due to symptomatic orthostasis, last seen in office 6/21/21 resumed Eliquis 5mg with stable H/H, noted elevated BP increased hydralazine dose to 100mg TID, 24-Hr BP monitoring still with SBP 160s added amlodipine 5mg, cardiac amyloid workup negative, had recent increased salt intake and gained >16 lb, restarted Torsemide 20mg, last visit 7/2021, underwent elective Watchman and with EPS/Dr. Mantilla in 7/27/21 had post 45 days MARLO now off Eliquis switched to ASA/clopidogrel, last visit 10/2021, iron-def. seen by hematology for IV iron, been off clopidogrel since 6 months from Watchman, now presents for follow up. \par \par Patient presents with his wife for the visit. \par Was hospitalized in 12/2021 in [place] from bronchitis was in rehab for 2 weeks, worsening Bell's palsy/facial droop with difficulty swallowing. Had a recent mechanical fall after he was bending forward to  something and tripped with lost of balance and fell backward not able to get up on his own, has large bruise over lower back, also while walking yesterday with his rolling walker and fell on the side. Reports noted LE swellings despite on Torsemide 40mg. Wife been holding PM dose of hydralazine due to low BP. Has gained weight recently due to dietary indiscretion. Nephrology is considering AV fistula for eventual plan of HD in the future. \par \par \par Prior visit 10/2021: \par Received EPO injection from nephrologist, most recent Hb 9.5 and Cr. 2.6. \par \par Reports home -140s, no recurrence of dizziness, denies chest discomfort, ambulating few minutes daily been getting physical therapy. Planning to return [place] for 6 months next week. \par \par \par Prior visit 7/2021: \par Reports BP has been better controlled, denies any bleeding or dark stool, wife noticed that he is off balance at times, denies dizziness or lightheaded, has not seen neurology recently, had been on home PT in the past. Pending elective Watchman procedure tomorrow and also planning to return to [place] in late October. Constantly feels chills at time. \par \par \par Prior visit 6/2021: \par Reports feeling well since discharged home, no recurrence of orthostatic dizziness, suspected gout pain in his foot is getting better. Labile home SBP up to 160-180s. Denies any dyspnea/orthopnea. Using cane for ambulation at times for balance as legs would feel weak at times. Reports an episode of bright red blood the day of discharge home but no recurrence since. \par \par pBNP 19986 (6/10/21), 46401 (5/28/21)\par \par Prior visit 6/1/21: \par He reports was very dizzy while walking into the bathroom on Sunday and felt room spinning and fell in the toilet, initial SBP 80s checked by his wife, then when EMS arrive SBP 110s. Still dizzy and worst  even with standing and walking. Continues to have chronic diarrhea 4x daily, did not stop the magnesium supplement. \par \par \par Prior visit 5/24/21: \par Reports having intermittent shortness of breath and also continues to have dizziness at times, been on reduced dose of metoprolol 25mg BID since last week, denies syncope, no chest pain. He reports is able to ambulate better with less exertional dyspnea compared to prior to recent hospitalization, no edema since discharge. Has not yet make appointment with nephrology for follow up. L-elbow wound has been healing, no recurrent bleeding back on ASA/Eliquis. \par \par Him and his wife and planning to return to [place] in October for 6 months.

## 2022-06-09 NOTE — PROCEDURE: MOHS SURGERY
Observation goals  PRIOR TO DISCHARGE        -diagnostic tests and consults completed and resulted: met   -vital signs normal or at patient baseline: met   -tolerating oral intake to maintain hydration: met   -safe disposition plan has been identified: not met      Nurse to notify provider when observation goals have been met and patient is ready for discharge.   Additional Anesthesia Volume In Cc: 3

## 2022-06-13 PROBLEM — N18.4 CKD (CHRONIC KIDNEY DISEASE) STAGE 4, GFR 15-29 ML/MIN: Status: ACTIVE | Noted: 2021-09-28

## 2022-06-13 PROBLEM — R26.89 BALANCE DISORDER: Status: ACTIVE | Noted: 2021-06-03

## 2022-06-13 PROBLEM — R53.1 WEAKNESS: Status: ACTIVE | Noted: 2022-01-01

## 2022-06-13 PROBLEM — I10 HTN (HYPERTENSION): Status: ACTIVE | Noted: 2021-07-06

## 2022-06-13 PROBLEM — F41.9 ANXIETY AND DEPRESSION: Status: ACTIVE | Noted: 2021-06-24

## 2022-06-13 NOTE — PLAN
[FreeTextEntry1] : CARDIOLOGY CONSULTANT NOTE, HEME/ONC AND NEPHROLOGY NOTES REVIEWED\par CONCERN FOR WORSENING RIGHT FACIAL DROOPING FOR SEVERAL MONTHS DISCUSSED; WIFE STATES HAD MRI BRAIN IMAGING AND WORK-UP FOR THIS AS WELL AS BALANCE WHILE IN [place].  IT HAS NOT CHANGED IN MONTHS TIME.  IS TO SIGN CONSENT TO OBTAIN ALL RECORDS FOR REVIEW.  IS ALREADY SEEING NEUROLOGY TOMORROW.  NEEDS FURTHER EVALUATION!!!\par TO ER WITH ANY WORSENING\par FALL PRECAUTIONS\par RESTARTING PHYSICAL THERAPY AND REFERRED FOR OCCUPATIONAL THERAPY AS WELL\par WILL HAVE TO CONSIDER STOPPING ANTICOAGULATION IF FALL RISK DESPITE PT - CARDIOLOGY AWARE\par BLOOD PRESSURE CONTROLLED\par HEALTHY DIET AND LIFESTYLE MODIFICATIONS\par RECENT LAB WORK REVIEWED\par CLOSE FOLLOW-UP IN OFFICE ONE MONTH\par REFERRAL TO [facility]RI[facility]\par IF SUICIDAL/HOMICIDAL IDEATIONS OR PLANS CALL 911 OR GO TO ER IMMEDIATELY\par CALL WITH ANY QUESTIONS, CONCERNS OR CHANGES \par SUPPORT GIVEN

## 2022-06-13 NOTE — REVIEW OF SYSTEMS
[Fatigue] : fatigue [Unsteady Walk] : ataxia [Depression] : depression [Negative] : Heme/Lymph [Fever] : no fever [Chills] : no chills [Headache] : no headache [Dizziness] : no dizziness [Suicidal] : not suicidal

## 2022-06-13 NOTE — HISTORY OF PRESENT ILLNESS
[Spouse] : spouse [FreeTextEntry1] : F/U FROM [facility] [de-identified] : MR. LOGAN IS A PLEASANT 82 YO PRESENTING FOR FOLLOW-UP WITH HIS WIFE TODAY.  HE JUST GOT HOME FROM [place] A MONTH AGO.  WHILE IN [place], HE WENT TO THE HOSPITAL ON A FEW OCCASIONS DUE TO MULTIPLE FALLS.   HE WAS ALSO EVALUATED FOR A PROGRESSIVELY WORSENING RIGHT FACIAL DROOP.  HISTORY OF BELLS.  WAS GIVEN STEROIDS PRIOR TO JACE AND FELT LIKE SYMPTOMS IMPROVED OF CHRONIC DROOPING.  WIFE STATES THAT HE HAD A BRAIN MRI IN [place] DUE TO THE WORSENING RIGHT FACIAL DROOP AT THE HOSPITAL.  ADVISED NO RECENT STROKES.  TROUBLE CLOSING THAT EYE.  [facility] ALSO [facility] [place] [facility]CHAR[facility]ES.  ON ONE ADMISSION WAS DIAGNOSED WITH PNEUMONIA AND BRONCHITIS.  RECENT CARDIOLOGY EVALUATION ON THE 6TH.  DISCUSSED FACIAL DROOP AND ARRANGED AN APPOINTMENT FOR HIM TO SEE NEUROLOGY TOMORROW FOR EVALUATION.  WIFE STATES THAT THE DROOP IS NOT NEW AND HAS BEEN WORSE LIKE THIS FOR SEVERAL MONTHS WHILE IN [place].  NO HEADACHE OR DIZZINESS.  IS CHRONICALLY OFF BALANCE.  IS TO START PHYSICAL THERAPY TOMORROW.  WALKS WITH A WALKER FOR SUPPORT.  HISTORY OF DEPRESSION.  BUPROPION WAS INCREASED  MG BY [facility]ST [facility] FOR [facility].  STATES HE WOULD NEVER PURSUE SUICIDAL/HOMICIDAL IDEATIONS OR PLANS.  HE HAS A HISTORY OF CAD S/P CABG IN 2017, CHRONIC CHF, PAROXYSMAL AFIB ON ANTICOAGULATION WITH ELIQUIS, HYPERTENSION, CKD, DIABETES, CHRONIC ANEMIA.  WHEN HE JUST SAW CARDIOLOGY AMLODIPINE WAS REDUCED TO 5 MG DUE TO LEG SWELLING.  IS GETTING AN IRON INFUSION WITH HEMATOLOGY THIS WEEK.  UP TO DATE WITH NEPHROLOGY.  NO SIGNS OR SYMPTOMS OF BLEEDING PRESENT.

## 2022-06-13 NOTE — PHYSICAL EXAM
[No Acute Distress] : no acute distress [Well Nourished] : well nourished [No Respiratory Distress] : no respiratory distress  [No Accessory Muscle Use] : no accessory muscle use [Clear to Auscultation] : lungs were clear to auscultation bilaterally [Normal Rate] : normal rate  [Regular Rhythm] : with a regular rhythm [Normal S1, S2] : normal S1 and S2 [Soft] : abdomen soft [Non Tender] : non-tender [Normal Bowel Sounds] : normal bowel sounds [Memory Grossly Normal] : memory grossly normal [Normal Mood] : the mood was normal [de-identified] : ROLLING WALKER, LOWER EXTREMITY EDEMA [de-identified] : SLOWED GAIT, RIGHT FACIAL DROOP

## 2022-06-14 PROBLEM — R26.89 MULTIFACTORIAL GAIT DISORDER: Status: ACTIVE | Noted: 2022-01-01

## 2022-06-14 PROBLEM — G51.0 BELL'S PALSY: Status: ACTIVE | Noted: 2021-06-03

## 2022-06-14 PROBLEM — R29.898 BILATERAL ARM WEAKNESS: Status: ACTIVE | Noted: 2022-01-01

## 2022-06-15 PROBLEM — I45.2 BIFASCICULAR BLOCK: Status: ACTIVE | Noted: 2021-06-29

## 2022-06-15 PROBLEM — Z95.818 PRESENCE OF WATCHMAN LEFT ATRIAL APPENDAGE CLOSURE DEVICE: Status: ACTIVE | Noted: 2021-08-30

## 2022-06-15 PROBLEM — I48.91 AFIB: Status: ACTIVE | Noted: 2021-05-19

## 2022-06-15 NOTE — DISCUSSION/SUMMARY
[FreeTextEntry1] : 1. AFib/AFL on rate control and s/p Watchman. Now on aspirin only as above. ILR suggested low AFIB burden (2%) and good overall HR histogram.\par - c/w current dose of Toprol\par - MARLO for 1-year post watchman assessment on first week of August/2022. Will try to book this with Dr. Duke.\par \par 2. Bifascicular block. No CHB or bradycardia on ILR. Also no recent syncope. \par - No current indication for PPM but may need one in the future if conduction disease progressed. He is approaching dialysis which makes Micra favorable to minimize risk of infection, but also has moderate LV dysfunction so this has to be further evaluated if he developed pacing indication. \par  \par 3. HFrEF. Will r/a EF during MARLO. If in doubt about his EF, can consider TTE to further evaluate. I doubt he is a good ICD candidate but this can be re-addressed based on his EF and in consultation with .

## 2022-06-15 NOTE — REVIEW OF SYSTEMS
[Fever] : no fever [Chills] : no chills [Feeling Fatigued] : feeling fatigued [Dyspnea on exertion] : not dyspnea during exertion [Chest Discomfort] : no chest discomfort [Lower Ext Edema] : lower extremity edema [Leg Claudication] : no intermittent leg claudication [Palpitations] : no palpitations [Orthopnea] : no orthopnea [Syncope] : no syncope [Cough] : no cough [Nausea] : no nausea [Dizziness] : no dizziness [Easy Bleeding] : no tendency for easy bleeding [Easy Bruising] : no tendency for easy bruising

## 2022-06-15 NOTE — PHYSICAL EXAM
[Well Developed] : well developed [No Acute Distress] : no acute distress [Frail] : frail [Normal S1, S2] : normal S1, S2 [No Murmur] : no murmur [Clear Lung Fields] : clear lung fields [No Respiratory Distress] : no respiratory distress  [Soft] : abdomen soft [Edema ___] : edema [unfilled] [No Rash] : no rash [Moves all extremities] : moves all extremities [Alert and Oriented] : alert and oriented

## 2022-06-15 NOTE — HISTORY OF PRESENT ILLNESS
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 Now status post uncomplicated KAROLINA occlusion with Watchman device 7/27/21.\par \par MARLO on 9/20/21 showed well seated device with no deng-device leak or thrombus.  Eliquis was discontinued and started on ASA 81mg and Plavix. Tolerating DAPT with report of bleeding issues. Doing well and remains asymptomatic. ILR reveals AT/AF burden 0.1% since last follow up, many episodes c/w SR with ectopy. No tachy or didi events. PVC burden 2%. \par  \par HPI 6/13/2022. Above from last visit on 10/4/2021. Since then, he has been free from cardiac symptoms (other than leg edema) but had several falls, some needing admission. His Bell's palsy got worse as well as his renal function. Has been on aspirin only since 1/2022 as advised and denies recurrent bleeding. He used to symptom activator of his ILR and this c/w sinus rhythm at 95 bpm. Denies chest pain, palpitation, syncope or presyncope.\par \par EKG 6/13/2022: Probable AFL with 2:1 AVB and bifasicular block\par ILR check showed AF burden to be only 2%. Has good HR trends without high burden of sustained high HR to suggest high burden of AT or AFL.

## 2022-06-17 NOTE — PHYSICAL EXAM
[FreeTextEntry1] : GENERAL PHYSICAL EXAM:\par GEN: no distress, normal affect\par HEENT: NCAT, OP clear, erythema of right ear\par EYES: sclera white, conjunctiva clear, right eye ptosis \par NECK: supple\par CV: normal rhythm\par PULM: no respiratory distress, normal rhythm and effort\par EXT: no edema, no cyanosis\par MSK: muscle tone and strength normal\par SKIN: bilateral lower extremity edema, warm, dry, no rash on exposed skin \par \par NEUROLOGICAL EXAM:\par Mental Status\par Orientation: alert and oriented to person, place, time, and situation, 3/3 recall after 5 minutes\par Language: clear and fluent, intact comprehension and repetition, intact naming and reading\par \par Cranial Nerves\par II: visual fields full to confrontation \par III, IV, VI: PERRL, EOMI\par V, VII: right-sided facial paralysis in V1-V3\par VIII: hearing intact \par IX, X: uvula midline, soft palate elevates normally \par XI: BL shoulder shrug intact \par XII: tongue midline\par \par Motor\par Shoulder abd: 5 (R), 5 (L)\par EF/EE: 5 (R), 5 (L)\par hand : 5 (R), 5 (L)\par HF/HE: 5 (R), 5 (L)\par KF/KE: 5 (R), 5 (L)\par Tone and bulk are normal in upper and lower limbs\par No pronator drift\par \par Sensation\par Decreased sensation to touch on right side of face\par \par Coordination\par Normal FTN bilaterally\par Dysdiadochokinesia not present. \par Able to perform rapid, alternating movements\par \par Gait\par Unsteady gait\par Walks with rolling walker

## 2022-06-17 NOTE — REVIEW OF SYSTEMS
[Chills] : chills [Depression] : depression [Facial Weakness] : facial weakness [Arm Weakness] : arm weakness [Leg Weakness] : leg weakness [Numbness] : numbness [Tingling] : tingling [Eyesight Problems] : eyesight problems [Dry Eyes] : dryness of the eyes [Fever] : no fever [Sleep Disturbances] : no sleep disturbances [Anxiety] : no anxiety [Confused or Disoriented] : no confusion [Memory Lapses or Loss] : no memory loss [Decr. Concentrating Ability] : no decrease in concentrating ability [Difficulty with Language] : no ~M difficulty with language [Hand Weakness] : no hand weakness [Poor Coordination] : good coordination [Seizures] : no convulsions [Fainting] : no fainting [Lightheadedness] : no lightheadedness [Difficulty Walking] : no difficulty walking [Inability to Walk] : able to walk [Ataxia] : no ataxia [Frequent Falls] : not falling [Eye Pain] : no eye pain [Earache] : no earache [Loss Of Hearing] : no hearing loss [Chest Pain] : no chest pain [Palpitations] : no palpitations [Shortness Of Breath] : no shortness of breath [Cough] : no cough [de-identified] : Forgetfulness, bilateral arm and leg weakness

## 2022-06-17 NOTE — DISCUSSION/SUMMARY
[FreeTextEntry1] : Mr. Aguilera is a 81 year-old man with PMH as outlined above who presented to the office today for worsening right-sided facial weakness x 5 weeks, gait disturbances and frequent falls. Plan to perform MRI and MRA head and neck to assess CV VII, VIII, to assess for stroke and carotid stenosis. Referral provided to PT and speech therapy for strengthening, balance and gait training and swallow evaluation. Follow-up with me in one month or sooner should the need arise. All of their questions and concerns were addressed.

## 2022-06-17 NOTE — HISTORY OF PRESENT ILLNESS
[FreeTextEntry1] : Mr. Aguilera is a 81 year-old man with PMH A.fib post Watchman procedure, anxiety and depression, CAD, CHF, CKD stage IV, cataracts, HTN, chronic Bell's palsy who presents to the office today for neurological evaluation. He reports history of Bell's palsy about 2 years ago with improvement in symptoms until about 5 weeks ago, when he began experiencing worsening right-sided facial numbness and weakness. He reports associated dry eyes, blurry vision, difficulty swallowing, generalized weakness, frequent falls and unsteady gait. He denies ear pain. \par \par

## 2022-06-25 NOTE — ED CDU PROVIDER INITIAL DAY NOTE - NS ED ATTENDING STATEMENT MOD
This was a shared visit with the STEPHANY. I reviewed and verified the documentation and independently performed the documented:

## 2022-06-25 NOTE — ED CDU PROVIDER INITIAL DAY NOTE - NS ED ROS FT
Gen: denies fever, chills  Skin: denies rashes, laceration  HEENT: denies visual changes  Respiratory: denies PINA, SOB, cough  Cardiovascular: denies chest pain, palpitations, diaphoresis  GI: denies abdominal pain, n/v/d  : denies dysuria, frequency, bowel/bladder incontinence  MSK: +Right shoulder pain, lower back pain, hip pain.  Neuro: denies headache, dizziness, weakness, numbness

## 2022-06-25 NOTE — ED CDU PROVIDER INITIAL DAY NOTE - PHYSICAL EXAMINATION
LE edema b/l Gen: No acute distress, non toxic.  Eyes: Mucous membranes moist, pink conjunctivae, EOMI. Pupils equal and reactive b/l.   CV: RRR, nl s1/s2.  Resp: CTAB, normal rate and effort. Scattered wheezing b/l. No signs of respiratory distress. No accessory muscle use or retractions.  GI: Abdomen soft, NT, ND. No rebound, no guarding  Neuro: A&O x4, moving all 4 extremities. normal muscle bulk and tone. 5/5 muscle strength upper and lower extremities. sensation intact and symmetrical on all 4 extremities. Right sided facial droop and inability to raise the right eyebrow due to bells palsy. neurovascularly intact.   MSK: ttp over right flank and right hip. Full ROM of upper and lower extremities.   Skin: No rashes, skin intact and well perfused. Cap refill <2sec  Vascular: Dorsalis pedal and radial pulses 2+ b/l. 1+ pitting edema LE b/l. No calf ttp

## 2022-06-25 NOTE — ED CDU PROVIDER INITIAL DAY NOTE - OBJECTIVE STATEMENT
80 yo male with pmhx of HTN, HLD, CHF, stage 4 kidney failure, bell's palsy, Afib (no on AC) presents s/p mechanical fall in his bathroom at 10:30 am this morning. Pt c/o right sided shoulder, back, and hip pain. Pt reports he hit his head against the floor. Denies dizziness, LOC, visual changes, vomiting.     Pt states at baseline he walks with a walker. 82 yo male with pmhx of HTN, HLD, CHF, stage 4 kidney failure, bell's palsy, Afib (not on AC) presents s/p mechanical fall in his bathroom at 10:30 am this morning. Pt c/o right sided shoulder, back, and hip pain. Pt reports he hit his head against the floor. Pt states he walked with a walker at baseline and reports that he has been having falls more frequently recently. Denies dizziness, LOC, visual changes, vomiting, H/A, chest pain, SOB, rashes, lacerations, dysuria, frequency, hematuria, bowel/bladder incontinence.

## 2022-06-25 NOTE — ED PROVIDER NOTE - NS ED ROS FT
Denies f/c/n/v/cp/sob/palpitations/ cough/rash/headache/dizziness/abd.pain/d/c/dysuria/hematuria  +SHOLDER +ELBOW RIGHT PAIN

## 2022-06-25 NOTE — ED PROVIDER NOTE - PHYSICAL EXAMINATION
Head: atraumatic, normacephalic  Face: atraumatic, no crepitus no orbiral/maxillary/mandibular ttp  throat: uvula midline no exudates  eyes: perrla eomi  heart: rrr s1s2  lungs: ctab  abd: soft, nt nd +bs no rebound/guarding no cva ttp  skin: warm  LE: no swelling, no calf ttp; ttp to right hip  UE: RADIAL PULSE 2+ TTP TO RIGHT SHOULDER/elbow   back: no midline cervical/thoracic/lumbar ttp Head: atraumatic, normacephalic  Face: atraumatic, no crepitus no orbiral/maxillary/mandibular ttp  throat: uvula midline no exudates  eyes: perrla eomi  heart: rrr s1s2  lungs: ctab  abd: soft, nt nd +bs no rebound/guarding no cva ttp  skin: warm  LE: no swelling, no calf ttp; ttp to right hip  UE: RADIAL PULSE 2+ TTP TO RIGHT SHOULDER/elbow   back: no midline cervical/thoracic/lumbar ttp  neuro: aaox3  right sided facial droop (bells palsy x 2 years as per pt) strength 5/5 bl

## 2022-06-25 NOTE — ED ADULT NURSE NOTE - OBJECTIVE STATEMENT
Pt A&OX,3 biba after slip and fall in bathroom.  Pt  states he hit back of head, no loc, no blood thinners.  Pt also has small abrasion to right elbow.  Pt has multiple bruises to BUE and BLE.  Pt states he has a clotting disorder.  Pt moving all ext well.  VSS.  Pt has a left facial droop from Desha Palsy X 2 years.

## 2022-06-25 NOTE — ED CDU PROVIDER INITIAL DAY NOTE - MEDICAL DECISION MAKING DETAILS
80 yo male with pmhx of HTN, HLD, CHF, stage 4 kidney failure, bell's palsy, Afib (not on AC) presents s/p mechanical fall in his bathroom at 10:30 am this morning. CT pelvic, lumbar, and chest performed- acute 11th rib fx identified. Pt hemodynamically stable, VSS. Pt in no acute distress. Pt placed in observation for PT eval and possible TAHMINA placement due to multiple falls.

## 2022-06-25 NOTE — ED CDU PROVIDER INITIAL DAY NOTE - ATTENDING APP SHARED VISIT CONTRIBUTION OF CARE
I personally saw the patient with the PA, and completed the key components of the history and physical exam. I then discussed the management plan with the PA.    pt with frequent falls, acute 11th posterior rib fracture otherwise no other acute fracture but several chronic fractures, obs for PT and placement

## 2022-06-25 NOTE — ED ADULT TRIAGE NOTE - CHIEF COMPLAINT QUOTE
Patient BIBA to ED from home with c/o slip and fall from standing height falling backwards at home where he lives with his wife.  Patient c/o pain to his right elbow, right upper back, c/o headache, no LOC, used walker to get around at home and outside of home.  Patient has had recent mechanical falls.

## 2022-06-25 NOTE — ED CDU PROVIDER INITIAL DAY NOTE - NSTIMEPROVIDERCAREINITIATE_GEN_ER
· Refill requested by: Pharmacy Interface  · Last office visit for birth control/estrogen/progestins: 21  · Next office visit: 3/7/22  · Medication(s) Requested: Vida Fe  · Last refill: 21  · Dosage: 1.5/30 mg-mcg  · Sig:  Take 1 tablet by mouth one time daily.   · Quantity requested: #84 with #0 RF  · Last mammogram:  21  Last pap:   Lab Results   Component Value Date    THINPREPRPT  2012     Name: LUISA LOUIS          MRN:     [id number]    :  1977                     Visit#:  [id number]                            Cytology Report        Client: [facility] 4TH FLOOR        Date Specimen Collected: 12            Accession #:  [id number]    Date Specimen Received:  12            Requisition #:[id number]    Date Reported:           3/7/2012 14:03            ______________________________________________________________________________    Cytologic Interpretation :        NEGATIVE FOR INTRAEPITHELIAL LESION OR MALIGNANCY.          Satisfactory for evaluation.  Unable to assess the presence of    endocervical/transformation zone component in specimens exhibiting a    postpartum cell pattern.            Claire Ayala CT(ASCP)    Mihaela Carias CT(ASCP)        ** Electronic Signature (LAE) 3/7/2012 14:03 **        Educational note:  The Pap test is a screening test with a well-recognized    false negative rate.  The best means available to lower the false negative    rate and to detect early cervical lesions is a Pap test at regular intervals.     All ThinPrep Paps will be reviewed with the aid of the ThinPrep Imaging    System, unless otherwise specified.        ______________________________________________________________________________    Clinical Information:    Menstrual Hx:  Post-Partum    Other Clinical Conditions:Previous Pap: 11    Specimen(s) Submitted:     THINPREP PAP        ICD-9 Codes:     V76.2        Fee Codes:     A:  [id number]        PRIOR GYN HISTORY    [id number]     Specimen Class: WP    Status:Signed Out     Accessioned:  6/23/2011              Signed out: 6/27/2011                        Shweta Goff, CT(ASCP)    Specimen(s) Received:     THINPREP PAP        Final Diagnosis         NEGATIVE FOR INTRAEPITHELIAL LESION OR MALIGNANCY.          Satisfactory for evaluation.  Presence of endocervical/transformation zone    component.                [id number]     Specimen Class: WP    Status:Signed Out     Accessioned:  7/1/2010              Signed out: 7/7/2010                        Cheryl Goltz, CT(ASCP)    Specimen(s) Received:     THINPREP PAP        Final Diagnosis         NEGATIVE FOR INTRAEPITHELIAL LESION OR MALIGNANCY.          Satisfactory for evaluation.  Presence of endocervical/transformation zone    component.    Primary screening of this case was performed at [facility],    [address] [place] [address].                [id number]     Specimen Class: WP    Status:Signed Out     Accessioned:  5/14/2009              Signed out: 5/19/2009                        Mihaela Carias, CT(ASCP)    Specimen(s) Received:     THINPREP PAP        Final Diagnosis         NEGATIVE FOR INTRAEPITHELIAL LESION OR MALIGNANCY.    Satisfactory for evaluation.            [id number]     Specimen Class: WP    Status:Signed Out     Accessioned:  3/5/2008              Signed out: 3/10/2008                        Amanda Rodas, CT(ASCP)    Specimen(s) Received:     THINPREP PAP WITH HPV REFLEX-HIGH RISK TYPES        Final Diagnosis         NEGATIVE FOR INTRAEPITHELIAL LESION OR MALIGNANCY.    Satisfactory for evaluation.            [id number]     Specimen Class: WP    Status:Signed Out     Accessioned:  1/25/2007              Signed out: 1/28/2007                        Charles Chacko, CT(ASCP)    Specimen(s) Received:     THINPREP PAP        Final Diagnosis         NEGATIVE FOR INTRAEPITHELIAL LESION  OR MALIGNANCY.    Satisfactory for evaluation.    Absence of endocervical/transformation zone component.                Performing Lab Location (Unless otherwise specified):    [place] [address]      THINDNARPT  2015     Name: LUISA LOUIS          MRN:     [id number]    :  1977                     Visit#:  [id number]                            Cytology Report        Client: [facility]    Submitting Physician:Zuly Paul,         Date Specimen Collected: 08/13/15            Accession #:  [id number]    Date Specimen Received:  08/14/15            Requisition #:[id number]    Date Reported:           2015 16:25    Location:     [facility]                            * Addendum Present *    ______________________________________________________________________________    Cytologic Interpretation :        NEGATIVE FOR INTRAEPITHELIAL LESION OR MALIGNANCY.          Satisfactory for evaluation.  Presence of endocervical/transformation zone    component.    Lack of pertinent clinical information, no menstrual period date (LMP)    provided.            GILBERT Guzmán(ASCP)    GILBERT Ruiz(ASCP)        ** Electronic Signature (MJS) 2015 16:25 **        Educational note:  The Pap test is a screening test with a well-recognized    false negative rate.  The best means available to lower the false negative    rate and to detect early cervical lesions is a Pap test at regular intervals.     All ThinPrep Paps will be reviewed with the aid of the ThinPrep Imaging    System, unless otherwise specified.        ______________________________________________________________________________    Clinical Information:        REASON FOR COLLECTION::SCREENING-;LOW RISK: V76.2    SOURCE::ENDOCERVICAL        Specimen(s) Submitted:     Thin Prep DNA (E-Order, HPV Regardless)        Procedures/Addenda:    HPV DNA Assay ([place]):     Date Ordered:     8/14/2015     Date Reported:8/17/2015        Interpretation                                          High Risk HPV DNA Assay Result:  NONE DETECTED        Results-Comments    The hc2 HPV DNA Test using Hybrid Capture 2 technology is an in vitro nucleic    acid hybridization assay with signal amplification using microplate    chemiluminescence for the qualitative detection of thirteen types of human    papillomavirus, HPV High Risk DNA (types 16,18,31,33,35,39,45,51,52,56,58,59,    and 68), in cervical specimens.  The hc2 HPV DNA Test results should not be    used as the sole basis for clinical assessment and treatment of patients.        Tech Code:  2501                                          [facility] Cytology Department                            ** Electronic Signature ** (MDP) 8/17/2015 15:56 **                ICD Codes:     V76.2 Z12.4        Fee Codes:     A: [id number]     HPV_WI: [id number]        Performing Lab Location (Unless otherwise specified):    [facility]    [address] [place] [address]       ·   Refill if last physical within the last 12 months and up to date on PAP  Filled per protocol   25-Jun-2022 12:15

## 2022-06-26 NOTE — CHART NOTE - NSCHARTNOTEFT_GEN_A_CORE
HUYEN Note: SW made aware PT is recommending [facility] upon d/c. SW met with pt at bedside to discuss. Pt reports he lives in [place] with his wife and would want to go to a facility local to his home. HUYEN attempted to call pt's wife, Alma [phone] to discuss however phone went right to voicemail, message left. SW to circulate referral for [facility]

## 2022-06-26 NOTE — CHART NOTE - NSCHARTNOTEFT_GEN_A_CORE
SW Note: Worker alerted by day  that pts wife, Alma ([phone]) responded to VM left earlier in day. Worker contacted pts wife to discuss [facility] choices. As per Alma, they would prefer to have a [facility] placement near the [facility]. Pts wife requested [facility], but declined sending referral to [facility]. Worker requested pts wifes email to review 2-3 more facilities just in case. Worker provided with email [email or web address]). Pts wife alerted that SW will follow-up in the morning. Worker emailed pts wife a list of facilities in [place]. Pt has straight medicare and will require no auth. Pending bed availability. SW to follow.

## 2022-06-26 NOTE — PROVIDER CONTACT NOTE (OTHER) - ACTION/TREATMENT ORDERED:
PT goals (to be achieved in 2 weeks): Supine <>sit with min A, sit <> stand Sup, amb 100 ft with RW and CG A, and negotiate 2 steps with min A and rail.

## 2022-06-26 NOTE — PHYSICAL THERAPY INITIAL EVALUATION ADULT - GROSSLY INTACT, SENSORY
diminished sensation Left LE toes and foot, Right LE lateral ankle/ heel; otherwise intact to light touch B/L LE

## 2022-06-26 NOTE — PHYSICAL THERAPY INITIAL EVALUATION ADULT - ADDITIONAL COMMENTS
As per pt and his spouse pt with recent decline in function: Pt lives with spouse in a house c 2 [address] c rail and resides on main level. Pt shower in outdoor shower c grab bar through back of house. Pt has family support in the area. Pt amb with RW and independent with functional mobility. Pt has assist for showering, toilet hygiene, LB dressing, and IADLs. Pt's does not drives and owns RW, Rollator, and SAC.

## 2022-06-26 NOTE — PHYSICAL THERAPY INITIAL EVALUATION ADULT - PERTINENT HX OF CURRENT PROBLEM, REHAB EVAL
80 yo male s/p fall hitting right shoulder, back and hip; found to have right rib fracture. Pt with history of frequent falls

## 2022-06-26 NOTE — PHYSICAL THERAPY INITIAL EVALUATION ADULT - ACTIVE RANGE OF MOTION EXAMINATION, REHAB EVAL
Right UE shoulder flexion & Abd 110 deg, elbow extension -15 deg/ flex WFL, hand/ digits WFL. Right UE shoulder flexion & Abd 75 deg, elbow extension -20 deg/ flex WFL, hand/ digits WFL.

## 2022-06-27 NOTE — ED CDU PROVIDER SUBSEQUENT DAY NOTE - ATTENDING APP SHARED VISIT CONTRIBUTION OF CARE
Pt with fall at home with one rib fx.  pt pending PT and SW eval. Pt reports his legs are more swollen this morning. will give extra dose of lasix.
Patient seen on morning rounds.  VSS.  Today with left lower abd pain.  will obtain CT.  Also mild dyspnea.  will repeat CXR.  Otherwise baseline.  family bedside.  Non toxic.  Uneventful ED observation period.

## 2022-06-27 NOTE — H&P ADULT - NSHPPHYSICALEXAM_GEN_ALL_CORE
VITALS:   T(C): 36.7 (06-27-22 @ 15:57), Max: 37.1 (06-27-22 @ 08:38)  HR: 101 (06-27-22 @ 15:57) (72 - 103)  BP: 153/92 (06-27-22 @ 15:57) (122/68 - 153/92)  RR: 20 (06-27-22 @ 15:57) (17 - 20)  SpO2: 99% (06-27-22 @ 15:57) (93% - 99%)    GENERAL: NAD, lying in bed comfortably  HEAD:  Atraumatic, Normocephalic  EYES: EOMI, PERRLA, conjunctiva and sclera clear  ENT: Moist mucous membranes  NECK: Supple, No JVD  CHEST/LUNG: Clear to auscultation bilaterally; No rales, rhonchi, wheezing, or rubs. Unlabored respirations. +LE pitting Edema extending to abdomen. Basilar crackles on exam  HEART: Regular rate and rhythm; No murmurs, rubs, or gallops  ABDOMEN: BSx4; Soft, nontender, nondistended  EXTREMITIES:  2+ Peripheral Pulses, brisk capillary refill. No clubbing, cyanosis, or edema  NERVOUS SYSTEM:  A&Ox3, no focal deficits   SKIN: No rashes or lesions  PSYCH: Normal affect, euthymic mood

## 2022-06-27 NOTE — ED CDU PROVIDER SUBSEQUENT DAY NOTE - PHYSICAL EXAMINATION
Gen: No acute distress, non toxic.  Eyes: Mucous membranes moist, pink conjunctivae, EOMI. Pupils equal and reactive b/l.   CV: RRR, nl s1/s2.  Resp: CTAB, normal rate and effort. Scattered wheezing b/l. No signs of respiratory distress. No accessory muscle use or retractions.  GI: Abdomen soft, NT, ND. No rebound, no guarding  Neuro: A&O x4, moving all 4 extremities. normal muscle bulk and tone. 5/5 muscle strength upper and lower extremities. sensation intact and symmetrical on all 4 extremities. Right sided facial droop and inability to raise the right eyebrow due to bells palsy. neurovascularly intact.   MSK: ttp over right flank and right hip. Full ROM of upper and lower extremities.   Skin: No rashes, skin intact and well perfused. Cap refill <2sec  Vascular: Dorsalis pedal and radial pulses 2+ b/l. 1+ pitting edema LE b/l. No calf ttp
Gen: No acute distress, non toxic.  Eyes: Mucous membranes moist, pink conjunctivae, EOMI. Pupils equal and reactive b/l.   CV: RRR, nl s1/s2.  Resp: CTAB, normal rate and effort. Scattered wheezing b/l. No signs of respiratory distress. No accessory muscle use or retractions.  GI: Abdomen soft, NT, ND. No rebound, no guarding  Neuro: A&O x4, moving all 4 extremities. normal muscle bulk and tone. 5/5 muscle strength upper and lower extremities. sensation intact and symmetrical on all 4 extremities. Right sided facial droop and inability to raise the right eyebrow due to bells palsy. neurovascularly intact.   MSK: ttp over right flank and right hip. Full ROM of upper and lower extremities.   Skin: No rashes, skin intact and well perfused. Cap refill <2sec  Vascular: Dorsalis pedal and radial pulses 2+ b/l. 1+ pitting edema LE b/l. No calf ttp

## 2022-06-27 NOTE — H&P ADULT - NSHPLABSRESULTS_GEN_ALL_CORE
9.0    9.88   )----------(  180       ( 27 Jun 2022 15:36 )               30.3      137    |  100    |  53.5   ----------------------------<  177        ( 27 Jun 2022 15:36 )  4.9     |  19.0   |  2.46     Ca    8.7        ( 27 Jun 2022 15:36 )  Mg     2.0       ( 27 Jun 2022 15:36 )    TPro  6.9    /  Alb  3.8    /  TBili  0.7    /  DBili  x      /  AST  30     /  ALT  19     /  AlkPhos  194    ( 27 Jun 2022 15:36 )    LIVER FUNCTIONS - ( 27 Jun 2022 15:36 )  Alb: 3.8 g/dL / Pro: 6.9 g/dL / ALK PHOS: 194 U/L / ALT: 19 U/L / AST: 30 U/L / GGT: x               CAPILLARY BLOOD GLUCOSE    CARDIAC MARKERS ( 27 Jun 2022 15:36 )  x     / 0.11 ng/mL / x     / x     / x

## 2022-06-27 NOTE — CONSULT NOTE ADULT - PROBLEM SELECTOR RECOMMENDATION 2
- In atrial flutter with RVR in setting of decompensated CHF.   - S/p Watchman device, no longer on AC.   - Continue metoprolol, will increase when less decompensated.   - Obtain TTE.   - Continue telemetry monitoring.

## 2022-06-27 NOTE — ED CDU PROVIDER SUBSEQUENT DAY NOTE - NS ED ROS FT
Gen: denies fever, chills  Skin: denies rashes, laceration  HEENT: denies visual changes  Respiratory: denies PINA, SOB, cough  Cardiovascular: denies chest pain, palpitations, diaphoresis  GI: denies abdominal pain, n/v/d  : denies dysuria, frequency, bowel/bladder incontinence  MSK: +Right shoulder pain, lower back pain, hip pain.  Neuro: denies headache, dizziness, weakness, numbness
Gen: denies fever, chills  Skin: denies rashes, laceration  HEENT: denies visual changes  Respiratory: denies PINA, SOB, cough  Cardiovascular: denies chest pain, palpitations, diaphoresis  GI: denies abdominal pain, n/v/d  : denies dysuria, frequency, bowel/bladder incontinence  MSK: +Right shoulder pain, lower back pain, hip pain.  Neuro: denies headache, dizziness, weakness, numbness

## 2022-06-27 NOTE — ED CDU PROVIDER SUBSEQUENT DAY NOTE - MEDICAL DECISION MAKING DETAILS
80 yo male with pmhx of HTN, HLD, CHF, stage 4 kidney failure, bell's palsy, Afib (not on AC) presents s/p mechanical fall in his bathroom at 10:30 am this morning. CT pelvic, lumbar, and chest performed- acute 11th rib fx identified. Pt hemodynamically stable, VSS. Pt in no acute distress. Pt placed in observation for PT eval and possible [facility] placement due to multiple falls.
kristen jo, [facility] placement

## 2022-06-27 NOTE — CONSULT NOTE ADULT - SUBJECTIVE AND OBJECTIVE BOX
[facility]                                              CARDIOLOGY AT [place] [address]                                             Telephone: [phone]. Fax:[phone]                                                       CARDIOLOGY CONSULTATION NOTE                                                                                             History obtained by: Patient and medical record  Community Cardiologist: Dr. 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 Patient states that he was in the bathroom, slipped and fell down. Patient immediately began having right shoulder, back, and hip pain. Patient denies any loss of consciousness before or after the fall. Patient states he has also been experiencing worsening leg swelling recently, and since he has been in the ER has been having worsening abdominal swelling. Patient was admitted for further management. Patient denies any fevers, chills, CP, syncope, N/V/D, headache, or dizziness.       CARDIAC TESTING   ECHO:  < from: MARLO Echo Doppler (09.20.21 @ 13:01) >  PHYSICIAN INTERPRETATION:  Left Ventricle:  Global LV systolic function was mildly decreased. Left ventricular ejection fraction, by visual estimation, is 40 to 45%.  Right Ventricle: The right ventricular size is moderately enlarged. RV systolic function is severely reduced.  Left Atrium: Severely enlarged left atrium. A well-seated Watchman device present without overlying thrombus and no evidence of deng-device leak.  Right Atrium: Severely enlarged right atrium.  Pericardium: There is no evidence of pericardial effusion.  Mitral Valve: The mitral valve is degenerative in appearance. Moderate mitral valve regurgitation is seen.  Tricuspid Valve: Structurally normal tricuspid valve, with normal leaflet excursion. Mild tricuspid regurgitation is visualized.  Aortic Valve: The aortic valve is trileaflet. No evidence of aortic stenosis.  Pulmonic Valve: The pulmonic valve is normal. Mild pulmonic valve regurgitation.  Aorta: Simple atheroma seen in the aortic arch and descending aorta. There is mild aortic root calcification.  Venous: The pulmonary veins appear normal.  Shunts: A small patent foramen ovale is detected, with predominantly right to left shunting across the atrial septum.  In comparison to the previous echocardiogram(s): Prior examinations are available and were reviewed for comparison purposes. Compared to the prior MARLO study from 7/27/21, LV systolic function remain mild-to-moderately reduced and known RV dysfunction. Well-seated Watchman without deng-device leak or overlying thrombus.      Summary:   1. Left ventricular ejection fraction, by visual estimation, is 40 to 45%.   2. Mildly decreased global left ventricular systolic function.   3. Moderately enlarged right ventricle with severely reduced systolic function, estimatd RVSP least 52 mmHg.   4. Severely enlarged left atrium.   5. Severely enlarged right atrium.   6. A well-seated Watchman device present without overlying thrombus and no evidence of deng-device leak.   7. Degenerative mitral valve.   8. Moderate mitral valve regurgitation.   9. Mild tricuspid regurgitation.  10. Mild pulmonic valve regurgitation.  11. Small patent foramen ovale, with predominantly right to left shunting across the atrial septum.  12. There is mild aortic root calcification with simple atheroma at the aortic arch and descending aorta.  13. There is no evidence of pericardial effusion.  14. Compared to the prior MARLO study from 7/27/21, LV systolic function remain mild-to-moderately reduced and known RV dysfunction. Well-seated Watchman without deng-device leak or overlyingthrombus.    Gamal Mirza DO Electronically signed on 9/20/2021 at 1:22:31 PM      < end of copied text >    STRESS:  < from: Nuclear Stress Test-Pharmacologic (05.10.21 @ 07:59) >  NUCLEAR FINDINGS:  Review of raw data shows: The study is of good technical  quality.  There is a medium sized, moderate defect in basal to mid  inferior wall that is fixed, suggestive of infarct. No  evidence of ischemia.  Dilated right ventricular noted.  ------------------------------------------------------------------------      GATED ANALYSIS:  Post-stress resting myocardial perfusion gated SPECT  imaging was performed (LVEF = 41 %;LVEDV = 141 ml.)  ------------------------------------------------------------------------    IMPRESSIONS:  * Myocardial Perfusion SPECT results are abnormal.  * There is a medium sized, moderate defect in basal to mid  inferior wall that is fixed, suggestive of infarct. No  evidence of ischemia.  * Post-stress resting myocardial perfusion gated SPECT  imaging was performed (LVEF = 41 %;LVEDV = 141 ml.)  * Dilated right ventricle noted.    No prior study is available for comparison.  ------------------------------------------------------------------------      ------------------------------------------------------------------------    Confirmed on  5/10/2021 - 14:21:33 by Gamal Duke MD  Cardiology Fellow: Elmer JOLLEYP-C  ------------------------------------------------------------------------    < end of copied text >        PAST MEDICAL HISTORY  Coronary artery disease involving native heart, angina presence unspecified, unspecified vessel or lesion type    Atrial fibrillation, unspecified type    Congestive heart failure, unspecified HF chronicity, unspecified heart failure type    Bell&#x27;s palsy    Essential hypertension    Type 2 diabetes mellitus without complication, unspecified whether long term insulin use    Osteomyelitis of sternum    Chronic kidney disease, unspecified CKD stage    History of subdural hematoma    Anemia secondary to renal failure    GIB (gastrointestinal bleeding)    Bifascicular block    Squamous cell skin cancer        PAST SURGICAL HISTORY  S/P CABG (coronary artery bypass graft)    History of loop recorder    Presence of Watchman left atrial appendage closure device    S/P skin cancer resection        SOCIAL HISTORY:  Denies smoking/alcohol/drugs      FAMILY HISTORY:  No pertinent family history in first degree relatives  cad      Family History of Cardiovascular Disease:  Yes [  ] No [  ]  Coronary Artery Disease in first degree relative: Yes [  ] No [  ]  Sudden Cardiac Death in First degree relative: Yes [  ] No [  ]    HOME MEDICATIONS:  amLODIPine 10 mg oral tablet: 1 tab(s) orally once a day (30 Sep 2021 21:36)  atorvastatin 40 mg oral tablet: 1 tab(s) orally once a day (30 Sep 2021 21:36)  hydrALAZINE 100 mg oral tablet: 1 tab(s) orally 3 times a day (30 Sep 2021 21:36)  Lantus 100 units/mL subcutaneous solution: 17 unit(s) subcutaneous 2 times a day (30 Sep 2021 21:36)  omeprazole 40 mg oral delayed release capsule: 1 cap(s) orally 2 times a day (30 Sep 2021 21:36)  sertraline 100 mg oral tablet: 1 tab(s) orally once a day (30 Sep 2021 21:36)  torsemide 20 mg oral tablet: 1 tab(s) orally once a day (30 Sep 2021 21:36)      CURRENT CARDIAC MEDICATIONS:  furosemide   Injectable 20 milliGRAM(s) IV Push once, Stop order after: 1 Doses  hydrALAZINE 100 milliGRAM(s) Oral three times a day  metoprolol succinate ER 25 milliGRAM(s) Oral daily      CURRENT OTHER MEDICATIONS:  ALBUTerol    90 MICROgram(s) HFA Inhaler 2 Puff(s) Inhalation four times a day  buPROPion XL (24-Hour) . 150 milliGRAM(s) Oral daily  aspirin enteric coated 81 milliGRAM(s) Oral daily  atorvastatin 40 milliGRAM(s) Oral at bedtime  dextrose 5%. 1000 milliLiter(s) (50 mL/Hr) IV Continuous <Continuous>  dextrose 5%. 1000 milliLiter(s) (100 mL/Hr) IV Continuous <Continuous>  dextrose 50% Injectable 25 Gram(s) IV Push once, Stop order after: 1 Doses  dextrose 50% Injectable 12.5 Gram(s) IV Push once, Stop order after: 1 Doses  dextrose 50% Injectable 25 Gram(s) IV Push once, Stop order after: 1 Doses  dextrose Oral Gel 15 Gram(s) Oral once, Stop order after: 1 Doses PRN Blood Glucose LESS THAN 70 milliGRAM(s)/deciliter  enoxaparin Injectable 30 milliGRAM(s) SubCutaneous every 24 hours  glucagon  Injectable 1 milliGRAM(s) IntraMuscular once, Stop order after: 1 Doses  insulin glargine Injectable (LANTUS) 10 Unit(s) SubCutaneous every morning  insulin glargine Injectable (LANTUS) 10 Unit(s) SubCutaneous at bedtime      ALLERGIES:   No Known Allergies      REVIEW OF SYMPTOMS:   CONSTITUTIONAL: No fever, no chills, no weight loss, no weight gain, no fatigue   ENMT:  No vertigo; No sinus or throat pain  NECK: No pain or stiffness  CARDIOVASCULAR: AS PER HPI  RESPIRATORY: AS PER HPI  : No dysuria, no hematuria   GI: No dark color stool, no nausea, no diarrhea, no constipation, no abdominal pain   NEURO: No headache, no slurred speech   MUSCULOSKELETAL: No joint pain or swelling; No muscle, back, or extremity pain  PSYCH: No agitation, no anxiety.    ALL OTHER REVIEW OF SYSTEMS ARE NEGATIVE.    VITAL SIGNS:  T(C): 37.1 (06-27-22 @ 11:18), Max: 37.1 (06-27-22 @ 08:38)  T(F): 98.7 (06-27-22 @ 11:18), Max: 98.8 (06-27-22 @ 08:38)  HR: 98 (06-27-22 @ 13:11) (72 - 103)  BP: 137/87 (06-27-22 @ 13:11) (122/68 - 148/90)  RR: 17 (06-27-22 @ 13:11) (17 - 20)  SpO2: 95% (06-27-22 @ 13:11) (93% - 98%)    INTAKE AND OUTPUT:     06-26 @ 07:01  -  06-27 @ 07:00  --------------------------------------------------------  IN: 0 mL / OUT: 600 mL / NET: -600 mL        PHYSICAL EXAM:  Constitutional: Comfortable . No acute distress.   HEENT: Atraumatic and normocephalic , neck is supple . no JVD. No carotid bruit.  CNS: A&Ox3. No focal deficits. Right sided facial droop  Respiratory: Decreased at b/l bases  Cardiovascular: Tachycardic,  No rubs or gallop. II/VI systolic murmur apex  Gastrointestinal: Soft, non-tender. +Bowel sounds.  Extremities: 2+ Peripheral Pulses, No clubbing, cyanosis, 3+ b/l LE edema  Psychiatric: Calm . no agitation.   Skin: Warm and dry, no ulcers on extremities     LABS:                            9.7    8.37  )-----------( 191      ( 25 Jun 2022 15:07 )             32.4     06-25    138  |  101  |  58.4<H>  ----------------------------<  75  4.6   |  21.0<L>  |  2.69<H>    Ca    9.3      25 Jun 2022 15:07    TPro  7.4  /  Alb  4.3  /  TBili  0.9  /  DBili  x   /  AST  26  /  ALT  18  /  AlkPhos  190<H>  06-25    PT/INR - ( 25 Jun 2022 15:07 )   PT: 14.4 sec;   INR: 1.24 ratio         PTT - ( 25 Jun 2022 15:07 )  PTT:30.1 sec            INTERPRETATION OF TELEMETRY:     ECG: Atrial flutter with RVR, RBBB  Prior ECG: Yes [ x] No [  ]    RADIOLOGY & ADDITIONAL STUDIES:    X-ray:    < from: MARLO Echo Doppler (09.20.21 @ 13:01) >  PHYSICIAN INTERPRETATION:  Left Ventricle:  Global LV systolic function was mildly decreased. Left ventricular ejection fraction, by visual estimation, is 40 to 45%.  Right Ventricle: The right ventricular size is moderately enlarged. RV systolic function is severely reduced.  Left Atrium: Severely enlarged left atrium. A well-seated Watchman device present without overlying thrombus and no evidence of deng-device leak.  Right Atrium: Severely enlarged right atrium.  Pericardium: There is no evidence of pericardial effusion.  Mitral Valve: The mitral valve is degenerative in appearance. Moderate mitral valve regurgitation is seen.  Tricuspid Valve: Structurally normal tricuspid valve, with normal leaflet excursion. Mild tricuspid regurgitation is visualized.  Aortic Valve: The aortic valve is trileaflet. No evidence of aortic stenosis.  Pulmonic Valve: The pulmonic valve is normal. Mild pulmonic valve regurgitation.  Aorta: Simple atheroma seen in the aortic arch and descending aorta. There is mild aortic root calcification.  Venous: The pulmonary veins appear normal.  Shunts: A small patent foramen ovale is detected, with predominantly right to left shunting across the atrial septum.  In comparison to the previous echocardiogram(s): Prior examinations are available and were reviewed for comparison purposes. Compared to the prior MARLO study from 7/27/21, LV systolic function remain mild-to-moderately reduced and known RV dysfunction. Well-seated Watchman without deng-device leak or overlying thrombus.      Summary:   1. Left ventricular ejection fraction, by visual estimation, is 40 to 45%.   2. Mildly decreased global left ventricular systolic function.   3. Moderately enlarged right ventricle with severely reduced systolic function, estimatd RVSP least 52 mmHg.   4. Severely enlarged left atrium.   5. Severely enlarged right atrium.   6. A well-seated Watchman device present without overlying thrombus and no evidence of deng-device leak.   7. Degenerative mitral valve.   8. Moderate mitral valve regurgitation.   9. Mild tricuspid regurgitation.  10. Mild pulmonic valve regurgitation.  11. Small patent foramen ovale, with predominantly right to left shunting across the atrial septum.  12. There is mild aortic root calcification with simple atheroma at the aortic arch and descending aorta.  13. There is no evidence of pericardial effusion.  14. Compared to the prior MARLO study from 7/27/21, LV systolic function remain mild-to-moderately reduced and known RV dysfunction. Well-seated Watchman without deng-device leak or overlyingthrombus.    Gamal Mirza DO Electronically signed on 9/20/2021 at 1:22:31 PM      < end of copied text >    CT scan:   < from: CT Abdomen and Pelvis No Cont (06.27.22 @ 11:10) >  FINDINGS:  LOWER CHEST: Cardiomegaly with coronary calcification. Right   gynecomastia. Small bilateral pleural effusions.    LIVER: Within normal limits.  BILE DUCTS: Normal caliber.  GALLBLADDER: Cholelithiasis.  SPLEEN: Within normal limits.  PANCREAS: Within normal limits.  ADRENALS: Within normal limits.  KIDNEYS/URETERS: Stable left renal cysts including hyperdense cyst.    BLADDER: Urachal remnant/urachal cyst with calcification in the midline   bladder dome similar to prior.  REPRODUCTIVE ORGANS: Enlarged prostate    BOWEL: No bowel obstruction. Colonic diverticulosis without acute   diverticulitis. Periampullary diverticulum. Embolization coils in the   gastroduodenal region Appendix no appendicitis  PERITONEUM: Volume of abdominal pelvic ascites. No organized fluid   collection or extraluminal gas  VESSELS: Nonaneurysmal  RETROPERITONEUM/LYMPH NODES: No lymphadenopathy.  ABDOMINAL WALL: Lower anterior abdominal wall hernia repair. Extensive   anasarca.  BONES: Senescent changes. Stable chronic L1 compression deformity.    IMPRESSION:  No specific acuteinflammatory obstructive pathology.    Small ascites. Extensive anasarca    Colonic diverticulosis without acute diverticulitis.    Enlarged prostate gland.    Additional findings as discussed    < end of copied text >    MRI:   US:

## 2022-06-27 NOTE — ED CDU PROVIDER SUBSEQUENT DAY NOTE - NSICDXFAMILYHX_GEN_ALL_CORE_FT
FAMILY HISTORY:  No pertinent family history in first degree relatives, cad    
FAMILY HISTORY:  No pertinent family history in first degree relatives, cad

## 2022-06-27 NOTE — ED CDU PROVIDER SUBSEQUENT DAY NOTE - NS ED ATTENDING STATEMENT MOD
This was a shared visit with the STEPHANY. I reviewed and verified the documentation and independently performed the documented:
This was a shared visit with the STEPHANY. I reviewed and verified the documentation and independently performed the documented:

## 2022-06-27 NOTE — ED ADULT NURSE REASSESSMENT NOTE - NS ED NURSE REASSESS COMMENT FT1
Assumed care from previous RN at 2100, POC reviewed. Pt brought to the bathroom at this time. Pt AOx4, speaking coherently, respirations even and unlabored on RA, skin warm and dry. Pt stated he has slight pain at the rib fracture site, but bearable, denies CP, SOB, dizziness, N/V/D, fevers, chills. PMH of bells palsy which is currently active. CM and  in place, bed in the lowest position, side rails up
Assuming care from HARMEET Castaneda, Pt is A&O4, dysphagia screen performed, NIH performed Q4 at change of shift, pt has a hx of bells palsy which is noticeable at the bedside, pt ambulates with a walker over short distances, review of patients history, reason for admission, medication administered, tests performed/to be performed reviewed, introduced to pt at bedside, provided follow up information at change of shift, IV site assessed, clean dry and intact, updated patient as to the plan of care and disposition, pt educated about plan of care to admit to [facility], pt on bedside pulse oximetry, pt educated on current plan of care and education deemed successful after teach back shows proficiency. Pt provided ample time for question at answers at conclusion of interview. Bed is locked, in the lowest position, side rails are up and call bell is within reach.
Pt A&OX4.  Assumed care of pt at 1930.  PT calm at this time, noted with obvious facial drool. No complaints of pain or discomfort.  Will continue to monitor.
Pt sleeping comfortably, arouses to voice, AOx4, speaking coherently, respirations even and unlabored on RA, skin warm and dry. FS and meds performed. Bed remains locked and in the lowest position, side rails up.
Pt sleeping, resting comfortably, arouses to voice. Pt AOx4, speaking coherently, respirations even and unlabored on RA, skin warm and dry. Pt denies HA, dizziness, N/V/D, SOB, CP, fever chills.  in place, bed locked and in the lowest position, side rails up.
Pt awake and alert, Pt awaiting SW consult and PT eval, Pt remains on continuous .
Report received from ED RN. Patient seen and assessed at bedside. Patient is A&Ox4. Pt in no apparent distress. Pt denies pain. PIV noted, intact & WNL. Plan of care reviewed. Call bell within reach. Safety maintained. Will continue to closely monitor. Pt placed on .
Trop value of 0.11 received Dr. Damian called and made aware. Pt on cardiac monitor, sinus tachycardia 101, denies chest pain at this time. 97% on room air. Wife at bedside. Safety maintained. Will continue to monitor.
Pt awake and alert x4, VSS afebrile. Pt assisted to bathroom with walker. Safety maintained. Will continue to monitor.
Assumed care of the patient @2135. Pt A&Ox4. VSS afebrile. Assisted OOB to chair.  Patient in understanding of plan of care. Patient with no further questions for the RN. Resting in comfort. Call bell within reach and encouraged to use when assistance needed. Will continue to monitor. Safety maintained.
Assumed care of the patient @0361. Pt A&Ox4, VSS afebrile. Pt c/o abdominal tenderness Dr Velásquez made aware. CT pending. Wife at bedside. Will continue to monitor.

## 2022-06-27 NOTE — CONSULT NOTE ADULT - PROBLEM SELECTOR RECOMMENDATION 3
- Continue hydralazine and metoprolol.   - D/C norvasc.   - If further BP control is needed start isosordil.

## 2022-06-27 NOTE — ED ADULT NURSE REASSESSMENT NOTE - CARDIO ASSESSMENT
Problem: Potential for Falls  Goal: Patient will remain free of falls  Description: INTERVENTIONS:  - Educate patient/family on patient safety including physical limitations  - Instruct patient to call for assistance with activity   - Consult OT/PT to assist with strengthening/mobility   - Keep Call bell within reach  - Keep bed low and locked with side rails adjusted as appropriate  - Keep care items and personal belongings within reach  - Initiate and maintain comfort rounds  - Make Fall Risk Sign visible to staff  - Offer Toileting every  Hours, in advance of need  - Initiate/Maintain alarm  - Obtain necessary fall risk management equipment:   - Apply yellow socks and bracelet for high fall risk patients  - Consider moving patient to room near nurses station  Outcome: Progressing     Problem: PAIN - ADULT  Goal: Verbalizes/displays adequate comfort level or baseline comfort level  Description: Interventions:  - Encourage patient to monitor pain and request assistance  - Assess pain using appropriate pain scale  - Administer analgesics based on type and severity of pain and evaluate response  - Implement non-pharmacological measures as appropriate and evaluate response  - Consider cultural and social influences on pain and pain management  - Notify physician/advanced practitioner if interventions unsuccessful or patient reports new pain  Outcome: Progressing     Problem: INFECTION - ADULT  Goal: Absence or prevention of progression during hospitalization  Description: INTERVENTIONS:  - Assess and monitor for signs and symptoms of infection  - Monitor lab/diagnostic results  - Monitor all insertion sites, i e  indwelling lines, tubes, and drains  - Monitor endotracheal if appropriate and nasal secretions for changes in amount and color  - [place] appropriate cooling/warming therapies per order  - Administer medications as ordered  - Instruct and encourage patient and family to use good hand hygiene technique  -
Identify and instruct in appropriate isolation precautions for identified infection/condition  Outcome: Progressing  Goal: Absence of fever/infection during neutropenic period  Description: INTERVENTIONS:  - Monitor WBC    Outcome: Progressing     Problem: SAFETY ADULT  Goal: Patient will remain free of falls  Description: INTERVENTIONS:  - Educate patient/family on patient safety including physical limitations  - Instruct patient to call for assistance with activity   - Consult OT/PT to assist with strengthening/mobility   - Keep Call bell within reach  - Keep bed low and locked with side rails adjusted as appropriate  - Keep care items and personal belongings within reach  - Initiate and maintain comfort rounds  - Make Fall Risk Sign visible to staff  - Offer Toileting every  Hours, in advance of need  - Initiate/Maintain alarm  - Obtain necessary fall risk management equipment:   - Apply yellow socks and bracelet for high fall risk patients  - Consider moving patient to room near nurses station  Outcome: Progressing  Goal: Maintain or return to baseline ADL function  Description: INTERVENTIONS:  -  Assess patient's ability to carry out ADLs; assess patient's baseline for ADL function and identify physical deficits which impact ability to perform ADLs (bathing, care of mouth/teeth, toileting, grooming, dressing, etc )  - Assess/evaluate cause of self-care deficits   - Assess range of motion  - Assess patient's mobility; develop plan if impaired  - Assess patient's need for assistive devices and provide as appropriate  - Encourage maximum independence but intervene and supervise when necessary  - Involve family in performance of ADLs  - Assess for home care needs following discharge   - Consider OT consult to assist with ADL evaluation and planning for discharge  - Provide patient education as appropriate  Outcome: Progressing  Goal: Maintains/Returns to pre admission functional level  Description:
INTERVENTIONS:  - Perform BMAT or MOVE assessment daily    - Set and communicate daily mobility goal to care team and patient/family/caregiver  - Collaborate with rehabilitation services on mobility goals if consulted  - Perform Range of Motion  times a day  - Reposition patient every  hours  - Dangle patient  times a day  - Stand patient  times a day  - Ambulate patient  times a day  - Out of bed to chair  times a day   - Out of bed for meals times a day  - Out of bed for toileting  - Record patient progress and toleration of activity level   Outcome: Progressing     Problem: DISCHARGE PLANNING  Goal: Discharge to home or other facility with appropriate resources  Description: INTERVENTIONS:  - Identify barriers to discharge w/patient and caregiver  - Arrange for needed discharge resources and transportation as appropriate  - Identify discharge learning needs (meds, wound care, etc )  - Arrange for interpretive services to assist at discharge as needed  - Refer to Case Management Department for coordinating discharge planning if the patient needs post-hospital services based on physician/advanced practitioner order or complex needs related to functional status, cognitive ability, or social support system  Outcome: Progressing     Problem: Knowledge Deficit  Goal: Patient/family/caregiver demonstrates understanding of disease process, treatment plan, medications, and discharge instructions  Description: Complete learning assessment and assess knowledge base    Interventions:  - Provide teaching at level of understanding  - Provide teaching via preferred learning methods  Outcome: Progressing
---
---

## 2022-06-27 NOTE — H&P ADULT - HISTORY OF PRESENT ILLNESS
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 Was planned for a discharge to [facility] however found to have worsening LE edema, Abdominal Swelling, and increased work of breathing.  Admitted for acute on chronic HFrEF.    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 Patient reports having several mechanical falls over last few months since moving up from [place]. In the ED found to have right posterior rib fracture on imaging. No PTX. Was planned for a discharge to [facility] however found to have worsening LE edema, Abdominal Swelling, and increased work of breathing.      Per patient, has SOB at baseline on exertion. States that his SOB has been a little worse over last 3 days. Furthermore describes history of LE edema that comes and goes. Also having worsened over last week and present at time of presentation to ED.  Denies CP, nausea, vomiting, fevers, chills nausea.     Admitted for acute on chronic HFrEF.

## 2022-06-27 NOTE — ED CDU PROVIDER SUBSEQUENT DAY NOTE - HISTORY
Pt resting comfortably at time of re-assessment. No events overnight. Pending PT consult. Will continue to monitor.
TAHMINA placement

## 2022-06-27 NOTE — ED CDU PROVIDER SUBSEQUENT DAY NOTE - WR INTERPRETATION 1
MSK XR negative - No fracture, No dislocation, No foreign body
MSK XR negative - No fracture, No dislocation, No foreign body

## 2022-06-27 NOTE — ED CDU PROVIDER DISPOSITION NOTE - CLINICAL COURSE
82 yo male with pmhx of HTN, HLD, CHF, stage 4 kidney failure, bell's palsy, Afib (not on AC) presents s/p mechanical fall in his bathroom at 10:30 am this morning. Pt c/o right sided shoulder, back, and hip pain. Pt reports he hit his head against the floor. Pt states he walked with a walker at baseline and reports that he has been having falls more frequently recently. pt Is keep on the obs for PT eval and placement. pt today states he si breathing is ok . as per PT and pt's wife and  he has more leg swollen x 1 week and abdominal swollen and left side of the abd pain x 1 week . CT with anasarca and CT chest With pulmonary edema and plural effusion and isolated rib 11-right  fracture . I have consult SSC and will admit the pt

## 2022-06-27 NOTE — ED CDU PROVIDER SUBSEQUENT DAY NOTE - PROGRESS NOTE DETAILS
Tee: pt seen at bedside. His pain is "getting a little better." His breathing feels okay, using his inhaler. Decreased breath sounds at R lung base - will order repeat CXR CT abd and pelvis With anasarca and Acities and other finding d.c with pt and pt's wife at the bed side . as per pt he had legs dependent edema worsen x 1 week and  his abdomen got more swollen more on the left side x 3 days . he is f.u with DR pang cardiologist , [facility] consult - as per wife at th bed side they want this issue been Address before goes to the [facility]   new labs and lasix 40 MG IV - will admit the pt

## 2022-06-27 NOTE — H&P ADULT - ASSESSMENT
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  Admitted for Acute on Chronic HFrEF.    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 Has had several falls in recent weeks.  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  Admitted for Acute on Chronic HFrEF.    AOC HFrEF with peripheral edema and anasarca on abdominal imaging on exam, increased SOB  Cardiology consulted in ED  IV Lasix  Strict IO, Daily weight, 1.5L restriction  Troponin, EKG  TTE     CAD s/p CABG  ASA, Statin    HTN  HLD  hydral, amlo, statin    DM type 2 uncontrolled with hyperglycemia  Lantus 10 Daily + ISS  Diabetic diet    Lower Extremity Edema - Likely secondary to CHF  Will obtain US LE to r/o DVT    Posterior T11 fracture  No PTX  Conservative management  pain control     KEN on CKD 3 3b  Suspect cardiorenal  Renal US to rule out obstruction     Falls  Patient severely deconditioned, has walker at home but frequently does not use it per wife. Has had several falls in recent weeks.  Pending TAHMINA once medically optimized     Dispo: Admit to [facility]

## 2022-06-27 NOTE — CONSULT NOTE ADULT - PROBLEM SELECTOR RECOMMENDATION 9
- Acute decompensated with EF 35-40%.   - Patient not given torsemide while in the ER.   - Lasix 40mg IV x 1 given today, will give an additional 20mg IV today.   - Start Lasix 60mg IV BID.   - Continue metoprolol 25mg PO BID and hydralazine.   - Unable to add isosordil due to orthostasis.   - D/C norvasc.   - Obtain TTE.   - Monitor on telemetry.    - Strict i/o and daily weights.    - Keep K > 4, Mg > 2.   - Monitor renal function with ongoing diuresis.

## 2022-06-27 NOTE — ED ADULT NURSE REASSESSMENT NOTE - GENERAL PATIENT STATE
comfortable appearance/cooperative
comfortable appearance/cooperative
comfortable appearance/family/SO at bedside

## 2022-06-27 NOTE — ED CDU PROVIDER SUBSEQUENT DAY NOTE - NSICDXPASTMEDICALHX_GEN_ALL_CORE_FT
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 Recent BL cr 2.0-2.5    Congestive heart failure, unspecified HF chronicity, unspecified heart failure type EF 35-40%    Coronary artery disease involving native heart, angina presence unspecified, unspecified vessel or lesion type nuc stress 5/2021 negative    Essential hypertension     GIB (gastrointestinal bleeding) s/p coil embolization    Osteomyelitis of sternum     Squamous cell skin cancer     Type 2 diabetes mellitus without complication, unspecified whether long term insulin use insulin plus orals    
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 Recent BL cr 2.0-2.5    Congestive heart failure, unspecified HF chronicity, unspecified heart failure type EF 35-40%    Coronary artery disease involving native heart, angina presence unspecified, unspecified vessel or lesion type nuc stress 5/2021 negative    Essential hypertension     GIB (gastrointestinal bleeding) s/p coil embolization    Osteomyelitis of sternum     Squamous cell skin cancer     Type 2 diabetes mellitus without complication, unspecified whether long term insulin use insulin plus orals

## 2022-06-27 NOTE — ED CDU PROVIDER SUBSEQUENT DAY NOTE - NSICDXPASTSURGICALHX_GEN_ALL_CORE_FT
PAST SURGICAL HISTORY:  History of loop recorder     Presence of Watchman left atrial appendage closure device     S/P CABG (coronary artery bypass graft) complicated by osteomyleitis of the sternum and sepsis; sternum removed    S/P skin cancer resection     
PAST SURGICAL HISTORY:  History of loop recorder     Presence of Watchman left atrial appendage closure device     S/P CABG (coronary artery bypass graft) complicated by osteomyleitis of the sternum and sepsis; sternum removed    S/P skin cancer resection

## 2022-06-27 NOTE — ED ADULT NURSE REASSESSMENT NOTE - COMFORT CARE
meal provided/plan of care explained/po fluids offered/repositioned/side rails up/warm blanket provided
meal provided/plan of care explained/po fluids offered/repositioned/warm blanket provided
meal provided/plan of care explained/po fluids offered/repositioned

## 2022-06-28 NOTE — PROVIDER CONTACT NOTE (OTHER) - ASSESSMENT
Pt received in chair +monitor, spouse at bedside. Pt amb. Pt returned to chair without signs/ symptoms of distress CBWR. Pt reports pre- 7/10, during 0/10, and after session 0/10 pain, nursing aware. PT will follow.
VS stable, and patient is in no acute distress. Pt has no current complaints of pain, he did state that it is slightly painful when he urinates but that this is not new.

## 2022-06-28 NOTE — PROGRESS NOTE ADULT - SUBJECTIVE AND OBJECTIVE BOX
[facility]                                                         CARDIOLOGY AT [facility]                                                                  [address] [facility] [place] [place] [address]                                                         Telephone: [phone]. Fax:[phone]                                                                             PROGRESS NOTE    Reason for follow up: Decompensated HFrEF  Update: Patient still with some dyspnea, but states he has been urinating frequently. Strict Is and Os were not recorded. Patient with no active chest pain, LE venous duplex with no DVT.       Review of symptoms:   Cardiac:  No chest pain. + dyspnea. No palpitations.  Respiratory: no cough. + dyspnea  Gastrointestinal: No diarrhea. No abdominal pain. No bleeding.   Neuro: No focal neuro complaints.    Vitals:  T(C): 36.6 (06-28-22 @ 05:18), Max: 37.1 (06-27-22 @ 11:18)  HR: 101 (06-28-22 @ 05:18) (98 - 101)  BP: 121/77 (06-28-22 @ 05:18) (121/77 - 153/92)  RR: 20 (06-28-22 @ 05:18) (17 - 20)  SpO2: 95% (06-28-22 @ 05:18) (95% - 99%)  Wt(kg): --  I&O's Summary    27 Jun 2022 07:01  -  28 Jun 2022 07:00  --------------------------------------------------------  IN: 0 mL / OUT: 350 mL / NET: -350 mL      Weight (kg): 111.5 (06-27 @ 16:53)    PHYSICAL EXAM:  Constitutional: Comfortable . No acute distress.   HEENT: Atraumatic and normocephalic , neck is supple . no JVD. No carotid bruit.  CNS: A&Ox3. No focal deficits. Right sided facial droop  Respiratory: Decreased at b/l bases  Cardiovascular: Tachycardic,  No rubs or gallop. II/VI systolic murmur apex  Gastrointestinal: Soft, non-tender. +Bowel sounds.  Extremities: 2+ Peripheral Pulses, No clubbing, cyanosis, 3+ b/l LE edema  Psychiatric: Calm . no agitation.   Skin: Warm and dry, no ulcers on extremities       CURRENT CARDIAC MEDICATIONS:  amLODIPine   Tablet 5 milliGRAM(s) Oral daily  furosemide   Injectable 60 milliGRAM(s) IV Push every 12 hours  hydrALAZINE 100 milliGRAM(s) Oral three times a day  metoprolol succinate ER 25 milliGRAM(s) Oral daily      CURRENT OTHER MEDICATIONS:  ALBUTerol    90 MICROgram(s) HFA Inhaler 2 Puff(s) Inhalation four times a day  buPROPion XL (24-Hour) . 150 milliGRAM(s) Oral daily  pantoprazole    Tablet 40 milliGRAM(s) Oral before breakfast  allopurinol 100 milliGRAM(s) Oral daily  atorvastatin 40 milliGRAM(s) Oral at bedtime  dextrose 50% Injectable 25 Gram(s) IV Push once, Stop order after: 1 Doses  dextrose 50% Injectable 12.5 Gram(s) IV Push once, Stop order after: 1 Doses  dextrose 50% Injectable 25 Gram(s) IV Push once, Stop order after: 1 Doses  dextrose Oral Gel 15 Gram(s) Oral once, Stop order after: 1 Doses PRN Blood Glucose LESS THAN 70 milliGRAM(s)/deciliter  glucagon  Injectable 1 milliGRAM(s) IntraMuscular once, Stop order after: 1 Doses  insulin glargine Injectable (LANTUS) 10 Unit(s) SubCutaneous every morning  insulin glargine Injectable (LANTUS) 10 Unit(s) SubCutaneous at bedtime  aspirin enteric coated 81 milliGRAM(s) Oral daily  dextrose 5%. 1000 milliLiter(s) (50 mL/Hr) IV Continuous <Continuous>  dextrose 5%. 1000 milliLiter(s) (100 mL/Hr) IV Continuous <Continuous>  enoxaparin Injectable 30 milliGRAM(s) SubCutaneous every 24 hours  ferrous    sulfate 325 milliGRAM(s) Oral daily  lidocaine   4% Patch 1 Patch Transdermal every 24 hours      LABS:	 	  ( 27 Jun 2022 15:36 )  Troponin T  0.11<H>,  CPK  X    , CKMB  X    , BNP 25193<H>                              8.7    6.88  )-----------( 153      ( 28 Jun 2022 06:33 )             28.8     06-28    139  |  103  |  52.3<H>  ----------------------------<  89  4.1   |  23.0  |  2.49<H>    Ca    8.9      28 Jun 2022 06:33  Phos  3.4     06-28  Mg     2.0     06-28    TPro  6.9  /  Alb  3.8  /  TBili  0.7  /  DBili  x   /  AST  30  /  ALT  19  /  AlkPhos  194<H>  06-27    PT/INR/PTT ( 25 Jun 2022 15:07 )                       :                       :      14.4         :       30.1                  .        .                   .              .           .       1.24        .                                       Lipid Profile:   HgA1c:   TSH:     TELEMETRY: Atrial flutter, PVCs  ECG:    DIAGNOSTIC TESTING:  [ ] Echocardiogram:     [ ]  Catheterization:  [ ] Stress Test:    OTHER: 	                                                                [facility]                                                         CARDIOLOGY AT [facility]                                                                  [address] [facility] [place] [place] [address]                                                         Telephone: [phone]. Fax:[phone]                                                                             PROGRESS NOTE    Reason for follow up: Decompensated HFrEF  Update: Patient still with some dyspnea, but states he has been urinating frequently. Strict Is and Os were not recorded. Patient with no active chest pain, LE venous duplex with no DVT.       Review of symptoms:   Cardiac:  No chest pain. + dyspnea. No palpitations.  Respiratory: no cough. + dyspnea  Gastrointestinal: No diarrhea. No abdominal pain. No bleeding.   Neuro: No focal neuro complaints.    Vitals:  T(C): 36.6 (06-28-22 @ 05:18), Max: 37.1 (06-27-22 @ 11:18)  HR: 101 (06-28-22 @ 05:18) (98 - 101)  BP: 121/77 (06-28-22 @ 05:18) (121/77 - 153/92)  RR: 20 (06-28-22 @ 05:18) (17 - 20)  SpO2: 95% (06-28-22 @ 05:18) (95% - 99%)  Wt(kg): --  I&O's Summary    27 Jun 2022 07:01  -  28 Jun 2022 07:00  --------------------------------------------------------  IN: 0 mL / OUT: 350 mL / NET: -350 mL      Weight (kg): 111.5 (06-27 @ 16:53)    PHYSICAL EXAM:  Constitutional: Comfortable . No acute distress.   HEENT: Atraumatic and normocephalic , neck is supple . no JVD. No carotid bruit.  CNS: A&Ox3. No focal deficits. Right sided facial droop  Respiratory: Decreased at b/l bases  Cardiovascular: Tachycardic,  No rubs or gallop. II/VI systolic murmur apex  Gastrointestinal: Soft, non-tender. +Bowel sounds.  Extremities: 2+ Peripheral Pulses, No clubbing, cyanosis, 3+ b/l LE edema  Psychiatric: Calm . no agitation.   Skin: Warm and dry, no ulcers on extremities       CURRENT CARDIAC MEDICATIONS:  amLODIPine   Tablet 5 milliGRAM(s) Oral daily  furosemide   Injectable 60 milliGRAM(s) IV Push every 12 hours  hydrALAZINE 100 milliGRAM(s) Oral three times a day  metoprolol succinate ER 25 milliGRAM(s) Oral daily      CURRENT OTHER MEDICATIONS:  ALBUTerol    90 MICROgram(s) HFA Inhaler 2 Puff(s) Inhalation four times a day  buPROPion XL (24-Hour) . 150 milliGRAM(s) Oral daily  pantoprazole    Tablet 40 milliGRAM(s) Oral before breakfast  allopurinol 100 milliGRAM(s) Oral daily  atorvastatin 40 milliGRAM(s) Oral at bedtime  dextrose 50% Injectable 25 Gram(s) IV Push once, Stop order after: 1 Doses  dextrose 50% Injectable 12.5 Gram(s) IV Push once, Stop order after: 1 Doses  dextrose 50% Injectable 25 Gram(s) IV Push once, Stop order after: 1 Doses  dextrose Oral Gel 15 Gram(s) Oral once, Stop order after: 1 Doses PRN Blood Glucose LESS THAN 70 milliGRAM(s)/deciliter  glucagon  Injectable 1 milliGRAM(s) IntraMuscular once, Stop order after: 1 Doses  insulin glargine Injectable (LANTUS) 10 Unit(s) SubCutaneous every morning  insulin glargine Injectable (LANTUS) 10 Unit(s) SubCutaneous at bedtime  aspirin enteric coated 81 milliGRAM(s) Oral daily  dextrose 5%. 1000 milliLiter(s) (50 mL/Hr) IV Continuous <Continuous>  dextrose 5%. 1000 milliLiter(s) (100 mL/Hr) IV Continuous <Continuous>  enoxaparin Injectable 30 milliGRAM(s) SubCutaneous every 24 hours  ferrous    sulfate 325 milliGRAM(s) Oral daily  lidocaine   4% Patch 1 Patch Transdermal every 24 hours      LABS:	 	  ( 27 Jun 2022 15:36 )  Troponin T  0.11<H>,  CPK  X    , CKMB  X    , BNP 52862<H>                              8.7    6.88  )-----------( 153      ( 28 Jun 2022 06:33 )             28.8     06-28    139  |  103  |  52.3<H>  ----------------------------<  89  4.1   |  23.0  |  2.49<H>    Ca    8.9      28 Jun 2022 06:33  Phos  3.4     06-28  Mg     2.0     06-28    TPro  6.9  /  Alb  3.8  /  TBili  0.7  /  DBili  x   /  AST  30  /  ALT  19  /  AlkPhos  194<H>  06-27    PT/INR/PTT ( 25 Jun 2022 15:07 )                       :                       :      14.4         :       30.1                  .        .                   .              .           .       1.24        .                                       Lipid Profile:   HgA1c:   TSH:     TELEMETRY: Atrial flutter, PVCs  ECG:    DIAGNOSTIC TESTING:  [ ] Echocardiogram:       [ ]  Catheterization:  [ ] Stress Test:    OTHER:

## 2022-06-28 NOTE — PROGRESS NOTE ADULT - ASSESSMENT
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  Admitted for Acute on Chronic HFrEF.    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 Has had several falls in recent weeks.  Pending TAHMINA once medically optimized     Dispo:   Needs ongoing  IV diuresis for extensive edema  Ultimate TAHMINA

## 2022-06-28 NOTE — PROGRESS NOTE ADULT - SUBJECTIVE AND OBJECTIVE BOX
[facility] Division of Hospital Medicine    Chief Complaint:    AOC CHF    SUBJECTIVE / OVERNIGHT EVENTS:  SOB with some improvement. Still with significant LE swelling  Patient denies chest pain, SOB, abd pain, N/V, fever, chills, dysuria or any other complaints. All remainder ROS negative.     MEDICATIONS  (STANDING):  ALBUTerol    90 MICROgram(s) HFA Inhaler 2 Puff(s) Inhalation four times a day  allopurinol 100 milliGRAM(s) Oral daily  amLODIPine   Tablet 5 milliGRAM(s) Oral daily  aspirin enteric coated 81 milliGRAM(s) Oral daily  atorvastatin 40 milliGRAM(s) Oral at bedtime  buPROPion XL (24-Hour) . 150 milliGRAM(s) Oral daily  dextrose 5%. 1000 milliLiter(s) (50 mL/Hr) IV Continuous <Continuous>  dextrose 5%. 1000 milliLiter(s) (100 mL/Hr) IV Continuous <Continuous>  dextrose 50% Injectable 25 Gram(s) IV Push once  dextrose 50% Injectable 12.5 Gram(s) IV Push once  dextrose 50% Injectable 25 Gram(s) IV Push once  enoxaparin Injectable 30 milliGRAM(s) SubCutaneous every 24 hours  ferrous    sulfate 325 milliGRAM(s) Oral daily  furosemide   Injectable 60 milliGRAM(s) IV Push every 12 hours  glucagon  Injectable 1 milliGRAM(s) IntraMuscular once  hydrALAZINE 100 milliGRAM(s) Oral three times a day  insulin glargine Injectable (LANTUS) 10 Unit(s) SubCutaneous every morning  insulin glargine Injectable (LANTUS) 10 Unit(s) SubCutaneous at bedtime  lidocaine   4% Patch 1 Patch Transdermal every 24 hours  metoprolol tartrate 25 milliGRAM(s) Oral every 8 hours  pantoprazole    Tablet 40 milliGRAM(s) Oral before breakfast    MEDICATIONS  (PRN):  dextrose Oral Gel 15 Gram(s) Oral once PRN Blood Glucose LESS THAN 70 milliGRAM(s)/deciliter        I&O's Summary    27 Jun 2022 07:01  -  28 Jun 2022 07:00  --------------------------------------------------------  IN: 0 mL / OUT: 350 mL / NET: -350 mL        PHYSICAL EXAM:  Vital Signs Last 24 Hrs  T(C): 36.6 (28 Jun 2022 12:27), Max: 36.7 (27 Jun 2022 15:57)  T(F): 97.9 (28 Jun 2022 12:27), Max: 98.1 (27 Jun 2022 15:57)  HR: 104 (28 Jun 2022 12:27) (98 - 107)  BP: 150/93 (28 Jun 2022 12:27) (121/77 - 153/92)  BP(mean): --  RR: 19 (28 Jun 2022 12:27) (18 - 20)  SpO2: 96% (28 Jun 2022 12:27) (95% - 99%)        CONSTITUTIONAL: NAD, elderly  ENMT: Moist oral mucosa, no pharyngeal injection or exudates; normal dentition  RESPIRATORY: Normal respiratory effort; lungs are clear to auscultation bilaterally  CARDIOVASCULAR: Regular rate and rhythm, normal S1 and S2, no murmur/rub/gallop; Peripheral pulses are 2+ bilaterally + Edema to abdomen   ABDOMEN: Nontender to palpation, normoactive bowel sounds, no rebound/guarding;   MUSCLOSKELETAL:  No clubbing or cyanosis of digits; no joint swelling or tenderness to palpation  PSYCH: A+O to person, place, and time; affect appropriate  NEUROLOGY: CN 2-12 intact except right facial  no gross sensory deficits;   SKIN: No rashes; no palpable lesions    LABS:                        8.7    6.88  )-----------( 153      ( 28 Jun 2022 06:33 )             28.8     06-28    139  |  103  |  52.3<H>  ----------------------------<  89  4.1   |  23.0  |  2.49<H>    Ca    8.9      28 Jun 2022 06:33  Phos  3.4     06-28  Mg     2.0     06-28    TPro  6.9  /  Alb  3.8  /  TBili  0.7  /  DBili  x   /  AST  30  /  ALT  19  /  AlkPhos  194<H>  06-27      CARDIAC MARKERS ( 27 Jun 2022 15:36 )  x     / 0.11 ng/mL / x     / x     / x              CAPILLARY BLOOD GLUCOSE      POCT Blood Glucose.: 156 mg/dL (27 Jun 2022 22:28)  POCT Blood Glucose.: 173 mg/dL (27 Jun 2022 21:09)  POCT Blood Glucose.: 171 mg/dL (27 Jun 2022 16:20)        RADIOLOGY & ADDITIONAL TESTS:  Results Reviewed:   Imaging Personally Reviewed:  Electrocardiogram Personally Reviewed:

## 2022-06-28 NOTE — PROGRESS NOTE ADULT - ASSESSMENT
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 Patient states that he was in the bathroom, slipped and fell down. Patient immediately began having right shoulder, back, and hip pain. Patient denies any loss of consciousness before or after the fall. Patient states he has also been experiencing worsening leg swelling recently, and since he has been in the ER has been having worsening abdominal swelling. Patient was admitted for further management. Patient denies any fevers, chills, CP, syncope, N/V/D, headache, or dizziness.

## 2022-06-29 NOTE — PROGRESS NOTE ADULT - ASSESSMENT
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  Admitted for Acute on Chronic HFrEF.    AOC HF with biventricular heart failure   with peripheral edema and anasarca on abdominal imaging on exam, increased SOB  Cardiology consulted in ED  IV Lasix  Strict IO, Daily weight, 1.5L restriction  TTE with severe Biventricular dysfunction and segmental wma  Needs several days of IV Lasix  Potential LHC?    CAD s/p CABG  ASA, Statin    HTN  HLD  hydral, amlo, statin    DM type 2 uncontrolled with hyperglycemia  Lantus 10 Daily + ISS  Diabetic diet    Lower Extremity Edema - Likely secondary to CHF  US LE to r/o DVT  DVT Ruled out    Posterior T11 fracture  No PTX  Conservative management  pain control     KEN on CKD 3 3b  Suspect cardiorenal  Renal US to rule out obstruction     Falls  Patient severely deconditioned, has walker at home but frequently does not use it per wife. Has had several falls in recent weeks.  Pending TAHMINA once medically optimized     Dispo:   Needs ongoing  IV diuresis for extensive edema  Ultimate TAHMINA

## 2022-06-29 NOTE — PROGRESS NOTE ADULT - ASSESSMENT
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 Patient states that he was in the bathroom, slipped and fell down. Patient immediately began having right shoulder, back, and hip pain. Patient denies any loss of consciousness before or after the fall. Patient states he has also been experiencing worsening leg swelling recently, and since he has been in the ER has been having worsening abdominal swelling. Patient was admitted for further management. Patient denies any fevers, chills, CP, syncope, N/V/D, headache, or dizziness.

## 2022-06-29 NOTE — PROGRESS NOTE ADULT - SUBJECTIVE AND OBJECTIVE BOX
[facility] Division of Hospital Medicine    Chief Complaint:    Fall, AOC CHF    SUBJECTIVE / OVERNIGHT EVENTS:  TTE With biventricular heart failure    Patient denies chest pain, abd pain, N/V, fever, chills, dysuria or any other complaints. All remainder ROS negative.     MEDICATIONS  (STANDING):  ALBUTerol    90 MICROgram(s) HFA Inhaler 2 Puff(s) Inhalation four times a day  allopurinol 100 milliGRAM(s) Oral daily  aspirin enteric coated 81 milliGRAM(s) Oral daily  atorvastatin 40 milliGRAM(s) Oral at bedtime  buMETAnide Injectable 2 milliGRAM(s) IV Push every 12 hours  buPROPion XL (24-Hour) . 150 milliGRAM(s) Oral daily  dextrose 5%. 1000 milliLiter(s) (50 mL/Hr) IV Continuous <Continuous>  dextrose 5%. 1000 milliLiter(s) (100 mL/Hr) IV Continuous <Continuous>  dextrose 50% Injectable 25 Gram(s) IV Push once  dextrose 50% Injectable 12.5 Gram(s) IV Push once  dextrose 50% Injectable 25 Gram(s) IV Push once  enoxaparin Injectable 30 milliGRAM(s) SubCutaneous every 24 hours  epoetin nohemi-epbx (RETACRIT) Injectable 83006 Unit(s) SubCutaneous <User Schedule>  glucagon  Injectable 1 milliGRAM(s) IntraMuscular once  hydrALAZINE 100 milliGRAM(s) Oral three times a day  insulin glargine Injectable (LANTUS) 10 Unit(s) SubCutaneous every morning  insulin glargine Injectable (LANTUS) 10 Unit(s) SubCutaneous at bedtime  iron sucrose IVPB 100 milliGRAM(s) IV Intermittent every 24 hours  isosorbide   dinitrate Tablet (ISORDIL) 10 milliGRAM(s) Oral three times a day  lidocaine   4% Patch 1 Patch Transdermal every 24 hours  metoprolol tartrate 25 milliGRAM(s) Oral every 8 hours  pantoprazole    Tablet 40 milliGRAM(s) Oral before breakfast    MEDICATIONS  (PRN):  dextrose Oral Gel 15 Gram(s) Oral once PRN Blood Glucose LESS THAN 70 milliGRAM(s)/deciliter        I&O's Summary    2022 07:01  -  2022 07:00  --------------------------------------------------------  IN: 0 mL / OUT: 1350 mL / NET: -1350 mL        PHYSICAL EXAM:  Vital Signs Last 24 Hrs  T(C): 36.6 (2022 11:39), Max: 36.8 (2022 09:00)  T(F): 97.8 (2022 11:39), Max: 98.2 (2022 09:00)  HR: 101 (:39) (97 - 107)  BP: 131/78 (2022 11:39) (115/80 - 141/91)  BP(mean): --  RR: 20 (2022 11:39) (19 - 20)  SpO2: 96% (:39) (96% - 100%)        CONSTITUTIONAL: NAD, obese  ENMT: Moist oral mucosa, no pharyngeal injection or exudates; normal dentition  RESPIRATORY: Normal respiratory effort; lungs are clear to auscultation bilaterally  CARDIOVASCULAR: Regular rate and rhythm, normal S1 and S2, no murmur/rub/gallop; Peripheral pulses are 2+ bilaterally, + edema  ABDOMEN: Nontender to palpation, normoactive bowel sounds, no rebound/guarding;   MUSCLOSKELETAL:  No clubbing or cyanosis of digits; no joint swelling or tenderness to palpation  PSYCH: A+O to person, place, and time; affect appropriate  NEUROLOGY: CN 2-12 are intact and symmetric; no gross sensory deficits;   SKIN: No rashes; no palpable lesions    LABS:                        8.7    6.88  )-----------( 153      ( 2022 06:33 )             28.8     06-    139  |  103  |  52.3<H>  ----------------------------<  89  4.1   |  23.0  |  2.49<H>    Ca    8.9      2022 06:33  Phos  3.4     -  Mg     2.0         TPro  6.9  /  Alb  3.8  /  TBili  0.7  /  DBili  x   /  AST  30  /  ALT  19  /  AlkPhos  194<H>  06-27      CARDIAC MARKERS ( 2022 09:05 )  x     / 0.12 ng/mL / 92 U/L / x     / x      CARDIAC MARKERS ( 2022 15:36 )  x     / 0.11 ng/mL / x     / x     / x          Urinalysis Basic - ( 2022 22:45 )    Color: Brown / Appearance: Slightly Turbid / S.010 / pH: x  Gluc: x / Ketone: Negative  / Bili: Negative / Urobili: Negative   Blood: x / Protein: Negative / Nitrite: Positive   Leuk Esterase: Trace / RBC: >50 /HPF / WBC 0-2 /HPF   Sq Epi: x / Non Sq Epi: Occasional / Bacteria: Few        CAPILLARY BLOOD GLUCOSE      POCT Blood Glucose.: 133 mg/dL (2022 08:24)  POCT Blood Glucose.: 239 mg/dL (2022 22:10)        RADIOLOGY & ADDITIONAL TESTS:  Results Reviewed:   Imaging Personally Reviewed:  Electrocardiogram Personally Reviewed:

## 2022-06-29 NOTE — CONSULT NOTE ADULT - SUBJECTIVE AND OBJECTIVE BOX
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 He presented to the ED S/p mechanical fall landing on his shoulder. Patient reports having several mechanical falls over last few months since moving up from [place]. In the ED found to have right posterior rib fracture on imaging. No PTX. Was planned for a discharge to [facility] however found to have worsening LE edema, Abdominal Swelling, and increased work of breathing. Per patient, has SOB at baseline on exertion. States that his SOB has been a little worse over last 3 days. Furthermore describes history of LE edema that comes and goes. Also having worsened over last week and present at time of presentation to ED.  Denies CP, nausea, vomiting, fevers, chills nausea.     PAST MEDICAL & SURGICAL HISTORY:  Coronary artery disease involving native heart, angina presence unspecified, unspecified vessel or lesion type  nuc stress 2021 negative  Atrial fibrillation, unspecified type  s/p MARLO/DCCV 21  Congestive heart failure, unspecified HF chronicity, unspecified heart failure type  EF 35-40%  Bell&#x27;s palsy  Right side of face  Essential hypertension  Type 2 diabetes mellitus without complication, unspecified whether long term insulin use  insulin plus orals  Osteomyelitis of sternum  Chronic kidney disease, unspecified CKD stage  stage III. Recent BL cr 2.0-2.5  Anemia secondary to renal failure  together with iron deficiency  GIB (gastrointestinal bleeding)  s/p coil embolization  Bifascicular block  s/p ILR implant  Squamous cell skin cancer  S/P CABG (coronary artery bypass graft)  complicated by osteomyleitis of the sternum and sepsis; sternum removed  History of loop recorder  Presence of Watchman left atrial appendage closure device  S/P skin cancer resection    REVIEW OF SYSTEMS  General:	  Skin/Breast:	  Ophthalmologic:	  ENMT:	  Respiratory and Thorax:	  Cardiovascular:	  Gastrointestinal:	  Genitourinary:	  Musculoskeletal:	  Neurological:	  Psychiatric:		    MEDICATIONS  (STANDING):  ALBUTerol    90 MICROgram(s) HFA Inhaler 2 Puff(s) Inhalation four times a day  allopurinol 100 milliGRAM(s) Oral daily  aspirin enteric coated 81 milliGRAM(s) Oral daily  atorvastatin 40 milliGRAM(s) Oral at bedtime  buPROPion XL (24-Hour) . 150 milliGRAM(s) Oral daily  dextrose 5%. 1000 milliLiter(s) (50 mL/Hr) IV Continuous <Continuous>  dextrose 5%. 1000 milliLiter(s) (100 mL/Hr) IV Continuous <Continuous>  dextrose 50% Injectable 25 Gram(s) IV Push once  dextrose 50% Injectable 25 Gram(s) IV Push once  dextrose 50% Injectable 12.5 Gram(s) IV Push once  enoxaparin Injectable 30 milliGRAM(s) SubCutaneous every 24 hours  ferrous    sulfate 325 milliGRAM(s) Oral daily  furosemide   Injectable 60 milliGRAM(s) IV Push every 12 hours  glucagon  Injectable 1 milliGRAM(s) IntraMuscular once  hydrALAZINE 100 milliGRAM(s) Oral three times a day  insulin glargine Injectable (LANTUS) 10 Unit(s) SubCutaneous every morning  insulin glargine Injectable (LANTUS) 10 Unit(s) SubCutaneous at bedtime  isosorbide   dinitrate Tablet (ISORDIL) 10 milliGRAM(s) Oral three times a day  lidocaine   4% Patch 1 Patch Transdermal every 24 hours  metoprolol tartrate 25 milliGRAM(s) Oral every 8 hours  pantoprazole    Tablet 40 milliGRAM(s) Oral before breakfast    MEDICATIONS  (PRN):  dextrose Oral Gel 15 Gram(s) Oral once PRN Blood Glucose LESS THAN 70 milliGRAM(s)/deciliter    Allergies  No Known Allergies    SOCIAL HISTORY: Never smoker, non drinker, 1-2 cups caffeine daily     FAMILY HISTORY:  No pertinent family history in first degree relatives  cad    Vital Signs Last 24 Hrs  T(C): 36.6 (2022 05:00), Max: 36.6 (2022 08:57)  T(F): 97.9 (2022 05:00), Max: 97.9 (2022 08:57)  HR: 100 (2022 05:00) (97 - 107)  BP: 117/64 (2022 05:00) (117/64 - 150/93)  RR: 19 (2022 05:00) (19 - 20)  SpO2: 100% (2022 05:00) (95% - 100%)    Physical Exam:  Constitutional: AAOx3, NAD  Neck: supple, No JVD  Cardiovascular: +S1S2 RRR, no murmurs, rubs, gallops   Pulmonary: CTA b/l, unlabored, no wheezes, rales. No rhonchi  Abdomen: +BS, soft NTND  Extremities: no edema b/l, +distal pulses b/l  Neuro: non focal, speech clear, HONG x 4    LABS:                        8.7    6.88  )-----------( 153      ( 2022 06:33 )             28.8     139  |  103  |  52.3<H>  ----------------------------<  89  4.1   |  23.0  |  2.49<H>  Ca    8.9      2022 06:33  Phos  3.4     06-  Mg     2.0     06-28  TPro  6.9  /  Alb  3.8  /  TBili  0.7  /  DBili  x   /  AST  30  /  ALT  19  /  AlkPhos  194<H>  06-27  LIVER FUNCTIONS - ( 2022 15:36 )  Alb: 3.8 g/dL / Pro: 6.9 g/dL / ALK PHOS: 194 U/L / ALT: 19 U/L / AST: 30 U/L / GGT: x         CARDIAC MARKERS ( 2022 15:36 )  x     / 0.11 ng/mL / x     / x     / x        Urinalysis Basic - ( 2022 22:45 )  Color: Brown / Appearance: Slightly Turbid / S.010 / pH: x  Gluc: x / Ketone: Negative  / Bili: Negative / Urobili: Negative   Blood: x / Protein: Negative / Nitrite: Positive   Leuk Esterase: Trace / RBC: >50 /HPF / WBC 0-2 /HPF   Sq Epi: x / Non Sq Epi: Occasional / Bacteria: Few    RADIOLOGY & ADDITIONAL STUDIES:  MARLO 2021  Summary:   1. Left ventricular ejection fraction, by visual estimation, is 40 to 45%.   2. Mildly decreased global left ventricular systolic function.   3. Moderately enlarged right ventricle with severely reduced systolic function, estimatd RVSP least 52 mmHg.   4. Severely enlarged left atrium.   5. Severely enlarged right atrium.   6. A well-seated Watchman device present without overlying thrombus and no evidence of deng-device leak.   7. Degenerative mitral valve.   8. Moderate mitral valve regurgitation.   9. Mild tricuspid regurgitation.  10. Mild pulmonic valve regurgitation.  11. Small patent foramen ovale, with predominantly right to left shunting across the atrial septum.  12. There is mild aortic root calcification with simple atheroma at the aortic arch and descending aorta.  13. There is no evidence of pericardial effusion.  14. Compared to the prior MARLO study from 21, LV systolic function remain mild-to-moderately reduced and known RV dysfunction. Well-seated Watchman without deng-device leak or overlying thrombus.    CXR 22  Frontal expiratory view of the chest shows the heart to be enlarged in   size. Mediastinal clips and loop recorder are again noted.  The lungs show similar pulmonary vascularity with progression of right   upper lobe infiltrate and there is no evidence of pneumothorax nor   pleural effusion.  IMPRESSION:  Progression of right infiltrate.    CT chest 22  IMPRESSION:  Acute fracture of right posterior 11th rib.  Small pleural effusions and pulmonary edema. No pneumothorax.  Ascites.    CT abdo/pelvis 22  IMPRESSION:  No specific acute inflammatory obstructive pathology.  Small ascites. Extensive anasarca  Colonic diverticulosis without acute diverticulitis.  Enlarged prostate gland.  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 He presented to the ED S/p mechanical fall landing on his shoulder. Patient reports having several mechanical falls over last few months since moving up from [place]. In the ED found to have right posterior rib fracture on imaging. No PTX. Was planned for a discharge to [facility] however found to have worsening LE edema, Abdominal Swelling, and increased work of breathing. Per patient, has SOB at baseline on exertion. States that his SOB has been a little worse over last 3 days. Furthermore describes history of LE edema that comes and goes. Also having worsened over last week and present at time of presentation to ED.  Denies CP, nausea, vomiting, fevers, chills nausea.     PAST MEDICAL & SURGICAL HISTORY:  Coronary artery disease involving native heart, angina presence unspecified, unspecified vessel or lesion type  nuc stress 2021 negative  Atrial fibrillation, unspecified type  s/p MARLO/DCCV 21  Congestive heart failure, unspecified HF chronicity, unspecified heart failure type  EF 35-40%  Bell&#x27;s palsy  Right side of face  Essential hypertension  Type 2 diabetes mellitus without complication, unspecified whether long term insulin use  insulin plus orals  Osteomyelitis of sternum  Chronic kidney disease, unspecified CKD stage  stage III. Recent BL cr 2.0-2.5  Anemia secondary to renal failure  together with iron deficiency  GIB (gastrointestinal bleeding)  s/p coil embolization  Bifascicular block  s/p ILR implant  Squamous cell skin cancer  S/P CABG (coronary artery bypass graft)  complicated by osteomyleitis of the sternum and sepsis; sternum removed  History of loop recorder  Presence of Watchman left atrial appendage closure device  S/P skin cancer resection    REVIEW OF SYSTEMS  General: - fever or chills, + fatigue	  Skin/Breast: - rashes  Ophthalmologic: - blurred vision	  ENMT: - sore throat  Respiratory and Thorax: + cough, + dyspnea 	  Cardiovascular: +LE edema, + PINA, + orthopnea  Gastrointestinal:	- N/V/D/C  Genitourinary: + hematuria  Musculoskeletal:	 + arthritis  Neurological: - weaknesses  Psychiatric: + depression     MEDICATIONS  (STANDING):  ALBUTerol    90 MICROgram(s) HFA Inhaler 2 Puff(s) Inhalation four times a day  allopurinol 100 milliGRAM(s) Oral daily  aspirin enteric coated 81 milliGRAM(s) Oral daily  atorvastatin 40 milliGRAM(s) Oral at bedtime  buPROPion XL (24-Hour) . 150 milliGRAM(s) Oral daily  dextrose 5%. 1000 milliLiter(s) (50 mL/Hr) IV Continuous <Continuous>  dextrose 5%. 1000 milliLiter(s) (100 mL/Hr) IV Continuous <Continuous>  dextrose 50% Injectable 25 Gram(s) IV Push once  dextrose 50% Injectable 25 Gram(s) IV Push once  dextrose 50% Injectable 12.5 Gram(s) IV Push once  enoxaparin Injectable 30 milliGRAM(s) SubCutaneous every 24 hours  ferrous    sulfate 325 milliGRAM(s) Oral daily  furosemide   Injectable 60 milliGRAM(s) IV Push every 12 hours  glucagon  Injectable 1 milliGRAM(s) IntraMuscular once  hydrALAZINE 100 milliGRAM(s) Oral three times a day  insulin glargine Injectable (LANTUS) 10 Unit(s) SubCutaneous every morning  insulin glargine Injectable (LANTUS) 10 Unit(s) SubCutaneous at bedtime  isosorbide   dinitrate Tablet (ISORDIL) 10 milliGRAM(s) Oral three times a day  lidocaine   4% Patch 1 Patch Transdermal every 24 hours  metoprolol tartrate 25 milliGRAM(s) Oral every 8 hours  pantoprazole    Tablet 40 milliGRAM(s) Oral before breakfast    MEDICATIONS  (PRN):  dextrose Oral Gel 15 Gram(s) Oral once PRN Blood Glucose LESS THAN 70 milliGRAM(s)/deciliter    Allergies  No Known Allergies    SOCIAL HISTORY: Never smoker, non drinker, 1-2 cups caffeine daily     FAMILY HISTORY:  No pertinent family history in first degree relatives  cad    Vital Signs Last 24 Hrs  T(C): 36.6 (2022 05:00), Max: 36.6 (2022 08:57)  T(F): 97.9 (2022 05:00), Max: 97.9 (2022 08:57)  HR: 100 (2022 05:00) (97 - 107)  BP: 117/64 (2022 05:00) (117/64 - 150/93)  RR: 19 (2022 05:00) (19 - 20)  SpO2: 100% (2022 05:00) (95% - 100%)    Physical Exam:  Constitutional: AAOx3, mild respiratory distress  Neck: supple, No JVD  Cardiovascular: +S1S2 IR, AFlutter at time of exam  Pulmonary: Coarse breath sounds, mildly labored.   Abdomen: obese, +BS, soft NTND  Extremities: 3+ tense pitting edema  Neuro: non focal, speech clear, HONG x 4  Psych: Depressed affect.     LABS:                        8.7    6.88  )-----------( 153      ( 2022 06:33 )             28.8     139  |  103  |  52.3<H>  ----------------------------<  89  4.1   |  23.0  |  2.49<H>  Ca    8.9      2022 06:33  Phos  3.4     06-28  Mg     2.0     06-28  TPro  6.9  /  Alb  3.8  /  TBili  0.7  /  DBili  x   /  AST  30  /  ALT  19  /  AlkPhos  194<H>  06-27  LIVER FUNCTIONS - ( 2022 15:36 )  Alb: 3.8 g/dL / Pro: 6.9 g/dL / ALK PHOS: 194 U/L / ALT: 19 U/L / AST: 30 U/L / GGT: x         CARDIAC MARKERS ( 2022 15:36 )  x     / 0.11 ng/mL / x     / x     / x        Urinalysis Basic - ( 2022 22:45 )  Color: Brown / Appearance: Slightly Turbid / S.010 / pH: x  Gluc: x / Ketone: Negative  / Bili: Negative / Urobili: Negative   Blood: x / Protein: Negative / Nitrite: Positive   Leuk Esterase: Trace / RBC: >50 /HPF / WBC 0-2 /HPF   Sq Epi: x / Non Sq Epi: Occasional / Bacteria: Few    RADIOLOGY & ADDITIONAL STUDIES:  MARLO 2021  Summary:   1. Left ventricular ejection fraction, by visual estimation, is 40 to 45%.   2. Mildly decreased global left ventricular systolic function.   3. Moderately enlarged right ventricle with severely reduced systolic function, estimatd RVSP least 52 mmHg.   4. Severely enlarged left atrium.   5. Severely enlarged right atrium.   6. A well-seated Watchman device present without overlying thrombus and no evidence of deng-device leak.   7. Degenerative mitral valve.   8. Moderate mitral valve regurgitation.   9. Mild tricuspid regurgitation.  10. Mild pulmonic valve regurgitation.  11. Small patent foramen ovale, with predominantly right to left shunting across the atrial septum.  12. There is mild aortic root calcification with simple atheroma at the aortic arch and descending aorta.  13. There is no evidence of pericardial effusion.  14. Compared to the prior MARLO study from 21, LV systolic function remain mild-to-moderately reduced and known RV dysfunction. Well-seated Watchman without deng-device leak or overlying thrombus.    CXR 22  Frontal expiratory view of the chest shows the heart to be enlarged in   size. Mediastinal clips and loop recorder are again noted.  The lungs show similar pulmonary vascularity with progression of right   upper lobe infiltrate and there is no evidence of pneumothorax nor   pleural effusion.  IMPRESSION:  Progression of right infiltrate.    CT chest 22  IMPRESSION:  Acute fracture of right posterior 11th rib.  Small pleural effusions and pulmonary edema. No pneumothorax.  Ascites.    CT abdo/pelvis 22  IMPRESSION:  No specific acute inflammatory obstructive pathology.  Small ascites. Extensive anasarca  Colonic diverticulosis without acute diverticulitis.  Enlarged prostate gland.  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   - Options include ablation, rate control or pace and ablate AV node. Unlikely to be able to tolerate anticoagulation so rhythm control is not favored. Patient is willing to this about and discuss with his wife Alma potential AV node ablation and CRT-P implantation (may discuss conduction system pacing)  - Cardiology note appreciated  - Nephrology note appreciated. Will have to consider implications of worsening kidney function and risk of dialysis   - Full recommendations to follow

## 2022-06-29 NOTE — PATIENT PROFILE ADULT - VISION (WITH CORRECTIVE LENSES IF THE PATIENT USUALLY WEARS THEM):
Last OARRS Review-0  Last Office Visit-10/17/2019  Return To Office-0  Next Appointment Date-0  Last Refill Date-10/18/2019 90 tabs   Number of Refills Given- 1    Health Maintenance   Topic Date Due    Shingles Vaccine (1 of 2) 11/09/2014    Potassium monitoring  05/04/2020    Creatinine monitoring  05/04/2020    Breast cancer screen  07/23/2020    DTaP/Tdap/Td vaccine (2 - Td) 03/13/2024    Lipid screen  05/04/2024    Colon cancer screen colonoscopy  08/04/2026    Flu vaccine  Completed    Hepatitis C screen  Completed    HIV screen  Completed    Hepatitis A vaccine  Aged Out    Hepatitis B vaccine  Aged Out    Hib vaccine  Aged Out    Meningococcal (ACWY) vaccine  Aged Out    Pneumococcal 0-64 years Vaccine  Aged Out             (applicable per patient's age: Cancer Screenings, Depression Screening, Fall Risk Screening, Immunizations)    LDL Calculated (mg/dL)   Date Value   05/04/2019 114     AST (U/L)   Date Value   05/04/2019 48     ALT (U/L)   Date Value   05/04/2019 68     BUN (mg/dL)   Date Value   05/04/2019 11      (goal A1C is < 7)   (goal LDL is <100) need 30-50% reduction from baseline     BP Readings from Last 3 Encounters:   10/17/19 138/82   05/03/19 120/84   04/19/19 (!) 144/96    (goal /80)      All Future Testing planned in [facility]:      Next Visit Date:  No future appointments.          Patient Active Problem List:     Family history of thyroid disease     History of cold sores     Pure hypercholesterolemia     Elevated liver enzymes     Essential hypertension Partially impaired: cannot see medication labels or newsprint, but can see obstacles in path, and the surrounding layout; can count fingers at arm's length

## 2022-06-29 NOTE — PROGRESS NOTE ADULT - SUBJECTIVE AND OBJECTIVE BOX
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    Admitted for Acute on Chronic HFrEF.    AOC HFrEF ,    CAD s/p CABG    HTN    DM type 2 uncontrolled with hyperglycemia    ICU Vital Signs Last 24 Hrs,    T(C): 36.8 (2022 09:00), Max: 36.8 (2022 09:00)  T(F): 98.2 (2022 09:00), Max: 98.2 (2022 09:00)  HR: 102 (2022 09:00) (97 - 107)  BP: 115/80 (2022 09:00) (115/80 - 150/93)  RR: 19 (2022 09:00) (19 - 19)  SpO2: 97% (2022 09:00) (96% - 100%)    139    |  103    |  52.3<H>  ----------------------------<  89     Ca:8.9   (2022 06:33)  4.1     |  23.0   |  2.49<H>    TPro  6.9    /  Alb  3.8    /  TBili  0.7    /  DBili  x      /  AST  30     /  ALT  19     /  AlkPhos  194<H>  2022 15:36                        8.7<L>  6.88  )-----------( 153      ( 2022 06:33 )             28.8<L>    Phos:3.4 mg/dL M.0 mg/dL  ( @ 06:33)    Urinalysis Basic - ( 2022 22:45 )  Color: Brown<!> / Appearance: Slightly Turbid<!> / S.010 / pH: x  Gluc: x / Ketone: Negative  / Bili: Negative / Urobili: Negative   Blood: x / Protein: Negative / Nitrite: Positive<!>   Leuk Esterase: Trace<!> / RBC: >50 /HPF<!> / WBC 0-2 /HPF   Sq Epi: x / Non Sq Epi: Occasional / Bacteria: Few<!>    UCr:29 mg/dL   [place]:99 mmol/L UOsm:326 mosm/kg  [place]:21 mmol/L ( @ 22:46)    < from: US Kidney and Bladder (22 @ 19:59) >    ACC: [id number] EXAM:  US KIDNEYS AND BLADDER                          PROCEDURE DATE:  2022          INTERPRETATION:  CLINICAL INFORMATION: Acute kidney injury.    COMPARISON: 2021.    TECHNIQUE: Sonography of the kidneys and bladder.    FINDINGS:  Right kidney: 10.1 cm. No renal mass, hydronephrosis or calculi.    Left kidney: 10.0 cm. No renal mass, hydronephrosis or calculi.  Renal   cysts measure 2.4 x 2.1 x 2.7 cm the upper pole and 1.8 x 0.9 x 2 cm in   the lower pole.    Urinary bladder: 1.9 x 1.9 x 1.7 cm echogenic nodular focus projecting   into the posterior bladder may represent hypertrophied median lobe of the   prostate.    IMPRESSION:  No renal mass, hydronephrosis or calculi are visualized by sonographic   technique.    A 1.8 x 1.9 x 1.7 cm echogenic nodular focus projecting into the   posterior bladder may represent hypertrophied median lobe of the   prostate.  Correlate with urinalysis and urine cytology to exclude a   bladder mass.    --- End of Report ---            SANJAY LINO MD; Attending Radiologist  This document has been electronically signed. 2022  8:15PM    < end of copied text >  < from: Xray Chest 1 View- PORTABLE-Urgent (Xray Chest 1 View- PORTABLE-Urgent .) (22 @ 15:48) >  ACC: [id number] EXAM:  XR CHEST PORTABLE URGENT 1V                          PROCEDURE DATE:  2022          INTERPRETATION:  Chest one view    HISTORY: Dyspnea    COMPARISON STUDY: Earlier the same day    Frontal expiratory view of the chest shows the heart to be enlarged in   size. Mediastinal clips and loop recorder are again noted.    The lungs show similar pulmonary vascularity with progression of right   upper lobe infiltrate and there is no evidence of pneumothorax nor   pleural effusion.    IMPRESSION:  Progression of right infiltrate.    < end of copied text >  Creatinine, Serum: 2.49 mg/dL (22 @ 06:33)       Historical Values  Creatinine, Serum: 2.49 mg/dL (22 @ :33)   Creatinine, Serum: 2.46 mg/dL (22 @ 15:36)   Creatinine, Serum: 2.69 mg/dL (22 @ 15:07)   Creatinine, Serum: 2.59 mg/dL (21 @ 10:40)   Creatinine, Serum: 2.74 mg/dL (21 @ 06:12)   Creatinine, Serum: 2.60 mg/dL (21 @ 12:41)   Creatinine, Serum: 2.59 mg/dL (21 @ 07:38)   Creatinine, Serum: 2.09 mg/dL (21 @ 13:52)   Creatinine, Serum: 1.87 mg/dL (06.15.21 @ 06:53)   Creatinine, Serum: 1.77 mg/dL (21 @ 06:01)   Creatinine, Serum: 1.70 mg/dL (21 @ 06:17)   Creatinine, Serum: 1.90 mg/dL (21 @ 08:47)   Creatinine, Serum: 1.79 mg/dL (06.10.21 @ 06:22)   Creatinine, Serum: 1.89 mg/dL (21 @ 06:01)   Creatinine, Serum: 2.04 mg/dL (21 @ 06:20)   Creatinine, Serum: 1.89 mg/dL (21 @ 05:43)   Creatinine, Serum: 1.92 mg/dL (21 @ 03:12)   Creatinine, Serum: 2.42 mg/dL (21 @ 08:03)   Creatinine, Serum: 2.27 mg/dL (21 @ 12:00)   Creatinine, Serum: 2.49 mg/dL (21 @ 15:45)   Creatinine, Serum: 2.43 mg/dL (21 @ 07:27)   Creatinine, Serum: 2.66 mg/dL (21 @ 06:45)   Creatinine, Serum: 2.66 mg/dL (21 @ 07:19)

## 2022-06-29 NOTE — PATIENT PROFILE ADULT - FUNCTIONAL ASSESSMENT - BASIC MOBILITY 6.
Left message for patient to return the call.    But did leave INR results and new dose.   3 = A little assistance

## 2022-06-29 NOTE — CHART NOTE - NSCHARTNOTEFT_GEN_A_CORE
Medicine PA-  Cd for pt that had x15 beats vtach, baseline rhythm SR. Pt is asymptomatic, VSS and electrolytes wnl, continue to monitor, repeat labs pending.

## 2022-06-29 NOTE — PROGRESS NOTE ADULT - SUBJECTIVE AND OBJECTIVE BOX
[facility]                                                         CARDIOLOGY AT [place] [address]                                                         Telephone: [phone]. Fax:[phone]                                                                             PROGRESS NOTE    Reason for follow up: Acute Decompensated HFrEF  Update: Patient states that he is having some chest pain this morning starting around 4 AM. Patient states it initially lasted for about an hour, but it when he saw Dr. Duke he states it has been continuous. Patient's EKG unchanged, and troponin still 0.12, normal CK. Patient's echo official read is pending, but EF newly reduced to 35-40%.      Review of symptoms:   Cardiac:  No chest pain. No dyspnea. No palpitations.  Respiratory: no cough. No dyspnea  Gastrointestinal: No diarrhea. No abdominal pain. No bleeding.   Neuro: No focal neuro complaints.    Vitals:  T(C): 36.8 (06-29-22 @ 09:00), Max: 36.8 (06-29-22 @ 09:00)  HR: 102 (06-29-22 @ 09:00) (97 - 107)  BP: 115/80 (06-29-22 @ 09:00) (115/80 - 150/93)  RR: 19 (06-29-22 @ 09:00) (19 - 19)  SpO2: 97% (06-29-22 @ 09:00) (96% - 100%)  Wt(kg): --  I&O's Summary    28 Jun 2022 07:01  -  29 Jun 2022 07:00  --------------------------------------------------------  IN: 0 mL / OUT: 1350 mL / NET: -1350 mL      Weight (kg): 111.5 (06-27 @ 16:53)    PHYSICAL EXAM:  Constitutional: Comfortable . No acute distress.   HEENT: Atraumatic and normocephalic , neck is supple . no JVD. No carotid bruit.  CNS: A&Ox3. No focal deficits. Right sided facial droop  Respiratory: Decreased at b/l bases  Cardiovascular: Tachycardic,  No rubs or gallop. II/VI systolic murmur apex  Gastrointestinal: Soft, non-tender. +Bowel sounds.  Extremities: 2+ Peripheral Pulses, No clubbing, cyanosis, 3+ b/l LE edema  Psychiatric: Calm . no agitation.   Skin: Warm and dry, no ulcers on extremities     CURRENT CARDIAC MEDICATIONS:  buMETAnide Injectable 2 milliGRAM(s) IV Push every 12 hours  hydrALAZINE 100 milliGRAM(s) Oral three times a day  isosorbide   dinitrate Tablet (ISORDIL) 10 milliGRAM(s) Oral three times a day  metoprolol tartrate 25 milliGRAM(s) Oral every 8 hours      CURRENT OTHER MEDICATIONS:  ALBUTerol    90 MICROgram(s) HFA Inhaler 2 Puff(s) Inhalation four times a day  buPROPion XL (24-Hour) . 150 milliGRAM(s) Oral daily  pantoprazole    Tablet 40 milliGRAM(s) Oral before breakfast  allopurinol 100 milliGRAM(s) Oral daily  atorvastatin 40 milliGRAM(s) Oral at bedtime  dextrose 50% Injectable 25 Gram(s) IV Push once, Stop order after: 1 Doses  dextrose 50% Injectable 12.5 Gram(s) IV Push once, Stop order after: 1 Doses  dextrose 50% Injectable 25 Gram(s) IV Push once, Stop order after: 1 Doses  dextrose Oral Gel 15 Gram(s) Oral once, Stop order after: 1 Doses PRN Blood Glucose LESS THAN 70 milliGRAM(s)/deciliter  glucagon  Injectable 1 milliGRAM(s) IntraMuscular once, Stop order after: 1 Doses  insulin glargine Injectable (LANTUS) 10 Unit(s) SubCutaneous every morning  insulin glargine Injectable (LANTUS) 10 Unit(s) SubCutaneous at bedtime  aspirin enteric coated 81 milliGRAM(s) Oral daily  dextrose 5%. 1000 milliLiter(s) (50 mL/Hr) IV Continuous <Continuous>  dextrose 5%. 1000 milliLiter(s) (100 mL/Hr) IV Continuous <Continuous>  enoxaparin Injectable 30 milliGRAM(s) SubCutaneous every 24 hours  epoetin nohemi-epbx (RETACRIT) Injectable 62452 Unit(s) SubCutaneous <User Schedule>  ferrous    sulfate 325 milliGRAM(s) Oral daily  lidocaine   4% Patch 1 Patch Transdermal every 24 hours      LABS:	 	  ( 29 Jun 2022 09:05 )  Troponin T  0.12<H>,  CPK  92   , CKMB  X    , BNP X        , ( 27 Jun 2022 15:36 )  Troponin T  0.11<H>,  CPK  X    , CKMB  X    , BNP 31393<H>                              8.7    6.88  )-----------( 153      ( 28 Jun 2022 06:33 )             28.8     06-28    139  |  103  |  52.3<H>  ----------------------------<  89  4.1   |  23.0  |  2.49<H>    Ca    8.9      28 Jun 2022 06:33  Phos  3.4     06-28  Mg     2.0     06-28    TPro  6.9  /  Alb  3.8  /  TBili  0.7  /  DBili  x   /  AST  30  /  ALT  19  /  AlkPhos  194<H>  06-27    PT/INR/PTT ( 25 Jun 2022 15:07 )                       :                       :      14.4         :       30.1                  .        .                   .              .           .       1.24        .                                       Lipid Profile:   HgA1c:   TSH:     TELEMETRY: atrial flutter, PVCs, NSVT  ECG:    DIAGNOSTIC TESTING:  [ ] Echocardiogram:     [ ]  Catheterization:  [ ] Stress Test:    OTHER: 	                                                                [facility]                                                         CARDIOLOGY AT [place] [address]                                                         Telephone: [phone]. Fax:[phone]                                                                             PROGRESS NOTE    Reason for follow up: Acute Decompensated HFrEF  Update: Patient states that he is having some chest pain this morning starting around 4 AM. Patient states it initially lasted for about an hour, but it when he saw Dr. Duke he states it has been continuous. Patient's EKG unchanged, and troponin still 0.12, normal CK. Patient's echo official read is pending, but EF newly reduced to 30-35%.      Review of symptoms:   Cardiac:  No chest pain. No dyspnea. No palpitations.  Respiratory: no cough. No dyspnea  Gastrointestinal: No diarrhea. No abdominal pain. No bleeding.   Neuro: No focal neuro complaints.    Vitals:  T(C): 36.8 (06-29-22 @ 09:00), Max: 36.8 (06-29-22 @ 09:00)  HR: 102 (06-29-22 @ 09:00) (97 - 107)  BP: 115/80 (06-29-22 @ 09:00) (115/80 - 150/93)  RR: 19 (06-29-22 @ 09:00) (19 - 19)  SpO2: 97% (06-29-22 @ 09:00) (96% - 100%)  Wt(kg): --  I&O's Summary    28 Jun 2022 07:01  -  29 Jun 2022 07:00  --------------------------------------------------------  IN: 0 mL / OUT: 1350 mL / NET: -1350 mL      Weight (kg): 111.5 (06-27 @ 16:53)    PHYSICAL EXAM:  Constitutional: Comfortable . No acute distress.   HEENT: Atraumatic and normocephalic , neck is supple . no JVD. No carotid bruit.  CNS: A&Ox3. No focal deficits. Right sided facial droop  Respiratory: Decreased at b/l bases  Cardiovascular: Tachycardic,  No rubs or gallop. II/VI systolic murmur apex  Gastrointestinal: Soft, non-tender. +Bowel sounds.  Extremities: 2+ Peripheral Pulses, No clubbing, cyanosis, 3+ b/l LE edema  Psychiatric: Calm . no agitation.   Skin: Warm and dry, no ulcers on extremities     CURRENT CARDIAC MEDICATIONS:  buMETAnide Injectable 2 milliGRAM(s) IV Push every 12 hours  hydrALAZINE 100 milliGRAM(s) Oral three times a day  isosorbide   dinitrate Tablet (ISORDIL) 10 milliGRAM(s) Oral three times a day  metoprolol tartrate 25 milliGRAM(s) Oral every 8 hours      CURRENT OTHER MEDICATIONS:  ALBUTerol    90 MICROgram(s) HFA Inhaler 2 Puff(s) Inhalation four times a day  buPROPion XL (24-Hour) . 150 milliGRAM(s) Oral daily  pantoprazole    Tablet 40 milliGRAM(s) Oral before breakfast  allopurinol 100 milliGRAM(s) Oral daily  atorvastatin 40 milliGRAM(s) Oral at bedtime  dextrose 50% Injectable 25 Gram(s) IV Push once, Stop order after: 1 Doses  dextrose 50% Injectable 12.5 Gram(s) IV Push once, Stop order after: 1 Doses  dextrose 50% Injectable 25 Gram(s) IV Push once, Stop order after: 1 Doses  dextrose Oral Gel 15 Gram(s) Oral once, Stop order after: 1 Doses PRN Blood Glucose LESS THAN 70 milliGRAM(s)/deciliter  glucagon  Injectable 1 milliGRAM(s) IntraMuscular once, Stop order after: 1 Doses  insulin glargine Injectable (LANTUS) 10 Unit(s) SubCutaneous every morning  insulin glargine Injectable (LANTUS) 10 Unit(s) SubCutaneous at bedtime  aspirin enteric coated 81 milliGRAM(s) Oral daily  dextrose 5%. 1000 milliLiter(s) (50 mL/Hr) IV Continuous <Continuous>  dextrose 5%. 1000 milliLiter(s) (100 mL/Hr) IV Continuous <Continuous>  enoxaparin Injectable 30 milliGRAM(s) SubCutaneous every 24 hours  epoetin nohemi-epbx (RETACRIT) Injectable 56996 Unit(s) SubCutaneous <User Schedule>  ferrous    sulfate 325 milliGRAM(s) Oral daily  lidocaine   4% Patch 1 Patch Transdermal every 24 hours      LABS:	 	  ( 29 Jun 2022 09:05 )  Troponin T  0.12<H>,  CPK  92   , CKMB  X    , BNP X        , ( 27 Jun 2022 15:36 )  Troponin T  0.11<H>,  CPK  X    , CKMB  X    , BNP 49108<H>                              8.7    6.88  )-----------( 153      ( 28 Jun 2022 06:33 )             28.8     06-28    139  |  103  |  52.3<H>  ----------------------------<  89  4.1   |  23.0  |  2.49<H>    Ca    8.9      28 Jun 2022 06:33  Phos  3.4     06-28  Mg     2.0     06-28    TPro  6.9  /  Alb  3.8  /  TBili  0.7  /  DBili  x   /  AST  30  /  ALT  19  /  AlkPhos  194<H>  06-27    PT/INR/PTT ( 25 Jun 2022 15:07 )                       :                       :      14.4         :       30.1                  .        .                   .              .           .       1.24        .                                       Lipid Profile:   HgA1c:   TSH:     TELEMETRY: atrial flutter, PVCs, NSVT  ECG:    DIAGNOSTIC TESTING:  [ ] Echocardiogram:     [ ]  Catheterization:  [ ] Stress Test:    OTHER:

## 2022-06-29 NOTE — PATIENT PROFILE ADULT - FALL HARM RISK - HARM RISK INTERVENTIONS

## 2022-06-30 NOTE — PROGRESS NOTE ADULT - ASSESSMENT
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 I suspect this was AFL with BBB. Pt has been and still in atypical AFL since admission. Still no clear plan about his CKD and potential dialysis.   - Today, still c/o CP, ? atypical and stable Tn    h/o AFIB, now mostly in atypical AFL for probably several months with VR in the low 100. Its possible that this led to worsening HF, EF dropped further now (30-35%), but also has CAD and no recent LHC (avoided as NST was ok and has severe renal failure). Pt now is approaching dialysis *(no clear plans yet), and also reported 5-7 days of dull RSCP.     Has WAtchman and on aspirin only    AFIB  AFL  HFrEF  CAD  RBBB/Bifascicular block    His rhythm management is complicated, and options detailed on my note from yesterday    1. Not a good candidate for any EP intervention during admission and till there is clear plans about his CKD.   2. Agree with uptitration of BB, was on 25 mg daily, probably should not go more than 75 mg a day given conduction disease, target rates should be in the  while avoiding bradycardia   3. Discussed the drop EF and potential risk of SCD, do not think he is a good ICD candidate at this stage but this can be r/a as o/p when above is addressed. Explained to pt all options including avoiding ICD vs implanting ICD and the possibility of turning it off later when protection against sudden  death from VT/VF is no longer desired.    EP will sign off, follow up with me in 1-2 weeks from d/c  above d/w pt and Dr. Duke

## 2022-06-30 NOTE — PROGRESS NOTE ADULT - ASSESSMENT
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  Admitted for Acute on Chronic HFrEF.    AOC HF with biventricular heart failure   with peripheral edema and anasarca on abdominal imaging on exam, increased SOB  Cardiology consulted in ED  IV Bumex  Strict IO, Daily weight, 1.5L restriction  TTE with severe Biventricular dysfunction and segmental wma  Needs several days of IV diuretics  Potential LHC?  EP following for conduction disease, NSVT, and considerations of ICD  To titrate BB. No plan for in-patient intervention       CAD s/p CABG  ASA, Statin    HTN  HLD  hydral, amlo, statin    DM type 2 uncontrolled with hyperglycemia  Lantus 10 Daily + ISS  Diabetic diet    Lower Extremity Edema - Likely secondary to CHF  US LE to r/o DVT  DVT Ruled out    Posterior T11 fracture  No PTX  Conservative management  pain control     KEN on CKD 3b  Suspect cardiorenal  No obstruction  Will cont to diurese    Falls  Patient severely deconditioned, has walker at home but frequently does not use it per wife. Has had several falls in recent weeks.  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  Admitted for Acute on Chronic HFrEF.    AOC HF with biventricular heart failure   with peripheral edema and anasarca on abdominal imaging on exam, increased SOB  Cardiology consulted in ED  IV Bumex  Strict IO, Daily weight, 1.5L restriction  TTE with severe Biventricular dysfunction and segmental wma  Needs several days of IV diuretics  Potential LHC?  EP following for conduction disease, NSVT, and considerations of ICD  To titrate BB. No plan for in-patient intervention       CAD s/p CABG  ASA, Statin    HTN  HLD  hydral, amlo, statin    DM type 2 uncontrolled with hyperglycemia  Lantus 10 nightly + ISS  Diabetic diet    Lower Extremity Edema - Likely secondary to CHF  US LE to r/o DVT  DVT Ruled out    Posterior T11 fracture  No PTX  Conservative management  pain control     KEN on CKD 3b  Suspect cardiorenal  No obstruction  Will cont to diurese    Falls  Patient severely deconditioned, has walker at home but frequently does not use it per wife. Has had several falls in recent weeks.  Pending TAHMINA once medically optimized     Dispo:   Needs ongoing  IV diuresis for extensive edema  Ultimate TAHMINA

## 2022-06-30 NOTE — PROGRESS NOTE ADULT - SUBJECTIVE AND OBJECTIVE BOX
[facility]                                                         CARDIOLOGY AT [place] [address]                                                         Telephone: [phone]. Fax:[phone]                                                                             PROGRESS NOTE    Reason for follow up: On chf  Update: remains in atrial flutter  No further chest discomfort  Intake and out put does not appear accurate      Review of symptoms:   Cardiac:  No chest pain. No dyspnea. No palpitations.  Respiratory: no cough. No dyspnea  Gastrointestinal: No diarrhea. No abdominal pain. No bleeding.   Neuro: No focal neuro complaints.      Vitals:  T(C): 36.6 (06-30-22 @ 10:15), Max: 36.9 (06-29-22 @ 23:32)  HR: 102 (06-30-22 @ 10:15) (80 - 104)  BP: 96/61 (06-30-22 @ 10:15) (96/61 - 136/83)  RR: 18 (06-30-22 @ 10:15) (18 - 20)  SpO2: 96% (06-30-22 @ 10:15) (93% - 100%)  Wt(kg): --  I&O's Summary    29 Jun 2022 07:01 - 30 Jun 2022 07:00  --------------------------------------------------------  IN: 100 mL / OUT: 450 mL / NET: -350 mL    30 Jun 2022 07:01  -  30 Jun 2022 10:50  --------------------------------------------------------  IN: 0 mL / OUT: 550 mL / NET: -550 mL      Weight (kg): 111.5 (06-27 @ 16:53)      PHYSICAL EXAM:  Appearance: Comfortable. No acute distress  HEENT:  Atraumatic. Normocephalic.  Normal oral mucosa  Neurologic: A & O x 3, no gross focal deficits.  Cardiovascular: RRR S1 S2, irregular  Respiratory: decreased breath sounds   Gastrointestinal:  Soft, Non-tender, + BS  Lower Extremities: chronic changes bilateral lower ext with edema  Ecchymosis on upper back down right arm  Psychiatry: Patient is calm. No agitation.   Skin: warm and dry.      CURRENT MEDICATIONS:  MEDICATIONS  (STANDING):  ALBUTerol    90 MICROgram(s) HFA Inhaler 2 Puff(s) Inhalation four times a day  allopurinol 100 milliGRAM(s) Oral daily  aspirin enteric coated 81 milliGRAM(s) Oral daily  atorvastatin 40 milliGRAM(s) Oral at bedtime  buMETAnide Injectable 2 milliGRAM(s) IV Push every 12 hours  buPROPion XL (24-Hour) . 150 milliGRAM(s) Oral daily  enoxaparin Injectable 30 milliGRAM(s) SubCutaneous every 24 hours  epoetin nohemi-epbx (RETACRIT) Injectable 10226 Unit(s) SubCutaneous <User Schedule>  glucagon  Injectable 1 milliGRAM(s) IntraMuscular once  hydrALAZINE 100 milliGRAM(s) Oral three times a day  insulin glargine Injectable (LANTUS) 10 Unit(s) SubCutaneous at bedtime  iron sucrose IVPB 100 milliGRAM(s) IV Intermittent every 24 hours  isosorbide   dinitrate Tablet (ISORDIL) 10 milliGRAM(s) Oral three times a day  lidocaine   4% Patch 1 Patch Transdermal every 24 hours  magnesium sulfate  IVPB 1 Gram(s) IV Intermittent once  metoprolol tartrate 25 milliGRAM(s) Oral every 8 hours  pantoprazole    Tablet 40 milliGRAM(s) Oral before breakfast      LABS:	 	  CARDIAC MARKERS ( 29 Jun 2022 15:01 )  x     / 0.12 ng/mL / 90 U/L / x     / x      p-BNP 29 Jun 2022 15:01: x    , CARDIAC MARKERS ( 29 Jun 2022 09:05 )  x     / 0.12 ng/mL / 92 U/L / x     / x      p-BNP 29 Jun 2022 09:05: 83242 pg/mL, CARDIAC MARKERS ( 27 Jun 2022 15:36 )  x     / 0.11 ng/mL / x     / x     / x      p-BNP 27 Jun 2022 15:36: 90626 pg/mL                          8.8    7.00  )-----------( 156      ( 30 Jun 2022 06:26 )             29.1     06-30    139  |  101  |  62.2<H>  ----------------------------<  52<LL>  4.1   |  27.0  |  2.93<H>    Ca    8.8      30 Jun 2022 06:25  Phos  3.6     06-30  Mg     1.9     06-30      proBNP: Serum Pro-Brain Natriuretic Peptide: 66197 pg/mL (06-29 @ 09:05)  Serum Pro-Brain Natriuretic Peptide: 31791 pg/mL (06-27 @ 15:36)      TELEMETRY: Atrial flutter    < from: TTE Echo Complete w/ Contrast w/ Doppler (06.27.22 @ 17:53) >  Summary:   1. Endocardial visualization was enhanced with intravenous echo contrast.   2. Leftventricular ejection fraction, by visual estimation, is 30 to   35%.   3. Severely decreased segmental left ventricular systolic function.   4. The mitral in-flow pattern reveals no discernable A-wave, therefore   no comment on diastolic function canbe made.   5. Estimated pulmonary artery systolic pressure is 49.0 mmHg assuming a   right atrial pressure of 8 mmHg, which is consistent with mild pulmonary   hypertension.   6. Severely enlarged right ventricle with severely reduced RV systolic   function.   7. Severely enlarged left atrium.   8. Severely enlarged right atrium.   9. Mild mitral annular calcification.  10. Moderate to severe mitral valve regurgitation.  11. Moderate-severe tricuspid regurgitation.  12. Mild to moderate pulmonic valve regurgitation.  13. There is no evidence of pericardial effusion.  14. Compared to the prior TTE study from 5/9/21, severe biventricular   systolic dysfunction again noted.    c

## 2022-06-30 NOTE — PROVIDER CONTACT NOTE (CRITICAL VALUE NOTIFICATION) - SITUATION
patients serum glucose 52 on am labs, patient received lantus 10u last night, BG done on bilateral hands with results of 46 on left hand and 45 on right hand. patient asymptomatic

## 2022-06-30 NOTE — PROGRESS NOTE ADULT - ASSESSMENT
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  Found to be in decompensated heart failure  Echo with new drop in EF 35% Moderate to severe mr mild pul htn Had some chest discomfort on 6/29 and trop negative  Plan for cath but renal function with worsening.  bnp 36k  cxr cardiomegaly with pvc

## 2022-06-30 NOTE — PROGRESS NOTE ADULT - SUBJECTIVE AND OBJECTIVE BOX
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 He presented to the ED S/p mechanical fall landing on his shoulder. Patient reports having several mechanical falls over last few months since moving up from [place]. In the ED found to have right posterior rib fracture on imaging. No PTX. Was planned for a discharge to [facility] however found to have worsening LE edema, Abdominal Swelling, and increased work of breathing. Per patient, has SOB at baseline on exertion. States that his SOB has been a little worse over last 3 days. Furthermore describes history of LE edema that comes and goes. Also having worsened over last week and present at time of presentation to ED.  Denies CP, nausea, vomiting, fevers, chills nausea.     Interval Hx 6/29-30:  - BB uptitrated, rates better controlled, reported NSVT not available to review. I suspect this was AFL with BBB. Pt has been and still in atypical AFL since admission. Still no clear plan about his CKD and potential dialysis.   - Today, still c/o CP, ? atypical and stable Tn      PAST MEDICAL & SURGICAL HISTORY:  Coronary artery disease involving native heart, angina presence unspecified, unspecified vessel or lesion type  nuc stress 5/2021 negative  Atrial fibrillation, unspecified type  s/p MARLO/DCCV 5/11/21  Congestive heart failure, unspecified HF chronicity, unspecified heart failure type  EF 35-40%  Bell&#x27;s palsy  Right side of face  Essential hypertension  Type 2 diabetes mellitus without complication, unspecified whether long term insulin use  insulin plus orals  Osteomyelitis of sternum  Chronic kidney disease, unspecified CKD stage  stage III. Recent BL cr 2.0-2.5  Anemia secondary to renal failure  together with iron deficiency  GIB (gastrointestinal bleeding)  s/p coil embolization  Bifascicular block  s/p ILR implant  Squamous cell skin cancer  S/P CABG (coronary artery bypass graft)  complicated by osteomyleitis of the sternum and sepsis; sternum removed  History of loop recorder  Presence of Watchman left atrial appendage closure device  S/P skin cancer resection    REVIEW OF SYSTEMS  General: - fever or chills, + fatigue	  Skin/Breast: - rashes  Ophthalmologic: - blurred vision	  ENMT: - sore throat  Respiratory and Thorax: + cough, + dyspnea 	  Cardiovascular: +LE edema, + PINA, + orthopnea  Gastrointestinal:	- N/V/D/C  Genitourinary: + hematuria  Musculoskeletal:	 + arthritis  Neurological: - weaknesses  Psychiatric: + depression     MEDICATIONS  (STANDING):  ALBUTerol    90 MICROgram(s) HFA Inhaler 2 Puff(s) Inhalation four times a day  allopurinol 100 milliGRAM(s) Oral daily  aspirin enteric coated 81 milliGRAM(s) Oral daily  atorvastatin 40 milliGRAM(s) Oral at bedtime  buMETAnide Injectable 2 milliGRAM(s) IV Push every 12 hours  buPROPion XL (24-Hour) . 150 milliGRAM(s) Oral daily  dextrose 5%. 1000 milliLiter(s) (50 mL/Hr) IV Continuous <Continuous>  dextrose 5%. 1000 milliLiter(s) (100 mL/Hr) IV Continuous <Continuous>  dextrose 50% Injectable 25 Gram(s) IV Push once  dextrose 50% Injectable 12.5 Gram(s) IV Push once  dextrose 50% Injectable 25 Gram(s) IV Push once  enoxaparin Injectable 30 milliGRAM(s) SubCutaneous every 24 hours  epoetin nohemi-epbx (RETACRIT) Injectable 43025 Unit(s) SubCutaneous <User Schedule>  glucagon  Injectable 1 milliGRAM(s) IntraMuscular once  hydrALAZINE 100 milliGRAM(s) Oral three times a day  insulin glargine Injectable (LANTUS) 10 Unit(s) SubCutaneous at bedtime  iron sucrose IVPB 100 milliGRAM(s) IV Intermittent every 24 hours  isosorbide   dinitrate Tablet (ISORDIL) 10 milliGRAM(s) Oral three times a day  lidocaine   4% Patch 1 Patch Transdermal every 24 hours  magnesium sulfate  IVPB 1 Gram(s) IV Intermittent once  metoprolol tartrate 25 milliGRAM(s) Oral every 8 hours  pantoprazole    Tablet 40 milliGRAM(s) Oral before breakfast    MEDICATIONS  (PRN):  acetaminophen     Tablet .. 650 milliGRAM(s) Oral every 6 hours PRN Moderate Pain (4 - 6)  dextrose Oral Gel 15 Gram(s) Oral once PRN Blood Glucose LESS THAN 70 milliGRAM(s)/deciliter      Allergies  No Known Allergies    SOCIAL HISTORY: Never smoker, non drinker, 1-2 cups caffeine daily     FAMILY HISTORY:  No pertinent family history in first degree relatives  cad    Vital Signs Last 24 Hrs  T(C): 36.6 (30 Jun 2022 10:15), Max: 36.9 (29 Jun 2022 23:32)  T(F): 97.8 (30 Jun 2022 10:15), Max: 98.5 (30 Jun 2022 08:00)  HR: 102 (30 Jun 2022 10:15) (80 - 104)  BP: 96/61 (30 Jun 2022 10:15) (96/61 - 136/83)  BP(mean): --  RR: 18 (30 Jun 2022 10:15) (18 - 20)  SpO2: 96% (30 Jun 2022 10:15) (93% - 100%)    Physical Exam:  Constitutional: AAOx3, mild respiratory distress  Neck: supple, No JVD  Cardiovascular: +S1S2 IR, AFlutter at time of exam  Pulmonary: Coarse breath sounds, mildly labored.   Abdomen: obese, +BS, soft NTND  Extremities: 3+ tense pitting edema  Neuro: non focal, speech clear, HONG x 4  Psych: Depressed affect.     LABS:                          8.8    7.00  )-----------( 156      ( 30 Jun 2022 06:26 )             29.1   06-30    139  |  101  |  62.2<H>  ----------------------------<  52<LL>  4.1   |  27.0  |  2.93<H>    Ca    8.8      30 Jun 2022 06:25  Phos  3.6     06-30  Mg     1.9     06-30        RADIOLOGY & ADDITIONAL STUDIES:  MARLO 9/20/2021  Summary:   1. Left ventricular ejection fraction, by visual estimation, is 40 to 45%.   2. Mildly decreased global left ventricular systolic function.   3. Moderately enlarged right ventricle with severely reduced systolic function, estimatd RVSP least 52 mmHg.   4. Severely enlarged left atrium.   5. Severely enlarged right atrium.   6. A well-seated Watchman device present without overlying thrombus and no evidence of deng-device leak.   7. Degenerative mitral valve.   8. Moderate mitral valve regurgitation.   9. Mild tricuspid regurgitation.  10. Mild pulmonic valve regurgitation.  11. Small patent foramen ovale, with predominantly right to left shunting across the atrial septum.  12. There is mild aortic root calcification with simple atheroma at the aortic arch and descending aorta.  13. There is no evidence of pericardial effusion.  14. Compared to the prior MARLO study from 7/27/21, LV systolic function remain mild-to-moderately reduced and known RV dysfunction. Well-seated Watchman without deng-device leak or overlying thrombus.    CXR 6/27/22  Frontal expiratory view of the chest shows the heart to be enlarged in   size. Mediastinal clips and loop recorder are again noted.  The lungs show similar pulmonary vascularity with progression of right   upper lobe infiltrate and there is no evidence of pneumothorax nor   pleural effusion.  IMPRESSION:  Progression of right infiltrate.    CT chest 6/25/22  IMPRESSION:  Acute fracture of right posterior 11th rib.  Small pleural effusions and pulmonary edema. No pneumothorax.  Ascites.    CT abdo/pelvis 6/27/22  IMPRESSION:  No specific acute inflammatory obstructive pathology.  Small ascites. Extensive anasarca  Colonic diverticulosis without acute diverticulitis.  Enlarged prostate gland.  Additional findings as discussed    EKG:   Aflutter at 102bpm; QRSD 178ms; RBBB

## 2022-06-30 NOTE — PROGRESS NOTE ADULT - ASSESSMENT
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 I suspect this was AFL with BBB. Pt has been and still in atypical AFL since admission. Still no clear plan about his CKD and potential dialysis.   - Today, still c/o CP, ? atypical and stable Tn    AFIB, now mostly in atypical AFL for probably several months with VR in the low 100. Its possible that this led to worsening HF, EF dropped further now (30-35%), but also has CAD and no recent LHC (avoided as NST was ok and has severe renal failure).     Pt now is approaching dialysis ,    Has Watchman and on aspirin,    AFIB  AFL  HFrEF  CAD  RBBB/Bifascicular block    On BB,     No Immediate Need for  HD,     AVF Once Stable,

## 2022-06-30 NOTE — PROGRESS NOTE ADULT - SUBJECTIVE AND OBJECTIVE BOX
Patient seen and examined    I&O's Summary    29 Jun 2022 07:01  -  30 Jun 2022 07:00  --------------------------------------------------------  IN: 100 mL / OUT: 450 mL / NET: -350 mL    30 Jun 2022 07:01  -  30 Jun 2022 13:41  --------------------------------------------------------  IN: 0 mL / OUT: 550 mL / NET: -550 mL    REVIEW OF SYSTEMS:    CONSTITUTIONAL: No F/C  RESPIRATORY: No cough or SOB  CARDIOVASCULAR: No CP/palpitations,    GASTROINTESTINAL: No abdominal pain , NVD   GENITOURINARY: No UTI sx  NEUROLOGICAL: No headaches/wk/numbness  MUSCULOSKELETAL:  No joint pain/swelling; No LBP  EXTREMITIES : ++ swelling,    Vital Signs Last 24 Hrs  T(C): 36.6 (30 Jun 2022 10:15), Max: 36.9 (29 Jun 2022 23:32)  T(F): 97.8 (30 Jun 2022 10:15), Max: 98.5 (30 Jun 2022 08:00)  HR: 103 (30 Jun 2022 12:54) (80 - 104)  BP: 110/65 (30 Jun 2022 12:54) (96/61 - 136/83)  RR: 18 (30 Jun 2022 10:15) (18 - 20)  SpO2: 96% (30 Jun 2022 10:15) (93% - 100%)    PHYSICAL EXAM:    GENERAL: NAD, Pale,   EYES:  conjunctiva and sclera clear  NECK: Supple, No JVD/Bruit  NERVOUS SYSTEM:  A/O x3,   CHEST:  CTA ,No rales or rhonchi  HEART:  RRR, No murmurs  ABDOMEN: Soft, NT / ND , BS+  EXTREMITIES:  N++ Edema;  SKIN: No rash,    LABS:                        8.8    7.00  )-----------( 156      ( 30 Jun 2022 06:26 )             29.1     06-30    139  |  101  |  62.2<H>  ----------------------------<  52<LL>  4.1   |  27.0  |  2.93<H>    Ca    8.8      30 Jun 2022 06:25  Phos  3.6     06-30  Mg     1.9     06-30    MEDICATIONS  (STANDING):  acetaminophen     Tablet .. PRN  ALBUTerol    90 MICROgram(s) HFA Inhaler  allopurinol  aspirin enteric coated  atorvastatin  buMETAnide Injectable  buPROPion XL (24-Hour) .  dextrose 5%.  dextrose 5%.  dextrose 50% Injectable  dextrose 50% Injectable  dextrose 50% Injectable  dextrose Oral Gel PRN  enoxaparin Injectable  epoetin nohemi-epbx (RETACRIT) Injectable  glucagon  Injectable  hydrALAZINE  insulin glargine Injectable (LANTUS)  iron sucrose IVPB  isosorbide   dinitrate Tablet (ISORDIL)  lidocaine   4% Patch  metoprolol tartrate  pantoprazole    Tablet  Creatinine, Serum: 2.93 mg/dL (06.30.22 @ 06:25)   Historical Values  Creatinine, Serum: 2.93 mg/dL (06.30.22 @ 06:25)   Creatinine, Serum: 2.58 mg/dL (06.29.22 @ 15:01)   Creatinine, Serum: 2.49 mg/dL (06.28.22 @ 06:33)   Creatinine, Serum: 2.46 mg/dL (06.27.22 @ 15:36)   Creatinine, Serum: 2.69 mg/dL (06.25.22 @ 15:07)   Creatinine, Serum: 2.59 mg/dL (09.20.21 @ 10:40)

## 2022-06-30 NOTE — PROGRESS NOTE ADULT - SUBJECTIVE AND OBJECTIVE BOX
[facility] Division of Hospital Medicine    Chief Complaint:    AOC CHF    SUBJECTIVE / OVERNIGHT EVENTS:  Noted to have hypoglycemia overnight   Also with episode of NSVT  Glucose corrected    Patient denies chest pain, abd pain, N/V, fever, chills, dysuria or any other complaints. All remainder ROS negative.     MEDICATIONS  (STANDING):  ALBUTerol    90 MICROgram(s) HFA Inhaler 2 Puff(s) Inhalation four times a day  allopurinol 100 milliGRAM(s) Oral daily  aspirin enteric coated 81 milliGRAM(s) Oral daily  atorvastatin 40 milliGRAM(s) Oral at bedtime  buMETAnide Injectable 2 milliGRAM(s) IV Push every 12 hours  buPROPion XL (24-Hour) . 150 milliGRAM(s) Oral daily  dextrose 5%. 1000 milliLiter(s) (50 mL/Hr) IV Continuous <Continuous>  dextrose 5%. 1000 milliLiter(s) (100 mL/Hr) IV Continuous <Continuous>  dextrose 50% Injectable 25 Gram(s) IV Push once  dextrose 50% Injectable 12.5 Gram(s) IV Push once  dextrose 50% Injectable 25 Gram(s) IV Push once  enoxaparin Injectable 30 milliGRAM(s) SubCutaneous every 24 hours  epoetin nohemi-epbx (RETACRIT) Injectable 50142 Unit(s) SubCutaneous <User Schedule>  glucagon  Injectable 1 milliGRAM(s) IntraMuscular once  hydrALAZINE 100 milliGRAM(s) Oral three times a day  insulin glargine Injectable (LANTUS) 10 Unit(s) SubCutaneous at bedtime  iron sucrose IVPB 100 milliGRAM(s) IV Intermittent every 24 hours  isosorbide   dinitrate Tablet (ISORDIL) 10 milliGRAM(s) Oral three times a day  lidocaine   4% Patch 1 Patch Transdermal every 24 hours  metoprolol tartrate 25 milliGRAM(s) Oral every 8 hours  pantoprazole    Tablet 40 milliGRAM(s) Oral before breakfast    MEDICATIONS  (PRN):  acetaminophen     Tablet .. 650 milliGRAM(s) Oral every 6 hours PRN Moderate Pain (4 - 6)  dextrose Oral Gel 15 Gram(s) Oral once PRN Blood Glucose LESS THAN 70 milliGRAM(s)/deciliter        I&O's Summary    2022 07:01  -  2022 07:00  --------------------------------------------------------  IN: 100 mL / OUT: 450 mL / NET: -350 mL    2022 07:01  -  2022 13:49  --------------------------------------------------------  IN: 0 mL / OUT: 550 mL / NET: -550 mL        PHYSICAL EXAM:  Vital Signs Last 24 Hrs  T(C): 36.6 (2022 10:15), Max: 36.9 (2022 23:32)  T(F): 97.8 (2022 10:15), Max: 98.5 (2022 08:00)  HR: 103 (2022 12:54) (80 - 104)  BP: 110/65 (2022 12:54) (96/61 - 136/83)  BP(mean): --  RR: 18 (2022 10:15) (18 - 20)  SpO2: 96% (2022 10:15) (93% - 100%)        CONSTITUTIONAL: NAD, elderly, obese, sitting in chair  ENMT: Moist oral mucosa, no pharyngeal injection or exudates; normal dentition  RESPIRATORY: Normal respiratory effort; lungs are clear to auscultation bilaterally  CARDIOVASCULAR: Regular rate and rhythm, normal S1 and S2, no murmur/rub/gallop; Peripheral pulses are 2+ bilaterally, + LE Edema  ABDOMEN: Nontender to palpation, normoactive bowel sounds, no rebound/guarding;   MUSCLOSKELETAL:  No clubbing or cyanosis of digits; no joint swelling or tenderness to palpation  PSYCH: A+O to person, place, and time; affect appropriate  NEUROLOGY: CN 2-12 are intact and symmetric; no gross sensory deficits;   SKIN: No rashes; no palpable lesions    LABS:                        8.8    7.00  )-----------( 156      ( 2022 06:26 )             29.1     06-30    139  |  101  |  62.2<H>  ----------------------------<  52<LL>  4.1   |  27.0  |  2.93<H>    Ca    8.8      2022 06:25  Phos  3.6     06-30  Mg     1.9     06-30        CARDIAC MARKERS ( 2022 15:01 )  x     / 0.12 ng/mL / 90 U/L / x     / x      CARDIAC MARKERS ( 2022 09:05 )  x     / 0.12 ng/mL / 92 U/L / x     / x          Urinalysis Basic - ( 2022 22:45 )    Color: Brown / Appearance: Slightly Turbid / S.010 / pH: x  Gluc: x / Ketone: Negative  / Bili: Negative / Urobili: Negative   Blood: x / Protein: Negative / Nitrite: Positive   Leuk Esterase: Trace / RBC: >50 /HPF / WBC 0-2 /HPF   Sq Epi: x / Non Sq Epi: Occasional / Bacteria: Few        CAPILLARY BLOOD GLUCOSE      POCT Blood Glucose.: 129 mg/dL (2022 12:06)  POCT Blood Glucose.: 74 mg/dL (2022 08:00)  POCT Blood Glucose.: 46 mg/dL (2022 07:21)  POCT Blood Glucose.: 45 mg/dL (2022 07:20)  POCT Blood Glucose.: 245 mg/dL (2022 22:12)        RADIOLOGY & ADDITIONAL TESTS:  Results Reviewed:   Imaging Personally Reviewed:  Electrocardiogram Personally Reviewed:

## 2022-07-01 NOTE — PROGRESS NOTE ADULT - SUBJECTIVE AND OBJECTIVE BOX
[facility]                                                         CARDIOLOGY AT [facility]                                                                  [address] [address]                                                         Telephone: [phone]. Fax:[phone]                                                                             PROGRESS NOTE    Reason for follow up:   Update:       Review of symptoms:   Cardiac:  No chest pain. No dyspnea. No palpitations.  Respiratory: no cough. No dyspnea  Gastrointestinal: No diarrhea. No abdominal pain. No bleeding.   Neuro: No focal neuro complaints.      Vitals:  T(C): 37.2 (07-01-22 @ 09:40), Max: 37.2 (07-01-22 @ 09:40)  HR: 101 (07-01-22 @ 09:40) (91 - 103)  BP: 117/73 (07-01-22 @ 09:40) (96/61 - 120/79)  RR: 17 (07-01-22 @ 09:40) (17 - 18)  SpO2: 100% (07-01-22 @ 09:40) (96% - 100%)  Wt(kg): --  I&O's Summary    30 Jun 2022 07:01  -  01 Jul 2022 07:00  --------------------------------------------------------  IN: 1050 mL / OUT: 1550 mL / NET: -500 mL      Weight (kg): 111.5 (06-27 @ 16:53)      PHYSICAL EXAM:  Appearance: Comfortable. No acute distress  HEENT:  Atraumatic. Normocephalic.  Normal oral mucosa  Neurologic: A & O x 3, no gross focal deficits.  Cardiovascular: RRR S1 S2, No murmur, no rubs/gallops. No JVD  Respiratory: Lungs clear to auscultation, unlabored   Gastrointestinal:  Soft, Non-tender, + BS  Lower Extremities: 2+ Peripheral Pulses, No clubbing, cyanosis, or edema  Psychiatry: Patient is calm. No agitation.   Skin: warm and dry.      CURRENT CARDIAC MEDICATIONS:  buMETAnide Injectable 2 milliGRAM(s) IV Push every 12 hours  hydrALAZINE 100 milliGRAM(s) Oral three times a day  isosorbide   dinitrate Tablet (ISORDIL) 10 milliGRAM(s) Oral three times a day  metoprolol tartrate 25 milliGRAM(s) Oral every 8 hours        CURRENT OTHER MEDICATIONS:  ALBUTerol    90 MICROgram(s) HFA Inhaler 2 Puff(s) Inhalation four times a day  acetaminophen     Tablet .. 650 milliGRAM(s) Oral every 6 hours PRN Moderate Pain (4 - 6)  buPROPion XL (24-Hour) . 150 milliGRAM(s) Oral daily  pantoprazole    Tablet 40 milliGRAM(s) Oral before breakfast  allopurinol 100 milliGRAM(s) Oral daily  atorvastatin 40 milliGRAM(s) Oral at bedtime  dextrose 50% Injectable 25 Gram(s) IV Push once, Stop order after: 1 Doses  dextrose 50% Injectable 12.5 Gram(s) IV Push once, Stop order after: 1 Doses  dextrose 50% Injectable 25 Gram(s) IV Push once, Stop order after: 1 Doses  dextrose Oral Gel 15 Gram(s) Oral once, Stop order after: 1 Doses PRN Blood Glucose LESS THAN 70 milliGRAM(s)/deciliter  glucagon  Injectable 1 milliGRAM(s) IntraMuscular once, Stop order after: 1 Doses  insulin glargine Injectable (LANTUS) 10 Unit(s) SubCutaneous at bedtime  aspirin enteric coated 81 milliGRAM(s) Oral daily  dextrose 5%. 1000 milliLiter(s) (50 mL/Hr) IV Continuous <Continuous>  dextrose 5%. 1000 milliLiter(s) (100 mL/Hr) IV Continuous <Continuous>  enoxaparin Injectable 30 milliGRAM(s) SubCutaneous every 24 hours  epoetin nohemi-epbx (RETACRIT) Injectable 78989 Unit(s) SubCutaneous <User Schedule>  iron sucrose IVPB 100 milliGRAM(s) IV Intermittent every 24 hours, Stop order after: 5 Doses  lidocaine   4% Patch 1 Patch Transdermal every 24 hours        LABS:	 	  ( 29 Jun 2022 15:01 )  Troponin T  0.12<H>,  CPK  90   , CKMB  X    , BNP X        , ( 29 Jun 2022 09:05 )  Troponin T  0.12<H>,  CPK  92   , CKMB  X    , BNP 52961<H>    , ( 27 Jun 2022 15:36 )  Troponin T  0.11<H>,  CPK  X    , CKMB  X    , BNP 42376<H>                              9.8    10.16 )-----------( 176      ( 01 Jul 2022 09:33 )             33.2     06-30    139  |  101  |  62.2<H>  ----------------------------<  52<LL>  4.1   |  27.0  |  2.93<H>    Ca    8.8      30 Jun 2022 06:25  Phos  3.6     06-30  Mg     1.9     06-30      PT/INR/PTT ( 25 Jun 2022 15:07 )                       :                       :      14.4         :       30.1                  .        .                   .              .           .       1.24        .                                         TELEMETRY:       DIAGNOSTIC TESTING:  [ ] Echocardiogram:   [ ]  Catheterization:  [ ] Stress Test:    OTHER: 	                                                                [facility]                                                         CARDIOLOGY AT [facility]                                                                  [address] [address]                                                         Telephone: [phone]. Fax:[phone]                                                                             PROGRESS NOTE    Reason for follow up: CHF, KEN, AFib  Update: Pt states feeling ok, little short of breath.   Denies chest pain palpitations       Review of symptoms:   Cardiac:  No chest pain. No dyspnea. No palpitations.  Respiratory: no cough. No dyspnea  Gastrointestinal: No diarrhea. No abdominal pain. No bleeding.   Neuro: No focal neuro complaints.      Vitals:  T(C): 37.2 (07-01-22 @ 09:40), Max: 37.2 (07-01-22 @ 09:40)  HR: 101 (07-01-22 @ 09:40) (91 - 103)  BP: 117/73 (07-01-22 @ 09:40) (96/61 - 120/79)  RR: 17 (07-01-22 @ 09:40) (17 - 18)  SpO2: 100% (07-01-22 @ 09:40) (96% - 100%)      I&O's Summary  30 Jun 2022 07:01  -  01 Jul 2022 07:00  --------------------------------------------------------  IN: 1050 mL / OUT: 1550 mL / NET: -500 mL      Weight (kg): 111.5 (06-27 @ 16:53)      PHYSICAL EXAM:  Appearance: Comfortable. No acute distress  HEENT:  Atraumatic. Normocephalic.  Normal oral mucosa  Neurologic: A & O x 3, no gross focal deficits.  Cardiovascular: RRR S1 S2, No murmur, no rubs/gallops.  Respiratory: diminished breath sounds, unlabored   Gastrointestinal:  Soft, Non-tender, + BS  Lower Extremities: 2+ edema  Psychiatry: Patient is calm. No agitation.   Skin: warm and dry.        CURRENT CARDIAC MEDICATIONS:  buMETAnide Injectable 2 milliGRAM(s) IV Push every 12 hours  hydrALAZINE 100 milliGRAM(s) Oral three times a day  isosorbide   dinitrate Tablet (ISORDIL) 10 milliGRAM(s) Oral three times a day  metoprolol tartrate 25 milliGRAM(s) Oral every 8 hours        CURRENT OTHER MEDICATIONS:  ALBUTerol    90 MICROgram(s) HFA Inhaler 2 Puff(s) Inhalation four times a day  acetaminophen     Tablet .. 650 milliGRAM(s) Oral every 6 hours PRN Moderate Pain (4 - 6)  buPROPion XL (24-Hour) . 150 milliGRAM(s) Oral daily  pantoprazole    Tablet 40 milliGRAM(s) Oral before breakfast  allopurinol 100 milliGRAM(s) Oral daily  atorvastatin 40 milliGRAM(s) Oral at bedtime  dextrose Oral Gel 15 Gram(s) Oral once, Stop order after: 1 Doses PRN Blood Glucose LESS THAN 70 milliGRAM(s)/deciliter  glucagon  Injectable 1 milliGRAM(s) IntraMuscular once, Stop order after: 1 Doses  insulin glargine Injectable (LANTUS) 10 Unit(s) SubCutaneous at bedtime  aspirin enteric coated 81 milliGRAM(s) Oral daily  dextrose 5%. 1000 milliLiter(s) (50 mL/Hr) IV Continuous <Continuous>  dextrose 5%. 1000 milliLiter(s) (100 mL/Hr) IV Continuous <Continuous>  enoxaparin Injectable 30 milliGRAM(s) SubCutaneous every 24 hours  epoetin nohemi-epbx (RETACRIT) Injectable 51642 Unit(s) SubCutaneous <User Schedule>  iron sucrose IVPB 100 milliGRAM(s) IV Intermittent every 24 hours, Stop order after: 5 Doses  lidocaine   4% Patch 1 Patch Transdermal every 24 hours        LABS:	 	  ( 29 Jun 2022 15:01 )  Troponin T  0.12<H>,  CPK  90   , CKMB  X    , BNP X      ( 29 Jun 2022 09:05 )  Troponin T  0.12<H>,  CPK  92   , CKMB  X    , BNP 04549<H>  ( 27 Jun 2022 15:36 )  Troponin T  0.11<H>,  CPK  X    , CKMB  X    , BNP 01160<H>                            9.8    10.16 )-----------( 176      ( 01 Jul 2022 09:33 )             33.2     06-30    139  |  101  |  62.2<H>  ----------------------------<  52<LL>  4.1   |  27.0  |  2.93<H>    Ca    8.8      30 Jun 2022 06:25  Phos  3.6     06-30  Mg     1.9     06-30      PT/INR/PTT ( 25 Jun 2022 15:07 )                       :                       :      14.4         :       30.1                  .        .                   .              .           .       1.24        .                                         TELEMETRY: Aflutter      DIAGNOSTIC TESTING:  [ ] Echocardiogram:   < from: TTE Echo Complete w/ Contrast w/ Doppler (06.27.22 @ 17:53) >    Summary:   1. Endocardial visualization was enhanced with intravenous echo contrast.   2. Left ventricular ejection fraction, by visual estimation, is 30 to   35%.   3. Severely decreased segmental left ventricular systolic function.   4. The mitral in-flow pattern reveals no discernable A-wave, therefore   no comment on diastolic function can be made.   5. Estimated pulmonary artery systolic pressure is 49.0 mmHg assuming a   right atrial pressure of 8 mmHg, which is consistent with mild pulmonary   hypertension.   6. Severely enlarged right ventricle with severely reduced RV systolic   function.   7. Severely enlarged left atrium.   8. Severely enlarged right atrium.   9. Mild mitral annular calcification.  10. Moderate to severe mitral valve regurgitation.  11. Moderate-severe tricuspid regurgitation.  12. Mild tomoderate pulmonic valve regurgitation.  13. There is no evidence of pericardial effusion.  14. Compared to the prior TTE study from 5/9/21, severe biventricular   systolic dysfunction again noted.    Gamal Mirza DO Electronically signed on 6/28/2022 at 1:54:26 PM    < end of copied text >

## 2022-07-01 NOTE — PROGRESS NOTE ADULT - SUBJECTIVE AND OBJECTIVE BOX
[facility] [place] Division of Hospital Medicine    Chief Complaint:    Fall, AOC CHF  SUBJECTIVE / OVERNIGHT EVENTS:  Edema improving   Patient denies chest pain, abd pain, N/V, fever, chills, dysuria or any other complaints. All remainder ROS negative.     MEDICATIONS  (STANDING):  allopurinol 100 milliGRAM(s) Oral daily  aspirin enteric coated 81 milliGRAM(s) Oral daily  atorvastatin 40 milliGRAM(s) Oral at bedtime  buMETAnide Injectable 2 milliGRAM(s) IV Push every 12 hours  buPROPion XL (24-Hour) . 150 milliGRAM(s) Oral daily  dextrose 5%. 1000 milliLiter(s) (100 mL/Hr) IV Continuous <Continuous>  dextrose 5%. 1000 milliLiter(s) (50 mL/Hr) IV Continuous <Continuous>  dextrose 50% Injectable 25 Gram(s) IV Push once  dextrose 50% Injectable 12.5 Gram(s) IV Push once  dextrose 50% Injectable 25 Gram(s) IV Push once  enoxaparin Injectable 30 milliGRAM(s) SubCutaneous every 24 hours  epoetin nohemi-epbx (RETACRIT) Injectable 29067 Unit(s) SubCutaneous <User Schedule>  glucagon  Injectable 1 milliGRAM(s) IntraMuscular once  hydrALAZINE 100 milliGRAM(s) Oral three times a day  insulin glargine Injectable (LANTUS) 10 Unit(s) SubCutaneous at bedtime  insulin lispro (ADMELOG) corrective regimen sliding scale   SubCutaneous three times a day before meals  insulin lispro (ADMELOG) corrective regimen sliding scale   SubCutaneous at bedtime  iron sucrose IVPB 100 milliGRAM(s) IV Intermittent every 24 hours  isosorbide   dinitrate Tablet (ISORDIL) 10 milliGRAM(s) Oral three times a day  lidocaine   4% Patch 1 Patch Transdermal every 24 hours  metoprolol tartrate 25 milliGRAM(s) Oral every 8 hours  pantoprazole    Tablet 40 milliGRAM(s) Oral before breakfast    MEDICATIONS  (PRN):  acetaminophen     Tablet .. 650 milliGRAM(s) Oral every 6 hours PRN Moderate Pain (4 - 6)  ALBUTerol    90 MICROgram(s) HFA Inhaler 1 Puff(s) Inhalation every 4 hours PRN Shortness of Breath and/or Wheezing  dextrose Oral Gel 15 Gram(s) Oral once PRN Blood Glucose LESS THAN 70 milliGRAM(s)/deciliter        I&O's Summary    30 Jun 2022 07:01  -  01 Jul 2022 07:00  --------------------------------------------------------  IN: 1050 mL / OUT: 1550 mL / NET: -500 mL        PHYSICAL EXAM:  Vital Signs Last 24 Hrs  T(C): 37.2 (01 Jul 2022 09:40), Max: 37.2 (01 Jul 2022 09:40)  T(F): 98.9 (01 Jul 2022 09:40), Max: 98.9 (01 Jul 2022 09:40)  HR: 103 (01 Jul 2022 14:00) (91 - 103)  BP: 123/48 (01 Jul 2022 14:00) (99/62 - 123/48)  BP(mean): --  RR: 18 (01 Jul 2022 14:00) (16 - 18)  SpO2: 99% (01 Jul 2022 14:00) (96% - 100%)        CONSTITUTIONAL: NAD, in chair  ENMT: Moist oral mucosa, no pharyngeal injection or exudates; normal dentition  RESPIRATORY: Normal respiratory effort; lungs are clear to auscultation bilaterally  CARDIOVASCULAR: Regular rate and rhythm, normal S1 and S2, no murmur/rub/gallop; Peripheral pulses are 2+ bilaterally + Pitting edema to thighs  ABDOMEN: Nontender to palpation, normoactive bowel sounds, no rebound/guarding;   MUSCLOSKELETAL:  No clubbing or cyanosis of digits; no joint swelling or tenderness to palpation  PSYCH: A+O to person, place, and time; affect appropriate  NEUROLOGY: CN 2-12 are intact and symmetric; no gross sensory deficits;   SKIN: No rashes; no palpable lesions    LABS:                        9.8    10.16 )-----------( 176      ( 01 Jul 2022 09:33 )             33.2     07-01    139  |  98  |  66.3<H>  ----------------------------<  180<H>  4.7   |  26.0  |  2.95<H>    Ca    9.1      01 Jul 2022 09:33  Phos  3.6     06-30  Mg     1.9     06-30    TPro  7.3  /  Alb  4.3  /  TBili  1.2  /  DBili  x   /  AST  22  /  ALT  17  /  AlkPhos  184<H>  07-01      CARDIAC MARKERS ( 29 Jun 2022 15:01 )  x     / 0.12 ng/mL / 90 U/L / x     / x              Culture - Urine (collected 29 Jun 2022 12:39)  Source: Clean Catch Clean Catch (Midstream)  Final Report (30 Jun 2022 21:29):    <10,000 CFU/mL Normal Urogenital Luann      CAPILLARY BLOOD GLUCOSE      POCT Blood Glucose.: 192 mg/dL (01 Jul 2022 12:16)  POCT Blood Glucose.: 188 mg/dL (01 Jul 2022 08:00)  POCT Blood Glucose.: 285 mg/dL (30 Jun 2022 21:01)  POCT Blood Glucose.: 238 mg/dL (30 Jun 2022 17:11)        RADIOLOGY & ADDITIONAL TESTS:  Results Reviewed:   Imaging Personally Reviewed:  Electrocardiogram Personally Reviewed:

## 2022-07-01 NOTE — PROGRESS NOTE ADULT - ASSESSMENT
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  Admitted for Acute on Chronic HFrEF.    AOC HF with biventricular heart failure   with peripheral edema and anasarca on abdominal imaging on exam, increased SOB  Cardiology consulted in ED  Cont IV Bumex and titrate through weekend. Will likely need further IV  Strict IO, Daily weight, 1.5L restriction  TTE with severe Biventricular dysfunction and segmental wma  Potential LHC?  EP following for conduction disease, NSVT, and considerations of ICD  BB titrated. No plan for in-patient intervention     CAD s/p CABG  ASA, Statin    HTN  HLD  hydral, amlo, statin    DM type 2 uncontrolled with hyperglycemia  Lantus 10 nightly + ISS  Diabetic diet    Lower Extremity Edema - Likely secondary to CHF  DVT Ruled out    Posterior T11 fracture  No PTX  Conservative management  pain control     KEN on CKD 3b  Suspect cardiorenal  No obstruction  Will cont to diurese  Nephrology consulted    Falls  Patient severely deconditioned, has walker at home but frequently does not use it per wife. Has had several falls in recent weeks.  Pending TAHMINA once medically optimized     Dispo:   Needs ongoing  IV diuresis for extensive edema  Ultimate TAHMINA

## 2022-07-02 NOTE — PROGRESS NOTE ADULT - SUBJECTIVE AND OBJECTIVE BOX
Patient seen and examined    I&O's Summary    01 Jul 2022 07:01  -  02 Jul 2022 07:00  --------------------------------------------------------  IN: 240 mL / OUT: 1450 mL / NET: -1210 mL    REVIEW OF SYSTEMS:    CONSTITUTIONAL: No F/C  RESPIRATORY: No cough or SOB  CARDIOVASCULAR: No CP/palpitations,    GASTROINTESTINAL: No abdominal pain , NVD   GENITOURINARY: No UTI sx  NEUROLOGICAL: No headaches/wk/numbness  MUSCULOSKELETAL:  No joint pain/swelling; No LBP  EXTREMITIES : no swelling,    Vital Signs Last 24 Hrs  T(C): 36.6 (02 Jul 2022 10:30), Max: 36.9 (02 Jul 2022 00:00)  T(F): 97.9 (02 Jul 2022 10:30), Max: 98.4 (02 Jul 2022 00:00)  HR: 101 (02 Jul 2022 13:30) (96 - 103)  BP: 150/70 (02 Jul 2022 13:30) (122/80 - 150/70)  RR: 16 (02 Jul 2022 13:30) (16 - 18)  SpO2: 96% (02 Jul 2022 13:30) (96% - 99%)    PHYSICAL EXAM:    GENERAL: NAD,   EYES:  conjunctiva and sclera clear  NECK: Supple, No JVD/Bruit  NERVOUS SYSTEM:  A/O x3,   CHEST:  CTA ,No rales or rhonchi  HEART:  RRR, No murmurs  ABDOMEN: Soft, NT/ND BS+  EXTREMITIES:  ++ Edema;  SKIN: No rashes    LABS:                        9.8    10.16 )-----------( 176      ( 01 Jul 2022 09:33 )             33.2     07-02    140  |  100  |  68.0<H>  ----------------------------<  143<H>  4.2   |  28.0  |  2.81<H>    Ca    8.6      02 Jul 2022 10:28    TPro  6.4<L>  /  Alb  3.7  /  TBili  1.0  /  DBili  x   /  AST  22  /  ALT  13  /  AlkPhos  151<H>  07-02    MEDICATIONS  (STANDING):  acetaminophen     Tablet .. PRN  ALBUTerol    90 MICROgram(s) HFA Inhaler PRN  allopurinol  aspirin enteric coated  atorvastatin  buMETAnide Injectable  buPROPion XL (24-Hour) .  dextrose 5%.  dextrose 5%.  dextrose 50% Injectable  dextrose 50% Injectable  dextrose 50% Injectable  dextrose Oral Gel PRN  enoxaparin Injectable  epoetin nohemi-epbx (RETACRIT) Injectable  glucagon  Injectable  hydrALAZINE  insulin glargine Injectable (LANTUS)  insulin lispro (ADMELOG) corrective regimen sliding scale  insulin lispro (ADMELOG) corrective regimen sliding scale  iron sucrose IVPB  isosorbide   dinitrate Tablet (ISORDIL)  lidocaine   4% Patch  metoprolol tartrate  pantoprazole    Tablet    US Kidney and Bladder (06.27.22 @ 19:59)     ACC: [id number] EXAM:  US KIDNEYS AND BLADDER                          PROCEDURE DATE:  06/27/2022      INTERPRETATION:  CLINICAL INFORMATION: Acute kidney injury.    COMPARISON: 08/23/2021.    TECHNIQUE: Sonography of the kidneys and bladder.    FINDINGS:  Right kidney: 10.1 cm. No renal mass, hydronephrosis or calculi.    Left kidney: 10.0 cm. No renal mass, hydronephrosis or calculi.  Renal   cysts measure 2.4 x 2.1 x 2.7 cm the upper pole and 1.8 x 0.9 x 2 cm in   the lower pole.    Urinary bladder: 1.9 x 1.9 x 1.7 cm echogenic nodular focus projecting   into the posterior bladder may represent hypertrophied median lobe of the   prostate.    IMPRESSION:  No renal mass, hydronephrosis or calculi are visualized by sonographic   technique.    A 1.8 x 1.9 x 1.7 cm echogenic nodular focus projecting into the   posterior bladder may represent hypertrophied median lobe of the   prostate.  Correlate with urinalysis and urine cytology to exclude a   bladder mass.    Xray Chest 1 View- PORTABLE-Urgent (Xray Chest 1 View- PORTABLE-Urgent .) (06.27.22 @ 15:48) >ACC: [id number] EXAM:  XR CHEST PORTABLE URGENT 1V                          PROCEDURE DATE:  06/27/2022      INTERPRETATION:  Chest one view    HISTORY: Dyspnea    COMPARISON STUDY: Earlier the same day    Frontal expiratory view of the chest shows the heart to be enlarged in   size. Mediastinal clips and loop recorder are again noted.    The lungs show similar pulmonary vascularity with progression of right   upper lobe infiltrate and there is no evidence of pneumothorax nor   pleural effusion.    IMPRESSION:  Progression of right infiltrate.    SANJAY LINO MD; Attending Radiologist  This document has been electronically signed. Jun 27 2022  8:15PM

## 2022-07-02 NOTE — PROGRESS NOTE ADULT - ASSESSMENT
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 however found to have worsening LE edema, Abdominal Swelling, and increased work of breathing. Admitted for Acute on Chronic HFrEF.    Assessment/Plan:    1. Acute on chronic HF with biventricular heart failure  -Cardiology consulted in ED  -Cont IV Bumex   -Strict IO, Daily weight, 1.5L restriction  -TTE with severe Biventricular dysfunction and segmental wma  -EP following for conduction disease, NSVT-BB titrated. No plan for in-patient intervention     2. CAD s/p CABG  -ASA, Statin    3. Hypertension    4. DM type 2 uncontrolled with hyperglycemia  -Lantus 10 nightly + ISS  -Diabetic diet    5. Posterior T11 fracture  -Conservative management  -pain control     6. KEN on CKD 3b  -Suspect cardiorenal  -No obstruction  -Nephrology consulted    7. Frequent falls;   -Patient severely deconditioned, has walker at home but frequently does not use it per wife. Has had several falls in recent weeks.  -Pending TAHMINA once medically optimized     Discharge disposition; TAHMINA when medically stable.

## 2022-07-02 NOTE — PROGRESS NOTE ADULT - SUBJECTIVE AND OBJECTIVE BOX
CC: Follow up     INTERVAL HPI/OVERNIGHT EVENTS: Patient seen and examined,       Vital Signs Last 24 Hrs  T(C): 36.6 (02 Jul 2022 10:30), Max: 36.9 (02 Jul 2022 00:00)  T(F): 97.9 (02 Jul 2022 10:30), Max: 98.4 (02 Jul 2022 00:00)  HR: 102 (02 Jul 2022 10:30) (96 - 103)  BP: 127/79 (02 Jul 2022 10:30) (122/80 - 133/84)  BP(mean): --  RR: 18 (02 Jul 2022 10:30) (18 - 18)  SpO2: 96% (02 Jul 2022 10:30) (96% - 99%)    PHYSICAL EXAM:    GENERAL: NAD, AOX3  HEAD:  Atraumatic, Normocephalic  EYES:  conjunctiva and sclera clear  ENMT: Moist mucous membranes  NECK: Supple  CHEST/LUNG: Clear to auscultation bilaterally; No rales, rhonchi, wheezing, or rubs  HEART: Regular rate and rhythm; No murmurs, rubs, or gallops  ABDOMEN: Soft, Nontender, Nondistended; Bowel sounds present  EXTREMITIES:  2+ Peripheral Pulses, No clubbing, cyanosis, or edema        MEDICATIONS  (STANDING):  allopurinol 100 milliGRAM(s) Oral daily  aspirin enteric coated 81 milliGRAM(s) Oral daily  atorvastatin 40 milliGRAM(s) Oral at bedtime  buMETAnide Injectable 2 milliGRAM(s) IV Push every 12 hours  buPROPion XL (24-Hour) . 150 milliGRAM(s) Oral daily  dextrose 5%. 1000 milliLiter(s) (50 mL/Hr) IV Continuous <Continuous>  dextrose 5%. 1000 milliLiter(s) (100 mL/Hr) IV Continuous <Continuous>  dextrose 50% Injectable 25 Gram(s) IV Push once  dextrose 50% Injectable 12.5 Gram(s) IV Push once  dextrose 50% Injectable 25 Gram(s) IV Push once  enoxaparin Injectable 30 milliGRAM(s) SubCutaneous every 24 hours  epoetin nohemi-epbx (RETACRIT) Injectable 94213 Unit(s) SubCutaneous <User Schedule>  glucagon  Injectable 1 milliGRAM(s) IntraMuscular once  hydrALAZINE 100 milliGRAM(s) Oral three times a day  insulin glargine Injectable (LANTUS) 10 Unit(s) SubCutaneous at bedtime  insulin lispro (ADMELOG) corrective regimen sliding scale   SubCutaneous three times a day before meals  insulin lispro (ADMELOG) corrective regimen sliding scale   SubCutaneous at bedtime  iron sucrose IVPB 100 milliGRAM(s) IV Intermittent every 24 hours  isosorbide   dinitrate Tablet (ISORDIL) 10 milliGRAM(s) Oral three times a day  lidocaine   4% Patch 1 Patch Transdermal every 24 hours  metoprolol tartrate 25 milliGRAM(s) Oral every 8 hours  pantoprazole    Tablet 40 milliGRAM(s) Oral before breakfast    MEDICATIONS  (PRN):  acetaminophen     Tablet .. 650 milliGRAM(s) Oral every 6 hours PRN Moderate Pain (4 - 6)  ALBUTerol    90 MICROgram(s) HFA Inhaler 1 Puff(s) Inhalation every 4 hours PRN Shortness of Breath and/or Wheezing  dextrose Oral Gel 15 Gram(s) Oral once PRN Blood Glucose LESS THAN 70 milliGRAM(s)/deciliter      Allergies    No Known Allergies    Intolerances          LABS:                          9.8    10.16 )-----------( 176      ( 01 Jul 2022 09:33 )             33.2     07-02    140  |  100  |  68.0<H>  ----------------------------<  143<H>  4.2   |  28.0  |  2.81<H>    Ca    8.6      02 Jul 2022 10:28    TPro  6.4<L>  /  Alb  3.7  /  TBili  1.0  /  DBili  x   /  AST  22  /  ALT  13  /  AlkPhos  151<H>  07-02          RADIOLOGY & ADDITIONAL TESTS:   CC: Follow up     INTERVAL HPI/OVERNIGHT EVENTS: Patient seen and examined, still sob but overall feeling better      Vital Signs Last 24 Hrs  T(C): 36.6 (02 Jul 2022 10:30), Max: 36.9 (02 Jul 2022 00:00)  T(F): 97.9 (02 Jul 2022 10:30), Max: 98.4 (02 Jul 2022 00:00)  HR: 102 (02 Jul 2022 10:30) (96 - 103)  BP: 127/79 (02 Jul 2022 10:30) (122/80 - 133/84)  BP(mean): --  RR: 18 (02 Jul 2022 10:30) (18 - 18)  SpO2: 96% (02 Jul 2022 10:30) (96% - 99%)    PHYSICAL EXAM:    GENERAL: NAD, AOX3  HEAD:  Atraumatic, Normocephalic  EYES:  conjunctiva and sclera clear  ENMT: Moist mucous membranes  NECK: Supple  CHEST/LUNG: Clear to auscultation bilaterally; No rales, rhonchi, wheezing, or rubs  HEART: Regular rate and rhythm; No murmurs, rubs, or gallops  ABDOMEN: Soft, Nontender, Nondistended; Bowel sounds present  EXTREMITIES:  2+ Peripheral Pulses, No clubbing, cyanosis, 2+ LE edema         MEDICATIONS  (STANDING):  allopurinol 100 milliGRAM(s) Oral daily  aspirin enteric coated 81 milliGRAM(s) Oral daily  atorvastatin 40 milliGRAM(s) Oral at bedtime  buMETAnide Injectable 2 milliGRAM(s) IV Push every 12 hours  buPROPion XL (24-Hour) . 150 milliGRAM(s) Oral daily  dextrose 5%. 1000 milliLiter(s) (50 mL/Hr) IV Continuous <Continuous>  dextrose 5%. 1000 milliLiter(s) (100 mL/Hr) IV Continuous <Continuous>  dextrose 50% Injectable 25 Gram(s) IV Push once  dextrose 50% Injectable 12.5 Gram(s) IV Push once  dextrose 50% Injectable 25 Gram(s) IV Push once  enoxaparin Injectable 30 milliGRAM(s) SubCutaneous every 24 hours  epoetin nohemi-epbx (RETACRIT) Injectable 03992 Unit(s) SubCutaneous <User Schedule>  glucagon  Injectable 1 milliGRAM(s) IntraMuscular once  hydrALAZINE 100 milliGRAM(s) Oral three times a day  insulin glargine Injectable (LANTUS) 10 Unit(s) SubCutaneous at bedtime  insulin lispro (ADMELOG) corrective regimen sliding scale   SubCutaneous three times a day before meals  insulin lispro (ADMELOG) corrective regimen sliding scale   SubCutaneous at bedtime  iron sucrose IVPB 100 milliGRAM(s) IV Intermittent every 24 hours  isosorbide   dinitrate Tablet (ISORDIL) 10 milliGRAM(s) Oral three times a day  lidocaine   4% Patch 1 Patch Transdermal every 24 hours  metoprolol tartrate 25 milliGRAM(s) Oral every 8 hours  pantoprazole    Tablet 40 milliGRAM(s) Oral before breakfast    MEDICATIONS  (PRN):  acetaminophen     Tablet .. 650 milliGRAM(s) Oral every 6 hours PRN Moderate Pain (4 - 6)  ALBUTerol    90 MICROgram(s) HFA Inhaler 1 Puff(s) Inhalation every 4 hours PRN Shortness of Breath and/or Wheezing  dextrose Oral Gel 15 Gram(s) Oral once PRN Blood Glucose LESS THAN 70 milliGRAM(s)/deciliter      Allergies    No Known Allergies    Intolerances          LABS:                          9.8    10.16 )-----------( 176      ( 01 Jul 2022 09:33 )             33.2     07-02    140  |  100  |  68.0<H>  ----------------------------<  143<H>  4.2   |  28.0  |  2.81<H>    Ca    8.6      02 Jul 2022 10:28    TPro  6.4<L>  /  Alb  3.7  /  TBili  1.0  /  DBili  x   /  AST  22  /  ALT  13  /  AlkPhos  151<H>  07-02          RADIOLOGY & ADDITIONAL TESTS:

## 2022-07-02 NOTE — PROGRESS NOTE ADULT - ASSESSMENT
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  Found to be in decompensated heart failure  Echo with new drop in EF 35% Moderate to severe mr mild pul htn Had some chest discomfort on 6/29 and trop negative Plan for cath but renal function with worsening.  bnp 36k  cxr cardiomegaly with pvc    07/02: Patient states that he feels like his symptoms are improving, but still with significant leg edema. No BMP ordered for today. Dr. Duke discussed workup for worsening cardiomyopathy regarding LHC and EPS/CRT which would both require contrast dye with risks of DAVE and ESRD. Patient and his wife not willing to move forward with these studies due to the risk. Will continue medical management.

## 2022-07-02 NOTE — PROGRESS NOTE ADULT - ASSESSMENT
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 Admitted for Acute on Chronic HFrEF.    Assessment/Plan:    1. Acute on chronic HF with biventricular heart failure  -TTE with severe Biventricular dysfunction and segmental WMA    2. CAD s/p CABG    3. Hypertension    4. DM type 2 uncontrolled with hyperglycemia    5. Posterior T11 fracture    6. KEN on CKD 3b ( CRS ) Well Compensated,     C/W CHF Management, OP F.U,     Please call as Needed, TY

## 2022-07-02 NOTE — PROGRESS NOTE ADULT - SUBJECTIVE AND OBJECTIVE BOX
[facility]                                                         CARDIOLOGY AT [place] [address]                                                         Telephone: [phone]. Fax:[phone]                                                                             PROGRESS NOTE    Reason for follow up: Decompensated HFrEF, NSTEMI  Update: Patient states that he feels like his symptoms are improving, but still with significant leg edema. No BMP ordered for today. Dr. Duke discussed workup for worsening cardiomyopathy regarding LHC and EPS/CRT which would both require contrast dye with risks of DAVE and ESRD. Patient and his wife not willing to move forward with these studies due to the risk. Will continue medical management.     Review of symptoms:   Cardiac:  No chest pain. No dyspnea. No palpitations.  Respiratory: no cough. No dyspnea  Gastrointestinal: No diarrhea. No abdominal pain. No bleeding.   Neuro: No focal neuro complaints.    Vitals:  T(C): 36.7 (07-02-22 @ 04:00), Max: 36.9 (07-02-22 @ 00:00)  HR: 100 (07-02-22 @ 05:00) (96 - 103)  BP: 125/75 (07-02-22 @ 05:00) (122/80 - 133/84)  RR: 18 (07-02-22 @ 05:00) (18 - 18)  SpO2: 98% (07-02-22 @ 05:00) (98% - 99%)  Wt(kg): --  I&O's Summary    01 Jul 2022 07:01  -  02 Jul 2022 07:00  --------------------------------------------------------  IN: 240 mL / OUT: 1450 mL / NET: -1210 mL      Weight (kg): 111.5 (06-27 @ 16:53)    PHYSICAL EXAM:  Appearance: Comfortable. No acute distress  HEENT:  Atraumatic. Normocephalic.  Normal oral mucosa  Neurologic: A & O x 3, no gross focal deficits.  Cardiovascular: Tachycardic S1 S2, No murmur, no rubs/gallops.  Respiratory: diminished breath sounds, unlabored   Gastrointestinal:  Soft, Non-tender, + BS  Lower Extremities: 2+ edema  Psychiatry: Patient is calm. No agitation.   Skin: warm and dry.      CURRENT CARDIAC MEDICATIONS:  buMETAnide Injectable 2 milliGRAM(s) IV Push every 12 hours  hydrALAZINE 100 milliGRAM(s) Oral three times a day  isosorbide   dinitrate Tablet (ISORDIL) 10 milliGRAM(s) Oral three times a day  metoprolol tartrate 25 milliGRAM(s) Oral every 8 hours      CURRENT OTHER MEDICATIONS:  ALBUTerol    90 MICROgram(s) HFA Inhaler 1 Puff(s) Inhalation every 4 hours PRN Shortness of Breath and/or Wheezing  acetaminophen     Tablet .. 650 milliGRAM(s) Oral every 6 hours PRN Moderate Pain (4 - 6)  buPROPion XL (24-Hour) . 150 milliGRAM(s) Oral daily  pantoprazole    Tablet 40 milliGRAM(s) Oral before breakfast  allopurinol 100 milliGRAM(s) Oral daily  atorvastatin 40 milliGRAM(s) Oral at bedtime  dextrose 50% Injectable 25 Gram(s) IV Push once, Stop order after: 1 Doses  dextrose 50% Injectable 12.5 Gram(s) IV Push once, Stop order after: 1 Doses  dextrose 50% Injectable 25 Gram(s) IV Push once, Stop order after: 1 Doses  dextrose Oral Gel 15 Gram(s) Oral once, Stop order after: 1 Doses PRN Blood Glucose LESS THAN 70 milliGRAM(s)/deciliter  glucagon  Injectable 1 milliGRAM(s) IntraMuscular once, Stop order after: 1 Doses  insulin glargine Injectable (LANTUS) 10 Unit(s) SubCutaneous at bedtime  insulin lispro (ADMELOG) corrective regimen sliding scale   SubCutaneous three times a day before meals  insulin lispro (ADMELOG) corrective regimen sliding scale   SubCutaneous at bedtime  aspirin enteric coated 81 milliGRAM(s) Oral daily  dextrose 5%. 1000 milliLiter(s) (50 mL/Hr) IV Continuous <Continuous>  dextrose 5%. 1000 milliLiter(s) (100 mL/Hr) IV Continuous <Continuous>  enoxaparin Injectable 30 milliGRAM(s) SubCutaneous every 24 hours  epoetin nohemi-epbx (RETACRIT) Injectable 05471 Unit(s) SubCutaneous <User Schedule>  iron sucrose IVPB 100 milliGRAM(s) IV Intermittent every 24 hours, Stop order after: 5 Doses  lidocaine   4% Patch 1 Patch Transdermal every 24 hours      LABS:	 	  ( 29 Jun 2022 15:01 )  Troponin T  0.12<H>,  CPK  90   , CKMB  X    , BNP X        , ( 29 Jun 2022 09:05 )  Troponin T  0.12<H>,  CPK  92   , CKMB  X    , BNP 83455<H>    , ( 27 Jun 2022 15:36 )  Troponin T  0.11<H>,  CPK  X    , CKMB  X    , BNP 73483<H>                              9.8    10.16 )-----------( 176      ( 01 Jul 2022 09:33 )             33.2     07-01    139  |  98  |  66.3<H>  ----------------------------<  180<H>  4.7   |  26.0  |  2.95<H>    Ca    9.1      01 Jul 2022 09:33    TPro  7.3  /  Alb  4.3  /  TBili  1.2  /  DBili  x   /  AST  22  /  ALT  17  /  AlkPhos  184<H>  07-01    PT/INR/PTT ( 25 Jun 2022 15:07 )                       :                       :      14.4         :       30.1                  .        .                   .              .           .       1.24        .                                       Lipid Profile:   HgA1c:   TSH:     TELEMETRY: Atrial flutter, PVCs   ECG:    DIAGNOSTIC TESTING:  [ ] Echocardiogram:   < from: TTE Echo Complete w/ Contrast w/ Doppler (06.27.22 @ 17:53) >    Summary:   1. Endocardial visualization was enhanced with intravenous echo contrast.   2. Left ventricular ejection fraction, by visual estimation, is 30 to   35%.   3. Severely decreased segmental left ventricular systolic function.   4. The mitral in-flow pattern reveals no discernable A-wave, therefore   no comment on diastolic function can be made.   5. Estimated pulmonary artery systolic pressure is 49.0 mmHg assuming a   right atrial pressure of 8 mmHg, which is consistent with mild pulmonary   hypertension.   6. Severely enlarged right ventricle with severely reduced RV systolic   function.   7. Severely enlarged left atrium.   8. Severely enlarged right atrium.   9. Mild mitral annular calcification.  10. Moderate to severe mitral valve regurgitation.  11. Moderate-severe tricuspid regurgitation.  12. Mild tomoderate pulmonic valve regurgitation.  13. There is no evidence of pericardial effusion.  14. Compared to the prior TTE study from 5/9/21, severe biventricular   systolic dysfunction again noted.    Gamal Mirza DO Electronically signed on 6/28/2022 at 1:54:26 PM    < end of copied text >

## 2022-07-03 NOTE — DIETITIAN INITIAL EVALUATION ADULT - PERTINENT LABORATORY DATA
07-03    138  |  99  |  73.1<H>  ----------------------------<  134<H>  3.8   |  26.0  |  2.93<H>    Ca    8.5<L>      03 Jul 2022 06:14  Mg     1.8     07-03    TPro  6.4<L>  /  Alb  3.7  /  TBili  1.0  /  DBili  x   /  AST  22  /  ALT  13  /  AlkPhos  151<H>  07-02  POCT Blood Glucose.: 144 mg/dL (07-03-22 @ 08:01)  A1C with Estimated Average Glucose Result: 6.7 % (07-02-22 @ 06:37)

## 2022-07-03 NOTE — DIETITIAN INITIAL EVALUATION ADULT - DIET TYPE
DASH/TLC (sodium and cholesterol restricted diet)/60 gm protein/consistent carbohydrate renal with evening snacks

## 2022-07-03 NOTE — PROGRESS NOTE ADULT - SUBJECTIVE AND OBJECTIVE BOX
CC: Follow up     INTERVAL HPI/OVERNIGHT EVENTS: Patient seen and examined, still sob but slowly improving. ++ LE edema       Vital Signs Last 24 Hrs  T(C): 36.7 (03 Jul 2022 08:14), Max: 36.9 (03 Jul 2022 00:00)  T(F): 98 (03 Jul 2022 08:14), Max: 98.4 (03 Jul 2022 00:00)  HR: 98 (03 Jul 2022 08:14) (89 - 103)  BP: 115/66 (03 Jul 2022 08:14) (105/64 - 150/70)  BP(mean): --  RR: 18 (03 Jul 2022 08:14) (16 - 18)  SpO2: 98% (03 Jul 2022 08:14) (96% - 98%)    PHYSICAL EXAM:    GENERAL: NAD, AOX3  HEAD:  Atraumatic, Normocephalic  EYES: , conjunctiva and sclera clear  ENMT: Moist mucous membranes  NECK: Supple  CHEST/LUNG: Clear to auscultation bilaterally; No rales, rhonchi, wheezing, or rubs  HEART: Regular rate and rhythm; No murmurs, rubs, or gallops  ABDOMEN: Soft, Nontender, Nondistended; Bowel sounds present  EXTREMITIES:  2+ Peripheral Pulses, No clubbing, cyanosis, 2+ LE edema         MEDICATIONS  (STANDING):  allopurinol 100 milliGRAM(s) Oral daily  aspirin enteric coated 81 milliGRAM(s) Oral daily  atorvastatin 40 milliGRAM(s) Oral at bedtime  buMETAnide Infusion 1 mG/Hr (5 mL/Hr) IV Continuous <Continuous>  buPROPion XL (24-Hour) . 150 milliGRAM(s) Oral daily  dextrose 5%. 1000 milliLiter(s) (100 mL/Hr) IV Continuous <Continuous>  dextrose 5%. 1000 milliLiter(s) (50 mL/Hr) IV Continuous <Continuous>  dextrose 50% Injectable 25 Gram(s) IV Push once  dextrose 50% Injectable 12.5 Gram(s) IV Push once  dextrose 50% Injectable 25 Gram(s) IV Push once  enoxaparin Injectable 30 milliGRAM(s) SubCutaneous every 24 hours  epoetin nohemi-epbx (RETACRIT) Injectable 98488 Unit(s) SubCutaneous <User Schedule>  glucagon  Injectable 1 milliGRAM(s) IntraMuscular once  hydrALAZINE 100 milliGRAM(s) Oral three times a day  insulin glargine Injectable (LANTUS) 10 Unit(s) SubCutaneous at bedtime  insulin lispro (ADMELOG) corrective regimen sliding scale   SubCutaneous three times a day before meals  insulin lispro (ADMELOG) corrective regimen sliding scale   SubCutaneous at bedtime  iron sucrose IVPB 100 milliGRAM(s) IV Intermittent every 24 hours  isosorbide   dinitrate Tablet (ISORDIL) 10 milliGRAM(s) Oral three times a day  lidocaine   4% Patch 1 Patch Transdermal every 24 hours  metoprolol tartrate 25 milliGRAM(s) Oral every 8 hours  pantoprazole    Tablet 40 milliGRAM(s) Oral before breakfast    MEDICATIONS  (PRN):  acetaminophen     Tablet .. 650 milliGRAM(s) Oral every 6 hours PRN Moderate Pain (4 - 6)  ALBUTerol    90 MICROgram(s) HFA Inhaler 1 Puff(s) Inhalation every 4 hours PRN Shortness of Breath and/or Wheezing  dextrose Oral Gel 15 Gram(s) Oral once PRN Blood Glucose LESS THAN 70 milliGRAM(s)/deciliter      Allergies    No Known Allergies    Intolerances          LABS:                          8.2    8.34  )-----------( 139      ( 03 Jul 2022 06:14 )             27.4     07-03    138  |  99  |  73.1<H>  ----------------------------<  134<H>  3.8   |  26.0  |  2.93<H>    Ca    8.5<L>      03 Jul 2022 06:14  Mg     1.8     07-03    TPro  6.4<L>  /  Alb  3.7  /  TBili  1.0  /  DBili  x   /  AST  22  /  ALT  13  /  AlkPhos  151<H>  07-02          RADIOLOGY & ADDITIONAL TESTS:

## 2022-07-03 NOTE — PROGRESS NOTE ADULT - ASSESSMENT
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 Admitted for Acute on Chronic HFrEF.    Assessment/Plan:    1. Acute on chronic HF with biventricular heart failure  - Still SOB this morning, changed to IV Bumex infusion   -Strict IO, Daily weight, 1.5L restriction  -TTE with severe Biventricular dysfunction and segmental wma  -EP following for conduction disease, NSVT-BB titrated. No plan for in-patient intervention     2. CAD s/p CABG  -ASA, Statin    3. Hypertension  - BP Stable     4. DM type 2 uncontrolled with hyperglycemia  -Lantus 10 nightly + ISS  -Diabetic diet    5. Posterior T11 fracture  -Conservative management  -pain control     6. KEN on CKD 3b  -Suspect cardiorenal  -No obstruction  -Nephrology consulted  - Serial baldder scan Q6 hours     7. Frequent falls;   -Patient severely deconditioned, has walker at home but frequently does not use it per wife. Has had several falls in recent weeks.  -Pending TAHMINA once medically optimized     Discharge disposition; [facility]MINA when medically stable.

## 2022-07-03 NOTE — DIETITIAN INITIAL EVALUATION ADULT - OTHER INFO
82yo M hx of cad, htn, afib not on ac chf, ckd presents with right shoulder/elbow/hip pain sp fall. notes that was coming out of the bathroom and slipepd and fell backwards hit his head and his right shoulder.

## 2022-07-03 NOTE — PROGRESS NOTE ADULT - ASSESSMENT
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  Found to be in decompensated heart failure  Echo with new drop in EF 35% Moderate to severe mr mild pul htn Had some chest discomfort on 6/29 and trop negative Plan for cath but renal function with worsening.  bnp 36k  cxr cardiomegaly with pvc    07/02: Patient states that he feels like his symptoms are improving, but still with significant leg edema. No BMP ordered for today. Dr. Duke discussed workup for worsening cardiomyopathy regarding LHC and EPS/CRT which would both require contrast dye with risks of DAVE and ESRD. Patient and his wife not willing to move forward with these studies due to the risk. Will continue medical management.   07/03:  Patient states that his breathing is a little more labored today. Patient's CXR also still pulmonary vascular congestion and pBNP is increasing. Patient's bladder scan also with 480 cc of urine.

## 2022-07-03 NOTE — PROGRESS NOTE ADULT - SUBJECTIVE AND OBJECTIVE BOX
[facility]                                                         CARDIOLOGY AT [facility]                                                                  [address] [address]                                                         Telephone: [phone]. Fax:[phone]                                                                             PROGRESS NOTE    Reason for follow up: Acute decompensated HFrEF, NSTEMI  Update: Patient states that his breathing is a little more labored today. Patient's CXR also still pulmonary vascular congestion and pBNP is increasing. Patient's bladder scan also with 480 cc of urine.      Review of symptoms:   Cardiac:  No chest pain. + dyspnea. No palpitations.  Respiratory: no cough. + dyspnea  Gastrointestinal: No diarrhea. No abdominal pain. No bleeding.   Neuro: No focal neuro complaints.    Vitals:  T(C): 36.7 (07-03-22 @ 08:14), Max: 36.9 (07-03-22 @ 00:00)  HR: 98 (07-03-22 @ 08:14) (89 - 103)  BP: 115/66 (07-03-22 @ 08:14) (105/64 - 150/70)  RR: 18 (07-03-22 @ 08:14) (16 - 18)  SpO2: 98% (07-03-22 @ 08:14) (96% - 98%)  Wt(kg): --  I&O's Summary    02 Jul 2022 07:01  -  03 Jul 2022 07:00  --------------------------------------------------------  IN: 240 mL / OUT: 701 mL / NET: -461 mL      PHYSICAL EXAM:  Appearance: Comfortable. No acute distress  HEENT:  Atraumatic. Normocephalic.  Normal oral mucosa  Neurologic: A & O x 3, no gross focal deficits.  Cardiovascular: Tachycardic S1 S2, No murmur, no rubs/gallops.  Respiratory: diminished breath sounds, unlabored   Gastrointestinal:  Soft, Non-tender, + BS  Lower Extremities: 2+ edema  Psychiatry: Patient is calm. No agitation.   Skin: warm and dry.    CURRENT CARDIAC MEDICATIONS:  buMETAnide Infusion 1 mG/Hr IV Continuous <Continuous>  hydrALAZINE 100 milliGRAM(s) Oral three times a day  isosorbide   dinitrate Tablet (ISORDIL) 10 milliGRAM(s) Oral three times a day  metoprolol tartrate 25 milliGRAM(s) Oral every 8 hours      CURRENT OTHER MEDICATIONS:  ALBUTerol    90 MICROgram(s) HFA Inhaler 1 Puff(s) Inhalation every 4 hours PRN Shortness of Breath and/or Wheezing  acetaminophen     Tablet .. 650 milliGRAM(s) Oral every 6 hours PRN Moderate Pain (4 - 6)  buPROPion XL (24-Hour) . 150 milliGRAM(s) Oral daily  pantoprazole    Tablet 40 milliGRAM(s) Oral before breakfast  allopurinol 100 milliGRAM(s) Oral daily  atorvastatin 40 milliGRAM(s) Oral at bedtime  dextrose 50% Injectable 25 Gram(s) IV Push once, Stop order after: 1 Doses  dextrose 50% Injectable 12.5 Gram(s) IV Push once, Stop order after: 1 Doses  dextrose 50% Injectable 25 Gram(s) IV Push once, Stop order after: 1 Doses  dextrose Oral Gel 15 Gram(s) Oral once, Stop order after: 1 Doses PRN Blood Glucose LESS THAN 70 milliGRAM(s)/deciliter  glucagon  Injectable 1 milliGRAM(s) IntraMuscular once, Stop order after: 1 Doses  insulin glargine Injectable (LANTUS) 10 Unit(s) SubCutaneous at bedtime  insulin lispro (ADMELOG) corrective regimen sliding scale   SubCutaneous three times a day before meals  insulin lispro (ADMELOG) corrective regimen sliding scale   SubCutaneous at bedtime  aspirin enteric coated 81 milliGRAM(s) Oral daily  dextrose 5%. 1000 milliLiter(s) (100 mL/Hr) IV Continuous <Continuous>  dextrose 5%. 1000 milliLiter(s) (50 mL/Hr) IV Continuous <Continuous>  enoxaparin Injectable 30 milliGRAM(s) SubCutaneous every 24 hours  epoetin nohemi-epbx (RETACRIT) Injectable 72262 Unit(s) SubCutaneous <User Schedule>  iron sucrose IVPB 100 milliGRAM(s) IV Intermittent every 24 hours, Stop order after: 5 Doses  lidocaine   4% Patch 1 Patch Transdermal every 24 hours      LABS:	 	  ( 03 Jul 2022 06:14 )  Troponin T  X    ,  CPK  X    , CKMB  X    , BNP 84370<H>    , ( 29 Jun 2022 15:01 )  Troponin T  0.12<H>,  CPK  90   , CKMB  X    , BNP X        , ( 29 Jun 2022 09:05 )  Troponin T  0.12<H>,  CPK  92   , CKMB  X    , BNP 69594<H>                              8.2    8.34  )-----------( 139      ( 03 Jul 2022 06:14 )             27.4     07-03    138  |  99  |  73.1<H>  ----------------------------<  134<H>  3.8   |  26.0  |  2.93<H>    Ca    8.5<L>      03 Jul 2022 06:14  Mg     1.8     07-03    TPro  6.4<L>  /  Alb  3.7  /  TBili  1.0  /  DBili  x   /  AST  22  /  ALT  13  /  AlkPhos  151<H>  07-02    PT/INR/PTT ( 25 Jun 2022 15:07 )                       :                       :      14.4         :       30.1                  .        .                   .              .           .       1.24        .                                       Lipid Profile:   HgA1c:   TSH:     TELEMETRY: Atrial flutter low 100s  ECG:    DIAGNOSTIC TESTING:  [ ] Echocardiogram:   < from: TTE Echo Complete w/ Contrast w/ Doppler (06.27.22 @ 17:53) >    Summary:   1. Endocardial visualization was enhanced with intravenous echo contrast.   2. Left ventricular ejection fraction, by visual estimation, is 30 to   35%.   3. Severely decreased segmental left ventricular systolic function.   4. The mitral in-flow pattern reveals no discernable A-wave, therefore   no comment on diastolic function can be made.   5. Estimated pulmonary artery systolic pressure is 49.0 mmHg assuming a   right atrial pressure of 8 mmHg, which is consistent with mild pulmonary   hypertension.   6. Severely enlarged right ventricle with severely reduced RV systolic   function.   7. Severely enlarged left atrium.   8. Severely enlarged right atrium.   9. Mild mitral annular calcification.  10. Moderate to severe mitral valve regurgitation.  11. Moderate-severe tricuspid regurgitation.  12. Mild tomoderate pulmonic valve regurgitation.  13. There is no evidence of pericardial effusion.  14. Compared to the prior TTE study from 5/9/21, severe biventricular   systolic dysfunction again noted.    Gamal Mirza DO Electronically signed on 6/28/2022 at 1:54:26 PM    < end of copied text >

## 2022-07-03 NOTE — DIETITIAN INITIAL EVALUATION ADULT - PERTINENT MEDS FT
MEDICATIONS  (STANDING):  allopurinol 100 milliGRAM(s) Oral daily  aspirin enteric coated 81 milliGRAM(s) Oral daily  atorvastatin 40 milliGRAM(s) Oral at bedtime  buMETAnide Infusion 1 mG/Hr (5 mL/Hr) IV Continuous <Continuous>  buPROPion XL (24-Hour) . 150 milliGRAM(s) Oral daily  dextrose 5%. 1000 milliLiter(s) (100 mL/Hr) IV Continuous <Continuous>  dextrose 5%. 1000 milliLiter(s) (50 mL/Hr) IV Continuous <Continuous>  dextrose 50% Injectable 25 Gram(s) IV Push once  dextrose 50% Injectable 12.5 Gram(s) IV Push once  dextrose 50% Injectable 25 Gram(s) IV Push once  enoxaparin Injectable 30 milliGRAM(s) SubCutaneous every 24 hours  epoetin nohemi-epbx (RETACRIT) Injectable 43732 Unit(s) SubCutaneous <User Schedule>  glucagon  Injectable 1 milliGRAM(s) IntraMuscular once  hydrALAZINE 100 milliGRAM(s) Oral three times a day  insulin glargine Injectable (LANTUS) 10 Unit(s) SubCutaneous at bedtime  insulin lispro (ADMELOG) corrective regimen sliding scale   SubCutaneous three times a day before meals  insulin lispro (ADMELOG) corrective regimen sliding scale   SubCutaneous at bedtime  iron sucrose IVPB 100 milliGRAM(s) IV Intermittent every 24 hours  isosorbide   dinitrate Tablet (ISORDIL) 10 milliGRAM(s) Oral three times a day  lidocaine   4% Patch 1 Patch Transdermal every 24 hours  metoprolol tartrate 25 milliGRAM(s) Oral every 8 hours  pantoprazole    Tablet 40 milliGRAM(s) Oral before breakfast    MEDICATIONS  (PRN):  acetaminophen     Tablet .. 650 milliGRAM(s) Oral every 6 hours PRN Moderate Pain (4 - 6)  ALBUTerol    90 MICROgram(s) HFA Inhaler 1 Puff(s) Inhalation every 4 hours PRN Shortness of Breath and/or Wheezing  dextrose Oral Gel 15 Gram(s) Oral once PRN Blood Glucose LESS THAN 70 milliGRAM(s)/deciliter

## 2022-07-04 NOTE — PROGRESS NOTE ADULT - PROBLEM SELECTOR PLAN 2
- In atrial flutter with RVR in setting of decompensated CHF.   - S/p Watchman device, no longer on AC.   - Increase metoprolol to 25 q8.  - Obtain TTE.   - Continue telemetry monitoring.
- denies chest pain today  - EKG unchanged from baseline  - Troponin mildly elevated with normal CK.  - Not consistent with ACS, likely due to acute decompensated HFrEF.   - No heparin gtt at this time.   - Continue aspirin.   - Patient defers LHC at this time due to the risks of DAVE and ESRD with contrast dye.
Followed by Ep  No slow rates  Had v tach on monitor in ER yesterday  s/p watchman
- denies chest pain today  - EKG unchanged from baseline  - Troponin mildly elevated with normal CK.  - Not consistent with ACS, likely due to acute decompensated HFrEF.   - No heparin gtt at this time.   - Continue aspirin.   - Patient defers LHC at this time due to the risks of DAVE and ESRD with contrast dye.
.  - EP evaluated, discussed ICD, pt refusing procedure at this time.   - telemetry monitoring for pAflutter/Afib  - continue metoprolol, increase as needed/tolerated   - pending 1 year Watchman MARLO in 8/2022
- denies chest pain today  - EKG unchanged from baseline  - Troponin mildly elevated with normal CK.  - Not consistent with ACS, likely due to acute decompensated HFrEF.   - No heparin gtt at this time.   - Continue aspirin.   - Patient defers LHC at this time due to the risks of DAVE and ESRD with contrast dye.
- Hx of CAD s/p remote CABG and PCI.   - Patient with new onset chest pain.   - EKG unchanged.   - Troponin 0.12 with normal CK.   - Elevated troponin likely in setting of decompensated CHF and KEN.   - Continue aspirin at this time.   - Unable to move forward with [facility] at this time due to renal dysfunction.   - Continue atorvastatin and metoprolol.

## 2022-07-04 NOTE — PROGRESS NOTE ADULT - PROBLEM SELECTOR PLAN 4
- Well controlled.
- Continue metoprolol, hydralazine, and isosordil.
- Well controlled.
.  - cr stable 2.95, yesterday 2.93  - nephrology following, no immediate need for HD as per 6/30 note  - also pending elective AV fistula per nephrology outpatient may need to be done inpatient if Cr. worsening with HD
- Well controlled.

## 2022-07-04 NOTE — PROGRESS NOTE ADULT - PROBLEM SELECTOR PROBLEM 3
Renal insufficiency
Atrial fibrillation and flutter
Hypertension
Atrial fibrillation and flutter
Atrial fibrillation and flutter
CAD (coronary artery disease)
Atrial fibrillation and flutter

## 2022-07-04 NOTE — PROGRESS NOTE ADULT - PROBLEM SELECTOR PROBLEM 2
CAD (coronary artery disease)
CAD (coronary artery disease)
Atrial fibrillation and flutter
CAD (coronary artery disease)
Atrial fibrillation and flutter
Atrial fibrillation and flutter
CAD (coronary artery disease)

## 2022-07-04 NOTE — PROGRESS NOTE ADULT - SUBJECTIVE AND OBJECTIVE BOX
[facility]                                                         CARDIOLOGY AT [address] [address]                                                         Telephone: [phone]. Fax:[phone]                                                                             PROGRESS NOTE    Reason for follow up: Acute decompensated HFrEF, NSTEMI  Update: Patient states his breathing started to improve, and he is diuresing well on bumex gtt. BUN/Cr with slight uptrend now 70.1/3.00. Patient with no other complaints.       Review of symptoms:   Cardiac:  No chest pain. + dyspnea. No palpitations.  Respiratory: no cough. + dyspnea  Gastrointestinal: No diarrhea. No abdominal pain. No bleeding.   Neuro: No focal neuro complaints.    Vitals:  T(C): 36.7 (07-04-22 @ 12:15), Max: 37 (07-04-22 @ 05:10)  HR: 87 (07-04-22 @ 12:15) (87 - 102)  BP: 120/81 (07-04-22 @ 12:15) (115/71 - 123/80)  RR: 18 (07-04-22 @ 12:15) (18 - 18)  SpO2: 99% (07-04-22 @ 12:15) (98% - 100%)  Wt(kg): --  I&O's Summary    03 Jul 2022 07:01  -  04 Jul 2022 07:00  --------------------------------------------------------  IN: 940 mL / OUT: 2550 mL / NET: -1610 mL    04 Jul 2022 07:01  -  04 Jul 2022 12:47  --------------------------------------------------------  IN: 260 mL / OUT: 0 mL / NET: 260 mL        PHYSICAL EXAM:  Appearance: Comfortable. No acute distress  HEENT:  Atraumatic. Normocephalic.  Normal oral mucosa  Neurologic: A & O x 3, no gross focal deficits.  Cardiovascular: Tachycardic S1 S2, No murmur, no rubs/gallops.  Respiratory: diminished breath sounds, unlabored   Gastrointestinal:  Soft, Non-tender, + BS  Lower Extremities: 2+ edema, improving  Psychiatry: Patient is calm. No agitation.   Skin: warm and dry.    CURRENT CARDIAC MEDICATIONS:  buMETAnide Infusion 1 mG/Hr IV Continuous <Continuous>  hydrALAZINE 100 milliGRAM(s) Oral three times a day  isosorbide   dinitrate Tablet (ISORDIL) 10 milliGRAM(s) Oral three times a day  metoprolol tartrate 25 milliGRAM(s) Oral every 8 hours      CURRENT OTHER MEDICATIONS:  ALBUTerol    90 MICROgram(s) HFA Inhaler 1 Puff(s) Inhalation every 4 hours PRN Shortness of Breath and/or Wheezing  acetaminophen     Tablet .. 650 milliGRAM(s) Oral every 6 hours PRN Moderate Pain (4 - 6)  buPROPion XL (24-Hour) . 150 milliGRAM(s) Oral daily  pantoprazole    Tablet 40 milliGRAM(s) Oral before breakfast  allopurinol 100 milliGRAM(s) Oral daily  atorvastatin 40 milliGRAM(s) Oral at bedtime  dextrose 50% Injectable 25 Gram(s) IV Push once, Stop order after: 1 Doses  dextrose 50% Injectable 12.5 Gram(s) IV Push once, Stop order after: 1 Doses  dextrose 50% Injectable 25 Gram(s) IV Push once, Stop order after: 1 Doses  dextrose Oral Gel 15 Gram(s) Oral once, Stop order after: 1 Doses PRN Blood Glucose LESS THAN 70 milliGRAM(s)/deciliter  glucagon  Injectable 1 milliGRAM(s) IntraMuscular once, Stop order after: 1 Doses  insulin glargine Injectable (LANTUS) 10 Unit(s) SubCutaneous at bedtime  insulin lispro (ADMELOG) corrective regimen sliding scale   SubCutaneous three times a day before meals  insulin lispro (ADMELOG) corrective regimen sliding scale   SubCutaneous at bedtime  aspirin enteric coated 81 milliGRAM(s) Oral daily  dextrose 5%. 1000 milliLiter(s) (100 mL/Hr) IV Continuous <Continuous>  dextrose 5%. 1000 milliLiter(s) (50 mL/Hr) IV Continuous <Continuous>  enoxaparin Injectable 30 milliGRAM(s) SubCutaneous every 24 hours  epoetin nohemi-epbx (RETACRIT) Injectable 37743 Unit(s) SubCutaneous <User Schedule>  lidocaine   4% Patch 1 Patch Transdermal every 24 hours  potassium chloride    Tablet ER 20 milliEquivalent(s) Oral once, Stop order after: 1 Doses      LABS:	 	  ( 03 Jul 2022 06:14 )  Troponin T  X    ,  CPK  X    , CKMB  X    , BNP 24640<H>    , ( 29 Jun 2022 15:01 )  Troponin T  0.12<H>,  CPK  90   , CKMB  X    , BNP X        , ( 29 Jun 2022 09:05 )  Troponin T  0.12<H>,  CPK  92   , CKMB  X    , BNP 01269<H>                              8.2    8.34  )-----------( 139      ( 03 Jul 2022 06:14 )             27.4     07-04    139  |  97<L>  |  70.1<H>  ----------------------------<  129<H>  3.6   |  27.0  |  3.00<H>    Ca    8.7      04 Jul 2022 05:34  Mg     1.8     07-03      PT/INR/PTT ( 25 Jun 2022 15:07 )                       :                       :      14.4         :       30.1                  .        .                   .              .           .       1.24        .                                       Lipid Profile:   HgA1c:   TSH:     TELEMETRY: Atrial flutter  ECG:    DIAGNOSTIC TESTING:  [ ] Echocardiogram:   < from: TTE Echo Complete w/ Contrast w/ Doppler (06.27.22 @ 17:53) >    Summary:   1. Endocardial visualization was enhanced with intravenous echo contrast.   2. Left ventricular ejection fraction, by visual estimation, is 30 to   35%.   3. Severely decreased segmental left ventricular systolic function.   4. The mitral in-flow pattern reveals no discernable A-wave, therefore   no comment on diastolic function can be made.   5. Estimated pulmonary artery systolic pressure is 49.0 mmHg assuming a   right atrial pressure of 8 mmHg, which is consistent with mild pulmonary   hypertension.   6. Severely enlarged right ventricle with severely reduced RV systolic   function.   7. Severely enlarged left atrium.   8. Severely enlarged right atrium.   9. Mild mitral annular calcification.  10. Moderate to severe mitral valve regurgitation.  11. Moderate-severe tricuspid regurgitation.  12. Mild tomoderate pulmonic valve regurgitation.  13. There is no evidence of pericardial effusion.  14. Compared to the prior TTE study from 5/9/21, severe biventricular   systolic dysfunction again noted.    Gamal Mirza DO Electronically signed on 6/28/2022 at 1:54:26 PM    < end of copied text >

## 2022-07-04 NOTE — PROGRESS NOTE ADULT - SUBJECTIVE AND OBJECTIVE BOX
CC: Follow up     INTERVAL HPI/OVERNIGHT EVENTS: Patient seen and examined, overall feels better. LE slowly improving. Has constant chest pressure/heaviness  Bladder scan with 500cc this morning       Vital Signs Last 24 Hrs  T(C): 36.9 (04 Jul 2022 08:00), Max: 37 (04 Jul 2022 05:10)  T(F): 98.4 (04 Jul 2022 08:00), Max: 98.6 (04 Jul 2022 05:10)  HR: 97 (04 Jul 2022 08:00) (97 - 102)  BP: 118/78 (04 Jul 2022 08:00) (115/71 - 123/80)  BP(mean): --  RR: 18 (04 Jul 2022 08:00) (18 - 18)  SpO2: 99% (04 Jul 2022 08:00) (98% - 100%)    PHYSICAL EXAM:    GENERAL: NAD, AOX3  HEAD:  Atraumatic, Normocephalic  EYES:  conjunctiva and sclera clear  ENMT: Moist mucous membranes  NECK: Supple  CHEST/LUNG: Clear to auscultation bilaterally; No rales, rhonchi, wheezing, or rubs  HEART: Regular rate and rhythm; No murmurs, rubs, or gallops  ABDOMEN: Soft, Nontender, Nondistended; Bowel sounds present  EXTREMITIES:  2+ Peripheral Pulses, No clubbing, cyanosis, 2+ LE edema         MEDICATIONS  (STANDING):  allopurinol 100 milliGRAM(s) Oral daily  aspirin enteric coated 81 milliGRAM(s) Oral daily  atorvastatin 40 milliGRAM(s) Oral at bedtime  buMETAnide Infusion 1 mG/Hr (5 mL/Hr) IV Continuous <Continuous>  buPROPion XL (24-Hour) . 150 milliGRAM(s) Oral daily  dextrose 5%. 1000 milliLiter(s) (100 mL/Hr) IV Continuous <Continuous>  dextrose 5%. 1000 milliLiter(s) (50 mL/Hr) IV Continuous <Continuous>  dextrose 50% Injectable 25 Gram(s) IV Push once  dextrose 50% Injectable 12.5 Gram(s) IV Push once  dextrose 50% Injectable 25 Gram(s) IV Push once  enoxaparin Injectable 30 milliGRAM(s) SubCutaneous every 24 hours  epoetin nohemi-epbx (RETACRIT) Injectable 59984 Unit(s) SubCutaneous <User Schedule>  glucagon  Injectable 1 milliGRAM(s) IntraMuscular once  hydrALAZINE 100 milliGRAM(s) Oral three times a day  insulin glargine Injectable (LANTUS) 10 Unit(s) SubCutaneous at bedtime  insulin lispro (ADMELOG) corrective regimen sliding scale   SubCutaneous three times a day before meals  insulin lispro (ADMELOG) corrective regimen sliding scale   SubCutaneous at bedtime  isosorbide   dinitrate Tablet (ISORDIL) 10 milliGRAM(s) Oral three times a day  lidocaine   4% Patch 1 Patch Transdermal every 24 hours  metoprolol tartrate 25 milliGRAM(s) Oral every 8 hours  pantoprazole    Tablet 40 milliGRAM(s) Oral before breakfast  potassium chloride    Tablet ER 20 milliEquivalent(s) Oral once    MEDICATIONS  (PRN):  acetaminophen     Tablet .. 650 milliGRAM(s) Oral every 6 hours PRN Moderate Pain (4 - 6)  ALBUTerol    90 MICROgram(s) HFA Inhaler 1 Puff(s) Inhalation every 4 hours PRN Shortness of Breath and/or Wheezing  dextrose Oral Gel 15 Gram(s) Oral once PRN Blood Glucose LESS THAN 70 milliGRAM(s)/deciliter      Allergies    No Known Allergies    Intolerances          LABS:                          8.2    8.34  )-----------( 139      ( 03 Jul 2022 06:14 )             27.4     07-04    139  |  97<L>  |  70.1<H>  ----------------------------<  129<H>  3.6   |  27.0  |  3.00<H>    Ca    8.7      04 Jul 2022 05:34  Mg     1.8     07-03            RADIOLOGY & ADDITIONAL TESTS:

## 2022-07-04 NOTE — PROGRESS NOTE ADULT - ASSESSMENT
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  Found to be in decompensated heart failure  Echo with new drop in EF 35% Moderate to severe mr mild pul htn Had some chest discomfort on 6/29 and trop negative Plan for cath but renal function with worsening.  bnp 36k  cxr cardiomegaly with pvc    07/02: Patient states that he feels like his symptoms are improving, but still with significant leg edema. No BMP ordered for today. Dr. Duke discussed workup for worsening cardiomyopathy regarding LHC and EPS/CRT which would both require contrast dye with risks of DAVE and ESRD. Patient and his wife not willing to move forward with these studies due to the risk. Will continue medical management.   07/03:  Patient states that his breathing is a little more labored today. Patient's CXR also still pulmonary vascular congestion and pBNP is increasing. Patient's bladder scan also with 480 cc of urine.  07/04:  Patient states his breathing started to improve, and he is diuresing well on bumex gtt. BUN/Cr with slight uptrend now 70.1/3.00. Patient with no other complaints.

## 2022-07-04 NOTE — PROGRESS NOTE ADULT - PROBLEM SELECTOR PLAN 3
.  - denies chest pain today  - EKG unchanged from baseline  - serial CK/Troponin no elevation  - mild Trop elevation flat not consistent with ACS, defer heparin gtt
avoid nephro toxic meds  creat 2.9  repeat tomorrow
- Continue hydralazine and metoprolol.   - D/C norvasc.   - If further BP control is needed start isosordil.
- EP evaluated, discussed ICD, pt refusing procedure at this time.   - Telemetry monitoring   - Still with atypical atrial flutter.   - Continue metoprolol 25mg PO q8, per EP caution with increasing further due to risk of didi arrhythmias.   - pending 1 year Watchman MARLO in 8/2022.
- In atrial flutter with RVR in setting of decompensated CHF.   - S/p Watchman device, no longer on AC.   - Continue metoprolol to 25 q8, will transition to toprol XL upon discharge.   - Obtain TTE.   - Continue telemetry monitoring.

## 2022-07-04 NOTE — PROGRESS NOTE ADULT - ASSESSMENT
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 Admitted for Acute on Chronic HFrEF.    Assessment/Plan:    1. Acute on chronic HF with biventricular heart failure  - IV Bumex infusion   -Strict IO, Daily weight, 1.5L restriction  -TTE with severe Biventricular dysfunction and segmental wma  -EP following for conduction disease, NSVT-BB titrated. No plan for in-patient intervention     2. CAD s/p CABG  -ASA, Statin    3. Hypertension  - BP Stable     4. DM type 2 uncontrolled with hyperglycemia  -Lantus 10 nightly + ISS  -Diabetic diet    5. Posterior T11 fracture  -Conservative management  -pain control     6. KEN on CKD 3b  -Suspect cardiorenal  -No obstruction  -Nephrology consulted  - Bladder scan with >500 cc. dyer placement    7. Frequent falls;   -Patient severely deconditioned, has walker at home but frequently does not use it per wife. Has had several falls in recent weeks.  -Pending TAHMINA once medically optimized     Discharge disposition; TAHMINA when medically stable.

## 2022-07-04 NOTE — PROGRESS NOTE ADULT - NS ATTEND OPT1A GEN_ALL_CORE
History/Exam/Medical decision making

## 2022-07-04 NOTE — PROGRESS NOTE ADULT - NS ATTEND AMEND GEN_ALL_CORE FT
agree with above,    81M h/o CAD s/p CABG (2017) c/b sepsis and sternal wound infection s/p sternum removal and pec flap and angioplasty, chronic HFrEF, pAfib (on Eliquis), HTN, DM2, CKD (Cr. 2.8), chronic anemia and Bell's Palsy last admitted in 5/9/21 with back pain/dizziness s/p mechanical fall noted ADHFrEF (35-40%), RV dysfunction, initial noted pBNP 85766 ---> 27863, pharm nuclear stress test without ischemia, RHC done with moderate pHTN Group 2 with PCWP 23, switched to torsemide 40mg, underwent MARLO/CV for pAflutter but reverted back to pAfib/flutter with bradycardia s/p Medtronic ILR implant repeated ER visits 5/28 & 5/30 for recurrent dizziness, readmitted 6/4-6/15/21 found with severe orthostasis and acute on chronic anemia (Hb seema 6.3) s/p EGD/colonoscopy found with duodenal bulb bleeding s/p IR embolization, noted elevated BP increased hydralazine dose to 100mg TID, 24-Hr BP monitoring still with SBP 160s added amlodipine 5mg, cardiac amyloid workup negative, had recent increased salt intake and gained >16 lb, restarted Torsemide 20mg, last visit 7/2021, underwent elective Watchman and with EPS/Dr. Mantilla in 7/27/21 been off Eliquis and clopidogrel, only on ASA 81mg still with recurrent anemia required IV iron infusion, recent worsening R-facial droop, last seen in office 6/6/22 been on Torsemide 40mg daily increased to 60mg by patient's wife, had recurrent mechanical fall at home unable to get up, monitored in CDU pending rehab discharge, however noted increased lower abdominal and legs swelling, CT chest with anasarca  -acute on chronic HFrEF, pBNP >42K, gross bilateral pitting edema to lower back, still overloaded with IV Lasix 60mg BID change to IV Bumex 2mg BID, repeat TTE with worsening of LV EF 30-35% and chronic RV failure  -advanced CKD not candidate for ARNi and GFR <30 defer SGLT-2i; continue hydralazine, discontinued amlodipine to add Isordil   -telemetry monitoring for pAflutter/Afib, continue metoprolol and ILR interrogation; EKG with chronic RBBB role for ABJ ablation with CRT?   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   -continuous chest wall pain reproducible, EKG unchanged from baseline, serial CK/Troponin no elevation, suspect musculoskeletal, trial of acetaminophen  -intermittent hematuria with +nitrite, suspect due to UTI, urine culture pending; mild Trop elevation flat not consistent with ACS, defer heparin gtt    Goals of care discussion:  -I have discussed in details with patient and his wife Alma presence at bedside, for the workup for worsening cardiomyopathy the option of left heart cath and with EPS for CRT device both would required IV contrast dye which can lead to DAVE and ESRD, patient/wife not ready yet to commit to long term hemodialysis plan and wants to continue medical management for cardiomyopathy and AFib        Gamal Duke DO, [facility]  Faculty Non-Invasive Cardiologist  [phone].
agree with above,    81M h/o CAD s/p CABG (2017) c/b sepsis and sternal wound infection s/p sternum removal and pec flap and angioplasty, chronic HFrEF, pAfib (on Eliquis), HTN, DM2, CKD (Cr. 2.8), chronic anemia and Bell's Palsy last admitted in 5/9/21 with back pain/dizziness s/p mechanical fall noted ADHFrEF (35-40%), RV dysfunction, initial noted pBNP 18608 ---> 71437, pharm nuclear stress test without ischemia, RHC done with moderate pHTN Group 2 with PCWP 23, switched to torsemide 40mg, underwent MARLO/CV for pAflutter but reverted back to pAfib/flutter with bradycardia s/p Medtronic ILR implant repeated ER visits 5/28 & 5/30 for recurrent dizziness, readmitted 6/4-6/15/21 found with severe orthostasis and acute on chronic anemia (Hb seema 6.3) s/p EGD/colonoscopy found with duodenal bulb bleeding s/p IR embolization, noted elevated BP increased hydralazine dose to 100mg TID, 24-Hr BP monitoring still with SBP 160s added amlodipine 5mg, cardiac amyloid workup negative, had recent increased salt intake and gained >16 lb, restarted Torsemide 20mg, last visit 7/2021, underwent elective Watchman and with EPS/Dr. Mantilla in 7/27/21 been off Eliquis and clopidogrel, only on ASA 81mg still with recurrent anemia required IV iron infusion, recent worsening R-facial droop, last seen in office 6/6/22 been on Torsemide 40mg daily increased to 60mg by patient's wife, had recurrent mechanical fall at home unable to get up, monitored in CDU pending rehab discharge, however noted increased lower abdominal and legs swelling, CT chest with anasarca  -acute on chronic HFrEF, pBNP >42K, gross bilateral pitting edema to lower back, still overloaded with IV Lasix 60mg BID changed to IV Bumex 2mg BID, repeat TTE with worsening of LV EF 30-35% and chronic RV failure  -advanced CKD not candidate for ARNi and GFR <30 defer SGLT-2i; continue hydralazine, discontinued amlodipine and added Isordil   -telemetry monitoring for pAflutter/Afib, continue metoprolol; EKG with chronic RBBB role for AVJ ablation with CRT? however if eventually starting HD would be at risk for device infection and explant   -pending 1 year Watchman MARLO in 8/2022  -also pending elective AV fistula per nephrology outpatient may need to be done inpatient if Cr. 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   -continuous chest wall pain reproducible, EKG unchanged from baseline, serial CK/Troponin no elevation, suspect musculoskeletal, trial of acetaminophen  -intermittent hematuria with +nitrite, suspect due to UTI, urine culture pending; mild Trop elevation flat not consistent with ACS, defer heparin gtt    Goals of care discussion:  -I have discussed in details with patient and his wife Alma gonzalez at bedside, for the workup for worsening cardiomyopathy the option of left heart cath and with EPS for CRT device both would required IV contrast dye which can lead to DAVE and ESRD, patient/wife not ready yet to commit to long term hemodialysis plan and wants to continue medical management for cardiomyopathy and AFib, however if Cr. worsening and remains overloaded then eventual HD may be needed.         Gamal Duke DO, [facility]  Faculty Non-Invasive Cardiologist  [phone].
agree with above,    81M h/o CAD s/p CABG (2017) c/b sepsis and sternal wound infection s/p sternum removal and pec flap and angioplasty, chronic HFrEF, pAfib (on Eliquis), HTN, DM2, CKD (Cr. 2.8), chronic anemia and Bell's Palsy last admitted in 5/9/21 with back pain/dizziness s/p mechanical fall noted ADHFrEF (35-40%), RV dysfunction, initial noted pBNP 72331 ---> 36953, pharm nuclear stress test without ischemia, RHC done with moderate pHTN Group 2 with PCWP 23, switched to torsemide 40mg, underwent MARLO/CV for pAflutter but reverted back to pAfib/flutter with bradycardia s/p Medtronic ILR implant repeated ER visits 5/28 & 5/30 for recurrent dizziness, readmitted 6/4-6/15/21 found with severe orthostasis and acute on chronic anemia (Hb seema 6.3) s/p EGD/colonoscopy found with duodenal bulb bleeding s/p IR embolization, noted elevated BP increased hydralazine dose to 100mg TID, 24-Hr BP monitoring still with SBP 160s added amlodipine 5mg, cardiac amyloid workup negative, had recent increased salt intake and gained >16 lb, restarted Torsemide 20mg, last visit 7/2021, underwent elective Watchman and with EPS/Dr. Mantilla in 7/27/21 been off Eliquis and clopidogrel, only on ASA 81mg still with recurrent anemia required IV iron infusion, recent worsening R-facial droop, last seen in office 6/6/22 been on Torsemide 40mg daily increased to 60mg by patient's wife, had recurrent mechanical fall at home unable to get up, monitored in CDU pending rehab discharge, however noted increased lower abdominal and legs swelling, CT chest with anasarca, repeat TTE with worsening of LV EF 30-35% and chronic RV failure    -acute on chronic HFrEF, pBNP >42K, gross bilateral pitting edema to lower back, still overloaded with IV Lasix 60mg BID changed to IV Bumex 2mg BID, will continue through the weekend and montir I/O and Cr, switch to PO Bumex on discharge, consider CardioMems placement   -advanced CKD not candidate for ARNi and GFR <30 defer SGLT-2i; continue hydralazine, discontinued amlodipine and added Isordil   -telemetry monitoring for pAflutter/Afib, continue metoprolol dose increased; EKG with chronic RBBB role for AVJ ablation with CRT? however if eventually starting HD would be at risk for device infection and explant   -pending 1 year Watchman MARLO in 8/2022  -also pending elective AV fistula per nephrology outpatient may need to be done inpatient if Cr. 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   -continuous chest wall pain reproducible, EKG unchanged from baseline, serial CK/Troponin no elevation, suspect musculoskeletal, trial of acetaminophen  -intermittent hematuria with +nitrite, suspect due to UTI, urine culture negative; mild Trop elevation flat not consistent with ACS, defer heparin gtt    Goals of care discussion:  -I have discussed in details with patient and his wife Alma, for the workup for worsening cardiomyopathy the option of left heart cath and with EPS for CRT device both would required IV contrast dye which can lead to DAVE and ESRD, patient/wife not ready yet to commit to long term hemodialysis plan and wants to continue medical management for cardiomyopathy and AFib, however if Cr. worsening and remains overloaded then eventual HD may be needed.         Gamal Duke DO, [facility]  Faculty Non-Invasive Cardiologist  [phone].
agree with above,    81M h/o CAD s/p CABG (2017) c/b sepsis and sternal wound infection s/p sternum removal and pec flap and angioplasty, chronic HFrEF, pAfib (on Eliquis), HTN, DM2, CKD (Cr. 2.8), chronic anemia and Bell's Palsy last admitted in 5/9/21 with back pain/dizziness s/p mechanical fall noted ADHFrEF (35-40%), RV dysfunction, initial noted pBNP 82237 ---> 24371, pharm nuclear stress test without ischemia, RHC done with moderate pHTN Group 2 with PCWP 23, switched to torsemide 40mg, underwent MARLO/CV for pAflutter but reverted back to pAfib/flutter with bradycardia s/p Medtronic ILR implant repeated ER visits 5/28 & 5/30 for recurrent dizziness, readmitted 6/4-6/15/21 found with severe orthostasis and acute on chronic anemia (Hb seema 6.3) s/p EGD/colonoscopy found with duodenal bulb bleeding s/p IR embolization, noted elevated BP increased hydralazine dose to 100mg TID, 24-Hr BP monitoring still with SBP 160s added amlodipine 5mg, cardiac amyloid workup negative, had recent increased salt intake and gained >16 lb, restarted Torsemide 20mg, last visit 7/2021, underwent elective Watchman and with EPS/Dr. Mantilla in 7/27/21 been off Eliquis and clopidogrel, only on ASA 81mg still with recurrent anemia required IV iron infusion, recent worsening R-facial droop, last seen in office 6/6/22 been on Torsemide 40mg daily increased to 60mg by patient's wife, had recurrent mechanical fall at home unable to get up, monitored in CDU pending rehab discharge, however noted increased lower abdominal and legs swelling, CT chest with anasarca  -acute on chronic HFrEF, pBNP >42K, gross bilateral pitting edema to lower back, continue IV Lasix 60mg BID, repeat TTE with worsening of LV EF 30-35% and chronic RV failure  -advanced CKD not candidate for ARNi and GFR <30 defer SGLT-2i; continue hydralazine, discontinue amlodipine to add Isordil   -telemetry monitoring for pAflutter/Afib, continue metoprolol and ILR interrogation; EKG with chronic RBBB role for ABJ ablation with CRT?   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         Gamal Duke DO, [facility]  Faculty Non-Invasive Cardiologist  [phone].
Acute on chronic systolic congestive heart failure: Volume overloaded.  Continue Bumex 2mg IV BID.  Repeat TTE with worsening of LV EF 30-35% and chronic RV failure.  Advanced CKD not candidate for ARNi and GFR <30 defer SGLT-2i.  Continue hydralazine, Isordil, metoprolol.  CAD:  Troponin mildly elevated with normal CK.  Not consistent with ACS, likely due to acute decompensated HFrEF.  No heparin gtt at this time. tinue aspirin.  Patient defers LHC at this time due to the risks of DAVE and ESRD with contrast dye.  Atrial fibrillation and flutter;  - EP evaluated, discussed ICD, pt refusing procedure at this time.  Continue metoprolol 25mg PO q8, per EP caution with increasing further due to risk of didi arrhythmias.  pending 1 year Watchman MARLO in 8/2022.
Acute on chronic systolic congestive heart failure.: cont diuresis, responding well.  Continue Bumex gtt 1mg/hr.   Check BID BMP to monitor electrolytes, if BUN/Cr uptrend significantly then D/C drip.  TTE with worsening of LV EF 30-35% and chronic RV failure
Acute on chronic systolic congestive heart failure: Transition to Bumex gtt 1mg/hr.   Repeat TTE with worsening of LV EF 30-35% and chronic RV failure.  Advanced CKD not candidate for ARNi and GFR <30 defer SGLT-2i.  Continue hydralazine, Isordil, metoprolol.  CAD: Patient defers LHC at this time due to the risks of DAVE and ESRD with contrast dye.  Atrial fibrillation and flutter:  EP evaluated, discussed ICD, pt refusing procedure at this time.   Continue metoprolol 25mg PO q8, per EP caution with increasing further due to risk of didi arrhythmias.   pending 1 year Watchman MARLO in 8/2022.

## 2022-07-04 NOTE — PROGRESS NOTE ADULT - NS ATTEND OPT1 GEN_ALL_CORE
I independently performed the documented:

## 2022-07-04 NOTE — PROGRESS NOTE ADULT - PROBLEM SELECTOR PLAN 1
Low na diet  strict intake and out put  discussed with rn staff  Creat more elevated today but patient still appears overloaded  check bladder scan  c/w bumex bid  check bnp
- pBNP >42K, now up 52K.   - Still hypervolemic.   - Transition to Bumex gtt 1mg/hr.   - Bladder scan with 480 cc, may need dyer or frequent straight caths.   - Repeat TTE with worsening of LV EF 30-35% and chronic RV failure  - Advanced CKD not candidate for ARNi and GFR <30 defer SGLT-2i  - Continue hydralazine, Isordil, metoprolol.  - Check daily BMP.  - Monitor on telemetry.    - Strict i/o and daily weights.    - Keep K > 4, Mg > 2.    - Monitor renal function with ongoing diuresis.
.  - pBNP >42K  - still overloaded cont IV Bumex 2mg BID  - repeat TTE with worsening of LV EF 30-35% and chronic RV failure  - advanced CKD not candidate for ARNi and GFR <30 defer SGLT-2i  - continue hydralazine, Isordil, metoprolol
- Acute decompensated with EF 35-40%.   - Patient not given torsemide while in the ER.   - Continue Lasix 60mg IV BID.   - Increase metoprolol to 25mg PO q8.   - Continue hydralazine.  - Unable to add isosordil due to orthostasis.   - Obtain TTE.   - Monitor on telemetry.    - Strict i/o and daily weights.    - Keep K > 4, Mg > 2.   - Monitor renal function with ongoing diuresis.
- pBNP >42K  - Still hypervolemic.   - Continue Bumex 2mg IV BID  - Repeat TTE with worsening of LV EF 30-35% and chronic RV failure  - Advanced CKD not candidate for ARNi and GFR <30 defer SGLT-2i  - Continue hydralazine, Isordil, metoprolol.  - Check daily BMP.  - Monitor on telemetry.    - Strict i/o and daily weights.    - Keep K > 4, Mg > 2.    - Monitor renal function with ongoing diuresis.
- Acute decompensated with EF 35-40%.   - Patient states he is not urinating as much today.   - D/C lasix.   - Start Bumex 2mg IV BID.   - Continue metoprolol to 25mg PO q8, and will transition to Toprol XL upon discharge.   - Continue hydralazine and isosordil.   - Unable to add ACEi/ARB/ARNI due to renal function.   - Monitor on telemetry.    - Strict i/o and daily weights.    - Keep K > 4, Mg > 2.   - Monitor renal function with ongoing diuresis.
- pBNP >42K, now up 52K.   - Continue Bumex gtt 1mg/hr.   - Check BID BMP to monitor electrolytes, if BUN/Cr uptrend significantly then D/C drip.  - Repeat TTE with worsening of LV EF 30-35% and chronic RV failure  - Advanced CKD not candidate for ARNi and GFR <30 defer SGLT-2i  - Continue hydralazine, Isordil, metoprolol.  - Check daily BMP.  - Monitor on telemetry.    - Strict i/o and daily weights.    - Keep K > 4, Mg > 2.    - Monitor renal function with ongoing diuresis.

## 2022-07-05 NOTE — PROGRESS NOTE ADULT - SUBJECTIVE AND OBJECTIVE BOX
[facility]                                                               Office: [address] [address]                                                              Telephone: [phone]. Fax:[phone]                                                                             PROGRESS NOTE  Reason for follow up: Acute on chronic HFrEF  Overnight: No new events.   Update: been on IV Bumex drip since yesterday with >4 liters urin output, still feeling dyspneic at times and body soreness      Review of symptoms:   Cardiac:  No chest pain. + dyspnea. No palpitations.  Respiratory: No cough. + dyspnea  Gastrointestinal: No diarrhea. No abdominal pain. No bleeding.     Past medical history: No updates.   	  Vital Signs Last 24 Hrs  T(C): 36.9 (07-05-22 @ 09:58), Max: 36.9 (07-05-22 @ 09:58)  T(F): 98.4 (07-05-22 @ 09:58), Max: 98.4 (07-05-22 @ 09:58)  HR: 102 (07-05-22 @ 09:58) (97 - 102)  BP: 113/71 (07-05-22 @ 09:58) (112/66 - 146/88)  BP(mean): --  RR: 17 (07-05-22 @ 09:58) (17 - 18)  SpO2: 95% (07-05-22 @ 09:58) (94% - 98%)  I&O's Summary    04 Jul 2022 07:01  -  05 Jul 2022 07:00  --------------------------------------------------------  IN: 1085 mL / OUT: 4350 mL / NET: -3265 mL    05 Jul 2022 07:01  -  05 Jul 2022 15:34  --------------------------------------------------------  IN: 670 mL / OUT: 850 mL / NET: -180 mL          PHYSICAL EXAM:  Appearance: Comfortable. No acute distress  HEENT:  Head and neck: R-facial palsy Normocephalic.  Normal oral mucosa, PERRL, Neck is supple. No JVD, No carotid bruit.   Neurologic: A&Ox 3, no focal deficits. EOMI, Cranial nerves are intact.  Lymphatic: No cervical lymphadenopathy  Cardiovascular: Normal S1 S2, No murmur, rubs/gallops. No JVD, No edema  Respiratory: decreased breath sounds basilar  Gastrointestinal:  Soft, Non-tender, + BS  Lower Extremities: +1 pitting edema   Psychiatry: Patient is calm. No agitation. Mood & affect appropriate  Skin: No rashes/ecchymoses/cyanosis/ulcers visualized on the face, hands or feet.      CURRENT MEDICATIONS:  MEDICATIONS  (STANDING):  allopurinol 100 milliGRAM(s) Oral daily  aspirin enteric coated 81 milliGRAM(s) Oral daily  atorvastatin 40 milliGRAM(s) Oral at bedtime  buMETAnide Infusion 1 mG/Hr (5 mL/Hr) IV Continuous <Continuous>  buPROPion XL (24-Hour) . 150 milliGRAM(s) Oral daily  dextrose 5%. 1000 milliLiter(s) (100 mL/Hr) IV Continuous <Continuous>  dextrose 5%. 1000 milliLiter(s) (50 mL/Hr) IV Continuous <Continuous>  dextrose 50% Injectable 25 Gram(s) IV Push once  dextrose 50% Injectable 12.5 Gram(s) IV Push once  dextrose 50% Injectable 25 Gram(s) IV Push once  enoxaparin Injectable 30 milliGRAM(s) SubCutaneous every 24 hours  epoetin nohemi-epbx (RETACRIT) Injectable 10901 Unit(s) SubCutaneous <User Schedule>  glucagon  Injectable 1 milliGRAM(s) IntraMuscular once  hydrALAZINE 100 milliGRAM(s) Oral three times a day  insulin glargine Injectable (LANTUS) 10 Unit(s) SubCutaneous at bedtime  insulin lispro (ADMELOG) corrective regimen sliding scale   SubCutaneous three times a day before meals  insulin lispro (ADMELOG) corrective regimen sliding scale   SubCutaneous at bedtime  isosorbide   dinitrate Tablet (ISORDIL) 10 milliGRAM(s) Oral three times a day  lidocaine   4% Patch 1 Patch Transdermal every 24 hours  metoprolol tartrate 25 milliGRAM(s) Oral every 8 hours  pantoprazole    Tablet 40 milliGRAM(s) Oral before breakfast  polyethylene glycol 3350 17 Gram(s) Oral daily  tamsulosin 0.4 milliGRAM(s) Oral at bedtime    MEDICATIONS  (PRN):  acetaminophen     Tablet .. 650 milliGRAM(s) Oral every 6 hours PRN Moderate Pain (4 - 6)  ALBUTerol    90 MICROgram(s) HFA Inhaler 1 Puff(s) Inhalation every 4 hours PRN Shortness of Breath and/or Wheezing  dextrose Oral Gel 15 Gram(s) Oral once PRN Blood Glucose LESS THAN 70 milliGRAM(s)/deciliter      DIAGNOSTIC TESTING:  [ ] Echocardiogram:   < from: TTE Echo Complete w/ Contrast w/ Doppler (06.27.22 @ 17:53) >  Summary:   1. Endocardial visualization was enhanced with intravenous echo contrast.   2. Left ventricular ejection fraction, by visual estimation, is 30 to   35%.   3. Severely decreased segmental left ventricular systolic function.   4. The mitral in-flow pattern reveals no discernable A-wave, therefore   no comment on diastolic function can be made.   5. Estimated pulmonary artery systolic pressure is 49.0 mmHg assuming a   right atrial pressure of 8 mmHg, which is consistent with mild pulmonary   hypertension.   6. Severely enlarged right ventricle with severely reduced RV systolic   function.   7. Severely enlarged left atrium.   8. Severely enlarged right atrium.   9. Mild mitral annular calcification.  10. Moderate to severe mitral valve regurgitation.  11. Moderate-severe tricuspid regurgitation.  12. Mild tomoderate pulmonic valve regurgitation.  13. There is no evidence of pericardial effusion.  14. Compared to the prior TTE study from 5/9/21, severe biventricular   systolic dysfunction again noted.    < end of copied text >    [ ]  Catheterization:  [ ] Stress Test:      Labs:    07-05    138  |  97<L>  |  74.8<H>  ----------------------------<  178<H>  3.6   |  27.0  |  2.83<H>    Ca    8.4<L>      05 Jul 2022 05:36       29 Jun 2022 15:01 Troponin 0.12 ng/mL / Creatine Kinase 90 U/L /  CKMB x     / CPK Mass Assay % x       29 Jun 2022 09:05 Troponin 0.12 ng/mL / Creatine Kinase 92 U/L /  CKMB x     / CPK Mass Assay % x       27 Jun 2022 15:36 Troponin 0.11 ng/mL / Creatine Kinase x     /  CKMB x     / CPK Mass Assay % x          Serum Pro-Brain Natriuretic Peptide: 05054 pg/mL (07-03-22 @ 06:14)  Serum Pro-Brain Natriuretic Peptide: 21830 pg/mL (06-29-22 @ 09:05)  Serum Pro-Brain Natriuretic Peptide: 90871 pg/mL (06-27-22 @ 15:36)  Serum Pro-Brain Natriuretic Peptide: 91887 pg/mL (06-10-21 @ 06:22)  Serum Pro-Brain Natriuretic Peptide: 89416 pg/mL (05-28-21 @ 15:45)  Serum Pro-Brain Natriuretic Peptide: 30200 pg/mL (05-11-21 @ 06:10)  Serum Pro-Brain Natriuretic Peptide: 00189 pg/mL (05-09-21 @ 11:25)  Serum Pro-Brain Natriuretic Peptide: 08098 pg/mL (05-09-21 @ 06:06)  Serum Pro-Brain Natriuretic Peptide: 39153 pg/mL (05-08-21 @ 08:39)      A1C with Estimated Average Glucose Result: 6.7 % (07-02-22 @ 06:37)      TELEMETRY: Aflutter 100s, PVCs

## 2022-07-05 NOTE — PROGRESS NOTE ADULT - ASSESSMENT
81M h/o CAD s/p CABG (2017) c/b sepsis and sternal wound infection s/p sternum removal and pec flap and angioplasty, chronic HFrEF, pAfib (on Eliquis), HTN, DM2, CKD (Cr. 2.8), chronic anemia and Bell's Palsy last admitted in 5/9/21 with back pain/dizziness s/p mechanical fall noted ADHFrEF (35-40%), RV dysfunction, initial noted pBNP 76734 ---> 86655, pharm nuclear stress test without ischemia, RHC done with moderate pHTN Group 2 with PCWP 23, switched to torsemide 40mg, underwent MARLO/CV for pAflutter but reverted back to pAfib/flutter with bradycardia s/p Medtronic ILR implant repeated ER visits 5/28 & 5/30 for recurrent dizziness, readmitted 6/4-6/15/21 found with severe orthostasis and acute on chronic anemia (Hb seema 6.3) s/p EGD/colonoscopy found with duodenal bulb bleeding s/p IR embolization, noted elevated BP increased hydralazine dose to 100mg TID, 24-Hr BP monitoring still with SBP 160s added amlodipine 5mg, cardiac amyloid workup negative, had recent increased salt intake and gained >16 lb, restarted Torsemide 20mg, last visit 7/2021, underwent elective Watchman and with EPS/Dr. Mantilla in 7/27/21 been off Eliquis and clopidogrel, only on ASA 81mg still with recurrent anemia required IV iron infusion, recent worsening R-facial droop, last seen in office 6/6/22 been on Torsemide 40mg daily increased to 60mg by patient's wife, had recurrent mechanical fall at home unable to get up, monitored in CDU pending rehab discharge, however noted increased lower abdominal and legs swelling, CT chest with anasarca, repeat TTE with worsening of LV EF 30-35% and chronic RV failure      Acute on chronic HFrEF, pBNP >42K  -gross bilateral pitting edema to lower back, still overloaded with IV Lasix 60mg BID changed to IV Bumex 2mg BID, still severely overloaded switched to IV Bumex gtt with >4 liters outpatient last 24hrs continue same drip rate for now until near euvolemic.   -advanced CKD not candidate for ARNi and GFR <30 defer SGLT-2i; continue hydralazine, discontinued amlodipine and added Isordil     Persistent Aflutter  -telemetry monitoring for pAflutter/Afib, continue metoprolol dose increased; EKG with chronic RBBB role for AVJ ablation with CRT? 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     Chest pain  -continuous chest wall pain reproducible, EKG unchanged from baseline, serial CK/Troponin no elevation, suspect musculoskeletal, trial of acetaminophen  -intermittent hematuria with +nitrite, suspect due to UTI, urine culture negative; mild Trop elevation flat not consistent with ACS, defer heparin gtt    Goals of care discussion:  -I have discussed in details with patient and his wife Alma previously and again today, for the workup for worsening cardiomyopathy the option of left heart cath and with EPS for CRT device both would required IV contrast dye which can lead to DAVE and ESRD, patient/wife not ready yet to commit to long term hemodialysis plan and wants to continue medical management for cardiomyopathy and AFib, however if Cr. worsening and remains overloaded then eventual HD may be needed. They're ok moving forward with consideration of AV fistula creation as accepting eventual HD.         Gamal Duke DO, [facility]  Faculty Non-Invasive Cardiologist  [phone].

## 2022-07-05 NOTE — PROGRESS NOTE ADULT - ASSESSMENT
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 Admitted for Acute on Chronic HFrEF.    Assessment/Plan:    1. Acute on chronic HF with biventricular heart failure  - IV Bumex infusion   -Strict IO, Daily weight, 1.5L restriction  -TTE with severe Biventricular dysfunction and segmental wma  -EP following for conduction disease, NSVT-BB titrated. No plan for in-patient intervention     2. CAD s/p CABG  -ASA, Statin    3. Hypertension  - BP Stable     4. DM type 2 uncontrolled with hyperglycemia  -Lantus 10 nightly + ISS  -Diabetic diet    5. Posterior T11 fracture  -Conservative management  -pain control     6. KEN on CKD 3b  -Suspect cardiorenal  -Nephrology consulted  - Stable after dyer placement   - MOnitor BMP closely     7. Frequent falls;   -Patient severely deconditioned, has walker at home but frequently does not use it per wife. Has had several falls in recent weeks.  -Pending TAHMINA once medically optimized     Discharge disposition; TAHMINA when medically stable.

## 2022-07-05 NOTE — PROGRESS NOTE ADULT - SUBJECTIVE AND OBJECTIVE BOX
CC: Follow up     INTERVAL HPI/OVERNIGHT EVENTS: Patient seen and examined, sitting up in a chair. Alla placed yesterday for retention. Has chest pressure which he states is chronic. SOB improving      Vital Signs Last 24 Hrs  T(C): 36.9 (05 Jul 2022 09:58), Max: 36.9 (05 Jul 2022 09:58)  T(F): 98.4 (05 Jul 2022 09:58), Max: 98.4 (05 Jul 2022 09:58)  HR: 102 (05 Jul 2022 09:58) (87 - 102)  BP: 113/71 (05 Jul 2022 09:58) (112/66 - 146/88)  BP(mean): --  RR: 17 (05 Jul 2022 09:58) (17 - 18)  SpO2: 95% (05 Jul 2022 09:58) (94% - 99%)    PHYSICAL EXAM:    GENERAL: NAD, AOX3  HEAD:  Atraumatic, Normocephalic  EYES: conjunctiva and sclera clear  ENMT: Moist mucous membranes  NECK: Supple, No JVD  NERVOUS SYSTEM:  Alert & Oriented X3, Motor Strength 5/5 B/L upper and lower extremities; Right facial droop   CHEST/LUNG: Clear to auscultation bilaterally; No rales, rhonchi, wheezing, or rubs  HEART: Regular rate and rhythm; No murmurs, rubs, or gallops  ABDOMEN: Soft, Nontender, Nondistended; Bowel sounds present  EXTREMITIES:  2+ Peripheral Pulses, No clubbing, cyanosis,1+ LE edema bilaterally       MEDICATIONS  (STANDING):  allopurinol 100 milliGRAM(s) Oral daily  aspirin enteric coated 81 milliGRAM(s) Oral daily  atorvastatin 40 milliGRAM(s) Oral at bedtime  buMETAnide Infusion 1 mG/Hr (5 mL/Hr) IV Continuous <Continuous>  buPROPion XL (24-Hour) . 150 milliGRAM(s) Oral daily  dextrose 5%. 1000 milliLiter(s) (100 mL/Hr) IV Continuous <Continuous>  dextrose 5%. 1000 milliLiter(s) (50 mL/Hr) IV Continuous <Continuous>  dextrose 50% Injectable 25 Gram(s) IV Push once  dextrose 50% Injectable 12.5 Gram(s) IV Push once  dextrose 50% Injectable 25 Gram(s) IV Push once  enoxaparin Injectable 30 milliGRAM(s) SubCutaneous every 24 hours  epoetin nohemi-epbx (RETACRIT) Injectable 18543 Unit(s) SubCutaneous <User Schedule>  glucagon  Injectable 1 milliGRAM(s) IntraMuscular once  hydrALAZINE 100 milliGRAM(s) Oral three times a day  insulin glargine Injectable (LANTUS) 10 Unit(s) SubCutaneous at bedtime  insulin lispro (ADMELOG) corrective regimen sliding scale   SubCutaneous three times a day before meals  insulin lispro (ADMELOG) corrective regimen sliding scale   SubCutaneous at bedtime  isosorbide   dinitrate Tablet (ISORDIL) 10 milliGRAM(s) Oral three times a day  lidocaine   4% Patch 1 Patch Transdermal every 24 hours  metoprolol tartrate 25 milliGRAM(s) Oral every 8 hours  pantoprazole    Tablet 40 milliGRAM(s) Oral before breakfast  tamsulosin 0.4 milliGRAM(s) Oral at bedtime    MEDICATIONS  (PRN):  acetaminophen     Tablet .. 650 milliGRAM(s) Oral every 6 hours PRN Moderate Pain (4 - 6)  ALBUTerol    90 MICROgram(s) HFA Inhaler 1 Puff(s) Inhalation every 4 hours PRN Shortness of Breath and/or Wheezing  dextrose Oral Gel 15 Gram(s) Oral once PRN Blood Glucose LESS THAN 70 milliGRAM(s)/deciliter      Allergies    No Known Allergies    Intolerances          LABS:      07-05    138  |  97<L>  |  74.8<H>  ----------------------------<  178<H>  3.6   |  27.0  |  2.83<H>    Ca    8.4<L>      05 Jul 2022 05:36            RADIOLOGY & ADDITIONAL TESTS:

## 2022-07-06 NOTE — PROGRESS NOTE ADULT - SUBJECTIVE AND OBJECTIVE BOX
[place] CARDIOLOGY-[facility]                                                               Office: [address] [address]                                                              Telephone: [phone]. Fax:[phone]                                                                             PROGRESS NOTE  Reason for follow up: Acute on chronic HFrEF  Overnight: No new events.   Update: diuresing well >3 liters on IV Bumex gtt, LE edema resolving, not dyspneic at rest, having abdominal gas pain discomfort      Review of symptoms:   Cardiac:  No chest pain. No dyspnea. No palpitations.  Respiratory: No cough. No dyspnea  Gastrointestinal: No diarrhea. No abdominal pain. No bleeding.     Past medical history: No updates.   	  Vital Signs Last 24 Hrs  T(C): 36.7 (07-06-22 @ 07:51), Max: 36.9 (07-05-22 @ 16:05)  T(F): 98 (07-06-22 @ 07:51), Max: 98.5 (07-05-22 @ 16:05)  HR: 99 (07-06-22 @ 07:51) (73 - 102)  BP: 113/71 (07-06-22 @ 07:51) (113/71 - 130/79)  BP(mean): 101 (07-05-22 @ 15:50) (101 - 101)  RR: 17 (07-06-22 @ 07:51) (17 - 18)  SpO2: 98% (07-06-22 @ 07:51) (94% - 98%)  I&O's Summary    05 Jul 2022 07:01  -  06 Jul 2022 07:00  --------------------------------------------------------  IN: 1210 mL / OUT: 3550 mL / NET: -2340 mL          PHYSICAL EXAM:  Appearance: Comfortable. No acute distress  HEENT:  Head and neck: Atraumatic. Normocephalic.  Normal oral mucosa, PERRL, Neck is supple.   Neurologic: A&Ox 3, Chronic R-facial droop EOMI,   Lymphatic: No cervical lymphadenopathy  Cardiovascular: Normal S1 S2, No murmur, rubs/gallops. JVD above clavicle   Respiratory: reduced breath sounds basilar  Gastrointestinal:  Soft, Non-tender, + BS  Lower Extremities: +2 pitting bilateral shin edema  Psychiatry: Patient is calm. No agitation. Mood & affect appropriate  Skin: No rashes/ecchymoses/cyanosis/ulcers visualized on the face, hands or feet.      CURRENT MEDICATIONS:  MEDICATIONS  (STANDING):  allopurinol 100 milliGRAM(s) Oral daily  aspirin enteric coated 81 milliGRAM(s) Oral daily  atorvastatin 40 milliGRAM(s) Oral at bedtime  buMETAnide Infusion 1 mG/Hr (5 mL/Hr) IV Continuous <Continuous>  buPROPion XL (24-Hour) . 150 milliGRAM(s) Oral daily  dextrose 5%. 1000 milliLiter(s) (100 mL/Hr) IV Continuous <Continuous>  dextrose 5%. 1000 milliLiter(s) (50 mL/Hr) IV Continuous <Continuous>  dextrose 50% Injectable 25 Gram(s) IV Push once  dextrose 50% Injectable 12.5 Gram(s) IV Push once  dextrose 50% Injectable 25 Gram(s) IV Push once  enoxaparin Injectable 30 milliGRAM(s) SubCutaneous every 24 hours  epoetin nohemi-epbx (RETACRIT) Injectable 13923 Unit(s) SubCutaneous <User Schedule>  glucagon  Injectable 1 milliGRAM(s) IntraMuscular once  hydrALAZINE 100 milliGRAM(s) Oral three times a day  insulin glargine Injectable (LANTUS) 10 Unit(s) SubCutaneous at bedtime  insulin lispro (ADMELOG) corrective regimen sliding scale   SubCutaneous three times a day before meals  insulin lispro (ADMELOG) corrective regimen sliding scale   SubCutaneous at bedtime  isosorbide   dinitrate Tablet (ISORDIL) 10 milliGRAM(s) Oral three times a day  lidocaine   4% Patch 1 Patch Transdermal every 24 hours  metoprolol tartrate 25 milliGRAM(s) Oral every 8 hours  pantoprazole    Tablet 40 milliGRAM(s) Oral before breakfast  polyethylene glycol 3350 17 Gram(s) Oral daily  tamsulosin 0.4 milliGRAM(s) Oral at bedtime    MEDICATIONS  (PRN):  acetaminophen     Tablet .. 650 milliGRAM(s) Oral every 6 hours PRN Moderate Pain (4 - 6)  ALBUTerol    90 MICROgram(s) HFA Inhaler 1 Puff(s) Inhalation every 4 hours PRN Shortness of Breath and/or Wheezing  dextrose Oral Gel 15 Gram(s) Oral once PRN Blood Glucose LESS THAN 70 milliGRAM(s)/deciliter      DIAGNOSTIC TESTING:  [ ] Echocardiogram:  < from: TTE Echo Complete w/ Contrast w/ Doppler (06.27.22 @ 17:53) >    Summary:   1. Endocardial visualization was enhanced with intravenous echo contrast.   2. Left ventricular ejection fraction, by visual estimation, is 30 to   35%.   3. Severely decreased segmental left ventricular systolic function.   4. The mitral in-flow pattern reveals no discernable A-wave, therefore   no comment on diastolic function can be made.   5. Estimated pulmonary artery systolic pressure is 49.0 mmHg assuming a   right atrial pressure of 8 mmHg, which is consistent with mild pulmonary   hypertension.   6. Severely enlarged right ventricle with severely reduced RV systolic   function.   7. Severely enlarged left atrium.   8. Severely enlarged right atrium.   9. Mild mitral annular calcification.  10. Moderate to severe mitral valve regurgitation.  11. Moderate-severe tricuspid regurgitation.  12. Mild tomoderate pulmonic valve regurgitation.  13. There is no evidence of pericardial effusion.  14. Compared to the prior TTE study from 5/9/21, severe biventricular   systolic dysfunction again noted.    < end of copied text >     [ ]  Catheterization:  [ ] Stress Test:      Labs:                        8.7    6.68  )-----------( 175      ( 06 Jul 2022 05:49 )             29.1     07-06    142  |  98  |  79.9<H>  ----------------------------<  97  3.6   |  29.0  |  2.99<H>    Ca    8.9      06 Jul 2022 05:49       29 Jun 2022 15:01 Troponin 0.12 ng/mL / Creatine Kinase 90 U/L /  CKMB x     / CPK Mass Assay % x       29 Jun 2022 09:05 Troponin 0.12 ng/mL / Creatine Kinase 92 U/L /  CKMB x     / CPK Mass Assay % x       27 Jun 2022 15:36 Troponin 0.11 ng/mL / Creatine Kinase x     /  CKMB x     / CPK Mass Assay % x          Serum Pro-Brain Natriuretic Peptide: 38064 pg/mL (07-03-22 @ 06:14)  Serum Pro-Brain Natriuretic Peptide: 86522 pg/mL (06-29-22 @ 09:05)  Serum Pro-Brain Natriuretic Peptide: 23321 pg/mL (06-27-22 @ 15:36)  Serum Pro-Brain Natriuretic Peptide: 43747 pg/mL (06-10-21 @ 06:22)  Serum Pro-Brain Natriuretic Peptide: 88916 pg/mL (05-28-21 @ 15:45)  Serum Pro-Brain Natriuretic Peptide: 66558 pg/mL (05-11-21 @ 06:10)  Serum Pro-Brain Natriuretic Peptide: 04378 pg/mL (05-09-21 @ 11:25)  Serum Pro-Brain Natriuretic Peptide: 38096 pg/mL (05-09-21 @ 06:06)  Serum Pro-Brain Natriuretic Peptide: 39398 pg/mL (05-08-21 @ 08:39)      A1C with Estimated Average Glucose Result: 6.7 % (07-02-22 @ 06:37)      TELEMETRY: Aflutter 100s, PVCs

## 2022-07-06 NOTE — PROGRESS NOTE ADULT - ASSESSMENT
81M h/o CAD s/p CABG (2017) c/b sepsis and sternal wound infection s/p sternum removal and pec flap and angioplasty, chronic HFrEF, pAfib (on Eliquis), HTN, DM2, CKD (Cr. 2.8), chronic anemia and Bell's Palsy last admitted in 5/9/21 with back pain/dizziness s/p mechanical fall noted ADHFrEF (35-40%), RV dysfunction, initial noted pBNP 30500 ---> 72871, pharm nuclear stress test without ischemia, RHC done with moderate pHTN Group 2 with PCWP 23, switched to torsemide 40mg, underwent MARLO/CV for pAflutter but reverted back to pAfib/flutter with bradycardia s/p Medtronic ILR implant repeated ER visits 5/28 & 5/30 for recurrent dizziness, readmitted 6/4-6/15/21 found with severe orthostasis and acute on chronic anemia (Hb seema 6.3) s/p EGD/colonoscopy found with duodenal bulb bleeding s/p IR embolization, noted elevated BP increased hydralazine dose to 100mg TID, 24-Hr BP monitoring still with SBP 160s added amlodipine 5mg, cardiac amyloid workup negative, had recent increased salt intake and gained >16 lb, restarted Torsemide 20mg, last visit 7/2021, underwent elective Watchman and with EPS/Dr. 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       Acute on chronic HFrEF, pBNP >42K  -on admission with gross bilateral pitting edema to lower back, still overloaded with IV Lasix 60mg BID changed to IV Bumex 2mg BID, still severely overloaded switched to IV Bumex gtt with >3-4 liters output last 48hrs continue same drip rate for another 24hrs until near euvolemic, repeat pBNP  -defer RHC and CardioMems for now as responding to aggressive diuresis with chronic anemia/falls history to avoid short term DAPT need  -advanced CKD not candidate for ARNi and GFR <30 defer SGLT-2i; continue hydralazine, discontinued amlodipine and added Isordil     Persistent Aflutter  -telemetry monitoring for pAflutter/Afib, continue metoprolol dose increased; EKG with chronic RBBB role for AVJ ablation with CRT? however if eventually starting HD would be at risk for device infection and explant   -pending 1 year Watchman MARLO in 8/2022    Advanced CKD  -given worsening IV diuretic requirement discussion with nephrology about pending elective AV fistula as likely need future HD plan, patient is agreeable AVF surgery    Chest pain  -continuous chest wall pain reproducible, EKG unchanged from baseline, serial CK/Troponin no elevation, suspect musculoskeletal, trial of acetaminophen      Goals of care discussion:  -I have discussed in details with patient and his wife Alma previously and again today, for the workup for worsening cardiomyopathy the option of left heart cath and with EPS for CRT device both would required IV contrast dye which can lead to DAVE and ESRD, patient/wife not ready yet to commit to long term hemodialysis plan and wants to continue medical management for cardiomyopathy and AFib, however if Cr. worsening and remains overloaded then eventual HD may be needed. They're ok moving forward with consideration of AV fistula creation as accepting eventual HD.         Gamal Duke DO, [facility]  Faculty Non-Invasive Cardiologist  [phone].

## 2022-07-06 NOTE — CONSULT NOTE ADULT - NS ATTEND AMEND GEN_ALL_CORE FT
Patient with CHF now requiring admission and initiation of HD  Patient also required pacemaker insertion  Protect dominant arm (right side) as pacemaker will be inserted on the left side  Will perform AVF creation as outpatient
Seen and examined, well known to me. Seen recently in the office.  ILR data reviewed again    h/o AFIB, now mostly in atypical AFL for probably several months with VR in the low 100. Its possible that this led to worsening HF, EF dropped further now (prelim report), but also has CAD and no recent LHC (avoided as NST was ok and has severe renal failure). Pt now is approcaging dialysis *(no clear plans yet), and also reported 5-7 days of dull RSCP.     Has WAtchman and on aspirin only    AFIB  AFL  HFrEF  CAD  RBBB/Bifascicular block    His rhythm management is complicated, and options include 1) c/w current rate control +/- uptitration of BB which is easiest to do but likely with less expected benefit; 2) AVJ and PPM (with resynchronization device) which will be complicated as likely we will need to use contrast which may worse renal function and also he will be dependant on a transvenous PM with risk of infection given expected initiation of IHD sometime soon; 3) rhythm control which will require probably repeated ablation and short duration of full dose AC.    I do NOT think rhythm control is a good option here, and so would likely end up planning either option 1 or 2 after d/w Dr. Duke and nephrology. Also do NOT think, he is a good ICD candidate and will discuss this further with pt and wife and Dr. Duke    will follow up, above d/w pt, wife and Dr. Duke
agree with above,    81M h/o CAD s/p CABG (2017) c/b sepsis and sternal wound infection s/p sternum removal and pec flap and angioplasty, chronic HFrEF, pAfib (on Eliquis), HTN, DM2, CKD (Cr. 2.8), chronic anemia and Bell's Palsy last admitted in 5/9/21 with back pain/dizziness s/p mechanical fall noted ADHFrEF (35-40%), RV dysfunction, initial noted pBNP 28674 ---> 12173, pharm nuclear stress test without ischemia, RHC done with moderate pHTN Group 2 with PCWP 23, switched to torsemide 40mg, underwent MARLO/CV for pAflutter but reverted back to pAfib/flutter with bradycardia s/p Medtronic ILR implant repeated ER visits 5/28 & 5/30 for recurrent dizziness, readmitted 6/4-6/15/21 found with severe orthostasis and acute on chronic anemia (Hb seema 6.3) s/p EGD/colonoscopy found with duodenal bulb bleeding s/p IR embolization, noted elevated BP increased hydralazine dose to 100mg TID, 24-Hr BP monitoring still with SBP 160s added amlodipine 5mg, cardiac amyloid workup negative, had recent increased salt intake and gained >16 lb, restarted Torsemide 20mg, last visit 7/2021, underwent elective Watchman and with EPS/Dr. Mantilla in 7/27/21 been off Eliquis and clopidogrel, only on ASA 81mg still with recurrent anemia required IV iron infusion, recent worsening R-facial droop, last seen in office 6/6/22 been on Torsemide 40mg daily increased to 60mg by dayami's weife, had recurrent mechanical fall at home unable to get up, monitored in CDU pending rehab discharge, however noted increased lower abdominal and legs swelling, CT chest with anasarca?  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         Gamal Duke DO, [facility]  Faculty Non-Invasive Cardiologist  [phone]

## 2022-07-06 NOTE — PROGRESS NOTE ADULT - SUBJECTIVE AND OBJECTIVE BOX
CC: Follow up     INTERVAL HPI/OVERNIGHT EVENTS: Patient seen and examined, complaints of abdominal pain and bloating. Requesting simethicone SOB improved.       Vital Signs Last 24 Hrs  T(C): 36.7 (06 Jul 2022 07:51), Max: 36.9 (05 Jul 2022 16:05)  T(F): 98 (06 Jul 2022 07:51), Max: 98.5 (05 Jul 2022 16:05)  HR: 99 (06 Jul 2022 07:51) (73 - 102)  BP: 113/71 (06 Jul 2022 07:51) (113/71 - 130/79)  BP(mean): 101 (05 Jul 2022 15:50) (101 - 101)  RR: 17 (06 Jul 2022 07:51) (17 - 18)  SpO2: 98% (06 Jul 2022 07:51) (94% - 98%)    PHYSICAL EXAM:    GENERAL: NAD, AOX3  HEAD:  Atraumatic, Normocephalic  EYES:  conjunctiva and sclera clear  ENMT: Moist mucous membranes  NECK: Supple, No JVD  NERVOUS SYSTEM:  Alert & Oriented X3, Motor Strength 5/5 B/L upper and lower extremities; DTRs 2+ intact and symmetric  Chronic left facial droop  CHEST/LUNG: Clear to auscultation bilaterally; No rales, rhonchi, wheezing, or rubs  HEART: Regular rate and rhythm; No murmurs, rubs, or gallops  ABDOMEN: Soft, Nontender, Nondistended; Bowel sounds present  EXTREMITIES:  2+ Peripheral Pulses, No clubbing, cyanosis, 1+ LE edema bilaterally       MEDICATIONS  (STANDING):  allopurinol 100 milliGRAM(s) Oral daily  aspirin enteric coated 81 milliGRAM(s) Oral daily  atorvastatin 40 milliGRAM(s) Oral at bedtime  buMETAnide Infusion 1 mG/Hr (5 mL/Hr) IV Continuous <Continuous>  buPROPion XL (24-Hour) . 150 milliGRAM(s) Oral daily  dextrose 5%. 1000 milliLiter(s) (100 mL/Hr) IV Continuous <Continuous>  dextrose 5%. 1000 milliLiter(s) (50 mL/Hr) IV Continuous <Continuous>  dextrose 50% Injectable 25 Gram(s) IV Push once  dextrose 50% Injectable 12.5 Gram(s) IV Push once  dextrose 50% Injectable 25 Gram(s) IV Push once  enoxaparin Injectable 30 milliGRAM(s) SubCutaneous every 24 hours  epoetin nohemi-epbx (RETACRIT) Injectable 31205 Unit(s) SubCutaneous <User Schedule>  glucagon  Injectable 1 milliGRAM(s) IntraMuscular once  hydrALAZINE 100 milliGRAM(s) Oral three times a day  insulin glargine Injectable (LANTUS) 10 Unit(s) SubCutaneous at bedtime  insulin lispro (ADMELOG) corrective regimen sliding scale   SubCutaneous three times a day before meals  insulin lispro (ADMELOG) corrective regimen sliding scale   SubCutaneous at bedtime  isosorbide   dinitrate Tablet (ISORDIL) 10 milliGRAM(s) Oral three times a day  lidocaine   4% Patch 1 Patch Transdermal every 24 hours  metoprolol tartrate 25 milliGRAM(s) Oral every 8 hours  pantoprazole    Tablet 40 milliGRAM(s) Oral before breakfast  polyethylene glycol 3350 17 Gram(s) Oral daily  simethicone 80 milliGRAM(s) Chew daily  tamsulosin 0.4 milliGRAM(s) Oral at bedtime    MEDICATIONS  (PRN):  acetaminophen     Tablet .. 650 milliGRAM(s) Oral every 6 hours PRN Moderate Pain (4 - 6)  ALBUTerol    90 MICROgram(s) HFA Inhaler 1 Puff(s) Inhalation every 4 hours PRN Shortness of Breath and/or Wheezing  dextrose Oral Gel 15 Gram(s) Oral once PRN Blood Glucose LESS THAN 70 milliGRAM(s)/deciliter      Allergies    No Known Allergies    Intolerances          LABS:                          8.7    6.68  )-----------( 175      ( 06 Jul 2022 05:49 )             29.1     07-06    142  |  98  |  79.9<H>  ----------------------------<  97  3.6   |  29.0  |  2.99<H>    Ca    8.9      06 Jul 2022 05:49            RADIOLOGY & ADDITIONAL TESTS:

## 2022-07-06 NOTE — CONSULT NOTE ADULT - SUBJECTIVE AND OBJECTIVE BOX
Vascular Attending:      Vascular Surgery HPI:  Admission HPI reviewed  Chronic renal insufficiency  Initiation of HD imminent   Renal MD requesting AVF   Right hand dominant   Patient reports right sided facial paralysis s/p resection of right sided facial mass  No CP/SOB/Fever chills       HPI:  80M with PMH of HTN, DM2, CKD, Bell's Palsy, pulm HTN, CAD s/p prior PCI & remote CABG( ccb sternal wound infection requiring sternum removal with pec flap) Ischemic cardiomyopathy, chronic HFrEF , AFL/AFIB s/p MARLO/DCCV 5/11/21 and watchman, bifascicular block s/p MDT ILR implant (5/2021-Annalee)  and 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 Patient reports having several mechanical falls over last few months since moving up from [place]. In the ED found to have right posterior rib fracture on imaging. No PTX. Was planned for a discharge to [facility] however found to have worsening LE edema, Abdominal Swelling, and increased work of breathing.      Per patient, has SOB at baseline on exertion. States that his SOB has been a little worse over last 3 days. Furthermore describes history of LE edema that comes and goes. Also having worsened over last week and present at time of presentation to ED.  Denies CP, nausea, vomiting, fevers, chills nausea.     Admitted for acute on chronic HFrEF. (27 Jun 2022 14:22)      PAST MEDICAL & SURGICAL HISTORY:  Coronary artery disease involving native heart, angina presence unspecified, unspecified vessel or lesion type  nuc stress 5/2021 negative      Atrial fibrillation, unspecified type  s/p MARLO/DCCV 5/11/21      Congestive heart failure, unspecified HF chronicity, unspecified heart failure type  EF 35-40%      Bell&#x27;s palsy  Right side of face      Essential hypertension      Type 2 diabetes mellitus without complication, unspecified whether long term insulin use  insulin plus orals      Osteomyelitis of sternum      Chronic kidney disease, unspecified CKD stage  stage III. Recent BL cr 2.0-2.5      Anemia secondary to renal failure  together with iron deficiency      GIB (gastrointestinal bleeding)  s/p coil embolization      Bifascicular block  s/p ILR implant      Squamous cell skin cancer      S/P CABG (coronary artery bypass graft)  complicated by osteomyleitis of the sternum and sepsis; sternum removed      History of loop recorder      Presence of Watchman left atrial appendage closure device      S/P skin cancer resection          REVIEW OF SYSTEMS :  see HPi       General:	    Skin/Breast:  	  Ophthalmologic:  	  ENMT:	    Respiratory and Thorax:  	  Cardiovascular:	    Gastrointestinal:	    Genitourinary:	    Musculoskeletal:	    Neurological:	    Psychiatric:	    Hematology/Lymphatics:	    Endocrine:	    Allergic/Immunologic:	    MEDICATIONS  (STANDING):  allopurinol 100 milliGRAM(s) Oral daily  aspirin enteric coated 81 milliGRAM(s) Oral daily  atorvastatin 40 milliGRAM(s) Oral at bedtime  buMETAnide Infusion 1 mG/Hr (5 mL/Hr) IV Continuous <Continuous>  buPROPion XL (24-Hour) . 150 milliGRAM(s) Oral daily  dextrose 5%. 1000 milliLiter(s) (100 mL/Hr) IV Continuous <Continuous>  dextrose 5%. 1000 milliLiter(s) (50 mL/Hr) IV Continuous <Continuous>  dextrose 50% Injectable 25 Gram(s) IV Push once  dextrose 50% Injectable 12.5 Gram(s) IV Push once  dextrose 50% Injectable 25 Gram(s) IV Push once  enoxaparin Injectable 30 milliGRAM(s) SubCutaneous every 24 hours  epoetin nohemi-epbx (RETACRIT) Injectable 46007 Unit(s) SubCutaneous <User Schedule>  glucagon  Injectable 1 milliGRAM(s) IntraMuscular once  hydrALAZINE 100 milliGRAM(s) Oral three times a day  insulin glargine Injectable (LANTUS) 10 Unit(s) SubCutaneous at bedtime  insulin lispro (ADMELOG) corrective regimen sliding scale   SubCutaneous three times a day before meals  insulin lispro (ADMELOG) corrective regimen sliding scale   SubCutaneous at bedtime  isosorbide   dinitrate Tablet (ISORDIL) 10 milliGRAM(s) Oral three times a day  lidocaine   4% Patch 1 Patch Transdermal every 24 hours  metoprolol tartrate 25 milliGRAM(s) Oral every 8 hours  pantoprazole    Tablet 40 milliGRAM(s) Oral before breakfast  polyethylene glycol 3350 17 Gram(s) Oral daily  simethicone 80 milliGRAM(s) Chew daily  tamsulosin 0.4 milliGRAM(s) Oral at bedtime    MEDICATIONS  (PRN):  acetaminophen     Tablet .. 650 milliGRAM(s) Oral every 6 hours PRN Moderate Pain (4 - 6)  ALBUTerol    90 MICROgram(s) HFA Inhaler 1 Puff(s) Inhalation every 4 hours PRN Shortness of Breath and/or Wheezing  dextrose Oral Gel 15 Gram(s) Oral once PRN Blood Glucose LESS THAN 70 milliGRAM(s)/deciliter      Allergies    No Known Allergies    Intolerances        SOCIAL HISTORY: non contributory       Vital Signs Last 24 Hrs  T(C): 36.7 (06 Jul 2022 07:51), Max: 36.9 (05 Jul 2022 16:05)  T(F): 98 (06 Jul 2022 07:51), Max: 98.5 (05 Jul 2022 16:05)  HR: 100 (06 Jul 2022 11:15) (73 - 102)  BP: 118/76 (06 Jul 2022 11:15) (113/71 - 130/79)  BP(mean): 90 (06 Jul 2022 11:15) (90 - 101)  RR: 17 (06 Jul 2022 11:15) (17 - 18)  SpO2: 95% (06 Jul 2022 11:15) (94% - 98%)    PHYSICAL EXAM:      Constitutional: right facial droop, no active motor fxn.  Alert and oriented     Eyes: no fuction to right eye lid.  No scleral icterus     ENMT: moist membranes     Neck: no access catheters     Respiratory: non labored    Cardiovascular: healed midsternal incision.  No implantable devices palpated     Gastrointestinal: soft, non tender     Genitourinary: dyer in place, kourtney urine     Rectal: not examined    Extremities: warm, bilateral le edema    Vascular: IV to the left distal cephalic, equal radials     Neurological: right sided facial paralysis     Skin: dry, hyperkeratosis to all ext       Psychiatric: good affect             LABS:                        8.7    6.68  )-----------( 175      ( 06 Jul 2022 05:49 )             29.1     07-06    142  |  98  |  79.9<H>  ----------------------------<  97  3.6   |  29.0  |  2.99<H>    Ca    8.9      06 Jul 2022 05:49            RADIOLOGY & ADDITIONAL STUDIES    Impression and Plan:    CKD  Initiation of HD imminent   Right hand dominant   Recent history of right sided facial paralysis s/p facial surgical intervention    - Vein mapping ordered  - will attempt to arrange Access placement prior to DC  - please obtain preoperative cardiology risk stratification  - when possible, please remove the IV from the left upper extremity

## 2022-07-06 NOTE — PROGRESS NOTE ADULT - ASSESSMENT
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 however found to have worsening LE edema, Abdominal Swelling, and increased work of breathing. Admitted for Acute on Chronic HFrEF.    Assessment/Plan:    1. Acute on chronic HF with biventricular heart failure  - IV Bumex infusion   -Strict IO, Daily weight, 1.5L restriction  -TTE with severe Biventricular dysfunction and segmental wma  -EP following for conduction disease, NSVT-BB titrated. No plan for in-patient intervention     2. CAD s/p CABG  -ASA, Statin    3. Hypertension  - BP Stable     4. DM type 2 uncontrolled with hyperglycemia  -Lantus 10 nightly + ISS  -Diabetic diet    5. Posterior T11 fracture  -Conservative management  -pain control     6. KEN on CKD 3b  -Suspect cardiorenal  -Nephrology following  - Stable after dyer placement   - MOnitor BMP closely     7. Frequent falls;   -Patient severely deconditioned, has walker at home but frequently does not use it per wife. Has had several falls in recent weeks.  -Pending TAHMNIA once medically optimized     8. BPH  - Dyer in place  - on FLomax     Discharge disposition; TAHMINA when medically stable.      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 Admitted for Acute on Chronic HFrEF.    Assessment/Plan:    1. Acute on chronic HF with biventricular heart failure  - IV Bumex infusion   -Strict IO, Daily weight, 1.5L restriction  -TTE with severe Biventricular dysfunction and segmental wma  -EP following for conduction disease, NSVT-BB titrated. No plan for in-patient intervention     2. CAD s/p CABG  -ASA, Statin    3. Hypertension  - BP Stable     4. DM type 2 uncontrolled with hyperglycemia  -Lantus 10 nightly + ISS  -Diabetic diet  - BSL elevated last night but low this morning, monitor closely for lantus adjustment     5. Posterior T11 fracture  -Conservative management  -pain control     6. KEN on CKD 3b  -Suspect cardiorenal  -Nephrology following  - Stable after dyer placement   - MOnitor BMP closely     7. Frequent falls;   -Patient severely deconditioned, has walker at home but frequently does not use it per wife. Has had several falls in recent weeks.  -Pending TAHMINA once medically optimized     8. BPH  - Dyer in place  - on FLomax     Discharge disposition; TAHMINA when medically stable.

## 2022-07-07 NOTE — PROGRESS NOTE ADULT - ASSESSMENT
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 Admitted for Acute on Chronic HFrEF.    Assessment/Plan:    1. Acute on chronic HF with biventricular heart failure  - Improving  - Changed to IV Bumex 2mg BID   -Strict IO, Daily weight, 1.5L restriction  -TTE with severe Biventricular dysfunction and segmental wma  -EP following for conduction disease, NSVT-BB titrated. No plan for in-patient intervention     2. CAD s/p CABG  -ASA, Statin    3. Hypertension  - BP Stable     4. DM type 2 uncontrolled with hyperglycemia  -Lantus 10 nightly + ISS  -Diabetic diet    5. Posterior T11 fracture  -Conservative management  -pain control     6. KEN on CKD 3b  -Suspect cardiorenal  - Laureano in place for retention on Flomax   - Monitor BMP closely   - Vascular surgery consulted for AVF prior to discharge    7. Frequent falls;   -Patient severely deconditioned, has walker at home but frequently does not use it per wife. Has had several falls in recent weeks.  -Pending TAHMINA once medically optimized     8. BPH  - Laureano in place  - on FLomax     Discharge disposition; TAHMINA when medically stable.

## 2022-07-07 NOTE — PROGRESS NOTE ADULT - SUBJECTIVE AND OBJECTIVE BOX
Patient seen and examined  No acute overnight event  feeling ok      REVIEW OF SYSTEMS:    CONSTITUTIONAL: No F/C  RESPIRATORY: No cough or SOB  CARDIOVASCULAR: No CP/palpitations,    GASTROINTESTINAL: No abdominal pain , NVD   GENITOURINARY: No UTI sx  NEUROLOGICAL: No headaches/wk/numbness  MUSCULOSKELETAL:  No joint pain/swelling; No LBP  EXTREMITIES : no swelling,    Vital Signs Last 24 Hrs  T(C): 36.8 (2022 05:01), Max: 36.8 (2022 21:21)  T(F): 98.2 (2022 05:01), Max: 98.3 (2022 21:21)  HR: 95 (2022 05:01) (93 - 103)  BP: 116/72 (2022 05:01) (114/75 - 121/78)  BP(mean): 88 (2022 16:45) (88 - 90)  RR: 18 (2022 05:01) (17 - 18)  SpO2: 98% (2022 05:01) (94% - 98%)    PHYSICAL EXAM:    GENERAL: NAD,   EYES:  conjunctiva and sclera clear  NECK: Supple, No JVD/Bruit  NERVOUS SYSTEM:  A/O x3,   CHEST:  CTA ,No rales or rhonchi  HEART:  RRR, No murmurs  ABDOMEN: Soft, NT/ND BS+  EXTREMITIES:  ++ Edema;  SKIN: No rashes    137    |  92<L>  |  84.4<H>  ----------------------------<  231<H>  Ca:8.8   (2022 06:16)  3.8     |  29.0   |  3.19<H>                                8.7<L>  6.68  )-----------( 175      ( 2022 05:49 )             29.1<L>    Phos:-- M.8 mg/dL PTH:-- Uric acid:-- Serum Osm:--  Ferritin:-- Iron:-- TIBC:-- Tsat:--  B12:-- TSH:-- ( @ 06:14)      UProt:-- UCr:29 mg/dL P/C Ratio:-- 24 hour Prot:-- UVol:-- CrCl:--  [place]:99 mmol/L UOsm:326 mosm/kg UVol:-- UCl:-- [place]:21 mmol/L ( @ 22:46)      MEDICATIONS  (STANDING):  acetaminophen     Tablet .. PRN  ALBUTerol    90 MICROgram(s) HFA Inhaler PRN  allopurinol  aspirin enteric coated  atorvastatin  buMETAnide Injectable  buPROPion XL (24-Hour) .  dextrose 5%.  dextrose 5%.  dextrose 50% Injectable  dextrose 50% Injectable  dextrose 50% Injectable  dextrose Oral Gel PRN  enoxaparin Injectable  epoetin nohemi-epbx (RETACRIT) Injectable  glucagon  Injectable  hydrALAZINE  insulin glargine Injectable (LANTUS)  insulin lispro (ADMELOG) corrective regimen sliding scale  insulin lispro (ADMELOG) corrective regimen sliding scale  iron sucrose IVPB  isosorbide   dinitrate Tablet (ISORDIL)  lidocaine   4% Patch  metoprolol tartrate  pantoprazole    Tablet

## 2022-07-07 NOTE — PROGRESS NOTE ADULT - ASSESSMENT
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 Admitted for Acute on Chronic HFrEF.    Assessment/Plan:    1. Acute on chronic HF with biventricular heart failure  -TTE with severe Biventricular dysfunction and segmental WMA    2. CAD s/p CABG    3. Hypertension    4. DM type 2 uncontrolled with hyperglycemia    5. Posterior T11 fracture    6. KEN on CKD 3b ( CRS ) Well Compensated,     C/W CHF Management, OP F.U,   Will be worked up for AVF placement  Please call as Needed, TY

## 2022-07-07 NOTE — PROGRESS NOTE ADULT - SUBJECTIVE AND OBJECTIVE BOX
[place] [facility]                                                               Office: [address] [address]                                                              Telephone: [phone]. Fax:[phone]                                                                             PROGRESS NOTE  Reason for follow up: Acute on chronic HFrEF  Overnight: No new events.   Update: reports feeling tire, reports having blood in the dyer, mild uptrend in Cr 3.2, switching IV Bumex gtt to IVP, LE improving, planning for eventual AV fistula       Review of symptoms:   Cardiac:  No chest pain. No dyspnea. No palpitations.  Respiratory: No cough. No dyspnea  Gastrointestinal: No diarrhea. No abdominal pain. No bleeding.     Past medical history: No updates.   	  Vital Signs Last 24 Hrs  T(C): 36.6 (07-07-22 @ 08:00), Max: 36.8 (07-06-22 @ 21:21)  T(F): 97.8 (07-07-22 @ 08:00), Max: 98.3 (07-06-22 @ 21:21)  HR: 100 (07-07-22 @ 13:10) (93 - 102)  BP: 109/69 (07-07-22 @ 13:10) (102/68 - 121/78)  BP(mean): 88 (07-06-22 @ 16:45) (88 - 88)  RR: 18 (07-07-22 @ 08:00) (17 - 18)  SpO2: 95% (07-07-22 @ 08:00) (94% - 98%)  I&O's Summary    06 Jul 2022 07:01  -  07 Jul 2022 07:00  --------------------------------------------------------  IN: 810 mL / OUT: 1850 mL / NET: -1040 mL    07 Jul 2022 07:01  -  07 Jul 2022 15:10  --------------------------------------------------------  IN: 245 mL / OUT: 825 mL / NET: -580 mL          PHYSICAL EXAM:  Appearance: Comfortable. No acute distress, pale  HEENT:  Head and neck: Atraumatic. Normocephalic.  Normal oral mucosa, PERRL, Neck is supple.   Neurologic: A&Ox 3, Chronic R-facial paralysis EOMI  Lymphatic: No cervical lymphadenopathy  Cardiovascular: Normal S1 S2, No murmur, rubs/gallops. No JVD, No edema  Respiratory: Lungs clear to auscultation  Gastrointestinal:  Soft, Non-tender, + BS  Lower Extremities: +2 pitting edema to shins L>R  Psychiatry: Patient is calm. No agitation. Mood & affect appropriate  Skin: No rashes/ecchymoses/cyanosis/ulcers visualized on the face, hands or feet.      CURRENT MEDICATIONS:  MEDICATIONS  (STANDING):  allopurinol 100 milliGRAM(s) Oral daily  aspirin enteric coated 81 milliGRAM(s) Oral daily  atorvastatin 40 milliGRAM(s) Oral at bedtime  buMETAnide Injectable 2 milliGRAM(s) IV Push every 12 hours  buPROPion XL (24-Hour) . 150 milliGRAM(s) Oral daily  dextrose 5%. 1000 milliLiter(s) (100 mL/Hr) IV Continuous <Continuous>  dextrose 5%. 1000 milliLiter(s) (50 mL/Hr) IV Continuous <Continuous>  dextrose 50% Injectable 25 Gram(s) IV Push once  dextrose 50% Injectable 12.5 Gram(s) IV Push once  dextrose 50% Injectable 25 Gram(s) IV Push once  enoxaparin Injectable 30 milliGRAM(s) SubCutaneous every 24 hours  epoetin nohemi-epbx (RETACRIT) Injectable 02838 Unit(s) SubCutaneous <User Schedule>  glucagon  Injectable 1 milliGRAM(s) IntraMuscular once  hydrALAZINE 100 milliGRAM(s) Oral three times a day  insulin glargine Injectable (LANTUS) 10 Unit(s) SubCutaneous at bedtime  insulin lispro (ADMELOG) corrective regimen sliding scale   SubCutaneous three times a day before meals  insulin lispro (ADMELOG) corrective regimen sliding scale   SubCutaneous at bedtime  isosorbide   dinitrate Tablet (ISORDIL) 10 milliGRAM(s) Oral three times a day  lidocaine   4% Patch 1 Patch Transdermal every 24 hours  metoprolol tartrate 25 milliGRAM(s) Oral every 8 hours  pantoprazole    Tablet 40 milliGRAM(s) Oral before breakfast  polyethylene glycol 3350 17 Gram(s) Oral daily  simethicone 80 milliGRAM(s) Chew daily  tamsulosin 0.4 milliGRAM(s) Oral at bedtime    MEDICATIONS  (PRN):  acetaminophen     Tablet .. 650 milliGRAM(s) Oral every 6 hours PRN Moderate Pain (4 - 6)  ALBUTerol    90 MICROgram(s) HFA Inhaler 1 Puff(s) Inhalation every 4 hours PRN Shortness of Breath and/or Wheezing  dextrose Oral Gel 15 Gram(s) Oral once PRN Blood Glucose LESS THAN 70 milliGRAM(s)/deciliter      DIAGNOSTIC TESTING:  [ ] Echocardiogram:   < from: TTE Echo Complete w/ Contrast w/ Doppler (06.27.22 @ 17:53) >  Summary:   1. Endocardial visualization was enhanced with intravenous echo contrast.   2. Left ventricular ejection fraction, by visual estimation, is 30 to   35%.   3. Severely decreased segmental left ventricular systolic function.   4. The mitral in-flow pattern reveals no discernable A-wave, therefore   no comment on diastolic function can be made.   5. Estimated pulmonary artery systolic pressure is 49.0 mmHg assuming a   right atrial pressure of 8 mmHg, which is consistent with mild pulmonary   hypertension.   6. Severely enlarged right ventricle with severely reduced RV systolic   function.   7. Severely enlarged left atrium.   8. Severely enlarged right atrium.   9. Mild mitral annular calcification.  10. Moderate to severe mitral valve regurgitation.  11. Moderate-severe tricuspid regurgitation.  12. Mild tomoderate pulmonic valve regurgitation.  13. There is no evidence of pericardial effusion.  14. Compared to the prior TTE study from 5/9/21, severe biventricular   systolic dysfunction again noted.    < end of copied text >    [ ]  Catheterization:  [ ] Stress Test:      Labs:                        8.7    6.68  )-----------( 175      ( 06 Jul 2022 05:49 )             29.1     07-07    137  |  92<L>  |  84.4<H>  ----------------------------<  231<H>  3.8   |  29.0  |  3.19<H>    Ca    8.8      07 Jul 2022 06:16       29 Jun 2022 15:01 Troponin 0.12 ng/mL / Creatine Kinase 90 U/L /  CKMB x     / CPK Mass Assay % x       29 Jun 2022 09:05 Troponin 0.12 ng/mL / Creatine Kinase 92 U/L /  CKMB x     / CPK Mass Assay % x       27 Jun 2022 15:36 Troponin 0.11 ng/mL / Creatine Kinase x     /  CKMB x     / CPK Mass Assay % x          Serum Pro-Brain Natriuretic Peptide: 88601 pg/mL (07-07-22 @ 06:16)  Serum Pro-Brain Natriuretic Peptide: 38443 pg/mL (07-03-22 @ 06:14)  Serum Pro-Brain Natriuretic Peptide: 76980 pg/mL (06-29-22 @ 09:05)  Serum Pro-Brain Natriuretic Peptide: 98184 pg/mL (06-27-22 @ 15:36)  Serum Pro-Brain Natriuretic Peptide: 81983 pg/mL (06-10-21 @ 06:22)  Serum Pro-Brain Natriuretic Peptide: 31788 pg/mL (05-28-21 @ 15:45)  Serum Pro-Brain Natriuretic Peptide: 16231 pg/mL (05-11-21 @ 06:10)  Serum Pro-Brain Natriuretic Peptide: 60806 pg/mL (05-09-21 @ 11:25)  Serum Pro-Brain Natriuretic Peptide: 79595 pg/mL (05-09-21 @ 06:06)  Serum Pro-Brain Natriuretic Peptide: 12927 pg/mL (05-08-21 @ 08:39)      A1C with Estimated Average Glucose Result: 6.7 % (07-02-22 @ 06:37)      TELEMETRY: Aflutter 100s, PVCs

## 2022-07-07 NOTE — PROGRESS NOTE ADULT - SUBJECTIVE AND OBJECTIVE BOX
CC: Follow up     INTERVAL HPI/OVERNIGHT EVENTS: Patient seen and examined, sitting up in a chair. Overall feels weak. Denies sob, chest pain or palpitations. LE edema improving       Vital Signs Last 24 Hrs  T(C): 36.6 (07 Jul 2022 08:00), Max: 36.8 (06 Jul 2022 21:21)  T(F): 97.8 (07 Jul 2022 08:00), Max: 98.3 (06 Jul 2022 21:21)  HR: 100 (07 Jul 2022 08:00) (93 - 103)  BP: 102/68 (07 Jul 2022 08:00) (102/68 - 121/78)  BP(mean): 88 (06 Jul 2022 16:45) (88 - 90)  RR: 18 (07 Jul 2022 08:00) (17 - 18)  SpO2: 95% (07 Jul 2022 08:00) (94% - 98%)    PHYSICAL EXAM:    GENERAL: NAD, AOX3  HEAD:  Atraumatic, Normocephalic  EYES:  conjunctiva and sclera clear  ENMT: Moist mucous membranes  NECK: Supple  CHEST/LUNG: Clear to auscultation bilaterally; No rales, rhonchi, wheezing, or rubs  HEART: Regular rate and rhythm; No murmurs, rubs, or gallops  ABDOMEN: Soft, Nontender, Nondistended; Bowel sounds present  EXTREMITIES:  2+ Peripheral Pulses, No clubbing, cyanosis, 1+ LE edema        MEDICATIONS  (STANDING):  allopurinol 100 milliGRAM(s) Oral daily  aspirin enteric coated 81 milliGRAM(s) Oral daily  atorvastatin 40 milliGRAM(s) Oral at bedtime  buMETAnide Injectable 2 milliGRAM(s) IV Push every 12 hours  buPROPion XL (24-Hour) . 150 milliGRAM(s) Oral daily  dextrose 5%. 1000 milliLiter(s) (100 mL/Hr) IV Continuous <Continuous>  dextrose 5%. 1000 milliLiter(s) (50 mL/Hr) IV Continuous <Continuous>  dextrose 50% Injectable 25 Gram(s) IV Push once  dextrose 50% Injectable 12.5 Gram(s) IV Push once  dextrose 50% Injectable 25 Gram(s) IV Push once  enoxaparin Injectable 30 milliGRAM(s) SubCutaneous every 24 hours  epoetin nohemi-epbx (RETACRIT) Injectable 44564 Unit(s) SubCutaneous <User Schedule>  glucagon  Injectable 1 milliGRAM(s) IntraMuscular once  hydrALAZINE 100 milliGRAM(s) Oral three times a day  insulin glargine Injectable (LANTUS) 10 Unit(s) SubCutaneous at bedtime  insulin lispro (ADMELOG) corrective regimen sliding scale   SubCutaneous three times a day before meals  insulin lispro (ADMELOG) corrective regimen sliding scale   SubCutaneous at bedtime  isosorbide   dinitrate Tablet (ISORDIL) 10 milliGRAM(s) Oral three times a day  lidocaine   4% Patch 1 Patch Transdermal every 24 hours  metoprolol tartrate 25 milliGRAM(s) Oral every 8 hours  pantoprazole    Tablet 40 milliGRAM(s) Oral before breakfast  polyethylene glycol 3350 17 Gram(s) Oral daily  simethicone 80 milliGRAM(s) Chew daily  tamsulosin 0.4 milliGRAM(s) Oral at bedtime    MEDICATIONS  (PRN):  acetaminophen     Tablet .. 650 milliGRAM(s) Oral every 6 hours PRN Moderate Pain (4 - 6)  ALBUTerol    90 MICROgram(s) HFA Inhaler 1 Puff(s) Inhalation every 4 hours PRN Shortness of Breath and/or Wheezing  dextrose Oral Gel 15 Gram(s) Oral once PRN Blood Glucose LESS THAN 70 milliGRAM(s)/deciliter      Allergies    No Known Allergies    Intolerances          LABS:                          8.7    6.68  )-----------( 175      ( 06 Jul 2022 05:49 )             29.1     07-07    137  |  92<L>  |  84.4<H>  ----------------------------<  231<H>  3.8   |  29.0  |  3.19<H>    Ca    8.8      07 Jul 2022 06:16            RADIOLOGY & ADDITIONAL TESTS:

## 2022-07-07 NOTE — PROGRESS NOTE ADULT - ASSESSMENT
81M h/o CAD s/p CABG (2017) c/b sepsis and sternal wound infection s/p sternum removal and pec flap and angioplasty, chronic HFrEF, pAfib (on Eliquis), HTN, DM2, CKD (Cr. 2.8), chronic anemia and Bell's Palsy last admitted in 5/9/21 with back pain/dizziness s/p mechanical fall noted ADHFrEF (35-40%), RV dysfunction, initial noted pBNP 86575 ---> 56592, pharm nuclear stress test without ischemia, RHC done with moderate pHTN Group 2 with PCWP 23, switched to torsemide 40mg, underwent MARLO/CV for pAflutter but reverted back to pAfib/flutter with bradycardia s/p Medtronic ILR implant repeated ER visits 5/28 & 5/30 for recurrent dizziness, readmitted 6/4-6/15/21 found with severe orthostasis and acute on chronic anemia (Hb seema 6.3) s/p EGD/colonoscopy found with duodenal bulb bleeding s/p IR embolization, noted elevated BP increased hydralazine dose to 100mg TID, 24-Hr BP monitoring still with SBP 160s added amlodipine 5mg, cardiac amyloid workup negative, had recent increased salt intake and gained >16 lb, restarted Torsemide 20mg, last visit 7/2021, underwent elective Watchman and with EPS/Dr. 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       Acute on chronic HFrEF, pBNP >42K  -on admission with gross bilateral pitting edema to lower back, still overloaded with IV Lasix 60mg BID changed to IV Bumex 2mg BID, still severely overloaded switched to IV Bumex gtt with >3-4 liters output last 72hrs continue edema has improved, repeat pBNP still elevated, change back to IV Bumex 2mg BID monitor I/O, will need PO Bumex on discharge   -defer RHC and CardioMems for now as responding to aggressive diuresis with chronic anemia/falls history to avoid short term DAPT need  -advanced CKD not candidate for ARNi and GFR <30 defer SGLT-2i; continue hydralazine, discontinued amlodipine and added Isordil     Persistent Aflutter  -telemetry monitoring for pAflutter/Afib, continue metoprolol dose increased; EKG with chronic RBBB role for AVJ ablation with CRT? however if eventually starting HD would be at risk for device infection and explant   -pending 1 year Watchman MARLO in 8/2022    Advanced CKD  -given worsening IV diuretic requirement discussion with nephrology about pending elective AV fistula as likely need future HD plan, patient is agreeable AVF surgery seen by vascular, at acceptable cardiac risk, currently optimized from cardiac standpoint to proceed with AV fistula creation, prefer R-side AV fistula as per EPS may need eventual pacemaker implant in the future for L-side device implant  -intermittent hematuria, trial of void    Episode of chest pain  -continuous chest wall pain reproducible, EKG unchanged from baseline, serial CK/Troponin no elevation, suspect musculoskeletal, trial of acetaminophen      Goals of care discussion:  -I have discussed in details with patient and his wife Alma previously for the workup for worsening cardiomyopathy the option of left heart cath and with EPS for CRT device both would required IV contrast dye which can lead to DAVE and ESRD, patient/wife not ready yet to commit to long term hemodialysis plan and wants to continue medical management for cardiomyopathy and AFib, however if Cr. worsening and remains overloaded then eventual HD may be needed. They're ok moving forward with consideration of AV fistula creation as accepting eventual HD.         Gamal Duke DO, [facility]  Faculty Non-Invasive Cardiologist  [phone].

## 2022-07-08 NOTE — PROGRESS NOTE ADULT - TIME BILLING
Acute on chronic HFrEF, Aflutter, CKD
Acute on chronic HFrEF, Aflutter, CKD
Acute on chronic HFrEF, Persistent Aflutter, CKD
Acute on chronic HFrEF, bi-V failure, Aflutter, CKD
Acute on chronic HFrEF, Persistent Aflutter, CKD
Acute on chronic HFrEF, Afib/flutter, CKD

## 2022-07-08 NOTE — PROGRESS NOTE ADULT - ASSESSMENT
81M h/o CAD s/p CABG (2017) c/b sepsis and sternal wound infection s/p sternum removal and pec flap and angioplasty, chronic HFrEF, pAfib (on Eliquis), HTN, DM2, CKD (Cr. 2.8), chronic anemia and Bell's Palsy last admitted in 5/9/21 with back pain/dizziness s/p mechanical fall noted ADHFrEF (35-40%), RV dysfunction, initial noted pBNP 17916 ---> 36661, pharm nuclear stress test without ischemia, RHC done with moderate pHTN Group 2 with PCWP 23, switched to torsemide 40mg, underwent MARLO/CV for pAflutter but reverted back to pAfib/flutter with bradycardia s/p Medtronic ILR implant repeated ER visits 5/28 & 5/30 for recurrent dizziness, readmitted 6/4-6/15/21 found with severe orthostasis and acute on chronic anemia (Hb seema 6.3) s/p EGD/colonoscopy found with duodenal bulb bleeding s/p IR embolization, noted elevated BP increased hydralazine dose to 100mg TID, 24-Hr BP monitoring still with SBP 160s added amlodipine 5mg, cardiac amyloid workup negative, had recent increased salt intake and gained >16 lb, restarted Torsemide 20mg, last visit 7/2021, underwent elective Watchman and with EPS/Dr. 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       Acute on chronic HFrEF, pBNP >42K  -on admission with gross bilateral pitting edema to lower back, still overloaded with IV Lasix 60mg BID changed to IV Bumex 2mg BID, still severely overloaded switched to IV Bumex gtt with >3-4 liters output last 72hrs continue edema has improved, repeat pBNP still elevated, change back to IV Bumex 2mg BID monitor I/O, uptrending BUN level and improving edema, will change to PO Bumex  -defer RHC and CardioMems for now as responding to aggressive diuresis with chronic anemia/falls history to avoid short term DAPT need  -advanced CKD not candidate for ARNi and GFR <30 defer SGLT-2i; continue hydralazine, discontinued amlodipine and added Isordil     Persistent Aflutter  -telemetry monitoring for pAflutter/Afib, continue metoprolol dose increased; EKG with chronic RBBB role for AVJ ablation with CRT? however if eventually starting HD would be at risk for device infection and explant   -pending 1 year Watchman MARLO in 8/2022    Advanced CKD  -given worsening IV diuretic requirement discussion with nephrology about pending elective AV fistula as likely need future HD plan, patient is agreeable AVF surgery seen by vascular, at acceptable cardiac risk, currently optimized from cardiac standpoint to proceed with AV fistula creation, prefer R-side AV fistula as per EPS may need eventual pacemaker implant in the future for L-side device implant  -intermittent hematuria, trial of void dyer removed today     Episode of chest pain  -continuous chest wall pain reproducible, EKG unchanged from baseline, serial CK/Troponin no elevation, suspect musculoskeletal, trial of acetaminophen      Goals of care discussion:  -I have discussed in details with patient and his wife Alma previously for the workup for worsening cardiomyopathy the option of left heart cath and with EPS for CRT device both would required IV contrast dye which can lead to DAVE and ESRD, patient/wife not ready yet to commit to long term hemodialysis plan and wants to continue medical management for cardiomyopathy and AFib, however if Cr. worsening and remains overloaded then eventual HD may be needed. They're ok moving forward with consideration of AV fistula creation as accepting eventual HD.       Change to PO Bunex 2mg BID starting tomorrow   Rehab pending; outpatient elective AV fistula surgery   -cardiology will sign off, reconsult if new issue arise        Gamal Duke DO, [facility]  Faculty Non-Invasive Cardiologist  [phone].  81M h/o CAD s/p CABG (2017) c/b sepsis and sternal wound infection s/p sternum removal and pec flap and angioplasty, chronic HFrEF, pAfib (on Eliquis), HTN, DM2, CKD (Cr. 2.8), chronic anemia and Bell's Palsy last admitted in 5/9/21 with back pain/dizziness s/p mechanical fall noted ADHFrEF (35-40%), RV dysfunction, initial noted pBNP 77471 ---> 53195, pharm nuclear stress test without ischemia, RHC done with moderate pHTN Group 2 with PCWP 23, switched to torsemide 40mg, underwent MARLO/CV for pAflutter but reverted back to pAfib/flutter with bradycardia s/p Medtronic ILR implant repeated ER visits 5/28 & 5/30 for recurrent dizziness, readmitted 6/4-6/15/21 found with severe orthostasis and acute on chronic anemia (Hb seema 6.3) s/p EGD/colonoscopy found with duodenal bulb bleeding s/p IR embolization, noted elevated BP increased hydralazine dose to 100mg TID, 24-Hr BP monitoring still with SBP 160s added amlodipine 5mg, cardiac amyloid workup negative, had recent increased salt intake and gained >16 lb, restarted Torsemide 20mg, last visit 7/2021, underwent elective Watchman and with EPS/Dr. 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       Acute on chronic HFrEF, pBNP >42K  -on admission with gross bilateral pitting edema to lower back, still overloaded with IV Lasix 60mg BID changed to IV Bumex 2mg BID, still severely overloaded switched to IV Bumex gtt with >3-4 liters output last 72hrs continue edema has improved, repeat pBNP still elevated, change back to IV Bumex 2mg BID monitor I/O, uptrending BUN level and improving edema, will change to PO Bumex  -defer RHC and CardioMems for now as responding to aggressive diuresis with chronic anemia/falls history to avoid short term DAPT need  -advanced CKD not candidate for ARNi and GFR <30 defer SGLT-2i; continue hydralazine, discontinued amlodipine and added Isordil     Persistent Aflutter  -telemetry monitoring for pAflutter/Afib, continue metoprolol dose increased; EKG with chronic RBBB role for AVJ ablation with CRT? however if eventually starting HD would be at risk for device infection and explant   -pending 1 year Watchman MARLO in 8/2022    Advanced CKD  -given worsening IV diuretic requirement discussion with nephrology about pending elective AV fistula as likely need future HD plan, patient is agreeable AVF surgery seen by vascular, at acceptable cardiac risk, currently optimized from cardiac standpoint to proceed with AV fistula creation, prefer R-side AV fistula as per EPS may need eventual pacemaker implant in the future for L-side device implant  -intermittent hematuria, trial of void dyer removed today     Episode of chest pain  -continuous chest wall pain reproducible, EKG unchanged from baseline, serial CK/Troponin no elevation, suspect musculoskeletal, trial of acetaminophen      Goals of care discussion:  -I have discussed in details with patient and his wife Alma previously for the workup for worsening cardiomyopathy the option of left heart cath and with EPS for CRT device both would required IV contrast dye which can lead to DAVE and ESRD, patient/wife not ready yet to commit to long term hemodialysis plan and wants to continue medical management for cardiomyopathy and AFib, however if Cr. worsening and remains overloaded then eventual HD may be needed. They're ok moving forward with consideration of AV fistula creation as accepting eventual HD.       Change to PO Bunex 1mg BID starting tomorrow   Rehab pending; outpatient elective AV fistula surgery   -cardiology will sign off, reconsult if new issue arise        Gamal Duke DO, [facility]  Faculty Non-Invasive Cardiologist  [phone].

## 2022-07-08 NOTE — PROGRESS NOTE ADULT - ASSESSMENT
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 Admitted for Acute on Chronic HFrEF.    Assessment/Plan:    1. Acute on chronic HF with biventricular heart failure  - Improving  - Changed to IV Bumex 2mg BID   -Strict IO, Daily weight, 1.5L restriction  -TTE with severe Biventricular dysfunction and segmental wma  -EP following for conduction disease, NSVT-BB titrated. No plan for in-patient intervention     2. KEN with CKD Stage 3  - Vascular surgery was consulted, Vein mapping done  - To follow up as outpatient for AVF   - Creatinine stable with diuresis  - Monitor closely  - I&os    3. Urinary retention  - Laureano placed on 07/06 for retention  - On Flomax  - TOV Today     4. CAD s/p CABG  -ASA, Statin    5. Hypertension  - BP Stable     6. DM type 2 uncontrolled with hyperglycemia  -Lantus 10 nightly + ISS  -Diabetic diet    7. Posterior T11 fracture  -Conservative management  -pain control     8. Frequent falls;   -Patient severely deconditioned, has walker at home but frequently does not use it per wife. Has had several falls in recent weeks.  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 Admitted for Acute on Chronic HFrEF.    Assessment/Plan:    1. Acute on chronic HF with biventricular heart failure  - Improving  - Changed to IV Bumex 2mg BID   -Strict IO, Daily weight, 1.5L restriction  -TTE with severe Biventricular dysfunction and segmental wma  -EP following for conduction disease, NSVT-BB titrated. No plan for in-patient intervention     2. KEN with CKD Stage 3  - Vascular surgery was consulted, Vein mapping done  - To follow up as outpatient for AVF   - Creatinine stable with diuresis  - Monitor closely  - I&os    3. Urinary retention  - Laureano placed on 07/06 for retention  - On Flomax  - TOV Today     4. Anemia of chronic disease  - On Epogen  - Hb/hct stable    5. CAD s/p CABG  -ASA, Statin    6. Hypertension  - BP Stable     7. DM type 2 uncontrolled with hyperglycemia  -Lantus 10 nightly + ISS  -Diabetic diet    8. Posterior T11 fracture  -Conservative management  -pain control     9. Frequent falls;   -Patient severely deconditioned, has walker at home but frequently does not use it per wife. Has had several falls in recent weeks.  -Pending [facility] once medically optimized       Discharge disposition; Anticipate discharge to [facility] On MOnday pending further clinical improvement

## 2022-07-08 NOTE — PROGRESS NOTE ADULT - ASSESSMENT
82 yo male with PMH of CKD needing HD consulted for AVF, will do outpatient.    - vein mapping done will discuss results with attending for possible signoff and f/u outpt for AVF

## 2022-07-08 NOTE — PROGRESS NOTE ADULT - SUBJECTIVE AND OBJECTIVE BOX
CC: Follow up     INTERVAL HPI/OVERNIGHT EVENTS: Patient seen and examined, overall feels better. SOB improved. Ambulating to the bathroom. LE edema improving       Vital Signs Last 24 Hrs  T(C): 36.8 (08 Jul 2022 08:33), Max: 36.8 (07 Jul 2022 16:15)  T(F): 98.2 (08 Jul 2022 08:33), Max: 98.2 (07 Jul 2022 16:15)  HR: 101 (08 Jul 2022 08:33) (98 - 105)  BP: 116/73 (08 Jul 2022 08:33) (100/64 - 126/76)  BP(mean): --  RR: 18 (08 Jul 2022 08:33) (17 - 18)  SpO2: 96% (08 Jul 2022 08:33) (95% - 98%)    Parameters below as of 08 Jul 2022 08:33  Patient On (Oxygen Delivery Method): room air        PHYSICAL EXAM:    GENERAL: NAD, AOX3  HEAD:  Atraumatic, Normocephalic  EYES:  conjunctiva and sclera clear  Chronic facial droop   ENMT: Moist mucous membranes  NECK: Supple, No JVD  CHEST/LUNG: Clear to auscultation bilaterally; No rales, rhonchi, wheezing, or rubs  HEART: Regular rate and rhythm; No murmurs, rubs, or gallops  ABDOMEN: Soft, Nontender, Nondistended; Bowel sounds present  + Laureano   EXTREMITIES:  2+ Peripheral Pulses, No clubbing, cyanosis, + 1 LE edema         MEDICATIONS  (STANDING):  allopurinol 100 milliGRAM(s) Oral daily  aspirin enteric coated 81 milliGRAM(s) Oral daily  atorvastatin 40 milliGRAM(s) Oral at bedtime  buMETAnide Injectable 2 milliGRAM(s) IV Push every 12 hours  buPROPion XL (24-Hour) . 150 milliGRAM(s) Oral daily  dextrose 5%. 1000 milliLiter(s) (100 mL/Hr) IV Continuous <Continuous>  dextrose 5%. 1000 milliLiter(s) (50 mL/Hr) IV Continuous <Continuous>  dextrose 50% Injectable 25 Gram(s) IV Push once  dextrose 50% Injectable 12.5 Gram(s) IV Push once  dextrose 50% Injectable 25 Gram(s) IV Push once  enoxaparin Injectable 30 milliGRAM(s) SubCutaneous every 24 hours  epoetin nohemi-epbx (RETACRIT) Injectable 12431 Unit(s) SubCutaneous <User Schedule>  glucagon  Injectable 1 milliGRAM(s) IntraMuscular once  hydrALAZINE 100 milliGRAM(s) Oral three times a day  insulin glargine Injectable (LANTUS) 10 Unit(s) SubCutaneous at bedtime  insulin lispro (ADMELOG) corrective regimen sliding scale   SubCutaneous three times a day before meals  insulin lispro (ADMELOG) corrective regimen sliding scale   SubCutaneous at bedtime  isosorbide   dinitrate Tablet (ISORDIL) 10 milliGRAM(s) Oral three times a day  lidocaine   4% Patch 1 Patch Transdermal every 24 hours  metoprolol tartrate 25 milliGRAM(s) Oral every 8 hours  pantoprazole    Tablet 40 milliGRAM(s) Oral before breakfast  polyethylene glycol 3350 17 Gram(s) Oral daily  simethicone 80 milliGRAM(s) Chew daily  tamsulosin 0.4 milliGRAM(s) Oral at bedtime    MEDICATIONS  (PRN):  acetaminophen     Tablet .. 650 milliGRAM(s) Oral every 6 hours PRN Moderate Pain (4 - 6)  ALBUTerol    90 MICROgram(s) HFA Inhaler 1 Puff(s) Inhalation every 4 hours PRN Shortness of Breath and/or Wheezing  dextrose Oral Gel 15 Gram(s) Oral once PRN Blood Glucose LESS THAN 70 milliGRAM(s)/deciliter      Allergies    No Known Allergies    Intolerances          LABS:                          8.4    7.38  )-----------( 166      ( 08 Jul 2022 05:35 )             27.6     07-08    137  |  93<L>  |  87.6<H>  ----------------------------<  182<H>  3.7   |  31.0<H>  |  3.12<H>    Ca    9.0      08 Jul 2022 05:35            RADIOLOGY & ADDITIONAL TESTS:

## 2022-07-08 NOTE — PROGRESS NOTE ADULT - SUBJECTIVE AND OBJECTIVE BOX
INTERVAL HPI: No acute events overnight. Right hand dominant. Consulted for AVF on right since he is getting pacemaker on the left. Yesterday evening team noted it should be done before d/c. However, AM rounds attending (Dr. Ortez) said it can be done outpatient.     US upper extremity vein mapping done 7/7 on right but not left arm b/c pt refused due to possible pacemaker placement    MEDICATIONS  (STANDING):  allopurinol 100 milliGRAM(s) Oral daily  aspirin enteric coated 81 milliGRAM(s) Oral daily  atorvastatin 40 milliGRAM(s) Oral at bedtime  buMETAnide Injectable 2 milliGRAM(s) IV Push every 12 hours  buPROPion XL (24-Hour) . 150 milliGRAM(s) Oral daily  dextrose 5%. 1000 milliLiter(s) (100 mL/Hr) IV Continuous <Continuous>  dextrose 5%. 1000 milliLiter(s) (50 mL/Hr) IV Continuous <Continuous>  dextrose 50% Injectable 25 Gram(s) IV Push once  dextrose 50% Injectable 12.5 Gram(s) IV Push once  dextrose 50% Injectable 25 Gram(s) IV Push once  enoxaparin Injectable 30 milliGRAM(s) SubCutaneous every 24 hours  epoetin nohemi-epbx (RETACRIT) Injectable 44476 Unit(s) SubCutaneous <User Schedule>  glucagon  Injectable 1 milliGRAM(s) IntraMuscular once  hydrALAZINE 100 milliGRAM(s) Oral three times a day  insulin glargine Injectable (LANTUS) 10 Unit(s) SubCutaneous at bedtime  insulin lispro (ADMELOG) corrective regimen sliding scale   SubCutaneous three times a day before meals  insulin lispro (ADMELOG) corrective regimen sliding scale   SubCutaneous at bedtime  isosorbide   dinitrate Tablet (ISORDIL) 10 milliGRAM(s) Oral three times a day  lidocaine   4% Patch 1 Patch Transdermal every 24 hours  metoprolol tartrate 25 milliGRAM(s) Oral every 8 hours  pantoprazole    Tablet 40 milliGRAM(s) Oral before breakfast  polyethylene glycol 3350 17 Gram(s) Oral daily  simethicone 80 milliGRAM(s) Chew daily  tamsulosin 0.4 milliGRAM(s) Oral at bedtime      MEDICATIONS  (PRN):  acetaminophen     Tablet .. 650 milliGRAM(s) Oral every 6 hours PRN Moderate Pain (4 - 6)  ALBUTerol    90 MICROgram(s) HFA Inhaler 1 Puff(s) Inhalation every 4 hours PRN Shortness of Breath and/or Wheezing  dextrose Oral Gel 15 Gram(s) Oral once PRN Blood Glucose LESS THAN 70 milliGRAM(s)/deciliter      Allergies:  No Known Allergies      PAST MEDICAL & SURGICAL HISTORY:  Coronary artery disease involving native heart, angina presence unspecified, unspecified vessel or lesion type  nuc stress 5/2021 negative      Atrial fibrillation, unspecified type  s/p MARLO/DCCV 5/11/21      Congestive heart failure, unspecified HF chronicity, unspecified heart failure type  EF 35-40%      Bell&#x27;s palsy  Right side of face      Essential hypertension      Type 2 diabetes mellitus without complication, unspecified whether long term insulin use  insulin plus orals      Osteomyelitis of sternum      Chronic kidney disease, unspecified CKD stage  stage III. Recent BL cr 2.0-2.5      Anemia secondary to renal failure  together with iron deficiency      GIB (gastrointestinal bleeding)  s/p coil embolization      Bifascicular block  s/p ILR implant      Squamous cell skin cancer      S/P CABG (coronary artery bypass graft)  complicated by osteomyleitis of the sternum and sepsis; sternum removed      History of loop recorder      Presence of Watchman left atrial appendage closure device      S/P skin cancer resection          FAMILY HISTORY:  No pertinent family history in first degree relatives  cad        Vital Signs Last 24 Hrs  T(C): 36.6 (08 Jul 2022 05:15), Max: 36.8 (07 Jul 2022 16:15)  T(F): 97.9 (08 Jul 2022 05:15), Max: 98.2 (07 Jul 2022 16:15)  HR: 105 (08 Jul 2022 05:15) (98 - 105)  BP: 102/58 (08 Jul 2022 05:15) (100/64 - 126/76)  BP(mean): --  RR: 18 (08 Jul 2022 05:15) (17 - 18)  SpO2: 96% (08 Jul 2022 05:15) (95% - 98%)    Parameters below as of 08 Jul 2022 05:15  Patient On (Oxygen Delivery Method): room air        I&O's Summary    07 Jul 2022 07:01  -  08 Jul 2022 07:00  --------------------------------------------------------  IN: 645 mL / OUT: 3100 mL / NET: -2455 mL        LABS:                        8.4    7.38  )-----------( 166      ( 08 Jul 2022 05:35 )             27.6       07-08    137  |  93<L>  |  87.6<H>  ----------------------------<  182<H>  3.7   |  31.0<H>  |  3.12<H>    Ca    9.0      08 Jul 2022 05:35            PHYSICAL EXAM:  GENERAL: NAD, lying in bed comfortably  FACE:  right facial droop  ENT: Moist mucous membranes  CHEST/LUNG: Unlabored respirations. healed midsternal incision  ABDOMEN: Soft, Nontender, Nondistended.   EXTREMITIES:  warm, bilateral LE edema   VASCULAR: IV to left distal cephalic   NERVOUS SYSTEM:  Alert & Oriented       Right upper extremity done.

## 2022-07-08 NOTE — PROGRESS NOTE ADULT - SUBJECTIVE AND OBJECTIVE BOX
[place] [facility]                                                               Office: [address] [address]                                                              Telephone: [phone]. Fax:[phone]                                                                             PROGRESS NOTE  Reason for follow up: Acute on chronic HFrEF  Overnight: No new events.   Update: still diuresing well 3L urine output with IVP Bumex 2mg BID, denies cardiac complains      Review of symptoms:   Cardiac:  No chest pain. No dyspnea. No palpitations.  Respiratory: No cough. No dyspnea  Gastrointestinal: No diarrhea. No abdominal pain. No bleeding.     Past medical history: No updates.   	  Vital Signs Last 24 Hrs  T(C): 36.8 (07-08-22 @ 16:40), Max: 36.8 (07-08-22 @ 08:33)  T(F): 98.2 (07-08-22 @ 16:40), Max: 98.2 (07-08-22 @ 08:33)  HR: 101 (07-08-22 @ 16:40) (99 - 108)  BP: 114/75 (07-08-22 @ 16:40) (100/64 - 126/76)  BP(mean): 75 (07-08-22 @ 11:57) (75 - 75)  RR: 18 (07-08-22 @ 16:40) (18 - 18)  SpO2: 98% (07-08-22 @ 16:40) (95% - 98%)  I&O's Summary    07 Jul 2022 07:01  -  08 Jul 2022 07:00  --------------------------------------------------------  IN: 645 mL / OUT: 3100 mL / NET: -2455 mL    08 Jul 2022 07:01  -  08 Jul 2022 17:59  --------------------------------------------------------  IN: 0 mL / OUT: 1000 mL / NET: -1000 mL          PHYSICAL EXAM:  Appearance: Comfortable. No acute distress, pale  HEENT:  Head and neck: Atraumatic. Normocephalic.  Normal oral mucosa, PERRL, Neck is supple.   Neurologic: A&Ox 3, Chronic R-facial paralysis EOMI  Lymphatic: No cervical lymphadenopathy  Cardiovascular: Normal S1 S2, No murmur, rubs/gallops. No JVD, No edema  Respiratory: Lungs clear to auscultation  Gastrointestinal:  Soft, Non-tender, + BS  Lower Extremities: +2 pitting edema to shins L>R  Psychiatry: Patient is calm. No agitation. Mood & affect appropriate  Skin: No rashes/ecchymoses/cyanosis/ulcers visualized on the face, hands or feet.    CURRENT MEDICATIONS:  MEDICATIONS  (STANDING):  allopurinol 100 milliGRAM(s) Oral daily  aspirin enteric coated 81 milliGRAM(s) Oral daily  atorvastatin 40 milliGRAM(s) Oral at bedtime  buMETAnide Injectable 2 milliGRAM(s) IV Push every 12 hours  buPROPion XL (24-Hour) . 150 milliGRAM(s) Oral daily  dextrose 5%. 1000 milliLiter(s) (100 mL/Hr) IV Continuous <Continuous>  dextrose 5%. 1000 milliLiter(s) (50 mL/Hr) IV Continuous <Continuous>  dextrose 50% Injectable 25 Gram(s) IV Push once  dextrose 50% Injectable 12.5 Gram(s) IV Push once  dextrose 50% Injectable 25 Gram(s) IV Push once  enoxaparin Injectable 30 milliGRAM(s) SubCutaneous every 24 hours  epoetin nohemi-epbx (RETACRIT) Injectable 67912 Unit(s) SubCutaneous <User Schedule>  glucagon  Injectable 1 milliGRAM(s) IntraMuscular once  hydrALAZINE 100 milliGRAM(s) Oral three times a day  insulin glargine Injectable (LANTUS) 10 Unit(s) SubCutaneous at bedtime  insulin lispro (ADMELOG) corrective regimen sliding scale   SubCutaneous three times a day before meals  insulin lispro (ADMELOG) corrective regimen sliding scale   SubCutaneous at bedtime  isosorbide   dinitrate Tablet (ISORDIL) 10 milliGRAM(s) Oral three times a day  lidocaine   4% Patch 1 Patch Transdermal every 24 hours  metoprolol tartrate 25 milliGRAM(s) Oral every 8 hours  pantoprazole    Tablet 40 milliGRAM(s) Oral before breakfast  polyethylene glycol 3350 17 Gram(s) Oral daily  simethicone 80 milliGRAM(s) Chew daily  tamsulosin 0.4 milliGRAM(s) Oral at bedtime    MEDICATIONS  (PRN):  acetaminophen     Tablet .. 650 milliGRAM(s) Oral every 6 hours PRN Moderate Pain (4 - 6)  ALBUTerol    90 MICROgram(s) HFA Inhaler 1 Puff(s) Inhalation every 4 hours PRN Shortness of Breath and/or Wheezing  dextrose Oral Gel 15 Gram(s) Oral once PRN Blood Glucose LESS THAN 70 milliGRAM(s)/deciliter      DIAGNOSTIC TESTING:  [ ] Echocardiogram:   < from: TTE Echo Complete w/ Contrast w/ Doppler (06.27.22 @ 17:53) >  Summary:   1. Endocardial visualization was enhanced with intravenous echo contrast.   2. Left ventricular ejection fraction, by visual estimation, is 30 to   35%.   3. Severely decreased segmental left ventricular systolic function.   4. The mitral in-flow pattern reveals no discernable A-wave, therefore   no comment on diastolic function can be made.   5. Estimated pulmonary artery systolic pressure is 49.0 mmHg assuming a   right atrial pressure of 8 mmHg, which is consistent with mild pulmonary   hypertension.   6. Severely enlarged right ventricle with severely reduced RV systolic   function.   7. Severely enlarged left atrium.   8. Severely enlarged right atrium.   9. Mild mitral annular calcification.  10. Moderate to severe mitral valve regurgitation.  11. Moderate-severe tricuspid regurgitation.  12. Mild tomoderate pulmonic valve regurgitation.  13. There is no evidence of pericardial effusion.  14. Compared to the prior TTE study from 5/9/21, severe biventricular   systolic dysfunction again noted.    < end of copied text >    [ ]  Catheterization:  [ ] Stress Test:      Labs:                        8.4    7.38  )-----------( 166      ( 08 Jul 2022 05:35 )             27.6     07-08    137  |  93<L>  |  87.6<H>  ----------------------------<  182<H>  3.7   |  31.0<H>  |  3.12<H>    Ca    9.0      08 Jul 2022 05:35       29 Jun 2022 15:01 Troponin 0.12 ng/mL / Creatine Kinase 90 U/L /  CKMB x     / CPK Mass Assay % x       29 Jun 2022 09:05 Troponin 0.12 ng/mL / Creatine Kinase 92 U/L /  CKMB x     / CPK Mass Assay % x       27 Jun 2022 15:36 Troponin 0.11 ng/mL / Creatine Kinase x     /  CKMB x     / CPK Mass Assay % x          Serum Pro-Brain Natriuretic Peptide: 68568 pg/mL (07-07-22 @ 06:16)  Serum Pro-Brain Natriuretic Peptide: 75825 pg/mL (07-03-22 @ 06:14)  Serum Pro-Brain Natriuretic Peptide: 35800 pg/mL (06-29-22 @ 09:05)  Serum Pro-Brain Natriuretic Peptide: 92556 pg/mL (06-27-22 @ 15:36)  Serum Pro-Brain Natriuretic Peptide: 81000 pg/mL (06-10-21 @ 06:22)  Serum Pro-Brain Natriuretic Peptide: 77265 pg/mL (05-28-21 @ 15:45)  Serum Pro-Brain Natriuretic Peptide: 31143 pg/mL (05-11-21 @ 06:10)  Serum Pro-Brain Natriuretic Peptide: 30268 pg/mL (05-09-21 @ 11:25)  Serum Pro-Brain Natriuretic Peptide: 27257 pg/mL (05-09-21 @ 06:06)  Serum Pro-Brain Natriuretic Peptide: 82651 pg/mL (05-08-21 @ 08:39)      A1C with Estimated Average Glucose Result: 6.7 % (07-02-22 @ 06:37)      TELEMETRY: Aflutter 100s, PVCs

## 2022-07-09 NOTE — PROGRESS NOTE ADULT - ASSESSMENT
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 Admitted for Acute on Chronic HFrEF.    *Acute on chronic HF with biventricular heart failure  Improving  Changed to IV Bumex 2mg BID   Strict IO, Daily weight, 1.5L restriction  TTE with severe Biventricular dysfunction and segmental wma  EP following for conduction disease, NSVT-BB titrated. No plan for in-patient intervention     *KEN with CKD Stage 3  Vascular surgery was consulted, Vein mapping done  To follow up as outpatient for AVF   Creatinine stable with diuresis  Monitor closely  I&os    *Urinary retention  Laureano placed on 07/06 for retention  On Flomax  TOV 7/8, pt is currently urinating    *Dysuria  f/u UA     *Anemia of chronic disease  On Epogen  Hb/hct stable    *CAD s/p CABG  ASA, Statin    *Hypertension  BP Stable     *DM type 2 uncontrolled with hyperglycemia  Increase Lantus 13 nightly  ISS  Diabetic diet    *Posterior T11 fracture  Conservative management  pain control     *Frequent falls;   Patient severely deconditioned, has walker at home but frequently does not use it per wife. Has had several falls in recent weeks.  Pending [facility] once medically optimized       Discharge disposition; Anticipate discharge to [facility] On M[facility]day pending further clinical improvement

## 2022-07-09 NOTE — PROGRESS NOTE ADULT - SUBJECTIVE AND OBJECTIVE BOX
HPI  Pt is a 80yo M admitted to [facility] for CHF exacerbation  Pt was seen and examined at bedside. No overnight complaints noted. Pt states he is feeling better. Pt has burning on urination. BGM elevation noted     Vital Signs Last 24 Hrs  T(C): 36.8 (09 Jul 2022 08:00), Max: 36.9 (09 Jul 2022 01:12)  T(F): 98.2 (09 Jul 2022 08:00), Max: 98.4 (09 Jul 2022 01:12)  HR: 95 (09 Jul 2022 08:00) (92 - 108)  BP: 102/64 (09 Jul 2022 08:00) (102/64 - 114/75)  BP(mean): 75 (08 Jul 2022 11:57) (75 - 75)  RR: 18 (09 Jul 2022 08:00) (18 - 18)  SpO2: 95% (09 Jul 2022 08:00) (95% - 98%)    Parameters below as of 09 Jul 2022 09:00  Patient On (Oxygen Delivery Method): room air        I&O's Summary    08 Jul 2022 07:01  -  09 Jul 2022 07:00  --------------------------------------------------------  IN: 0 mL / OUT: 1700 mL / NET: -1700 mL    09 Jul 2022 07:01  -  09 Jul 2022 11:45  --------------------------------------------------------  IN: 0 mL / OUT: 900 mL / NET: -900 mL        CAPILLARY BLOOD GLUCOSE      POCT Blood Glucose.: 190 mg/dL (09 Jul 2022 08:28)  POCT Blood Glucose.: 278 mg/dL (08 Jul 2022 21:52)  POCT Blood Glucose.: 242 mg/dL (08 Jul 2022 17:38)  POCT Blood Glucose.: 215 mg/dL (08 Jul 2022 11:56)      PHYSICAL EXAM:    Constitutional: NAD,   HEENT: PERR, EOMI,    Neck: Soft and supple,    Respiratory: Breath sounds are clear bilaterally,    Cardiovascular: S1 and S2,    Gastrointestinal: Bowel Sounds present, soft,   Extremities: +1 bilateral lower ext swelling noted   Vascular: 2+ peripheral pulses  Neurological: A/O x 3,    Musculoskeletal: 5/5 strength b/l upper and lower extremities  Skin: No rashes    MEDICATIONS:  MEDICATIONS  (STANDING):  allopurinol 100 milliGRAM(s) Oral daily  aspirin enteric coated 81 milliGRAM(s) Oral daily  atorvastatin 40 milliGRAM(s) Oral at bedtime  buMETAnide 1 milliGRAM(s) Oral two times a day  buPROPion XL (24-Hour) . 150 milliGRAM(s) Oral daily  dextrose 5%. 1000 milliLiter(s) (100 mL/Hr) IV Continuous <Continuous>  dextrose 5%. 1000 milliLiter(s) (50 mL/Hr) IV Continuous <Continuous>  dextrose 50% Injectable 25 Gram(s) IV Push once  dextrose 50% Injectable 12.5 Gram(s) IV Push once  dextrose 50% Injectable 25 Gram(s) IV Push once  enoxaparin Injectable 30 milliGRAM(s) SubCutaneous every 24 hours  epoetin nohemi-epbx (RETACRIT) Injectable 92634 Unit(s) SubCutaneous <User Schedule>  glucagon  Injectable 1 milliGRAM(s) IntraMuscular once  hydrALAZINE 100 milliGRAM(s) Oral three times a day  insulin glargine Injectable (LANTUS) 10 Unit(s) SubCutaneous at bedtime  insulin lispro (ADMELOG) corrective regimen sliding scale   SubCutaneous three times a day before meals  insulin lispro (ADMELOG) corrective regimen sliding scale   SubCutaneous at bedtime  isosorbide   dinitrate Tablet (ISORDIL) 10 milliGRAM(s) Oral three times a day  lidocaine   4% Patch 1 Patch Transdermal every 24 hours  metoprolol tartrate 25 milliGRAM(s) Oral every 8 hours  pantoprazole    Tablet 40 milliGRAM(s) Oral before breakfast  polyethylene glycol 3350 17 Gram(s) Oral daily  simethicone 80 milliGRAM(s) Chew daily  tamsulosin 0.4 milliGRAM(s) Oral at bedtime      LABS: All Labs Reviewed:                        8.5    8.39  )-----------( 173      ( 09 Jul 2022 07:01 )             27.5     07-09    137  |  94<L>  |  96.7<H>  ----------------------------<  197<H>  3.5   |  31.0<H>  |  2.92<H>    Ca    8.7      09 Jul 2022 07:01            Blood Culture:     RADIOLOGY/EKG:    DVT PPX:    ADVANCED DIRECTIVE:    DISPOSITION:

## 2022-07-10 NOTE — PROGRESS NOTE ADULT - PROVIDER SPECIALTY LIST ADULT
Cardiology
Electrophysiology
Hospitalist
Hospitalist
Nephrology
Vascular Surgery
Cardiology
Hospitalist
Nephrology
Nephrology
Hospitalist
Nephrology
Cardiology

## 2022-07-10 NOTE — PROGRESS NOTE ADULT - ASSESSMENT
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 Admitted for Acute on Chronic HFrEF.    *Acute on chronic HF with biventricular heart failure  Improving  Changed to IV Bumex 2mg BID   Strict IO, Daily weight, 1.5L restriction  TTE with severe Biventricular dysfunction and segmental wma  EP following for conduction disease, NSVT-BB titrated. No plan for in-patient intervention     *KEN with CKD Stage 3  Vascular surgery was consulted, Vein mapping done  To follow up as outpatient for AVF   Creatinine stable with diuresis  Monitor closely  I&os    *Urinary retention  Laureano placed on 07/06 for retention  On Flomax  TOV 7/8, pt is currently urinating    *UTI  UA positive  will start on Rocephin   Pyridium for today only     *Anemia of chronic disease  On Epogen  Hb/hct stable    *CAD s/p CABG  ASA, Statin    *Hypertension  BP Stable     *DM type 2 uncontrolled with hyperglycemia  Lantus 13 nightly  ISS  Diabetic diet    *Posterior T11 fracture  Conservative management  pain control     *Frequent falls;   Patient severely deconditioned, has walker at home but frequently does not use it per wife. Has had several falls in recent weeks.  Pending [facility] once medically optimized       Discharge disposition; Anticipate discharge to [facility] tomorrow

## 2022-07-10 NOTE — PROGRESS NOTE ADULT - SUBJECTIVE AND OBJECTIVE BOX
HPI  Pt is a 82yo M admitted to [facility] for CHF exacerbation  Pt was seen and examined at bedside. No overnight complaints noted. Pt states he has a lot of burning on urination. reviewed pt that UA positive.     Vital Signs Last 24 Hrs  T(C): 36.3 (10 Jul 2022 09:34), Max: 37.1 (09 Jul 2022 18:00)  T(F): 97.4 (10 Jul 2022 09:34), Max: 98.7 (09 Jul 2022 18:00)  HR: 101 (10 Jul 2022 09:34) (98 - 102)  BP: 92/50 (10 Jul 2022 09:34) (92/50 - 120/70)  BP(mean): --  RR: 18 (10 Jul 2022 09:34) (17 - 18)  SpO2: 95% (10 Jul 2022 09:34) (95% - 98%)    Parameters below as of 10 Jul 2022 09:34  Patient On (Oxygen Delivery Method): room air        I&O's Summary    09 Jul 2022 07:01  -  10 Jul 2022 07:00  --------------------------------------------------------  IN: 1250 mL / OUT: 2050 mL / NET: -800 mL        CAPILLARY BLOOD GLUCOSE      POCT Blood Glucose.: 174 mg/dL (10 Jul 2022 08:15)  POCT Blood Glucose.: 325 mg/dL (09 Jul 2022 21:43)  POCT Blood Glucose.: 260 mg/dL (09 Jul 2022 16:42)  POCT Blood Glucose.: 256 mg/dL (09 Jul 2022 13:38)  POCT Blood Glucose.: 277 mg/dL (09 Jul 2022 12:27)      PHYSICAL EXAM:  Constitutional: NAD,   HEENT: PERR, EOMI,    Neck: Soft and supple,    Respiratory: Breath sounds are clear bilaterally,    Cardiovascular: S1 and S2,    Gastrointestinal: Bowel Sounds present, soft,   Extremities: +1 bilateral lower ext swelling noted   Vascular: 2+ peripheral pulses  Neurological: A/O x 3,    Musculoskeletal: 5/5 strength b/l upper and lower extremities  Skin: No rashes      MEDICATIONS:  MEDICATIONS  (STANDING):  allopurinol 100 milliGRAM(s) Oral daily  aspirin enteric coated 81 milliGRAM(s) Oral daily  atorvastatin 40 milliGRAM(s) Oral at bedtime  buMETAnide 1 milliGRAM(s) Oral two times a day  buPROPion XL (24-Hour) . 150 milliGRAM(s) Oral daily  cefTRIAXone   IVPB      dextrose 5%. 1000 milliLiter(s) (100 mL/Hr) IV Continuous <Continuous>  dextrose 5%. 1000 milliLiter(s) (50 mL/Hr) IV Continuous <Continuous>  dextrose 50% Injectable 25 Gram(s) IV Push once  dextrose 50% Injectable 12.5 Gram(s) IV Push once  dextrose 50% Injectable 25 Gram(s) IV Push once  enoxaparin Injectable 30 milliGRAM(s) SubCutaneous every 24 hours  epoetin nohemi-epbx (RETACRIT) Injectable 08882 Unit(s) SubCutaneous <User Schedule>  glucagon  Injectable 1 milliGRAM(s) IntraMuscular once  hydrALAZINE 100 milliGRAM(s) Oral three times a day  insulin glargine Injectable (LANTUS) 13 Unit(s) SubCutaneous at bedtime  insulin lispro (ADMELOG) corrective regimen sliding scale   SubCutaneous three times a day before meals  insulin lispro (ADMELOG) corrective regimen sliding scale   SubCutaneous at bedtime  isosorbide   dinitrate Tablet (ISORDIL) 10 milliGRAM(s) Oral three times a day  metoprolol tartrate 25 milliGRAM(s) Oral every 8 hours  pantoprazole    Tablet 40 milliGRAM(s) Oral before breakfast  phenazopyridine 200 milliGRAM(s) Oral three times a day  polyethylene glycol 3350 17 Gram(s) Oral daily  simethicone 80 milliGRAM(s) Chew daily  tamsulosin 0.4 milliGRAM(s) Oral at bedtime      LABS: All Labs Reviewed:                        8.4    8.96  )-----------( 178      ( 10 Jul 2022 06:25 )             27.0     07-10    138  |  95<L>  |  100.4<H>  ----------------------------<  163<H>  3.5   |  29.0  |  2.91<H>    Ca    8.6      10 Jul 2022 06:25  Phos  3.3     07-10  Mg     1.8     07-10            Blood Culture:     RADIOLOGY/EKG:    DVT PPX:    ADVANCED DIRECTIVE:    DISPOSITION:

## 2022-07-11 NOTE — DISCHARGE NOTE PROVIDER - HOSPITAL COURSE
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 Initially presented to the ED S/p mechanical fall landing on his shoulder ccb by posterior T11 fracture was planned for a discharge to [facility] however found to have worsening LE edema, Abdominal Swelling, and increased work of breathing. Admitted for Acute on Chronic HFrEF.  The patient was seen in consultation by Cardiology, Nephrology, and Vascular surgery. Diuresed with IV Bumex 2mg BID , strict IO, Daily weight, 1.5L restriction  TTE with severe Biventricular dysfunction and segmental wma. EP saw patient for conduction disease, NSVT-BB titrated. No plan for in-patient intervention.. Creatinine stable with diuresis. Laureano was placed for urinary retention, passed voiding trial on 7/8. Treated for UTI with IV Rocephin will change to oral Vantin to complete 7 days course.  Changed to PO Bumex, as per CArdiology, defer RHC and CardioMems for now as responding to aggressive diuresis with chronic anemia/falls history to avoid short term DAPT need. Due to advanced CKD not candidate for ARNi and GFR <30 defer SGLT-2i; continue hydralazine, isordil. Given worsening IV diuretic requirement discussion with nephrology about pending elective AV fistula as likely need future HD plan, patient is agreeable AVF surgery seen by vascular, at acceptable cardiac risk, currently optimized from cardiac standpoint to proceed with AV fistula creation, prefer R-side AV fistula as per EPS may need eventual pacemaker implant in the future for L-side device implant. Vein mapping completed, outpatient follow up with Vascular surgery. Patient severely deconditioned, has walker at home but frequently does not use it per wife. Has had several falls in recent weeeks, medically stable for [facility]. 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 Initially presented to the ED S/p mechanical fall landing on his shoulder ccb by posterior T11 fracture was planned for a discharge to [facility] however found to have worsening LE edema, Abdominal Swelling, and increased work of breathing. Admitted for Acute on Chronic HFrEF.  The patient was seen in consultation by Cardiology, Nephrology, and Vascular surgery. Diuresed with IV Bumex 2mg BID , strict IO, Daily weight, 1.5L restriction  TTE with severe Biventricular dysfunction and segmental wma. EP saw patient for conduction disease, NSVT-BB titrated. No plan for in-patient intervention.. Creatinine stable with diuresis. Laureano was placed for urinary retention, passed voiding trial on 7/8. Treated for UTI with IV Rocephin will change to oral Vantin to complete 7 days course.  Changed to PO Bumex, as per CArdiology, defer RHC and CardioMems for now as responding to aggressive diuresis with chronic anemia/falls history to avoid short term DAPT need. Due to advanced CKD not candidate for ARNi and GFR <30 defer SGLT-2i; continue hydralazine, isordil. Given worsening IV diuretic requirement discussion with nephrology about pending elective AV fistula as likely need future HD plan, patient is agreeable AVF surgery seen by vascular, at acceptable cardiac risk, currently optimized from cardiac standpoint to proceed with AV fistula creation, prefer R-side AV fistula as per EPS may need eventual pacemaker implant in the future for L-side device implant. Vein mapping completed, outpatient follow up with Vascular surgery. Patient severely deconditioned, has walker at home but frequently does not use it per wife. Has had several falls in recent weeeks, medically stable for [facility].     Vital Signs Last 24 Hrs  T(C): 36.7 (11 Jul 2022 15:07), Max: 37.2 (10 Jul 2022 18:29)  T(F): 98 (11 Jul 2022 15:07), Max: 98.9 (10 Jul 2022 18:29)  HR: 102 (11 Jul 2022 15:07) (100 - 102)  BP: 104/64 (11 Jul 2022 15:07) (101/68 - 126/78)  BP(mean): --  RR: 17 (11 Jul 2022 15:07) (17 - 18)  SpO2: 96% (11 Jul 2022 15:07) (95% - 98%)    Parameters below as of 11 Jul 2022 15:07  Patient On (Oxygen Delivery Method): room air    PHYSICAL EXAM:  Constitutional: NAD,   HEENT: PERR, EOMI,    Neck: Soft and supple,    Respiratory: Breath sounds are clear bilaterally,    Cardiovascular: S1 and S2,    Gastrointestinal: Bowel Sounds present, soft,   Extremities: +1 bilateral lower ext swelling noted   Vascular: 2+ peripheral pulses  Neurological: A/O x 3,    Musculoskeletal: 5/5 strength b/l upper and lower extremities  Skin: No rashes    Time spent on discharge 36min

## 2022-07-11 NOTE — DISCHARGE NOTE PROVIDER - PROVIDER TOKENS
PROVIDER:[TOKEN:[[id number]]],PROVIDER:[TOKEN:[[id number]]],PROVIDER:[TOKEN:[[id number]]],PROVIDER:[TOKEN:[[id number]]]

## 2022-07-11 NOTE — DISCHARGE NOTE NURSING/CASE MANAGEMENT/SOCIAL WORK - NSDCPEFALRISK_GEN_ALL_CORE
For information on Fall & Injury Prevention, visit: [email or web address] OR  [email or web address] OR  https://www.cdc.gov/steadi/patient.html

## 2022-07-11 NOTE — DISCHARGE NOTE PROVIDER - NSDCMRMEDTOKEN_GEN_ALL_CORE_FT
acetaminophen 325 mg oral tablet: 2 tab(s) orally every 6 hours, As needed, Moderate Pain (4 - 6)  albuterol 90 mcg/inh inhalation aerosol: 1 puff(s) inhaled every 4 hours, As needed, Shortness of Breath and/or Wheezing  allopurinol 100 mg oral tablet: 1 tab(s) orally once a day  Aspirin Enteric Coated 81 mg oral delayed release tablet: 1 tab(s) orally once a day   atorvastatin 40 mg oral tablet: 1 tab(s) orally once a day  bumetanide 1 mg oral tablet: 1 tab(s) orally 2 times a day  buPROPion 150 mg/24 hours (XL) oral tablet, extended release: 2 tab(s) orally every 24 hours  cefpodoxime 200 mg oral tablet: 1 tab(s) orally every 12 hours last day 7/16  epoetin nohemi: 15235 unit(s) subcutaneous 3 times a week as per Nephrology  hydrALAZINE 100 mg oral tablet: 1 tab(s) orally 3 times a day  insulin lispro 100 units/mL injectable solution: injectable 4 times a day (before meals and at bedtime) with insulin sliding scale coverage  isosorbide dinitrate 10 mg oral tablet: 1 tab(s) orally 3 times a day  Lantus 100 units/mL subcutaneous solution: 13 unit(s) subcutaneous once a day (at bedtime)  metoprolol tartrate 25 mg oral tablet: 1 tab(s) orally every 8 hours  pantoprazole 40 mg oral delayed release tablet: 1 tab(s) orally once a day (before a meal)  polyethylene glycol 3350 oral powder for reconstitution: 17 gram(s) orally once a day  simethicone 80 mg oral tablet, chewable: 1 tab(s) orally once a day  tamsulosin 0.4 mg oral capsule: 1 cap(s) orally once a day (at bedtime)

## 2022-07-11 NOTE — DISCHARGE NOTE PROVIDER - CARE PROVIDER_API CALL
Gamal Duke (DO)  Cardiovascular Disease; Internal Medicine  [address] [place] [address]  Phone: [phone]  Fax: [phone]  Follow Up Time:     Demetra Mims)  Internal Medicine; Nephrology  [address] [place] [address]  Phone: [phone]  Fax: [phone]  Follow Up Time:     ManvarSingh, Pallavi B (MD)  Vascular Surgery  [address] [facility], [address] [place] [address]  Phone: [phone]  Fax: [phone]  Follow Up Time:     Jacqueline Mantilla)  Cardiac Electrophysiology; Cardiovascular Disease; Internal Medicine  [address] [place] [address]  Phone: [phone]  Fax: [phone]  Follow Up Time:

## 2022-07-11 NOTE — DISCHARGE NOTE NURSING/CASE MANAGEMENT/SOCIAL WORK - PATIENT PORTAL LINK FT
You can access the FollowMyHealth Patient Portal offered by [facility] by registering at the following website: [email or web address]. By joining [employer]’s FollowMyHealth portal, you will also be able to view your health information using other applications (apps) compatible with our system.

## 2022-07-11 NOTE — DISCHARGE NOTE PROVIDER - NSDCCPCAREPLAN_GEN_ALL_CORE_FT
PRINCIPAL DISCHARGE DIAGNOSIS  Diagnosis: Acute on chronic heart failure, unspecified heart failure type  Assessment and Plan of Treatment: Continue oral Bumex  Continue Hydralazine/Isordil  Low sodium diet  1500 cc Fluid restriction  Follow up with your Cardiologist for further management      SECONDARY DISCHARGE DIAGNOSES  Diagnosis: Fracture of one rib  Assessment and Plan of Treatment: Pain control  Conservative management    Diagnosis: Atrial fibrillation and flutter  Assessment and Plan of Treatment: -Continue metoprolol dose increased  -pending 1 year Watchman MARLO in 8/2022    Diagnosis: KEN (acute kidney injury)  Assessment and Plan of Treatment: KEN on CKD  Vein mapping completed  Follow up with vascular surgery for eventual AVF  Follow up with Nephrology  Avoid nephrotoxic drugs    Diagnosis: Acute UTI  Assessment and Plan of Treatment: Complete course of oral antibiotics on 7/16

## 2022-07-11 NOTE — DISCHARGE NOTE PROVIDER - NSDCFUSCHEDAPPT_GEN_ALL_CORE_FT
Cinthya Washington  [facility]  FAMILYMED [address]  Scheduled Appointment: 07/13/2022    Hetal Bains  [facility]  NEUROLOGY [address]  Scheduled Appointment: 07/15/2022    [facility]  Cardio Electro [address]  Scheduled Appointment: 07/18/2022    [facility]  CARDIOLOGY [address] [facility]  Scheduled Appointment: 09/12/2022

## 2022-07-11 NOTE — DISCHARGE NOTE PROVIDER - CARE PROVIDERS DIRECT ADDRESSES
,DirectAddress_Unknown,[email or web address],DirectAddress_Unknown

## 2022-07-30 ENCOUNTER — TELEPHONE ENCOUNTER (OUTPATIENT)
Age: 82
End: 2022-07-30

## 2022-07-31 ENCOUNTER — TELEPHONE ENCOUNTER (OUTPATIENT)
Age: 82
End: 2022-07-31

## 2022-08-25 NOTE — PROGRESS NOTE ADULT - ASSESSMENT
How Severe Are Your Spot(S)?: mild Recent UGI bleeding from Duodenal Tic embolized by IR.  Hx of A Fib.  Not overtly bleeding.    Continue observation, preferably off of AC.  Monitor Hgb.   What Is The Reason For Today's Visit?: Upper Body Skin Exam

## 2022-09-03 NOTE — ED ADULT TRIAGE NOTE - CHIEF COMPLAINT QUOTE
patient BIBA from [facility], called 911 himself because "they do not take care of me there and I am being overdosed on antibiotics." currently being treated for pneumonia and c-diff. c/o intermittent chest pain en route to EMS, denies current chest pain in triage. ekg performed. patient BIBA from [facility], called 911 himself because "they do not take care of me there and I am being overdosed on antibiotics." currently being treated for pneumonia and c-diff. per [facility] staff, patient refusing medications today, repeatedly taking off cardiac monitor and O2 at facility. c/o intermittent chest pain en route to EMS, denies current chest pain in triage. ekg performed.

## 2022-09-04 NOTE — ED ADULT NURSE REASSESSMENT NOTE - NS ED NURSE REASSESS COMMENT FT1
Dr Bernard aware of pt cardiac rhythm changes, pt currently in CT scan, will do EKG once pt got back to his room.

## 2022-09-04 NOTE — ED PROVIDER NOTE - NSICDXPASTMEDICALHX_GEN_ALL_CORE_FT
PAST MEDICAL HISTORY:  Anemia secondary to renal failure together with iron deficiency    Atrial fibrillation, unspecified type s/p MRALO/DCCV 5/11/21    Bell's palsy Right side of face    Bifascicular block s/p ILR implant    Chronic kidney disease, unspecified CKD stage stage III. Recent BL cr 2.0-2.5    Congestive heart failure, unspecified HF chronicity, unspecified heart failure type EF 35-40%    Coronary artery disease involving native heart, angina presence unspecified, unspecified vessel or lesion type nuc stress 5/2021 negative    Essential hypertension     GIB (gastrointestinal bleeding) s/p coil embolization    Osteomyelitis of sternum     Squamous cell skin cancer     Type 2 diabetes mellitus without complication, unspecified whether long term insulin use insulin plus orals

## 2022-09-04 NOTE — ED PROVIDER NOTE - OBJECTIVE STATEMENT
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 Pt reportedly called 911 himself because "they do not take care of me there and I am being overdosed on antibiotics." Was reportedly refusing medications today, repeatedly taking off cardiac monitor and O2 at facility. Complains of chest pain; however says that he says he has had this for weeks. Per review of transfer papers, pt currently on IV zosyn via midline for pneumonia and PO vancomycin for C. diff. Denies other complaints at this time. Wearing 2 L NC O2, which he says is baseline. +DNR/DNI as per papers.

## 2022-09-04 NOTE — ED ADULT NURSE NOTE - CHIEF COMPLAINT QUOTE
patient BIBA from [facility], called 911 himself because "they do not take care of me there and I am being overdosed on antibiotics." currently being treated for pneumonia and c-diff. per [facility] staff, patient refusing medications today, repeatedly taking off cardiac monitor and O2 at facility. c/o intermittent chest pain en route to EMS, denies current chest pain in triage. ekg performed.

## 2022-09-04 NOTE — ED PROVIDER NOTE - PATIENT PORTAL LINK FT
You can access the FollowMyHealth Patient Portal offered by [facility] by registering at the following website: [email or web address]. By joining [employer]’s FollowMyHealth portal, you will also be able to view your health information using other applications (apps) compatible with our system.

## 2022-09-04 NOTE — ED PROVIDER NOTE - PROGRESS NOTE DETAILS
Marco Antonio: Results reviewed - pt with elevated troponin unchanged from baseline. Already on antibiotics for pneumonia and C. diff. Spoke with [facility]; updated them on plan for discharge with continued antibiotics.

## 2022-09-04 NOTE — ED PROVIDER NOTE - NSFOLLOWUPINSTRUCTIONS_ED_ALL_ED_FT
- Continue with Zosyn and Vancomycin  - Blood and urine cultures were sent  - Return to the emergency room for any new or worsening issues.

## 2022-09-04 NOTE — ED ADULT NURSE NOTE - OBJECTIVE STATEMENT
Pt reports to Ed with multiple complaints. Pt BIBA from [facility]. Upon assessment pt reports cp radiating to his back, pt also reports he was dx of c-diff and pna nad being treated at [facility]. Pt AOX3.

## 2022-09-04 NOTE — ED PROVIDER NOTE - PHYSICAL EXAMINATION
Constitutional: Awake, alert, in no acute distress  Eyes: no scleral icterus  HENT: normocephalic, atraumatic, moist oral mucosa  Neck: supple  CV: RRR, no murmur, 2+ distal pulses in all extremities  Pulm: non-labored respirations, +scattered rhonchi  Abdomen: soft, mild generalized tenderness, non-distended  Extremities: mild pedal edema, no deformity, +L upper arm midline  Skin: no rash, no jaundice  Neuro: AAOx3, moving all extremities equally

## 2022-09-04 NOTE — ED PROVIDER NOTE - NS ED ROS FT
Constitutional: no fever, no chills  Eyes: no vision changes  ENT: no nasal congestion, no sore throat  CV: +chest pain  Resp: no cough, no shortness of breath  GI: +abdominal pain, no vomiting, +diarrhea  : no dysuria  MSK: no joint pain  Skin: no rash  Neuro: no headache, no focal weakness, no paresthesias

## 2022-09-08 NOTE — ED ADULT TRIAGE NOTE - TEMPERATURE IN FAHRENHEIT (DEGREES F)
Pt wearing Oasys 1 day Dist ou--  Pt wearing dist- Oasys 1 day   +1.00/+1.50=-0.75x060. Eval 120. 98.5

## 2022-10-06 NOTE — ED PROVIDER NOTE - ATTENDING CONTRIBUTION TO CARE
The patient seen and examined    Pancsong DAWN, Carlyle Bales, performed the initial face to face bedside interview with this patient regarding history of present illness, review of symptoms and relevant past medical, social and family history.  I completed an independent physical examination.  I was the initial provider who evaluated this patient. I have signed out the follow up of any pending tests (i.e. labs, radiological studies) to the resident.  I have communicated the patient’s plan of care and disposition with the resident

## 2022-10-06 NOTE — CONSULT NOTE ADULT - ASSESSMENT
81M h/o CAD s/p CABG (2017) c/b sepsis and sternal wound infection s/p sternum removal and pec flap and angioplasty, chronic HFrEF, pAfib (on Eliquis), HTN, DM2, CKD (Cr. 2.8), chronic anemia and Bell's Palsy last admitted in 5/9/21 with back pain/dizziness s/p mechanical fall noted ADHFrEF (35-40%), RV dysfunction, initial noted pBNP 58570 ---> 79132, pharm nuclear stress test without ischemia, RHC done with moderate pHTN Group 2 with PCWP 23, switched to torsemide 40mg, underwent MARLO/CV for pAflutter but reverted back to pAfib/flutter with bradycardia s/p Medtronic ILR implant repeated ER visits 5/28 & 5/30 for recurrent dizziness, readmitted 6/4-6/15/21 found with severe orthostasis and acute on chronic anemia (Hb seema 6.3) s/p EGD/colonoscopy found with duodenal bulb bleeding s/p IR embolization, noted elevated BP increased hydralazine dose to 100mg TID, 24-Hr BP monitoring still with SBP 160s added amlodipine 5mg, cardiac amyloid workup negative, had recent increased salt intake and gained >16 lb, restarted Torsemide 20mg, last visit 7/2021, underwent elective Watchman and with EPS/Dr. 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 rehab with C. diff and persistent diarrhea and missed office followup since last admission, presents with chest pain, abdominal discomfort and diarrhea, EKG unchanged with Afib chronic RBBB.     Chest wall pain/tender to palpation is noncardiac, has similar pain on the last admission, EKG unchanged from baseline, has chronic mild Troponin elevation with advanced CKD, no further inpatient cardiac testing indicated for now. Need CT ab/pel for abdominal tenderness and chronic diarrhea, recommend GI eval. as well.       1. Noncardiac chest pain  -musculoskeletal related, can use acetaminophen PRN.     2. Persistent diarrhea, abdominal pain  -recommend CT ab/pel no IV contrast  -GI consult advised given refractory diarrhea   -at acceptable cardiac risk if need colonoscopy, no absolute cardiac contraindication    3. Chronic HFrEF  -euvolemic, continue oral diuretic  -defer RHC and CardioMems for now as chronic anemia/falls history to avoid short term DAPT need  -advanced CKD not candidate for ARNi and GFR <30 defer SGLT-2i; continue hydralazine, discontinued amlodipine and added Isordil     4.Persistent Aflutter s/p Watchman  -pAflutter/Afib rate controlled continue metoprolol; EKG with chronic RBBB role for AVJ ablation with CRT? however if eventually starting HD would be at risk for device infection and explant   -missed 1 year Watchman MARLO in 8/2022    5. Advanced CKD  -last admission discussed with nephrology about pending elective AV fistula as likely need future HD plan, he is at acceptable cardiac risk to proceed with AV fistula creation, prefer R-side AV fistula as per EPS may need eventual pacemaker implant in the future for L-side device implant        Goals of care discussion:  -wife/ does not want to return to [facility] rehab, PT eval.   -I have discussed in details with patient and his wife Alma previously for the workup for worsening cardiomyopathy the option of left heart cath and with EPS for CRT device both would required IV contrast dye which can lead to DAVE and ESRD, patient/wife was not ready yet to commit to long term hemodialysis plan and wants to continue medical management for cardiomyopathy and AFib, however if Cr. worsening or recurrent fluid overloaded then eventual HD may be needed. They were ok moving forward with consideration of AV fistula creation as accepting eventual HD.         Cardiology will sign off, reconsult if new issue arise        Gamal Duke DO, [facility]  Faculty Non-Invasive Cardiologist  [phone].  81M h/o CAD s/p CABG (2017) c/b sepsis and sternal wound infection s/p sternum removal and pec flap and angioplasty, chronic HFrEF, pAfib (on Eliquis), HTN, DM2, CKD (Cr. 2.8), chronic anemia and Bell's Palsy last admitted in 5/9/21 with back pain/dizziness s/p mechanical fall noted ADHFrEF (35-40%), RV dysfunction, initial noted pBNP 91590 ---> 65606, pharm nuclear stress test without ischemia, RHC done with moderate pHTN Group 2 with PCWP 23, switched to torsemide 40mg, underwent MARLO/CV for pAflutter but reverted back to pAfib/flutter with bradycardia s/p Medtronic ILR implant repeated ER visits 5/28 & 5/30 for recurrent dizziness, readmitted 6/4-6/15/21 found with severe orthostasis and acute on chronic anemia (Hb seema 6.3) s/p EGD/colonoscopy found with duodenal bulb bleeding s/p IR embolization, noted elevated BP increased hydralazine dose to 100mg TID, 24-Hr BP monitoring still with SBP 160s added amlodipine 5mg, cardiac amyloid workup negative, had recent increased salt intake and gained >16 lb, restarted Torsemide 20mg, last visit 7/2021, underwent elective Watchman and with EPS/Dr. 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 diff and persistent diarrhea and missed office followup since last admission, presents with chest pain, abdominal discomfort and diarrhea, EKG unchanged with Afib chronic RBBB.     Chest wall pain/tender to palpation is noncardiac, has similar pain on the last admission, EKG unchanged from baseline, has chronic mild Troponin elevation with advanced CKD, no further inpatient cardiac testing indicated for now. Need CT ab/pel for abdominal tenderness and chronic diarrhea, recommend GI eval. as well.       1. Noncardiac chest pain  -musculoskeletal related, can use acetaminophen PRN.     2. Persistent diarrhea, abdominal pain  -recommend CT ab/pel no IV contrast  -GI consult advised given refractory diarrhea   -at acceptable cardiac risk if need colonoscopy, no absolute cardiac contraindication    3. Chronic HFrEF  -euvolemic, continue oral diuretic  -defer RHC and CardioMems for now as chronic anemia/falls history to avoid short term DAPT need  -advanced CKD not candidate for ARNi and GFR <30 defer SGLT-2i; continue hydralazine, discontinued amlodipine and added Isordil     4.Persistent Aflutter s/p Watchman  -pAflutter/Afib rate controlled continue metoprolol; EKG with chronic RBBB role for AVJ ablation with CRT? however if eventually starting HD would be at risk for device infection and explant   -missed 1 year post Watchman MARLO due in 8/2022, can obtain MARLO while inpatient prior to discharge     5. Advanced CKD  -last admission discussed with nephrology about pending elective AV fistula as likely need future HD plan, he is at acceptable cardiac risk to proceed with AV fistula creation, prefer R-side AV fistula as per EPS may need eventual pacemaker implant in the future for L-side device implant        Goals of care discussion:  -wife/ does not want to return to [facility] rehab, PT eval.   -I have discussed in details with patient and his wife Alma previously for the workup for worsening cardiomyopathy the option of left heart cath and with EPS for CRT device both would required IV contrast dye which can lead to DAVE and ESRD, patient/wife was not ready yet to commit to long term hemodialysis plan and wants to continue medical management for cardiomyopathy and AFib, however if Cr. worsening or recurrent fluid overloaded then eventual HD may be needed. They were ok moving forward with consideration of AV fistula creation as accepting eventual HD.         Cardiology will sign off, reconsult if new issue arise        Gamal Duke DO, [facility]  Faculty Non-Invasive Cardiologist  [phone].

## 2022-10-06 NOTE — CONSULT NOTE ADULT - SUBJECTIVE AND OBJECTIVE BOX
[facility]                                                       [facility]                                                               Office: [address] [address]                                                              Telephone: [phone]. Fax:[phone]                                                                        CARDIOLOGY CONSULTATION NOTE                                                                                             Consult requested by:  Dr. Carlyle Bales  Reason for Consultation:  Chest pain  History obtained by: self, chart  Community cardiologist: Dr. Duke    Chief complaint:  Chest pain, abdominal pain, diarrhea      HPI:  81M h/o CAD s/p CABG (2017) c/b sepsis and sternal wound infection s/p sternum removal and pec flap and angioplasty, chronic HFrEF, pAfib (on Eliquis), HTN, DM2, CKD (Cr. 2.8), chronic anemia and Bell's Palsy last admitted in 5/9/21 with back pain/dizziness s/p mechanical fall noted ADHFrEF (35-40%), RV dysfunction, initial noted pBNP 99982 ---> 91691, pharm nuclear stress test without ischemia, RHC done with moderate pHTN Group 2 with PCWP 23, switched to torsemide 40mg, underwent MARLO/CV for pAflutter but reverted back to pAfib/flutter with bradycardia s/p Medtronic ILR implant repeated ER visits 5/28 & 5/30 for recurrent dizziness, readmitted 6/4-6/15/21 found with severe orthostasis and acute on chronic anemia (Hb seema 6.3) s/p EGD/colonoscopy found with duodenal bulb bleeding s/p IR embolization, noted elevated BP increased hydralazine dose to 100mg TID, 24-Hr BP monitoring still with SBP 160s added amlodipine 5mg, cardiac amyloid workup negative, had recent increased salt intake and gained >16 lb, restarted Torsemide 20mg, last visit 7/2021, underwent elective Watchman and with EPS/Dr. 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 diff and missed office followup since last admission, presents with chest pain, abdominal discomfort and diarrhea, EKG unchanged with Afib chronic RBBB.         REVIEW OF SYMPTOMS:     CONSTITUTIONAL: No fever, weight loss, or fatigue  ENMT:  No difficulty hearing, tinnitus, vertigo; No sinus or throat pain  NECK: No pain or stiffness  CARDIOVASCULAR: as per HPI  RESPIRATORY: No Dyspnea on exertion, Shortness of breath, cough, wheezing  : No dysuria, no hematuria   GI: No dark color stool, no melena, no diarrhea, no constipation, no abdominal pain   NEURO: No headache, no dizziness, no slurred speech   MUSCULOSKELETAL: No joint pain or swelling; No muscle, back, or extremity pain  PSYCH: No agitation, no anxiety.    ALL OTHER REVIEW OF SYSTEMS ARE NEGATIVE.      PREVIOUS DIAGNOSTIC TESTING    DIAGNOSTIC TESTING:  [ ] Echocardiogram:   < from: TTE Echo Complete w/ Contrast w/ Doppler (06.27.22 @ 17:53) >  Summary:   1. Endocardial visualization was enhanced with intravenous echo contrast.   2. Left ventricular ejection fraction, by visual estimation, is 30 to   35%.   3. Severely decreased segmental left ventricular systolic function.   4. The mitral in-flow pattern reveals no discernable A-wave, therefore   no comment on diastolic function can be made.   5. Estimated pulmonary artery systolic pressure is 49.0 mmHg assuming a   right atrial pressure of 8 mmHg, which is consistent with mild pulmonary   hypertension.   6. Severely enlarged right ventricle with severely reduced RV systolic   function.   7. Severely enlarged left atrium.   8. Severely enlarged right atrium.   9. Mild mitral annular calcification.  10. Moderate to severe mitral valve regurgitation.  11. Moderate-severe tricuspid regurgitation.  12. Mild tomoderate pulmonic valve regurgitation.  13. There is no evidence of pericardial effusion.  14. Compared to the prior TTE study from 5/9/21, severe biventricular   systolic dysfunction again noted.    < from: Nuclear Stress Test-Pharmacologic (05.10.21 @ 07:59) >  IMPRESSIONS:  * Myocardial Perfusion SPECT results are abnormal.  * There is a medium sized, moderate defect in basal to mid  inferior wall that is fixed, suggestive of infarct. No  evidence of ischemia.  * Post-stress resting myocardial perfusion gated SPECT  imaging was performed (LVEF = 41 %;LVEDV = 141 ml.)  * Dilated right ventricle noted.    < end of copied text >      < from: Cardiac Cath Lab - Adult (05.11.21 @ 16:42) >  RHC  Moderate Elevation of Right Heart Pressures:  RA=15 mmHg; RV=61/18 mmHg; PCWP=23 mmHg  Moderate Pulmonary Hypertension=62/22 mmHg  Reduced CO=4.8 L/min and CI=2.06 L/min/m2    < end of copied text >      ALLERGIES: Allergies    No Known Allergies        PAST MEDICAL HISTORY        PAST SURGICAL HISTORY        FAMILY HISTORY:        SOCIAL HISTORY:    CIGARETTES:     ALCOHOL:  DRUGS:      CURRENT MEDICATIONS:  MEDICATIONS  (STANDING):    MEDICATIONS  (PRN):         HOME MEDICATIONS:        Vital Signs Last 24 Hrs  T(C): 36.4 (10-06-22 @ 13:17), Max: 36.4 (10-06-22 @ 13:17)  T(F): 97.5 (10-06-22 @ 13:17), Max: 97.5 (10-06-22 @ 13:17)  HR: 71 (10-06-22 @ 13:17) (71 - 71)  BP: 105/61 (10-06-22 @ 13:17) (105/61 - 105/61)  BP(mean): --  RR: 16 (10-06-22 @ 13:17) (16 - 16)  SpO2: 94% (10-06-22 @ 13:17) (94% - 94%)  I&O's Summary      Appearance: Comfortable. No acute distress  HEENT:  Head and neck: Atraumatic. Normocephalic.  Normal oral mucosa, PERRL, Neck is supple. No JVD, No carotid bruit.   Neurologic: A&Ox 3, no focal deficits. EOMI, Cranial nerves are intact.  Lymphatic: No cervical lymphadenopathy  Cardiovascular: Normal S1 S2, RRR, No murmur, rubs/gallops. No JVD, No edema  Respiratory: Lungs clear to auscultation  Gastrointestinal:  Soft, Non-tender, + BS  Lower Extremities: No edema  Psychiatry: Patient is calm. No agitation. Mood & affect appropriate  Skin: No rashes/ecchymoses/cyanosis/ulcers visualized on the face, hands or feet.      Labs:               Serum Pro-Brain Natriuretic Peptide: 57104 pg/mL (09-04-22 @ 00:12)  Serum Pro-Brain Natriuretic Peptide: 61570 pg/mL (07-07-22 @ 06:16)  Serum Pro-Brain Natriuretic Peptide: 57275 pg/mL (07-03-22 @ 06:14)  Serum Pro-Brain Natriuretic Peptide: 25026 pg/mL (06-29-22 @ 09:05)  Serum Pro-Brain Natriuretic Peptide: 04918 pg/mL (06-27-22 @ 15:36)  Serum Pro-Brain Natriuretic Peptide: 19922 pg/mL (06-10-21 @ 06:22)  Serum Pro-Brain Natriuretic Peptide: 98315 pg/mL (05-28-21 @ 15:45)  Serum Pro-Brain Natriuretic Peptide: 00943 pg/mL (05-11-21 @ 06:10)  Serum Pro-Brain Natriuretic Peptide: 51993 pg/mL (05-09-21 @ 11:25)  Serum Pro-Brain Natriuretic Peptide: 41718 pg/mL (05-09-21 @ 06:06)  Serum Pro-Brain Natriuretic Peptide: 51024 pg/mL (05-08-21 @ 08:39)      A1C with Estimated Average Glucose Result: 6.7 % (07-02-22 @ 06:37)      TELEMETRY: Reviewed    ECG:  rate controlled Afib RBBB                                                                     [facility]                                                       [facility]                                                               Office: [address] [address]                                                              Telephone: [phone]. Fax:[phone]                                                                        CARDIOLOGY CONSULTATION NOTE                                                                                             Consult requested by:  Dr. Carlyle Bales  Reason for Consultation:  Chest pain  History obtained by: self, chart  Community cardiologist: Dr. Duke    Chief complaint:  Chest pain, abdominal pain, diarrhea      HPI:  81M h/o CAD s/p CABG (2017) c/b sepsis and sternal wound infection s/p sternum removal and pec flap and angioplasty, chronic HFrEF, pAfib (on Eliquis), HTN, DM2, CKD (Cr. 2.8), chronic anemia and Bell's Palsy last admitted in 5/9/21 with back pain/dizziness s/p mechanical fall noted ADHFrEF (35-40%), RV dysfunction, initial noted pBNP 89694 ---> 78593, pharm nuclear stress test without ischemia, RHC done with moderate pHTN Group 2 with PCWP 23, switched to torsemide 40mg, underwent MARLO/CV for pAflutter but reverted back to pAfib/flutter with bradycardia s/p Medtronic ILR implant repeated ER visits 5/28 & 5/30 for recurrent dizziness, readmitted 6/4-6/15/21 found with severe orthostasis and acute on chronic anemia (Hb seema 6.3) s/p EGD/colonoscopy found with duodenal bulb bleeding s/p IR embolization, noted elevated BP increased hydralazine dose to 100mg TID, 24-Hr BP monitoring still with SBP 160s added amlodipine 5mg, cardiac amyloid workup negative, had recent increased salt intake and gained >16 lb, restarted Torsemide 20mg, last visit 7/2021, underwent elective Watchman and with EPS/Dr. 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 diff and persistent diarrhea and missed office followup since last admission, presents with chest pain, abdominal discomfort and diarrhea, EKG unchanged with Afib chronic RBBB.     Patient and wife reports been having profuse diarrhea despite Imodium use, has been ongoing for >2 months, also with abdomina discomfort, he's been frustrated and anxious in calling his family 2am in the morning about prolonged rehab stay, some days been fatigue not able to move around; reports chest pain started last night, currently pain is reproducible across his chest wall tender to palpitation. Has no fluid retention since at the rehab, not yet seen nephrology or vascular surgery for elective AV fistula.         REVIEW OF SYMPTOMS:     CONSTITUTIONAL: No fever, weight loss, or fatigue  ENMT:  No difficulty hearing, tinnitus, vertigo; No sinus or throat pain  NECK: No pain or stiffness  CARDIOVASCULAR: as per HPI  RESPIRATORY: No Dyspnea on exertion, Shortness of breath, cough, wheezing  : No dysuria, no hematuria   GI: No dark color stool, no melena, + diarrhea, no constipation,+abdominal pain   NEURO: No headache, no dizziness, no slurred speech   MUSCULOSKELETAL: No joint pain or swelling; No muscle, back, or extremity pain  PSYCH: No agitation, no anxiety.    ALL OTHER REVIEW OF SYSTEMS ARE NEGATIVE.      PREVIOUS DIAGNOSTIC TESTING    DIAGNOSTIC TESTING:  [ ] Echocardiogram:   < from: TTE Echo Complete w/ Contrast w/ Doppler (06.27.22 @ 17:53) >  Summary:   1. Endocardial visualization was enhanced with intravenous echo contrast.   2. Left ventricular ejection fraction, by visual estimation, is 30 to   35%.   3. Severely decreased segmental left ventricular systolic function.   4. The mitral in-flow pattern reveals no discernable A-wave, therefore   no comment on diastolic function can be made.   5. Estimated pulmonary artery systolic pressure is 49.0 mmHg assuming a   right atrial pressure of 8 mmHg, which is consistent with mild pulmonary   hypertension.   6. Severely enlarged right ventricle with severely reduced RV systolic   function.   7. Severely enlarged left atrium.   8. Severely enlarged right atrium.   9. Mild mitral annular calcification.  10. Moderate to severe mitral valve regurgitation.  11. Moderate-severe tricuspid regurgitation.  12. Mild tomoderate pulmonic valve regurgitation.  13. There is no evidence of pericardial effusion.  14. Compared to the prior TTE study from 5/9/21, severe biventricular   systolic dysfunction again noted.    < from: Nuclear Stress Test-Pharmacologic (05.10.21 @ 07:59) >  IMPRESSIONS:  * Myocardial Perfusion SPECT results are abnormal.  * There is a medium sized, moderate defect in basal to mid  inferior wall that is fixed, suggestive of infarct. No  evidence of ischemia.  * Post-stress resting myocardial perfusion gated SPECT  imaging was performed (LVEF = 41 %;LVEDV = 141 ml.)  * Dilated right ventricle noted.    < end of copied text >      < from: Cardiac Cath Lab - Adult (05.11.21 @ 16:42) >  RHC  Moderate Elevation of Right Heart Pressures:  RA=15 mmHg; RV=61/18 mmHg; PCWP=23 mmHg  Moderate Pulmonary Hypertension=62/22 mmHg  Reduced CO=4.8 L/min and CI=2.06 L/min/m2    < end of copied text >      ALLERGIES: Allergies    No Known Allergies        PAST MEDICAL HISTORY  see HPI        PAST SURGICAL HISTORY  S/P CABG (coronary artery bypass graft)    History of loop recorder    Presence of Watchman left atrial appendage closure device    S/P skin cancer resection        SOCIAL HISTORY:  Denies smoking/alcohol/drugs      FAMILY HISTORY:  No pertinent family history in first degree relatives  cad  PAST SURGICAL HISTORY  S/P CABG (coronary artery bypass graft)    History of loop recorder    Presence of Watchman left atrial appendage closure device    S/P skin cancer resection        SOCIAL HISTORY:  Denies smoking/alcohol/drugs      FAMILY HISTORY:  No pertinent family history in first degree relatives  cad      CURRENT MEDICATIONS:  MEDICATIONS  (STANDING):    MEDICATIONS  (PRN):         HOME MEDICATIONS:  rehab list      Vital Signs Last 24 Hrs  T(C): 36.4 (10-06-22 @ 13:17), Max: 36.4 (10-06-22 @ 13:17)  T(F): 97.5 (10-06-22 @ 13:17), Max: 97.5 (10-06-22 @ 13:17)  HR: 71 (10-06-22 @ 13:17) (71 - 71)  BP: 105/61 (10-06-22 @ 13:17) (105/61 - 105/61)  BP(mean): --  RR: 16 (10-06-22 @ 13:17) (16 - 16)  SpO2: 94% (10-06-22 @ 13:17) (94% - 94%)  I&O's Summary      Appearance: Comfortable. No acute distress, frail   HEENT:  Head and neck: Atraumatic. Normocephalic.  Normal oral mucosa, PERRL, Neck is supple. No JVD, No carotid bruit.   Neurologic: A&Ox 3, no focal deficits. +R sided facial droop chronic  Lymphatic: No cervical lymphadenopathy  Cardiovascular: Normal S1 S2, RRR, No murmur, rubs/gallops. No JVD, No edema; +tenderness bilateral chest wall  Respiratory: Lungs clear to auscultation  Gastrointestinal:  hyperactive bowel sounds, +tenderness  Lower Extremities: No edema  Psychiatry: Patient is calm. No agitation. Mood & affect appropriate  Skin: No rashes/ecchymoses/cyanosis/ulcers visualized on the face, hands or feet.      Labs:   pending            Serum Pro-Brain Natriuretic Peptide: 96964 pg/mL (09-04-22 @ 00:12)  Serum Pro-Brain Natriuretic Peptide: 45769 pg/mL (07-07-22 @ 06:16)  Serum Pro-Brain Natriuretic Peptide: 84186 pg/mL (07-03-22 @ 06:14)  Serum Pro-Brain Natriuretic Peptide: 20500 pg/mL (06-29-22 @ 09:05)  Serum Pro-Brain Natriuretic Peptide: 07384 pg/mL (06-27-22 @ 15:36)  Serum Pro-Brain Natriuretic Peptide: 17099 pg/mL (06-10-21 @ 06:22)  Serum Pro-Brain Natriuretic Peptide: 72485 pg/mL (05-28-21 @ 15:45)  Serum Pro-Brain Natriuretic Peptide: 08824 pg/mL (05-11-21 @ 06:10)  Serum Pro-Brain Natriuretic Peptide: 87367 pg/mL (05-09-21 @ 11:25)  Serum Pro-Brain Natriuretic Peptide: 28147 pg/mL (05-09-21 @ 06:06)  Serum Pro-Brain Natriuretic Peptide: 90468 pg/mL (05-08-21 @ 08:39)      A1C with Estimated Average Glucose Result: 6.7 % (07-02-22 @ 06:37)      TELEMETRY: Reviewed    ECG:  rate controlled Afib RBBB

## 2022-10-06 NOTE — ED PROVIDER NOTE - SKIN, MLM
Spine appears normal, range of motion is not limited, no muscle or joint tenderness
Skin normal color for race, warm, dry and intact. No evidence of rash.

## 2022-10-06 NOTE — ED PROVIDER NOTE - OBJECTIVE STATEMENT
The patient is a 81 year old male with history of DM, HTN, HLD and cardiomyopathy s/p CABG presents with chest pain and SOB for few days  Dr Duke of [facility]

## 2022-10-06 NOTE — ED ADULT NURSE NOTE - OBJECTIVE STATEMENT
Pt with hx of CABG is here with c/o CP and SOB for the past few days.  Pt also c/o abd pain and diarrhea.

## 2022-10-07 NOTE — H&P ADULT - ASSESSMENT
80 y/o male with pan-colitis, KEN on CKD-4, Hx of CAD s/p PCI/CABG, ICM w/ EF of 30%, Afib, chronic anemia, DM-2, HTN, Gout    Pan-colitis:  -concern for C diff based on hx   -Was only on oral Vancomycin for 2 days prior to being sent to ED  -Will Cont. Oral vanco  -isolation  -CLD for now  -f/u stool PCR  -monitor lytes  -DVT-P  -OOB, ambulate, fall risk  -PT eval while pending return to [facility]    KEN:  -No LUTs symptoms  -Cont. flomax  -Avoid nephrotoxins  -repeat BMP post IVF  -Nephrology eval-Called    CAD:  -Cardiology consult reviewed  -No evidence of ACS  -EKG unchanged  -Trop elevation due to low GFR  -CP reproducible   -No evidence of decompensated CHF    Afib:  -No A/C due to prior GIB  -S/P Watchman device    Chronic anemia:  -Due to CKD/AOCD  -monitor CBC     DM-2:  -ADA diet  -Basal/bolus insulin  -SS    HTN:  -resume o/p regimen    Gout:  Resume Allopurinol     Discussed with ED staff, Patient who agree with above plan of care.      82 y/o male with pan-colitis, KEN on CKD-4, Hx of CAD s/p PCI/CABG, ICM w/ EF of 30%, Afib, chronic anemia, DM-2, HTN, Gout    Pan-colitis:  -concern for C diff based on hx   -Was only on oral Vancomycin for 2 days prior to being sent to ED  -Will Cont. Oral vanco  -isolation  -CLD for now  -f/u stool PCR  -monitor lytes  -DVT-P  -OOB, ambulate, fall risk  -PT eval while pending return to [facility]    KEN:  -No LUTs symptoms  -Cont. flomax  -Avoid nephrotoxins  -repeat BMP post IVF  -Nephrology eval-Called    CAD:  -Cardiology consult reviewed  -No evidence of ACS  -EKG unchanged  -Trop elevation due to low GFR  -CP reproducible   -No evidence of decompensated CHF    Afib:  -No A/C due to prior GIB  -Digoxin on hold pending level in setting of KEN on advanced CKD  -Check Mag, replace Potassium to goal >4  -S/P Watchman device    Chronic anemia:  -Due to CKD/AOCD  -monitor CBC     DM-2:  -ADA diet  -Basal/bolus insulin  -SS    HTN:  -resume o/p regimen    Gout:  Resume Allopurinol     Discussed with ED staff, Patient who agree with above plan of care.

## 2022-10-07 NOTE — H&P ADULT - CONVERSATION DETAILS
Wishes to be admitted for w/u of ongoing diarrhea, and KEN. Wishes to remain DNR/DNI, but agreeable to all other indicated treatments at this time.

## 2022-10-07 NOTE — CONSULT NOTE ADULT - SUBJECTIVE AND OBJECTIVE BOX
[facility]  INFECTIOUS DISEASES at [place] and [place]  =======================================================                               Cecil Bright MD#   Lyssa Oconnor MD*                             Asia Cast MD*   Gricelda Gray MD*            Diplomates American Board of Internal Medicine & Infectious Diseases                # [place] Office - Appt - Tel  [phone] Fax [phone]                * [place] Office - Appt - Tel [phone] Fax [phone]                                  Hospital Consult line:  [phone]  =======================================================      [id number]  CHU LOGAN    CC: Patient is a 81y old  Male who presents with a chief complaint of KEN on CKD  R/O recurrent C diff (07 Oct 2022 04:56)      81y  Male with h/o 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 Patient sent in from [facility] for persistent loose stool. Patient was started on PO vancomycin and Imodium on 10/5 due to hx of C diff in past which patient states he has had 2x before. Denies fever, chills, N/V. Has mild abdominal pain but no rebound or guarding noted. No reported blood in stool. In ED patient is afebrile, no leukocytosis. CT abd/pelvis with pan colitis, Labs with chronic anemia and KEN on CKD. Give IVF, PO vancomycin, and IV flagyl. Stool C diff is negative. ID input requested.        Past Medical & Surgical Hx:  Coronary artery disease involving native heart, angina presence unspecified, unspecified vessel or lesion type  nuc stress 5/2021 negative  Atrial fibrillation, unspecified type  s/p MARLO/DCCV 5/11/21  Congestive heart failure, unspecified HF chronicity, unspecified heart failure type EF 35-40%  [place] palsy  Right side of face  Essential hypertension  Type 2 diabetes mellitus without complication, unspecified whether long term insulin use  insulin plus orals  Osteomyelitis of sternum  Chronic kidney disease, unspecified CKD stage  stage III. Recent BL cr 2.0-2.5  Anemia secondary to renal failure together with iron deficiency  GIB (gastrointestinal bleeding) s/p coil embolization  Bifascicular block s/p ILR implant  Squamous cell skin cancer  S/P CABG (coronary artery bypass graft)  complicated by osteomyleitis of the sternum and sepsis; sternum removed  History of loop recorder  Presence of Watchman left atrial appendage closure device  S/P skin cancer resection      Social Hx:  Denies smoking or ETOH      FAMILY HISTORY:  Patient does not recall any medical problems of his mother or father       Allergies  No Known Allergies      REVIEW OF SYSTEMS:  CONSTITUTIONAL:  No Fever or chills  HEENT:  No diplopia or blurred vision.  No earache, sore throat or runny nose.  CARDIOVASCULAR:  No chest pain  RESPIRATORY:  No cough, shortness of breath  GASTROINTESTINAL:  No nausea, vomiting. + diarrhea.  GENITOURINARY:  No dysuria, frequency or urgency. No Blood in urine  MUSCULOSKELETAL:  no joint aches, no muscle pain  SKIN:  No change in skin, hair or nails.  NEUROLOGIC:  No Headaches, seizures  PSYCHIATRIC:  No disorder of thought or mood.  ENDOCRINE:  No heat or cold intolerance  HEMATOLOGICAL:  No easy bruising or bleeding.       Physical Exam:  GEN: NAD  HEENT: normocephalic and atraumatic. EOMI. PERRL.    NECK: Supple.   LUNGS: CTA B/L.  HEART: RRR  ABDOMEN: Soft, NT, ND.  +BS.    : No CVA tenderness  EXTREMITIES: Without  edema.  MSK: No joint swelling  NEUROLOGIC: Rt sided facial droop   PSYCHIATRIC: Appropriate affect .  SKIN: No rash      Height (cm): 185.4 (10-06 @ 13:17)  Weight (kg): 86.2 (10-06 @ 13:17)  BMI (kg/m2): 25.1 (10-06 @ 13:17)  BSA (m2): 2.11 (10-06 @ 13:17)      Vitals:  T(F): 98.2 (07 Oct 2022 06:30), Max: 98.2 (07 Oct 2022 06:30)  HR: 52 (07 Oct 2022 06:30)  BP: 130/60 (07 Oct 2022 06:30)  RR: 16 (07 Oct 2022 06:30)  SpO2: 96% (07 Oct 2022 06:30) (94% - 98%)  temp max in last 48H T(F): , Max: 98.2 (10-07-22 @ 06:30)      Current Antibiotics:  vancomycin    Solution 125 milliGRAM(s) Oral every 6 hours    Other medications:  allopurinol 100 milliGRAM(s) Oral daily  aspirin enteric coated 81 milliGRAM(s) Oral daily  atorvastatin 40 milliGRAM(s) Oral at bedtime  buMETAnide 1 milliGRAM(s) Oral two times a day  buPROPion XL (24-Hour) . 150 milliGRAM(s) Oral daily  busPIRone 5 milliGRAM(s) Oral two times a day  dextrose 5%. 1000 milliLiter(s) IV Continuous <Continuous>  dextrose 5%. 1000 milliLiter(s) IV Continuous <Continuous>  dextrose 50% Injectable 25 Gram(s) IV Push once  dextrose 50% Injectable 12.5 Gram(s) IV Push once  dextrose 50% Injectable 25 Gram(s) IV Push once  glucagon  Injectable 1 milliGRAM(s) IntraMuscular once  hydrALAZINE 50 milliGRAM(s) Oral every 8 hours  insulin glargine Injectable (LANTUS) 5 Unit(s) SubCutaneous at bedtime  insulin lispro (ADMELOG) corrective regimen sliding scale   SubCutaneous Before meals and at bedtime  insulin lispro Injectable (ADMELOG) 2 Unit(s) SubCutaneous before breakfast  insulin lispro Injectable (ADMELOG) 2 Unit(s) SubCutaneous before lunch  insulin lispro Injectable (ADMELOG) 2 Unit(s) SubCutaneous before dinner  isosorbide   dinitrate Tablet (ISORDIL) 10 milliGRAM(s) Oral three times a day  mirtazapine 30 milliGRAM(s) Oral at bedtime  sodium bicarbonate 650 milliGRAM(s) Oral two times a day  sodium chloride 0.9% lock flush 3 milliLiter(s) IV Push every 8 hours  tamsulosin 0.4 milliGRAM(s) Oral at bedtime                            9.6    7.06  )-----------( 205      ( 06 Oct 2022 13:58 )             31.4     10-07    140  |  101  |  46.5<H>  ----------------------------<  68<L>  3.9   |  24.0  |  4.08<H>    Ca    7.7<L>      07 Oct 2022 09:30    TPro  6.7  /  Alb  2.9<L>  /  TBili  0.3<L>  /  DBili  x   /  AST  20  /  ALT  11  /  AlkPhos  121<H>  10-06      WBC Count: 7.06 K/uL (10-06-22 @ 13:58)    Creatinine, Serum: 4.08 mg/dL (10-07-22 @ 09:30)  Creatinine, Serum: 4.22 mg/dL (10-06-22 @ 18:20)     SARS-CoV-2 Result: NotDetec (10-07-22 @ 04:00)          < from: CT Abdomen and Pelvis No Cont (10.07.22 @ 02:13) >  ACC: [id number] EXAM:  CT ABDOMEN AND PELVIS                          PROCEDURE DATE:  10/07/2022      INTERPRETATION:  CLINICAL INFORMATION: Abdominal pain. Recent colitis.    COMPARISON: Comparison is made to CT torso 9/4/2022.    PROCEDURE:  CT of the Abdomen and Pelvis was performed without intravenous contrast.  Intravenous contrast: None.  Oral contrast: None.  Sagittal and coronal reformats were performed.    FINDINGS: Absence of intravenous contrast limits evaluation of   vasculature and solid organs.    LOWER CHEST: Mild bibasilar dependent atelectasis. Cardiomegaly.   Partially imaged postoperative changes of CABG. Coronary artery   calcification and/or stenting.    LIVER: Within normal limits.  BILE DUCTS: Normal caliber.  GALLBLADDER: Cholelithiasis.  SPLEEN: Within normal limits.  PANCREAS: Within normal limits.  ADRENALS: Within normal limits.  KIDNEYS/URETERS: No hydronephrosis. Left renal cysts including a   hyperdense cyst.    BLADDER: Wall thickening.  REPRODUCTIVE ORGANS: Prominent prostate with nodular density at the apex,   cause mass effect and protrusion at the bladder base.    BOWEL: There is diffuse long segmental colonic wall thickening with   pericolonic stranding, compatible with colitis, likely infectious or   inflammatory. No bowel obstruction. Diverticulosis coli.  PERITONEUM: No ascites.  VESSELS:  Atherosclerotic changes.  RETROPERITONEUM: No lymphadenopathy.  ABDOMINAL WALL: Right groin hernia postoperative changes.  BONES: Multilevel degenerative the spine. Heterogeneity of the T10   vertebral body containing lytic lesion reidentified.  AI VERTEBRAL BODY ANALYSIS:  T8: Moderate compression fracture with 32% height loss.  L1: Moderate compression fracture with 27% height loss.    IMPRESSION:    Diffuse pancolitis, differential includes C. difficile, other infectious   agents, and inflammatory bowel disease. No bowel obstruction.    Diffuse bladder wall thickening, which may related to chronic bladder   outlet obstruction from enlarged prostate. Correlate with urinalysis and   lab values to assess for cystitis and/or ascending urinary tract   infection.    VERTEBRAL BODY ANALYSIS: Redemonstrated stable vertebral compression   fractures as described, consider further workup for osteoporosis.   Heterogeneity of the T10 vertebral body containing lytic lesion   reidentified.    Additional findings as mentioned above.        --- End of Report ---  < end of copied text >

## 2022-10-07 NOTE — PHYSICAL THERAPY INITIAL EVALUATION ADULT - ADDITIONAL COMMENTS
As per pt and his spouse pt with recent decline in function: Pt lives with spouse in a house c 2 [address] c rail and resides on main level. Pt shower in outdoor shower c grab bar through back of house. Pt has family support in the area. Pt amb with RW and independent with functional mobility. Pt has assist for showering, toilet hygiene, LB dressing, and IADLs. Pt's does not drives and owns RW, Rollator, and SAC. pt has been at [facility] since 7/2022.

## 2022-10-07 NOTE — H&P ADULT - NSICDXNOFAMILYHX_GEN_ALL_CORE
Patient seen and examined with house-staff during bedside rounds  Resident note read, including vitals, physical findings, laboratory data, and radiological reports.   Revisions included below.  Case discussed with House staff  Direct personal management at bedside  and extensive interpretation of data. Decision making of high complexity. <-- Click to add NO pertinent Family History

## 2022-10-07 NOTE — H&P ADULT - HISTORY OF PRESENT ILLNESS
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 Patient sent in from [facility] from [facility] for which he has been at since his last DC from [facility] in July. Patient sent in for persistent loose stool with day and night. Patient was started on PO vancomycin and Imodium on 10/5 due to hx of C diff in past which patient states he has had 2x before. Denies fever, chills, N/V. Has mild abdominal pain but no rebound or guarding noted. No reported blood in stool. In ED also c/o localized CP in area of prior CABG. Vitals stable in ED, EKG unchanged from prior, Trop with chronic elevation. CT abd/pelvis with pan colitis, Labs with chronic anemia and KEN on CKD. Give IVF, PO vancomycin, and IV flagyl.

## 2022-10-07 NOTE — CONSULT NOTE ADULT - ASSESSMENT
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 Patient sent in from [facility] for persistent loose stool. Patient was started on PO vancomycin and Imodium on 10/5 due to hx of C diff in past which patient states he has had 2x before. Denies fever, chills, N/V. Has mild abdominal pain but no rebound or guarding noted. No reported blood in stool. In ED patient is afebrile, no leukocytosis. CT abd/pelvis with pan colitis, Labs with chronic anemia and KEN on CKD. Give IVF, PO vancomycin, and IV flagyl. Stool C diff is negative.      Diarrhea  Colitis   h/o C diff   KEN      - Blood cultures pending  - Stool for C diff is negative  - GI PCR ordered   - RVP/COVID 19 PCR pending  - CT A/P reporting colitis   - Continue IV Flagyl  - D/C PO Vancomycin   - Follow up cultures  - Trend Fever  - Trend WBC      Thank you for allowing me to participate in the care of your patient.   Will Follow    d/w Dr Bales

## 2022-10-07 NOTE — PROGRESS NOTE ADULT - ASSESSMENT
82 y/o male with pan-colitis, KEN on CKD-4, Hx of CAD s/p PCI/CABG, ICM w/ EF of 30%, Afib, chronic anemia, DM-2, HTN, Gout    Pan-colitis:  -concern for C diff based on hx   -Was only on oral Vancomycin for 2 days prior to being sent to ED  -Will Cont. Oral vanco  -advance to full liquid diet  -f/u stool PCR  -monitor lytes  -DVT-P  -OOB, ambulate, fall risk  -PT eval while pending return to [facility]    KEN: in setting of ckd stage 4  -Cont. flomax  -Avoid nephrotoxins  -Nephrology eval-Called    CAD:  -Cardiology consult reviewed  -No evidence of ACS  -EKG unchanged  -Trop elevation due to low GFR  -CP reproducible   -No evidence of decompensated CHF    Afib:  -No A/C due to prior GIB  -Digoxin on hold pending level in setting of KEN on advanced CKD  -Check Mag, replace Potassium to goal >4  -S/P Watchman device    Chronic anemia:  -Due to CKD/AOCD  -monitor CBC     DM-2:  -ADA diet  -Basal/bolus insulin  -SS    HTN:  -resume o/p regimen    Gout:   Allopurinol

## 2022-10-07 NOTE — PROGRESS NOTE ADULT - SUBJECTIVE AND OBJECTIVE BOX
CHU LOGAN    [id number]    81y      Male    INTERVAL HPI/OVERNIGHT EVENTS: patient being seen for pancolitis and acute on chronic renal failure.     REVIEW OF SYSTEMS:    CONSTITUTIONAL: No fever, weight loss, or fatigue  RESPIRATORY: No cough, wheezing, hemoptysis; No shortness of breath  CARDIOVASCULAR: No chest pain, palpitations  GASTROINTESTINAL: No abdominal or epigastric pain. No nausea, vomiting  NEUROLOGICAL: No headaches, memory loss, loss of strength.  MISCELLANEOUS:      Vital Signs Last 24 Hrs  T(C): 36.8 (07 Oct 2022 06:30), Max: 36.8 (07 Oct 2022 06:30)  T(F): 98.2 (07 Oct 2022 06:30), Max: 98.2 (07 Oct 2022 06:30)  HR: 52 (07 Oct 2022 06:30) (52 - 78)  BP: 130/60 (07 Oct 2022 06:30) (105/61 - 155/89)  BP(mean): 111 (07 Oct 2022 04:56) (111 - 111)  RR: 16 (07 Oct 2022 06:30) (16 - 18)  SpO2: 96% (07 Oct 2022 06:30) (94% - 98%)    Parameters below as of 07 Oct 2022 06:30  Patient On (Oxygen Delivery Method): room air        PHYSICAL EXAM:    · Constitutional	no distress  · Constitutional Comments	elderly male lying in bed awake, conversant  · Eyes	conjunctiva clear  · ENMT	mouth  · Mouth	dry  · Respiratory	no wheezes; no rales; no rhonchi  · Cardiovascular	no gallops; no rub; Irregularly irregular rhythm  · Gastrointestinal	soft; nontender  · Gastrointestinal Comments	mild pain to deep palpation  · Skin	warm and dry  · Musculoskeletal	no joint swelling  · Psychiatric	normal affect        LABS:                        9.6    7.06  )-----------( 205      ( 06 Oct 2022 13:58 )             31.4     10-07    140  |  101  |  46.5<H>  ----------------------------<  68<L>  3.9   |  24.0  |  4.08<H>    Ca    7.7<L>      07 Oct 2022 09:30    TPro  6.7  /  Alb  2.9<L>  /  TBili  0.3<L>  /  DBili  x   /  AST  20  /  ALT  11  /  AlkPhos  121<H>  10-06    PT/INR - ( 06 Oct 2022 13:58 )   PT: 14.1 sec;   INR: 1.21 ratio         PTT - ( 06 Oct 2022 13:58 )  PTT:27.1 sec        MEDICATIONS  (STANDING):  allopurinol 100 milliGRAM(s) Oral daily  aspirin enteric coated 81 milliGRAM(s) Oral daily  atorvastatin 40 milliGRAM(s) Oral at bedtime  buMETAnide 1 milliGRAM(s) Oral two times a day  buPROPion XL (24-Hour) . 150 milliGRAM(s) Oral daily  busPIRone 5 milliGRAM(s) Oral two times a day  dextrose 5%. 1000 milliLiter(s) (50 mL/Hr) IV Continuous <Continuous>  dextrose 5%. 1000 milliLiter(s) (100 mL/Hr) IV Continuous <Continuous>  dextrose 50% Injectable 25 Gram(s) IV Push once  dextrose 50% Injectable 12.5 Gram(s) IV Push once  dextrose 50% Injectable 25 Gram(s) IV Push once  glucagon  Injectable 1 milliGRAM(s) IntraMuscular once  hydrALAZINE 50 milliGRAM(s) Oral every 8 hours  insulin glargine Injectable (LANTUS) 5 Unit(s) SubCutaneous at bedtime  insulin lispro (ADMELOG) corrective regimen sliding scale   SubCutaneous Before meals and at bedtime  insulin lispro Injectable (ADMELOG) 2 Unit(s) SubCutaneous before breakfast  insulin lispro Injectable (ADMELOG) 2 Unit(s) SubCutaneous before lunch  insulin lispro Injectable (ADMELOG) 2 Unit(s) SubCutaneous before dinner  isosorbide   dinitrate Tablet (ISORDIL) 10 milliGRAM(s) Oral three times a day  mirtazapine 30 milliGRAM(s) Oral at bedtime  sodium bicarbonate 650 milliGRAM(s) Oral two times a day  sodium chloride 0.9% lock flush 3 milliLiter(s) IV Push every 8 hours  tamsulosin 0.4 milliGRAM(s) Oral at bedtime  vancomycin    Solution 125 milliGRAM(s) Oral every 6 hours    MEDICATIONS  (PRN):  acetaminophen     Tablet .. 650 milliGRAM(s) Oral every 6 hours PRN Temp greater or equal to 38C (100.4F), Mild Pain (1 - 3)  aluminum hydroxide/magnesium hydroxide/simethicone Suspension 30 milliLiter(s) Oral every 4 hours PRN Dyspepsia  dextrose Oral Gel 15 Gram(s) Oral once PRN Blood Glucose LESS THAN 70 milliGRAM(s)/deciliter  melatonin 3 milliGRAM(s) Oral at bedtime PRN Insomnia  ondansetron Injectable 4 milliGRAM(s) IV Push every 8 hours PRN Nausea and/or Vomiting      RADIOLOGY & ADDITIONAL TESTS:

## 2022-10-07 NOTE — CONSULT NOTE ADULT - SUBJECTIVE AND OBJECTIVE BOX
HPI: Patient is a 82 y/o male with hx of right facial injury and facial paralysis due to prior surgery, HTN, DM-2, CKD-3, sent in from [facility] from [facility] for which he has been at since his last DC from [facility] in July. Patient sent in for persistent loose stool with day and night. Patient was started on PO vancomycin and Imodium on 10/5 due to hx of C diff in past. + mild abdominal pain. No fever, chills, melena, black tarry stools. In ED labs were significant for SCr 4.22 mg/dl up from baseline Scr of 1.8-2 mg/dl. CT abd/pelvis with pan colitis. Started on IVF, PO vancomycin, and IV flagyl. He denied increased urination frequency, dysuria, gross hematuria,  skin rash or itching, nausea, vomiting, bleeding problems and joint pain or swelling. Review of other systems was negative.    PAST MEDICAL & SURGICAL HISTORY:  Coronary artery disease involving native heart  Atrial fibrillation, unspecified type  s/p MARLO/DCCV 5/11/21      Congestive heart failure, unspecified HF chronicity, unspecified heart failure type  EF 35-40%      Bell&#x27;s palsy  Right side of face      Essential hypertension      Type 2 diabetes mellitus without complication, unspecified whether long term insulin use  insulin plus orals      Osteomyelitis of sternum      Chronic kidney disease, unspecified CKD stage  stage III. Recent BL cr 2.0-2.5      Anemia secondary to renal failure  together with iron deficiency      GIB (gastrointestinal bleeding)  s/p coil embolization      Bifascicular block  s/p ILR implant      Squamous cell skin cancer      S/P CABG (coronary artery bypass graft)  complicated by osteomyleitis of the sternum and sepsis; sternum removed      History of loop recorder      Presence of Watchman left atrial appendage closure device      S/P skin cancer resection          FAMILY HISTORY:  No pertinent family history in first degree relatives  cad        Social History:    MEDICATIONS  (STANDING):  allopurinol 100 milliGRAM(s) Oral daily  aspirin enteric coated 81 milliGRAM(s) Oral daily  atorvastatin 40 milliGRAM(s) Oral at bedtime  buMETAnide 1 milliGRAM(s) Oral two times a day  buPROPion XL (24-Hour) . 150 milliGRAM(s) Oral daily  busPIRone 5 milliGRAM(s) Oral two times a day  dextrose 5%. 1000 milliLiter(s) (50 mL/Hr) IV Continuous <Continuous>  dextrose 5%. 1000 milliLiter(s) (100 mL/Hr) IV Continuous <Continuous>  dextrose 50% Injectable 25 Gram(s) IV Push once  dextrose 50% Injectable 12.5 Gram(s) IV Push once  dextrose 50% Injectable 25 Gram(s) IV Push once  glucagon  Injectable 1 milliGRAM(s) IntraMuscular once  hydrALAZINE 50 milliGRAM(s) Oral every 8 hours  insulin glargine Injectable (LANTUS) 5 Unit(s) SubCutaneous at bedtime  insulin lispro (ADMELOG) corrective regimen sliding scale   SubCutaneous Before meals and at bedtime  insulin lispro Injectable (ADMELOG) 2 Unit(s) SubCutaneous before breakfast  insulin lispro Injectable (ADMELOG) 2 Unit(s) SubCutaneous before lunch  insulin lispro Injectable (ADMELOG) 2 Unit(s) SubCutaneous before dinner  isosorbide   dinitrate Tablet (ISORDIL) 10 milliGRAM(s) Oral three times a day  mirtazapine 30 milliGRAM(s) Oral at bedtime  sodium bicarbonate 650 milliGRAM(s) Oral two times a day  sodium chloride 0.9% lock flush 3 milliLiter(s) IV Push every 8 hours  tamsulosin 0.4 milliGRAM(s) Oral at bedtime  vancomycin    Solution 125 milliGRAM(s) Oral every 6 hours    MEDICATIONS  (PRN):  acetaminophen     Tablet .. 650 milliGRAM(s) Oral every 6 hours PRN Temp greater or equal to 38C (100.4F), Mild Pain (1 - 3)  aluminum hydroxide/magnesium hydroxide/simethicone Suspension 30 milliLiter(s) Oral every 4 hours PRN Dyspepsia  dextrose Oral Gel 15 Gram(s) Oral once PRN Blood Glucose LESS THAN 70 milliGRAM(s)/deciliter  melatonin 3 milliGRAM(s) Oral at bedtime PRN Insomnia  ondansetron Injectable 4 milliGRAM(s) IV Push every 8 hours PRN Nausea and/or Vomiting      Allergies    No Known Allergies    Intolerances        REVIEW OF SYSTEMS:    CONSTITUTIONAL: No fever, weight loss, or fatigue  EYES: No eye pain, visual disturbances, or discharge  ENMT:  No difficulty hearing, tinnitus, vertigo; No sinus or throat pain  NECK: No pain or stiffness  BREASTS: No pain, masses, or nipple discharge  RESPIRATORY: No cough, wheezing, chills or hemoptysis; No shortness of breath  CARDIOVASCULAR: No chest pain, palpitations, dizziness, or leg swelling  GASTROINTESTINAL: No abdominal or epigastric pain. No nausea, vomiting, or hematemesis; No diarrhea or constipation. No melena or hematochezia.  GENITOURINARY: No dysuria, frequency, hematuria, or incontinence  NEUROLOGICAL: No headaches, memory loss, loss of strength, numbness, or tremors  SKIN: No itching, burning, rashes, or lesions   LYMPH NODES: No enlarged glands  ENDOCRINE: No heat or cold intolerance; No hair loss  MUSCULOSKELETAL: No joint pain or swelling; No muscle, back, or extremity pain  PSYCHIATRIC: No depression, anxiety, mood swings, or difficulty sleeping  HEME/LYMPH: No easy bruising, or bleeding gums  ALLERY AND IMMUNOLOGIC: No hives or eczema      Vital Signs Last 24 Hrs  T(C): 36.8 (07 Oct 2022 06:30), Max: 36.8 (07 Oct 2022 06:30)  T(F): 98.2 (07 Oct 2022 06:30), Max: 98.2 (07 Oct 2022 06:30)  HR: 52 (07 Oct 2022 06:30) (52 - 78)  BP: 130/60 (07 Oct 2022 06:30) (130/60 - 155/89)  BP(mean): 111 (07 Oct 2022 04:56) (111 - 111)  RR: 16 (07 Oct 2022 06:30) (16 - 18)  SpO2: 96% (07 Oct 2022 06:30) (94% - 98%)    Parameters below as of 07 Oct 2022 06:30  Patient On (Oxygen Delivery Method): room air        PHYSICAL EXAM:    GENERAL: NAD, well-groomed, well-developed  HEAD:  Atraumatic, Normocephalic  EYES: EOMI, PERRLA, conjunctiva and sclera clear  ENMT: No tonsillar erythema, exudates, or enlargement; Moist mucous membranes, Good dentition, No lesions  NECK: Supple, No JVD, Normal thyroid  NERVOUS SYSTEM:  Alert & Oriented X3, Good concentration; Motor Strength 5/5 B/L upper and lower extremities; DTRs 2+ intact and symmetric  CHEST/LUNG: Clear to percussion bilaterally; No rales, rhonchi, wheezing, or rubs  HEART: Regular rate and rhythm; No murmurs, rubs, or gallops  ABDOMEN: Soft, Nontender, Nondistended; Bowel sounds present  EXTREMITIES:  2+ Peripheral Pulses, No clubbing, cyanosis, or edema  LYMPH: No lymphadenopathy noted  SKIN: No rashes or lesions      LABS:                        9.6    7.06  )-----------( 205      ( 06 Oct 2022 13:58 )             31.4     10-07    140  |  101  |  46.5<H>  ----------------------------<  68<L>  3.9   |  24.0  |  4.08<H>    Ca    7.7<L>      07 Oct 2022 09:30    TPro  6.7  /  Alb  2.9<L>  /  TBili  0.3<L>  /  DBili  x   /  AST  20  /  ALT  11  /  AlkPhos  121<H>  10-06    PT/INR - ( 06 Oct 2022 13:58 )   PT: 14.1 sec;   INR: 1.21 ratio         PTT - ( 06 Oct 2022 13:58 )  PTT:27.1 sec        RADIOLOGY & ADDITIONAL TESTS:   HPI: Patient is a 80 y/o male with hx of right facial injury and facial paralysis due to prior surgery, HTN, DM-2, CKD-3, sent in from [facility] from [facility] for which he has been at since his last DC from [facility] in July. Patient sent in for persistent loose stool with day and night. Patient was started on PO vancomycin and Imodium on 10/5 due to hx of C diff in past. + mild abdominal pain. No fever, chills, melena, black tarry stools. In ED labs were significant for SCr 4.22 mg/dl up from baseline Scr of 1.8-2 mg/dl. CT abd/pelvis with pan colitis. Started on IVF, PO vancomycin, and IV flagyl. He denied increased urination frequency, dysuria, gross hematuria,  skin rash or itching, nausea, vomiting, bleeding problems and joint pain or swelling. Review of other systems was negative.    PAST MEDICAL & SURGICAL HISTORY:  Coronary artery disease involving native heart  Atrial fibrillation, unspecified type s/p MARLO/DCCV 5/11/21  Congestive heart failure, unspecified HF chronicity, unspecified heart failure type EF 35-40%  Right side of face paralysis  Essential hypertension  Type 2 diabetes mellitus without complication, unspecified whether long term insulin use  Osteomyelitis of sternum  Chronic kidney disease, stage III.   Anemia secondary to renal failure together with iron deficiency  GIB (gastrointestinal bleeding) s/p coil embolization  Bifascicular block s/p ILR implant  Squamous cell skin cancer  S/P CABG (coronary artery bypass graft) complicated by osteomyelitis of the sternum and sepsis; sternum removed  History of loop recorder  Presence of Watchman left atrial appendage closure device  S/P skin cancer resection    FAMILY HISTORY:  No pertinent family history of renal diseases in first degree relatives     SOCIAL HISTORY: NH resident.    MEDICATIONS  (STANDING):  allopurinol 100 milliGRAM(s) Oral daily  aspirin enteric coated 81 milliGRAM(s) Oral daily  atorvastatin 40 milliGRAM(s) Oral at bedtime  buMETAnide 1 milliGRAM(s) Oral two times a day  buPROPion XL (24-Hour) . 150 milliGRAM(s) Oral daily  busPIRone 5 milliGRAM(s) Oral two times a day  dextrose 5%. 1000 milliLiter(s) (50 mL/Hr) IV Continuous <Continuous>  dextrose 5%. 1000 milliLiter(s) (100 mL/Hr) IV Continuous <Continuous>  dextrose 50% Injectable 25 Gram(s) IV Push once  dextrose 50% Injectable 12.5 Gram(s) IV Push once  dextrose 50% Injectable 25 Gram(s) IV Push once  glucagon  Injectable 1 milliGRAM(s) IntraMuscular once  hydrALAZINE 50 milliGRAM(s) Oral every 8 hours  insulin glargine Injectable (LANTUS) 5 Unit(s) SubCutaneous at bedtime  insulin lispro (ADMELOG) corrective regimen sliding scale   SubCutaneous Before meals and at bedtime  insulin lispro Injectable (ADMELOG) 2 Unit(s) SubCutaneous before breakfast  insulin lispro Injectable (ADMELOG) 2 Unit(s) SubCutaneous before lunch  insulin lispro Injectable (ADMELOG) 2 Unit(s) SubCutaneous before dinner  isosorbide   dinitrate Tablet (ISORDIL) 10 milliGRAM(s) Oral three times a day  mirtazapine 30 milliGRAM(s) Oral at bedtime  sodium bicarbonate 650 milliGRAM(s) Oral two times a day  sodium chloride 0.9% lock flush 3 milliLiter(s) IV Push every 8 hours  tamsulosin 0.4 milliGRAM(s) Oral at bedtime  vancomycin    Solution 125 milliGRAM(s) Oral every 6 hours    MEDICATIONS  (PRN):  acetaminophen     Tablet .. 650 milliGRAM(s) Oral every 6 hours PRN Temp greater or equal to 38C (100.4F), Mild Pain (1 - 3)  aluminum hydroxide/magnesium hydroxide/simethicone Suspension 30 milliLiter(s) Oral every 4 hours PRN Dyspepsia  dextrose Oral Gel 15 Gram(s) Oral once PRN Blood Glucose LESS THAN 70 milliGRAM(s)/deciliter  melatonin 3 milliGRAM(s) Oral at bedtime PRN Insomnia  ondansetron Injectable 4 milliGRAM(s) IV Push every 8 hours PRN Nausea and/or Vomiting    ALLERGIES: No Known Allergies    REVIEW OF SYSTEMS: All systems were reviewed in detail. Pertinent positive and negative have been detailed in HPI, otherwise negative.     Vital Signs Last 24 Hrs  T(C): 36.8 (07 Oct 2022 06:30), Max: 36.8 (07 Oct 2022 06:30)  T(F): 98.2 (07 Oct 2022 06:30), Max: 98.2 (07 Oct 2022 06:30)  HR: 52 (07 Oct 2022 06:30) (52 - 78)  BP: 130/60 (07 Oct 2022 06:30) (130/60 - 155/89)  BP(mean): 111 (07 Oct 2022 04:56) (111 - 111)  RR: 16 (07 Oct 2022 06:30) (16 - 18)  SpO2: 96% (07 Oct 2022 06:30) (94% - 98%)    Parameters below as of 07 Oct 2022 06:30  Patient On (Oxygen Delivery Method): room air    PHYSICAL EXAM:  GENERAL: elderly male, NAD  HEAD:  Right side facial palsy  EYES: conjunctiva and sclera clear  ENMT: Moist mucous membranes, OP clear  NECK: Supple, No JVD  NERVOUS SYSTEM:  Alert & Oriented X3  CHEST/LUNG: Clear to percussion bilaterally; No rales, rhonchi, wheezing, or rubs  HEART: Regular rate and rhythm; No murmurs, rubs, or gallops  ABDOMEN: Soft, Nontender, Nondistended; Bowel sounds present  EXTREMITIES:  No clubbing, cyanosis, or edema  SKIN: No rashes or lesions    LABS:                     9.6    7.06  )-----------( 205      ( 06 Oct 2022 13:58 )             31.4     10-07    140  |  101  |  46.5<H>  ----------------------------<  68<L>  3.9   |  24.0  |  4.08<H>    Ca    7.7<L>      07 Oct 2022 09:30  TPro  6.7  /  Alb  2.9<L>  /  TBili  0.3<L>  /  DBili  x   /  AST  20  /  ALT  11  /  AlkPhos  121<H>  10-06  PT/INR - ( 06 Oct 2022 13:58 )   PT: 14.1 sec;   INR: 1.21 ratio     PTT - ( 06 Oct 2022 13:58 )  PTT:27.1 sec    RADIOLOGY & ADDITIONAL TESTS:  < from: CT Abdomen and Pelvis No Cont (10.07.22 @ 02:13) >  IMPRESSION:    Diffuse pancolitis, differential includes C. difficile, other infectious agents, and inflammatory bowel disease. No bowel obstruction.  Diffuse bladder wall thickening, which may related to chronic bladder outlet obstruction from enlarged prostate. Correlate with urinalysis and lab values to assess for cystitis and/or ascending urinary tract infection.  VERTEBRAL BODY ANALYSIS: Redemonstrated stable vertebral compression fractures as described, consider further workup for osteoporosis.   Heterogeneity of the T10 vertebral body containing lytic lesion reidentified.

## 2022-10-07 NOTE — ED ADULT NURSE REASSESSMENT NOTE - NS ED NURSE REASSESS COMMENT FT1
Assumed care at 2230. Pt resting on stretcher. Reports no distress at this time. Safety precautions in place. Will continue to monitor

## 2022-10-07 NOTE — CONSULT NOTE ADULT - CONVERSATION DETAILS
Patient brought up that He would like to be DNR/DNI.   Confirmed with the patient his wishes and that he will remain DNR/DNI.

## 2022-10-07 NOTE — PHYSICAL THERAPY INITIAL EVALUATION ADULT - PERTINENT HX OF CURRENT PROBLEM, REHAB EVAL
80 y/o male with pan-colitis, KEN on CKD-4, Hx of CAD s/p PCI/CABG, ICM w/ EF of 30%, Afib, chronic anemia, DM-2, HTN, Gout

## 2022-10-07 NOTE — CONSULT NOTE ADULT - ASSESSMENT
1. Acute kidney injury on CKD stage 3b: prerenal volume depletion in setting of diarrhea/colitis  -will hold Bumex  -will start on IVF; NS @ 100 cc/hr  -will continue Flomax  Expecting SCr to improve w/ these interventions. More w/u if SCr doesn't follow expected course.    2. Colitis: continue on Abx.    3. Metabolic acidosis: c/w home dose sodium bicarbonate PO.    4. Anemia: will check iron stores w/ AM labs.    5. Hyperuricemia: c/w home dose allopurinol.     Thanks for the consult.

## 2022-10-08 NOTE — PROGRESS NOTE ADULT - SUBJECTIVE AND OBJECTIVE BOX
[facility]  INFECTIOUS DISEASES at [place] and [place]  =======================================================                               Cecil Bright MD#   Lyssa Oconnor MD*                             Asia Cast MD*   Gricelda Gray MD*            Diplomates American Board of Internal Medicine & Infectious Diseases                # [place] Office - Appt - Tel  [phone] Fax [phone]                * [place] Office - Appt - Tel [phone] Fax [phone]                                  Hospital Consult line:  [phone]  =======================================================    [place] [id number]    Follow up:      Allergies:  No Known Allergies           REVIEW OF SYSTEMS:  CONSTITUTIONAL:  No Fever or chills  HEENT:   No diplopia or blurred vision.  No earache, sore throat or runny nose.  CARDIOVASCULAR:  No Chest Pain  RESPIRATORY:  No cough, shortness of breath  GASTROINTESTINAL:  No nausea, vomiting or diarrhea.  GENITOURINARY:  No dysuria, frequency or urgency. No Blood in urine  MUSCULOSKELETAL:  no joint aches, no muscle pain  SKIN:  No change in skin, hair or nails.  NEUROLOGIC:  No Headaches, seizures   PSYCHIATRIC:  No disorder of thought or mood.  ENDOCRINE:  No heat or cold intolerance  HEMATOLOGICAL:  No easy bruising or bleeding.       Physical Exam:  GEN: NAD  HEENT: normocephalic and atraumatic. EOMI. PERRL.    NECK: Supple.   LUNGS: CTA B/L.  HEART: RRR  ABDOMEN: Soft, NT, ND.  +BS.    : No CVA tenderness  EXTREMITIES: Without  edema.  MSK: No joint swelling  NEUROLOGIC: No Focal Deficits   PSYCHIATRIC: Appropriate affect .  SKIN: No rash      Vitals:    T(F): 98.5 (08 Oct 2022 04:56), Max: 98.5 (08 Oct 2022 04:56)  HR: 69 (08 Oct 2022 04:56)  BP: 151/63 (08 Oct 2022 04:56)  RR: 18 (08 Oct 2022 04:56)  SpO2: 97% (08 Oct 2022 04:56) (96% - 98%)  temp max in last 48H T(F): , Max: 98.5 (10-08-22 @ 04:56)    Current Antibiotics:    Other medications:  allopurinol 100 milliGRAM(s) Oral daily  aspirin enteric coated 81 milliGRAM(s) Oral daily  atorvastatin 40 milliGRAM(s) Oral at bedtime  buPROPion XL (24-Hour) . 150 milliGRAM(s) Oral daily  busPIRone 5 milliGRAM(s) Oral two times a day  dextrose 5%. 1000 milliLiter(s) IV Continuous <Continuous>  dextrose 5%. 1000 milliLiter(s) IV Continuous <Continuous>  dextrose 50% Injectable 25 Gram(s) IV Push once  dextrose 50% Injectable 12.5 Gram(s) IV Push once  dextrose 50% Injectable 25 Gram(s) IV Push once  glucagon  Injectable 1 milliGRAM(s) IntraMuscular once  heparin   Injectable 5000 Unit(s) SubCutaneous every 12 hours  hydrALAZINE 50 milliGRAM(s) Oral every 8 hours  insulin lispro (ADMELOG) corrective regimen sliding scale   SubCutaneous Before meals and at bedtime  isosorbide   dinitrate Tablet (ISORDIL) 10 milliGRAM(s) Oral three times a day  mirtazapine 30 milliGRAM(s) Oral at bedtime  potassium chloride    Tablet ER 40 milliEquivalent(s) Oral once  sodium bicarbonate 650 milliGRAM(s) Oral two times a day  sodium chloride 0.9% lock flush 3 milliLiter(s) IV Push every 8 hours  sodium chloride 0.9%. 1000 milliLiter(s) IV Continuous <Continuous>  tamsulosin 0.4 milliGRAM(s) Oral at bedtime                            9.1    5.84  )-----------( 211      ( 08 Oct 2022 04:45 )             30.4     10-08    142  |  102  |  53.2<H>  ----------------------------<  86  3.3<L>   |  25.0  |  3.47<H>    Ca    7.7<L>      08 Oct 2022 04:45  Phos  3.5     10-08  Mg     1.4     10-08    TPro  6.7  /  Alb  2.9<L>  /  TBili  0.3<L>  /  DBili  x   /  AST  20  /  ALT  11  /  AlkPhos  121<H>  10-06      WBC Count: 5.84 K/uL (10-08-22 @ 04:45)  WBC Count: 7.06 K/uL (10-06-22 @ 13:58)    Creatinine, Serum: 3.47 mg/dL (10-08-22 @ 04:45)  Creatinine, Serum: 4.08 mg/dL (10-07-22 @ 09:30)  Creatinine, Serum: 4.22 mg/dL (10-06-22 @ 18:20)     SARS-CoV-2 Result: NotDetec (10-07-22 @ 04:00)    Clostridium difficile Toxin by PCR (10.07.22 @ 10:12)    Clostridium difficile Toxin by PCR: RESULT INTERPRETATION:    Not detected - No Clostridium difficile toxins detected by amplified DNA  PCR.      GI PCR Panel Stool (10.07.22 @ 12:00)    GI PCR Panel: NotDete: GI Panel PCR evaluates for:  Campylobacter, Plesiomonas shigelloides, Salmonella, Vibrio, Yersinia  enterocolitica, Enteroaggregative Escherichia (EAEC), Enteropathogenic E.  coli (EPEC), Enterotoxigenic E. coli (ETEC), Shiga-like toxin producing  E.coli (STEC), E. coli O157, Shigella/Enteroinvasive E. coli (EIEC),  Adenovirus, Astrovirus, Norovirus, Rotavirus, Sapovirus, Cryptosporidium,  Cyclospora cayetanensis, Entamoeba histolytica, Giardia lamblia.  For culture and susceptibility reports refer to “reflex stool culture”.         [facility]  INFECTIOUS DISEASES at [place] and [place]  =======================================================                               Cecil Bright MD#   Lyssa Oconnor MD*                             Asia Cast MD*   Gricelda Gray MD*            Diplomates American Board of Internal Medicine & Infectious Diseases                # [place] Office - Appt - Tel  [phone] Fax [phone]                * [place] Office - Appt - Tel [phone] Fax [phone]                                  Hospital Consult line:  [phone]  =======================================================    [place] [id number]    Follow up: Diarrhea    Still with diarrhea, some improvement    Allergies:  No Known Allergies           REVIEW OF SYSTEMS:  CONSTITUTIONAL:  No Fever or chills  HEENT:  No diplopia or blurred vision.  No earache, sore throat or runny nose.  CARDIOVASCULAR:  No chest pain  RESPIRATORY:  No cough, shortness of breath  GASTROINTESTINAL:  No nausea, vomiting. + diarrhea.  GENITOURINARY:  No dysuria, frequency or urgency. No Blood in urine  MUSCULOSKELETAL:  no joint aches, no muscle pain  SKIN:  No change in skin, hair or nails.  NEUROLOGIC:  No Headaches, seizures  PSYCHIATRIC:  No disorder of thought or mood.  ENDOCRINE:  No heat or cold intolerance  HEMATOLOGICAL:  No easy bruising or bleeding.       Physical Exam:  GEN: NAD  HEENT: normocephalic and atraumatic. EOMI. PERRL.    NECK: Supple.   LUNGS: CTA B/L.  HEART: RRR  ABDOMEN: Soft, NT, ND.  +BS.    : No CVA tenderness  EXTREMITIES: Without  edema.  MSK: No joint swelling  NEUROLOGIC: Rt sided facial droop   PSYCHIATRIC: Appropriate affect .  SKIN: No rash      Vitals:    T(F): 98.5 (08 Oct 2022 04:56), Max: 98.5 (08 Oct 2022 04:56)  HR: 69 (08 Oct 2022 04:56)  BP: 151/63 (08 Oct 2022 04:56)  RR: 18 (08 Oct 2022 04:56)  SpO2: 97% (08 Oct 2022 04:56) (96% - 98%)  temp max in last 48H T(F): , Max: 98.5 (10-08-22 @ 04:56)    Current Antibiotics:    Other medications:  allopurinol 100 milliGRAM(s) Oral daily  aspirin enteric coated 81 milliGRAM(s) Oral daily  atorvastatin 40 milliGRAM(s) Oral at bedtime  buPROPion XL (24-Hour) . 150 milliGRAM(s) Oral daily  busPIRone 5 milliGRAM(s) Oral two times a day  dextrose 5%. 1000 milliLiter(s) IV Continuous <Continuous>  dextrose 5%. 1000 milliLiter(s) IV Continuous <Continuous>  dextrose 50% Injectable 25 Gram(s) IV Push once  dextrose 50% Injectable 12.5 Gram(s) IV Push once  dextrose 50% Injectable 25 Gram(s) IV Push once  glucagon  Injectable 1 milliGRAM(s) IntraMuscular once  heparin   Injectable 5000 Unit(s) SubCutaneous every 12 hours  hydrALAZINE 50 milliGRAM(s) Oral every 8 hours  insulin lispro (ADMELOG) corrective regimen sliding scale   SubCutaneous Before meals and at bedtime  isosorbide   dinitrate Tablet (ISORDIL) 10 milliGRAM(s) Oral three times a day  mirtazapine 30 milliGRAM(s) Oral at bedtime  potassium chloride    Tablet ER 40 milliEquivalent(s) Oral once  sodium bicarbonate 650 milliGRAM(s) Oral two times a day  sodium chloride 0.9% lock flush 3 milliLiter(s) IV Push every 8 hours  sodium chloride 0.9%. 1000 milliLiter(s) IV Continuous <Continuous>  tamsulosin 0.4 milliGRAM(s) Oral at bedtime                            9.1    5.84  )-----------( 211      ( 08 Oct 2022 04:45 )             30.4     10-08    142  |  102  |  53.2<H>  ----------------------------<  86  3.3<L>   |  25.0  |  3.47<H>    Ca    7.7<L>      08 Oct 2022 04:45  Phos  3.5     10-08  Mg     1.4     10-08    TPro  6.7  /  Alb  2.9<L>  /  TBili  0.3<L>  /  DBili  x   /  AST  20  /  ALT  11  /  AlkPhos  121<H>  10-06      WBC Count: 5.84 K/uL (10-08-22 @ 04:45)  WBC Count: 7.06 K/uL (10-06-22 @ 13:58)    Creatinine, Serum: 3.47 mg/dL (10-08-22 @ 04:45)  Creatinine, Serum: 4.08 mg/dL (10-07-22 @ 09:30)  Creatinine, Serum: 4.22 mg/dL (10-06-22 @ 18:20)     SARS-CoV-2 Result: NotDetec (10-07-22 @ 04:00)    Clostridium difficile Toxin by PCR (10.07.22 @ 10:12)    Clostridium difficile Toxin by PCR: RESULT INTERPRETATION:    Not detected - No Clostridium difficile toxins detected by amplified DNA  PCR.      GI PCR Panel Stool (10.07.22 @ 12:00)    GI PCR Panel: NotDetec: GI Panel PCR evaluates for:  Campylobacter, Plesiomonas shigelloides, Salmonella, Vibrio, Yersinia  enterocolitica, Enteroaggregative Escherichia (EAEC), Enteropathogenic E.  coli (EPEC), Enterotoxigenic E. coli (ETEC), Shiga-like toxin producing  E.coli (STEC), E. coli O157, Shigella/Enteroinvasive E. coli (EIEC),  Adenovirus, Astrovirus, Norovirus, Rotavirus, Sapovirus, Cryptosporidium,  Cyclospora cayetanensis, Entamoeba histolytica, Giardia lamblia.  For culture and susceptibility reports refer to “reflex stool culture”.

## 2022-10-08 NOTE — PROGRESS NOTE ADULT - ASSESSMENT
1. Acute kidney injury on CKD stage IV prerenal volume depletion in setting of diarrhea/colitis  -SCr 4.22 on presentation; baseline ~2.5  - Scr improving with IV hydration  - Continue to hold diuretics    2. Colitis:  Abx as per primary team    3. Metabolic acidosis: c/w home dose sodium bicarbonate PO.    4. Anemia: check iron stores    5. Hypokalemia- replete K+

## 2022-10-08 NOTE — PROGRESS NOTE ADULT - ASSESSMENT
The patient is a 81 year old male with a a history of CAD status post PCI/CABG, CKD stage 3, hypertension, afib, Chronic anemia, diabetes mellitus type 2, previous C Diff sent from [facility] for diarrhea possible C DIff. Was started on PO Vancomcyin 2 days prior to admission. In the ER, started on PO Vancomycin and IV Flagyl. CT of the abdomen showed pan colitis. Admitted for pan- colitis possible C diff and KEN/CKD stage 3. ID was consulted, PO Vancomycin was discontinued. Started on IVF for hydration    Assessment/Plan:    1. Pan colitis:   -concern for C diff based on history   -Was only on oral Vancomycin for 2 days prior to being sent to ED  - On IV Flagyl  - ID following, PO Vanco discontinued   - C Diff negative   - FLD. advance as tolerated    2. KEN with CKD stage 3  - Baseline creatinine of 2.9-3.0  - Improving   - Bumex held  - IVF   -Avoid nephrotoxins    3. History of CAD  - Chest pain in the ER   -Cardiology consult reviewed  -No evidence of ACS  -EKG unchanged  -Trop elevation due to low GFR  -CP reproducible   -No evidence of decompensated CHF    4. AFib   -No A/C due to prior GIB  -Digoxin on hold pending level in setting of KEN on advanced CKD  - Supplement KCL   -S/P Watchman device    5. Chronic anemia:  -Due to CKD/AOCD  -monitor CBC     6. DM-2:  - Hypoglycemia  - Hold Lantus QHS and admelog pre-meals  - On  FLD   - Monitor BSL closely     7. Gout:   Allopurinol     8. BPH  - On FLomax    VTE- Heparin subcut    Discharge disposition; [facility] when medically stable  PT following        The patient is a 81 year old male with a a history of CAD status post PCI/CABG, CKD stage 3, hypertension, afib, Chronic anemia, diabetes mellitus type 2, previous C Diff sent from [facility] for diarrhea possible C DIff. Was started on PO Vancomcyin 2 days prior to admission. In the ER, started on PO Vancomycin and IV Flagyl. CT of the abdomen showed pan colitis. Admitted for pan- colitis possible C diff and KEN/CKD stage 3. ID was consulted, PO Vancomycin was discontinued. Started on IVF for hydration    Assessment/Plan:    1. Pan colitis:   -concern for C diff based on history   -Was only on oral Vancomycin for 2 days prior to being sent to ED  - On IV Flagyl  - ID following, PO Vanco discontinued   - C Diff negative   - GI PCR negative   - FLD. advance as tolerated    2. KEN with CKD stage 3  - Baseline creatinine of 2.9-3.0  - Improving   - Bumex held  - IVF   -Avoid nephrotoxins    3. History of CAD  - Chest pain in the ER   -Cardiology consult reviewed  -No evidence of ACS  -EKG unchanged  -Trop elevation due to low GFR  -CP reproducible   -No evidence of decompensated CHF    4. AFib   -No A/C due to prior GIB  -Digoxin on hold pending level in setting of KEN on advanced CKD  - Supplement KCL   -S/P Watchman device    5. Chronic anemia:  -Due to CKD/AOCD  -monitor CBC     6. DM-2:  - Hypoglycemia  - Hold Lantus QHS and admelog pre-meals  - On  FLD   - Monitor BSL closely     7. Gout:   Allopurinol     8. BPH  - On FLomax    VTE- Heparin subcut    Discharge disposition; [facility] when medically stable  PT following

## 2022-10-08 NOTE — PROGRESS NOTE ADULT - SUBJECTIVE AND OBJECTIVE BOX
[facility] DIVISION OF KIDNEY DISEASES AND HYPERTENSION -- FOLLOW UP NOTE  --------------------------------------------------------------------------------  Chief Complaint: Surya on ckd     24 hour events/subjective:  pt continues to have diarrhea          PAST HISTORY  --------------------------------------------------------------------------------  No significant changes to PMH, PSH, FHx, SHx, unless otherwise noted    ALLERGIES & MEDICATIONS  --------------------------------------------------------------------------------  Allergies    No Known Allergies    Intolerances      Standing Inpatient Medications  allopurinol 100 milliGRAM(s) Oral daily  aspirin enteric coated 81 milliGRAM(s) Oral daily  atorvastatin 40 milliGRAM(s) Oral at bedtime  buPROPion XL (24-Hour) . 150 milliGRAM(s) Oral daily  busPIRone 5 milliGRAM(s) Oral two times a day  dextrose 5%. 1000 milliLiter(s) IV Continuous <Continuous>  dextrose 5%. 1000 milliLiter(s) IV Continuous <Continuous>  dextrose 50% Injectable 25 Gram(s) IV Push once  dextrose 50% Injectable 12.5 Gram(s) IV Push once  dextrose 50% Injectable 25 Gram(s) IV Push once  glucagon  Injectable 1 milliGRAM(s) IntraMuscular once  heparin   Injectable 5000 Unit(s) SubCutaneous every 12 hours  hydrALAZINE 50 milliGRAM(s) Oral every 8 hours  insulin lispro (ADMELOG) corrective regimen sliding scale   SubCutaneous Before meals and at bedtime  isosorbide   dinitrate Tablet (ISORDIL) 10 milliGRAM(s) Oral three times a day  metroNIDAZOLE    Tablet 500 milliGRAM(s) Oral every 8 hours  mirtazapine 30 milliGRAM(s) Oral at bedtime  sodium bicarbonate 650 milliGRAM(s) Oral two times a day  sodium chloride 0.9% lock flush 3 milliLiter(s) IV Push every 8 hours  sodium chloride 0.9%. 1000 milliLiter(s) IV Continuous <Continuous>  tamsulosin 0.4 milliGRAM(s) Oral at bedtime    PRN Inpatient Medications  acetaminophen     Tablet .. 650 milliGRAM(s) Oral every 6 hours PRN  aluminum hydroxide/magnesium hydroxide/simethicone Suspension 30 milliLiter(s) Oral every 4 hours PRN  dextrose Oral Gel 15 Gram(s) Oral once PRN  melatonin 3 milliGRAM(s) Oral at bedtime PRN  ondansetron Injectable 4 milliGRAM(s) IV Push every 8 hours PRN      REVIEW OF SYSTEMS  --------------------------------------------------------------------------------  Gen: No weight changes, fatigue, fevers/chills, weakness  Skin: No rashes  Head/Eyes/Ears/Mouth: No headache; Normal hearing; Normal vision w/o blurriness; No sinus pain/discomfort, sore throat  Respiratory: No dyspnea, cough, wheezing, hemoptysis  CV: No chest pain, PND, orthopnea  GI: No abdominal pain, diarrhea, +  : No increased frequency, dysuria, hematuria, nocturia  MSK: No joint pain/swelling; no back pain; no edema  Neuro: No dizziness/lightheadedness, weakness, seizures, numbness, tingling  Heme: No easy bruising or bleeding  Endo: No heat/cold intolerance  Psych: No significant nervousness, anxiety, stress, depression    All other systems were reviewed and are negative, except as noted.    VITALS/PHYSICAL EXAM  --------------------------------------------------------------------------------  T(C): 36.9 (10-08-22 @ 11:05), Max: 36.9 (10-08-22 @ 04:56)  HR: 65 (10-08-22 @ 15:02) (58 - 71)  BP: 151/67 (10-08-22 @ 15:02) (130/68 - 151/67)  RR: 18 (10-08-22 @ 11:05) (18 - 20)  SpO2: 96% (10-08-22 @ 11:05) (96% - 98%)  Wt(kg): --        Physical Exam:  	Gen: NAD  	HEENT: supple neck, clear oropharynx  	Pulm: CTA B/L  	CV: RRR, S1S2; no rub  	Back: No spinal or CVA tenderness; no sacral edema  	Abd: +BS, soft, nontender/nondistended  	: No suprapubic tenderness  	UE: Warm, no edema  	LE: Warm, no edema  	Neuro: No focal deficit  	Psych: Normal affect and mood  	Skin: Warm    LABS/STUDIES  --------------------------------------------------------------------------------              9.1    5.84  >-----------<  211      [10-08-22 @ 04:45]              30.4     142  |  102  |  53.2  ----------------------------<  86      [10-08-22 @ 04:45]  3.3   |  25.0  |  3.47        Ca     7.7     [10-08-22 @ 04:45]      Mg     1.4     [10-08-22 @ 04:45]      Phos  3.5     [10-08-22 @ 04:45]    TPro  6.7  /  Alb  2.9  /  TBili  0.3  /  DBili  x   /  AST  20  /  ALT  11  /  AlkPhos  121  [10-06-22 @ 18:20]        Troponin 0.21      [10-07-22 @ 09:30]  CK 42      [10-07-22 @ 09:30]    Creatinine Trend:  SCr 3.47 [10-08 @ 04:45]  SCr 4.08 [10-07 @ 09:30]  SCr 4.22 [10-06 @ 18:20]        Iron 32, TIBC 293, %sat 11      [06-29-22 @ 09:05]  Ferritin 609      [06-29-22 @ 09:05]

## 2022-10-08 NOTE — PROGRESS NOTE ADULT - ASSESSMENT
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 Patient sent in from [facility] for persistent loose stool. Patient was started on PO vancomycin and Imodium on 10/5 due to hx of C diff in past which patient states he has had 2x before. Denies fever, chills, N/V. Has mild abdominal pain but no rebound or guarding noted. No reported blood in stool. In ED patient is afebrile, no leukocytosis. CT abd/pelvis with pan colitis, Labs with chronic anemia and KEN on CKD. Give IVF, PO vancomycin, and IV flagyl. Stool C diff is negative.      Diarrhea  Colitis   h/o C diff   KEN      - Blood cultures pending  - Stool for C diff on 10/7 is negative  - No record of C diff test from nursing facility   - GI PCR negative  - Check stool culture  - COVID 19 PCR pending 10/7 negative   - CT A/P reporting colitis   - Continue Flagyl  - Follow up cultures  - Trend Fever  - Trend WBC        Will Follow

## 2022-10-08 NOTE — PROGRESS NOTE ADULT - SUBJECTIVE AND OBJECTIVE BOX
CC: Follow up     INTERVAL HPI/OVERNIGHT EVENTS: Patient seen and examined, afebrile. Denies abdominal pain nausea or vomiting. Per RN 1 loose bm overnight.       Vital Signs Last 24 Hrs  T(C): 36.9 (08 Oct 2022 11:05), Max: 36.9 (08 Oct 2022 04:56)  T(F): 98.4 (08 Oct 2022 11:05), Max: 98.5 (08 Oct 2022 04:56)  HR: 71 (08 Oct 2022 11:05) (58 - 71)  BP: 148/66 (08 Oct 2022 11:05) (130/68 - 151/63)  BP(mean): --  RR: 18 (08 Oct 2022 11:05) (18 - 20)  SpO2: 96% (08 Oct 2022 11:05) (96% - 98%)    Parameters below as of 08 Oct 2022 11:05  Patient On (Oxygen Delivery Method): room air        PHYSICAL EXAM:    GENERAL: NAD, AOX3 _+Facial droop  HEAD:  Atraumatic, Normocephalic  EYES: , conjunctiva and sclera clear  ENMT: Moist mucous membranes  NECK: Supple  CHEST/LUNG: Clear to auscultation bilaterally; No rales, rhonchi, wheezing, or rubs  HEART: Regular rate and rhythm; No murmurs, rubs, or gallops  ABDOMEN: Soft, Nontender, Nondistended; Bowel sounds present  EXTREMITIES:  2+ Peripheral Pulses, No clubbing, cyanosis, or edema        MEDICATIONS  (STANDING):  allopurinol 100 milliGRAM(s) Oral daily  aspirin enteric coated 81 milliGRAM(s) Oral daily  atorvastatin 40 milliGRAM(s) Oral at bedtime  buPROPion XL (24-Hour) . 150 milliGRAM(s) Oral daily  busPIRone 5 milliGRAM(s) Oral two times a day  dextrose 5%. 1000 milliLiter(s) (50 mL/Hr) IV Continuous <Continuous>  dextrose 5%. 1000 milliLiter(s) (100 mL/Hr) IV Continuous <Continuous>  dextrose 50% Injectable 25 Gram(s) IV Push once  dextrose 50% Injectable 12.5 Gram(s) IV Push once  dextrose 50% Injectable 25 Gram(s) IV Push once  glucagon  Injectable 1 milliGRAM(s) IntraMuscular once  heparin   Injectable 5000 Unit(s) SubCutaneous every 12 hours  hydrALAZINE 50 milliGRAM(s) Oral every 8 hours  insulin lispro (ADMELOG) corrective regimen sliding scale   SubCutaneous Before meals and at bedtime  isosorbide   dinitrate Tablet (ISORDIL) 10 milliGRAM(s) Oral three times a day  metroNIDAZOLE    Tablet 500 milliGRAM(s) Oral every 8 hours  mirtazapine 30 milliGRAM(s) Oral at bedtime  sodium bicarbonate 650 milliGRAM(s) Oral two times a day  sodium chloride 0.9% lock flush 3 milliLiter(s) IV Push every 8 hours  sodium chloride 0.9%. 1000 milliLiter(s) (100 mL/Hr) IV Continuous <Continuous>  tamsulosin 0.4 milliGRAM(s) Oral at bedtime    MEDICATIONS  (PRN):  acetaminophen     Tablet .. 650 milliGRAM(s) Oral every 6 hours PRN Temp greater or equal to 38C (100.4F), Mild Pain (1 - 3)  aluminum hydroxide/magnesium hydroxide/simethicone Suspension 30 milliLiter(s) Oral every 4 hours PRN Dyspepsia  dextrose Oral Gel 15 Gram(s) Oral once PRN Blood Glucose LESS THAN 70 milliGRAM(s)/deciliter  melatonin 3 milliGRAM(s) Oral at bedtime PRN Insomnia  ondansetron Injectable 4 milliGRAM(s) IV Push every 8 hours PRN Nausea and/or Vomiting      Allergies    No Known Allergies    Intolerances          LABS:                          9.1    5.84  )-----------( 211      ( 08 Oct 2022 04:45 )             30.4     10-08    142  |  102  |  53.2<H>  ----------------------------<  86  3.3<L>   |  25.0  |  3.47<H>    Ca    7.7<L>      08 Oct 2022 04:45  Phos  3.5     10-08  Mg     1.4     10-08    TPro  6.7  /  Alb  2.9<L>  /  TBili  0.3<L>  /  DBili  x   /  AST  20  /  ALT  11  /  AlkPhos  121<H>  10-06          RADIOLOGY & ADDITIONAL TESTS:

## 2022-10-09 NOTE — PROGRESS NOTE ADULT - ASSESSMENT
1. Acute kidney injury on CKD stage IV prerenal volume depletion in setting of diarrhea/colitis  -SCr 4.22 on presentation; baseline ~2.5  - Scr improving with IV hydration- now back at baseline   - Continue to hold diuretics  - d/c fluids    2. Colitis:  Abx as per primary team    3. Metabolic acidosis: c/w home dose sodium bicarbonate PO.    4. Anemia: obtain iron stores. Will give IVONNE today.    5. Hypokalemia- in setting of diarrhea, now improved. Monitor K+

## 2022-10-09 NOTE — PROGRESS NOTE ADULT - SUBJECTIVE AND OBJECTIVE BOX
CC: Follow up     INTERVAL HPI/OVERNIGHT EVENTS: Patient seen and examined, afebrile. Denies nausea vomiting or abdominal pain. Diarrhea improving      Vital Signs Last 24 Hrs  T(C): 36.9 (09 Oct 2022 05:14), Max: 37.1 (08 Oct 2022 16:40)  T(F): 98.4 (09 Oct 2022 05:14), Max: 98.8 (08 Oct 2022 16:40)  HR: 60 (09 Oct 2022 05:14) (52 - 71)  BP: 172/64 (09 Oct 2022 05:14) (148/66 - 172/64)  BP(mean): --  RR: 18 (09 Oct 2022 05:14) (18 - 18)  SpO2: 93% (09 Oct 2022 05:14) (93% - 97%)    Parameters below as of 09 Oct 2022 05:14  Patient On (Oxygen Delivery Method): room air        PHYSICAL EXAM:    GENERAL: NAD, AOX3 facial droop   HEAD:  Atraumatic, Normocephalic  EYES:  conjunctiva and sclera clear  ENMT: Moist mucous membranes  NECK: Supple,  CHEST/LUNG: Clear to auscultation bilaterally; No rales, rhonchi, wheezing, or rubs  HEART: Regular rate and rhythm; No murmurs, rubs, or gallops  ABDOMEN: Soft, Nontender, Nondistended; Bowel sounds present  EXTREMITIES:  2+ Peripheral Pulses, No clubbing, cyanosis, or edema        MEDICATIONS  (STANDING):  allopurinol 100 milliGRAM(s) Oral daily  aspirin enteric coated 81 milliGRAM(s) Oral daily  atorvastatin 40 milliGRAM(s) Oral at bedtime  buPROPion XL (24-Hour) . 150 milliGRAM(s) Oral daily  busPIRone 5 milliGRAM(s) Oral two times a day  dextrose 5%. 1000 milliLiter(s) (100 mL/Hr) IV Continuous <Continuous>  dextrose 5%. 1000 milliLiter(s) (50 mL/Hr) IV Continuous <Continuous>  dextrose 50% Injectable 25 Gram(s) IV Push once  dextrose 50% Injectable 12.5 Gram(s) IV Push once  dextrose 50% Injectable 25 Gram(s) IV Push once  glucagon  Injectable 1 milliGRAM(s) IntraMuscular once  heparin   Injectable 5000 Unit(s) SubCutaneous every 12 hours  hydrALAZINE 50 milliGRAM(s) Oral every 8 hours  insulin lispro (ADMELOG) corrective regimen sliding scale   SubCutaneous Before meals and at bedtime  isosorbide   dinitrate Tablet (ISORDIL) 10 milliGRAM(s) Oral three times a day  metroNIDAZOLE    Tablet 500 milliGRAM(s) Oral every 8 hours  mirtazapine 30 milliGRAM(s) Oral at bedtime  sodium bicarbonate 650 milliGRAM(s) Oral two times a day  sodium chloride 0.9% lock flush 3 milliLiter(s) IV Push every 8 hours  tamsulosin 0.4 milliGRAM(s) Oral at bedtime    MEDICATIONS  (PRN):  acetaminophen     Tablet .. 650 milliGRAM(s) Oral every 6 hours PRN Temp greater or equal to 38C (100.4F), Mild Pain (1 - 3)  aluminum hydroxide/magnesium hydroxide/simethicone Suspension 30 milliLiter(s) Oral every 4 hours PRN Dyspepsia  dextrose Oral Gel 15 Gram(s) Oral once PRN Blood Glucose LESS THAN 70 milliGRAM(s)/deciliter  melatonin 3 milliGRAM(s) Oral at bedtime PRN Insomnia  ondansetron Injectable 4 milliGRAM(s) IV Push every 8 hours PRN Nausea and/or Vomiting      Allergies    No Known Allergies    Intolerances          LABS:                          9.1    5.84  )-----------( 211      ( 08 Oct 2022 04:45 )             30.4     10-    144  |  108<H>  |  45.3<H>  ----------------------------<  89  3.6   |  23.0  |  2.52<H>    Ca    8.0<L>      09 Oct 2022 08:25  Phos  3.5     10-08  Mg     1.4     10-08        Urinalysis Basic - ( 08 Oct 2022 18:58 )    Color: Yellow / Appearance: Slightly Turbid / S.015 / pH: x  Gluc: x / Ketone: Trace  / Bili: Small / Urobili: 4   Blood: x / Protein: 15 / Nitrite: Positive   Leuk Esterase: Trace / RBC: Negative /HPF / WBC 0-2 /HPF   Sq Epi: x / Non Sq Epi: Occasional / Bacteria: Few        RADIOLOGY & ADDITIONAL TESTS:

## 2022-10-09 NOTE — PROGRESS NOTE ADULT - SUBJECTIVE AND OBJECTIVE BOX
[facility]  INFECTIOUS DISEASES at [place] and [place]  =======================================================                               Cecil Bright MD#   Lyssa Oconnor MD*                             Asia Cast MD*   Gricelda Gray MD*            Diplomates American Board of Internal Medicine & Infectious Diseases                # [place] Office - Appt - Tel  [phone] Fax [phone]                * [place] Office - Appt - Tel [phone] Fax [phone]                                  Hospital Consult line:  [phone]  =======================================================    [place] [id number]    Follow up: Diarrhea    No more  diarrhea today    2 BM's yesterday     Allergies:  No Known Allergies       REVIEW OF SYSTEMS:  CONSTITUTIONAL:  No Fever or chills  HEENT:  No diplopia or blurred vision.  No earache, sore throat or runny nose.  CARDIOVASCULAR:  No chest pain  RESPIRATORY:  No cough, shortness of breath  GASTROINTESTINAL:  No nausea, vomiting. Improved  diarrhea.  GENITOURINARY:  No dysuria, frequency or urgency. No Blood in urine  MUSCULOSKELETAL:  no joint aches, no muscle pain  SKIN:  No change in skin, hair or nails.  NEUROLOGIC:  No Headaches, seizures  PSYCHIATRIC:  No disorder of thought or mood.  ENDOCRINE:  No heat or cold intolerance  HEMATOLOGICAL:  No easy bruising or bleeding.       Physical Exam:  GEN: NAD  HEENT: normocephalic and atraumatic. EOMI. PERRL.    NECK: Supple.   LUNGS: CTA B/L.  HEART: RRR  ABDOMEN: Soft, NT, ND.  +BS.    : No CVA tenderness  EXTREMITIES: Without  edema.  MSK: No joint swelling  NEUROLOGIC: Rt sided facial droop   PSYCHIATRIC: Appropriate affect .  SKIN: No rash      Vitals:    T(F): 98.4 (09 Oct 2022 05:14), Max: 98.8 (08 Oct 2022 16:40)  HR: 60 (09 Oct 2022 05:14)  BP: 172/64 (09 Oct 2022 05:14)  RR: 18 (09 Oct 2022 05:14)  SpO2: 93% (09 Oct 2022 05:14) (93% - 97%)  temp max in last 48H T(F): , Max: 98.8 (10-08-22 @ 16:40)    Current Antibiotics:  metroNIDAZOLE    Tablet 500 milliGRAM(s) Oral every 8 hours    Other medications:  allopurinol 100 milliGRAM(s) Oral daily  aspirin enteric coated 81 milliGRAM(s) Oral daily  atorvastatin 40 milliGRAM(s) Oral at bedtime  buPROPion XL (24-Hour) . 150 milliGRAM(s) Oral daily  busPIRone 5 milliGRAM(s) Oral two times a day  dextrose 5%. 1000 milliLiter(s) IV Continuous <Continuous>  dextrose 5%. 1000 milliLiter(s) IV Continuous <Continuous>  dextrose 50% Injectable 25 Gram(s) IV Push once  dextrose 50% Injectable 12.5 Gram(s) IV Push once  dextrose 50% Injectable 25 Gram(s) IV Push once  glucagon  Injectable 1 milliGRAM(s) IntraMuscular once  heparin   Injectable 5000 Unit(s) SubCutaneous every 12 hours  hydrALAZINE 50 milliGRAM(s) Oral every 8 hours  insulin lispro (ADMELOG) corrective regimen sliding scale   SubCutaneous Before meals and at bedtime  isosorbide   dinitrate Tablet (ISORDIL) 10 milliGRAM(s) Oral three times a day  mirtazapine 30 milliGRAM(s) Oral at bedtime  sodium bicarbonate 650 milliGRAM(s) Oral two times a day  sodium chloride 0.9% lock flush 3 milliLiter(s) IV Push every 8 hours  tamsulosin 0.4 milliGRAM(s) Oral at bedtime                            9.1    5.84  )-----------( 211      ( 08 Oct 2022 04:45 )             30.4     10-08    142  |  102  |  53.2<H>  ----------------------------<  86  3.3<L>   |  25.0  |  3.47<H>    Ca    7.7<L>      08 Oct 2022 04:45  Phos  3.5     10-08  Mg     1.4     10-08      RECENT CULTURES:  10-07 @ 09:30 .Blood Blood     No growth to date.    10-07 @ 09:22 .Blood Blood     No growth to date.      WBC Count: 5.84 K/uL (10-08-22 @ 04:45)  WBC Count: 7.06 K/uL (10-06-22 @ 13:58)    Creatinine, Serum: 3.47 mg/dL (10-08-22 @ 04:45)  Creatinine, Serum: 4.08 mg/dL (10-07-22 @ 09:30)  Creatinine, Serum: 4.22 mg/dL (10-06-22 @ 18:20)     SARS-CoV-2 Result: NotDetec (10-07-22 @ 04:00)    Clostridium difficile Toxin by PCR (10.07.22 @ 10:12)    Clostridium difficile Toxin by PCR: RESULT INTERPRETATION:    Not detected - No Clostridium difficile toxins detected by amplified DNA  PCR.      GI PCR Panel Stool (10.07.22 @ 12:00)    GI PCR Panel: NotDetec: GI Panel PCR evaluates for:  Campylobacter, Plesiomonas shigelloides, Salmonella, Vibrio, Yersinia  enterocolitica, Enteroaggregative Escherichia (EAEC), Enteropathogenic E.  coli (EPEC), Enterotoxigenic E. coli (ETEC), Shiga-like toxin producing  E.coli (STEC), E. coli O157, Shigella/Enteroinvasive E. coli (EIEC),  Adenovirus, Astrovirus, Norovirus, Rotavirus, Sapovirus, Cryptosporidium,  Cyclospora cayetanensis, Entamoeba histolytica, Giardia lamblia.  For culture and susceptibility reports refer to “reflex stool culture”.

## 2022-10-09 NOTE — PROGRESS NOTE ADULT - SUBJECTIVE AND OBJECTIVE BOX
[facility] DIVISION OF KIDNEY DISEASES AND HYPERTENSION -- FOLLOW UP NOTE  --------------------------------------------------------------------------------  Chief Complaint: tr    24 hour events/subjective:  no acute event   Diarrhea has improved- no BM overnight as per RN      PAST HISTORY  --------------------------------------------------------------------------------  No significant changes to PMH, PSH, FHx, SHx, unless otherwise noted    ALLERGIES & MEDICATIONS  --------------------------------------------------------------------------------  Allergies    No Known Allergies    Intolerances      Standing Inpatient Medications  allopurinol 100 milliGRAM(s) Oral daily  aspirin enteric coated 81 milliGRAM(s) Oral daily  atorvastatin 40 milliGRAM(s) Oral at bedtime  buPROPion XL (24-Hour) . 150 milliGRAM(s) Oral daily  busPIRone 5 milliGRAM(s) Oral two times a day  dextrose 5%. 1000 milliLiter(s) IV Continuous <Continuous>  dextrose 5%. 1000 milliLiter(s) IV Continuous <Continuous>  dextrose 50% Injectable 25 Gram(s) IV Push once  dextrose 50% Injectable 12.5 Gram(s) IV Push once  dextrose 50% Injectable 25 Gram(s) IV Push once  glucagon  Injectable 1 milliGRAM(s) IntraMuscular once  heparin   Injectable 5000 Unit(s) SubCutaneous every 12 hours  hydrALAZINE 50 milliGRAM(s) Oral every 8 hours  insulin lispro (ADMELOG) corrective regimen sliding scale   SubCutaneous Before meals and at bedtime  isosorbide   dinitrate Tablet (ISORDIL) 10 milliGRAM(s) Oral three times a day  metroNIDAZOLE    Tablet 500 milliGRAM(s) Oral every 8 hours  mirtazapine 30 milliGRAM(s) Oral at bedtime  sodium bicarbonate 650 milliGRAM(s) Oral two times a day  sodium chloride 0.9% lock flush 3 milliLiter(s) IV Push every 8 hours  tamsulosin 0.4 milliGRAM(s) Oral at bedtime    PRN Inpatient Medications  acetaminophen     Tablet .. 650 milliGRAM(s) Oral every 6 hours PRN  aluminum hydroxide/magnesium hydroxide/simethicone Suspension 30 milliLiter(s) Oral every 4 hours PRN  dextrose Oral Gel 15 Gram(s) Oral once PRN  melatonin 3 milliGRAM(s) Oral at bedtime PRN  ondansetron Injectable 4 milliGRAM(s) IV Push every 8 hours PRN      REVIEW OF SYSTEMS  --------------------------------------------------------------------------------  Gen: No weight changes, fatigue, fevers/chills, weakness  Skin: No rashes  Head/Eyes/Ears/Mouth: No headache; Normal hearing; Normal vision w/o blurriness; No sinus pain/discomfort, sore throat  Respiratory: No dyspnea, cough, wheezing, hemoptysis  CV: No chest pain, PND, orthopnea  GI: No abdominal pain, diarrhea, constipation, nausea, vomiting, melena, hematochezia  : No increased frequency, dysuria, hematuria, nocturia  MSK: No joint pain/swelling; no back pain; no edema  Neuro: No dizziness/lightheadedness, weakness, seizures, numbness, tingling  Heme: No easy bruising or bleeding  Endo: No heat/cold intolerance  Psych: No significant nervousness, anxiety, stress, depression    All other systems were reviewed and are negative, except as noted.    VITALS/PHYSICAL EXAM  --------------------------------------------------------------------------------  T(C): 36.8 (10-09-22 @ 17:07), Max: 36.9 (10-09-22 @ 05:14)  HR: 64 (10-09-22 @ 17:07) (52 - 81)  BP: 170/73 (10-09-22 @ 17:07) (153/67 - 172/64)  RR: 18 (10-09-22 @ 17:07) (18 - 18)  SpO2: 92% (10-09-22 @ 17:07) (92% - 97%)  Wt(kg): --        10-08-22 @ 07:01  -  10-09-22 @ 07:00  --------------------------------------------------------  IN: 0 mL / OUT: 300 mL / NET: -300 mL      Physical Exam:  	Gen: NAD  	HEENT: supple neck, clear oropharynx  	Pulm: CTA B/L  	CV: RRR, S1S2; no rub  	Back:  no sacral edema  	Abd: +BS, soft, nontender/nondistended  	: No suprapubic tenderness  	UE: Warm, no edema  	LE: Warm, no edema  	Neuro: No focal deficit  	Psych: Normal affect and mood  	Skin: Warm    LABS/STUDIES  --------------------------------------------------------------------------------              9.1    5.84  >-----------<  211      [10-08-22 @ 04:45]              30.4     144  |  108  |  45.3  ----------------------------<  89      [10-09-22 @ 08:25]  3.6   |  23.0  |  2.52        Ca     8.0     [10-09-22 @ 08:25]      Mg     1.4     [10-08-22 @ 04:45]      Phos  3.5     [10-08-22 @ 04:45]            Creatinine Trend:  SCr 2.52 [10-09 @ 08:25]  SCr 3.47 [10-08 @ 04:45]  SCr 4.08 [10-07 @ 09:30]  SCr 4.22 [10-06 @ 18:20]    Urinalysis - [10-08-22 @ 18:58]      Color Yellow / Appearance Slightly Turbid / SG 1.015 / pH 5.0      Gluc Negative / Ketone Trace  / Bili Small / Urobili 4       Blood Negative / Protein 15 / Leuk Est Trace / Nitrite Positive      RBC Negative / WBC 0-2 / Hyaline 0-2 / Gran  / Sq Epi  / Non Sq Epi Occasional / Bacteria Few      Iron 32, TIBC 293, %sat 11      [06-29-22 @ 09:05]  Ferritin 609      [06-29-22 @ 09:05]

## 2022-10-09 NOTE — PROGRESS NOTE ADULT - ASSESSMENT
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 Patient sent in from [facility] for persistent loose stool. Patient was started on PO vancomycin and Imodium on 10/5 due to hx of C diff in past which patient states he has had 2x before. Denies fever, chills, N/V. Has mild abdominal pain but no rebound or guarding noted. No reported blood in stool. In ED patient is afebrile, no leukocytosis. CT abd/pelvis with pan colitis, Labs with chronic anemia and KEN on CKD. Give IVF, PO vancomycin, and IV flagyl. Stool C diff is negative.      Diarrhea  Colitis   h/o C diff   KEN      - Blood cultures 10/7 no growth   - Stool for C diff on 10/7 is negative  - No record of C diff test from nursing facility   - GI PCR negative  - Check stool culture if diarrhea continues   - COVID 19 PCR 10/7 negative   - CT A/P reporting colitis   - Continue Flagyl  - Follow up cultures  - Trend Fever  - Trend WBC        Will Follow      d/w Dr Waite    Statement Selected NPO except medication in thin puree (applesauce NOT pudding) and allowance for moderately thick liquid wash for medication only; Rx for primary intake of nutrition/hydration to be non-oral.

## 2022-10-09 NOTE — PROGRESS NOTE ADULT - ASSESSMENT
The patient is a 81 year old male with a a history of CAD status post PCI/CABG, CKD stage 3, hypertension, afib, Chronic anemia, diabetes mellitus type 2, previous C Diff sent from [facility] for diarrhea possible C DIff. Was started on PO Vancomcyin 2 days prior to admission. In the ER, started on PO Vancomycin and IV Flagyl. CT of the abdomen showed pan colitis. Admitted for pan- colitis possible C diff and KEN/CKD stage 3. ID was consulted, PO Vancomycin was discontinued. Started on IVF for hydration    Assessment/Plan:    1. Pan colitis:   -concern for C diff based on history   -Was only on oral Vancomycin for 2 days prior to being sent to ED  - On PO Flagyl day 3  - ID following, PO Vanco discontinued   - C Diff negative   - GI PCR negative   - Stool culture pending   - FLD. advance as tolerated    2. KEN with CKD stage 3  - Baseline creatinine of 2.9-3.0  - Improving   - Bumex held  - Hold IVF  - Follow up BMP  -Avoid nephrotoxins    3. History of CAD  - Chest pain in the ER   -Cardiology consult reviewed  -No evidence of ACS  -EKG unchanged  -Trop elevation due to low GFR  -CP reproducible   -No evidence of decompensated CHF    4. AFib   -No A/C due to prior GIB  -Digoxin on hold pending level in setting of KEN on advanced CKD- Repeat Dig level in AM   - Supplement KCL   -S/P Watchman device    5. Chronic anemia:  -Due to CKD/AOCD  -monitor CBC     6. DM-2:  - Hypoglycemia  - Hold  standing Lantus QHS and admelog pre-meals  - Admelog sliding scale for now   - Monitor BSL closely     7. Gout:   Allopurinol     8. BPH  - On FLomax    VTE- Heparin subcut    Discharge disposition; [facility] when medically stable  PT following

## 2022-10-10 NOTE — DISCHARGE NOTE PROVIDER - CARE PROVIDERS DIRECT ADDRESSES
[email or web address]

## 2022-10-10 NOTE — DISCHARGE NOTE PROVIDER - NSDCMRMEDTOKEN_GEN_ALL_CORE_FT
acetaminophen 325 mg oral tablet: 2 tab(s) orally every 6 hours, As needed, Moderate Pain (4 - 6)  albuterol 90 mcg/inh inhalation aerosol: 1 puff(s) inhaled every 4 hours, As needed, Shortness of Breath and/or Wheezing  allopurinol 100 mg oral tablet: 1 tab(s) orally once a day  Aspirin Enteric Coated 81 mg oral delayed release tablet: 1 tab(s) orally once a day   atorvastatin 40 mg oral tablet: 1 tab(s) orally once a day  bumetanide 1 mg oral tablet: 1 tab(s) orally 2 times a day  buPROPion 150 mg/24 hours (XL) oral tablet, extended release: 2 tab(s) orally every 24 hours  BuSpar 5 mg oral tablet: 1 tab(s) orally 2 times a day  epoetin nohemi: 76105 unit(s) subcutaneous 3 times a week as per Nephrology  hydrALAZINE 100 mg oral tablet: 0.5 tab(s) orally 3 times a day  insulin lispro 100 units/mL injectable solution: injectable 4 times a day (before meals and at bedtime) with insulin sliding scale coverage  insulin lispro 100 units/mL injectable solution:  injectable    Sliding scale TID with meals   isosorbide dinitrate 10 mg oral tablet: 1 tab(s) orally 3 times a day  Lantus 100 units/mL subcutaneous solution: 13 unit(s) subcutaneous once a day (at bedtime)  pantoprazole 40 mg oral delayed release tablet: 1 tab(s) orally once a day (before a meal)  Remeron 30 mg oral tablet: 1 tab(s) orally once a day (at bedtime)  Semglee 100 units/mL subcutaneous solution:   simethicone 80 mg oral tablet, chewable: 1 tab(s) orally once a day  sodium bicarbonate 650 mg oral tablet: 1 tab(s) orally 2 times a day  tamsulosin 0.4 mg oral capsule: 1 cap(s) orally once a day (at bedtime)   acetaminophen 325 mg oral tablet: 2 tab(s) orally every 6 hours, As needed, Moderate Pain (4 - 6)  albuterol 90 mcg/inh inhalation aerosol: 1 puff(s) inhaled every 4 hours, As needed, Shortness of Breath and/or Wheezing  allopurinol 100 mg oral tablet: 1 tab(s) orally once a day  Aspirin Enteric Coated 81 mg oral delayed release tablet: 1 tab(s) orally once a day   atorvastatin 40 mg oral tablet: 1 tab(s) orally once a day  bumetanide 1 mg oral tablet: 1 tab(s) orally 2 times a day  buPROPion 150 mg/24 hours (XL) oral tablet, extended release: 2 tab(s) orally every 24 hours  BuSpar 5 mg oral tablet: 1 tab(s) orally 2 times a day  epoetin nohemi: 19513 unit(s) subcutaneous 3 times a week as per Nephrology  hydrALAZINE 100 mg oral tablet: 0.5 tab(s) orally 3 times a day  insulin lispro 100 units/mL injectable solution: sliding scale  injectable 3 times a day (before meals)  isosorbide dinitrate 10 mg oral tablet: 1 tab(s) orally 3 times a day  metroNIDAZOLE 500 mg oral tablet: 1 tab(s) orally every 8 hours until 10/12/22   pantoprazole 40 mg oral delayed release tablet: 1 tab(s) orally once a day (before a meal)  Remeron 30 mg oral tablet: 1 tab(s) orally once a day (at bedtime)  simethicone 80 mg oral tablet, chewable: 1 tab(s) orally once a day  sodium bicarbonate 650 mg oral tablet: 1 tab(s) orally 2 times a day  tamsulosin 0.4 mg oral capsule: 1 cap(s) orally once a day (at bedtime)   acetaminophen 325 mg oral tablet: 2 tab(s) orally every 6 hours, As needed, Moderate Pain (4 - 6)  albuterol 90 mcg/inh inhalation aerosol: 1 puff(s) inhaled every 4 hours, As needed, Shortness of Breath and/or Wheezing  allopurinol 100 mg oral tablet: 1 tab(s) orally once a day  Aspirin Enteric Coated 81 mg oral delayed release tablet: 1 tab(s) orally once a day   atorvastatin 40 mg oral tablet: 1 tab(s) orally once a day  bumetanide 1 mg oral tablet: 1 tab(s) orally 2 times a day  buPROPion 150 mg/24 hours (XL) oral tablet, extended release: 2 tab(s) orally every 24 hours  BuSpar 5 mg oral tablet: 1 tab(s) orally 2 times a day  epoetin nohemi: 64070 unit(s) subcutaneous 3 times a week as per Nephrology  hydrALAZINE 100 mg oral tablet: 0.5 tab(s) orally 3 times a day  insulin lispro 100 units/mL injectable solution: sliding scale  injectable 3 times a day (before meals)  isosorbide dinitrate 10 mg oral tablet: 1 tab(s) orally 3 times a day  metroNIDAZOLE 500 mg oral tablet: 1 tab(s) orally every 8 hours until 10/15/22  pantoprazole 40 mg oral delayed release tablet: 1 tab(s) orally once a day (before a meal)  Remeron 30 mg oral tablet: 1 tab(s) orally once a day (at bedtime)  simethicone 80 mg oral tablet, chewable: 1 tab(s) orally once a day  sodium bicarbonate 650 mg oral tablet: 1 tab(s) orally 2 times a day  tamsulosin 0.4 mg oral capsule: 1 cap(s) orally once a day (at bedtime)

## 2022-10-10 NOTE — PROGRESS NOTE ADULT - ASSESSMENT
The patient is a 81 year old male with a a history of CAD status post PCI/CABG, CKD stage 3, hypertension, afib, Chronic anemia, diabetes mellitus type 2, previous C Diff sent from [facility] for diarrhea possible C DIff. Was started on PO Vancomcyin 2 days prior to admission. In the ER, started on PO Vancomycin and IV Flagyl. CT of the abdomen showed pan colitis. Admitted for pan- colitis possible C diff and KEN/CKD stage 3. ID was consulted, PO Vancomycin was discontinued. Started on IVF for hydration    Assessment/Plan:    1. Pan colitis:   -concern for C diff based on history   -Was only on oral Vancomycin for 2 days prior to being sent to ED  - On PO Flagyl day 4  - ID following, PO Vanco discontinued   - C Diff negative   - GI PCR negative     2. KEN with CKD stage 3  - Baseline creatinine of 2.9-3.0  - Improving   - Bumex held  - Follow up BMP  -Avoid nephrotoxins    3. History of CAD  - Chest pain in the ER   -Cardiology consult reviewed  -No evidence of ACS  -EKG unchanged  -Trop elevation due to low GFR  -CP reproducible   -No evidence of decompensated CHF    4. AFib   -No A/C due to prior GIB  -Digoxin on hold pending level in setting of KEN on advanced CKD- Repeat Dig level in AM   - Supplement KCL   -S/P Watchman device    5. Chronic anemia:  -Due to CKD/AOCD  -monitor CBC     6. DM-2:  - Hbaic of 5/1   - Hold  standing Lantus QHS and admelog pre-meals  - Admelog sliding scale for now   - Monitor BSL closely     7. Gout:   Allopurinol     8. BPH  - On FLomax    VTE- Heparin subcut    Discharge disposition; [facility] when medically stable  PT following  The patient is a 81 year old male with a a history of CAD status post PCI/CABG, CKD stage 3, hypertension, afib, Chronic anemia, diabetes mellitus type 2, previous C Diff sent from [facility] for diarrhea possible C DIff. Was started on PO Vancomcyin 2 days prior to admission. In the ER, started on PO Vancomycin and IV Flagyl. CT of the abdomen showed pan colitis. Admitted for pan- colitis possible C diff and KEN/CKD stage 3. ID was consulted, PO Vancomycin was discontinued. Started on IVF for hydration    Assessment/Plan:    1. Pan colitis:   -concern for C diff based on history   -Was only on oral Vancomycin for 2 days prior to being sent to ED  - On PO Flagyl day 4  - ID following, PO Vanco discontinued   - C Diff negative   - GI PCR negative   - Diarrhea improving     2. KEN with CKD stage 3  - Baseline creatinine of 2.9-3.0  - Improving   - Bumex held  - Follow up BMP  -Avoid nephrotoxins    3. History of CAD  - Chest pain in the ER   -Cardiology consult reviewed  -No evidence of ACS  -EKG unchanged  -Trop elevation due to low GFR  -CP reproducible   -No evidence of decompensated CHF    4. AFib   -No A/C due to prior GIB  -Digoxin on hold pending level in setting of KEN on advanced CKD- Repeat Dig level in AM   - Supplement KCL   -S/P Watchman device    5. Chronic anemia:  -Due to CKD/AOCD  -monitor CBC     6. DM-2:  - Hbaic of 5/1   - Hold  standing Lantus QHS and admelog pre-meals  - Admelog sliding scale for now   - Monitor BSL closely     7. Gout:   Allopurinol     8. BPH  - On FLomax    VTE- Heparin subcut    Stable for discharge back to [facility] today; attempted to get in touch with patients wife to discuss with no answer The patient is a 81 year old male with a a history of CAD status post PCI/CABG, CKD stage 3, hypertension, afib, Chronic anemia, diabetes mellitus type 2, previous C Diff sent from [facility] for diarrhea possible C DIff. Was started on PO Vancomcyin 2 days prior to admission. In the ER, started on PO Vancomycin and IV Flagyl. CT of the abdomen showed pan colitis. Admitted for pan- colitis possible C diff and KEN/CKD stage 3. ID was consulted, PO Vancomycin was discontinued. Started on IVF for hydration    Assessment/Plan:    1. Pan colitis:   -concern for C diff based on history   -Was only on oral Vancomycin for 2 days prior to being sent to ED  - On PO Flagyl day 4/5  - ID following, PO Vanco discontinued   - C Diff negative   - GI PCR negative   - Diarrhea improving     2. KEN with CKD stage 3  - Baseline creatinine of 2.9-3.0  - Improving   - Bumex held  - Follow up BMP  -Avoid nephrotoxins    3. History of CAD  - Chest pain in the ER   -Cardiology consult reviewed  -No evidence of ACS  -EKG unchanged  -Trop elevation due to low GFR  -CP reproducible   -No evidence of decompensated CHF    4. AFib   -No A/C due to prior GIB  -Digoxin on hold pending level in setting of KEN on advanced CKD- Repeat Dig level in AM   - Supplement KCL   -S/P Watchman device    5. Chronic anemia:  -Due to CKD/AOCD  -monitor CBC     6. DM-2:  - Hbaic of 5/1   - Hold  standing Lantus QHS and admelog pre-meals  - Admelog sliding scale for now   - Monitor BSL closely     7. Gout:   Allopurinol     8. BPH  - On FLomax    VTE- Heparin subcut    Medically stable for discharge. Discussed with patient's wife a bedside. Requesting different facility for discharge. SW following

## 2022-10-10 NOTE — DISCHARGE NOTE PROVIDER - NSDCCPCAREPLAN_GEN_ALL_CORE_FT
PRINCIPAL DISCHARGE DIAGNOSIS  Diagnosis: Pancolitis  Assessment and Plan of Treatment:       SECONDARY DISCHARGE DIAGNOSES  Diagnosis: Acute renal failure superimposed on stage 3 chronic kidney disease  Assessment and Plan of Treatment:      PRINCIPAL DISCHARGE DIAGNOSIS  Diagnosis: Pancolitis  Assessment and Plan of Treatment: IMproved   C Diff negative   To complete PO Flagyl as prescribed until 10/12/22      SECONDARY DISCHARGE DIAGNOSES  Diagnosis: Acute renal failure superimposed on stage 3 chronic kidney disease  Assessment and Plan of Treatment: Improved with IV Hydration  Repeat BMP in 1 week     PRINCIPAL DISCHARGE DIAGNOSIS  Diagnosis: Pancolitis  Assessment and Plan of Treatment: IMproved   C Diff negative   To complete PO Flagyl as prescribed until 10/15/22      SECONDARY DISCHARGE DIAGNOSES  Diagnosis: Acute renal failure superimposed on stage 3 chronic kidney disease  Assessment and Plan of Treatment: Improved with IV Hydration  Repeat BMP in 1 week

## 2022-10-10 NOTE — DISCHARGE NOTE PROVIDER - ATTENDING DISCHARGE PHYSICAL EXAMINATION:
Vital Signs Last 24 Hrs  T(C): 37 (11 Oct 2022 05:04), Max: 37.2 (10 Oct 2022 11:44)  T(F): 98.6 (11 Oct 2022 05:04), Max: 98.9 (10 Oct 2022 11:44)  HR: 70 (11 Oct 2022 06:55) (54 - 75)  BP: 142/69 (11 Oct 2022 06:55) (140/63 - 175/64)  BP(mean): --  RR: 18 (11 Oct 2022 06:55) (18 - 18)  SpO2: 95% (11 Oct 2022 06:55) (95% - 97%)    Parameters below as of 11 Oct 2022 06:55  Patient On (Oxygen Delivery Method): room air        PHYSICAL EXAM:    GENERAL: NAD, AOX3 + Facial droop   HEAD:  Atraumatic, Normocephalic  EYES: Econjunctiva and sclera clear  ENMT: Moist mucous membranes  NECK: Supple  CHEST/LUNG: Clear to auscultation bilaterally; No rales, rhonchi, wheezing, or rubs  HEART: Regular rate and rhythm; No murmurs, rubs, or gallops  ABDOMEN: Soft, Nontender, Nondistended; Bowel sounds present  EXTREMITIES:  2+ Peripheral Pulses, No clubbing, cyanosis, or edema   Vital Signs Last 24 Hrs  T(C): 36.7 (12 Oct 2022 11:25), Max: 37 (11 Oct 2022 20:00)  T(F): 98.1 (12 Oct 2022 11:25), Max: 98.6 (11 Oct 2022 20:00)  HR: 77 (12 Oct 2022 11:25) (68 - 82)  BP: 148/75 (12 Oct 2022 11:25) (148/75 - 174/85)  BP(mean): --  RR: 18 (12 Oct 2022 11:25) (18 - 18)  SpO2: 98% (12 Oct 2022 11:25) (97% - 98%)    Parameters below as of 12 Oct 2022 11:25  Patient On (Oxygen Delivery Method): room air        PHYSICAL EXAM:    GENERAL: NAD, AOX3 + facial droop  HEAD:  Atraumatic, Normocephalic  EYES:  conjunctiva and sclera clear  ENMT: Moist mucous membranes  NECK: Supple  CHEST/LUNG: Clear to auscultation bilaterally; No rales, rhonchi, wheezing, or rubs  HEART: Regular rate and rhythm; No murmurs, rubs, or gallops  ABDOMEN: Soft, Nontender, Nondistended; Bowel sounds present  EXTREMITIES:  2+ Peripheral Pulses, No clubbing, cyanosis, or edema INTERVAL HPI/OVERNIGHT EVENTS:      Vital Signs Last 24 Hrs  T(C): 36.9 (13 Oct 2022 04:46), Max: 36.9 (12 Oct 2022 20:34)  T(F): 98.5 (13 Oct 2022 04:46), Max: 98.5 (12 Oct 2022 20:34)  HR: 81 (13 Oct 2022 04:46) (71 - 81)  BP: 168/81 (13 Oct 2022 04:46) (148/80 - 172/69)  BP(mean): --  RR: 18 (13 Oct 2022 04:46) (18 - 18)  SpO2: 95% (13 Oct 2022 04:46) (94% - 98%)    Parameters below as of 13 Oct 2022 04:46  Patient On (Oxygen Delivery Method): room air        PHYSICAL EXAM:    GENERAL: NAD, well-groomed, well-developed  HEAD:  Atraumatic, Normocephalic  EYES: EOMI, PERRLA, conjunctiva and sclera clear  ENMT: Moist mucous membranes  NECK: Supple, No JVD  NERVOUS SYSTEM:  Alert & Oriented X3, Motor Strength 5/5 B/L upper and lower extremities; DTRs 2+ intact and symmetric  CHEST/LUNG: Clear to auscultation bilaterally; No rales, rhonchi, wheezing, or rubs  HEART: Regular rate and rhythm; No murmurs, rubs, or gallops  ABDOMEN: Soft, Nontender, Nondistended; Bowel sounds present  EXTREMITIES:  2+ Peripheral Pulses, No clubbing, cyanosis, or edema

## 2022-10-10 NOTE — PROGRESS NOTE ADULT - ASSESSMENT
1. Acute kidney injury on CKD stage 3b: prerenal volume depletion in setting of diarrhea/colitis  Scr almost back to baseline  -will continue Flomax.  -Outpatient CKD office follow up    2. Colitis: continue on Abx.    3. Metabolic acidosis: c/w home dose sodium bicarbonate PO.    4. Hyperuricemia: c/w home dose allopurinol.     Stable from nephrology standpoint

## 2022-10-10 NOTE — DISCHARGE NOTE PROVIDER - HOSPITAL COURSE
The patient is a 81 year old male with a a history of CAD status post PCI/CABG, CKD stage 3, hypertension, afib, Chronic anemia, diabetes mellitus type 2, previous C Diff sent from [facility] for diarrhea possible C DIff. Was started on PO Vancomcyin 2 days prior to admission. In the ER, started on PO Vancomycin and IV Flagyl. CT of the abdomen showed pan colitis. Admitted for pan- colitis possible C diff and KEN/CKD stage 3. ID was consulted, PO Vancomycin was discontinued. Started on IVF for hydration with improvement; durietics held. C Diff was negative; GI PCR was negative. PO Vancomycin was discontinued. Continued on PO flagyl with improvement. KEN improved. Seen by PT, discharged back to [facility]>

## 2022-10-10 NOTE — PROGRESS NOTE ADULT - ASSESSMENT
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 Patient sent in from [facility] for persistent loose stool. Patient was started on PO vancomycin and Imodium on 10/5 due to hx of C diff in past which patient states he has had 2x before. Denies fever, chills, N/V. Has mild abdominal pain but no rebound or guarding noted. No reported blood in stool. In ED patient is afebrile, no leukocytosis. CT abd/pelvis with pan colitis, Labs with chronic anemia and KEN on CKD. Give IVF, PO vancomycin, and IV flagyl. Stool C diff is negative.      Diarrhea now resolved  Colitis   h/o C diff   KEN      - Blood cultures 10/7 no growth   - Stool for C diff on 10/7 is negative  - No record of C diff test from nursing facility   - GI PCR negative  - Check stool culture if diarrhea continues   - COVID 19 PCR 10/7 negative   - CT A/P reporting colitis   - Continue Flagyl till 10/11/22  - Follow up cultures  - Trend Fever  - Trend WBC        Will sign off. Please call PRN.       d/w Dr Waite

## 2022-10-10 NOTE — DISCHARGE NOTE PROVIDER - CARE PROVIDER_API CALL
Lyssa Oconnor)  Infectious Disease; Internal Medicine  [address] [place] [address]  Phone: [phone]  Fax: [phone]  Follow Up Time: 2 weeks

## 2022-10-10 NOTE — PROGRESS NOTE ADULT - SUBJECTIVE AND OBJECTIVE BOX
[facility]  INFECTIOUS DISEASES at [place] and [place]  =======================================================                               Cecil Bright MD#   Lyssa Oconnor MD*                             Asia Cast MD*   Gricelda Gray MD*            Diplomates American Board of Internal Medicine & Infectious Diseases                # [place] Office - Appt - Tel  [phone] Fax [phone]                * [place] Office - Appt - Tel [phone] Fax [phone]                                  Hospital Consult line:  [phone]  =======================================================    [place] [place] [id number]    Follow up: Diarrhea    No more  diarrhea today      Allergies:  No Known Allergies       REVIEW OF SYSTEMS:  CONSTITUTIONAL:  No Fever or chills  HEENT:  No diplopia or blurred vision.  No earache, sore throat or runny nose.  CARDIOVASCULAR:  No chest pain  RESPIRATORY:  No cough, shortness of breath  GASTROINTESTINAL:  No nausea, vomiting. Improved  diarrhea.  GENITOURINARY:  No dysuria, frequency or urgency. No Blood in urine  MUSCULOSKELETAL:  no joint aches, no muscle pain  SKIN:  No change in skin, hair or nails.  NEUROLOGIC:  No Headaches, seizures  PSYCHIATRIC:  No disorder of thought or mood.  ENDOCRINE:  No heat or cold intolerance  HEMATOLOGICAL:  No easy bruising or bleeding.       Physical Exam:  GEN: NAD  HEENT: normocephalic and atraumatic. EOMI. PERRL.    NECK: Supple.   LUNGS: CTA B/L.  HEART: RRR  ABDOMEN: Soft, NT, ND.  +BS.    : No CVA tenderness  EXTREMITIES: Without  edema.  MSK: No joint swelling  NEUROLOGIC: Rt sided facial droop   PSYCHIATRIC: Appropriate affect .  SKIN: No rash      Vitals:  T(F): 98.4 (09 Oct 2022 05:14), Max: 98.8 (08 Oct 2022 16:40)  HR: 60 (09 Oct 2022 05:14)  BP: 172/64 (09 Oct 2022 05:14)  RR: 18 (09 Oct 2022 05:14)  SpO2: 93% (09 Oct 2022 05:14) (93% - 97%)  temp max in last 48H T(F): , Max: 98.8 (10-08-22 @ 16:40)    Current Antibiotics:  metroNIDAZOLE    Tablet 500 milliGRAM(s) Oral every 8 hours    Other medications:  allopurinol 100 milliGRAM(s) Oral daily  aspirin enteric coated 81 milliGRAM(s) Oral daily  atorvastatin 40 milliGRAM(s) Oral at bedtime  buPROPion XL (24-Hour) . 150 milliGRAM(s) Oral daily  busPIRone 5 milliGRAM(s) Oral two times a day  dextrose 5%. 1000 milliLiter(s) IV Continuous <Continuous>  dextrose 5%. 1000 milliLiter(s) IV Continuous <Continuous>  dextrose 50% Injectable 25 Gram(s) IV Push once  dextrose 50% Injectable 12.5 Gram(s) IV Push once  dextrose 50% Injectable 25 Gram(s) IV Push once  glucagon  Injectable 1 milliGRAM(s) IntraMuscular once  heparin   Injectable 5000 Unit(s) SubCutaneous every 12 hours  hydrALAZINE 50 milliGRAM(s) Oral every 8 hours  insulin lispro (ADMELOG) corrective regimen sliding scale   SubCutaneous Before meals and at bedtime  isosorbide   dinitrate Tablet (ISORDIL) 10 milliGRAM(s) Oral three times a day  mirtazapine 30 milliGRAM(s) Oral at bedtime  sodium bicarbonate 650 milliGRAM(s) Oral two times a day  sodium chloride 0.9% lock flush 3 milliLiter(s) IV Push every 8 hours  tamsulosin 0.4 milliGRAM(s) Oral at bedtime                            9.1    5.84  )-----------( 211      ( 08 Oct 2022 04:45 )             30.4     10-08    142  |  102  |  53.2<H>  ----------------------------<  86  3.3<L>   |  25.0  |  3.47<H>    Ca    7.7<L>      08 Oct 2022 04:45  Phos  3.5     10-08  Mg     1.4     10-08      RECENT CULTURES:  10-07 @ 09:30 .Blood Blood     No growth to date.    10-07 @ 09:22 .Blood Blood     No growth to date.      WBC Count: 5.84 K/uL (10-08-22 @ 04:45)  WBC Count: 7.06 K/uL (10-06-22 @ 13:58)    Creatinine, Serum: 3.47 mg/dL (10-08-22 @ 04:45)  Creatinine, Serum: 4.08 mg/dL (10-07-22 @ 09:30)  Creatinine, Serum: 4.22 mg/dL (10-06-22 @ 18:20)     SARS-CoV-2 Result: NotDetec (10-07-22 @ 04:00)    Clostridium difficile Toxin by PCR (10.07.22 @ 10:12)    Clostridium difficile Toxin by PCR: RESULT INTERPRETATION:    Not detected - No Clostridium difficile toxins detected by amplified DNA  PCR.      GI PCR Panel Stool (10.07.22 @ 12:00)    GI PCR Panel: NotDetec: GI Panel PCR evaluates for:  Campylobacter, Plesiomonas shigelloides, Salmonella, Vibrio, Yersinia  enterocolitica, Enteroaggregative Escherichia (EAEC), Enteropathogenic E.  coli (EPEC), Enterotoxigenic E. coli (ETEC), Shiga-like toxin producing  E.coli (STEC), E. coli O157, Shigella/Enteroinvasive E. coli (EIEC),  Adenovirus, Astrovirus, Norovirus, Rotavirus, Sapovirus, Cryptosporidium,  Cyclospora cayetanensis, Entamoeba histolytica, Giardia lamblia.  For culture and susceptibility reports refer to “reflex stool culture”.

## 2022-10-10 NOTE — DISCHARGE NOTE PROVIDER - NSDCFUSCHEDAPPT_GEN_ALL_CORE_FT
Pt here today for incision check also a lump near incision, pt states she has pain where the lump is but was told there was no signs of infection just keep an eye on it and call the office with any questions or concerns.
[facility]  [facility] [address]  Scheduled Appointment: 10/31/2022

## 2022-10-10 NOTE — PROGRESS NOTE ADULT - SUBJECTIVE AND OBJECTIVE BOX
CC: Follow up     INTERVAL HPI/OVERNIGHT EVENTS: Patient seen and examined, denies nausea vomiting or fever. 1 BM overnight per RN       Vital Signs Last 24 Hrs  T(C): 36.7 (10 Oct 2022 05:13), Max: 36.8 (09 Oct 2022 12:50)  T(F): 98 (10 Oct 2022 05:13), Max: 98.2 (09 Oct 2022 12:50)  HR: 61 (10 Oct 2022 05:53) (59 - 64)  BP: 165/72 (10 Oct 2022 05:53) (155/74 - 170/76)  BP(mean): --  RR: 18 (10 Oct 2022 05:13) (18 - 18)  SpO2: 97% (10 Oct 2022 05:13) (92% - 99%)    Parameters below as of 10 Oct 2022 05:13  Patient On (Oxygen Delivery Method): room air        PHYSICAL EXAM:    GENERAL: NAD, AOX3  HEAD:  Atraumatic, Normocephalic  EYES:  conjunctiva and sclera clear  ENMT: Moist mucous membranes  NECK: Supple  CHEST/LUNG: Clear to auscultation bilaterally; No rales, rhonchi, wheezing, or rubs  HEART: Regular rate and rhythm; No murmurs, rubs, or gallops  ABDOMEN: Soft, Nontender, Nondistended; Bowel sounds present  EXTREMITIES:  2+ Peripheral Pulses, No clubbing, cyanosis, or edema        MEDICATIONS  (STANDING):  allopurinol 100 milliGRAM(s) Oral daily  aspirin enteric coated 81 milliGRAM(s) Oral daily  atorvastatin 40 milliGRAM(s) Oral at bedtime  buPROPion XL (24-Hour) . 150 milliGRAM(s) Oral daily  busPIRone 5 milliGRAM(s) Oral two times a day  dextrose 5%. 1000 milliLiter(s) (50 mL/Hr) IV Continuous <Continuous>  dextrose 5%. 1000 milliLiter(s) (100 mL/Hr) IV Continuous <Continuous>  dextrose 50% Injectable 25 Gram(s) IV Push once  dextrose 50% Injectable 12.5 Gram(s) IV Push once  dextrose 50% Injectable 25 Gram(s) IV Push once  glucagon  Injectable 1 milliGRAM(s) IntraMuscular once  heparin   Injectable 5000 Unit(s) SubCutaneous every 12 hours  hydrALAZINE 50 milliGRAM(s) Oral every 8 hours  insulin lispro (ADMELOG) corrective regimen sliding scale   SubCutaneous Before meals and at bedtime  isosorbide   dinitrate Tablet (ISORDIL) 10 milliGRAM(s) Oral three times a day  metroNIDAZOLE    Tablet 500 milliGRAM(s) Oral every 8 hours  mirtazapine 30 milliGRAM(s) Oral at bedtime  sodium bicarbonate 650 milliGRAM(s) Oral two times a day  sodium chloride 0.9% lock flush 3 milliLiter(s) IV Push every 8 hours  tamsulosin 0.4 milliGRAM(s) Oral at bedtime    MEDICATIONS  (PRN):  acetaminophen     Tablet .. 650 milliGRAM(s) Oral every 6 hours PRN Temp greater or equal to 38C (100.4F), Mild Pain (1 - 3)  aluminum hydroxide/magnesium hydroxide/simethicone Suspension 30 milliLiter(s) Oral every 4 hours PRN Dyspepsia  dextrose Oral Gel 15 Gram(s) Oral once PRN Blood Glucose LESS THAN 70 milliGRAM(s)/deciliter  melatonin 3 milliGRAM(s) Oral at bedtime PRN Insomnia  ondansetron Injectable 4 milliGRAM(s) IV Push every 8 hours PRN Nausea and/or Vomiting      Allergies    No Known Allergies    Intolerances          LABS:                          9.0    6.25  )-----------( 198      ( 10 Oct 2022 04:30 )             30.0     10-10    144  |  107  |  36.6<H>  ----------------------------<  109<H>  3.9   |  23.0  |  2.11<H>    Ca    8.3<L>      10 Oct 2022 04:30    TPro  6.2<L>  /  Alb  2.8<L>  /  TBili  0.4  /  DBili  x   /  AST  20  /  ALT  10  /  AlkPhos  110  10-10      Urinalysis Basic - ( 08 Oct 2022 18:58 )    Color: Yellow / Appearance: Slightly Turbid / S.015 / pH: x  Gluc: x / Ketone: Trace  / Bili: Small / Urobili: 4   Blood: x / Protein: 15 / Nitrite: Positive   Leuk Esterase: Trace / RBC: Negative /HPF / WBC 0-2 /HPF   Sq Epi: x / Non Sq Epi: Occasional / Bacteria: Few        RADIOLOGY & ADDITIONAL TESTS:

## 2022-10-10 NOTE — PROGRESS NOTE ADULT - SUBJECTIVE AND OBJECTIVE BOX
Reason for visit:  KEN    Subjective: No acute overnight event. Patient denied any cardiac or urinary complains. No fever/chills.     ROS: All systems were reviewed in detail pertinent positive and negative mentioned above, rest are negative.    Physical Exam:  GENERAL: elderly male, NAD  HEAD:  Right side facial palsy  EYES: conjunctiva and sclera clear  ENMT: Moist mucous membranes, OP clear  NECK: Supple, No JVD  NERVOUS SYSTEM:  Alert & Oriented X3  CHEST/LUNG: Clear to percussion bilaterally; No rales, rhonchi, wheezing, or rubs  HEART: Regular rate and rhythm; No murmurs, rubs, or gallops  ABDOMEN: Soft, Nontender, Nondistended; Bowel sounds present  EXTREMITIES:  No clubbing, cyanosis, or edema  SKIN: No rashes or lesions      =======================================================  Vital Signs Last 24 Hrs  T(C): 36.7 (10 Oct 2022 20:40), Max: 37.2 (10 Oct 2022 11:44)  T(F): 98.1 (10 Oct 2022 20:40), Max: 98.9 (10 Oct 2022 11:44)  HR: 54 (10 Oct 2022 20:40) (54 - 75)  BP: 164/65 (10 Oct 2022 20:40) (164/65 - 175/64)  BP(mean): --  RR: 18 (10 Oct 2022 20:40) (18 - 18)  SpO2: 97% (10 Oct 2022 20:40) (95% - 97%)    Parameters below as of 10 Oct 2022 20:40  Patient On (Oxygen Delivery Method): room air      I&O's Summary    09 Oct 2022 07:01  -  10 Oct 2022 07:00  --------------------------------------------------------  IN: 0 mL / OUT: 300 mL / NET: -300 mL    10 Oct 2022 07:01  -  10 Oct 2022 22:07  --------------------------------------------------------  IN: 0 mL / OUT: 280 mL / NET: -280 mL      =======================================================  Current Antibiotics:  metroNIDAZOLE    Tablet 500 milliGRAM(s) Oral every 8 hours    Other medications:  allopurinol 100 milliGRAM(s) Oral daily  aspirin enteric coated 81 milliGRAM(s) Oral daily  atorvastatin 40 milliGRAM(s) Oral at bedtime  buPROPion XL (24-Hour) . 150 milliGRAM(s) Oral daily  busPIRone 5 milliGRAM(s) Oral two times a day  dextrose 5%. 1000 milliLiter(s) IV Continuous <Continuous>  dextrose 5%. 1000 milliLiter(s) IV Continuous <Continuous>  dextrose 50% Injectable 25 Gram(s) IV Push once  dextrose 50% Injectable 12.5 Gram(s) IV Push once  dextrose 50% Injectable 25 Gram(s) IV Push once  glucagon  Injectable 1 milliGRAM(s) IntraMuscular once  heparin   Injectable 5000 Unit(s) SubCutaneous every 12 hours  hydrALAZINE 50 milliGRAM(s) Oral every 8 hours  insulin lispro (ADMELOG) corrective regimen sliding scale   SubCutaneous Before meals and at bedtime  isosorbide   dinitrate Tablet (ISORDIL) 10 milliGRAM(s) Oral three times a day  mirtazapine 30 milliGRAM(s) Oral at bedtime  sodium bicarbonate 650 milliGRAM(s) Oral two times a day  sodium chloride 0.9% lock flush 3 milliLiter(s) IV Push every 8 hours  tamsulosin 0.4 milliGRAM(s) Oral at bedtime    =======================================================  10-10    144  |  107  |  36.6<H>  ----------------------------<  109<H>  3.9   |  23.0  |  2.11<H>    Ca    8.3<L>      10 Oct 2022 04:30    TPro  6.2<L>  /  Alb  2.8<L>  /  TBili  0.4  /  DBili  x   /  AST  20  /  ALT  10  /  AlkPhos  110  10-10    Creatinine, Serum: 2.11 mg/dL (10-10-22 @ 04:30)  Creatinine, Serum: 2.52 mg/dL (10-09-22 @ 08:25)  Creatinine, Serum: 3.47 mg/dL (10-08-22 @ 04:45)  Creatinine, Serum: 4.08 mg/dL (10-07-22 @ 09:30)  Creatinine, Serum: 4.22 mg/dL (10-06-22 @ 18:20)      =======================================================

## 2022-10-11 NOTE — PROGRESS NOTE ADULT - SUBJECTIVE AND OBJECTIVE BOX
CC: Follow up     INTERVAL HPI/OVERNIGHT EVENTS: Patient seen and examined, diarrhea resolved. Denies nausea vomiting or abdominal pain Afebrile.       Vital Signs Last 24 Hrs  T(C): 37 (11 Oct 2022 05:04), Max: 37.2 (10 Oct 2022 11:44)  T(F): 98.6 (11 Oct 2022 05:04), Max: 98.9 (10 Oct 2022 11:44)  HR: 70 (11 Oct 2022 06:55) (54 - 75)  BP: 142/69 (11 Oct 2022 06:55) (140/63 - 175/64)  BP(mean): --  RR: 18 (11 Oct 2022 06:55) (18 - 18)  SpO2: 95% (11 Oct 2022 06:55) (95% - 97%)    Parameters below as of 11 Oct 2022 06:55  Patient On (Oxygen Delivery Method): room air        PHYSICAL EXAM:    GENERAL: NAD, AOX3 + Facial droop   HEAD:  Atraumatic, Normocephalic  EYES: Econjunctiva and sclera clear  ENMT: Moist mucous membranes  NECK: Supple  CHEST/LUNG: Clear to auscultation bilaterally; No rales, rhonchi, wheezing, or rubs  HEART: Regular rate and rhythm; No murmurs, rubs, or gallops  ABDOMEN: Soft, Nontender, Nondistended; Bowel sounds present  EXTREMITIES:  2+ Peripheral Pulses, No clubbing, cyanosis, or edema        MEDICATIONS  (STANDING):  allopurinol 100 milliGRAM(s) Oral daily  aspirin enteric coated 81 milliGRAM(s) Oral daily  atorvastatin 40 milliGRAM(s) Oral at bedtime  buPROPion XL (24-Hour) . 150 milliGRAM(s) Oral daily  busPIRone 5 milliGRAM(s) Oral two times a day  dextrose 5%. 1000 milliLiter(s) (50 mL/Hr) IV Continuous <Continuous>  dextrose 5%. 1000 milliLiter(s) (100 mL/Hr) IV Continuous <Continuous>  dextrose 50% Injectable 25 Gram(s) IV Push once  dextrose 50% Injectable 12.5 Gram(s) IV Push once  dextrose 50% Injectable 25 Gram(s) IV Push once  glucagon  Injectable 1 milliGRAM(s) IntraMuscular once  heparin   Injectable 5000 Unit(s) SubCutaneous every 12 hours  hydrALAZINE 50 milliGRAM(s) Oral every 8 hours  insulin lispro (ADMELOG) corrective regimen sliding scale   SubCutaneous Before meals and at bedtime  isosorbide   dinitrate Tablet (ISORDIL) 10 milliGRAM(s) Oral three times a day  metroNIDAZOLE    Tablet 500 milliGRAM(s) Oral every 8 hours  mirtazapine 30 milliGRAM(s) Oral at bedtime  sodium bicarbonate 650 milliGRAM(s) Oral two times a day  sodium chloride 0.9% lock flush 3 milliLiter(s) IV Push every 8 hours  tamsulosin 0.4 milliGRAM(s) Oral at bedtime    MEDICATIONS  (PRN):  acetaminophen     Tablet .. 650 milliGRAM(s) Oral every 6 hours PRN Temp greater or equal to 38C (100.4F), Mild Pain (1 - 3)  aluminum hydroxide/magnesium hydroxide/simethicone Suspension 30 milliLiter(s) Oral every 4 hours PRN Dyspepsia  dextrose Oral Gel 15 Gram(s) Oral once PRN Blood Glucose LESS THAN 70 milliGRAM(s)/deciliter  melatonin 3 milliGRAM(s) Oral at bedtime PRN Insomnia  ondansetron Injectable 4 milliGRAM(s) IV Push every 8 hours PRN Nausea and/or Vomiting      Allergies    No Known Allergies    Intolerances          LABS:                          8.9    7.29  )-----------( 198      ( 11 Oct 2022 04:30 )             29.6     10-11    144  |  107  |  30.8<H>  ----------------------------<  120<H>  3.8   |  25.0  |  1.89<H>    Ca    8.1<L>      11 Oct 2022 04:30    TPro  6.2<L>  /  Alb  2.8<L>  /  TBili  0.4  /  DBili  x   /  AST  20  /  ALT  10  /  AlkPhos  110  10-10          RADIOLOGY & ADDITIONAL TESTS:

## 2022-10-11 NOTE — PROGRESS NOTE ADULT - ASSESSMENT
The patient is a 81 year old male with a a history of CAD status post PCI/CABG, CKD stage 3, hypertension, afib, Chronic anemia, diabetes mellitus type 2, previous C Diff sent from [facility] for diarrhea possible C DIff. Was started on PO Vancomcyin 2 days prior to admission. In the ER, started on PO Vancomycin and IV Flagyl. CT of the abdomen showed pan colitis. Admitted for pan- colitis possible C diff and KEN/CKD stage 3. ID was consulted, PO Vancomycin was discontinued. Started on IVF for hydration    Assessment/Plan:    1. Pan colitis:   -concern for C diff based on history   -Was only on oral Vancomycin for 2 days prior to being sent to ED  - On PO Flagyl day 4/5  - ID following, PO Vanco discontinued   - C Diff negative   - GI PCR negative   - Diarrhea improving     2. KEN with CKD stage 3  - Baseline creatinine of 2.9-3.0  - Improving   - Bumex held  - Follow up BMP  -Avoid nephrotoxins    3. History of CAD  - Chest pain in the ER   -Cardiology consult reviewed  -No evidence of ACS  -EKG unchanged  -Trop elevation due to low GFR  -CP reproducible   -No evidence of decompensated CHF    4. AFib   -No A/C due to prior GIB  -Digoxin on hold pending level in setting of KEN on advanced CKD- Repeat Dig level in AM   - Supplement KCL   -S/P Watchman device    5. Chronic anemia:  -Due to CKD/AOCD  -monitor CBC     6. DM-2:  - Hbaic of 5/1   - Hold  standing Lantus QHS and admelog pre-meals  - Admelog sliding scale for now   - Monitor BSL closely     7. Gout:   Allopurinol     8. BPH  - On FLomax    VTE- Heparin subcut    Medically stable for discharge.  The patient is a 81 year old male with a a history of CAD status post PCI/CABG, CKD stage 3, hypertension, afib, Chronic anemia, diabetes mellitus type 2, previous C Diff sent from [facility] for diarrhea possible C DIff. Was started on PO Vancomcyin 2 days prior to admission. In the ER, started on PO Vancomycin and IV Flagyl. CT of the abdomen showed pan colitis. Admitted for pan- colitis possible C diff and KEN/CKD stage 3. ID was consulted, PO Vancomycin was discontinued. Started on IVF for hydration    Assessment/Plan:    1. Pan colitis:   -concern for C diff based on history   -Was only on oral Vancomycin for 2 days prior to being sent to ED  - On PO Flagyl day 5  - ID following, PO Vanco discontinued   - C Diff negative   - GI PCR negative   - Diarrhea resolved    2. KEN with CKD stage 3  - Baseline creatinine of 2.9-3.0  - Improving   - Bumex held  - Follow up BMP  -Avoid nephrotoxins    3. History of CAD  - Chest pain in the ER   -Cardiology consult reviewed  -No evidence of ACS  -EKG unchanged  -Trop elevation due to low GFR  -CP reproducible   -No evidence of decompensated CHF    4. AFib   -No A/C due to prior GIB  -Digoxin on hold pending level in setting of KEN on advanced CKD- Repeat Dig level in AM   - Supplement KCL   -S/P Watchman device    5. Chronic anemia:  -Due to CKD/AOCD  -monitor CBC     6. DM-2:  - Hbaic of 5/1   - Hold  standing Lantus QHS and admelog pre-meals  - Admelog sliding scale for now   - Monitor BSL closely     7. Gout:   Allopurinol     8. BPH  - On FLomax    VTE- Heparin subcut    Medically stable for discharge. Spoke with patient's wife on the phone

## 2022-10-12 NOTE — PROGRESS NOTE ADULT - ASSESSMENT
The patient is a 81 year old male with a a history of CAD status post PCI/CABG, CKD stage 3, hypertension, afib, Chronic anemia, diabetes mellitus type 2, previous C Diff sent from [facility] for diarrhea possible C DIff. Was started on PO Vancomcyin 2 days prior to admission. In the ER, started on PO Vancomycin and IV Flagyl. CT of the abdomen showed pan colitis. Admitted for pan- colitis possible C diff and KEN/CKD stage 3. ID was consulted, PO Vancomycin was discontinued. Started on IVF for hydration    Assessment/Plan:    1. Pan colitis:   -concern for C diff based on history   -Was only on oral Vancomycin for 2 days prior to being sent to ED  - On PO Flagyl day 6  - ID following, PO Vanco discontinued   - C Diff negative   - GI PCR negative   - Repeat CT with improved Pan colitis; Positive for proctitis. Imodium x 1     2. KEN with CKD stage 3  - Baseline creatinine of 2.9-3.0  - Improving   - Resume Bumex 1mg OD   - Follow up BMP  -Avoid nephrotoxins    3. History of CAD  - Chest pain in the ER   -Cardiology consult reviewed  -No evidence of ACS  -EKG unchanged  -Trop elevation due to low GFR  -CP reproducible   -No evidence of decompensated CHF    4. AFib   -No A/C due to prior GIB  -Digoxin on hold pending level in setting of KEN on advanced CKD- Repeat Dig level in AM   - Supplement KCL   -S/P Watchman device    5. Chronic anemia:  -Due to CKD/AOCD  -monitor CBC     6. DM-2:  - Hbaic of 5/1   - Hold  standing Lantus QHS and admelog pre-meals  - Admelog sliding scale for now   - Monitor BSL closely     7. Gout:   Allopurinol     8. BPH  - On FLomax    VTE- Heparin subcut    Discharge disposition: [facility] if diarrhea improved   The patient is a 81 year old male with a a history of CAD status post PCI/CABG, CKD stage 3, hypertension, afib, Chronic anemia, diabetes mellitus type 2, previous C Diff sent from [facility] for diarrhea possible C DIff. Was started on PO Vancomcyin 2 days prior to admission. In the ER, started on PO Vancomycin and IV Flagyl. CT of the abdomen showed pan colitis. Admitted for pan- colitis possible C diff and KEN/CKD stage 3. ID was consulted, PO Vancomycin was discontinued. Started on IVF for hydration    Assessment/Plan:    1. Pan colitis:   -concern for C diff based on history   -Was only on oral Vancomycin for 2 days prior to being sent to ED  - On PO Flagyl day 6  - ID following, PO Vanco discontinued   - C Diff negative   - GI PCR negative   - Repeat CT with improved Pan colitis; Positive for proctitis. Imodium x 1     2. KEN with CKD stage 3  - Improved  - Resume Bumex 1mg OD   - Follow up BMP  -Avoid nephrotoxins    3. History of CAD  - Chest pain in the ER   -Cardiology consult reviewed  -No evidence of ACS  -EKG unchanged  -Trop elevation due to low GFR  -CP reproducible   -No evidence of decompensated CHF    4. AFib   -No A/C due to prior GIB  -Digoxin on hold pending level in setting of KEN on advanced CKD- Repeat Dig level in AM   - Supplement KCL   -S/P Watchman device    5. Chronic anemia:  -Due to CKD/AOCD  -monitor CBC     6. DM-2:  - Hbaic of 5/1   - Hold  standing Lantus QHS and admelog pre-meals  - Admelog sliding scale for now   - Monitor BSL closely     7. Gout:   Allopurinol     8. BPH  - On FLomax    VTE- Heparin subcut    Discharge disposition: [facility] if diarrhea improved

## 2022-10-12 NOTE — PROGRESS NOTE ADULT - SUBJECTIVE AND OBJECTIVE BOX
CC: Follow up     INTERVAL HPI/OVERNIGHT EVENTS: Patient seen and examined, still having loose bm overnight. denies nausea vomiting or abdominal pain      Vital Signs Last 24 Hrs  T(C): 36.7 (12 Oct 2022 11:25), Max: 37 (11 Oct 2022 20:00)  T(F): 98.1 (12 Oct 2022 11:25), Max: 98.6 (11 Oct 2022 20:00)  HR: 77 (12 Oct 2022 11:25) (68 - 82)  BP: 148/75 (12 Oct 2022 11:25) (148/75 - 174/85)  BP(mean): --  RR: 18 (12 Oct 2022 11:25) (18 - 18)  SpO2: 98% (12 Oct 2022 11:25) (97% - 98%)    Parameters below as of 12 Oct 2022 11:25  Patient On (Oxygen Delivery Method): room air        PHYSICAL EXAM:    GENERAL: NAD, AOX3 + facial droop  HEAD:  Atraumatic, Normocephalic  EYES:  conjunctiva and sclera clear  ENMT: Moist mucous membranes  NECK: Supple  CHEST/LUNG: Clear to auscultation bilaterally; No rales, rhonchi, wheezing, or rubs  HEART: Regular rate and rhythm; No murmurs, rubs, or gallops  ABDOMEN: Soft, Nontender, Nondistended; Bowel sounds present  EXTREMITIES:  2+ Peripheral Pulses, No clubbing, cyanosis, or edema        MEDICATIONS  (STANDING):  allopurinol 100 milliGRAM(s) Oral daily  aspirin enteric coated 81 milliGRAM(s) Oral daily  atorvastatin 40 milliGRAM(s) Oral at bedtime  buPROPion XL (24-Hour) . 150 milliGRAM(s) Oral daily  busPIRone 5 milliGRAM(s) Oral two times a day  dextrose 5%. 1000 milliLiter(s) (50 mL/Hr) IV Continuous <Continuous>  dextrose 5%. 1000 milliLiter(s) (100 mL/Hr) IV Continuous <Continuous>  dextrose 50% Injectable 25 Gram(s) IV Push once  dextrose 50% Injectable 12.5 Gram(s) IV Push once  dextrose 50% Injectable 25 Gram(s) IV Push once  glucagon  Injectable 1 milliGRAM(s) IntraMuscular once  heparin   Injectable 5000 Unit(s) SubCutaneous every 12 hours  hydrALAZINE 75 milliGRAM(s) Oral every 8 hours  insulin lispro (ADMELOG) corrective regimen sliding scale   SubCutaneous Before meals and at bedtime  isosorbide   dinitrate Tablet (ISORDIL) 10 milliGRAM(s) Oral three times a day  metroNIDAZOLE    Tablet 500 milliGRAM(s) Oral every 8 hours  mirtazapine 30 milliGRAM(s) Oral at bedtime  sodium bicarbonate 650 milliGRAM(s) Oral two times a day  sodium chloride 0.9% lock flush 3 milliLiter(s) IV Push every 8 hours  tamsulosin 0.4 milliGRAM(s) Oral at bedtime    MEDICATIONS  (PRN):  acetaminophen     Tablet .. 650 milliGRAM(s) Oral every 6 hours PRN Temp greater or equal to 38C (100.4F), Mild Pain (1 - 3)  aluminum hydroxide/magnesium hydroxide/simethicone Suspension 30 milliLiter(s) Oral every 4 hours PRN Dyspepsia  dextrose Oral Gel 15 Gram(s) Oral once PRN Blood Glucose LESS THAN 70 milliGRAM(s)/deciliter  melatonin 3 milliGRAM(s) Oral at bedtime PRN Insomnia  ondansetron Injectable 4 milliGRAM(s) IV Push every 8 hours PRN Nausea and/or Vomiting      Allergies    No Known Allergies    Intolerances          LABS:                          9.3    7.01  )-----------( 225      ( 12 Oct 2022 08:51 )             30.2     10-12    140  |  106  |  25.0<H>  ----------------------------<  122<H>  4.1   |  26.0  |  1.70<H>    Ca    8.5      12 Oct 2022 08:51            RADIOLOGY & ADDITIONAL TESTS:

## 2022-10-13 NOTE — DISCHARGE NOTE NURSING/CASE MANAGEMENT/SOCIAL WORK - NSDCPEFALRISK_GEN_ALL_CORE
For information on Fall & Injury Prevention, visit: [email or web address] OR  [email or web address] OR  https://www.cdc.gov/steadi/patient.html

## 2022-10-13 NOTE — PROGRESS NOTE ADULT - REASON FOR ADMISSION
KEN on CKD  R/O recurrent C diff

## 2022-10-13 NOTE — PROGRESS NOTE ADULT - PROVIDER SPECIALTY LIST ADULT
Hospitalist
Infectious Disease
Internal Medicine
Nephrology
Hospitalist
Infectious Disease
Nephrology
Hospitalist
Hospitalist
Infectious Disease
Nephrology

## 2022-10-13 NOTE — PROGRESS NOTE ADULT - SUBJECTIVE AND OBJECTIVE BOX
CC: Follow up     INTERVAL HPI/OVERNIGHT EVENTS: Patient seen and examined, No acute complaints overnight. Diarrhea resolved      Vital Signs Last 24 Hrs  T(C): 36.9 (13 Oct 2022 04:46), Max: 36.9 (12 Oct 2022 20:34)  T(F): 98.5 (13 Oct 2022 04:46), Max: 98.5 (12 Oct 2022 20:34)  HR: 81 (13 Oct 2022 04:46) (71 - 81)  BP: 168/81 (13 Oct 2022 04:46) (148/80 - 172/69)  BP(mean): --  RR: 18 (13 Oct 2022 04:46) (18 - 18)  SpO2: 95% (13 Oct 2022 04:46) (94% - 98%)    Parameters below as of 13 Oct 2022 04:46  Patient On (Oxygen Delivery Method): room air        PHYSICAL EXAM:    GENERAL: NAD, AOX3  HEAD:  Atraumatic, Normocephalic  EYES:  conjunctiva and sclera clear  ENMT: Moist mucous membranes  NECK: Supple  CHEST/LUNG: Clear to auscultation bilaterally; No rales, rhonchi, wheezing, or rubs  HEART: Regular rate and rhythm; No murmurs, rubs, or gallops  ABDOMEN: Soft, Nontender, Nondistended; Bowel sounds present  EXTREMITIES:  2+ Peripheral Pulses, No clubbing, cyanosis, or edema        MEDICATIONS  (STANDING):  allopurinol 100 milliGRAM(s) Oral daily  aspirin enteric coated 81 milliGRAM(s) Oral daily  atorvastatin 40 milliGRAM(s) Oral at bedtime  buMETAnide 1 milliGRAM(s) Oral two times a day  buPROPion XL (24-Hour) . 150 milliGRAM(s) Oral daily  busPIRone 5 milliGRAM(s) Oral two times a day  dextrose 5%. 1000 milliLiter(s) (50 mL/Hr) IV Continuous <Continuous>  dextrose 5%. 1000 milliLiter(s) (100 mL/Hr) IV Continuous <Continuous>  dextrose 50% Injectable 25 Gram(s) IV Push once  dextrose 50% Injectable 12.5 Gram(s) IV Push once  dextrose 50% Injectable 25 Gram(s) IV Push once  glucagon  Injectable 1 milliGRAM(s) IntraMuscular once  heparin   Injectable 5000 Unit(s) SubCutaneous every 12 hours  hydrALAZINE 75 milliGRAM(s) Oral every 8 hours  insulin lispro (ADMELOG) corrective regimen sliding scale   SubCutaneous Before meals and at bedtime  isosorbide   dinitrate Tablet (ISORDIL) 10 milliGRAM(s) Oral three times a day  metroNIDAZOLE    Tablet 500 milliGRAM(s) Oral every 8 hours  mirtazapine 30 milliGRAM(s) Oral at bedtime  sodium bicarbonate 650 milliGRAM(s) Oral two times a day  sodium chloride 0.9% lock flush 3 milliLiter(s) IV Push every 8 hours  tamsulosin 0.4 milliGRAM(s) Oral at bedtime    MEDICATIONS  (PRN):  acetaminophen     Tablet .. 650 milliGRAM(s) Oral every 6 hours PRN Temp greater or equal to 38C (100.4F), Mild Pain (1 - 3)  aluminum hydroxide/magnesium hydroxide/simethicone Suspension 30 milliLiter(s) Oral every 4 hours PRN Dyspepsia  dextrose Oral Gel 15 Gram(s) Oral once PRN Blood Glucose LESS THAN 70 milliGRAM(s)/deciliter  melatonin 3 milliGRAM(s) Oral at bedtime PRN Insomnia  ondansetron Injectable 4 milliGRAM(s) IV Push every 8 hours PRN Nausea and/or Vomiting      Allergies    No Known Allergies    Intolerances          LABS:                          9.3    7.01  )-----------( 225      ( 12 Oct 2022 08:51 )             30.2     10-13    141  |  105  |  26.4<H>  ----------------------------<  135<H>  4.3   |  25.0  |  1.77<H>    Ca    8.5      13 Oct 2022 04:40            RADIOLOGY & ADDITIONAL TESTS:

## 2022-10-13 NOTE — PROGRESS NOTE ADULT - ASSESSMENT
The patient is a 81 year old male with a a history of CAD status post PCI/CABG, CKD stage 3, hypertension, afib, Chronic anemia, diabetes mellitus type 2, previous C Diff sent from [facility] for diarrhea possible C DIff. Was started on PO Vancomcyin 2 days prior to admission. In the ER, started on PO Vancomycin and IV Flagyl. CT of the abdomen showed pan colitis. Admitted for pan- colitis possible C diff and KEN/CKD stage 3. ID was consulted, PO Vancomycin was discontinued. Started on IVF for hydration    Assessment/Plan:    1. Pan colitis:   -concern for C diff based on history   -Was only on oral Vancomycin for 2 days prior to being sent to ED  - On PO Flagyl day 7/10  - ID following, PO Vanco discontinued   - C Diff negative   - GI PCR negative   - Repeat CT with improved Pan colitis; Positive for proctitis. Imodium x 1 Diarrhea improved    2. KEN with CKD stage 3  - Improved  -  Bumex 1mg BID  - Follow up BMP  -Avoid nephrotoxins    3. History of CAD  - Chest pain in the ER   -Cardiology consult reviewed  -No evidence of ACS  -EKG unchanged  -Trop elevation due to low GFR  -CP reproducible   -No evidence of decompensated CHF    4. AFib   -No A/C due to prior GIB  -Digoxin   -S/P Watchman device    5. Chronic anemia:  -Due to CKD/AOCD  -monitor CBC     6. DM-2:  - Hbaic of 5/1   - Hold  standing Lantus QHS and admelog pre-meals  - Admelog sliding scale for now   - Monitor BSL closely     7. Gout:   Allopurinol     8. BPH  - On FLomax    VTE- Heparin subcut    Discharge disposition: [facility]

## 2022-10-13 NOTE — DISCHARGE NOTE NURSING/CASE MANAGEMENT/SOCIAL WORK - PATIENT PORTAL LINK FT
You can access the FollowMyHealth Patient Portal offered by [facility] by registering at the following website: [email or web address]. By joining [employer]’s FollowMyHealth portal, you will also be able to view your health information using other applications (apps) compatible with our system.

## 2022-10-18 NOTE — PROCEDURE: MOHS SURGERY
Sent msg via pt portal regarding medication and refills per MD instructions.  Reordered Rx with 11 refills, sent to preferred pharmacy   Island Pedicle Flap-Requiring Vessel Identification Text: The defect edges were debeveled with a #15 scalpel blade.  Given the location of the defect, shape of the defect and the proximity to free margins an island pedicle advancement flap was deemed most appropriate.  Using a sterile surgical marker, an appropriate advancement flap was drawn, based on the axial vessel mentioned above, incorporating the defect, outlining the appropriate donor tissue and placing the expected incisions within the relaxed skin tension lines where possible.    The area thus outlined was incised deep to adipose tissue with a #15 scalpel blade.  The skin margins were undermined to an appropriate distance in all directions around the primary defect and laterally outward around the island pedicle utilizing iris scissors.  There was minimal undermining beneath the pedicle flap.

## 2022-10-29 NOTE — ED ADULT TRIAGE NOTE - NS ED NURSE BANDS TYPE
Your CT today showed that you have a 4-5 millimeter kidney stone in the left ureter. Oftentimes these will pass by themselves with fluids and we control pain and nausea. If you develop increasing pain or you develop fevers, symptoms worrisome for a urinary infection like burning, frequency, urgency, or persistent vomiting with inability to keep down fluids, return to the emergency department immediately. Otherwise you should call on Monday to schedule an appoint with urology to be seen a soon as possible.
Allergy;

## 2023-01-01 ENCOUNTER — APPOINTMENT (OUTPATIENT)
Dept: ELECTROPHYSIOLOGY | Facility: CLINIC | Age: 83
End: 2023-01-01
Payer: MEDICARE

## 2023-01-01 ENCOUNTER — NON-APPOINTMENT (OUTPATIENT)
Age: 83
End: 2023-01-01

## 2023-01-01 PROCEDURE — 93298 REM INTERROG DEV EVAL SCRMS: CPT

## 2023-01-01 PROCEDURE — G2066: CPT

## 2023-02-13 ENCOUNTER — APPOINTMENT (OUTPATIENT)
Dept: ELECTROPHYSIOLOGY | Facility: CLINIC | Age: 83
End: 2023-02-13

## 2023-02-14 NOTE — PLAN
Metronidazole Pregnancy And Lactation Text: This medication is Pregnancy Category B and considered safe during pregnancy.  It is also excreted in breast milk. [FreeTextEntry1] : CARDIOLOGY CONSULTANT NOTE REVIEWED\par LAB WORK REVIEWED\par TO SEE NEPHROLOGY FOR ACUTE ON CHRONIC KIDNEY DISEASE\par MONITOR BLOOD PRESSURE, FS'S, WEIGHT AND FOR EDEMA\par SODIUM AVOIDANCE\par HEALTHY DIET AND LIFESTYLE MODIFICATIONS\par HEALTHY WEIGHT LOSS \par NEED VACCINATION RECORDS FOR REVIEW\par NEED OPHTHALMOLOGY CONSULTANT NOTE\par CHECK LAB WORK INCLUDING REPEAT CBC; CONTINUE IRON 325 DAILY PENDING REPEAT LAB WORK\par NEEDS TO SEE GI IN FOLLOW-UP FROM HOSPITALIZATION\par FALL PRECAUTIONS\par FOLLOW-UP ALL SPECIALISTS AS DIRECTED \par CALL WITH ANY QUESTIONS, CONCERNS OR CHANGES \par AWARE WHEN TO SEEK EMERGENT CARE\par CARDIOLOGY CONSULTANT NOTE, LAB WORK, CAROTIDS AND EKG DONE WITH CARDIOLOGY REVIEWED

## 2023-03-16 NOTE — PROCEDURE: MOHS SURGERY
[facility] are psychiatry clinics that prescribe Suboxone so will have good understanding of how it works and relates to other medications.      You have refill of Suboxone at pharmacy.    Follow-up via video visit on 4/13 @ 12:30pm.    A prescription has been sent to your pharmacy of choice.    If a prior authorization is required it may take several days to get your medication.    Please make sure your pharmacy had your contact information so they can contact you when it is ready to .  Please contact your pharmacy to see if your medication is ready to be picked up.     You are encouraged to have some type of recovery program in addition to medication treatment.    Medication alone is generally not enough to lead to long term recovery.    This may include having some type of sober network, avoiding isolating, avoiding triggers (people, places, things you associate with using substances and help with managing cravings)    Options for support include :         [employer] service office in [facility]:    [email or web address]    [phone]  Narcotics Anonymous   www.[employer].org    3-827-119-3743   Smart Recovery   wwwGiftbar  [employer] Sobriety   www.womenforsobriety.org  Celebrate Recovery   [email or web address].[employer]   (Lutheran centered recovery).   [place] [facility] ([facility])  [facility] connects people seeking recovery to resources that help foster and sustain long-term recovery.  Whether you are seeking resources for treatment, transportation, housing, job training, education, health care or other pathways to recovery, [facility] is a great place to start.  (www.[email or web address])   [phone]  [employer] and local community support groups  Mental health counseling   -we can assist with referral     You are strongly recommended abstaining from alcohol, benzodiazepines, cannabis, opioids and other drugs of abuse.     Using these in  combination with Buprenorphine can raise your risk of overdose and death    Contact Us:   [facility] -[phone] or by ThinkLink      If you cannot make your appointment please call the office and reschedule immediately.     If a refill or bridge is needed it is important to call in advance so you do not run out of medications.   We will make every effort to manage your request promptly but please be aware that it may take up to one business day for your refill request to be processed.   If there is a concern with your request we will contact you.  Otherwise, please contact your pharmacy directly to see if your prescription is ready for .     Our clinic is open from Monday-Friday 8:00am-4:30pm and there is not an On Call after hours service.   If medical care is needed after hours or on the weekend you will need to contact your primary care physician or go to an Urgent Care or ER.          Island Pedicle Flap-Requiring Vessel Identification Text: The defect edges were debeveled with a #15 scalpel blade.  Given the location of the defect, shape of the defect and the proximity to free margins an island pedicle advancement flap was deemed most appropriate.  Using a sterile surgical marker, an appropriate advancement flap was drawn, based on the axial vessel mentioned above, incorporating the defect, outlining the appropriate donor tissue and placing the expected incisions within the relaxed skin tension lines where possible.    The area thus outlined was incised deep to adipose tissue with a #15 scalpel blade.  The skin margins were undermined to an appropriate distance in all directions around the primary defect and laterally outward around the island pedicle utilizing iris scissors.  There was minimal undermining beneath the pedicle flap.

## 2023-06-05 NOTE — PHARMACOTHERAPY INTERVENTION NOTE - INTERVENTION TYPE MED REC
Med Rec - Admission
Detail Level: Simple
Instructions: This plan will send the code FBSE to the PM system.  DO NOT or CHANGE the price.
Price (Do Not Change): 0.00

## 2023-07-19 NOTE — PROCEDURE: MOHS SURGERY
[Use of Plain Language] : use of plain language [Adequate] : adequate [None] : none Secondary Intention Text (Leave Blank If You Do Not Want): The defect will heal with secondary intention.

## 2024-04-10 NOTE — ED PROVIDER NOTE - DATE/TIME 1
"History  Chief Complaint   Patient presents with    Shortness of Breath     Reports he recently completed  5 days of steriods 72 hours ago. States he has been on steriods for the past month. Started with increased shortness of breath and wheezing yesterday, using his inhaler q 4 hours.      62-year-old male with history of asthma with multiple prior admissions intubations and 2 prior episodes of respiratory arrest resulting in cardiac arrest presents complaining of asthma exacerbation.  Patient states that his asthma was well-controlled for a long time when he was placed on Dupixent but his insurance stopped covering this and when he got taken off he began having more flares.  Patient states that he ran out of steroids 3 days ago and believes he needs steroids again.  Patient is having some chest discomfort and shortness of breath.  Patient denies painful breathing.  Patient took 1 nebulizer at home and received a second nebulizer via EMS and route to the hospital.  Patient states that the second nebulizer he received made him feel much better but he continues to have some chest tightness.      History provided by:  Patient   used: No        Prior to Admission Medications   Prescriptions Last Dose Informant Patient Reported? Taking?   Blood Glucose Monitoring Suppl (ACURA BLOOD GLUCOSE METER) w/Device KIT  Self Yes No   Sig: by Does not apply route   Calcium Carb-Cholecalciferol (CALCIUM CARBONATE-VITAMIN D3 PO)  Self Yes No   Sig: Take 1,500 mg by mouth daily   Cinnamon 500 MG capsule  Self Yes No   Sig: Take 500 mg by mouth daily   Diclofenac Sodium (VOLTAREN) 1 %   No No   Sig: Apply 2 g topically 4 (four) times a day   EPINEPHrine (EPIPEN) 0.3 mg/0.3 mL SOAJ   No No   Sig: Inject 0.3 mL (0.3 mg total) into a muscle once for 1 dose   FIBER PO  Self Yes No   Sig: Take by mouth   Insulin Pen Needle 32G X 4 MM MISC  Self Yes No   Sig: Unifine Pentips Plus 32 gauge x 5/32\" needle   TAMSULOSIN HCL " PO  Self Yes No   Sig: Take 0.4 mg by mouth daily    Turmeric 500 MG CAPS  Self Yes No   Sig: Take by mouth   VITAMIN D PO  Self Yes No   Sig: Take by mouth   acetaminophen (TYLENOL) 650 mg CR tablet   No No   Sig: Take 1 tablet (650 mg total) by mouth every 8 (eight) hours as needed for mild pain   albuterol (2.5 mg/3 mL) 0.083 % nebulizer solution   No No   Sig: Take 3 mL (2.5 mg total) by nebulization every 6 (six) hours as needed for wheezing or shortness of breath   albuterol (ProAir HFA) 90 mcg/act inhaler   No No   Sig: Inhale 2 puffs every 6 (six) hours as needed for wheezing   amLODIPine (NORVASC) 5 mg tablet  Self Yes No   Sig: Take 5 mg by mouth daily   atorvastatin (LIPITOR) 80 mg tablet  Self Yes No   Sig: Take 80 mg by mouth daily at bedtime   betamethasone dipropionate (DIPROSONE) 0.05 % ointment   No No   Sig: Apply topically 2 (two) times a day   bimatoprost (LUMIGAN) 0.03 % ophthalmic drops  Self Yes No   Sig: Administer 1 drop to both eyes daily at bedtime     carBAMazepine (TEGretol) 200 mg tablet  Self Yes No   Sig: Take 2 tabs in the morning and 3 tabs at night   diclofenac sodium (VOLTAREN) 50 mg EC tablet   No No   Sig: Take 1 tablet (50 mg total) by mouth 2 (two) times a day for 10 days   dupilumab (DUPIXENT) subcutaneous injection   No No   Sig: Inject 2 mL (300 mg total) under the skin every 14 (fourteen) days   erythromycin (ILOTYCIN) ophthalmic ointment   No No   Sig: Place a 1/2 inch ribbon of ointment into the lower eyelid.   fexofenadine (ALLEGRA) 60 MG tablet   No No   Sig: Take 1 tablet (60 mg total) by mouth daily   finasteride (PROSCAR) 5 mg tablet  Self Yes No   Sig: Take 5 mg by mouth daily   fluticasone (FLONASE) 50 mcg/act nasal spray   No No   Si spray into each nostril daily   fluticasone-vilanterol (Breo Ellipta) 200-25 mcg/actuation inhaler   No No   Sig: Inhale 1 puff daily Rinse mouth after use.   gabapentin (NEURONTIN) 300 mg capsule  Self Yes No   Sig: Take 300 mg  by mouth 3 (three) times a day   glucagon 1 MG injection  Self Yes No   Sig: as needed   guaiFENesin (MUCINEX) 600 mg 12 hr tablet  Self No No   Sig: Take 1 tablet (600 mg total) by mouth every 12 (twelve) hours   hydrOXYzine pamoate (VISTARIL) 50 mg capsule  Self Yes No   Sig: Take 50 mg by mouth Three times daily as needed   insulin aspart (NovoLOG) 100 units/mL injection  Self Yes No   Sig: Inject under the skin 3 (three) times a day before meals   insulin glargine (BASAGLAR KWIKPEN) 100 units/mL injection pen  Self Yes No   Sig: Inject 43 Units under the skin daily at bedtime    ipratropium (ATROVENT) 0.02 % nebulizer solution  Self No No   Sig: Take 2.5 mL (0.5 mg total) by nebulization 4 (four) times a day   latanoprost (XALATAN) 0.005 % ophthalmic solution  Self Yes No   Sig: Administer 1 drop to both eyes daily   lidocaine (LIDODERM) 5 %  Self No No   Sig: Apply 1 patch topically daily Remove & Discard patch within 12 hours or as directed by MD   naloxone (NARCAN) 4 mg/0.1 mL nasal spray  Self No No   Sig: Administer 1 spray into a nostril. If no response after 2-3 minutes, give another dose in the other nostril using a new spray.   naproxen (EC NAPROSYN) 375 MG TBEC   No No   Sig: Take 1 tablet (375 mg total) by mouth 2 (two) times a day with meals   omega-3-acid ethyl esters (LOVAZA) 1 g capsule  Self Yes No   Sig: Take 1 g by mouth daily   omeprazole (PriLOSEC) 40 MG capsule  Self Yes No   Sig: Take 40 mg by mouth daily   perphenazine 2 mg tablet  Self Yes No   Sig: Take 2 mg by mouth daily   polyvinyl alcohol (LIQUIFILM TEARS) 1.4 % ophthalmic solution  Self Yes No   Si drops as needed for dry eyes   predniSONE 20 mg tablet   No No   Sig: Take 1 tablet (20 mg total) by mouth daily   tolnaftate (TINACTIN) 1 % powder  Self Yes No   Sig: Apply 1 application topically 2 (two) times a day      Facility-Administered Medications: None       Past Medical History:   Diagnosis Date    Asthma     Bipolar  disorder (HCC)     COPD (chronic obstructive pulmonary disease) (HCC)     Diabetes mellitus (HCC)     Enlarged prostate     Glaucoma     Hyperlipidemia     Hypertension     Overdose of heroin, accidental or unintentional, sequela     Psychiatric disorder     Seasonal allergic rhinitis     Seizures (HCC)        Past Surgical History:   Procedure Laterality Date    CIRCUMCISION      KNEE SURGERY      WRIST SURGERY Left        Family History   Problem Relation Age of Onset    Arthritis Mother      I have reviewed and agree with the history as documented.    E-Cigarette/Vaping    E-Cigarette Use Never User      E-Cigarette/Vaping Substances    Nicotine No     THC No     CBD No     Flavoring No     Other No     Unknown No      Social History     Tobacco Use    Smoking status: Former     Current packs/day: 0.00     Average packs/day: 2.0 packs/day for 27.0 years (54.0 ttl pk-yrs)     Types: Cigarettes     Start date:      Quit date:      Years since quittin.2    Smokeless tobacco: Never   Vaping Use    Vaping status: Never Used   Substance Use Topics    Alcohol use: Not Currently    Drug use: Not Currently     Types: Heroin, Fentanyl     Comment: OD 24 heroin       Review of Systems   Constitutional: Negative.  Negative for chills and fatigue.   HENT:  Negative for ear pain and sore throat.    Eyes:  Negative for photophobia and redness.   Respiratory:  Positive for shortness of breath and wheezing. Negative for apnea and cough.    Cardiovascular:  Positive for chest pain.   Gastrointestinal:  Negative for abdominal pain, nausea and vomiting.   Genitourinary:  Negative for dysuria.   Musculoskeletal:  Negative for arthralgias, neck pain and neck stiffness.   Skin:  Negative for rash.   Neurological:  Negative for dizziness, tremors, syncope and weakness.   Psychiatric/Behavioral:  Negative for suicidal ideas.        Physical Exam  Physical Exam  Constitutional:       General: He is not in acute  distress.     Appearance: He is well-developed. He is not diaphoretic.   Eyes:      Pupils: Pupils are equal, round, and reactive to light.   Cardiovascular:      Rate and Rhythm: Normal rate and regular rhythm.   Pulmonary:      Effort: Pulmonary effort is normal. No respiratory distress.      Breath sounds: Wheezing present.      Comments: Mild diffuse expiratory wheezes without conversational dyspnea or accessory muscle usage.  No respiratory distress.  Abdominal:      General: Bowel sounds are normal. There is no distension.      Palpations: Abdomen is soft.   Musculoskeletal:         General: Normal range of motion.      Cervical back: Normal range of motion and neck supple.   Skin:     General: Skin is warm and dry.   Neurological:      Mental Status: He is alert and oriented to person, place, and time.         Vital Signs  ED Triage Vitals   Temperature Pulse Respirations Blood Pressure SpO2   04/10/24 0611 04/10/24 0611 04/10/24 0611 04/10/24 0611 04/10/24 0611   98.5 °F (36.9 °C) 101 20 165/86 99 %      Temp Source Heart Rate Source Patient Position - Orthostatic VS BP Location FiO2 (%)   04/10/24 0611 04/10/24 0611 04/10/24 0611 04/10/24 0611 --   Tympanic Monitor Lying Left arm       Pain Score       04/10/24 0711       No Pain           Vitals:    04/10/24 0611 04/10/24 0711   BP: 165/86 132/88   Pulse: 101 85   Patient Position - Orthostatic VS: Lying Sitting         Visual Acuity      ED Medications  Medications   methylPREDNISolone sodium succinate (Solu-MEDROL) 125 MG injection **ADS Override Pull** (0 mg  Given to EMS 4/10/24 0621)   ipratropium-albuterol (DUO-NEB) 0.5-2.5 mg/3 mL inhalation solution **ADS Override Pull** (  Given to EMS 4/10/24 0622)       Diagnostic Studies  Results Reviewed       Procedure Component Value Units Date/Time    FLU/RSV/COVID - if FLU/RSV clinically relevant [[id number]]  (Normal) Collected: 04/10/24 0633    Lab Status: Final result Specimen: Nares from Nose Updated:  04/10/24 0722     SARS-CoV-2 Negative     INFLUENZA A PCR Negative     INFLUENZA B PCR Negative     RSV PCR Negative    Narrative:      FOR PEDIATRIC PATIENTS - copy/paste COVID Guidelines URL to browser: [email or web address]    SARS-CoV-2 assay is a Nucleic Acid Amplification assay intended for the  qualitative detection of nucleic acid from SARS-CoV-2 in nasopharyngeal  swabs. Results are for the presumptive identification of SARS-CoV-2 RNA.    Positive results are indicative of infection with SARS-CoV-2, the virus  causing COVID-19, but do not rule out bacterial infection or co-infection  with other viruses. Laboratories within the [place] and its  territories are required to report all positive results to the appropriate  public health authorities. Negative results do not preclude SARS-CoV-2  infection and should not be used as the sole basis for treatment or other  patient management decisions. Negative results must be combined with  clinical observations, patient history, and epidemiological information.  This test has not been FDA cleared or approved.    This test has been authorized by FDA under an Emergency Use Authorization  (EUA). This test is only authorized for the duration of time the  declaration that circumstances exist justifying the authorization of the  emergency use of an in vitro diagnostic tests for detection of SARS-CoV-2  virus and/or diagnosis of COVID-19 infection under section 564(b)(1) of  the Act, 21 U.S.C. 360bbb-3(b)(1), unless the authorization is terminated  or revoked sooner. The test has been validated but independent review by FDA  and CLIA is pending.    Test performed using [employer]: This RT-PCR assay targets N2,  a region unique to SARS-CoV-2. A conserved region in the E-gene was chosen  for pan-Sarbecovirus detection which includes SARS-CoV-2.    According to CMS-2020-01-R, this platform meets the definition  of high-throughput technology.    HS Troponin 0hr (reflex protocol) [[id number]]  (Normal) Collected: 04/10/24 0633    Lab Status: Final result Specimen: Blood from Arm, Left Updated: 04/10/24 0707     hs TnI 0hr <2 ng/L     Comprehensive metabolic panel [[id number]]  (Abnormal) Collected: 04/10/24 0633    Lab Status: Final result Specimen: Blood from Arm, Left Updated: 04/10/24 0702     Sodium 139 mmol/L      Potassium 3.7 mmol/L      Chloride 103 mmol/L      CO2 27 mmol/L      ANION GAP 9 mmol/L      BUN 17 mg/dL      Creatinine 1.11 mg/dL      Glucose 112 mg/dL      Calcium 9.3 mg/dL      AST 19 U/L      ALT 19 U/L      Alkaline Phosphatase 53 U/L      Total Protein 6.1 g/dL      Albumin 4.0 g/dL      Total Bilirubin 0.39 mg/dL      eGFR 71 ml/min/1.73sq m     Narrative:      National Kidney Disease Foundation guidelines for Chronic Kidney Disease (CKD):     Stage 1 with normal or high GFR (GFR > 90 mL/min/1.73 square meters)    Stage 2 Mild CKD (GFR = 60-89 mL/min/1.73 square meters)    Stage 3A Moderate CKD (GFR = 45-59 mL/min/1.73 square meters)    Stage 3B Moderate CKD (GFR = 30-44 mL/min/1.73 square meters)    Stage 4 Severe CKD (GFR = 15-29 mL/min/1.73 square meters)    Stage 5 End Stage CKD (GFR <15 mL/min/1.73 square meters)  Note: GFR calculation is accurate only with a steady state creatinine    CBC and differential [[id number]] Collected: 04/10/24 0633    Lab Status: Final result Specimen: Blood from Arm, Left Updated: 04/10/24 0645     WBC 8.90 Thousand/uL      RBC 5.26 Million/uL      Hemoglobin 15.2 g/dL      Hematocrit 45.0 %      MCV 86 fL      MCH 28.9 pg      MCHC 33.8 g/dL      RDW 12.6 %      MPV 9.4 fL      Platelets 219 Thousands/uL      nRBC 0 /100 WBCs      Segmented % 52 %      Immature Grans % 0 %      Lymphocytes % 29 %      Monocytes % 12 %      Eosinophils Relative 6 %      Basophils Relative 1 %      Absolute Neutrophils 4.62 Thousands/µL      Absolute Immature Grans 0.04 Thousand/uL       Absolute Lymphocytes 2.59 Thousands/µL      Absolute Monocytes 1.02 Thousand/µL      Eosinophils Absolute 0.57 Thousand/µL      Basophils Absolute 0.06 Thousands/µL                    XR chest 2 views   Final Result by Evgeny Riddle MD (04/10 1224)      No acute cardiopulmonary disease.            Workstation performed: [id number]                    Procedures  ECG 12 Lead Documentation Only    Date/Time: 4/10/2024 6:49 AM    Performed by: Briana Martinez PA-C  Authorized by: Briana Martinez PA-C    Indications / Diagnosis:  CP  ECG reviewed by me, the ED Provider: yes    Patient location:  ED  Interpretation:     Interpretation: normal    Rate:     ECG rate:  87    ECG rate assessment: normal    Rhythm:     Rhythm: sinus rhythm    Ectopy:     Ectopy: none    QRS:     QRS axis:  Normal    QRS intervals:  Normal  Conduction:     Conduction: normal    ST segments:     ST segments:  Normal  T waves:     T waves: normal             ED Course                               SBIRT 22yo+      Flowsheet Row Most Recent Value   Initial Alcohol Screen: US AUDIT-C     1. How often do you have a drink containing alcohol? 0 Filed at: 04/10/2024 0620   2. How many drinks containing alcohol do you have on a typical day you are drinking?  0 Filed at: 04/10/2024 0620   3a. Male UNDER 65: How often do you have five or more drinks on one occasion? 0 Filed at: 04/10/2024 0620   3b. FEMALE Any Age, or MALE 65+: How often do you have 4 or more drinks on one occassion? 0 Filed at: 04/10/2024 0620   Audit-C Score 0 Filed at: 04/10/2024 0620   ROXY: How many times in the past year have you...    Used an illegal drug or used a prescription medication for non-medical reasons? Never Filed at: 04/10/2024 0620                      Medical Decision Making  Patient was initially seen and evaluated by myself for asthma versus COPD.  Patient has history of similar with prior exacerbations resulting in intubation and  hospitalization.  On today's evaluation patient did not have conversational dyspnea or acute respiratory distress.  Patient had significant relief following nebulizer given in the ambulance as well as Solu-Medrol.  Initial laboratory evaluation and chest x-ray was ordered by me and then care was signed out to attending at change of shift.  Please see separate note for reevaluation and disposition.    Amount and/or Complexity of Data Reviewed  Labs: ordered.  Radiology: ordered.    Risk  Prescription drug management.             Disposition  Final diagnoses:   COPD with acute exacerbation (HCC)     Time reflects when diagnosis was documented in both MDM as applicable and the Disposition within this note       Time User Action Codes Description Comment    4/10/2024  7:13 AM Kingston Quezada Add [J44.1] COPD with acute exacerbation (HCC)           ED Disposition       ED Disposition   Discharge    Condition   Stable    Date/Time   Wed Apr 10, 2024 0713    Comment   Moises Ordaz discharge to home/self care.                   Follow-up Information       Follow up With Specialties Details Why Contact Info    Agustín Sethi MD Family Medicine   [address] [place], [place] [id number] [phone]              Discharge Medication List as of 4/10/2024  7:14 AM        START taking these medications    Details   benzonatate (TESSALON PERLES) 100 mg capsule Take 1 capsule (100 mg total) by mouth 3 (three) times a day as needed for cough, Starting Wed 4/10/2024, Normal      !! predniSONE 20 mg tablet Take 3 tablets (60 mg total) by mouth daily for 5 days, Starting Wed 4/10/2024, Until Mon 4/15/2024, Normal       !! - Potential duplicate medications found. Please discuss with provider.        CONTINUE these medications which have NOT CHANGED    Details   acetaminophen (TYLENOL) 650 mg CR tablet Take 1 tablet (650 mg total) by mouth every 8 (eight) hours as needed for mild pain, Starting Wed 2/7/2024, Normal      albuterol (2.5 mg/3  mL) 0.083 % nebulizer solution Take 3 mL (2.5 mg total) by nebulization every 6 (six) hours as needed for wheezing or shortness of breath, Starting Tue 12/26/2023, Normal      albuterol (ProAir HFA) 90 mcg/act inhaler Inhale 2 puffs every 6 (six) hours as needed for wheezing, Starting Fri 7/21/2023, Normal      amLODIPine (NORVASC) 5 mg tablet Take 5 mg by mouth daily, Historical Med      atorvastatin (LIPITOR) 80 mg tablet Take 80 mg by mouth daily at bedtime, Starting Thu 9/8/2016, Historical Med      betamethasone dipropionate (DIPROSONE) 0.05 % ointment Apply topically 2 (two) times a day, Starting Fri 8/4/2023, Normal      bimatoprost (LUMIGAN) 0.03 % ophthalmic drops Administer 1 drop to both eyes daily at bedtime  , Starting Thu 6/5/2008, Historical Med      Blood Glucose Monitoring Suppl (ACURA BLOOD GLUCOSE METER) w/Device KIT by Does not apply route, Historical Med      Calcium Carb-Cholecalciferol (CALCIUM CARBONATE-VITAMIN D3 PO) Take 1,500 mg by mouth daily, Starting Fri 9/20/2013, Historical Med      carBAMazepine (TEGretol) 200 mg tablet Take 2 tabs in the morning and 3 tabs at night, Historical Med      Cinnamon 500 MG capsule Take 500 mg by mouth daily, Historical Med      Diclofenac Sodium (VOLTAREN) 1 % Apply 2 g topically 4 (four) times a day, Starting Wed 2/7/2024, Normal      diclofenac sodium (VOLTAREN) 50 mg EC tablet Take 1 tablet (50 mg total) by mouth 2 (two) times a day for 10 days, Starting Mon 7/12/2021, Until Wed 3/20/2024, Normal      dupilumab (DUPIXENT) subcutaneous injection Inject 2 mL (300 mg total) under the skin every 14 (fourteen) days, Starting Wed 3/20/2024, Print      EPINEPHrine (EPIPEN) 0.3 mg/0.3 mL SOAJ Inject 0.3 mL (0.3 mg total) into a muscle once for 1 dose, Starting Wed 3/20/2024, Normal      erythromycin (ILOTYCIN) ophthalmic ointment Place a 1/2 inch ribbon of ointment into the lower eyelid., Normal      fexofenadine (ALLEGRA) 60 MG tablet Take 1 tablet (60 mg  "total) by mouth daily, Starting Wed 3/20/2024, Normal      FIBER PO Take by mouth, Historical Med      finasteride (PROSCAR) 5 mg tablet Take 5 mg by mouth daily, Starting Tue 4/26/2016, Historical Med      fluticasone (FLONASE) 50 mcg/act nasal spray 1 spray into each nostril daily, Starting Wed 3/20/2024, Normal      fluticasone-vilanterol (Breo Ellipta) 200-25 mcg/actuation inhaler Inhale 1 puff daily Rinse mouth after use., Starting Wed 3/20/2024, Until Mon 9/16/2024, Normal      gabapentin (NEURONTIN) 300 mg capsule Take 300 mg by mouth 3 (three) times a day, Starting Thu 9/8/2016, Historical Med      glucagon 1 MG injection as needed, Starting Fri 8/5/2016, Historical Med      guaiFENesin (MUCINEX) 600 mg 12 hr tablet Take 1 tablet (600 mg total) by mouth every 12 (twelve) hours, Starting Fri 5/3/2019, Print      hydrOXYzine pamoate (VISTARIL) 50 mg capsule Take 50 mg by mouth Three times daily as needed, Starting Thu 8/8/2019, Historical Med      insulin aspart (NovoLOG) 100 units/mL injection Inject under the skin 3 (three) times a day before meals, Historical Med      insulin glargine (BASAGLAR KWIKPEN) 100 units/mL injection pen Inject 43 Units under the skin daily at bedtime , Historical Med      Insulin Pen Needle 32G X 4 MM MISC Unifine Pentips Plus 32 gauge x 5/32\" needle, Historical Med      ipratropium (ATROVENT) 0.02 % nebulizer solution Take 2.5 mL (0.5 mg total) by nebulization 4 (four) times a day, Starting Mon 3/28/2022, Normal      latanoprost (XALATAN) 0.005 % ophthalmic solution Administer 1 drop to both eyes daily, Historical Med      lidocaine (LIDODERM) 5 % Apply 1 patch topically daily Remove & Discard patch within 12 hours or as directed by MD, Starting Fri 5/14/2021, Normal      naloxone (NARCAN) 4 mg/0.1 mL nasal spray Administer 1 spray into a nostril. If no response after 2-3 minutes, give another dose in the other nostril using a new spray., Normal      naproxen (EC NAPROSYN) 375 MG " TBEC Take 1 tablet (375 mg total) by mouth 2 (two) times a day with meals, Starting Mon 5/29/2023, Until Tue 5/28/2024, Normal      omega-3-acid ethyl esters (LOVAZA) 1 g capsule Take 1 g by mouth daily, Historical Med      omeprazole (PriLOSEC) 40 MG capsule Take 40 mg by mouth daily, Starting Tue 4/26/2016, Historical Med      perphenazine 2 mg tablet Take 2 mg by mouth daily, Historical Med      polyvinyl alcohol (LIQUIFILM TEARS) 1.4 % ophthalmic solution 2 drops as needed for dry eyes, Historical Med      !! predniSONE 20 mg tablet Take 1 tablet (20 mg total) by mouth daily, Starting Wed 3/20/2024, Normal      TAMSULOSIN HCL PO Take 0.4 mg by mouth daily , Historical Med      tolnaftate (TINACTIN) 1 % powder Apply 1 application topically 2 (two) times a day, Historical Med      Turmeric 500 MG CAPS Take by mouth, Historical Med      VITAMIN D PO Take by mouth, Historical Med       !! - Potential duplicate medications found. Please discuss with provider.          No discharge procedures on file.    PDMP Review         Value Time User    PDMP Reviewed  Yes 7/12/2021  7:56 AM Eugenia Ruiz DO            ED Provider  Electronically Signed by             Briana Martinez PA-C  04/10/24 2001     08-May-2021 11:22

## 2024-05-25 NOTE — PHYSICAL EXAM
[facility] History & Physical    Patient: Vikram Munoz   MRN: [id number]  Age: 64 year old  Sex: male  : 1960   Author: Eddie Bill CNP     PCP: Derrick Loyd MD     Cc: Fever    HPI: Vikram Munoz is a 64 year old male with past medical history significant for asthma, HLD, Lewy body dementia.  He presented to the ED with wife with complaints of confusion from his baseline, foul-smelling urine and fever present for the past 2 days. Upon arrival BP 90/57, temp 99.9.  WBC 18.9, Pro-Tawanda 0.13.  UA suggestive of infection.  CXR without acute abnormalities.  Patient received ceftriaxone, IV fluids and subsequently admitted for further management.  Patient seen and examined at bedside.  He denies chills and reports some lower abdominal discomfort.  He denies any nausea, vomiting, chest pain or shortness of breath.  Wife not present at bedside.    ROS  As per HPI     Past Medical History  Past Medical History:   Diagnosis Date    Asthma (CMD)     High cholesterol     Lewy body dementia  (CMD)         Surgical History  History reviewed. No pertinent surgical history.     Social History  Social History     Tobacco Use    Smoking status: Never    Smokeless tobacco: Never   Vaping Use    Vaping status: never used   Substance Use Topics    Alcohol use: Not Currently     Comment: Hasn't had a drink in months per wife    Drug use: Never       Family History  No family history on file.     Allergies  ALLERGIES:  Seasonal    Medications  (Not in a hospital admission)      Current medications  Current Facility-Administered Medications   Medication    lactated ringers bolus 1,000 mL    cefTRIAXone (ROCEPHIN) syringe 1,000 mg     Current Outpatient Medications   Medication Sig Dispense Refill    acetaminophen (TYLENOL) 325 MG tablet Take 650 mg by mouth every 4 hours as needed for Pain.      tamsulosin (FLOMAX) 0.4 MG Cap Take 1 capsule by mouth daily. 30 capsule 0    polyethylene glycol (MIRALAX) 17 g packet Take 17 g by  mouth in the morning and 17 g in the evening. Stir and dissolve powder in any 4 to 8 ounces of beverage, then drink. 60 each 0    albuterol 108 (90 Base) MCG/ACT inhaler Inhale 2 puffs into the lungs every 4 hours as needed.      lisinopril-hydroCHLOROthiazide (ZESTORETIC) 20-12.5 MG per tablet Take 0.5 tablets by mouth daily.         Physical Exam  Physical Exam  Vitals and nursing note reviewed.   Constitutional:       General: He is sleeping.      Appearance: He is diaphoretic.   HENT:      Head: Normocephalic and atraumatic.      Mouth/Throat:      Mouth: Mucous membranes are moist.   Cardiovascular:      Rate and Rhythm: Normal rate and regular rhythm.      Pulses: Normal pulses.      Heart sounds: Normal heart sounds.   Pulmonary:      Effort: Pulmonary effort is normal.      Breath sounds: Normal breath sounds.   Abdominal:      General: Bowel sounds are normal.      Palpations: Abdomen is soft.      Tenderness: There is abdominal tenderness in the suprapubic area.   Genitourinary:     Comments: Rodriguez with yellow urine and sediments present   Musculoskeletal:         General: Normal range of motion.   Skin:     General: Skin is warm.      Capillary Refill: Capillary refill takes less than 2 seconds.   Neurological:      General: No focal deficit present.      Mental Status: He is oriented to person, place, and time and easily aroused.      Comments: Alert to self, place, time, mildly confused to situation   Psychiatric:         Mood and Affect: Mood normal.         Behavior: Behavior normal.              OBJECTIVE:      VITAL SIGNS:    Vital Last Value 24 Hour Range   Temperature 99.9 °F (37.7 °C) (05/24/24 2105) Temp  Min: 99.9 °F (37.7 °C)  Max: 99.9 °F (37.7 °C)   Pulse 81 (05/24/24 2300) Pulse  Min: 74  Max: 88   Respiratory (!) 22 (05/24/24 2300) Resp  Min: 18  Max: 31   Non-Invasive  Blood Pressure 126/68 (05/24/24 2300) BP  Min: 90/57  Max: 129/82   Pulse Oximetry 97 % (05/24/24 2300) SpO2  Min: 95 %   Max: 100 %     Vital Today Admitted   Weight 80.3 kg (177 lb) (05/24/24 2105) Weight: 80.3 kg (177 lb) (05/24/24 2105)   Height N/A Height: 5' 10\" (177.8 cm) (05/24/24 2105)   Body Mass Index N/A BMI (Calculated): 25.4 (05/24/24 2105)     LABS:   Recent Results (from the past 24 hour(s))   Lactic Acid Venous With Reflex    Collection Time: 05/24/24  9:13 PM   Result Value Ref Range    Lactate, Venous 0.8 0.0 - 2.0 mmol/L   Comprehensive Metabolic Panel    Collection Time: 05/24/24  9:13 PM   Result Value Ref Range    Fasting Status      Sodium 132 (L) 135 - 145 mmol/L    Potassium 3.9 3.4 - 5.1 mmol/L    Chloride 100 97 - 110 mmol/L    Carbon Dioxide 24 21 - 32 mmol/L    Anion Gap 12 7 - 19 mmol/L    Glucose 136 (H) 70 - 99 mg/dL    BUN 17 6 - 20 mg/dL    Creatinine 0.68 0.67 - 1.17 mg/dL    Glomerular Filtration Rate >90 >=60    BUN/Cr 25 7 - 25    Calcium 9.1 8.4 - 10.2 mg/dL    Bilirubin, Total 1.3 (H) 0.2 - 1.0 mg/dL    GOT/AST 21 <=37 Units/L    GPT/ALT 22 <64 Units/L    Alkaline Phosphatase 75 45 - 117 Units/L    Albumin 3.5 (L) 3.6 - 5.1 g/dL    Protein, Total 7.1 6.4 - 8.2 g/dL    Globulin 3.6 2.0 - 4.0 g/dL    A/G Ratio 1.0 1.0 - 2.4   Magnesium    Collection Time: 05/24/24  9:13 PM   Result Value Ref Range    Magnesium 1.8 1.7 - 2.4 mg/dL   Procalcitonin    Collection Time: 05/24/24  9:13 PM   Result Value Ref Range    Procalcitonin 0.13 (H) <=0.09 ng/mL   TROPONIN I, HIGH SENSITIVITY    Collection Time: 05/24/24  9:13 PM   Result Value Ref Range    Troponin I, High Sensitivity 4 <77 ng/L   Prothrombin Time (INR/PT)    Collection Time: 05/24/24  9:13 PM   Result Value Ref Range    Protime- PT 11.4 9.7 - 11.8 sec    INR 1.1     Partial Thromboplastin Time (PTT)    Collection Time: 05/24/24  9:13 PM   Result Value Ref Range    PTT 29 22 - 32 sec   Blood Culture    Collection Time: 05/24/24  9:13 PM    Specimen: Blood, Venous   Result Value Ref Range    Culture, Blood or Bone Marrow No Growth <24 hours    CBC  with Automated Differential (performable only)    Collection Time: 05/24/24  9:13 PM   Result Value Ref Range    WBC 18.9 (H) 4.2 - 11.0 K/mcL    RBC 4.37 (L) 4.50 - 5.90 mil/mcL    HGB 13.4 13.0 - 17.0 g/dL    HCT 40.1 39.0 - 51.0 %    MCV 91.8 78.0 - 100.0 fl    MCH 30.7 26.0 - 34.0 pg    MCHC 33.4 32.0 - 36.5 g/dL    RDW-CV 13.6 11.0 - 15.0 %    RDW-SD 46.4 39.0 - 50.0 fL     140 - 450 K/mcL    NRBC 0 <=0 /100 WBC    Neutrophil, Percent 91 %    Lymphocytes, Percent 4 %    Mono, Percent 5 %    Eosinophils, Percent 0 %    Basophils, Percent 0 %    Immature Granulocytes 0 %    Absolute Neutrophils 17.0 (H) 1.8 - 7.7 K/mcL    Absolute Lymphocytes 0.8 (L) 1.0 - 4.0 K/mcL    Absolute Monocytes 1.0 (H) 0.3 - 0.9 K/mcL    Absolute Eosinophils  0.0 0.0 - 0.5 K/mcL    Absolute Basophils 0.0 0.0 - 0.3 K/mcL    Absolute Immature Granulocytes 0.1 0.0 - 0.2 K/mcL   Electrocardiogram 12-Lead    Collection Time: 05/24/24  9:23 PM   Result Value Ref Range    Ventricular Rate EKG/Min (BPM) 81     Atrial Rate (BPM) 81     ID-Interval (MSEC) 152     QRS-Interval (MSEC) 100     QT-Interval (MSEC) 348     QTc 404     P Axis (Degrees) 69     R Axis (Degrees) 62     T Axis (Degrees) 54     REPORT TEXT       Normal sinus rhythm  Normal ECG  When compared with ECG of  02-APR-2024 18:02,  No significant change was found     Urinalysis & Reflex Microscopy With Culture If Indicated    Collection Time: 05/24/24 10:58 PM   Result Value Ref Range    COLOR, URINALYSIS Yellow     APPEARANCE, URINALYSIS Cloudy     GLUCOSE, URINALYSIS Negative Negative mg/dL    BILIRUBIN, URINALYSIS Negative Negative    KETONES, URINALYSIS >80 (A) Negative mg/dL    SPECIFIC GRAVITY, URINALYSIS 1.025 1.005 - 1.030    OCCULT BLOOD, URINALYSIS Large (A) Negative    PH, URINALYSIS 6.0 5.0 - 7.0    PROTEIN, URINALYSIS 100 (A) Negative mg/dL    UROBILINOGEN, URINALYSIS 0.2 0.2, 1.0 mg/dL    NITRITE, URINALYSIS Positive (A) Negative    LEUKOCYTE ESTERASE,  URINALYSIS Large (A) Negative    SQUAMOUS EPITHELIAL, URINALYSIS None Seen None Seen, 1 to 5 /hpf    ERYTHROCYTES, URINALYSIS >100 (A) None Seen, 1 to 2 /hpf    LEUKOCYTES, URINALYSIS >100 (A) None Seen, 1 to 5 /hpf    BACTERIA, URINALYSIS None Seen None Seen /hpf    HYALINE CASTS, URINALYSIS None Seen None Seen, 1 to 5 /lpf    MUCUS Present         IMAGING:  LAST X-RAY:  === 05/24/24 ===    XR CHEST AP OR PA    - Narrative -  EXAM:  PORTABLE CHEST RADIOGRAPH, 1 View    CLINICAL INDICATION:  Fever    COMPARISON: None    - Impression -  Normal contours the cardiomediastinal silhouette. No large airspace  opacity, pleural effusion or pneumothorax. Degenerative changes of the  spine.    Electronically Signed by: ELIZABETH LOUIS D.O.  Signed on: 5/24/2024 10:07 PM  Workstation ID: [id number]      === 04/12/24 ===    XR CHEST AP OR PA    - Narrative -  EXAM: XR CHEST AP OR PA    CLINICAL INDICATION: Fever.    COMPARISON: Chest x-ray 4/2/2024    TECHNIQUE: Frontal view of the chest.    FINDINGS:      The cardiomediastinal silhouette is normal in size and configuration.    There is no consolidation, pleural effusion, or pneumothorax.    - Impression -  No acute intrathoracic abnormality.    Electronically Signed by: OMAR FERMIN M.D.  Signed on: 4/12/2024 8:30 PM  Workstation ID: [id number]      === 04/02/24 ===    XR CHEST AP OR PA    - Narrative -  EXAM: XR CHEST AP OR PA    CLINICAL INDICATION: Shortness of breath.    COMPARISON: 2017.    - Impression -  FINDINGS AND IMPRESSION:    Heart size is normal. No infiltrate, pleural effusion or pneumothorax.          Electronically Signed by: DREW HECTOR M.D.  Signed on: 4/2/2024 5:38 PM  Workstation ID: [id number]    PROBLEM LIST:    Patient Active Problem List   Diagnosis    Stercoral colitis    Allergic rhinitis    Anxiety    Asthma (CMD)    Dermoid cyst of right upper extremity    Family history of rectal cancer    Hypercholesterolemia    Hypertension,  essential    Left ventricular hypertrophy    Lewy body dementia  (CMD)    Solitary pulmonary nodule    Vasculogenic erectile dysfunction    Adenomatous colon polyp    Disorientation       ASSESSMENT/PLAN:     Acute metabolic encephalopathy (improving) likely secondary to suspected urinary tract infection with chronic indwelling catheter and underlying Lewy body dementia  Reported fevers at home, afebrile here  UA as above, urine cultures pending  ABX coverage: IV ceftriaxone  Supportive care with IV fluids  PT to evaluate patient  Continue alfuzosin    Hypotension with underlying primary hypertension  On Zestoretic as OP, holding  Continue IV fluids  Monitoring BP    Hyperglycemia   on arrival  >80 Ketones in urine  Obtain HgbA1c    Asthma  Albuterol as needed    DVT PROPHYLAXIS: Lovenox    DISPOSITION: pending improvement in above.      JERRY: TBD    CODE STATUS: ACP discussion, measures including CPR, intubation, cardioversion and medications reviewed.   Patient would like to be a full resuscitation.    The case was reviewed with Dr. Church. A substantive portion of the medical decision making and plan of care was performed by Dr. Church.      Eddie Bill, [facility]  [phone]    The time of this documentation, may not reflect the time patient was seen at bedside.     Note to Patient: The 21st Century Cures Act makes medical notes like these available to patients in the interest of transparency. However, be advised this is a medical document. It is intended as [id number] communication. It is written in medical language and may contain abbreviations or verbiage that are unfamiliar. It may appear blunt or direct. Medical documents are intended to carry relevant information, facts as evident, and the clinical opinion of the practitioner.  This note may have been transcribed using a voice dictation system. Voice recognition errors may occur. This should not be taken to alter the  content or meaning of this note.    [Well Developed] : well developed [Well Nourished] : well nourished [No Acute Distress] : no acute distress [Normal Conjunctiva] : normal conjunctiva [Normal Venous Pressure] : normal venous pressure [No Carotid Bruit] : no carotid bruit [Normal S1, S2] : normal S1, S2 [No Murmur] : no murmur [No Rub] : no rub [No Gallop] : no gallop [Clear Lung Fields] : clear lung fields [Good Air Entry] : good air entry [No Respiratory Distress] : no respiratory distress  [Soft] : abdomen soft [Non Tender] : non-tender [No Masses/organomegaly] : no masses/organomegaly [Normal Bowel Sounds] : normal bowel sounds [Abnormal Gait] : abnormal gait [No Cyanosis] : no cyanosis [No Clubbing] : no clubbing [No Varicosities] : no varicosities [Moves all extremities] : moves all extremities [Normal Speech] : normal speech [Alert and Oriented] : alert and oriented [Normal memory] : normal memory [de-identified] : +2 pittinge L>R [de-identified] : large bruise and small superficial hematoma over left lower back [de-identified] : R-facial droop, paralysis

## 2024-05-27 NOTE — ED ADULT NURSE NOTE - BREATH SOUNDS, RIGHT
----- Message from Vivian Taylor sent at 5/27/2024 10:45 AM CDT -----  Pt is calling in regards to getting lab results and refill for levothyroxine (EUTHYROX) 175 MCG tablet Pt can be reached at [phone].            [id number] - [place] - [address]  [address] [address]  Phone: [phone] Fax: [phone]        Thanks  TNARMINDA   clear

## 2024-06-21 NOTE — DISCHARGE NOTE NURSING/CASE MANAGEMENT/SOCIAL WORK - NSDCPEPT PROEDMA_GEN_ALL_CORE
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  Pt sister resides in [facility].

## 2024-10-16 NOTE — PROGRESS NOTE ADULT - ASSESSMENT
Assessment & Plan   Neuro:   #Seizure disorder  Continue keppra 1000 mg bid, Gabapentin 400mg BID    #Impulsivity  No issues since 10/11 while in restraints  Continue qhs haldol     #Hearing loss  Some baseline deficits  Has b/l severely impacted cerumen  Continue debrox, attempt to flush later this week     CV:   #Atrial fibrillation  metoprolol tartrate 25 mg bid, eliquis 5 mg bid     #HFpEF  #Moderate to severe aortic regurgitation  9/11 ECHO: LVEF 55%, hypokinetic apex. Moderate to severe aortic regurgitation, mild tricuspid regurgitation, dilated ascending aortic root up to 5.4 cm.  On maintenance lasix po daily, maintain euvolemia  Continue to monitor fluid status      #History of AAA  5.4 cm  Monitor hemodynamics; BP goal <130/80  Outpatient follow up     Pulm:  #Acute on Chronic hypoxic and hypercapnic respiratory failure.  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, 2016  Status post trach 9/19  Continue VC+, daily SBT  Continue Xopenex and Atrovent BID     #Respiratory acidosis  2/2 poor tidal volume and underlying COPD  Adjust vent settings as tolerated     #Pleural effusions  L>R, persistent despite diuresis  Not amenable to drainage on POCUS     GI:   #PEG placed 9/19  Continue tube feeds at goal  Bowel regimen: Senokot, Miralax     :   #Urinary retention  Doxazosin 2mg     #Metabolic alkalosis  Due to diuresis & compensation from respiratory acidosis     F/E/N:   F: None  E: Replete as needed  N: Continue TwoCalHN continuous     Heme/Onc:   #Anemia of chronic disease  Transfuse for Hgb <7 and/or symptomatic anemia     #DVT ppx  SCDs and eliquis     Endo:   #Hypothyroidism  Home med: Levothyroxine 250 mg  TSH 45.06 on 9/25 from 0.59 on 9/5, free T4 0.34 (9/25)  Restarted on home levothyroxine 250 mg  Recheck 10/31     ID:   #Colonization with Acinetobacter  Continue to monitor WBC count and temperature curve     MSK/Skin:    PT/OT  Frequent turns/repositioning  Skin surveillance      L: midline, PEG  D: none  A: VC+ 350/8/16/40%      Disposition: Medically stable for discharge, pending placement    ICU Core Measures     Vented Patient  VAP Bundle  VAP bundle ordered     A: Assess, Prevent, and Manage Pain Has pain been assessed? Yes  Need for changes to pain regimen? No   B: Both Spontaneous Awakening Trials (SATs) and Spontaneous Breathing Trials (SBTs) Plan to perform spontaneous awakening trial today? N/A   Plan to perform spontaneous breathing trial today? Yes   Obvious barriers to extubation? Yes   C: Choice of Sedation RASS Goal: N/A patient not on sedation  Need for changes to sedation or analgesia regimen? No   D: Delirium CAM-ICU: Unable to perform secondary to Dementia or other chronic cognitive dysfunction   E: Early Mobility  Plan for early mobility? Yes   F: Family Engagement Plan for family engagement today? Yes       Review of Invasive Devices:            Prophylaxis:  VTE VTE covered by:  apixaban, Oral, 5 mg at 10/15/24 1751       Stress Ulcer  covered byfamotidine (PEPCID) oral suspension 20 mg [[id number]]         24 Hour Events : no acute events  Subjective  Patient is resting comfortably in bed. No new complaints at this time.       Objective :                   Vitals I/O      Most Recent Min/Max in 24hrs   Temp 98.8 °F (37.1 °C) Temp  Min: 98.4 °F (36.9 °C)  Max: 98.8 °F (37.1 °C)   Pulse 69 Pulse  Min: 61  Max: 80   Resp 20 Resp  Min: 16  Max: 33   /57 BP  Min: 95/45  Max: 132/64   O2 Sat 97 % SpO2  Min: 97 %  Max: 100 %      Intake/Output Summary (Last 24 hours) at 10/16/2024 0637  Last data filed at 10/15/2024 2338  Gross per 24 hour   Intake 1019.75 ml   Output 1125 ml   Net -105.25 ml       Diet Enteral/Parenteral; Tube Feeding No Oral Diet; Two Mirza HN; Cyclic; 45; 20 hours; Prosource Protein Liquid - One Packet; BID; 100; Water; Every 4 hours    Invasive Monitoring           Physical Exam   Physical  Exam  Vitals and nursing note reviewed.   Eyes:      Conjunctiva/sclera: Conjunctivae normal.   Skin:     General: Skin is warm and dry.   HENT:      Head: Normocephalic and atraumatic.      Ears:      Comments: Impacted b/l     Mouth/Throat:      Mouth: Mucous membranes are moist.   Cardiovascular:      Rate and Rhythm: Normal rate and regular rhythm.   Musculoskeletal:      Right lower leg: No edema.      Left lower leg: No edema.   Abdominal: General: Bowel sounds are normal. There is abdominal type feeding tube.     Palpations: Abdomen is soft.   Constitutional:       Appearance: He is well-developed and well-nourished.      Interventions: He is restrained.   Pulmonary:      Effort: Pulmonary effort is normal.      Breath sounds: Normal breath sounds.   Neurological:      General: No focal deficit present.      Mental Status: He is alert. He is calm.   Genitourinary/Anorectal:  external catheter present.        Diagnostic Studies        Lab Results: I have reviewed the following results:     Medications:  Scheduled PRN   apixaban, 5 mg, BID  carbamide peroxide, 5 drop, BID  chlorhexidine, 15 mL, Q12H [place]  doxazosin, 2 mg, Daily  famotidine, 20 mg, BID  furosemide, 40 mg, Daily  gabapentin, 400 mg, BID  haloperidol, 5 mg, HS  ipratropium, 0.5 mg, TID  levalbuterol, 1.25 mg, TID  levETIRAcetam, 1,000 mg, Q12H [place]  levothyroxine, 250 mcg, Early Morning  melatonin, 6 mg, HS  metoprolol tartrate, 25 mg, Q12H [place]  [START ON 10/19/2024] nicotine, 14 mg, Daily  nicotine, 1 patch, Daily  [START ON 11/2/2024] nicotine, 7 mg, Daily  polyethylene glycol, 17 g, BID  sodium chloride, 4 mL, TID      acetaminophen, 650 mg, Q4H PRN  albuterol, 2.5 mg, TID PRN  oxyCODONE, 2.5 mg, Q6H PRN       Continuous          Labs:   CBC    Recent Labs     10/16/24  0622   WBC 6.09   HGB 7.5*   HCT 22.9*        BMP    Recent Labs     10/14/24  2243   SODIUM 136   K 4.4   CL 95*   CO2 39*   AGAP 2*   BUN 33*   CREATININE 0.56*   CALCIUM  8.7       Coags    Recent Labs     10/15/24  0728 10/15/24  1513   PTT 39* 96*        Additional Electrolytes  Recent Labs     10/14/24  2243   MG 1.9   PHOS 3.6          Blood Gas    No recent results  Recent Labs     10/16/24  0622   PHVEN 7.366   [id number] 68.9*   PO2VEN 38.0   [id number] 38.6*   BEVEN 11.6   [id number] 68.8    LFTs  No recent results    Infectious  No recent results  Glucose  Recent Labs     10/14/24  2243   GLUC 126         Patient with duodenal diverticular bleed, now appears to have stopped.  To be challenged with heparin and monitored for rebleeding.  Will continue to follow.  Please call with questions.

## 2024-10-23 NOTE — PHYSICAL EXAM
Doing well  derian liquids  labs and vitals ok  home today with Eliquis2.5 bid and PPI  instructions reviewed  f/u next week [Normal] : alert and oriented, normal memory [de-identified] : very dizzy upon standing

## 2025-05-06 NOTE — PROCEDURE: COUNSELING
Preoperative Consultation      Reggie Bhandari  YOB: 1978    Date of Service:  5/6/2025    Vitals:    05/06/25 0701   BP: 122/74   Pulse: 58   Temp: 98.7 °F (37.1 °C)   TempSrc: Temporal   Weight: 86 kg (189 lb 11.2 oz)   Height: 1.778 m (5' 10\")      Wt Readings from Last 2 Encounters:   05/06/25 86 kg (189 lb 11.2 oz)   03/18/25 85.3 kg (188 lb)     BP Readings from Last 3 Encounters:   05/06/25 122/74   03/18/25 127/80   02/25/25 133/77        Chief Complaint   Patient presents with    Pre-op Exam     REPAIR NASAL VESTIBULAR STENOSIS, LEFT, SEPTOPLASTY, SUBMUCOSAL INFERIOR TURBINATE REDUCTION BILATERAL SURGERY SET FOR 5/13/25 BY Giuseppe Lowe DO AT [facility] OR     No Known Allergies  Outpatient Medications Marked as Taking for the 5/6/25 encounter (Office Visit) with Eriberto Reyna DO   Medication Sig Dispense Refill    amphetamine-dextroamphetamine (ADDERALL, 10MG,) 10 MG tablet Take 1 tablet by mouth 2 times daily at 0800 and 1400 for 90 days. Max Daily Amount: 20 mg 180 tablet 0    valACYclovir (VALTREX) 500 MG tablet Take 1 tablet by mouth daily. 90 tablet 0    finasteride (PROPECIA) 1 MG tablet Take 1 tablet by mouth daily. 90 tablet 0    albuterol sulfate HFA (PROVENTIL;VENTOLIN;PROAIR) 108 (90 Base) MCG/ACT inhaler Inhale 2 puffs by mouth every 6 hours as needed for wheezing. 8.5 g 2    MULTIPLE VITAMIN PO Take by mouth      Omega-3 Fatty Acids (FISH OIL) 500 MG CAPS Take 500 mg by mouth daily (with breakfast)         This patient presents to the office today for a preoperative consultation at the request of surgeon, Dr. Collins, who plans on performing septoplasty, submucosal inferior turbinate reduction bilaterally on May 13 at [facility].  The current problem began several months ago, and symptoms have been worsening with time.  Conservative therapy: N/A.    Planned anesthesia: General   Known anesthesia problems: None   Bleeding risk: No recent or remote history of  Detail Level: Zone
